# Patient Record
Sex: FEMALE | Race: WHITE | NOT HISPANIC OR LATINO | Employment: OTHER | URBAN - METROPOLITAN AREA
[De-identification: names, ages, dates, MRNs, and addresses within clinical notes are randomized per-mention and may not be internally consistent; named-entity substitution may affect disease eponyms.]

---

## 2017-01-17 ENCOUNTER — HOSPITAL ENCOUNTER (OUTPATIENT)
Dept: LAB | Facility: MEDICAL CENTER | Age: 72
End: 2017-01-17
Attending: INTERNAL MEDICINE
Payer: MEDICARE

## 2017-01-17 LAB
ALBUMIN SERPL BCP-MCNC: 4.2 G/DL (ref 3.2–4.9)
ALBUMIN/GLOB SERPL: 1.5 G/DL
ALP SERPL-CCNC: 49 U/L (ref 30–99)
ALT SERPL-CCNC: 16 U/L (ref 2–50)
ANION GAP SERPL CALC-SCNC: 4 MMOL/L (ref 0–11.9)
AST SERPL-CCNC: 12 U/L (ref 12–45)
BASOPHILS # BLD AUTO: 0.04 K/UL (ref 0–0.12)
BASOPHILS NFR BLD AUTO: 0.8 % (ref 0–1.8)
BILIRUB SERPL-MCNC: 0.5 MG/DL (ref 0.1–1.5)
BUN SERPL-MCNC: 35 MG/DL (ref 8–22)
CALCIUM SERPL-MCNC: 10.1 MG/DL (ref 8.5–10.5)
CHLORIDE SERPL-SCNC: 105 MMOL/L (ref 96–112)
CHOLEST SERPL-MCNC: 232 MG/DL (ref 100–199)
CO2 SERPL-SCNC: 29 MMOL/L (ref 20–33)
CREAT SERPL-MCNC: 0.89 MG/DL (ref 0.5–1.4)
EOSINOPHIL # BLD: 0.24 K/UL (ref 0–0.51)
EOSINOPHIL NFR BLD AUTO: 5 % (ref 0–6.9)
ERYTHROCYTE [DISTWIDTH] IN BLOOD BY AUTOMATED COUNT: 51.5 FL (ref 35.9–50)
GLOBULIN SER CALC-MCNC: 2.8 G/DL (ref 1.9–3.5)
GLUCOSE SERPL-MCNC: 93 MG/DL (ref 65–99)
HCT VFR BLD AUTO: 45.8 % (ref 37–47)
HDLC SERPL-MCNC: 58 MG/DL
HGB BLD-MCNC: 14.7 G/DL (ref 12–16)
IMM GRANULOCYTES # BLD AUTO: 0.02 K/UL (ref 0–0.11)
IMM GRANULOCYTES NFR BLD AUTO: 0.4 % (ref 0–0.9)
LDLC SERPL CALC-MCNC: 108 MG/DL
LYMPHOCYTES # BLD: 0.86 K/UL (ref 1–4.8)
LYMPHOCYTES NFR BLD AUTO: 17.8 % (ref 22–41)
MCH RBC QN AUTO: 34.1 PG (ref 27–33)
MCHC RBC AUTO-ENTMCNC: 32.1 G/DL (ref 33.6–35)
MCV RBC AUTO: 106.3 FL (ref 81.4–97.8)
MONOCYTES # BLD: 0.45 K/UL (ref 0–0.85)
MONOCYTES NFR BLD AUTO: 9.3 % (ref 0–13.4)
NEUTROPHILS # BLD: 3.22 K/UL (ref 2–7.15)
NEUTROPHILS NFR BLD AUTO: 66.7 % (ref 44–72)
NRBC # BLD AUTO: 0 K/UL
NRBC BLD-RTO: 0 /100 WBC
PLATELET # BLD AUTO: 187 K/UL (ref 164–446)
PMV BLD AUTO: 12 FL (ref 9–12.9)
POTASSIUM SERPL-SCNC: 4.7 MMOL/L (ref 3.6–5.5)
PROT SERPL-MCNC: 7 G/DL (ref 6–8.2)
RBC # BLD AUTO: 4.31 M/UL (ref 4.2–5.4)
SODIUM SERPL-SCNC: 138 MMOL/L (ref 135–145)
TRIGL SERPL-MCNC: 330 MG/DL (ref 0–149)
WBC # BLD AUTO: 4.8 K/UL (ref 4.8–10.8)

## 2017-01-17 PROCEDURE — 80061 LIPID PANEL: CPT

## 2017-01-17 PROCEDURE — 36415 COLL VENOUS BLD VENIPUNCTURE: CPT

## 2017-01-17 PROCEDURE — 80053 COMPREHEN METABOLIC PANEL: CPT

## 2017-01-17 PROCEDURE — 85025 COMPLETE CBC W/AUTO DIFF WBC: CPT

## 2017-01-19 ENCOUNTER — SLEEP CENTER VISIT (OUTPATIENT)
Dept: SLEEP MEDICINE | Facility: MEDICAL CENTER | Age: 72
End: 2017-01-19
Payer: MEDICARE

## 2017-01-19 VITALS
HEART RATE: 65 BPM | DIASTOLIC BLOOD PRESSURE: 52 MMHG | BODY MASS INDEX: 38.14 KG/M2 | WEIGHT: 243 LBS | SYSTOLIC BLOOD PRESSURE: 110 MMHG | RESPIRATION RATE: 18 BRPM | HEIGHT: 67 IN

## 2017-01-19 DIAGNOSIS — G47.33 OBSTRUCTIVE SLEEP APNEA: ICD-10-CM

## 2017-01-19 PROCEDURE — 99213 OFFICE O/P EST LOW 20 MIN: CPT | Performed by: INTERNAL MEDICINE

## 2017-01-19 NOTE — PROGRESS NOTES
Clau Vences is a 71 y.o. female here for obstructive sleep apnea.    History of Present Illness:    The patient is a 71-year-old female has obstructive sleep apnea, coronary disease and hypertension. She is currently on CPAP of 17 cm water and comes in today for an annual visit. The apnea hypopnea index of 0.2 and she is averaging 5 hours and 50 minutes a night with the machine. She is having a moderate leak. She is using a nasal mask but she's not using a chinstrap. She does wake up with a dry throat and dry mouth. She is sleeping well and wakes up feeling rested and denies any daytime sleepiness. Overall she is very pleased with the results of the CPAP therapy. She has no history of any breathing problems during the daytime and denies any cough or congestion or dyspnea on exertion. She has no new complaints today. Her current CPAP machine is 1-year-old.    Constitutional:  Negative for fever, chills, sweats, and fatigue.  Eyes:  Negative for eye pain and visual changes.  HENT:  Negative for tinnitus and hoarse voice.  Cardiovascular:  Negative for chest pain, leg swelling, syncope and orthopnea.  Respiratory:  See HPI for pertinent negatives  Sleep:  Negative for somnolence, loud snoring, sleep disturbance due to breathing, insomnia.  Gastrointestinal:  Negative for dysphagia, nausea and abdominal pain.  Heme/lymph:  Denies easy bruising, blood clots.  Musculoskeletal:  Negative for arthralgias, sore muscles and back pain.  Skin:  Negative for rash and color change.  Neurological:  Negative for headaches, lightheadedness and weakness.  Psychiatric:  Denies depression.    Current Outpatient Prescriptions   Medication Sig Dispense Refill   • IRON PO Take  by mouth every day.     • Cyanocobalamin (VITAMIN B12 PO) Take  by mouth every day.     • losartan-hydrochlorothiazide (HYZAAR) 100-25 MG per tablet Take 1 Tab by mouth every day.     • carvedilol (COREG) 6.25 MG TABS Take 6.25 mg by mouth 2 times a day,  with meals.     • simvastatin (ZOCOR) 20 MG TABS Take 20 mg by mouth every evening.     • zolpidem (AMBIEN) 10 MG Tab Take 5 mg by mouth at bedtime as needed for Sleep.     • docusate sodium (COLACE) 100 MG CAPS Take 100 mg by mouth every day.     • oxycodone-acetaminophen (PERCOCET) 7.5-325 MG per tablet Take 1-2 Tabs by mouth every 6 hours as needed (pain). (Patient not taking: Reported on 1/19/2017) 60 Tab 0   • metoclopramide (REGLAN) 10 MG TABS Take 1 Tab by mouth every 6 hours as needed (nausea ). (Patient not taking: Reported on 1/19/2017) 30 Tab 0   • FOLIC ACID PO Take  by mouth every day.     • Acetaminophen (TYLENOL 8 HOUR PO) Take  by mouth as needed.     • LEXAPRO PO Take 20 mg by mouth every day.       No current facility-administered medications for this visit.       Social History   Substance Use Topics   • Smoking status: Never Smoker    • Smokeless tobacco: Never Used   • Alcohol Use: 0.0 oz/week     0 Standard drinks or equivalent per week      Comment: 5 per week       Past Medical History   Diagnosis Date   • CAD (coronary artery disease)    • Hypertension    • Other specified symptom associated with female genital organs    • Snoring    • Psychiatric problem    • Unspecified cataract    • Anesthesia      low O2 sat   • Arthritis      osteo   • Sleep apnea      c-pap with 3 l/nc   • Dyslipidemia        Past Surgical History   Procedure Laterality Date   • Gastroscopy with biopsy  7/2/2014     Performed by Sky Vila M.D. at SURGERY [facility] ORS   • Other orthopedic surgery  2006     left total knee   • Other       meghan cataracts   • Hysterectomy robotic  10/23/2014     Performed by Smith Lyons M.D. at SURGERY [facility] ORS   • Tonsillectomy and adenoidectomy     • Other abdominal surgery       Resection of pelvic mass, uterus and ovaries       Allergies:  Nkda    Family History   Problem Relation Age of Onset   • Breast Cancer Mother    • Cancer Father      Colon       Physical  "Examination    Filed Vitals:    01/19/17 1035   Height: 1.702 m (5' 7\")   Weight: 110.224 kg (243 lb)   Weight % change since last entry.: 0 %   BP: 110/52   Pulse: 65   BMI (Calculated): 38.06   Resp: 18   O2 sat % room air: 90 %       Physical Exam:  Constitutional:  Well developed and well nourished.  Head:  Normocephalic and atraumatic.  Nose:  Nose normal.  Mouth/Throat:  Oropharynx is clear and moist, no lesions.  Mallampati class IV  Neck:  Normal range of motion.  Supple.  No JVD.  Cardiovascular:  Normal rate, regular rhythm, normal heart sounds. No edema  Pulmonary/Chest: No wheezing, rales or rhonchi.  Respiratory effort non labored  Musculoskeletal.  No muscular atrophy.  Lymphadenopathy:  No cervical or supraclavicular adenopathy  Neurological:  Alert and oriented.  Cranial nerves intact.  No focal deficits  Skin:  No rashes or ulcers.  Psyciatric:  Normal mood and affect.      Assessment and Plan:  1. Obstructive sleep apnea  I have recommended that she continue with her current CPAP settings at 17 cm of water. Treatment appears effective and well tolerated and she has been compliant. She is having a leak which sounds like a mouth leak. We have ordered a deluxe wide chinstrap. She will give it a try. Otherwise is no other issues and we'll see her back in one year.  - DME OTHER      Followup Return in about 1 year (around 1/19/2018) for follow up with the sleep physician, follow up visit with AKIRA.    "

## 2017-01-19 NOTE — MR AVS SNAPSHOT
"Clau Vences   2017 10:40 AM   Sleep Center Visit   MRN: [id number]    Department:  [facility]   Dept Phone:  [phone]    Description:  Female : 1945   Provider:  Smith Orourke M.D.           Reason for Visit     Follow-Up AZUL      Allergies as of 2017     Allergen Noted Reactions    Nkda [No Known Drug Allergy] 2007         You were diagnosed with     Obstructive sleep apnea   [[id number]]         Vital Signs     Blood Pressure Pulse Respirations Height Weight Body Mass Index    110/52 mmHg 65 18 1.702 m (5' 7\") 110.224 kg (243 lb) 38.05 kg/m2    Smoking Status                   Never Smoker            Basic Information     Date Of Birth Sex Race Ethnicity Preferred Language    1945 Female White Non- English      Problem List              ICD-10-CM Priority Class Noted - Resolved    Pulmonary embolism (CMS-HCC) I26.99   2014 - Present    Acute GI bleeding K92.2   2014 - Present    Respiratory failure, acute (CMS-[facility]) J96.00 High  2014 - Present    HTN (hypertension) I10   2014 - Present    Hyperlipidemia E78.5   2014 - Present    AZUL on CPAP G47.33   2014 - Present    Anemia D64.9   2014 - Present    Hypoxia R09.02   2014 - Present    Abdominal or pelvic swelling, mass or lump, unspecified site R19.00   10/23/2014 - Present      Health Maintenance        Date Due Completion Dates    IMM DTaP/Tdap/Td Vaccine (1 - Tdap) 3/3/1964 ---    PAP SMEAR 3/3/1966 ---    COLONOSCOPY 3/3/1995 ---    IMM ZOSTER VACCINE 3/3/2005 ---    IMM PNEUMOCOCCAL 65+ (ADULT) LOW/MEDIUM RISK SERIES (1 of 2 - PCV13) 3/3/2010 ---    IMM INFLUENZA (1) 2016 ---    MAMMOGRAM 2017, 2015, 2014, 3/13/2013, 3/8/2012, 2011, 2010, 2010, 2009, 2009, 2008, 2008, 2007, 2005, 2004    BONE DENSITY 2021, 3/13/2013, 2010, 2007            Current Immunizations     No " immunizations on file.      Below and/or attached are the medications your provider expects you to take. Review all of your home medications and newly ordered medications with your provider and/or pharmacist. Follow medication instructions as directed by your provider and/or pharmacist. Please keep your medication list with you and share with your provider. Update the information when medications are discontinued, doses are changed, or new medications (including over-the-counter products) are added; and carry medication information at all times in the event of emergency situations     Allergies:  NKDA - (reactions not documented)               Medications  Valid as of: January 19, 2017 - 11:05 AM    Generic Name Brand Name Tablet Size Instructions for use    Acetaminophen   Take  by mouth as needed.        Carvedilol (Tab) COREG 6.25 MG Take 6.25 mg by mouth 2 times a day, with meals.        Cyanocobalamin   Take  by mouth every day.        Docusate Sodium (Cap) COLACE 100 MG Take 100 mg by mouth every day.        Escitalopram Oxalate   Take 20 mg by mouth every day.        Folic Acid   Take  by mouth every day.        Iron   Take  by mouth every day.        Losartan Potassium-HCTZ (Tab) HYZAAR 100-25 MG Take 1 Tab by mouth every day.        Metoclopramide HCl (Tab) REGLAN 10 MG Take 1 Tab by mouth every 6 hours as needed (nausea ).        Oxycodone-Acetaminophen (Tab) PERCOCET 7.5-325 MG Take 1-2 Tabs by mouth every 6 hours as needed (pain).        Simvastatin (Tab) ZOCOR 20 MG Take 20 mg by mouth every evening.        Zolpidem Tartrate (Tab) AMBIEN 10 MG Take 5 mg by mouth at bedtime as needed for Sleep.        .                 Medicines prescribed today were sent to:     [id number] - [place], [place] - [address]    [address] [address]    Phone: [phone] Fax: [phone]    Open 24 Hours?: No      Medication refill instructions:       If your prescription bottle indicates you have medication  refills left, it is not necessary to call your provider’s office. Please contact your pharmacy and they will refill your medication.    If your prescription bottle indicates you do not have any refills left, you may request refills at any time through one of the following ways: The online Luqit system (except Urgent Care), by calling your provider’s office, or by asking your pharmacy to contact your provider’s office with a refill request. Medication refills are processed only during regular business hours and may not be available until the next business day. Your provider may request additional information or to have a follow-up visit with you prior to refilling your medication.   *Please Note: Medication refills are assigned a new Rx number when refilled electronically. Your pharmacy may indicate that no refills were authorized even though a new prescription for the same medication is available at the pharmacy. Please request the medicine by name with the pharmacy before contacting your provider for a refill.        Instructions    1.  Continue with CPAP therapy as you are currently doing  2. We have prescribed a chinstrap to be used with the nasal mask  3. Recommend follow-up in one year          Luqit Access Code: Activation code not generated  Current Luqit Status: Active

## 2017-05-04 ENCOUNTER — HOSPITAL ENCOUNTER (OUTPATIENT)
Dept: RADIOLOGY | Facility: MEDICAL CENTER | Age: 72
End: 2017-05-04
Attending: INTERNAL MEDICINE
Payer: MEDICARE

## 2017-05-04 DIAGNOSIS — Z13.9 SCREENING: ICD-10-CM

## 2017-05-04 PROCEDURE — G0202 SCR MAMMO BI INCL CAD: HCPCS

## 2017-08-08 ENCOUNTER — OFFICE VISIT (OUTPATIENT)
Dept: INTERNAL MEDICINE | Facility: IMAGING CENTER | Age: 72
End: 2017-08-08
Payer: MEDICARE

## 2017-08-08 VITALS
RESPIRATION RATE: 14 BRPM | SYSTOLIC BLOOD PRESSURE: 142 MMHG | TEMPERATURE: 98.7 F | HEIGHT: 67 IN | BODY MASS INDEX: 37.98 KG/M2 | HEART RATE: 70 BPM | OXYGEN SATURATION: 91 % | DIASTOLIC BLOOD PRESSURE: 78 MMHG | WEIGHT: 242 LBS

## 2017-08-08 DIAGNOSIS — M15.9 PRIMARY OSTEOARTHRITIS INVOLVING MULTIPLE JOINTS: ICD-10-CM

## 2017-08-08 DIAGNOSIS — E78.2 MIXED HYPERLIPIDEMIA: ICD-10-CM

## 2017-08-08 DIAGNOSIS — F41.9 ANXIETY: ICD-10-CM

## 2017-08-08 DIAGNOSIS — F51.04 CHRONIC INSOMNIA: ICD-10-CM

## 2017-08-08 DIAGNOSIS — G47.33 OSA ON CPAP: ICD-10-CM

## 2017-08-08 DIAGNOSIS — I10 ESSENTIAL HYPERTENSION: ICD-10-CM

## 2017-08-08 PROCEDURE — 99204 OFFICE O/P NEW MOD 45 MIN: CPT | Performed by: FAMILY MEDICINE

## 2017-08-08 RX ORDER — DIAZEPAM 5 MG/1
5 TABLET ORAL EVERY 12 HOURS PRN
COMMUNITY
End: 2017-10-18 | Stop reason: SDUPTHER

## 2017-08-08 RX ORDER — CARVEDILOL 12.5 MG/1
12.5 TABLET ORAL 2 TIMES DAILY WITH MEALS
COMMUNITY
End: 2017-10-09 | Stop reason: SDUPTHER

## 2017-08-08 RX ORDER — SERTRALINE HYDROCHLORIDE 100 MG/1
100 TABLET, FILM COATED ORAL DAILY
COMMUNITY
End: 2018-12-11 | Stop reason: SDUPTHER

## 2017-08-08 RX ORDER — ZOLPIDEM TARTRATE 5 MG/1
5 TABLET ORAL NIGHTLY PRN
COMMUNITY
End: 2017-08-14 | Stop reason: SDUPTHER

## 2017-08-08 ASSESSMENT — PATIENT HEALTH QUESTIONNAIRE - PHQ9: CLINICAL INTERPRETATION OF PHQ2 SCORE: 0

## 2017-08-09 PROBLEM — F41.9 ANXIETY: Status: ACTIVE | Noted: 2017-08-09

## 2017-08-09 PROBLEM — F51.04 CHRONIC INSOMNIA: Status: ACTIVE | Noted: 2017-08-09

## 2017-08-09 NOTE — PROGRESS NOTES
CC: Establish care    HPI:  Clau is a 72 y.o. new female patient here to establish care.  Previous physician Dr. Farooq    She has a history of hyperlipidemia, hypertension, osteoarthritis, sleep apnea.  She is followed by [facility] and sees Dr. martinez once a year. She uses her sleep apnea regularly.    She is on carvedilol and Hyzaar for hypertension. She states this combination is working well for her. Denies any chest pain, shortness of breath, edema or cough.    She is on simvastatin for cholesterol. She has also been working with a nutritionist, Camilla Carr on diet, and weight loss.  She has slowly been losing weight.    She does have osteoarthritis has had a left total knee replacement. Has arthritis in her right knee and sees Dr. Celestine Starr for steroid injection every now and then. Her exercise includes yoga and Pilates. She states due to her arthritis she is somewhat limited with aerobic exercise.    She is status post complete hysterectomy by Dr. Hernandez for a benign ovarian mass.  She is up-to-date on her mammograms.    Up-to-date on colonoscopy.    She struggles with chronic insomnia. She states she has tapered down from 20 mg of Ambien to 5 mg of Ambien. She states without it she simply cannot sleep. She denies any side effects from it.    She is on sertraline for anxiety, previously on Lexapro. The sertraline seems to be working well for her. She rarely uses Valium mainly for flying anxiety.    She denies depression, no thoughts of hopelessness or suicide.      Past Medical History   Diagnosis Date   • Hypertension    • Other specified symptom associated with female genital organs    • Snoring    • Psychiatric problem    • Unspecified cataract    • Anesthesia      low O2 sat   • Arthritis      osteo   • Sleep apnea      c-pap with 3 l/nc   • Dyslipidemia        Past Surgical History   Procedure Laterality Date   • Gastroscopy with biopsy  7/2/2014     Performed by Sky Vila M.D. at  SURGERY [facility] ORS   • Other orthopedic surgery  2006     left total knee   • Other       meghan cataracts   • Hysterectomy robotic  10/23/2014     Performed by Smith Lyons M.D. at SURGERY [facility] ORS   • Tonsillectomy and adenoidectomy     • Other abdominal surgery       Resection of pelvic mass, uterus and ovaries       Family History   Problem Relation Age of Onset   • Breast Cancer Mother    • Cancer Mother 65     Breast   • Cancer Father      Colon   • Alcohol/Drug Father    • Hyperlipidemia Brother    • Heart Disease Brother      arrythmia       Social History   Substance Use Topics   • Smoking status: Never Smoker    • Smokeless tobacco: Never Used   • Alcohol Use: 0.0 oz/week     0 Standard drinks or equivalent per week      Comment: 2 glasses of wine     Counseling given: Not Answered     Patient Active Problem List    Diagnosis Date Noted   • Chronic insomnia 08/09/2017   • Anxiety 08/09/2017   • HTN (hypertension) 07/01/2014   • Hyperlipidemia 07/01/2014   • AZUL on CPAP 07/01/2014     Current Outpatient Prescriptions   Medication Sig Dispense Refill   • carvedilol (COREG) 12.5 MG Tab Take 12.5 mg by mouth 2 times a day, with meals.     • sertraline (ZOLOFT) 100 MG Tab Take 100 mg by mouth every day.     • zolpidem (AMBIEN) 5 MG Tab Take 5 mg by mouth at bedtime as needed for Sleep.     • diazepam (VALIUM) 5 MG Tab Take 5 mg by mouth every 12 hours as needed for Anxiety.     • vitamin D (CHOLECALCIFEROL) 1000 UNIT Tab Take 5,000 Units by mouth every day.     • Omega 3 1000 MG Cap Take  by mouth.     • docusate sodium (COLACE) 100 MG CAPS Take 100 mg by mouth every day.     • IRON PO Take 65 mg by mouth every day.     • Cyanocobalamin (VITAMIN B12 PO) Take  by mouth every day.     • FOLIC ACID PO Take  by mouth every day.     • losartan-hydrochlorothiazide (HYZAAR) 100-25 MG per tablet Take 1 Tab by mouth every day.     • simvastatin (ZOCOR) 20 MG TABS Take 20 mg by mouth every evening.     •  "metoclopramide (REGLAN) 10 MG TABS Take 1 Tab by mouth every 6 hours as needed (nausea ). (Patient not taking: Reported on 1/19/2017) 30 Tab 0   • Acetaminophen (TYLENOL 8 HOUR PO) Take  by mouth as needed.                     No current facility-administered medications for this visit.      Current supplements: As above.        REVIEW OF SYSTEMS:  GENERAL: No fatigue, slow intentional weight loss.  HEENT:  Ears--no earache, no change in hearing, no dizziness, no tinnitus.                 Eyes--no blurred vision, no discharge or pain                 Throat--No sore throat, no dysphagia, no hoarseness  CV:  No chest pain,dyspnea,palpitations or edema.  RESP:  No sob,cough,wheezing or hemoptysis.  GI: No dysphagia, heartburn,abdominal pain, nausea, vomiting, diarrhea or constipation.       No melena, jaundice, bleeding, incontinence or change in bowel habits.  :  No dysuria, polyuria, hematuria, incontinence, or nocturia.  MS: Arthritic pain, mostly in her right knee.  NEURO:  No seizures, syncope, paralysis, tremor, or weakness.  SKIN: No new or concerning skin lesions or changes.   PSYCH: Mood fine.          /78 mmHg  Pulse 70  Temp(Src) 37.1 °C (98.7 °F)  Resp 14  Ht 1.702 m (5' 7\")  Wt 109.77 kg (242 lb)  BMI 37.89 kg/m2  SpO2 91% Body mass index is 37.89 kg/(m^2).    PHYSICAL EXAM;  GENERAL; overweight female. No acute distress.   HEENT:  Head normocephalic and atraumatic.    PERRLA, EOMI. No conjunctival injection, no icterus.     TM's normal, nasal mucosa and mouth with no abnormalities.    Oropharynx is clear with no lesions.  NECK: Supple, no adenopathy or thyromegaly.  CV: RR. Normal S1,S2. No murmur, gallop or rub.          No JVD, Carotid pulses 2+ and sym. No bruits.  LUNGS:  Clear, no wheezes, rales or rhonchi.  ABDOMEN: Soft, NT, nondistended. Bowel sounds normal. No hepatosplenomegaly or masses. No rebound or guarding. No hernias.  EXTREMITIES:  No edema.   NEURO:  CN II-XII intact. " Motor and sensation grossly intact.   SKIN: No rashes or abnormal lesions.  Psych: Mood and affect are appropriate.        Assessment and Plan. The following treatment and monitoring plan is recommended:   1. Essential hypertension  Controlled on present medications.    2. Mixed hyperlipidemia  Controlled on present medications.  Recommend lab work in January.    3. AZUL on CPAP  Continue follow-up with pulmonary. Continue C Pap.  Continue efforts at weight loss.    4. BMI 37.0-37.9, adult  Continue efforts at weight loss.    5. Chronic insomnia  May continue on Ambien 5 mg nightly. Discussed sleep hygiene.    6. Anxiety  Rare Valium.  Continue Sertraline.    7. Healthcare Maintenance. Counseled re: nutrition, activity and safety. Reviewed immunizations.   Follow up 6 months and prn.      ·

## 2017-08-14 RX ORDER — ZOLPIDEM TARTRATE 5 MG/1
5 TABLET ORAL NIGHTLY PRN
Qty: 30 TAB | Refills: 3 | Status: SHIPPED
Start: 2017-08-14 | End: 2017-12-13 | Stop reason: SDUPTHER

## 2017-08-14 RX ORDER — SIMVASTATIN 20 MG
20 TABLET ORAL EVERY EVENING
Qty: 30 TAB | Refills: 11 | Status: SHIPPED | OUTPATIENT
Start: 2017-08-14 | End: 2018-08-24 | Stop reason: SDUPTHER

## 2017-09-13 ENCOUNTER — OFFICE VISIT (OUTPATIENT)
Dept: INTERNAL MEDICINE | Facility: IMAGING CENTER | Age: 72
End: 2017-09-13
Payer: MEDICARE

## 2017-09-13 VITALS
HEART RATE: 74 BPM | OXYGEN SATURATION: 90 % | RESPIRATION RATE: 14 BRPM | HEIGHT: 67 IN | WEIGHT: 242 LBS | DIASTOLIC BLOOD PRESSURE: 70 MMHG | SYSTOLIC BLOOD PRESSURE: 150 MMHG | TEMPERATURE: 98.5 F | BODY MASS INDEX: 37.98 KG/M2

## 2017-09-13 DIAGNOSIS — J06.9 VIRAL URI WITH COUGH: ICD-10-CM

## 2017-09-13 PROBLEM — R01.1 HEART MURMUR: Status: ACTIVE | Noted: 2017-09-13

## 2017-09-13 PROCEDURE — 99213 OFFICE O/P EST LOW 20 MIN: CPT | Performed by: FAMILY MEDICINE

## 2017-09-13 NOTE — PROGRESS NOTES
Chief Complaint   Patient presents with   • URI     and cough x 3 weeks       HISTORY OF PRESENT ILLNESS: Patient is a 72 y.o. female established patient who presents today Complaining of 3 weeks of cough. Started as a cold with some head congestion. She still has head congestion, clear drainage, ears feel full and plugged. Postnasal drainage which is triggering a cough. She denies any chest congestion. All of her drainage is clear. No fevers. She states she really does not feel that bad. The cough does keep her awake at night. No shortness of breath.  She has not tried taking anything except for cough lozenges.    Patient Active Problem List    Diagnosis Date Noted   • Heart murmur 09/13/2017   • Chronic insomnia 08/09/2017   • Anxiety 08/09/2017   • HTN (hypertension) 07/01/2014   • Hyperlipidemia 07/01/2014   • AZUL on CPAP 07/01/2014     Current Outpatient Prescriptions on File Prior to Visit   Medication Sig Dispense Refill   • zolpidem (AMBIEN) 5 MG Tab Take 1 Tab by mouth at bedtime as needed for Sleep. 30 Tab 3   • simvastatin (ZOCOR) 20 MG Tab Take 1 Tab by mouth every evening. 30 Tab 11   • carvedilol (COREG) 12.5 MG Tab Take 12.5 mg by mouth 2 times a day, with meals.     • sertraline (ZOLOFT) 100 MG Tab Take 100 mg by mouth every day.     • diazepam (VALIUM) 5 MG Tab Take 5 mg by mouth every 12 hours as needed for Anxiety.     • vitamin D (CHOLECALCIFEROL) 1000 UNIT Tab Take 5,000 Units by mouth every day.     • Omega 3 1000 MG Cap Take  by mouth.     • docusate sodium (COLACE) 100 MG CAPS Take 100 mg by mouth every day.     • metoclopramide (REGLAN) 10 MG TABS Take 1 Tab by mouth every 6 hours as needed (nausea ). (Patient not taking: Reported on 1/19/2017) 30 Tab 0   • IRON PO Take 65 mg by mouth every day.     • Cyanocobalamin (VITAMIN B12 PO) Take  by mouth every day.     • FOLIC ACID PO Take  by mouth every day.     • Acetaminophen (TYLENOL 8 HOUR PO) Take  by mouth as needed.     •  "losartan-hydrochlorothiazide (HYZAAR) 100-25 MG per tablet Take 1 Tab by mouth every day.     • carvedilol (COREG) 6.25 MG TABS Take 6.25 mg by mouth 2 times a day, with meals.     • LEXAPRO PO Take 20 mg by mouth every day.       No current facility-administered medications on file prior to visit.          Past medical, surgical, family, and social history is reviewed and updated in Epic chart by me today.   Medications and allergies reviewed and updated in Epic chart by me today.     REVIEW OF SYSTEMS:  GENERAL: No fatigue, no weight loss.  HEENT:  Ears--ears feel full. No pain. No tinnitus.                 Eyes--no blurred vision, no discharge or pain                 Nasal congestion                 Throat--mild sore throat, no dysphagia, no hoarseness  CV:  No chest pain,dyspnea,palpitations or edema.  RESP:  No sob,,wheezing or hemoptysis. Nonproductive cough.  GI: No dysphagia, heartburn,abdominal pain, nausea, vomiting, diarrhea or constipation.       No melena, jaundice, bleeding, incontinence or change in bowel habits.      Vitals:    09/13/17 0800   BP: 150/70   Pulse: 74   Resp: 14   Temp: 36.9 °C (98.5 °F)   SpO2: 90%   Weight: 109.8 kg (242 lb)   Height: 1.702 m (5' 7.01\")     Physical Exam:  GENERAL;  WD/WN, No acute distress.  HEENT:  PERRLA, EOMI, no conjuctival injection, no icterus.                 TM's: Retracted bilaterally with fluid. No erythema                 Nasal mucosa erythematous and edematous.                 Pharynx injected. No exudate.  NECK: Supple with no adenopathy or thyromegaly.  LUNGS: Clear with no rales, rhonchi, or wheezes.  COR: Regular rate and rhythm. Grade 2/6 systolic murmur heard best over the aortic area. No Gallop or rub.  EXT: No edema.    Assessment/Plan:  1. Viral URI with cough     Recommend Mucinex twice daily, Flonase daily, increase fluids, rest. Call for worsening of her symptoms, fever  "

## 2017-10-06 ENCOUNTER — NON-PROVIDER VISIT (OUTPATIENT)
Dept: INTERNAL MEDICINE | Facility: IMAGING CENTER | Age: 72
End: 2017-10-06
Payer: MEDICARE

## 2017-10-06 DIAGNOSIS — Z23 NEED FOR INFLUENZA VACCINATION: ICD-10-CM

## 2017-10-06 PROCEDURE — 90662 IIV NO PRSV INCREASED AG IM: CPT | Performed by: FAMILY MEDICINE

## 2017-10-06 PROCEDURE — G0008 ADMIN INFLUENZA VIRUS VAC: HCPCS | Performed by: FAMILY MEDICINE

## 2017-10-09 RX ORDER — CARVEDILOL 12.5 MG/1
12.5 TABLET ORAL 2 TIMES DAILY WITH MEALS
Qty: 60 TAB | Refills: 6 | Status: SHIPPED | OUTPATIENT
Start: 2017-10-09 | End: 2018-05-12 | Stop reason: SDUPTHER

## 2017-10-18 RX ORDER — DIAZEPAM 5 MG/1
5 TABLET ORAL EVERY 12 HOURS PRN
Qty: 30 TAB | Refills: 0 | Status: SHIPPED
Start: 2017-10-18 | End: 2017-12-11 | Stop reason: SDUPTHER

## 2017-12-11 RX ORDER — DIAZEPAM 5 MG/1
TABLET ORAL
Qty: 30 TAB | Refills: 0 | Status: SHIPPED
Start: 2017-12-11 | End: 2018-01-24 | Stop reason: SDUPTHER

## 2017-12-13 ENCOUNTER — OFFICE VISIT (OUTPATIENT)
Dept: INTERNAL MEDICINE | Facility: IMAGING CENTER | Age: 72
End: 2017-12-13
Payer: MEDICARE

## 2017-12-13 VITALS
HEIGHT: 67 IN | WEIGHT: 237 LBS | RESPIRATION RATE: 14 BRPM | BODY MASS INDEX: 37.2 KG/M2 | TEMPERATURE: 97.6 F | SYSTOLIC BLOOD PRESSURE: 136 MMHG | OXYGEN SATURATION: 95 % | DIASTOLIC BLOOD PRESSURE: 70 MMHG | HEART RATE: 74 BPM

## 2017-12-13 DIAGNOSIS — M17.11 PRIMARY OSTEOARTHRITIS OF RIGHT KNEE: ICD-10-CM

## 2017-12-13 DIAGNOSIS — F51.04 CHRONIC INSOMNIA: ICD-10-CM

## 2017-12-13 DIAGNOSIS — G47.33 OSA ON CPAP: ICD-10-CM

## 2017-12-13 DIAGNOSIS — E78.2 MIXED HYPERLIPIDEMIA: ICD-10-CM

## 2017-12-13 DIAGNOSIS — I10 ESSENTIAL HYPERTENSION: ICD-10-CM

## 2017-12-13 DIAGNOSIS — Z01.818 PRE-OP EXAM: ICD-10-CM

## 2017-12-13 PROCEDURE — 99213 OFFICE O/P EST LOW 20 MIN: CPT | Performed by: FAMILY MEDICINE

## 2017-12-14 ENCOUNTER — NON-PROVIDER VISIT (OUTPATIENT)
Dept: INTERNAL MEDICINE | Facility: IMAGING CENTER | Age: 72
End: 2017-12-14
Payer: MEDICARE

## 2017-12-14 VITALS — DIASTOLIC BLOOD PRESSURE: 82 MMHG | SYSTOLIC BLOOD PRESSURE: 160 MMHG

## 2017-12-14 DIAGNOSIS — I10 ESSENTIAL HYPERTENSION: ICD-10-CM

## 2017-12-14 RX ORDER — ZOLPIDEM TARTRATE 5 MG/1
TABLET ORAL
Qty: 30 TAB | Refills: 2 | Status: SHIPPED
Start: 2017-12-14 | End: 2018-01-13

## 2017-12-15 RX ORDER — LOSARTAN POTASSIUM AND HYDROCHLOROTHIAZIDE 25; 100 MG/1; MG/1
1 TABLET ORAL DAILY
Qty: 90 TAB | Refills: 3 | Status: SHIPPED | OUTPATIENT
Start: 2017-12-15 | End: 2018-12-17 | Stop reason: SDUPTHER

## 2017-12-15 NOTE — PROGRESS NOTES
Chief Complaint   Patient presents with   • Pre-op Exam     Right TKA with Dr. Starr       HISTORY OF PRESENT ILLNESS: Patient is a 72 y.o. female established patient who presents today For preoperative evaluation for anticipated right total knee arthroplasty with Dr. Celestine Starr. She is tentatively scheduled for the day after Tiffany.    She has had her left knee replaced. She is anxious but looking forward to this.    She does have a history of hyperlipidemia and hypertension, sleep apnea and obesity. She denies any chest pain, shortness breath, palpitations. Denies fatigue. No pedal edema. No history of complications with anesthesia.  She will be getting lab work and an EKG and chest x-ray at [facility] tomorrow.          Patient Active Problem List    Diagnosis Date Noted   • Heart murmur 09/13/2017   • Chronic insomnia 08/09/2017   • Anxiety 08/09/2017   • HTN (hypertension) 07/01/2014   • Hyperlipidemia 07/01/2014   • AZUL on CPAP 07/01/2014     Current Outpatient Prescriptions on File Prior to Visit   Medication Sig Dispense Refill   • carvedilol (COREG) 12.5 MG Tab Take 1 Tab by mouth 2 times a day, with meals. 60 Tab 6   • simvastatin (ZOCOR) 20 MG Tab Take 1 Tab by mouth every evening. 30 Tab 11   • sertraline (ZOLOFT) 100 MG Tab Take 100 mg by mouth every day.     • Omega 3 1000 MG Cap Take  by mouth.     • diazepam (VALIUM) 5 MG Tab TAKE ONE (1) TABLET BY MOUTH EVERY TWELVE HOURS AS NEEDED FOR ANXIETY. 30 Tab 0   • vitamin D (CHOLECALCIFEROL) 1000 UNIT Tab Take 5,000 Units by mouth every day.     • docusate sodium (COLACE) 100 MG CAPS Take 100 mg by mouth every day.     • IRON PO Take 65 mg by mouth every day.     • Cyanocobalamin (VITAMIN B12 PO) Take  by mouth every day.     • FOLIC ACID PO Take  by mouth every day.     • Acetaminophen (TYLENOL 8 HOUR PO) Take  by mouth as needed.     • losartan-hydrochlorothiazide (HYZAAR) 100-25 MG per tablet Take 1 Tab by mouth every day.    "  • LEXAPRO PO Take 20 mg by mouth every day.       No current facility-administered medications on file prior to visit.          Past medical, surgical, family, and social history is reviewed and updated in Epic chart by me today.   Medications and allergies reviewed and updated in Epic chart by me today.     REVIEW OF SYSTEMS:  GENERAL: No fatigue, no weight loss.  HEENT:  Ears--no earache, no change in hearing, no dizziness, no tinnitus.                 Eyes--no blurred vision, no discharge or pain                 Throat--No sore throat, no dysphagia, no hoarseness  CV:  No chest pain,dyspnea,palpitations or edema.  RESP:  No sob,cough,wheezing or hemoptysis.  GI: No dysphagia, heartburn,abdominal pain, nausea, vomiting, diarrhea or constipation.       No melena, jaundice, bleeding, incontinence or change in bowel habits.  :  No dysuria, polyuria, hematuria, incontinence, or nocturia.  MS:  Right knee pain  NEURO:  No seizures, syncope, paralysis, tremor, or weakness.  SKIN: No new or concerning skin lesions or changes.   PSYCH: Mood fine.    Vitals:    12/13/17 1400   BP: 136/70   Pulse: 74   Resp: 14   Temp: 36.4 °C (97.6 °F)   SpO2: 95%   Weight: 107.5 kg (237 lb)   Height: 1.702 m (5' 7.01\")     Physical Exam:  Gen: Obese female.. No acute distress.  Neck:  Supple, no adenopathy or thyromegaly.  Heart:  Regular rate and rhythm.  Normal S1, S2. No gallop or rub. Grade 2/6 systolic murmur heard best over the aortic area. No change.  Lungs:  Clear, No wheezes,rales or rhonchi.  Abdomen: Soft, nontender, nondistended. Normal bowel sounds. No hepatosplenomegaly or masses, or hernias. No rebound or guarding.  Extremities:  No edema.  Psych: Mood and affect are appropriate.      Assessment/Plan:  1. Pre-op exam. She is medically stable and low risk for surgery. Will review her EKG and lab work.     2. Primary osteoarthritis of right knee . Anticipating knee replacement surgery.     3. Essential hypertension . " Controlled. Continue present medications.     4. Mixed hyperlipidemia     5. AZUL on CPAP        Follow-up 1-2 months     Addendum:   Labwork from [facility] reviewed.  EKG reviewed.   NSR, rate 73, 1st degree AV block. No ectopy or acute changes.                             No change compared to EKG of 10/15/2014.

## 2017-12-18 ENCOUNTER — TELEPHONE (OUTPATIENT)
Dept: INTERNAL MEDICINE | Facility: IMAGING CENTER | Age: 72
End: 2017-12-18

## 2017-12-18 DIAGNOSIS — I10 ESSENTIAL HYPERTENSION: ICD-10-CM

## 2017-12-18 RX ORDER — AMLODIPINE BESYLATE 5 MG/1
5 TABLET ORAL DAILY
Qty: 30 TAB | Refills: 3 | Status: SHIPPED | OUTPATIENT
Start: 2017-12-18 | End: 2018-02-22

## 2017-12-19 NOTE — TELEPHONE ENCOUNTER
Will add amlodipine 5mg at hs.  She will come in Thursday for BP check    Anticipating surgery on 12/26

## 2018-01-16 ENCOUNTER — TELEPHONE (OUTPATIENT)
Dept: INTERNAL MEDICINE | Facility: IMAGING CENTER | Age: 73
End: 2018-01-16

## 2018-01-17 NOTE — TELEPHONE ENCOUNTER
Has been feeling nauseous for the past 2 days. Off all pain medicines from her knee surgery. The only new medication with amlodipine which was started December 18.  Food makes it better. Has tried Pepcid. That did not make a difference. Bowels are normal. No pain. No fever. No urinary symptoms.  Will have her try going off the amlodipine for 2 or 3 days and let me know. Encouraged small frequent meals. Follow-up in 2-3

## 2018-01-24 DIAGNOSIS — F41.9 ANXIETY: ICD-10-CM

## 2018-01-24 RX ORDER — DIAZEPAM 5 MG/1
TABLET ORAL
Qty: 30 TAB | Refills: 0 | Status: SHIPPED
Start: 2018-01-24 | End: 2018-02-23

## 2018-02-22 ENCOUNTER — OFFICE VISIT (OUTPATIENT)
Dept: INTERNAL MEDICINE | Facility: IMAGING CENTER | Age: 73
End: 2018-02-22
Payer: MEDICARE

## 2018-02-22 VITALS
TEMPERATURE: 98.5 F | HEIGHT: 67 IN | WEIGHT: 238 LBS | DIASTOLIC BLOOD PRESSURE: 72 MMHG | HEART RATE: 74 BPM | SYSTOLIC BLOOD PRESSURE: 118 MMHG | OXYGEN SATURATION: 94 % | BODY MASS INDEX: 37.35 KG/M2

## 2018-02-22 DIAGNOSIS — I10 ESSENTIAL HYPERTENSION: ICD-10-CM

## 2018-02-22 DIAGNOSIS — F41.9 ANXIETY: ICD-10-CM

## 2018-02-22 DIAGNOSIS — F51.04 CHRONIC INSOMNIA: ICD-10-CM

## 2018-02-22 PROCEDURE — 99213 OFFICE O/P EST LOW 20 MIN: CPT | Performed by: FAMILY MEDICINE

## 2018-02-22 RX ORDER — ZOLPIDEM TARTRATE 5 MG/1
TABLET ORAL
COMMUNITY
Start: 2018-02-09 | End: 2018-03-11 | Stop reason: SDUPTHER

## 2018-02-22 NOTE — PROGRESS NOTES
Chief Complaint   Patient presents with   • Hypertension     follow up . stopped norvasc       HISTORY OF PRESENT ILLNESS: Patient is a 72 y.o. female established patient who presents today to follow-up on hypertension. She is now taking Hyzaar and carvedilol. She stopped taking the amlodipine. Her blood pressure was elevated prior to her knee surgery. She believes she was just very anxious about the knee surgery. She has since discontinued the amlodipine. She is monitoring her blood pressure at home and her systolics are running mostly in the 130s. She denies any chest pain, shortness of breath, no edema.  She is receiving physical therapy. Her knee replacement went well.    She continues with some anxiety, is on Zoloft for mood and that seems to be working well for her. Does take Valium occasionally for anxiety. And she is dependent on Ambien for sleep.    Patient Active Problem List    Diagnosis Date Noted   • Heart murmur 09/13/2017   • Chronic insomnia 08/09/2017   • Anxiety 08/09/2017   • HTN (hypertension) 07/01/2014   • Hyperlipidemia 07/01/2014   • AZUL on CPAP 07/01/2014       Current Outpatient Prescriptions:   •  zolpidem (AMBIEN) 5 MG Tab, , Disp: , Rfl:   •  losartan-hydrochlorothiazide (HYZAAR) 100-25 MG per tablet, Take 1 Tab by mouth every day., Disp: 90 Tab, Rfl: 3  •  simvastatin (ZOCOR) 20 MG Tab, Take 1 Tab by mouth every evening., Disp: 30 Tab, Rfl: 11  •  sertraline (ZOLOFT) 100 MG Tab, Take 100 mg by mouth every day., Disp: , Rfl:   •  vitamin D (CHOLECALCIFEROL) 1000 UNIT Tab, Take 5,000 Units by mouth every day., Disp: , Rfl:   •  Omega 3 1000 MG Cap, Take  by mouth., Disp: , Rfl:   •  docusate sodium (COLACE) 100 MG CAPS, Take 100 mg by mouth every day., Disp: , Rfl:   •  IRON PO, Take 65 mg by mouth every day., Disp: , Rfl:   •  Cyanocobalamin (VITAMIN B12 PO), Take  by mouth every day., Disp: , Rfl:   •  FOLIC ACID PO, Take  by mouth every day., Disp: , Rfl:   •  acetaminophen-codeine #3  "(TYLENOL #3) 300-30 MG Tab, , Disp: , Rfl:   •  diazepam (VALIUM) 5 MG Tab, TAKE ONE (1) TABLET BY MOUTH EVERY TWELVE HOURS AS NEEDED FOR ANXIETY , Disp: 30 Tab, Rfl: 0  •  carvedilol (COREG) 12.5 MG Tab, Take 1 Tab by mouth 2 times a day, with meals., Disp: 60 Tab, Rfl: 6  •  Acetaminophen (TYLENOL 8 HOUR PO), Take  by mouth as needed., Disp: , Rfl:       Past medical, surgical, family, and social history is reviewed and updated in Epic chart by me today.   Medications and allergies reviewed and updated in Epic chart by me today.     REVIEW OF SYSTEMS:  GENERAL: No fatigue, no weight loss.  CV:  No chest pain,dyspnea,palpitations or edema.  RESP:  No sob,cough,wheezing or hemoptysis.  GI: No dysphagia, heartburn,abdominal pain, nausea, vomiting, diarrhea or constipation.       No melena, jaundice, bleeding, incontinence or change in bowel habits.  :  No dysuria, polyuria, hematuria, incontinence, or nocturia.  MS: Recent right knee replacement.  NEURO:  No seizures, syncope, paralysis, tremor, or weakness.  SKIN: No new or concerning skin lesions or changes.   PSYCH: Mood fine.    Vitals:    02/22/18 1125   BP: 118/72   Pulse: 74   Temp: 36.9 °C (98.5 °F)   SpO2: 94%   Weight: 108 kg (238 lb)   Height: 1.702 m (5' 7\")     Physical Exam:  Gen: Overweight female.. No acute distress.  Neck:  Supple, no adenopathy or thyromegaly.  Heart:  Regular rate and rhythm.  Normal S1, S2. No murmur, gallop or rub.  Lungs:  Clear, No wheezes,rales or rhonchi.  Extremities:  No edema.  Psych: Mood and affect are appropriate.        Assessment/Plan:  1. Essential hypertension      Well controlled on Hyzaar and carvedilol. She'll continue same.   2. Anxiety. Continue with sertraline 100 mg daily. Sparing use of Valium.   3. Chronic insomnia. Continue Ambien. Discussed sleep hygiene. She is cautioned not to use the Ambien and Valium within the same 4 hour period   Recheck in 3 months.     "

## 2018-03-09 ENCOUNTER — OFFICE VISIT (OUTPATIENT)
Dept: INTERNAL MEDICINE | Facility: IMAGING CENTER | Age: 73
End: 2018-03-09
Payer: MEDICARE

## 2018-03-09 VITALS
HEART RATE: 62 BPM | HEIGHT: 67 IN | RESPIRATION RATE: 14 BRPM | TEMPERATURE: 36.6 F | OXYGEN SATURATION: 93 % | WEIGHT: 238 LBS | DIASTOLIC BLOOD PRESSURE: 80 MMHG | BODY MASS INDEX: 37.35 KG/M2 | SYSTOLIC BLOOD PRESSURE: 128 MMHG

## 2018-03-09 DIAGNOSIS — N89.8 VAGINAL LESION: ICD-10-CM

## 2018-03-09 PROCEDURE — 99213 OFFICE O/P EST LOW 20 MIN: CPT | Performed by: FAMILY MEDICINE

## 2018-03-09 NOTE — PROGRESS NOTES
"Chief Complaint   Patient presents with   • Cyst       HPI:  Patient is a 73 y.o. female established patient of Dr. Davies who presents today for evaluation of new vaginal lesion present for the past two days. She reports riding stationary bike regularly for physical therapy related to R TKA 12/27/17 and reports that her vaginal area has become very irritated and has increased moisture. She noticed a prominent lump on inside of R labia that is concerning to her. She denies associated pain nor drainage from lesion and has no associated systemic symptoms at this time.     Patient Active Problem List    Diagnosis Date Noted   • Heart murmur 09/13/2017   • Chronic insomnia 08/09/2017   • Anxiety 08/09/2017   • HTN (hypertension) 07/01/2014   • Hyperlipidemia 07/01/2014   • AZUL on CPAP 07/01/2014     Past medical, surgical, family, and social history was reviewed in Epic chart by me today.     Medications and allergies reviewed and updated in Epic chart by me today.     ROS:  Pertinent positives listed above in HPI. All other systems have been reviewed and are negative.    PE:   /80   Pulse 62   Temp (!) 2.6 °C (36.6 °F)   Resp 14   Ht 1.702 m (5' 7.01\")   Wt 108 kg (238 lb)   SpO2 93%   BMI 37.27 kg/m²   Vital signs reviewed with patient.     Gen: Well developed; well nourished; no acute distress; age appropriate appearance   Extremities: No peripheral edema b/l LE extremities/ no clubbing nor cyanosis noted  Skin: Warm and dry; no rashes noted   Neuro: No focal deficits noted   Psych: AAOx4; mood and affect are appropriate  : External genitalia normal with no lesions noted. Small cyst lesion noted on inside of R labia: no material can be expressed from it and no signs of secondary infection present/ no vaginal odor or discharge noted on exam today.       A/P:  1. Vaginal lesion  Suspect patient had small inclusion cyst that is self draining and resolving. No indication for further treatment at this " time    Pt is to call our office if lesion does not resolve on its own.

## 2018-03-11 DIAGNOSIS — F51.01 PRIMARY INSOMNIA: ICD-10-CM

## 2018-03-12 ENCOUNTER — APPOINTMENT (OUTPATIENT)
Dept: SLEEP MEDICINE | Facility: MEDICAL CENTER | Age: 73
End: 2018-03-12
Payer: MEDICARE

## 2018-03-12 RX ORDER — ZOLPIDEM TARTRATE 5 MG/1
5 TABLET ORAL NIGHTLY PRN
Qty: 30 TAB | Refills: 0 | Status: SHIPPED
Start: 2018-03-12 | End: 2018-04-11

## 2018-03-19 DIAGNOSIS — F41.9 ANXIETY: ICD-10-CM

## 2018-03-19 RX ORDER — DIAZEPAM 5 MG/1
TABLET ORAL
Qty: 30 TAB | Refills: 0 | Status: SHIPPED
Start: 2018-03-19 | End: 2018-08-15 | Stop reason: SDUPTHER

## 2018-04-06 ENCOUNTER — SLEEP CENTER VISIT (OUTPATIENT)
Dept: SLEEP MEDICINE | Facility: MEDICAL CENTER | Age: 73
End: 2018-04-06
Payer: MEDICARE

## 2018-04-06 VITALS
HEART RATE: 70 BPM | WEIGHT: 225.9 LBS | DIASTOLIC BLOOD PRESSURE: 62 MMHG | OXYGEN SATURATION: 95 % | RESPIRATION RATE: 18 BRPM | BODY MASS INDEX: 35.46 KG/M2 | SYSTOLIC BLOOD PRESSURE: 114 MMHG | HEIGHT: 67 IN

## 2018-04-06 DIAGNOSIS — G47.33 OSA ON CPAP: ICD-10-CM

## 2018-04-06 DIAGNOSIS — R01.1 HEART MURMUR: ICD-10-CM

## 2018-04-06 PROCEDURE — 99213 OFFICE O/P EST LOW 20 MIN: CPT | Performed by: NURSE PRACTITIONER

## 2018-04-06 NOTE — PROGRESS NOTES
Chief Complaint   Patient presents with   • Apnea     Annual Compliance         HPI: This patient is a 73 y.o. female, who presents for annual follow-up of obstructive sleep apnea.     PSG 2007 indicates severe obstructive sleep apnea with an RDI of 93. she is compliant with CPAP 17 cm H2O. Compliance download over the past 30 days indicates 100 % compliance, average use of 5 hours 44 minutes per night, AHI of 0.4. patient reports continuing to benefit from therapy. She's used her CPAP machine so long she does not know really how she feels about it. She denies a.m. headaches. She does not nap during the day. She recently had a right knee replacement and is working with physical therapy, healing well. She requires a prescription for supplies to [facility].    Past Medical History:   Diagnosis Date   • Anesthesia     low O2 sat   • Arthritis     osteo   • Dyslipidemia    • Heart murmur 9/13/2017   • Hypertension    • Other specified symptom associated with female genital organs    • Psychiatric problem    • Sleep apnea     c-pap with 3 l/nc   • Snoring    • Unspecified cataract        Social History   Substance Use Topics   • Smoking status: Never Smoker   • Smokeless tobacco: Never Used   • Alcohol use 8.4 oz/week     14 Glasses of wine per week      Comment: 2 glasses of wine per day       Family History   Problem Relation Age of Onset   • Breast Cancer Mother    • Cancer Mother 65     Breast   • Cancer Father      Colon   • Alcohol/Drug Father    • Hyperlipidemia Brother    • Heart Disease Brother      arrythmia       Current medications as of today   Current Outpatient Prescriptions   Medication Sig Dispense Refill   • zolpidem (AMBIEN) 5 MG Tab Take 1 Tab by mouth at bedtime as needed for Sleep for up to 30 days. 30 Tab 0   • losartan-hydrochlorothiazide (HYZAAR) 100-25 MG per tablet Take 1 Tab by mouth every day. 90 Tab 3   • carvedilol (COREG) 12.5 MG Tab Take 1 Tab by mouth 2 times a day, with meals. 60 Tab 6   •  "simvastatin (ZOCOR) 20 MG Tab Take 1 Tab by mouth every evening. 30 Tab 11   • sertraline (ZOLOFT) 100 MG Tab Take 100 mg by mouth every day.     • vitamin D (CHOLECALCIFEROL) 1000 UNIT Tab Take 5,000 Units by mouth every day.     • Omega 3 1000 MG Cap Take  by mouth.     • IRON PO Take 65 mg by mouth every day.     • Cyanocobalamin (VITAMIN B12 PO) Take  by mouth every day.     • diazePAM (VALIUM) 5 MG Tab TAKE ONE (1) TABLET BY MOUTH EVERY TWELVE HOURS AS NEEDED FOR ANXIETY 30 Tab 0   • acetaminophen-codeine #3 (TYLENOL #3) 300-30 MG Tab      • docusate sodium (COLACE) 100 MG CAPS Take 100 mg by mouth every day.     • FOLIC ACID PO Take  by mouth every day.     • Acetaminophen (TYLENOL 8 HOUR PO) Take  by mouth as needed.       No current facility-administered medications for this visit.        Allergies: Nkda [no known drug allergy] and Tramadol    Blood pressure 114/62, pulse 70, resp. rate 18, height 1.702 m (5' 7\"), weight 102.5 kg (225 lb 14.4 oz), SpO2 95 %.      ROS: As per HPI and otherwise negative if not stated.      Physical exam:   Constitutional: Well-nourished, well-developed, in no acute distress  Eyes: PERRL  Neck: supple, trachea midline  Respiratory: no intercostal retractions or accessory muscle use   Lungs auscultation: Clear to auscultation bilaterally  Cardiovascular: Regular rate rhythm, systolic murmur noted, rubs or gallops  Musculoskeletal: no clubbing or cyanosis  Skin: No rashes or lesions noted on exposed skin  Neuro: No focal deficit noted  Psychiatric: Oriented to time, person and place.     Diagnosis:  1. AZUL on CPAP  DME MASK AND SUPPLIES   2. Heart murmur     3. BMI 35.0-35.9,adult         Plan:  1. Continue CPAP nightly  2. Order for new mask and supplies to [facility].  3. Clean mask & tubing weekly  4. Replace supplies as insurance will allow  5. Follow up annually, sooner if needed  6. Patient may like to consider purchasing a travel CPAP. Information for [facility] was " provided.

## 2018-04-11 ENCOUNTER — TELEPHONE (OUTPATIENT)
Dept: INTERNAL MEDICINE | Facility: IMAGING CENTER | Age: 73
End: 2018-04-11

## 2018-04-11 DIAGNOSIS — Z78.0 POST-MENOPAUSE: ICD-10-CM

## 2018-04-11 NOTE — TELEPHONE ENCOUNTER
----- Message from Casandra Angel R.N. sent at 4/11/2018 10:18 AM PDT -----  Please order DEXA scan.    M

## 2018-04-12 DIAGNOSIS — F51.04 CHRONIC INSOMNIA: ICD-10-CM

## 2018-04-13 RX ORDER — ZOLPIDEM TARTRATE 5 MG/1
TABLET ORAL
Qty: 30 TAB | Refills: 0 | Status: SHIPPED
Start: 2018-04-13 | End: 2018-05-07 | Stop reason: SDUPTHER

## 2018-05-07 ENCOUNTER — HOSPITAL ENCOUNTER (OUTPATIENT)
Dept: RADIOLOGY | Facility: MEDICAL CENTER | Age: 73
End: 2018-05-07
Attending: FAMILY MEDICINE
Payer: MEDICARE

## 2018-05-07 DIAGNOSIS — Z78.0 POST-MENOPAUSE: ICD-10-CM

## 2018-05-07 DIAGNOSIS — Z12.31 ENCOUNTER FOR SCREENING MAMMOGRAM FOR MALIGNANT NEOPLASM OF BREAST: ICD-10-CM

## 2018-05-07 PROCEDURE — 77063 BREAST TOMOSYNTHESIS BI: CPT

## 2018-05-07 PROCEDURE — 77080 DXA BONE DENSITY AXIAL: CPT

## 2018-05-14 RX ORDER — CARVEDILOL 12.5 MG/1
TABLET ORAL
Qty: 180 TAB | Refills: 1 | Status: SHIPPED | OUTPATIENT
Start: 2018-05-14 | End: 2018-11-16 | Stop reason: SDUPTHER

## 2018-06-05 ENCOUNTER — OFFICE VISIT (OUTPATIENT)
Dept: INTERNAL MEDICINE | Facility: IMAGING CENTER | Age: 73
End: 2018-06-05
Payer: MEDICARE

## 2018-06-05 VITALS
SYSTOLIC BLOOD PRESSURE: 120 MMHG | WEIGHT: 231 LBS | TEMPERATURE: 96.7 F | DIASTOLIC BLOOD PRESSURE: 74 MMHG | HEIGHT: 67 IN | HEART RATE: 70 BPM | BODY MASS INDEX: 36.26 KG/M2 | RESPIRATION RATE: 14 BRPM | OXYGEN SATURATION: 90 %

## 2018-06-05 DIAGNOSIS — F51.04 CHRONIC INSOMNIA: ICD-10-CM

## 2018-06-05 PROCEDURE — 99213 OFFICE O/P EST LOW 20 MIN: CPT | Performed by: FAMILY MEDICINE

## 2018-06-05 RX ORDER — ZOLPIDEM TARTRATE 5 MG/1
5 TABLET ORAL
Qty: 30 TAB | Refills: 3 | Status: SHIPPED
Start: 2018-06-05 | End: 2018-10-01 | Stop reason: SDUPTHER

## 2018-06-05 NOTE — PROGRESS NOTES
Chief Complaint   Patient presents with   • Insomnia     chronic       HISTORY OF PRESENT ILLNESS: Patient is a 73 y.o. female established patient who presents today for follow-up on chronic insomnia and a prescription refill.  She suffers from chronic insomnia. She sometimes has difficulty initiating as well as maintaining sleep. She does have obstructive sleep apnea and uses CPAP consistently. She is dependent on Ambien for sleep. She takes 5 mg most every night. She states it helps her get to sleep and maintaining sleep. She feels rested. She denies side effects. No history of sleepwalking or sleep talking. She does not feel tired the next morning.    She is otherwise doing well. She had her right knee replaced. Dr. Starr has released her and she is starting back to exercising. She has lost a few pounds.      Patient Active Problem List    Diagnosis Date Noted   • BMI 35.0-35.9,adult 04/06/2018   • Heart murmur 09/13/2017   • Chronic insomnia 08/09/2017   • Anxiety 08/09/2017   • HTN (hypertension) 07/01/2014   • Hyperlipidemia 07/01/2014   • AZUL on CPAP 07/01/2014       Current Outpatient Prescriptions:   •  zolpidem (AMBIEN) 5 MG Tab, Take 1 Tab by mouth every bedtime for 30 days., Disp: 30 Tab, Rfl: 3  •  carvedilol (COREG) 12.5 MG Tab, TAKE ONE TABLET BY MOUTH TWICE DAILY WITH MEALS , Disp: 180 Tab, Rfl: 1  •  losartan-hydrochlorothiazide (HYZAAR) 100-25 MG per tablet, Take 1 Tab by mouth every day., Disp: 90 Tab, Rfl: 3  •  simvastatin (ZOCOR) 20 MG Tab, Take 1 Tab by mouth every evening., Disp: 30 Tab, Rfl: 11  •  sertraline (ZOLOFT) 100 MG Tab, Take 100 mg by mouth every day., Disp: , Rfl:   •  vitamin D (CHOLECALCIFEROL) 1000 UNIT Tab, Take 5,000 Units by mouth every day., Disp: , Rfl:   •  Omega 3 1000 MG Cap, Take  by mouth., Disp: , Rfl:   •  docusate sodium (COLACE) 100 MG CAPS, Take 100 mg by mouth every day., Disp: , Rfl:   •  IRON PO, Take 65 mg by mouth every day., Disp: , Rfl:   •  Cyanocobalamin  "(VITAMIN B12 PO), Take  by mouth every day., Disp: , Rfl:   •  FOLIC ACID PO, Take  by mouth every day., Disp: , Rfl:   •  Acetaminophen (TYLENOL 8 HOUR PO), Take  by mouth as needed., Disp: , Rfl:         Past medical, surgical, family, and social history is reviewed and updated in Epic chart by me today.   Medications and allergies reviewed and updated in Epic chart by me today.     REVIEW OF SYSTEMS:  GENERAL: No fatigue, slow weight loss.  CV:  No chest pain,dyspnea,palpitations or edema.  RESP:  No sob,cough,wheezing or hemoptysis.  GI: No dysphagia, heartburn,abdominal pain, nausea, vomiting, diarrhea or constipation.       No melena, jaundice, bleeding, incontinence or change in bowel habits.  :  No dysuria, polyuria, hematuria, incontinence, or nocturia.  MS:  No joint swelling, myalgias, or arthralgias.  NEURO:  No seizures, syncope, paralysis, tremor, or weakness.  SKIN: No new or concerning skin lesions or changes.   PSYCH: Mood fine.    Vitals:    06/05/18 1100   BP: 120/74   Pulse: 70   Resp: 14   Temp: 35.9 °C (96.7 °F)   SpO2: 90%   Weight: 104.8 kg (231 lb)   Height: 1.702 m (5' 7.01\")     Physical Exam:  Gen: Well developed, well nourished. No acute distress.  Neck:  Supple, no adenopathy or thyromegaly.  Heart:  Regular rate and rhythm.  Normal S1, S2. No murmur, gallop or rub.  Lungs:  Clear, No wheezes,rales or rhonchi.  Extremities:  No edema.  Psych: Mood and affect are appropriate.          Assessment/Plan:  1. Chronic insomnia . Discussed sleep hygiene. She is stable and will continue on Ambien 5 mg nightly. Follow-up in 3-4 months.  zolpidem (AMBIEN) 5 MG Tab   2. BMI 36.0-36.9,adult . Counseled regarding diet and exercise.  Patient identified as having weight management issue.  Appropriate orders and counseling given.          "

## 2018-06-07 ENCOUNTER — APPOINTMENT (OUTPATIENT)
Dept: OTHER | Facility: IMAGING CENTER | Age: 73
End: 2018-06-07

## 2018-06-14 ENCOUNTER — APPOINTMENT (OUTPATIENT)
Dept: OTHER | Facility: IMAGING CENTER | Age: 73
End: 2018-06-14

## 2018-06-21 ENCOUNTER — APPOINTMENT (OUTPATIENT)
Dept: OTHER | Facility: IMAGING CENTER | Age: 73
End: 2018-06-21

## 2018-06-28 ENCOUNTER — APPOINTMENT (OUTPATIENT)
Dept: OTHER | Facility: IMAGING CENTER | Age: 73
End: 2018-06-28

## 2018-07-02 ENCOUNTER — APPOINTMENT (OUTPATIENT)
Dept: OTHER | Facility: IMAGING CENTER | Age: 73
End: 2018-07-02

## 2018-07-05 ENCOUNTER — APPOINTMENT (OUTPATIENT)
Dept: OTHER | Facility: IMAGING CENTER | Age: 73
End: 2018-07-05

## 2018-07-12 ENCOUNTER — APPOINTMENT (OUTPATIENT)
Dept: OTHER | Facility: IMAGING CENTER | Age: 73
End: 2018-07-12

## 2018-07-19 ENCOUNTER — APPOINTMENT (OUTPATIENT)
Dept: OTHER | Facility: IMAGING CENTER | Age: 73
End: 2018-07-19

## 2018-07-26 ENCOUNTER — APPOINTMENT (OUTPATIENT)
Dept: OTHER | Facility: IMAGING CENTER | Age: 73
End: 2018-07-26

## 2018-08-02 ENCOUNTER — APPOINTMENT (OUTPATIENT)
Dept: OTHER | Facility: IMAGING CENTER | Age: 73
End: 2018-08-02

## 2018-08-09 ENCOUNTER — APPOINTMENT (OUTPATIENT)
Dept: OTHER | Facility: IMAGING CENTER | Age: 73
End: 2018-08-09

## 2018-08-15 DIAGNOSIS — F41.9 ANXIETY: ICD-10-CM

## 2018-08-15 RX ORDER — DIAZEPAM 5 MG/1
TABLET ORAL
Qty: 30 TAB | Refills: 0 | Status: SHIPPED
Start: 2018-08-15 | End: 2018-10-18 | Stop reason: SDUPTHER

## 2018-08-23 ENCOUNTER — APPOINTMENT (OUTPATIENT)
Dept: OTHER | Facility: IMAGING CENTER | Age: 73
End: 2018-08-23

## 2018-08-24 RX ORDER — SIMVASTATIN 20 MG
20 TABLET ORAL EVERY EVENING
Qty: 30 TAB | Refills: 0 | Status: SHIPPED | OUTPATIENT
Start: 2018-08-24 | End: 2018-10-02 | Stop reason: SDUPTHER

## 2018-08-30 ENCOUNTER — APPOINTMENT (OUTPATIENT)
Dept: OTHER | Facility: IMAGING CENTER | Age: 73
End: 2018-08-30

## 2018-09-06 ENCOUNTER — APPOINTMENT (OUTPATIENT)
Dept: OTHER | Facility: IMAGING CENTER | Age: 73
End: 2018-09-06

## 2018-09-10 ENCOUNTER — APPOINTMENT (OUTPATIENT)
Dept: OTHER | Facility: IMAGING CENTER | Age: 73
End: 2018-09-10

## 2018-09-13 ENCOUNTER — APPOINTMENT (OUTPATIENT)
Dept: OTHER | Facility: IMAGING CENTER | Age: 73
End: 2018-09-13

## 2018-09-20 ENCOUNTER — APPOINTMENT (OUTPATIENT)
Dept: OTHER | Facility: IMAGING CENTER | Age: 73
End: 2018-09-20

## 2018-09-27 ENCOUNTER — APPOINTMENT (OUTPATIENT)
Dept: OTHER | Facility: IMAGING CENTER | Age: 73
End: 2018-09-27

## 2018-10-01 DIAGNOSIS — F51.04 CHRONIC INSOMNIA: ICD-10-CM

## 2018-10-01 RX ORDER — ZOLPIDEM TARTRATE 5 MG/1
5 TABLET ORAL
Qty: 30 TAB | Refills: 0 | Status: SHIPPED
Start: 2018-10-01 | End: 2018-10-29 | Stop reason: SDUPTHER

## 2018-10-02 RX ORDER — SIMVASTATIN 20 MG
TABLET ORAL
Qty: 30 TAB | Refills: 3 | Status: SHIPPED | OUTPATIENT
Start: 2018-10-02 | End: 2019-02-05 | Stop reason: SDUPTHER

## 2018-10-04 ENCOUNTER — APPOINTMENT (OUTPATIENT)
Dept: OTHER | Facility: IMAGING CENTER | Age: 73
End: 2018-10-04

## 2018-10-11 ENCOUNTER — APPOINTMENT (OUTPATIENT)
Dept: OTHER | Facility: IMAGING CENTER | Age: 73
End: 2018-10-11

## 2018-10-18 ENCOUNTER — APPOINTMENT (OUTPATIENT)
Dept: OTHER | Facility: IMAGING CENTER | Age: 73
End: 2018-10-18

## 2018-10-18 DIAGNOSIS — F41.9 ANXIETY: ICD-10-CM

## 2018-10-18 RX ORDER — DIAZEPAM 5 MG/1
TABLET ORAL
Qty: 30 TAB | Refills: 0 | Status: SHIPPED
Start: 2018-10-18 | End: 2018-10-22 | Stop reason: SDUPTHER

## 2018-10-20 ENCOUNTER — NON-PROVIDER VISIT (OUTPATIENT)
Dept: INTERNAL MEDICINE | Facility: IMAGING CENTER | Age: 73
End: 2018-10-20
Payer: MEDICARE

## 2018-10-20 DIAGNOSIS — Z23 NEED FOR INFLUENZA VACCINATION: ICD-10-CM

## 2018-10-20 PROCEDURE — G0008 ADMIN INFLUENZA VIRUS VAC: HCPCS | Performed by: FAMILY MEDICINE

## 2018-10-20 PROCEDURE — 90662 IIV NO PRSV INCREASED AG IM: CPT | Performed by: FAMILY MEDICINE

## 2018-10-22 DIAGNOSIS — F41.9 ANXIETY: ICD-10-CM

## 2018-10-23 RX ORDER — DIAZEPAM 5 MG/1
TABLET ORAL
Qty: 30 TAB | Refills: 0 | Status: SHIPPED
Start: 2018-10-23 | End: 2018-12-10 | Stop reason: SDUPTHER

## 2018-10-25 ENCOUNTER — APPOINTMENT (OUTPATIENT)
Dept: OTHER | Facility: IMAGING CENTER | Age: 73
End: 2018-10-25

## 2018-10-29 ENCOUNTER — OFFICE VISIT (OUTPATIENT)
Dept: INTERNAL MEDICINE | Facility: IMAGING CENTER | Age: 73
End: 2018-10-29
Payer: MEDICARE

## 2018-10-29 VITALS
SYSTOLIC BLOOD PRESSURE: 136 MMHG | HEART RATE: 73 BPM | TEMPERATURE: 97.3 F | RESPIRATION RATE: 14 BRPM | BODY MASS INDEX: 38.92 KG/M2 | OXYGEN SATURATION: 90 % | WEIGHT: 248 LBS | HEIGHT: 67 IN | DIASTOLIC BLOOD PRESSURE: 78 MMHG

## 2018-10-29 DIAGNOSIS — M51.36 DDD (DEGENERATIVE DISC DISEASE), LUMBAR: ICD-10-CM

## 2018-10-29 DIAGNOSIS — F51.04 CHRONIC INSOMNIA: ICD-10-CM

## 2018-10-29 DIAGNOSIS — M54.50 CHRONIC MIDLINE LOW BACK PAIN WITHOUT SCIATICA: ICD-10-CM

## 2018-10-29 DIAGNOSIS — F41.9 ANXIETY: ICD-10-CM

## 2018-10-29 DIAGNOSIS — G89.29 CHRONIC MIDLINE LOW BACK PAIN WITHOUT SCIATICA: ICD-10-CM

## 2018-10-29 PROCEDURE — 99214 OFFICE O/P EST MOD 30 MIN: CPT | Performed by: FAMILY MEDICINE

## 2018-10-29 RX ORDER — ZOLPIDEM TARTRATE 5 MG/1
5 TABLET ORAL
Qty: 30 TAB | Refills: 0 | Status: SHIPPED
Start: 2018-10-29 | End: 2018-11-30 | Stop reason: SDUPTHER

## 2018-10-29 ASSESSMENT — PATIENT HEALTH QUESTIONNAIRE - PHQ9: CLINICAL INTERPRETATION OF PHQ2 SCORE: 0

## 2018-10-29 NOTE — PROGRESS NOTES
Chief Complaint   Patient presents with   • Insomnia   • Anxiety   • Back Pain       HISTORY OF PRESENT ILLNESS: Patient is a 73 y.o. female established patient who presents today to discuss medication refills for anxiety and insomnia.    She states she has difficult time sleeping most nights.  She does take Ambien 5 mg.  Her last refill was October 2.  She states without the Ambien she has a difficult time both initiating and maintaining sleep.  She has worked on aspects of sleep hygiene.  She denies side effects of medication.    She also has anxiety.  She uses an occasional Valium refill was August and then October 18.  She knows not to use the volume with the Ambien.  She has been on this for years and is trying to slowly taper.  Denies depression.    She had her knee replaced and went through physical therapy.  Now her biggest complaint is some back pain.  She states in the morning her low back feels very stiff.  Occasionally she will get some radicular type symptoms but nothing consistent.  She is doing some exercises and chair yoga which seems to help.  She does take an occasional Tylenol.  She is unable to take anti-inflammatories due to history of GI bleed.  She is trying to walk.  She is not interested in any imaging.  Review of a CT scan abdomen/pelvis done in 2014 does show some degenerative disc disease.        Patient Active Problem List    Diagnosis Date Noted   • BMI 35.0-35.9,adult 04/06/2018   • Heart murmur 09/13/2017   • Chronic insomnia 08/09/2017   • Anxiety 08/09/2017   • HTN (hypertension) 07/01/2014   • Hyperlipidemia 07/01/2014   • AZUL on CPAP 07/01/2014       Current Outpatient Prescriptions:   •  zolpidem (AMBIEN) 5 MG Tab, Take 1 Tab by mouth every bedtime for 30 days., Disp: 30 Tab, Rfl: 0  •  simvastatin (ZOCOR) 20 MG Tab, TAKE 1 TABLET BY MOUTH IN THE EVENING, Disp: 30 Tab, Rfl: 3  •  carvedilol (COREG) 12.5 MG Tab, TAKE ONE TABLET BY MOUTH TWICE DAILY WITH MEALS , Disp: 180 Tab, Rfl:  1  •  losartan-hydrochlorothiazide (HYZAAR) 100-25 MG per tablet, Take 1 Tab by mouth every day., Disp: 90 Tab, Rfl: 3  •  diazePAM (VALIUM) 5 MG Tab, TAKE ONE (1) TABLET BY MOUTH EVERY TWELVE HOURS AS NEEDED FOR ANXIETY, Disp: 30 Tab, Rfl: 0  •  sertraline (ZOLOFT) 100 MG Tab, Take 100 mg by mouth every day., Disp: , Rfl:   •  vitamin D (CHOLECALCIFEROL) 1000 UNIT Tab, Take 5,000 Units by mouth every day., Disp: , Rfl:   •  Omega 3 1000 MG Cap, Take  by mouth., Disp: , Rfl:   •  docusate sodium (COLACE) 100 MG CAPS, Take 100 mg by mouth every day., Disp: , Rfl:   •  IRON PO, Take 65 mg by mouth every day., Disp: , Rfl:   •  Cyanocobalamin (VITAMIN B12 PO), Take  by mouth every day., Disp: , Rfl:   •  FOLIC ACID PO, Take  by mouth every day., Disp: , Rfl:   •  Acetaminophen (TYLENOL 8 HOUR PO), Take  by mouth as needed., Disp: , Rfl:       Past medical, surgical, family, and social history is reviewed and updated in Epic chart by me today.   Medications and allergies reviewed and updated in Epic chart by me today.     REVIEW OF SYSTEMS:  GENERAL: No fatigue.  She gained weight recovering from hernia surgery.  HEENT:  Ears--no earache, no change in hearing, no dizziness, no tinnitus.                 Eyes--no blurred vision, no discharge or pain                 Throat--No sore throat, no dysphagia, no hoarseness  CV:  No chest pain,dyspnea,palpitations or edema.  RESP:  No sob,cough,wheezing or hemoptysis.  GI: No dysphagia, heartburn,abdominal pain, nausea, vomiting, diarrhea or constipation.       No melena, jaundice, bleeding, incontinence or change in bowel habits.  :  No dysuria, polyuria, hematuria, incontinence, or nocturia.  MS: Low back pain as above.  NEURO:  No seizures, syncope, paralysis, tremor, or weakness.  SKIN: No new or concerning skin lesions or changes.   PSYCH: Mood fine.     Vitals:    10/29/18 0900   BP: 136/78   Pulse: 73   Resp: 14   Temp: 36.3 °C (97.3 °F)   SpO2: 90%   Weight: 112.5 kg  "(248 lb)   Height: 1.702 m (5' 7.01\")     Physical Exam:  Gen: Overweight female.. No acute distress.  Neck:  Supple, no adenopathy or thyromegaly.  Heart:  Regular rate and rhythm.  Normal S1, S2. No murmur, gallop or rub.  Lungs:  Clear, No wheezes,rales or rhonchi.  Back: LS spine is nontender.  She has mild tenderness in both paraspinous muscles and SI joints.  She has fairly good range of motion but complains of feeling stiff.  Neuro exam is symmetric and nonfocal.  Extremities:  No edema.  Psych: Mood and affect are appropriate.      Assessment/Plan:  1. Anxiety.  She may continue with Valium used sparingly.  She will follow-up again in 3-4 months.  Discussed meditation, stress management, counseling.    2. Chronic insomnia.  Continues with Ambien 5 mg.  Counseled regarding sleep hygiene. zolpidem (AMBIEN) 5 MG Tab        3. Chronic midline low back pain without sciatica.  Encouraged her to continue with her chair yoga and gentle stretches.  Encouraged walking, encouraged weight loss.  Occasional Tylenol.  If her symptoms bother her more would recommend imaging and additional physical therapy.    4. DDD (degenerative disc disease), lumbar       Follow-up 3-4 months  "

## 2018-11-08 ENCOUNTER — APPOINTMENT (OUTPATIENT)
Dept: OTHER | Facility: IMAGING CENTER | Age: 73
End: 2018-11-08

## 2018-11-16 RX ORDER — CARVEDILOL 12.5 MG/1
12.5 TABLET ORAL 2 TIMES DAILY WITH MEALS
Qty: 180 TAB | Refills: 1 | Status: SHIPPED | OUTPATIENT
Start: 2018-11-16 | End: 2019-05-21 | Stop reason: SDUPTHER

## 2018-11-29 ENCOUNTER — APPOINTMENT (OUTPATIENT)
Dept: OTHER | Facility: IMAGING CENTER | Age: 73
End: 2018-11-29

## 2018-11-30 DIAGNOSIS — F51.04 CHRONIC INSOMNIA: ICD-10-CM

## 2018-11-30 RX ORDER — ZOLPIDEM TARTRATE 5 MG/1
5 TABLET ORAL
Qty: 30 TAB | Refills: 2 | Status: SHIPPED
Start: 2018-11-30 | End: 2019-03-11 | Stop reason: SDUPTHER

## 2018-12-06 ENCOUNTER — APPOINTMENT (OUTPATIENT)
Dept: OTHER | Facility: IMAGING CENTER | Age: 73
End: 2018-12-06

## 2018-12-10 DIAGNOSIS — F41.9 ANXIETY: ICD-10-CM

## 2018-12-10 RX ORDER — DIAZEPAM 5 MG/1
TABLET ORAL
Qty: 30 TAB | Refills: 0 | Status: SHIPPED
Start: 2018-12-10 | End: 2019-01-30 | Stop reason: SDUPTHER

## 2018-12-11 RX ORDER — SERTRALINE HYDROCHLORIDE 100 MG/1
100 TABLET, FILM COATED ORAL DAILY
Qty: 30 TAB | Refills: 11 | Status: SHIPPED | OUTPATIENT
Start: 2018-12-11 | End: 2019-12-10 | Stop reason: SDUPTHER

## 2018-12-13 ENCOUNTER — APPOINTMENT (OUTPATIENT)
Dept: OTHER | Facility: IMAGING CENTER | Age: 73
End: 2018-12-13

## 2018-12-17 RX ORDER — LOSARTAN POTASSIUM AND HYDROCHLOROTHIAZIDE 25; 100 MG/1; MG/1
1 TABLET ORAL DAILY
Qty: 90 TAB | Refills: 3 | Status: SHIPPED | OUTPATIENT
Start: 2018-12-17 | End: 2021-05-06 | Stop reason: SDUPTHER

## 2018-12-20 ENCOUNTER — APPOINTMENT (OUTPATIENT)
Dept: OTHER | Facility: IMAGING CENTER | Age: 73
End: 2018-12-20

## 2019-01-03 ENCOUNTER — APPOINTMENT (OUTPATIENT)
Dept: OTHER | Facility: IMAGING CENTER | Age: 74
End: 2019-01-03

## 2019-01-10 ENCOUNTER — APPOINTMENT (OUTPATIENT)
Dept: OTHER | Facility: IMAGING CENTER | Age: 74
End: 2019-01-10

## 2019-01-17 ENCOUNTER — APPOINTMENT (OUTPATIENT)
Dept: OTHER | Facility: IMAGING CENTER | Age: 74
End: 2019-01-17

## 2019-01-24 ENCOUNTER — APPOINTMENT (OUTPATIENT)
Dept: OTHER | Facility: IMAGING CENTER | Age: 74
End: 2019-01-24

## 2019-01-30 DIAGNOSIS — F41.9 ANXIETY: ICD-10-CM

## 2019-01-30 RX ORDER — DIAZEPAM 5 MG/1
TABLET ORAL
Qty: 30 TAB | Refills: 0 | Status: SHIPPED
Start: 2019-01-30 | End: 2019-05-23 | Stop reason: SDUPTHER

## 2019-02-05 RX ORDER — SIMVASTATIN 20 MG
TABLET ORAL
Qty: 30 TAB | Refills: 3 | Status: SHIPPED | OUTPATIENT
Start: 2019-02-05 | End: 2019-06-09 | Stop reason: SDUPTHER

## 2019-02-07 ENCOUNTER — APPOINTMENT (OUTPATIENT)
Dept: OTHER | Facility: IMAGING CENTER | Age: 74
End: 2019-02-07

## 2019-02-14 ENCOUNTER — APPOINTMENT (OUTPATIENT)
Dept: OTHER | Facility: IMAGING CENTER | Age: 74
End: 2019-02-14

## 2019-02-21 ENCOUNTER — APPOINTMENT (OUTPATIENT)
Dept: OTHER | Facility: IMAGING CENTER | Age: 74
End: 2019-02-21

## 2019-02-28 ENCOUNTER — APPOINTMENT (OUTPATIENT)
Dept: OTHER | Facility: IMAGING CENTER | Age: 74
End: 2019-02-28

## 2019-03-11 DIAGNOSIS — F51.04 CHRONIC INSOMNIA: ICD-10-CM

## 2019-03-11 RX ORDER — ZOLPIDEM TARTRATE 5 MG/1
5 TABLET ORAL
Qty: 30 TAB | Refills: 0 | Status: SHIPPED
Start: 2019-03-11 | End: 2019-04-15 | Stop reason: SDUPTHER

## 2019-03-13 ENCOUNTER — OFFICE VISIT (OUTPATIENT)
Dept: INTERNAL MEDICINE | Facility: IMAGING CENTER | Age: 74
End: 2019-03-13
Payer: MEDICARE

## 2019-03-13 VITALS
BODY MASS INDEX: 38.92 KG/M2 | DIASTOLIC BLOOD PRESSURE: 72 MMHG | TEMPERATURE: 98.7 F | HEART RATE: 87 BPM | HEIGHT: 67 IN | SYSTOLIC BLOOD PRESSURE: 128 MMHG | WEIGHT: 248 LBS | RESPIRATION RATE: 14 BRPM | OXYGEN SATURATION: 91 %

## 2019-03-13 DIAGNOSIS — J01.00 ACUTE NON-RECURRENT MAXILLARY SINUSITIS: ICD-10-CM

## 2019-03-13 PROCEDURE — 99213 OFFICE O/P EST LOW 20 MIN: CPT | Performed by: FAMILY MEDICINE

## 2019-03-13 RX ORDER — AZITHROMYCIN 250 MG/1
TABLET, FILM COATED ORAL
Qty: 6 TAB | Refills: 0 | Status: SHIPPED | OUTPATIENT
Start: 2019-03-13 | End: 2019-08-26

## 2019-03-13 ASSESSMENT — PATIENT HEALTH QUESTIONNAIRE - PHQ9: CLINICAL INTERPRETATION OF PHQ2 SCORE: 0

## 2019-03-13 NOTE — PROGRESS NOTES
Chief Complaint   Patient presents with   • URI     x 8 day       HISTORY OF PRESENT ILLNESS: Patient is a 74 y.o. female established patient who presents today complaining of 8 days of respiratory symptoms.  She states it started as a cold.  She has had has had congestion and a sore throat.  She has been using Mucinex.  She thinks is getting worse, more head congestion.  Yellowish drainage.  Ears feel full.  Mild sore throat.  Cough.  No chest congestion.  No shortness of breath.  She feels like it is mostly in her maxillary sinus area.  She has been drinking fluids well.  No fevers.  She has had chills.  No muscle aches.      Patient Active Problem List    Diagnosis Date Noted   • BMI 35.0-35.9,adult 04/06/2018   • Heart murmur 09/13/2017   • Chronic insomnia 08/09/2017   • Anxiety 08/09/2017   • HTN (hypertension) 07/01/2014   • Hyperlipidemia 07/01/2014   • AZUL on CPAP 07/01/2014     Current Outpatient Prescriptions on File Prior to Visit   Medication Sig Dispense Refill   • zolpidem (AMBIEN) 5 MG Tab Take 1 Tab by mouth every bedtime for 30 days. 30 Tab 0   • simvastatin (ZOCOR) 20 MG Tab TAKE ONE TABLET BY MOUTH EVERY EVENING 30 Tab 3   • losartan-hydrochlorothiazide (HYZAAR) 100-25 MG per tablet Take 1 Tab by mouth every day. 90 Tab 3   • sertraline (ZOLOFT) 100 MG Tab Take 1 Tab by mouth every day. 30 Tab 11   • carvedilol (COREG) 12.5 MG Tab Take 1 Tab by mouth 2 times a day, with meals. 180 Tab 1   • vitamin D (CHOLECALCIFEROL) 1000 UNIT Tab Take 5,000 Units by mouth every day.     • Omega 3 1000 MG Cap Take  by mouth.     • docusate sodium (COLACE) 100 MG CAPS Take 100 mg by mouth every day.     • IRON PO Take 65 mg by mouth every day.     • Cyanocobalamin (VITAMIN B12 PO) Take  by mouth every day.     • FOLIC ACID PO Take  by mouth every day.     • Acetaminophen (TYLENOL 8 HOUR PO) Take  by mouth as needed.       No current facility-administered medications on file prior to visit.          Past medical,  "surgical, family, and social history is reviewed and updated in Epic chart by me today.   Medications and allergies reviewed and updated in Epic chart by me today.     REVIEW OF SYSTEMS:  GENERAL: mild fatigue, no weight loss.  HEENT:  Ears--no earache, ears feel full.  no change in hearing, no dizziness, no tinnitus.                 Eyes--no blurred vision, no discharge or pain                 Throat--mild sore throat, no dysphagia, no hoarseness  CV:  No chest pain,dyspnea,palpitations or edema.  RESP:  No sob,wheezing or hemoptysis. Mild cough.   GI: No dysphagia, heartburn,abdominal pain, nausea, vomiting, diarrhea or constipation.       No melena, jaundice, bleeding, incontinence or change in bowel habits.  :  No dysuria, polyuria, hematuria, incontinence, or nocturia.  MS:  No joint swelling, myalgias, or arthralgias.  NEURO:  No seizures, syncope, paralysis, tremor, or weakness.  SKIN: No new or concerning skin lesions or changes.   PSYCH: Mood fine.    Vitals:    03/13/19 1400   BP: 128/72   Pulse: 87   Resp: 14   Temp: 37.1 °C (98.7 °F)   TempSrc: Temporal   SpO2: 91%   Weight: 112.5 kg (248 lb)   Height: 1.702 m (5' 7.01\")     Physical Exam:  GENERAL;  WD/WN, No acute distress.  HEENT:  PERRLA, EOMI, no conjuctival injection, no icterus.                 TM's retracted bilaterally.                 Nasal mucosa erythematous and edematous.                 Pharynx injected. No exudate.  NECK: Supple with no adenopathy or thyromegaly.  LUNGS: Clear with no rales, rhonchi, or wheezes.  COR: RR with no murmur, gallop or rub.  EXT: No edema.      Assessment/Plan:  1. Acute non-recurrent maxillary sinusitis     Zithromax for 5 days, fluids, rest.  She is to call for any worsening of her symptoms.     "

## 2019-03-21 ENCOUNTER — APPOINTMENT (OUTPATIENT)
Dept: OTHER | Facility: IMAGING CENTER | Age: 74
End: 2019-03-21

## 2019-03-28 ENCOUNTER — APPOINTMENT (OUTPATIENT)
Dept: OTHER | Facility: IMAGING CENTER | Age: 74
End: 2019-03-28

## 2019-04-11 ENCOUNTER — APPOINTMENT (OUTPATIENT)
Dept: OTHER | Facility: IMAGING CENTER | Age: 74
End: 2019-04-11

## 2019-04-15 DIAGNOSIS — F51.04 CHRONIC INSOMNIA: ICD-10-CM

## 2019-04-15 RX ORDER — ZOLPIDEM TARTRATE 5 MG/1
5 TABLET ORAL
Qty: 30 TAB | Refills: 0 | Status: SHIPPED
Start: 2019-04-15 | End: 2019-06-14 | Stop reason: SDUPTHER

## 2019-04-18 ENCOUNTER — APPOINTMENT (OUTPATIENT)
Dept: MEDICAL GROUP | Facility: IMAGING CENTER | Age: 74
End: 2019-04-18

## 2019-04-25 ENCOUNTER — APPOINTMENT (OUTPATIENT)
Dept: MEDICAL GROUP | Facility: IMAGING CENTER | Age: 74
End: 2019-04-25

## 2019-05-02 ENCOUNTER — APPOINTMENT (OUTPATIENT)
Dept: MEDICAL GROUP | Facility: IMAGING CENTER | Age: 74
End: 2019-05-02

## 2019-05-09 ENCOUNTER — APPOINTMENT (OUTPATIENT)
Dept: MEDICAL GROUP | Facility: IMAGING CENTER | Age: 74
End: 2019-05-09

## 2019-05-14 ENCOUNTER — APPOINTMENT (OUTPATIENT)
Dept: RADIOLOGY | Facility: MEDICAL CENTER | Age: 74
End: 2019-05-14
Attending: FAMILY MEDICINE
Payer: MEDICARE

## 2019-05-16 ENCOUNTER — APPOINTMENT (OUTPATIENT)
Dept: MEDICAL GROUP | Facility: IMAGING CENTER | Age: 74
End: 2019-05-16

## 2019-05-21 RX ORDER — CARVEDILOL 12.5 MG/1
12.5 TABLET ORAL 2 TIMES DAILY WITH MEALS
Qty: 180 TAB | Refills: 1 | Status: SHIPPED | OUTPATIENT
Start: 2019-05-21 | End: 2019-11-14 | Stop reason: SDUPTHER

## 2019-05-23 ENCOUNTER — APPOINTMENT (OUTPATIENT)
Dept: MEDICAL GROUP | Facility: IMAGING CENTER | Age: 74
End: 2019-05-23

## 2019-05-23 DIAGNOSIS — F41.9 ANXIETY: ICD-10-CM

## 2019-05-23 RX ORDER — DIAZEPAM 5 MG/1
TABLET ORAL
Qty: 30 TAB | Refills: 0 | Status: SHIPPED
Start: 2019-05-23 | End: 2019-07-24 | Stop reason: SDUPTHER

## 2019-05-24 ENCOUNTER — HOSPITAL ENCOUNTER (OUTPATIENT)
Dept: RADIOLOGY | Facility: MEDICAL CENTER | Age: 74
End: 2019-05-24
Attending: FAMILY MEDICINE
Payer: MEDICARE

## 2019-05-24 DIAGNOSIS — Z12.31 SCREENING MAMMOGRAM, ENCOUNTER FOR: ICD-10-CM

## 2019-06-06 ENCOUNTER — APPOINTMENT (OUTPATIENT)
Dept: MEDICAL GROUP | Facility: IMAGING CENTER | Age: 74
End: 2019-06-06

## 2019-06-10 RX ORDER — SIMVASTATIN 20 MG
TABLET ORAL
Qty: 90 TAB | Refills: 3 | Status: SHIPPED | OUTPATIENT
Start: 2019-06-10 | End: 2021-01-13

## 2019-06-13 ENCOUNTER — APPOINTMENT (OUTPATIENT)
Dept: MEDICAL GROUP | Facility: IMAGING CENTER | Age: 74
End: 2019-06-13

## 2019-06-14 DIAGNOSIS — F51.04 CHRONIC INSOMNIA: ICD-10-CM

## 2019-06-14 RX ORDER — ZOLPIDEM TARTRATE 5 MG/1
5 TABLET ORAL
Qty: 30 TAB | Refills: 0 | Status: SHIPPED
Start: 2019-06-14 | End: 2019-07-11 | Stop reason: SDUPTHER

## 2019-06-20 ENCOUNTER — APPOINTMENT (OUTPATIENT)
Dept: MEDICAL GROUP | Facility: IMAGING CENTER | Age: 74
End: 2019-06-20

## 2019-06-25 ENCOUNTER — HOSPITAL ENCOUNTER (OUTPATIENT)
Dept: RADIOLOGY | Facility: MEDICAL CENTER | Age: 74
End: 2019-06-25
Attending: FAMILY MEDICINE
Payer: MEDICARE

## 2019-06-25 PROCEDURE — 77063 BREAST TOMOSYNTHESIS BI: CPT

## 2019-06-27 ENCOUNTER — APPOINTMENT (OUTPATIENT)
Dept: MEDICAL GROUP | Facility: IMAGING CENTER | Age: 74
End: 2019-06-27

## 2019-07-11 ENCOUNTER — APPOINTMENT (OUTPATIENT)
Dept: MEDICAL GROUP | Facility: IMAGING CENTER | Age: 74
End: 2019-07-11

## 2019-07-11 DIAGNOSIS — F51.04 CHRONIC INSOMNIA: ICD-10-CM

## 2019-07-11 RX ORDER — ZOLPIDEM TARTRATE 5 MG/1
5 TABLET ORAL
Qty: 30 TAB | Refills: 0 | Status: SHIPPED
Start: 2019-07-11 | End: 2019-08-12 | Stop reason: SDUPTHER

## 2019-07-24 DIAGNOSIS — F41.9 ANXIETY: ICD-10-CM

## 2019-07-24 RX ORDER — DIAZEPAM 5 MG/1
TABLET ORAL
Qty: 30 TAB | Refills: 0 | Status: SHIPPED
Start: 2019-07-24 | End: 2019-09-26 | Stop reason: SDUPTHER

## 2019-07-25 ENCOUNTER — APPOINTMENT (OUTPATIENT)
Dept: MEDICAL GROUP | Facility: IMAGING CENTER | Age: 74
End: 2019-07-25

## 2019-08-01 ENCOUNTER — APPOINTMENT (OUTPATIENT)
Dept: MEDICAL GROUP | Facility: IMAGING CENTER | Age: 74
End: 2019-08-01

## 2019-08-08 ENCOUNTER — APPOINTMENT (OUTPATIENT)
Dept: MEDICAL GROUP | Facility: IMAGING CENTER | Age: 74
End: 2019-08-08

## 2019-08-12 DIAGNOSIS — F51.04 CHRONIC INSOMNIA: ICD-10-CM

## 2019-08-12 RX ORDER — ZOLPIDEM TARTRATE 5 MG/1
5 TABLET ORAL
Qty: 30 TAB | Refills: 0 | Status: SHIPPED
Start: 2019-08-12 | End: 2019-09-12 | Stop reason: SDUPTHER

## 2019-08-15 ENCOUNTER — APPOINTMENT (OUTPATIENT)
Dept: MEDICAL GROUP | Facility: IMAGING CENTER | Age: 74
End: 2019-08-15

## 2019-08-26 ENCOUNTER — OFFICE VISIT (OUTPATIENT)
Dept: INTERNAL MEDICINE | Facility: IMAGING CENTER | Age: 74
End: 2019-08-26
Payer: MEDICARE

## 2019-08-26 ENCOUNTER — HOSPITAL ENCOUNTER (OUTPATIENT)
Facility: MEDICAL CENTER | Age: 74
End: 2019-08-26
Attending: FAMILY MEDICINE
Payer: MEDICARE

## 2019-08-26 VITALS
RESPIRATION RATE: 14 BRPM | BODY MASS INDEX: 36.73 KG/M2 | WEIGHT: 234 LBS | HEART RATE: 89 BPM | DIASTOLIC BLOOD PRESSURE: 70 MMHG | HEIGHT: 67 IN | TEMPERATURE: 97.3 F | OXYGEN SATURATION: 98 % | SYSTOLIC BLOOD PRESSURE: 122 MMHG

## 2019-08-26 DIAGNOSIS — R19.7 DIARRHEA, UNSPECIFIED TYPE: ICD-10-CM

## 2019-08-26 DIAGNOSIS — F51.04 CHRONIC INSOMNIA: ICD-10-CM

## 2019-08-26 DIAGNOSIS — F41.9 ANXIETY: ICD-10-CM

## 2019-08-26 LAB
ALBUMIN SERPL BCP-MCNC: 4.3 G/DL (ref 3.2–4.9)
ALBUMIN/GLOB SERPL: 1.6 G/DL
ALP SERPL-CCNC: 50 U/L (ref 30–99)
ALT SERPL-CCNC: 28 U/L (ref 2–50)
ANION GAP SERPL CALC-SCNC: 9 MMOL/L (ref 0–11.9)
AST SERPL-CCNC: 28 U/L (ref 12–45)
BASOPHILS # BLD AUTO: 0.5 % (ref 0–1.8)
BASOPHILS # BLD: 0.03 K/UL (ref 0–0.12)
BILIRUB SERPL-MCNC: 0.6 MG/DL (ref 0.1–1.5)
BUN SERPL-MCNC: 42 MG/DL (ref 8–22)
CALCIUM SERPL-MCNC: 10.4 MG/DL (ref 8.5–10.5)
CHLORIDE SERPL-SCNC: 101 MMOL/L (ref 96–112)
CO2 SERPL-SCNC: 27 MMOL/L (ref 20–33)
CREAT SERPL-MCNC: 1.17 MG/DL (ref 0.5–1.4)
EOSINOPHIL # BLD AUTO: 0.11 K/UL (ref 0–0.51)
EOSINOPHIL NFR BLD: 1.9 % (ref 0–6.9)
ERYTHROCYTE [DISTWIDTH] IN BLOOD BY AUTOMATED COUNT: 47.4 FL (ref 35.9–50)
GLOBULIN SER CALC-MCNC: 2.7 G/DL (ref 1.9–3.5)
GLUCOSE SERPL-MCNC: 97 MG/DL (ref 65–99)
HCT VFR BLD AUTO: 43.6 % (ref 37–47)
HGB BLD-MCNC: 14.6 G/DL (ref 12–16)
IMM GRANULOCYTES # BLD AUTO: 0.01 K/UL (ref 0–0.11)
IMM GRANULOCYTES NFR BLD AUTO: 0.2 % (ref 0–0.9)
LYMPHOCYTES # BLD AUTO: 0.86 K/UL (ref 1–4.8)
LYMPHOCYTES NFR BLD: 15.1 % (ref 22–41)
MCH RBC QN AUTO: 35.2 PG (ref 27–33)
MCHC RBC AUTO-ENTMCNC: 33.5 G/DL (ref 33.6–35)
MCV RBC AUTO: 105.1 FL (ref 81.4–97.8)
MONOCYTES # BLD AUTO: 0.58 K/UL (ref 0–0.85)
MONOCYTES NFR BLD AUTO: 10.2 % (ref 0–13.4)
NEUTROPHILS # BLD AUTO: 4.12 K/UL (ref 2–7.15)
NEUTROPHILS NFR BLD: 72.1 % (ref 44–72)
NRBC # BLD AUTO: 0 K/UL
NRBC BLD-RTO: 0 /100 WBC
PLATELET # BLD AUTO: 159 K/UL (ref 164–446)
PMV BLD AUTO: 11.6 FL (ref 9–12.9)
POTASSIUM SERPL-SCNC: 3.7 MMOL/L (ref 3.6–5.5)
PROT SERPL-MCNC: 7 G/DL (ref 6–8.2)
RBC # BLD AUTO: 4.15 M/UL (ref 4.2–5.4)
SODIUM SERPL-SCNC: 137 MMOL/L (ref 135–145)
TSH SERPL DL<=0.005 MIU/L-ACNC: 0.94 UIU/ML (ref 0.38–5.33)
WBC # BLD AUTO: 5.7 K/UL (ref 4.8–10.8)

## 2019-08-26 PROCEDURE — 85025 COMPLETE CBC W/AUTO DIFF WBC: CPT

## 2019-08-26 PROCEDURE — 99214 OFFICE O/P EST MOD 30 MIN: CPT | Performed by: FAMILY MEDICINE

## 2019-08-26 PROCEDURE — 84443 ASSAY THYROID STIM HORMONE: CPT

## 2019-08-26 PROCEDURE — 80053 COMPREHEN METABOLIC PANEL: CPT

## 2019-08-26 RX ORDER — CHOLESTYRAMINE 4 G/9G
1 POWDER, FOR SUSPENSION ORAL DAILY
Qty: 30 EACH | Refills: 1 | Status: SHIPPED | OUTPATIENT
Start: 2019-08-26 | End: 2021-01-13

## 2019-08-27 NOTE — PROGRESS NOTES
Chief Complaint   Patient presents with   • Diarrhea     intermittent since July       HISTORY OF PRESENT ILLNESS: Patient is a 74 y.o. female established patient who presents today complaining of intermittent diarrhea since July 4.  She states at least 4 to 5 days of the week she has multiple loose stools.  Usually worse in the morning.  By afternoon things have slowed.  She denies any inciting event.  No recent travel.  She denies pain.  She was a little cramping and urgency prior to a bowel movement.  There has been no blood or dark stools.  No one else has been sick.  No significant change in her diet.  She has found no pattern to certain foods.  Her appetite has been good.  She has lost about 5 pounds.  No fevers or chills.  She will have an occasional day with normal bowel movements.  She has had no accidents.  No change in medications.  She is up-to-date on colonoscopies.  She has tried Imodium and it does help some.  She has only been taking 1-2 daily    Also here to follow-up on insomnia.  She suffers from chronic insomnia and has been dependent on Ambien for sleep.  She has difficulty both initiating and maintaining sleep.  5 mg of Ambien has worked well for her.  She denies side effects.  She does take it appropriately.    She also is an anxious person.  She states she worries about everything.  She also occasionally take Valium when her anxiety increases.  She uses this sparingly.   is reviewed today.  She is also on daily sertraline which is helped as well.      Patient Active Problem List    Diagnosis Date Noted   • BMI 35.0-35.9,adult 04/06/2018   • Heart murmur 09/13/2017   • Chronic insomnia 08/09/2017   • Anxiety 08/09/2017   • HTN (hypertension) 07/01/2014   • Hyperlipidemia 07/01/2014   • AZUL on CPAP 07/01/2014     Current Outpatient Medications on File Prior to Visit   Medication Sig Dispense Refill   • zolpidem (AMBIEN) 5 MG Tab Take 1 Tab by mouth every bedtime for 30 days. 30 Tab 0   •  "simvastatin (ZOCOR) 20 MG Tab TAKE ONE TABLET BY MOUTH EVERY EVENING 90 Tab 3   • carvedilol (COREG) 12.5 MG Tab Take 1 Tab by mouth 2 times a day, with meals. 180 Tab 1   • losartan-hydrochlorothiazide (HYZAAR) 100-25 MG per tablet Take 1 Tab by mouth every day. 90 Tab 3   • sertraline (ZOLOFT) 100 MG Tab Take 1 Tab by mouth every day. 30 Tab 11   • IRON PO Take 65 mg by mouth every day.     • Cyanocobalamin (VITAMIN B12 PO) Take  by mouth every day.     • FOLIC ACID PO Take  by mouth every day.     • Acetaminophen (TYLENOL 8 HOUR PO) Take  by mouth as needed.     • vitamin D (CHOLECALCIFEROL) 1000 UNIT Tab Take 5,000 Units by mouth every day.     • Omega 3 1000 MG Cap Take  by mouth.     • docusate sodium (COLACE) 100 MG CAPS Take 100 mg by mouth every day.       No current facility-administered medications on file prior to visit.          Past medical, surgical, family, and social history is reviewed and updated in Epic chart by me today.   Medications and allergies reviewed and updated in Epic chart by me today.     REVIEW OF SYSTEMS:  GENERAL: No fatigue, 5 lb wt loss since july per patient  HEENT:  Ears--no earache, no change in hearing, no dizziness, no tinnitus.                 Eyes--no blurred vision, no discharge or pain                 Throat--No sore throat, no dysphagia, no hoarseness  CV:  No chest pain,dyspnea,palpitations or edema.  RESP:  No sob,cough,wheezing or hemoptysis.  GI: diarrhea as above.  No nausea or vomiting.  No heartburn.  :  No dysuria, polyuria, hematuria, incontinence, or nocturia.  MS:  No joint swelling, myalgias, or arthralgias.  NEURO:  No seizures, syncope, paralysis, tremor, or weakness.  SKIN: No new or concerning skin lesions or changes.   PSYCH: Mood fine.    Vitals:    08/26/19 1400   BP: 122/70   Pulse: 89   Resp: 14   Temp: 36.3 °C (97.3 °F)   TempSrc: Temporal   SpO2: 98%   Weight: 106.1 kg (234 lb)   Height: 1.702 m (5' 7.01\")     Physical Exam:  Gen: Well " developed, well nourished. No acute distress.  Neck:  Supple, no adenopathy or thyromegaly.  Heart:  Regular rate and rhythm.  Normal S1, S2. No murmur, gallop or rub.  Lungs:  Clear, No wheezes,rales or rhonchi.  Abdomen: Soft, nontender, nondistended. Normal bowel sounds. No hepatosplenomegaly or masses, or hernias. No rebound or guarding.  Extremities:  No edema.  Psych: Mood and affect are appropriate.      Assessment/Plan:  1. Diarrhea, unspecified type.  Monitor lab work and school culture as ordered.  Monitor symptoms.  Encourage fluids.  Trial of Questran, 1 packet daily.  She may continue the Imodium if this does not work.  Will reevaluate after lab and stool tests.  CBC WITH DIFFERENTIAL    Comp Metabolic Panel    TSH WITH REFLEX TO FT4    CULTURE STOOL   2. Anxiety .  Continue sertraline.  May use diazepam sparingly.    3. Chronic insomnia.  Discussed sleep hygiene.  She continues on Ambien 5 mg daily.  Cautioned not to take within 8 hours of valium.

## 2019-08-28 ENCOUNTER — HOSPITAL ENCOUNTER (OUTPATIENT)
Facility: MEDICAL CENTER | Age: 74
End: 2019-08-28
Attending: FAMILY MEDICINE
Payer: MEDICARE

## 2019-08-28 ENCOUNTER — NON-PROVIDER VISIT (OUTPATIENT)
Dept: INTERNAL MEDICINE | Facility: IMAGING CENTER | Age: 74
End: 2019-08-28
Payer: MEDICARE

## 2019-08-28 DIAGNOSIS — R19.7 DIARRHEA, UNSPECIFIED TYPE: ICD-10-CM

## 2019-08-29 ENCOUNTER — APPOINTMENT (OUTPATIENT)
Dept: MEDICAL GROUP | Facility: IMAGING CENTER | Age: 74
End: 2019-08-29

## 2019-08-30 ENCOUNTER — TELEPHONE (OUTPATIENT)
Dept: INTERNAL MEDICINE | Facility: IMAGING CENTER | Age: 74
End: 2019-08-30

## 2019-08-30 NOTE — TELEPHONE ENCOUNTER
Lab was unable to test stool sample.  It needs to be collected in a specific stool kit.  Message left for Clau to she how she is feeling and if she still has diarrhea.  If so, she will need to go to [facility] lab and obtain correct stool collection cup.  Return call requested.

## 2019-09-03 ENCOUNTER — TELEPHONE (OUTPATIENT)
Dept: INTERNAL MEDICINE | Facility: IMAGING CENTER | Age: 74
End: 2019-09-03

## 2019-09-03 NOTE — TELEPHONE ENCOUNTER
Rosy returned phone call and stated that her diarrhea is 100% better.  She is going to hold off on the stool culture unless she has diarrhea again.

## 2019-09-10 DIAGNOSIS — F51.04 CHRONIC INSOMNIA: ICD-10-CM

## 2019-09-10 RX ORDER — ZOLPIDEM TARTRATE 5 MG/1
5 TABLET ORAL
Qty: 30 TAB | Refills: 0 | OUTPATIENT
Start: 2019-09-10 | End: 2019-10-10

## 2019-09-12 DIAGNOSIS — F51.04 CHRONIC INSOMNIA: ICD-10-CM

## 2019-09-12 RX ORDER — ZOLPIDEM TARTRATE 5 MG/1
5 TABLET ORAL
Qty: 30 TAB | Refills: 0 | OUTPATIENT
Start: 2019-09-12 | End: 2019-10-12

## 2019-09-12 RX ORDER — ZOLPIDEM TARTRATE 5 MG/1
5 TABLET ORAL
Qty: 30 TAB | Refills: 2 | Status: SHIPPED
Start: 2019-09-12 | End: 2019-12-04 | Stop reason: SDUPTHER

## 2019-09-26 DIAGNOSIS — F41.9 ANXIETY: ICD-10-CM

## 2019-09-26 RX ORDER — DIAZEPAM 5 MG/1
TABLET ORAL
Qty: 30 TAB | Refills: 0 | Status: SHIPPED
Start: 2019-09-26 | End: 2019-12-02 | Stop reason: SDUPTHER

## 2019-10-16 ENCOUNTER — NON-PROVIDER VISIT (OUTPATIENT)
Dept: INTERNAL MEDICINE | Facility: IMAGING CENTER | Age: 74
End: 2019-10-16
Payer: MEDICARE

## 2019-10-16 DIAGNOSIS — Z23 NEED FOR VACCINATION: ICD-10-CM

## 2019-10-16 PROCEDURE — 90662 IIV NO PRSV INCREASED AG IM: CPT | Performed by: FAMILY MEDICINE

## 2019-10-16 PROCEDURE — G0008 ADMIN INFLUENZA VIRUS VAC: HCPCS | Performed by: FAMILY MEDICINE

## 2019-11-14 RX ORDER — CARVEDILOL 12.5 MG/1
TABLET ORAL
Qty: 180 TAB | Refills: 1 | Status: SHIPPED | OUTPATIENT
Start: 2019-11-14 | End: 2020-06-01

## 2019-12-02 DIAGNOSIS — F41.9 ANXIETY: ICD-10-CM

## 2019-12-02 RX ORDER — DIAZEPAM 5 MG/1
TABLET ORAL
Qty: 30 TAB | Refills: 0 | Status: SHIPPED
Start: 2019-12-02 | End: 2019-12-31 | Stop reason: SDUPTHER

## 2019-12-04 DIAGNOSIS — F51.04 CHRONIC INSOMNIA: ICD-10-CM

## 2019-12-04 RX ORDER — ZOLPIDEM TARTRATE 5 MG/1
5 TABLET ORAL
Qty: 30 TAB | Refills: 2 | Status: SHIPPED
Start: 2019-12-04 | End: 2020-03-02 | Stop reason: SDUPTHER

## 2019-12-10 RX ORDER — SERTRALINE HYDROCHLORIDE 100 MG/1
100 TABLET, FILM COATED ORAL DAILY
Qty: 90 TAB | Refills: 3 | Status: SHIPPED | OUTPATIENT
Start: 2019-12-10 | End: 2020-12-08

## 2019-12-31 DIAGNOSIS — F41.9 ANXIETY: ICD-10-CM

## 2019-12-31 RX ORDER — DIAZEPAM 5 MG/1
TABLET ORAL
Qty: 30 TAB | Refills: 0 | Status: SHIPPED
Start: 2019-12-31 | End: 2020-04-17 | Stop reason: SDUPTHER

## 2020-02-19 ENCOUNTER — OFFICE VISIT (OUTPATIENT)
Dept: INTERNAL MEDICINE | Facility: IMAGING CENTER | Age: 75
End: 2020-02-19
Payer: MEDICARE

## 2020-02-19 VITALS
OXYGEN SATURATION: 95 % | BODY MASS INDEX: 36.73 KG/M2 | WEIGHT: 234 LBS | HEART RATE: 75 BPM | DIASTOLIC BLOOD PRESSURE: 80 MMHG | RESPIRATION RATE: 14 BRPM | HEIGHT: 67 IN | SYSTOLIC BLOOD PRESSURE: 140 MMHG | TEMPERATURE: 98.4 F

## 2020-02-19 DIAGNOSIS — J06.9 VIRAL URI: ICD-10-CM

## 2020-02-19 DIAGNOSIS — F51.04 CHRONIC INSOMNIA: ICD-10-CM

## 2020-02-19 DIAGNOSIS — R01.1 HEART MURMUR: ICD-10-CM

## 2020-02-19 DIAGNOSIS — H69.93 DYSFUNCTION OF BOTH EUSTACHIAN TUBES: ICD-10-CM

## 2020-02-19 DIAGNOSIS — H91.93 DECREASED HEARING OF BOTH EARS: ICD-10-CM

## 2020-02-19 DIAGNOSIS — F41.9 ANXIETY: ICD-10-CM

## 2020-02-19 DIAGNOSIS — R42 DIZZINESS: ICD-10-CM

## 2020-02-19 PROCEDURE — 99214 OFFICE O/P EST MOD 30 MIN: CPT | Performed by: FAMILY MEDICINE

## 2020-02-19 RX ORDER — ZOLPIDEM TARTRATE 5 MG/1
TABLET ORAL
COMMUNITY
Start: 2020-02-01 | End: 2020-07-02

## 2020-02-19 RX ORDER — LOSARTAN POTASSIUM 100 MG/1
TABLET ORAL
COMMUNITY
Start: 2020-01-02 | End: 2020-10-07

## 2020-02-19 ASSESSMENT — PATIENT HEALTH QUESTIONNAIRE - PHQ9: CLINICAL INTERPRETATION OF PHQ2 SCORE: 0

## 2020-02-24 NOTE — PROGRESS NOTES
Chief Complaint   Patient presents with   • Ear Fullness     and balance concerns   • URI       HISTORY OF PRESENT ILLNESS: Patient is a 74 y.o. female established patient who presents today complaining of upper respiratory symptoms for the past 7 to 10 days.  She does feel like she is slowly weaning.  She has had head congestion, mostly clear drainage.  Little sore throat.  Dry cough.  No shortness of breath.  She has been using OTC medications including Mucinex and is feeling better.    She is complaining of chronic symptoms with her ears.  She feels like her hearing has decreased in both of her ears.  She states her ears sometimes feel full.  She sometimes feels a little unsteady.  Her balance is worsened.  She denies any spinning or vertigo type symptoms.  She would like a referral to audiology.    Also here to follow-up on anxiety and chronic insomnia.  She is dependent on Valium for anxiety.  She takes 5 mg occasionally.  She is also on sertraline daily.   is reviewed today.  She feels that her mood is controlled on the sertraline and generally her anxiety is well controlled.  She does occasionally need that 5 mg of Valium when she feels more anxious and stressed.    She also is dependent on Ambien for sleep.  She does take 5 mg most nights.  She knows not to take the Ambien and the Valium together.  The Ambien helps her get to sleep and maintain sleep.  She denies any side effects.  She does have sleep apnea and uses CPAP      Patient Active Problem List    Diagnosis Date Noted   • BMI 35.0-35.9,adult 04/06/2018   • Heart murmur 09/13/2017   • Chronic insomnia 08/09/2017   • Anxiety 08/09/2017   • HTN (hypertension) 07/01/2014   • Hyperlipidemia 07/01/2014   • AZUL on CPAP 07/01/2014     Current Outpatient Medications on File Prior to Visit   Medication Sig Dispense Refill   • simvastatin (ZOCOR) 20 MG Tab TAKE ONE TABLET BY MOUTH EVERY EVENING 90 Tab 3   • Acetaminophen (TYLENOL 8 HOUR PO) Take  by mouth  as needed.     • losartan (COZAAR) 100 MG Tab      • zolpidem (AMBIEN) 5 MG Tab      • sertraline (ZOLOFT) 100 MG Tab Take 1 Tab by mouth every day. 90 Tab 3   • carvedilol (COREG) 12.5 MG Tab TAKE ONE (1) TABLET BY MOUTH TWICE DAILY WITH MEALS 180 Tab 1   • cholestyramine (QUESTRAN) 4 g packet Take 4 g by mouth every day. 30 Each 1   • losartan-hydrochlorothiazide (HYZAAR) 100-25 MG per tablet Take 1 Tab by mouth every day. 90 Tab 3   • vitamin D (CHOLECALCIFEROL) 1000 UNIT Tab Take 5,000 Units by mouth every day.     • Omega 3 1000 MG Cap Take  by mouth.     • docusate sodium (COLACE) 100 MG CAPS Take 100 mg by mouth every day.     • IRON PO Take 65 mg by mouth every day.     • Cyanocobalamin (VITAMIN B12 PO) Take  by mouth every day.     • FOLIC ACID PO Take  by mouth every day.       No current facility-administered medications on file prior to visit.          Past medical, surgical, family, and social history is reviewed and updated in Epic chart by me today.   Medications and allergies reviewed and updated in Epic chart by me today.     REVIEW OF SYSTEMS:  GENERAL: No fatigue, no weight loss.  HEENT:  Ears--as above                 Eyes--no blurred vision, no discharge or pain                 Throat--No sore throat, no dysphagia, no hoarseness  CV:  No chest pain,dyspnea,palpitations or edema.  RESP:  No sob,cough,wheezing or hemoptysis.  GI: No dysphagia, heartburn,abdominal pain, nausea, vomiting, diarrhea or constipation.       No melena, jaundice, bleeding, incontinence or change in bowel habits.  :  No dysuria, polyuria, hematuria, incontinence, or nocturia.  MS:  No joint swelling, myalgias, or arthralgias.  NEURO:  No seizures, syncope, paralysis, tremor, or weakness.  SKIN: No new or concerning skin lesions or changes.   PSYCH: Mood as above.  States her mood is well controlled on sertraline.    Vitals:    02/19/20 0900   BP: 140/80   Pulse: 75   Resp: 14   Temp: 36.9 °C (98.4 °F)   TempSrc: Temporal  "  SpO2: 95%   Weight: 106.1 kg (234 lb)   Height: 1.702 m (5' 7.01\")     Physical Exam:  GENERAL;  WD/WN, No acute distress.  HEENT:  PERRLA, EOMI, no conjuctival injection, no icterus.                 TM's normal bilat.                 Nasal mucosa erythematous and edematous.                 Pharynx i clear.  No exudate.  NECK: Supple with no adenopathy or thyromegaly.  LUNGS: Clear with no rales, rhonchi, or wheezes.  COR: 3/6 systolic murmur.  Normal rhythm.  EXT: No edema.    Echo:  2014  CONCLUSIONS  Normal left ventricular chamber size. Mild concentric left ventricular   hypertrophy. Normal left ventricular systolic function. Left   ventricular ejection fraction is 60% to 65%. Grade II diastolic   dysfunction is present.   Trace mitral regurgitation. Possible prolapse of the anterior mitral   valve leaflet.  The aortic valve is not well visualized. Aortic sclerosis without   stenosis. AV MG 8.25 mmHg, PG 15 mmHg. Trace aortic insufficiency.  Trace tricuspid regurgitation. Unable to estimate pulmonary artery   pressure due to an inadequate tricuspid regurgitant jet.   Small pericardial effusion without evidence of hemodynamic compromise.   Normal aortic root diameter 2.8 cm.   No prior study for comparison.    Assessment/Plan:  1. Decreased hearing of both ears.  Referral to audiology. REFERRAL TO AUDIOLOGY   2. Dysfunction of both eustachian tubes  REFERRAL TO AUDIOLOGY   3. Dizziness.  Recommend Nasacort daily    4. Viral URI.  Symptomatic treatment.  She is improving.    5. Chronic insomnia.  Counseled regarding sleep hygiene.  Continue CPAP.  May continue with Ambien at bedtime.    6. Anxiety.  Continue sertraline.  Occasional Valium.    7. Heart murmur.  She has not had an echocardiogram since 2014.  I believe her murmur is a little more pronounced. EC-ECHOCARDIOGRAM COMPLETE W/O CONT     Follow-up after echo.   "

## 2020-03-02 DIAGNOSIS — F51.04 CHRONIC INSOMNIA: ICD-10-CM

## 2020-03-02 RX ORDER — ZOLPIDEM TARTRATE 5 MG/1
5 TABLET ORAL
Qty: 30 TAB | Refills: 2 | OUTPATIENT
Start: 2020-03-02 | End: 2020-04-01

## 2020-04-02 DIAGNOSIS — F51.04 CHRONIC INSOMNIA: ICD-10-CM

## 2020-04-02 RX ORDER — ZOLPIDEM TARTRATE 5 MG/1
5 TABLET ORAL
Qty: 30 TAB | Refills: 2
Start: 2020-04-02 | End: 2020-05-02

## 2020-04-06 DIAGNOSIS — F51.04 CHRONIC INSOMNIA: ICD-10-CM

## 2020-04-06 RX ORDER — ZOLPIDEM TARTRATE 5 MG/1
5 TABLET ORAL
Qty: 30 TAB | Refills: 2 | OUTPATIENT
Start: 2020-04-06 | End: 2020-05-06

## 2020-04-17 DIAGNOSIS — F41.9 ANXIETY: ICD-10-CM

## 2020-04-17 RX ORDER — DIAZEPAM 5 MG/1
TABLET ORAL
Qty: 30 TAB | Refills: 1 | Status: SHIPPED
Start: 2020-04-17 | End: 2020-05-16

## 2020-05-06 ENCOUNTER — APPOINTMENT (OUTPATIENT)
Dept: SLEEP MEDICINE | Facility: MEDICAL CENTER | Age: 75
End: 2020-05-06
Payer: MEDICARE

## 2020-06-01 RX ORDER — CARVEDILOL 12.5 MG/1
TABLET ORAL
Qty: 180 TAB | Refills: 1 | Status: SHIPPED | OUTPATIENT
Start: 2020-06-01 | End: 2020-12-04

## 2020-07-02 DIAGNOSIS — F51.04 CHRONIC INSOMNIA: ICD-10-CM

## 2020-07-02 RX ORDER — ZOLPIDEM TARTRATE 5 MG/1
TABLET ORAL
Qty: 30 TAB | Refills: 2 | Status: SHIPPED | OUTPATIENT
Start: 2020-07-02 | End: 2020-08-01

## 2020-07-08 ENCOUNTER — HOSPITAL ENCOUNTER (OUTPATIENT)
Dept: RADIOLOGY | Facility: MEDICAL CENTER | Age: 75
End: 2020-07-08
Attending: FAMILY MEDICINE
Payer: MEDICARE

## 2020-07-08 DIAGNOSIS — Z12.31 VISIT FOR SCREENING MAMMOGRAM: ICD-10-CM

## 2020-07-08 PROCEDURE — 77067 SCR MAMMO BI INCL CAD: CPT

## 2020-09-30 DIAGNOSIS — F51.04 CHRONIC INSOMNIA: ICD-10-CM

## 2020-09-30 RX ORDER — ZOLPIDEM TARTRATE 5 MG/1
TABLET ORAL
Qty: 30 TAB | Refills: 0 | OUTPATIENT
Start: 2020-09-30 | End: 2020-10-30

## 2020-10-01 ENCOUNTER — NON-PROVIDER VISIT (OUTPATIENT)
Dept: INTERNAL MEDICINE | Facility: IMAGING CENTER | Age: 75
End: 2020-10-01
Payer: MEDICARE

## 2020-10-01 DIAGNOSIS — Z23 NEED FOR INFLUENZA VACCINATION: ICD-10-CM

## 2020-10-01 PROCEDURE — G0008 ADMIN INFLUENZA VIRUS VAC: HCPCS | Performed by: FAMILY MEDICINE

## 2020-10-01 PROCEDURE — 90662 IIV NO PRSV INCREASED AG IM: CPT | Performed by: FAMILY MEDICINE

## 2020-10-07 RX ORDER — LOSARTAN POTASSIUM 100 MG/1
TABLET ORAL
Qty: 90 TAB | Refills: 1 | Status: SHIPPED
Start: 2020-10-07 | End: 2020-12-02

## 2020-10-28 DIAGNOSIS — F51.04 CHRONIC INSOMNIA: ICD-10-CM

## 2020-10-28 RX ORDER — ZOLPIDEM TARTRATE 5 MG/1
TABLET ORAL
Qty: 30 TAB | Refills: 0 | Status: SHIPPED
Start: 2020-10-28 | End: 2020-11-27

## 2020-11-02 DIAGNOSIS — F51.04 CHRONIC INSOMNIA: ICD-10-CM

## 2020-11-02 RX ORDER — ZOLPIDEM TARTRATE 5 MG/1
TABLET ORAL
Qty: 30 TAB | Refills: 0 | OUTPATIENT
Start: 2020-11-02 | End: 2020-12-02

## 2020-11-28 DIAGNOSIS — F51.04 CHRONIC INSOMNIA: ICD-10-CM

## 2020-11-30 RX ORDER — ZOLPIDEM TARTRATE 5 MG/1
TABLET ORAL
Qty: 30 TAB | Refills: 2 | Status: SHIPPED | OUTPATIENT
Start: 2020-11-30 | End: 2020-12-30

## 2020-12-02 ENCOUNTER — OFFICE VISIT (OUTPATIENT)
Dept: INTERNAL MEDICINE | Facility: IMAGING CENTER | Age: 75
End: 2020-12-02
Payer: MEDICARE

## 2020-12-02 VITALS
TEMPERATURE: 97.1 F | RESPIRATION RATE: 14 BRPM | OXYGEN SATURATION: 93 % | HEIGHT: 67 IN | DIASTOLIC BLOOD PRESSURE: 72 MMHG | BODY MASS INDEX: 36.57 KG/M2 | WEIGHT: 233 LBS | SYSTOLIC BLOOD PRESSURE: 128 MMHG | HEART RATE: 69 BPM

## 2020-12-02 DIAGNOSIS — Z11.59 NEED FOR HEPATITIS C SCREENING TEST: ICD-10-CM

## 2020-12-02 DIAGNOSIS — R01.1 HEART MURMUR: ICD-10-CM

## 2020-12-02 DIAGNOSIS — E55.9 VITAMIN D DEFICIENCY: ICD-10-CM

## 2020-12-02 DIAGNOSIS — D75.89 MACROCYTOSIS: ICD-10-CM

## 2020-12-02 DIAGNOSIS — I10 ESSENTIAL HYPERTENSION: ICD-10-CM

## 2020-12-02 DIAGNOSIS — F51.04 CHRONIC INSOMNIA: ICD-10-CM

## 2020-12-02 DIAGNOSIS — E78.2 MIXED HYPERLIPIDEMIA: ICD-10-CM

## 2020-12-02 PROCEDURE — 99214 OFFICE O/P EST MOD 30 MIN: CPT | Performed by: FAMILY MEDICINE

## 2020-12-02 ASSESSMENT — FIBROSIS 4 INDEX: FIB4 SCORE: 2.5

## 2020-12-03 NOTE — PROGRESS NOTES
Chief Complaint   Patient presents with   • Hypertension     elevated at the dentists office   • Insomnia     follow up       HISTORY OF PRESENT ILLNESS: Patient is a 75 y.o. female established patient who presents today with concerns about her blood pressure.  She was at the dentist recently and her blood pressure was elevated.  She has since monitored it at home and it has been much better.  She is not sure if she was just real anxious or if possibly the tech there did not take it correctly.  She denies any chest pain, shortness of breath, edema.  She is on carvedilol twice daily and Hyzaar for blood pressure control.    Also on simvastatin for lipids and due for lab work.  She is overweight.  She admits during the pandemic she is not getting much exercise.    She does have a heart murmur and this has not been monitored with an echocardiogram since 2014.    Also here to follow-up on chronic insomnia.  She has a long history of insomnia.  She has difficulty both initiating and maintaining sleep.  She feels that she practices good sleep hygiene.  She does have sleep apnea and uses her CPAP regularly.  She is dependent on Ambien for sleep.  She denies any side effects.      Patient Active Problem List    Diagnosis Date Noted   • BMI 35.0-35.9,adult 04/06/2018   • Heart murmur 09/13/2017   • Chronic insomnia 08/09/2017   • Anxiety 08/09/2017   • HTN (hypertension) 07/01/2014   • Hyperlipidemia 07/01/2014   • AZUL on CPAP 07/01/2014     Current Outpatient Medications on File Prior to Visit   Medication Sig Dispense Refill   • zolpidem (AMBIEN) 5 MG Tab TAKE ONE TABLET BY MOUTH EVERY DAY AT BEDTIME AS NEEDED 30 Tab 2   • carvedilol (COREG) 12.5 MG Tab TAKE ONE TABLET BY MOUTH TWICE A DAY WITH MEALS 180 Tab 1   • sertraline (ZOLOFT) 100 MG Tab Take 1 Tab by mouth every day. 90 Tab 3   • cholestyramine (QUESTRAN) 4 g packet Take 4 g by mouth every day. 30 Each 1   • simvastatin (ZOCOR) 20 MG Tab TAKE ONE TABLET BY MOUTH  "EVERY EVENING 90 Tab 3   • losartan-hydrochlorothiazide (HYZAAR) 100-25 MG per tablet Take 1 Tab by mouth every day. 90 Tab 3   • vitamin D (CHOLECALCIFEROL) 1000 UNIT Tab Take 5,000 Units by mouth every day.     • Omega 3 1000 MG Cap Take  by mouth.     • docusate sodium (COLACE) 100 MG CAPS Take 100 mg by mouth every day.     • IRON PO Take 65 mg by mouth every day.     • Cyanocobalamin (VITAMIN B12 PO) Take  by mouth every day.     • FOLIC ACID PO Take  by mouth every day.     • Acetaminophen (TYLENOL 8 HOUR PO) Take  by mouth as needed.       No current facility-administered medications on file prior to visit.        Past medical, surgical, family, and social history is reviewed and updated in Epic chart by me today.   Medications and allergies reviewed and updated in Epic chart by me today.     REVIEW OF SYSTEMS:  GENERAL: No fatigue, no weight loss.  HEENT:  Ears--no earache, no change in hearing, no dizziness, no tinnitus.                 Eyes--no blurred vision, no discharge or pain                 Throat--No sore throat, no dysphagia, no hoarseness  CV:  No chest pain,dyspnea,palpitations or edema.  RESP:  No sob,cough,wheezing or hemoptysis.  GI: No dysphagia, heartburn,abdominal pain, nausea, vomiting, diarrhea or constipation.       No melena, jaundice, bleeding, incontinence or change in bowel habits.  :  No dysuria, polyuria, hematuria, incontinence, or nocturia.  MS:  No joint swelling, myalgias, or arthralgias.  NEURO:  No seizures, syncope, paralysis, tremor, or weakness.  SKIN: No new or concerning skin lesions or changes.   PSYCH: Mood fine.    Vitals:    12/02/20 1000   BP: 128/72   Pulse: 69   Resp: 14   Temp: 36.2 °C (97.1 °F)   TempSrc: Temporal   SpO2: 93%   Weight: 105.7 kg (233 lb)   Height: 1.702 m (5' 7.01\")     Physical Exam:  Gen: Overweight female.. No acute distress.  Neck:  Supple, no adenopathy or thyromegaly.  Heart:  Regular rate and rhythm.  Grade 3/6 systolic murmur.   Lungs:  " Clear, No wheezes,rales or rhonchi.  Extremities:  No edema.  Psych: Mood and affect are appropriate.        Assessment/Plan:  1. Essential hypertension her blood pressure here today is in good control.  She will continue her present medications.  Counseled regarding exercise, weight loss, sodium limits. EC-ECHOCARDIOGRAM COMPLETE W/O CONT    CBC WITH DIFFERENTIAL    Comp Metabolic Panel   2. Chronic insomnia.  Counseled regarding sleep hygiene.  She may continue with Ambien.    3. Heart murmur.  Echocardiogram EC-ECHOCARDIOGRAM COMPLETE W/O CONT   4. Mixed hyperlipidemia.  Continue statin, diet and exercise and monitor lab work. Comp Metabolic Panel    Lipid Profile   5. Macrocytosis  VITAMIN B12   6. Vitamin D deficiency  VITAMIN D,25 HYDROXY   7. Need for hepatitis C screening test  HEP C VIRUS ANTIBODY   8. BMI 36.0-36.9,adult.  Counseled regarding diet and exercise. TSH WITH REFLEX TO FT4     Follow-up in 3 months and as needed

## 2020-12-04 RX ORDER — CARVEDILOL 12.5 MG/1
TABLET ORAL
Qty: 180 TAB | Refills: 3 | Status: SHIPPED | OUTPATIENT
Start: 2020-12-04 | End: 2021-09-13 | Stop reason: SDUPTHER

## 2020-12-08 RX ORDER — SERTRALINE HYDROCHLORIDE 100 MG/1
TABLET, FILM COATED ORAL
Qty: 90 TAB | Refills: 3 | Status: SHIPPED | OUTPATIENT
Start: 2020-12-08 | End: 2021-09-16 | Stop reason: SDUPTHER

## 2020-12-09 ENCOUNTER — NON-PROVIDER VISIT (OUTPATIENT)
Dept: INTERNAL MEDICINE | Facility: IMAGING CENTER | Age: 75
End: 2020-12-09
Payer: MEDICARE

## 2020-12-09 ENCOUNTER — HOSPITAL ENCOUNTER (OUTPATIENT)
Facility: MEDICAL CENTER | Age: 75
End: 2020-12-09
Attending: FAMILY MEDICINE
Payer: MEDICARE

## 2020-12-09 DIAGNOSIS — D75.89 MACROCYTOSIS: ICD-10-CM

## 2020-12-09 DIAGNOSIS — E78.2 MIXED HYPERLIPIDEMIA: ICD-10-CM

## 2020-12-09 DIAGNOSIS — E55.9 VITAMIN D DEFICIENCY: ICD-10-CM

## 2020-12-09 DIAGNOSIS — Z11.59 NEED FOR HEPATITIS C SCREENING TEST: ICD-10-CM

## 2020-12-09 DIAGNOSIS — I10 ESSENTIAL HYPERTENSION: ICD-10-CM

## 2020-12-09 PROCEDURE — 82306 VITAMIN D 25 HYDROXY: CPT

## 2020-12-09 PROCEDURE — 80061 LIPID PANEL: CPT

## 2020-12-09 PROCEDURE — 86803 HEPATITIS C AB TEST: CPT

## 2020-12-09 PROCEDURE — 84443 ASSAY THYROID STIM HORMONE: CPT

## 2020-12-09 PROCEDURE — 80053 COMPREHEN METABOLIC PANEL: CPT

## 2020-12-09 PROCEDURE — 82607 VITAMIN B-12: CPT

## 2020-12-09 PROCEDURE — 85025 COMPLETE CBC W/AUTO DIFF WBC: CPT

## 2020-12-09 RX ORDER — HYDROCHLOROTHIAZIDE 25 MG/1
TABLET ORAL
Qty: 90 TAB | Refills: 3 | Status: SHIPPED | OUTPATIENT
Start: 2020-12-09 | End: 2021-01-13

## 2020-12-10 LAB
25(OH)D3 SERPL-MCNC: 55 NG/ML (ref 30–100)
ALBUMIN SERPL BCP-MCNC: 4.4 G/DL (ref 3.2–4.9)
ALBUMIN/GLOB SERPL: 1.5 G/DL
ALP SERPL-CCNC: 75 U/L (ref 30–99)
ALT SERPL-CCNC: 21 U/L (ref 2–50)
ANION GAP SERPL CALC-SCNC: 9 MMOL/L (ref 7–16)
AST SERPL-CCNC: 22 U/L (ref 12–45)
BASOPHILS # BLD AUTO: 0.7 % (ref 0–1.8)
BASOPHILS # BLD: 0.04 K/UL (ref 0–0.12)
BILIRUB SERPL-MCNC: 0.5 MG/DL (ref 0.1–1.5)
BUN SERPL-MCNC: 27 MG/DL (ref 8–22)
CALCIUM SERPL-MCNC: 11.2 MG/DL (ref 8.5–10.5)
CHLORIDE SERPL-SCNC: 102 MMOL/L (ref 96–112)
CHOLEST SERPL-MCNC: 282 MG/DL (ref 100–199)
CO2 SERPL-SCNC: 27 MMOL/L (ref 20–33)
CREAT SERPL-MCNC: 0.72 MG/DL (ref 0.5–1.4)
EOSINOPHIL # BLD AUTO: 0.12 K/UL (ref 0–0.51)
EOSINOPHIL NFR BLD: 2 % (ref 0–6.9)
ERYTHROCYTE [DISTWIDTH] IN BLOOD BY AUTOMATED COUNT: 48.4 FL (ref 35.9–50)
GLOBULIN SER CALC-MCNC: 2.9 G/DL (ref 1.9–3.5)
GLUCOSE SERPL-MCNC: 101 MG/DL (ref 65–99)
HCT VFR BLD AUTO: 45.1 % (ref 37–47)
HCV AB SER QL: NORMAL
HDLC SERPL-MCNC: 72 MG/DL
HGB BLD-MCNC: 14.8 G/DL (ref 12–16)
IMM GRANULOCYTES # BLD AUTO: 0.03 K/UL (ref 0–0.11)
IMM GRANULOCYTES NFR BLD AUTO: 0.5 % (ref 0–0.9)
LDLC SERPL CALC-MCNC: 182 MG/DL
LYMPHOCYTES # BLD AUTO: 0.91 K/UL (ref 1–4.8)
LYMPHOCYTES NFR BLD: 15.3 % (ref 22–41)
MCH RBC QN AUTO: 34.4 PG (ref 27–33)
MCHC RBC AUTO-ENTMCNC: 32.8 G/DL (ref 33.6–35)
MCV RBC AUTO: 104.9 FL (ref 81.4–97.8)
MONOCYTES # BLD AUTO: 0.49 K/UL (ref 0–0.85)
MONOCYTES NFR BLD AUTO: 8.2 % (ref 0–13.4)
NEUTROPHILS # BLD AUTO: 4.36 K/UL (ref 2–7.15)
NEUTROPHILS NFR BLD: 73.3 % (ref 44–72)
NRBC # BLD AUTO: 0 K/UL
NRBC BLD-RTO: 0 /100 WBC
PLATELET # BLD AUTO: 162 K/UL (ref 164–446)
PMV BLD AUTO: 12.1 FL (ref 9–12.9)
POTASSIUM SERPL-SCNC: 4.9 MMOL/L (ref 3.6–5.5)
PROT SERPL-MCNC: 7.3 G/DL (ref 6–8.2)
RBC # BLD AUTO: 4.3 M/UL (ref 4.2–5.4)
SODIUM SERPL-SCNC: 138 MMOL/L (ref 135–145)
TRIGL SERPL-MCNC: 139 MG/DL (ref 0–149)
TSH SERPL DL<=0.005 MIU/L-ACNC: 1.16 UIU/ML (ref 0.38–5.33)
VIT B12 SERPL-MCNC: 784 PG/ML (ref 211–911)
WBC # BLD AUTO: 6 K/UL (ref 4.8–10.8)

## 2020-12-15 ENCOUNTER — TELEPHONE (OUTPATIENT)
Dept: INTERNAL MEDICINE | Facility: IMAGING CENTER | Age: 75
End: 2020-12-15

## 2020-12-15 DIAGNOSIS — E83.52 HYPERCALCEMIA: ICD-10-CM

## 2020-12-15 DIAGNOSIS — E78.2 MIXED HYPERLIPIDEMIA: ICD-10-CM

## 2020-12-15 RX ORDER — ROSUVASTATIN CALCIUM 10 MG/1
10 TABLET, COATED ORAL EVERY EVENING
Qty: 30 TAB | Refills: 11 | Status: SHIPPED | OUTPATIENT
Start: 2020-12-15 | End: 2021-12-07 | Stop reason: SDUPTHER

## 2020-12-15 NOTE — TELEPHONE ENCOUNTER
I spoke with Clau.  She has been taking simvastatin 20 mg.  Her cholesterol is significantly higher.    We will switch to rosuvastatin 10 mg, focus on diet and exercise.  Recheck labs in 3 months with a chemistry panel.    Calcium was also slightly high and I like to repeat that along with a PTH.

## 2020-12-27 DIAGNOSIS — F41.9 ANXIETY: ICD-10-CM

## 2020-12-28 RX ORDER — DIAZEPAM 5 MG/1
TABLET ORAL
Qty: 30 TAB | Refills: 1 | Status: SHIPPED | OUTPATIENT
Start: 2020-12-28 | End: 2021-01-27

## 2021-01-11 DIAGNOSIS — Z23 NEED FOR VACCINATION: ICD-10-CM

## 2021-01-13 ENCOUNTER — OFFICE VISIT (OUTPATIENT)
Dept: INTERNAL MEDICINE | Facility: IMAGING CENTER | Age: 76
End: 2021-01-13
Payer: MEDICARE

## 2021-01-13 VITALS
RESPIRATION RATE: 17 BRPM | HEART RATE: 86 BPM | WEIGHT: 233 LBS | TEMPERATURE: 98.1 F | DIASTOLIC BLOOD PRESSURE: 72 MMHG | BODY MASS INDEX: 36.57 KG/M2 | HEIGHT: 67 IN | SYSTOLIC BLOOD PRESSURE: 146 MMHG | OXYGEN SATURATION: 93 %

## 2021-01-13 DIAGNOSIS — G47.33 OSA ON CPAP: ICD-10-CM

## 2021-01-13 DIAGNOSIS — Z71.85 VACCINE COUNSELING: ICD-10-CM

## 2021-01-13 DIAGNOSIS — E78.2 MIXED HYPERLIPIDEMIA: ICD-10-CM

## 2021-01-13 DIAGNOSIS — F51.04 CHRONIC INSOMNIA: ICD-10-CM

## 2021-01-13 DIAGNOSIS — R01.1 HEART MURMUR: ICD-10-CM

## 2021-01-13 DIAGNOSIS — I10 ESSENTIAL HYPERTENSION: ICD-10-CM

## 2021-01-13 DIAGNOSIS — E66.9 OBESITY (BMI 30-39.9): ICD-10-CM

## 2021-01-13 DIAGNOSIS — F41.9 ANXIETY: ICD-10-CM

## 2021-01-13 DIAGNOSIS — Z00.00 ENCOUNTER FOR MEDICARE ANNUAL WELLNESS EXAM: ICD-10-CM

## 2021-01-13 PROCEDURE — G0439 PPPS, SUBSEQ VISIT: HCPCS | Performed by: FAMILY MEDICINE

## 2021-01-13 ASSESSMENT — PATIENT HEALTH QUESTIONNAIRE - PHQ9: CLINICAL INTERPRETATION OF PHQ2 SCORE: 0

## 2021-01-13 ASSESSMENT — FIBROSIS 4 INDEX: FIB4 SCORE: 2.22

## 2021-01-13 ASSESSMENT — ACTIVITIES OF DAILY LIVING (ADL): BATHING_REQUIRES_ASSISTANCE: 0

## 2021-01-13 ASSESSMENT — ENCOUNTER SYMPTOMS: GENERAL WELL-BEING: GOOD

## 2021-01-14 NOTE — PROGRESS NOTES
CC:   Medicare Annual Wellness Visit    HPI:  Clau is a 75 y.o. female here for her Medicare Annual Wellness Visit and to transfer care from Dr. Davies, due to her long-term. Patient has history of chronic essential HTN and chronic mixed dyslipidemia controlled with daily medication use. She has pending order for echo to follow up on MVP identified on echo done 7/1/14, and she continues to see Dr. Morel for ongoing eye care. She has chronic AZUL with CPAP use managed by pulmonology and will be due for annual fasting labs in March. She has chronic MARIE controlled with infrequent Valium use and daily Zoloft use. She sees Dr. Olson for ongoing dermatology care and endorses 100% medication compliance. She is in good spirits today and denies new health concerns.     Patient Active Problem List    Diagnosis Date Noted   • Obesity (BMI 30-39.9) 04/06/2018   • Heart murmur 09/13/2017   • Chronic insomnia 08/09/2017   • Anxiety 08/09/2017   • HTN (hypertension) 07/01/2014   • Hyperlipidemia 07/01/2014   • AZUL on CPAP 07/01/2014     Current Outpatient Medications   Medication Sig Dispense Refill   • diazePAM (VALIUM) 5 MG Tab TAKE ONE TABLET BY MOUTH EVERY 12 HOURS AS NEEDED FOR ANXIETY 30 Tab 1   • rosuvastatin (CRESTOR) 10 MG Tab Take 1 Tab by mouth every evening. 30 Tab 11   • sertraline (ZOLOFT) 100 MG Tab TAKE ONE (1) TABLET BY MOUTH EVERY DAY 90 Tab 3   • carvedilol (COREG) 12.5 MG Tab TAKE ONE TABLET BY MOUTH TWICE A DAY WITH MEALS 180 Tab 3   • losartan-hydrochlorothiazide (HYZAAR) 100-25 MG per tablet Take 1 Tab by mouth every day. 90 Tab 3   • vitamin D (CHOLECALCIFEROL) 1000 UNIT Tab Take 5,000 Units by mouth every day.     • Omega 3 1000 MG Cap Take  by mouth.     • docusate sodium (COLACE) 100 MG CAPS Take 100 mg by mouth every day.     • IRON PO Take 65 mg by mouth every day.     • Cyanocobalamin (VITAMIN B12 PO) Take  by mouth every day.     • FOLIC ACID PO Take  by mouth every day.     • Acetaminophen  (TYLENOL 8 HOUR PO) Take  by mouth as needed.       No current facility-administered medications for this visit.       Current supplements: see MAR  Chronic narcotic pain medicines: no  Allergies: Tramadol  Exercise: yes  Current social contact/activities: limited due to COVID  Current mood: good  Advance Directive on file: no    Screening:  Depression Screening    Little interest or pleasure in doing things?  0 - not at all  Feeling down, depressed , or hopeless? 0 - not at all  Patient Health Questionnaire Score: 0     Screening for Cognitive Impairment    Three Minute Recall (river, nation, finger) 3/3    Paramjit clock face with all 12 numbers and set the hands to show 10 past 11.  Yes    Cognitive concerns identified deferred for follow up unless specifically addressed in assessment and plan.    Fall Risk Assessment    Has the patient had two or more falls in the last year or any fall with injury in the last year?  No    Safety Assessment    Throw rugs on floor.  No  Handrails on all stairs.  Yes  Good lighting in all hallways.  Yes  Difficulty hearing.  No  Patient counseled about all safety risks that were identified.    Functional Assessment ADLs    Are there any barriers preventing you from cooking for yourself or meeting nutritional needs?  No.    Are there any barriers preventing you from driving safely or obtaining transportation?  No.    Are there any barriers preventing you from using a telephone or calling for help?  No.    Are there any barriers preventing you from shopping?  No.    Are there any barriers preventing you from taking care of your own finances?  No.    Are there any barriers preventing you from managing your medications?  No.    Are there any barriers preventing you from showering, bathing or dressing yourself?  No.    Are you currently engaging in any exercise or physical activity?  Yes.     What is your perception of your health?  Good.      Health Maintenance Summary                 COVID-19 Vaccine Overdue 3/3/1961     MAMMOGRAM Next Due 7/8/2021      Done 7/8/2020 MA-SCREENING MAMMO BILAT W/TOMOSYNTHESIS W/CAD     Patient has more history with this topic...    COLONOSCOPY Next Due 2/4/2023      Done 2/4/2013 REFERRAL TO GI FOR COLONOSCOPY    BONE DENSITY Next Due 5/7/2023      Done 5/7/2018 DS-BONE DENSITY STUDY (DEXA)     Patient has more history with this topic...    IMM DTaP/Tdap/Td Vaccine Next Due 10/11/2026      Done 10/11/2016 Imm Admin: Tdap Vaccine          Patient Care Team:  Marge Brasher M.D. as PCP - General (Family Medicine)  Savannah Dolan R.N. as Registered Nurse      Social History     Tobacco Use   • Smoking status: Never Smoker   • Smokeless tobacco: Never Used   Substance Use Topics   • Alcohol use: Yes     Alcohol/week: 8.4 oz     Types: 14 Glasses of wine per week     Comment: 2 glasses of wine per day   • Drug use: No     Family History   Problem Relation Age of Onset   • Breast Cancer Mother    • Cancer Mother 65        Breast   • Cancer Father         Colon   • Alcohol/Drug Father    • Hyperlipidemia Brother    • Heart Disease Brother         arrythmia     She  has a past medical history of Anesthesia, Arthritis, Dyslipidemia, Heart murmur (9/13/2017), Hypertension, Other specified symptom associated with female genital organs, Psychiatric problem, Sleep apnea, Snoring, and Unspecified cataract. She also has no past medical history of Breast cancer (HCC) or CAD (coronary artery disease).   Past Surgical History:   Procedure Laterality Date   • KNEE ARTHROPLASTY TOTAL Right 12/27/2017   • HYSTERECTOMY ROBOTIC  10/23/2014    Performed by Smith Lyons M.D. at SURGERY [facility] ORS   • GASTROSCOPY WITH BIOPSY  7/2/2014    Performed by Sky Vila M.D. at SURGERY [facility] ORS   • OTHER ORTHOPEDIC SURGERY  2006    left total knee   • APPENDECTOMY     • OTHER      meghan cataracts   • OTHER ABDOMINAL SURGERY      Resection of pelvic mass, uterus and  "ovaries   • PA REDUCTION OF BREAST Bilateral     1982   • TONSILLECTOMY AND ADENOIDECTOMY         ROS:    All positives noted in HPI. All others reviewed and are negative.    Ostomy or other tubes or amputations: no  Chronic oxygen use: no  Last eye exam: UTD per pt report  : denies urinary incontinence; does not interfere with ADLs/ sleep  Gait: stable  Problems with balance/ difficulty walking: no  Hearing: good  Dentition: adequate    Exam:   /72 (BP Location: Left arm, Patient Position: Sitting, BP Cuff Size: Adult)   Pulse 86   Temp 36.7 °C (98.1 °F) (Temporal)   Resp 17   Ht 1.702 m (5' 7\")   Wt 105.7 kg (233 lb)   SpO2 93%  Body mass index is 36.49 kg/m².    Gen: Well developed; well nourished; no acute distress; age appropriate appearance   HEENT: Normocephalic; atraumatic; PEERLA b/l; sclera clear b/l; b/l external auditory canals WNL; b/l TM WNL; nares patent; oropharynx clear; mask in place  Neck: No adenopathy; no thyromegaly  CV: Regular rate and rhythm; S1/ S2 present; faint murmur; no gallop or rub noted  Pulm: No respiratory distress; clear to ascultation b/l; no wheezing or stridor noted b/l  Abd: Adequate bowel sounds noted; soft and nontender; no rebound, rigidity, nor distention  Extremities: No peripheral edema b/l LE extremities/ no clubbing nor cyanosis noted  Skin: Warm and dry; no rashes noted   Neuro: No focal deficits noted; pt is able to get up out of chair unassisted and walk forward  Psych: AAOx4; mood and affect are appropriate    Assessment and Plan:  1. Essential hypertension  Stable/ recommend patient continue current daily medication use.   - Subsequent Annual Wellness Visit - Includes PPPS ()    2. Mixed hyperlipidemia  Stable/ recommend patient continue current Crestor use.   - Subsequent Annual Wellness Visit - Includes PPPS ()    3. AZUL on CPAP  Stable with ongoing CPAP use managed by pulmonology.   - Subsequent Annual Wellness Visit - Includes PPPS " ()    4. Chronic insomnia  Stable per patient report.   - Subsequent Annual Wellness Visit - Includes PPPS ()    5. Anxiety  Stable at this time per pt report. Recommend continuing PRN Valium use and daily Zoloft use.   - Subsequent Annual Wellness Visit - Includes PPPS ()    6. Heart murmur  Patient has known mild MVP - repeat echo order pending in patient's chart for update this year.   - Subsequent Annual Wellness Visit - Includes PPPS ()    7. Obesity (BMI 30-39.9)  Stable  - Subsequent Annual Wellness Visit - Includes PPPS ()  - Patient identified as having weight management issue.  Appropriate orders and counseling given.    8. Vaccine counseling  Patient is aware of COVID vaccine and will schedule appointment when more become available.   - Subsequent Annual Wellness Visit - Includes PPPS ()    9. Encounter for Medicare annual wellness exam  Patient is stable and remains well informed about health conditions that need additional attention moving forward.   - Subsequent Annual Wellness Visit - Includes PPPS ()       Services needed: no new services needed at this time  Health Care Screening: recommendations as per orders if indicated.  Referrals offered: none  Counseling provided today:  · Prevent falls and reduce trip hazards; Secure or remove rugs if present   · Maintain working fire alarm and carbon monoxide detectors   · Engage in regular physical activity daily and social activities weekly as tolerated

## 2021-01-18 ENCOUNTER — APPOINTMENT (OUTPATIENT)
Dept: CARDIOLOGY | Facility: MEDICAL CENTER | Age: 76
End: 2021-01-18
Attending: FAMILY MEDICINE
Payer: MEDICARE

## 2021-01-22 ENCOUNTER — IMMUNIZATION (OUTPATIENT)
Dept: FAMILY PLANNING/WOMEN'S HEALTH CLINIC | Facility: IMMUNIZATION CENTER | Age: 76
End: 2021-01-22
Attending: INTERNAL MEDICINE
Payer: MEDICARE

## 2021-01-22 DIAGNOSIS — Z23 NEED FOR VACCINATION: ICD-10-CM

## 2021-01-22 DIAGNOSIS — Z23 ENCOUNTER FOR VACCINATION: Primary | ICD-10-CM

## 2021-01-22 PROCEDURE — 0001A PFIZER SARS-COV-2 VACCINE: CPT

## 2021-01-22 PROCEDURE — 91300 PFIZER SARS-COV-2 VACCINE: CPT

## 2021-01-26 ENCOUNTER — HOSPITAL ENCOUNTER (OUTPATIENT)
Dept: RADIOLOGY | Facility: MEDICAL CENTER | Age: 76
End: 2021-01-26
Attending: PODIATRIST
Payer: MEDICARE

## 2021-01-26 DIAGNOSIS — I74.3 EMBOLISM AND THROMBOSIS OF ARTERIES OF LOWER EXTREMITY (HCC): ICD-10-CM

## 2021-01-26 PROCEDURE — 93971 EXTREMITY STUDY: CPT | Mod: LT

## 2021-01-27 ENCOUNTER — TELEPHONE (OUTPATIENT)
Dept: INTERNAL MEDICINE | Facility: IMAGING CENTER | Age: 76
End: 2021-01-27

## 2021-02-09 DIAGNOSIS — I34.1 MITRAL VALVE PROLAPSE: ICD-10-CM

## 2021-02-10 ENCOUNTER — APPOINTMENT (OUTPATIENT)
Dept: CARDIOLOGY | Facility: MEDICAL CENTER | Age: 76
End: 2021-02-10
Attending: FAMILY MEDICINE
Payer: MEDICARE

## 2021-02-12 ENCOUNTER — IMMUNIZATION (OUTPATIENT)
Dept: FAMILY PLANNING/WOMEN'S HEALTH CLINIC | Facility: IMMUNIZATION CENTER | Age: 76
End: 2021-02-12
Attending: INTERNAL MEDICINE
Payer: MEDICARE

## 2021-02-12 DIAGNOSIS — Z23 ENCOUNTER FOR VACCINATION: Primary | ICD-10-CM

## 2021-02-12 PROCEDURE — 0002A PFIZER SARS-COV-2 VACCINE: CPT

## 2021-02-12 PROCEDURE — 91300 PFIZER SARS-COV-2 VACCINE: CPT

## 2021-02-24 DIAGNOSIS — D75.89 MACROCYTOSIS: ICD-10-CM

## 2021-02-25 ENCOUNTER — HOSPITAL ENCOUNTER (OUTPATIENT)
Facility: MEDICAL CENTER | Age: 76
End: 2021-02-25
Attending: FAMILY MEDICINE
Payer: MEDICARE

## 2021-02-25 ENCOUNTER — NON-PROVIDER VISIT (OUTPATIENT)
Dept: INTERNAL MEDICINE | Facility: IMAGING CENTER | Age: 76
End: 2021-02-25
Payer: MEDICARE

## 2021-02-25 DIAGNOSIS — E78.2 MIXED HYPERLIPIDEMIA: ICD-10-CM

## 2021-02-25 DIAGNOSIS — E83.52 HYPERCALCEMIA: ICD-10-CM

## 2021-02-25 DIAGNOSIS — D75.89 MACROCYTOSIS: ICD-10-CM

## 2021-02-25 LAB
ALBUMIN SERPL BCP-MCNC: 4.3 G/DL (ref 3.2–4.9)
ALBUMIN/GLOB SERPL: 1.4 G/DL
ALP SERPL-CCNC: 113 U/L (ref 30–99)
ALT SERPL-CCNC: 14 U/L (ref 2–50)
ANION GAP SERPL CALC-SCNC: 9 MMOL/L (ref 7–16)
AST SERPL-CCNC: 18 U/L (ref 12–45)
BILIRUB SERPL-MCNC: 0.5 MG/DL (ref 0.1–1.5)
BUN SERPL-MCNC: 28 MG/DL (ref 8–22)
CALCIUM SERPL-MCNC: 10.9 MG/DL (ref 8.5–10.5)
CHLORIDE SERPL-SCNC: 98 MMOL/L (ref 96–112)
CHOLEST SERPL-MCNC: 167 MG/DL (ref 100–199)
CO2 SERPL-SCNC: 27 MMOL/L (ref 20–33)
CREAT SERPL-MCNC: 0.87 MG/DL (ref 0.5–1.4)
FOLATE SERPL-MCNC: 27.5 NG/ML
GLOBULIN SER CALC-MCNC: 3 G/DL (ref 1.9–3.5)
GLUCOSE SERPL-MCNC: 92 MG/DL (ref 65–99)
HDLC SERPL-MCNC: 78 MG/DL
LDLC SERPL CALC-MCNC: 64 MG/DL
POTASSIUM SERPL-SCNC: 4.7 MMOL/L (ref 3.6–5.5)
PROT SERPL-MCNC: 7.3 G/DL (ref 6–8.2)
PTH-INTACT SERPL-MCNC: 57.1 PG/ML (ref 14–72)
SODIUM SERPL-SCNC: 134 MMOL/L (ref 135–145)
TRIGL SERPL-MCNC: 123 MG/DL (ref 0–149)

## 2021-02-25 PROCEDURE — 82746 ASSAY OF FOLIC ACID SERUM: CPT

## 2021-02-25 PROCEDURE — 80053 COMPREHEN METABOLIC PANEL: CPT

## 2021-02-25 PROCEDURE — 80061 LIPID PANEL: CPT

## 2021-02-25 PROCEDURE — 83970 ASSAY OF PARATHORMONE: CPT

## 2021-03-01 DIAGNOSIS — F51.01 PRIMARY INSOMNIA: ICD-10-CM

## 2021-03-01 DIAGNOSIS — E83.52 HYPERCALCEMIA: ICD-10-CM

## 2021-03-01 RX ORDER — ZOLPIDEM TARTRATE 5 MG/1
5 TABLET ORAL NIGHTLY PRN
Qty: 30 TABLET | Refills: 2 | Status: SHIPPED | OUTPATIENT
Start: 2021-03-01 | End: 2021-05-25

## 2021-03-12 ENCOUNTER — HOSPITAL ENCOUNTER (OUTPATIENT)
Dept: RADIOLOGY | Facility: MEDICAL CENTER | Age: 76
End: 2021-03-12
Attending: FAMILY MEDICINE
Payer: MEDICARE

## 2021-03-12 DIAGNOSIS — E83.52 HYPERCALCEMIA: ICD-10-CM

## 2021-03-12 PROCEDURE — 76536 US EXAM OF HEAD AND NECK: CPT

## 2021-03-16 ENCOUNTER — OFFICE VISIT (OUTPATIENT)
Dept: INTERNAL MEDICINE | Facility: IMAGING CENTER | Age: 76
End: 2021-03-16
Payer: MEDICARE

## 2021-03-16 VITALS
RESPIRATION RATE: 17 BRPM | DIASTOLIC BLOOD PRESSURE: 64 MMHG | OXYGEN SATURATION: 93 % | SYSTOLIC BLOOD PRESSURE: 125 MMHG | WEIGHT: 233.69 LBS | HEIGHT: 67 IN | TEMPERATURE: 98.3 F | HEART RATE: 75 BPM | BODY MASS INDEX: 36.68 KG/M2

## 2021-03-16 DIAGNOSIS — E83.52 HYPERCALCEMIA: ICD-10-CM

## 2021-03-16 DIAGNOSIS — E04.1 THYROID NODULE: ICD-10-CM

## 2021-03-16 PROCEDURE — 99213 OFFICE O/P EST LOW 20 MIN: CPT | Performed by: FAMILY MEDICINE

## 2021-03-16 RX ORDER — LOSARTAN POTASSIUM 100 MG/1
TABLET ORAL
COMMUNITY
Start: 2021-02-05 | End: 2021-03-16

## 2021-03-16 ASSESSMENT — FIBROSIS 4 INDEX: FIB4 SCORE: 2.26

## 2021-03-16 NOTE — PROGRESS NOTES
"Chief Complaint   Patient presents with   • Follow-Up     review results       HPI:  Patient is a 76 y.o. female established patient who presents today to review recent lab and imaging results. Patient has history of mild hypercalcemia noted on labs done 12/9/2020 by Dr. Davies. Since that time, patient has undergone further lab work 2/25/2021 and thyroid US 3/12/2021 and would like to discuss results today. She is \"a worrier\" at baseline and has been doing extensively online research ([employer]) about potential illnesses she could be suffering from. Beyond her baseline anxiety, she feels well at this time and remains busy with her 8 year old lab at home.     Patient Active Problem List    Diagnosis Date Noted   • Thyroid nodule 03/16/2021   • Obesity (BMI 30-39.9) 04/06/2018   • Heart murmur 09/13/2017   • Chronic insomnia 08/09/2017   • Anxiety 08/09/2017   • HTN (hypertension) 07/01/2014   • Hyperlipidemia 07/01/2014   • AZUL on CPAP 07/01/2014       Past medical, surgical, family, and social history was reviewed and updated in Epic chart by me today.     Medications and allergies reviewed and updated in Epic chart by me today.     Lab results 2/25/2021 reviewed with patient at visit today.  Thyroid US 3/12/2021 results reviewed with patient at visit today.     ROS:  Pertinent positives listed above in HPI. All other systems have been reviewed and are negative.    PE:   /64 (BP Location: Left arm, Patient Position: Sitting, BP Cuff Size: Adult)   Pulse 75   Temp 36.8 °C (98.3 °F) (Temporal)   Resp 17   Ht 1.702 m (5' 7\")   Wt 106 kg (233 lb 11 oz)   SpO2 93%   BMI 36.60 kg/m²   Vital signs reviewed with patient.     Gen: Well developed; well nourished; no acute distress; non toxic appearance   Neuro: No focal deficits noted   Psych: AAOx4; mood and affect are at baseline    A/P:  1. Hypercalcemia  Mild condition of unclear etiology despite work up. Considering the fast that her calcium levels have " decreased since December, recommend patient repeat labs in 8 weeks for ongoing management.   - IONIZED CALCIUM; Future  - Comp Metabolic Panel; Future    2. Thyroid nodule  US done 3/12/2021 shows 2.3 cm right thyroid nodule - TR3 - recommend repeating US in 1 year for ongoing management.     Face to face time: 20 minutes spent with greater than 90% of time spent with direct patient care and counseling about diagnosis, prognosis, risk versus benefits of treatment, and importance of compliance with instructions. All questions answered and support provided during visit today.

## 2021-04-13 ENCOUNTER — HOSPITAL ENCOUNTER (OUTPATIENT)
Dept: CARDIOLOGY | Facility: MEDICAL CENTER | Age: 76
End: 2021-04-13
Attending: FAMILY MEDICINE
Payer: MEDICARE

## 2021-04-13 DIAGNOSIS — I34.1 MITRAL VALVE PROLAPSE: ICD-10-CM

## 2021-04-13 LAB
LV EJECT FRACT  99904: 70
LV EJECT FRACT MOD 2C 99903: 74.4
LV EJECT FRACT MOD 4C 99902: 66.95
LV EJECT FRACT MOD BP 99901: 70.45

## 2021-04-13 PROCEDURE — 93306 TTE W/DOPPLER COMPLETE: CPT

## 2021-04-13 PROCEDURE — 93306 TTE W/DOPPLER COMPLETE: CPT | Mod: 26 | Performed by: INTERNAL MEDICINE

## 2021-04-26 ENCOUNTER — OFFICE VISIT (OUTPATIENT)
Dept: INTERNAL MEDICINE | Facility: IMAGING CENTER | Age: 76
End: 2021-04-26
Payer: MEDICARE

## 2021-04-26 VITALS
WEIGHT: 233.69 LBS | HEIGHT: 67 IN | HEART RATE: 63 BPM | TEMPERATURE: 98.4 F | BODY MASS INDEX: 36.68 KG/M2 | SYSTOLIC BLOOD PRESSURE: 136 MMHG | DIASTOLIC BLOOD PRESSURE: 78 MMHG | RESPIRATION RATE: 17 BRPM | OXYGEN SATURATION: 95 %
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
## 2021-04-26 DIAGNOSIS — R26.89 POOR BALANCE: ICD-10-CM

## 2021-04-26 PROCEDURE — 99213 OFFICE O/P EST LOW 20 MIN: CPT | Performed by: FAMILY MEDICINE

## 2021-04-26 RX ORDER — HYDROCHLOROTHIAZIDE 25 MG/1
TABLET ORAL
COMMUNITY
Start: 2021-03-17 | End: 2021-04-26

## 2021-04-26 ASSESSMENT — FIBROSIS 4 INDEX: FIB4 SCORE: 2.26

## 2021-04-26 NOTE — PROGRESS NOTES
"Chief Complaint   Patient presents with   • Fall     Fell this AM, feels deconditioned and nervous with all walking       HPI:  Patient is a 76 y.o. female established patient who presents today to discuss options to help her feel more confident with ambulation. She reports being clumsy and falls often - most recently she stepped on her dog's ball on floor today. She denies acute injury but fell nervous with all ambulation. She is scared to hurt herself and has consequently become deconditioned due to fear of safe ambulation. She uses a four point cane, denies associated neurological changes, and is otherwise doing well at this time.    Patient Active Problem List    Diagnosis Date Noted   • Poor balance 04/26/2021   • Thyroid nodule 03/16/2021   • Obesity (BMI 30-39.9) 04/06/2018   • Heart murmur 09/13/2017   • Chronic insomnia 08/09/2017   • Anxiety 08/09/2017   • HTN (hypertension) 07/01/2014   • Hyperlipidemia 07/01/2014   • AZUL on CPAP 07/01/2014       Past medical, surgical, family, and social history was reviewed and updated in Epic chart by me today.     Medications and allergies reviewed and updated in Epic chart by me today.     ROS:  Pertinent positives listed above in HPI. All other systems have been reviewed and are negative.    PE:   /78 (BP Location: Left arm, Patient Position: Sitting, BP Cuff Size: Adult)   Pulse 63   Temp 36.9 °C (98.4 °F) (Temporal)   Resp 17   Ht 1.702 m (5' 7\")   Wt 106 kg (233 lb 11 oz)   SpO2 95%   BMI 36.60 kg/m²   Vital signs reviewed with patient.     Gen: Well developed; well nourished; no acute distress; non toxic appearance   Skin: Warm and dry; no rashes noted   Neuro: No focal deficits noted   Psych: AAOx4; mood and affect are appropriate    A/P:  1. Fall from ground level  Most recent fall was today - stepped on her dog's ball and fell without reported injury. Patient reports being chronically clumsy and falls often. She is appropriately concerned about " having a significant injury if she continues to fall. I agree with her concerns and will refer patient to Charu Godfrey at [facility] for evaluation and treatment. Patient provided with office number to schedule appointment for evaluation in near future.   - REFERRAL TO PHYSICAL THERAPY    2. Poor balance  Chronic issue for patient that is worsening as she becomes more fearful of safe ambulation. No new neurological condition present.  I feel that PT referral will help her greatly, and I will certainly follow her response to therapies.   - REFERRAL TO PHYSICAL THERAPY

## 2021-05-03 ENCOUNTER — OFFICE VISIT (OUTPATIENT)
Dept: INTERNAL MEDICINE | Facility: IMAGING CENTER | Age: 76
End: 2021-05-03
Payer: MEDICARE

## 2021-05-03 VITALS
WEIGHT: 233.69 LBS | SYSTOLIC BLOOD PRESSURE: 140 MMHG | BODY MASS INDEX: 36.68 KG/M2 | HEIGHT: 67 IN | TEMPERATURE: 98.8 F | DIASTOLIC BLOOD PRESSURE: 62 MMHG | RESPIRATION RATE: 17 BRPM | HEART RATE: 65 BPM | OXYGEN SATURATION: 92 %

## 2021-05-03 DIAGNOSIS — H01.00B BLEPHARITIS OF BOTH UPPER AND LOWER EYELID OF LEFT EYE, UNSPECIFIED TYPE: ICD-10-CM

## 2021-05-03 PROCEDURE — 99214 OFFICE O/P EST MOD 30 MIN: CPT | Performed by: FAMILY MEDICINE

## 2021-05-03 RX ORDER — ERYTHROMYCIN 5 MG/G
1 OINTMENT OPHTHALMIC 4 TIMES DAILY
Qty: 3.5 G | Refills: 1 | Status: SHIPPED | OUTPATIENT
Start: 2021-05-03 | End: 2021-05-10

## 2021-05-03 ASSESSMENT — FIBROSIS 4 INDEX: FIB4 SCORE: 2.26

## 2021-05-03 NOTE — PROGRESS NOTES
"Chief Complaint   Patient presents with   • Eye Problem     Left upper eyelid swollen since yesterday/ resolving stye left lower eyelid       HPI:  Patient is a 76 y.o. female established patient who presents today for evaluation of new left upper eyelid swelling and redness present since yesterday. She has been appropriately treating mid portion left lower eyelid stye with warm compresses throughout the past week and was feeling better until her condition changed yesterday. She has associated left upper eyelid skin redness and has been applying polysporin type of ointment to eyelid. She denies associated vision changes, denies eye drainage/eye lash matting, and no associated acute illness. She sees Dr. Morel for annual eye exams per her report.     Patient Active Problem List    Diagnosis Date Noted   • Poor balance 04/26/2021   • Thyroid nodule 03/16/2021   • Obesity (BMI 30-39.9) 04/06/2018   • Heart murmur 09/13/2017   • Chronic insomnia 08/09/2017   • Anxiety 08/09/2017   • HTN (hypertension) 07/01/2014   • Hyperlipidemia 07/01/2014   • AZUL on CPAP 07/01/2014       Past medical, surgical, family, and social history was reviewed and updated in Epic chart by me today.     Medications and allergies reviewed and updated in Epic chart by me today.     ROS:  Pertinent positives listed above in HPI. All other systems have been reviewed and are negative.    PE:   /62 (BP Location: Left arm, Patient Position: Sitting, BP Cuff Size: Adult)   Pulse 65   Temp 37.1 °C (98.8 °F) (Temporal)   Resp 17   Ht 1.702 m (5' 7\")   Wt 106 kg (233 lb 11 oz)   SpO2 92%   BMI 36.60 kg/m²   Vital signs reviewed with patient.     Gen: Well developed; well nourished; no acute distress; age appropriate appearance   HEENT: Normocephalic; atraumatic; PEERLA b/l; sclera clear b/l; left upper eyelid swollen and skin is erythematous; resolving mid left lower lid stye present; no discharge/no eyelash matting; right eyelids are WNL; " hearing intact; nares patent; oropharynx clear; mask in place; mild dependent edema sac noted under left eye area  Skin: Warm and dry; no rashes noted   Neuro: No focal deficits noted   Psych: AAOx4; mood and affect are appropriate    A/P:  1. Blepharitis of both upper and lower eyelid of left eye, unspecified type  Evolving condition for patient - initially she had a mid lower left eyelid stye for the past week, which was resolving with support care management. She then developed overt left upper eyelid blepharitis and skin erythema without eye discharge nor vision changes. Recommend patient discontinue OTC ointment use, start erythromycin ointment, keep area clean and dry, and apply cool compresses to entire area throughout the day to decrease swelling and provide comfort. If condition does not significantly improve within 48 hrs, patient has been directed to call Dr. Morel's office for formal eye evaluation. New RX sent to pharmacy.   - erythromycin 5 MG/GM Ointment; Apply 1 Application to left eye 4 times a day for 7 days. (treat both upper and lower eyelids)  Dispense: 3.5 g; Refill: 1

## 2021-05-06 RX ORDER — LOSARTAN POTASSIUM AND HYDROCHLOROTHIAZIDE 25; 100 MG/1; MG/1
1 TABLET ORAL DAILY
Qty: 90 TABLET | Refills: 3 | Status: SHIPPED
Start: 2021-05-06 | End: 2021-09-30

## 2021-05-10 ENCOUNTER — PHYSICAL THERAPY (OUTPATIENT)
Dept: PHYSICAL THERAPY | Facility: REHABILITATION | Age: 76
End: 2021-05-10
Attending: FAMILY MEDICINE
Payer: MEDICARE
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
## 2021-05-10 DIAGNOSIS — R26.89 POOR BALANCE: ICD-10-CM

## 2021-05-10 PROCEDURE — 97110 THERAPEUTIC EXERCISES: CPT

## 2021-05-10 PROCEDURE — 97161 PT EVAL LOW COMPLEX 20 MIN: CPT

## 2021-05-10 ASSESSMENT — BALANCE ASSESSMENTS
LOOK OVER LEFT AND RIGHT SHOULDERS WHILE STANDING: 4
STANDING UNSUPPORTED WITH FEET TOGETHER: 3
LONG VERSION TOTAL SCORE (MAX 56): 43
SITTING TO STANDING: 4
TURN 360 DEGREES: 3
SITTING UNSUPPORTED: 4
STANDING UNSUPPORTED WITH EYES CLOSED: 3
STANDING UNSUPPORTED: 4
PLACE ALTERNATE FOOT ON STEP OR STOOL WHILE STANDING UNSUPPORTED: 1
STANDING UNSUPPORTED ONE FOOT IN FRONT: 2
TRANSFERS: 3
PICK UP OBJECT FROM THE FLOOR FROM A STANDING POSITION: 4
STANDING TO SITTING: 4
LONG VERSION TOTAL SCORE (MAX 56): 43
STANDING ON ONE LEG: 1
REACHING FORWARD WITH OUTSTRETCHED ARM WHILE STANDING: 3

## 2021-05-10 NOTE — OP THERAPY EVALUATION
"  Outpatient Physical Therapy  VESTIBULAR EVALUATION    [address]  [address]  [facility] [address]  Phone:  [phone]  Fax:  [phone]    Date of Evaluation: 05/10/2021    Patient: Rosy Vences  YOB: 1945  MRN: [id number]     Referring Provider: Marge Brasher M.D.  [address] [facility]  [address]  [place] [address]   Referring Diagnosis: Poor balance [R26.89];Fall from ground level [W18.30XA]     Time Calculation    Start time: 1016  Stop time: 1115 Time Calculation (min): 59 minutes           Chief Complaint: Loss Of Balance    Visit Diagnoses     ICD-10-CM   1. Poor balance  R26.89   2. Fall from ground level  W18.30XA         History of Present Illness:     Mechanism of injury:    Pt presents to PT with complaint of imbalance.  States she has been falling about 1x/month over the last year.  Most recently fell last week (tripped on her dog's ball) and in Feb 2021(not sure what happened, but went down and twisted the L ankle).  Concerned that she is unable to get back up when she falls.  \"I'm worried about falling. I think about it all the time.\" Uses life alert. Had not been using an AD in the home, only SPC for the community.  Recently got a 4WW that she has been using in the home, \"it's wonderful.\"  Doesn't use it in the community, too bulky.  Impacting PMH includes: L foot pain due to 'bone spur in my instep', Bilateral TKAs  (2017 most recent).  Uses CPAP at night. Denies dizziness except for some lightheadedness if she transitions too quickly. Denies N/T in the LEs. Chronic swelling in the L LE.     Prior level of function:  Retired. Indep wtih ADLs.  No longer able to garden. Has a recumbent bike (hasn't used due to DOMS). Chair Yoga with an instructor: 2-3 yrs, 3x/week (will decrease freq during PT episode).     Sleep disturbance:  Not disrupted  Symptoms:     Progression:  Worsening  Social Support:     Lives in:  One-story house    " Lives with:  Alone (Family nearby)  Treatments:     No prior treatments received    Patient goals:     Patient goals for therapy:  Improved balance, independence with ADLs/IADLs, increased motion, decreased pain, increased strength and return to sport/leisure activities      Past Medical History:   Diagnosis Date   • Anesthesia     low O2 sat   • Arthritis     osteo   • Dyslipidemia    • Heart murmur 9/13/2017   • Hypertension    • Other specified symptom associated with female genital organs    • Psychiatric problem    • Sleep apnea     c-pap with 3 l/nc   • Snoring    • Unspecified cataract      Past Surgical History:   Procedure Laterality Date   • KNEE ARTHROPLASTY TOTAL Right 12/27/2017   • HYSTERECTOMY ROBOTIC  10/23/2014    Performed by Smith Lyons M.D. at SURGERY [facility] ORS   • GASTROSCOPY WITH BIOPSY  7/2/2014    Performed by Sky Vila M.D. at SURGERY [facility] ORS   • OTHER ORTHOPEDIC SURGERY  2006    left total knee   • APPENDECTOMY     • OTHER      meghan cataracts   • OTHER ABDOMINAL SURGERY      Resection of pelvic mass, uterus and ovaries   • [place] REDUCTION OF BREAST Bilateral     1982   • TONSILLECTOMY AND ADENOIDECTOMY       Social History     Tobacco Use   • Smoking status: Never Smoker   • Smokeless tobacco: Never Used   Substance Use Topics   • Alcohol use: Yes     Family and Occupational History     Socioeconomic History   • Marital status:      Spouse name: Not on file   • Number of children: Not on file   • Years of education: Not on file   • Highest education level: Not on file   Occupational History   • Not on file       Objective:    Observation:     Comments: ABC score= 70%  Gait:     Assessment: wide-based gait, improved gait with assistive device and difficulty with concurrent head rotation    Comments: Increased lateral sway, little to no arm swing    5x STS= 23 sec    TUG= 36 sec with R SPC  Vestibulospinal Exam:     Fall Risk: yes    Comments: ENGLISH= 43/56.  Reproduction of dizziness/lightheadedness during step tap.  Confidence notably shaken.   Oculomotor Exam:         Details: No spontaneous nystagmus central gaze          Details: No spontaneous nystagmus eccentric gaze    No saccadic eye movements    Smooth pursuit present    Convergence:        Normal convergence    No skew  Active Range of Motion:     Within functional limits  Limb Ataxia Exam:     Finger-to-Nose:         Intention tremor: none    Dysdiadochokinesia: none.  Strength Exam:     Upper extremities within functional limits    Comments:   Heel raise only to half height, LOB with attempt at toe raise  Reflex Exam:       Deep Tendon Reflexes:         Left L4 (Patellar): normal (2+)        Right L4 (Patellar): normal (2+)  Sensation Tests:     Left Light Touch Sensation:         All left lumbar dermatomes intact      Right Light Touch Sensation:         All right lumbar dermatomes intact      BPPV Exam:     Normal [place]-Hallpike    Normal straight head-hanging test    Normal roll test        Therapeutic Exercises (CPT 69557):     1. NuStep, L1 x 5 min    2. Pre gait stepping to focus on weight shifts, x 2 min    3. Ankle sways, AP and circular x 1 min ea, HO provided.       Time-based treatments/modalities:    Physical Therapy Timed Treatment Charges  Therapeutic exercise minutes (CPT 56832): 10 minutes        Assessment:     Functional impairments:  Decreased postural stability, gait abnormality/instability, decreased limits of stability, decreased range of motion/strength, decreased utilization of VIS/vest/somato and pain    Assessment details:    Ms. Vences is a 76 y.o female who presents to PT with complaint of progressively worsening balance and increase fall rate.  PT evaluation reveals decreased balance confidence (70% on the ABC scale), moderate risk of falls indicated on the ENGLISH, TUG and 5x STS.  She has very limited confidence with SLS, resulting in shuffled gait pattern with increased lateral  sway.  Poor step recovery to external perturbations.  Skilled PT services are indicated to address the mentioned functional limitations, decrease risk for falls and enhance QOL.       Prognosis: good    Goals:     Short term goals:  - Indep with 'just right' balance challenge for HEP  - Able to step tap alt pattern indep without hand support  - Able to walk clinic ground without AD and normal arm swing    Short term goal time span:  1-2 weeks    Long term goals:  - Improve ABC 15%  - Improve ENGLISH to at least 48/56  - Improve TUG to less than 13 seconds.  - Indep with HEP    Long term goal time span:  6-8 weeks  Plan:     Therapy options:  Physical therapy treatment to continue    Planned therapy interventions:  Functional Training, Self Care (CPT 82221), Therapeutic Activities (CPT 54753), Therapeutic Exercise (CPT 10127), Gait Training (CPT 35989), Neuromuscular Re-education (CPT 79299), E Stim Unattended (CPT 01891) and Hot or Cold Pack Therapy (CPT 48063)    Frequency:  2x week    Duration in weeks:  8    Discussed with:  Patient      Functional Assessment Used  English Balance Total Score (0-56): 43       Referring provider co-signature:  I have reviewed this plan of care and my co-signature certifies the need for services.    Certification Period: 05/10/2021 to  07/05/21    Physician Signature: ________________________________ Date: ______________

## 2021-05-13 ENCOUNTER — PHYSICAL THERAPY (OUTPATIENT)
Dept: PHYSICAL THERAPY | Facility: REHABILITATION | Age: 76
End: 2021-05-13
Attending: FAMILY MEDICINE
Payer: MEDICARE
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
## 2021-05-13 DIAGNOSIS — R26.89 POOR BALANCE: ICD-10-CM

## 2021-05-13 PROCEDURE — 97112 NEUROMUSCULAR REEDUCATION: CPT

## 2021-05-13 NOTE — OP THERAPY DAILY TREATMENT
Outpatient Physical Therapy  DAILY TREATMENT     [facility] Physical Therapy [address].  [address]  [place] [address]  Phone:  [phone]  Fax:  [phone]    Date: 05/13/2021    Patient: Rosy Vences  YOB: 1945  MRN: [id number]     Time Calculation      start 1148 Stop 1215 Total 27 min         Chief Complaint: Loss Of Balance    Visit #: 2    SUBJECTIVE:  No new complaints.  Enthusiastic about getting started.     OBJECTIVE:      Therapeutic Treatments and Modalities:     1. Neuromuscular Re-education (CPT 53536)    Therapeutic Treatment and Modalities Summary:   // bars:  - Changing JOSE A: narrow->1/2 tandem  - Airex: standard and wide with EC  - Standing marching with focus on weight shifting  - Lateral walking  - Pregait dowel step over: fwd and lateral      Time-based treatments/modalities:    Physical Therapy Timed Treatment Charges  Neuromusc re-ed, balance, coor, post minutes (CPT 59754): 27 minutes      ASSESSMENT:   Response to treatment: Good participation.  Needs encouragement to challenge to the point of effort with balance, but receptive to cuing.     PLAN/RECOMMENDATIONS:   Plan for treatment: therapy treatment to continue next visit.  Planned interventions for next visit: continue with current treatment.

## 2021-05-17 ENCOUNTER — PHYSICAL THERAPY (OUTPATIENT)
Dept: PHYSICAL THERAPY | Facility: REHABILITATION | Age: 76
End: 2021-05-17
Attending: FAMILY MEDICINE
Payer: MEDICARE

## 2021-05-17 DIAGNOSIS — R26.89 POOR BALANCE: ICD-10-CM
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
## 2021-05-17 PROCEDURE — 97110 THERAPEUTIC EXERCISES: CPT

## 2021-05-17 PROCEDURE — 97112 NEUROMUSCULAR REEDUCATION: CPT

## 2021-05-17 NOTE — OP THERAPY DAILY TREATMENT
"  Outpatient Physical Therapy  DAILY TREATMENT     [facility] Physical [address].  [address]  [place] [address]  Phone:  [phone]  Fax:  [phone]    Date: 05/17/2021    Patient: Rosy Vences  YOB: 1945  MRN: [id number]     Time Calculation    Start time: 1015  Stop time: 1100 Time Calculation (min): 45 minutes         Chief Complaint: Loss Of Balance    Visit #: 3    SUBJECTIVE:  I forgot my SPC coming in today.  I think that is a good sign.     OBJECTIVE:      Therapeutic Exercises (CPT 45425):     1. Gastroc str, x 1 min    2. Heel raise, x 20    3. SL clam, x 20 ea    4. SL hip abd on diagonal, x 15 ea    5. NuStep, L1 x 7 min, pacer at 65 spm.     Therapeutic Treatments and Modalities:     1. Neuromuscular Re-education (CPT 79909)    Therapeutic Treatment and Modalities Summary:   // bars:  - Ankle sways: x 1 min  - [place] sways: x 2 min  - Balance on tilt: 4 way x 1 min ea  - Rocker board: lateral x 2 min attempts. Poor confidence, req 1 hand support  - Step taps: x2 min. 4\". Cuing to increase WS to R and decrease reliance on L hand support.       Time-based treatments/modalities:    Physical Therapy Timed Treatment Charges  Neuromusc re-ed, balance, coor, post minutes (CPT 57069): 25 minutes  Therapeutic exercise minutes (CPT 24992): 20 minutes      ASSESSMENT:   Response to treatment: Poor R LE trust and strength results in decreased weight shift onto that side and increased reliance on UEs for support when SLS on the R.  Receptive to cuing and able to decrease support with practice. Anxious during rocker board.     PLAN/RECOMMENDATIONS:   Plan for treatment: therapy treatment to continue next visit.  Planned interventions for next visit: continue with current treatment. Develop R hip strength and trust, review rocker board and enhance confidence.        "

## 2021-05-20 ENCOUNTER — PHYSICAL THERAPY (OUTPATIENT)
Dept: PHYSICAL THERAPY | Facility: REHABILITATION | Age: 76
End: 2021-05-20
Attending: FAMILY MEDICINE
Payer: MEDICARE

## 2021-05-20 DIAGNOSIS — R26.89 POOR BALANCE: ICD-10-CM
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
## 2021-05-20 PROCEDURE — 97110 THERAPEUTIC EXERCISES: CPT

## 2021-05-20 PROCEDURE — 97112 NEUROMUSCULAR REEDUCATION: CPT

## 2021-05-20 NOTE — OP THERAPY DAILY TREATMENT
"  Outpatient Physical Therapy  DAILY TREATMENT     [facility] Physical Therapy [address].  [address]  [place] [address]  Phone:  [phone]  Fax:  [phone]    Date: 05/20/2021    Patient: Rosy Vences  YOB: 1945  MRN: [id number]     Time Calculation    Start time: 1148  Stop time: 1215 Time Calculation (min): 27 minutes         Chief Complaint: Loss Of Balance    Visit #: 4    SUBJECTIVE:  I wasn't able to do the hip exercises because my bed is too soft    OBJECTIVE:      Therapeutic Exercises (CPT 72490):     1. Standard bridge, x 15    2. SL clam , x 20    3. SL hip abd on diagonal, x 10 ea, Notable axiety rolling from L SL to R. \"I never lay on that side\"    Therapeutic Treatments and Modalities:     1. Neuromuscular Re-education (CPT 24153)    Therapeutic Treatment and Modalities Summary:   // bars:  - STS: x5 standard, x5 with unilateral airex, x 5 with bilateral airex  - Airex EO/EC standing balance- x 1 min attempts ea  - step taps 6\" x 15 ea  - 1/2 step hold with HTs x 10 ea      Time-based treatments/modalities:    Physical Therapy Timed Treatment Charges  Neuromusc re-ed, balance, coor, post minutes (CPT 89960): 15 minutes  Therapeutic exercise minutes (CPT 79781): 12 minutes      ASSESSMENT:   Response to treatment: Improved step taps to without support. Improved interaction with balance challenges outside the // bars.  Interestingly, bed mobility generated quite an anxiety response and should be reviewed. Also should work on floor recovery.     PLAN/RECOMMENDATIONS:   Plan for treatment: therapy treatment to continue next visit.  Planned interventions for next visit: continue with current treatment.       "

## 2021-05-24 ENCOUNTER — PHYSICAL THERAPY (OUTPATIENT)
Dept: PHYSICAL THERAPY | Facility: REHABILITATION | Age: 76
End: 2021-05-24
Attending: FAMILY MEDICINE
Payer: MEDICARE

## 2021-05-24 DIAGNOSIS — R26.89 POOR BALANCE: ICD-10-CM
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
## 2021-05-24 PROCEDURE — 97110 THERAPEUTIC EXERCISES: CPT

## 2021-05-24 PROCEDURE — 97112 NEUROMUSCULAR REEDUCATION: CPT

## 2021-05-24 NOTE — OP THERAPY DAILY TREATMENT
Outpatient Physical Therapy  DAILY TREATMENT     [facility] Physical [address].  [address]  [place] [address]  Phone:  [phone]  Fax:  [phone]    Date: 05/24/2021    Patient: Rosy Vences  YOB: 1945  MRN: [id number]     Time Calculation    Start time: 1145  Stop time: 1220 Time Calculation (min): 35 minutes         Chief Complaint: Loss Of Balance    Visit #: 5    SUBJECTIVE:  No new complaints.     OBJECTIVE:      Therapeutic Exercises (CPT 85864):     1. Standard bridge, x 20    2. SL clam , x 20    3. SL hip abd on diagonal, x 10 ea, decreased anxiety during bed mobility    4. TB side steps, L1 x 1 lap ea    5. TB monster walk, L1 x 1 lap ea    Therapeutic Treatments and Modalities:     1. Neuromuscular Re-education (CPT 31632)    Therapeutic Treatment and Modalities Summary:   // bars:  - STS: x 8 with EO, x 8 with EC - HHA at first to initiate trunk lean due to post support from knees on table.  Improves with practice.   - Gait with: standard and cuing to increase stride length, speed change, dual tasking (carry ball, vertical ball toss, category recall).   - Obstacle course: dowel, roller step over, airex up and over    Time-based treatments/modalities:    Physical Therapy Timed Treatment Charges  Neuromusc re-ed, balance, coor, post minutes (CPT 31959): 15 minutes  Therapeutic exercise minutes (CPT 04796): 15 minutes    ASSESSMENT:   Response to treatment: Was able to normalize pattern with cuing for long stride. Noted return of arm swing and improve speed.  Dual tasking well tolerated until difficulty coordinating ball toss. Anxiety during airex step overs, needed HHA, but able to perform in //bars without UEs.     PLAN/RECOMMENDATIONS:   Plan for treatment: therapy treatment to continue next visit.  Planned interventions for next visit: continue with current treatment.

## 2021-05-25 DIAGNOSIS — F51.01 PRIMARY INSOMNIA: ICD-10-CM

## 2021-05-25 RX ORDER — ZOLPIDEM TARTRATE 5 MG/1
5 TABLET ORAL NIGHTLY PRN
Qty: 30 TABLET | Refills: 2 | Status: SHIPPED | OUTPATIENT
Start: 2021-05-29 | End: 2021-06-28

## 2021-05-27 ENCOUNTER — PHYSICAL THERAPY (OUTPATIENT)
Dept: PHYSICAL THERAPY | Facility: REHABILITATION | Age: 76
End: 2021-05-27
Attending: FAMILY MEDICINE
Payer: MEDICARE

## 2021-05-27 DIAGNOSIS — R26.89 POOR BALANCE: ICD-10-CM
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
## 2021-05-27 PROCEDURE — 97112 NEUROMUSCULAR REEDUCATION: CPT

## 2021-05-27 NOTE — OP THERAPY DAILY TREATMENT
"  Outpatient Physical Therapy  DAILY TREATMENT     [facility] Physical Therapy [address].  [address]  [place] [address]  Phone:  [phone]  Fax:  [phone]    Date: 05/27/2021    Patient: Rosy Vences  YOB: 1945  MRN: [id number]     Time Calculation    Start time: 1149  Stop time: 1215 Time Calculation (min): 26 minutes         Chief Complaint: Loss Of Balance    Visit #: 6    SUBJECTIVE:  I'm doing ok.  Riding my bike x 7 min a day without issue. Walking is feeling better    OBJECTIVE:      Therapeutic Exercises (CPT 04545):     1. Step ups, 6\" x 15 ea with unilateral support, cuing for full ext at the top and glute/quad drive    Therapeutic Treatments and Modalities:     1. Neuromuscular Re-education (CPT 27939)    Therapeutic Treatment and Modalities Summary:   // bars:  - Rocker board: lateral x 4 min (manual cues initially to trust R weight shift and anticipation of loading on that side x 2 min)  - airex step up and over x 15    Free space:   airex step up and over- ModA at the pelvis and encouragement required x 2 (Reports feeling dizzy after, 'you are really pushing me')  airex walk across with obstacles under (similar to grass): 2x across.  Pattern shuffled    Time-based treatments/modalities:    Physical Therapy Timed Treatment Charges  Neuromusc re-ed, balance, coor, post minutes (CPT 47096): 23 minutes  Therapeutic exercise minutes (CPT 70417): 3 minutes    ASSESSMENT:   Response to treatment: Steady improvements noted by willingness to participate on compliant surfaces in free space. Will need further training to enhance independence and quality of movement.     PLAN/RECOMMENDATIONS:   Plan for treatment: therapy treatment to continue next visit.  Planned interventions for next visit: continue with current treatment.       "

## 2021-06-01 ENCOUNTER — APPOINTMENT (OUTPATIENT)
Dept: PHYSICAL THERAPY | Facility: REHABILITATION | Age: 76
End: 2021-06-01
Attending: FAMILY MEDICINE
Payer: MEDICARE

## 2021-06-03 ENCOUNTER — PHYSICAL THERAPY (OUTPATIENT)
Dept: PHYSICAL THERAPY | Facility: REHABILITATION | Age: 76
End: 2021-06-03
Attending: FAMILY MEDICINE
Payer: MEDICARE
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
## 2021-06-03 DIAGNOSIS — R26.89 POOR BALANCE: ICD-10-CM

## 2021-06-03 PROCEDURE — 97110 THERAPEUTIC EXERCISES: CPT

## 2021-06-03 PROCEDURE — 97112 NEUROMUSCULAR REEDUCATION: CPT

## 2021-06-03 NOTE — OP THERAPY DAILY TREATMENT
Outpatient Physical Therapy  DAILY TREATMENT     [facility] Physical Therapy [address].  [address]  [place] [address]  Phone:  [phone]  Fax:  [phone]    Date: 2021    Patient: Rosy Vences  YOB: 1945  MRN: [id number]     Time Calculation    Start time: 1147  Stop time: 1215 Time Calculation (min): 28 minutes         Chief Complaint: Loss Of Balance    Visit #: 7    SUBJECTIVE:  Low energy due to having GI issues over the weekend, so hoping to do a less intense session today.     OBJECTIVE:      Therapeutic Exercises (CPT 61400):     1. Shuttle, Squat: 6C x 3 min    2. HR , bilat with support x 20    Therapeutic Treatments and Modalities:     1. Neuromuscular Re-education (CPT 46407)    Therapeutic Treatment and Modalities Summary:   - Banded walkin way x 2 laps ea  Ambulation:   - across level surface with scattered ankle weights to walk across (mimicing river rocks)  - as above with airex mat to cover the obstacles (mimics walking over pitted grass)  - practice with targeted foot placement (mimic stepping stones)    Time-based treatments/modalities:    Physical Therapy Timed Treatment Charges  Neuromusc re-ed, balance, coor, post minutes (CPT 27742): 20 minutes  Therapeutic exercise minutes (CPT 23722): 10 minutes    ASSESSMENT:   Response to treatment: Improved quality overall during obstacle navigation.  Was able to increase step clearance, but still quickly placed/shuffled and nonspecific in regard to placement selection.  Limits ability to avoid unknown obstacles.  Would continue to benefit from balance/strength progressions to enhance community integration.     PLAN/RECOMMENDATIONS:   Plan for treatment: therapy treatment to continue next visit.  Planned interventions for next visit: continue with current treatment.

## 2021-06-08 ENCOUNTER — PHYSICAL THERAPY (OUTPATIENT)
Dept: PHYSICAL THERAPY | Facility: REHABILITATION | Age: 76
End: 2021-06-08
Attending: FAMILY MEDICINE
Payer: MEDICARE

## 2021-06-08 DIAGNOSIS — R26.89 POOR BALANCE: ICD-10-CM
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
## 2021-06-08 PROCEDURE — 97112 NEUROMUSCULAR REEDUCATION: CPT

## 2021-06-08 ASSESSMENT — GAIT ASSESSMENTS
GAIT AND PIVOT TURN: MILD IMPAIRMENT: PIVOT TURNS SAFELY IN >3 SECONDS AND STOPS WITH NO LOSS OF BALANCE
STEPS: MODERATE IMPAIRMENT: TWO FEET TO A STAIR, MUST USE RAIL
DYNAMIC GAIT INDEX SCORE: 66.67
STEP OVER OBSTACLE: MILD IMPAIRMENT: IS ABLE TO STEP OVER BOX, BUT MUST SLOW DOWN AND ADJUST TO CLEAR THE BOX SAFELY
GAIT WITH HORIZONTAL HEAD TURNS: MILD IMPAIRMENT: PERFORMS HEAD TURNS SMOOTHLY WITH SLIGHT CHANGE IN GAIT VELOCITY, IE, MINOR DISRUPTION TO SMOOTH GAIT PATH OR USES WALKING AID
GAIT WITH VERTICAL HEAD TURNS: NORMAL: PERFORMS HEAD TURNS SMOOTHLY WITH NO CHANGE IN GAIT
GAIT LEVEL SURFACE: MODERATE IMPAIRMENT: WALKS 20', SLOW SPEED, ABNORMAL GAIT PATTERN, EVIDENCE FOR IMBALANCE
STEP AROUND OBSTACLES: NORMAL: IS ABLE TO WALK AROUND CONES SAFELY WITHOUT CHANGING GAIT SPEED, NO EVIDENCE OF IMBALANCE
CHANGE IN GAIT SPEED: MILD IMPAIRMENT: IS ABLE TO CHANGE SPEED BUT DEMONSTRATES MILD GAIT DEVIATIONS, OR NO GAIT DEVIATIONS BUT UNABLE TO ACHIEVE A SIGNIFICANT CHANGE IN VELOCITY, OR USES AN ASSISTIVE DEVICE

## 2021-06-08 ASSESSMENT — BALANCE ASSESSMENTS
STANDING TO SITTING: 4
PICK UP OBJECT FROM THE FLOOR FROM A STANDING POSITION: 4
STANDING UNSUPPORTED WITH EYES CLOSED: 4
TURN 360 DEGREES: 4
LONG VERSION TOTAL SCORE (MAX 56): 51
STANDING UNSUPPORTED WITH FEET TOGETHER: 4
STANDING UNSUPPORTED ONE FOOT IN FRONT: 3
STANDING ON ONE LEG: 1
LOOK OVER LEFT AND RIGHT SHOULDERS WHILE STANDING: 4
SITTING TO STANDING: 4
LONG VERSION TOTAL SCORE (MAX 56): 51
SITTING UNSUPPORTED: 4
REACHING FORWARD WITH OUTSTRETCHED ARM WHILE STANDING: 4
PLACE ALTERNATE FOOT ON STEP OR STOOL WHILE STANDING UNSUPPORTED: 3
STANDING UNSUPPORTED: 4
TRANSFERS: 4

## 2021-06-08 NOTE — OP THERAPY PROGRESS SUMMARY
Outpatient Physical Therapy  PROGRESS SUMMARY NOTE      [facility] Physical Therapy [place] [address]  [address]  [place] [address]  Phone:  [phone]  Fax:  [phone]    Date of Visit: 06/08/2021    Patient: Rosy Vences  YOB: 1945  MRN: [id number]     Referring Provider: Marge Brasher M.D.  [address] [facility]  [address]  [place] [address]   Referring Diagnosis Other abnormalities of gait and mobility [R26.89];Fall on same level, unspecified, initial encounter [W18.30XA]     Visit Diagnoses     ICD-10-CM   1. Fall from ground level  W18.30XA   2. Poor balance  R26.89       Rehab Potential: good    Progress Report Period: 5/10/21-6/8/21    Functional Assessment Used  English Balance Total Score (0-56): 51  Dynamic Gait Index Total Score: 66.67       Objective Findings and Assessment:   Patient progression towards goals:   Ms. Vences has been seen for 8 PT sessions treating her imbalance through progressive strengthening and balance training.  Pt is showing positive response overall with improvement in all measures: English Balance Total Score (0-56): 51 (improved from 43/56), 5x STS= 14 seconds (improved from 23 sec), TUG= 13 sec without AD (improved from 36 sec with R SPC).    Short term goals:    - Indep with 'just right' balance challenge for HEP (MET)  - Able to step tap alt pattern indep without hand support (MET)  - Able to walk clinic ground without AD and normal arm swing (MET)      Long term goals:    - Improve ABC 15%  - Improve ENGLISH to at least 48/56 (MET)  - Improve TUG to less than 13 seconds. (MET)  - Indep with HEP (MET)    With improvements noted in strength and static balance, increased challenge to assess dynamic control today.  Dynamic Gait Index Total Score: 66.67  (16/24), which does continue to note risk of falls dynamically and would benefit from continuation of skilled PT services to work toward goals set below.       Goals:   Short Term Goals:     - Pt  able to ambulate 400 feet in 2 mins without AD  - Pt able to ambulate with HTs and no LOB  Short term goal time span:  1-2 weeks      Long Term Goals:      - Improve DGI to at least 20/24  - Pt able to ambulate in her yard without falls or apprehension  - Indpe with progressed HEP  Long term goal time span:  6-8 weeks    Plan:   Planned therapy interventions:  Canalith Repositioning (CPT 39704), E Stim Unattended (CPT 02528), Therapeutic Activities (CPT 15780), Therapeutic Exercise (CPT 12761), Functional Training, Self Care (CPT 84313) and Neuromuscular Re-education (CPT 38589)  Frequency: 1-2x/week.  Duration in weeks:  8      Referring provider co-signature:  I have reviewed this plan of care and my co-signature certifies the need for services.     Certification Period: 06/08/2021 to 08/03/21    Physician Signature: ________________________________ Date: ______________

## 2021-06-08 NOTE — OP THERAPY DAILY TREATMENT
Outpatient Physical Therapy  DAILY TREATMENT     [facility] Physical [address].  [address]  [place] [address]  Phone:  [phone]  Fax:  [phone]    Date: 06/08/2021    Patient: Rosy Vences  YOB: 1945  MRN: [id number]     Time Calculation    Start time: 1148  Stop time: 1215 Time Calculation (min): 27 minutes         Chief Complaint: Difficulty Walking    Visit #: 8    SUBJECTIVE:  I feel like I had a breakthrough last week.     OBJECTIVE:  Kovacs Balance Total Score (0-56): 51  Dynamic Gait Index Total Score: 66.67  (16/24)  5x STS= (14 seconds) 23 sec  TUG= 36 sec with R SPC    Therapeutic Treatments and Modalities:     1. Neuromuscular Re-education (CPT 12139)    Therapeutic Treatment and Modalities Summary:   - Reassessment of objective measures and review of results as noted above. Discussed goals for ongoing PT.     Time-based treatments/modalities:    Physical Therapy Timed Treatment Charges  Neuromusc re-ed, balance, coor, post minutes (CPT 11043): 27 minutes    ASSESSMENT:   Response to treatment: See PN    PLAN/RECOMMENDATIONS:   Plan for treatment: therapy treatment to continue next visit.  Planned interventions for next visit: continue with current treatment.

## 2021-06-11 ENCOUNTER — PHYSICAL THERAPY (OUTPATIENT)
Dept: PHYSICAL THERAPY | Facility: REHABILITATION | Age: 76
End: 2021-06-11
Attending: FAMILY MEDICINE
Payer: MEDICARE

## 2021-06-11 DIAGNOSIS — R26.89 POOR BALANCE: ICD-10-CM
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
## 2021-06-11 PROCEDURE — 97112 NEUROMUSCULAR REEDUCATION: CPT

## 2021-06-11 NOTE — OP THERAPY DAILY TREATMENT
"  Outpatient Physical Therapy  DAILY TREATMENT     [facility] Physical Therapy [address]  [address]  [place] [address]  Phone:  [phone]  Fax:  [phone]    Date: 06/11/2021    Patient: Rosy Vences  YOB: 1945  MRN: [id number]     Time Calculation    Start time: 1045  Stop time: 1114 Time Calculation (min): 29 minutes         Chief Complaint: Loss Of Balance    Visit #: 9    SUBJECTIVE:  I'm doing ok today.     OBJECTIVE:      Therapeutic Treatments and Modalities:     1. Neuromuscular Re-education (CPT 19242)    Therapeutic Treatment and Modalities Summary:   3 rounds:   10 STS  10 alt step ups (4\")   1 lap (200 feet) without AD working on increased speed  2 min rest    Round 1- 3:32 min  Round 2- 3:06 min  Round 3- 3:15 min    - Ball toss to PT: lateral and F/B x 2 laps ea.       Time-based treatments/modalities:    Physical Therapy Timed Treatment Charges  Neuromusc re-ed, balance, coor, post minutes (CPT 30379): 29 minutes    ASSESSMENT:   Response to treatment: Intro of circuit training to increase work intensity- well tolerated, but pt quite fatigued to follow.  During ball toss, noted decreased step length posterior, but good dual tasking.  LOB x2 walking backward, but good self recovery    PLAN/RECOMMENDATIONS:   Plan for treatment: therapy treatment to continue next visit.  Planned interventions for next visit: continue with current treatment.       "

## 2021-06-16 ENCOUNTER — PHYSICAL THERAPY (OUTPATIENT)
Dept: PHYSICAL THERAPY | Facility: REHABILITATION | Age: 76
End: 2021-06-16
Attending: FAMILY MEDICINE
Payer: MEDICARE
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
## 2021-06-16 DIAGNOSIS — R26.89 POOR BALANCE: ICD-10-CM

## 2021-06-16 PROCEDURE — 97112 NEUROMUSCULAR REEDUCATION: CPT

## 2021-06-16 NOTE — OP THERAPY DAILY TREATMENT
"  Outpatient Physical Therapy  DAILY TREATMENT     [facility] Therapy [address].  [address]  [place] [address]  Phone:  [phone]  Fax:  [phone]    Date: 06/16/2021    Patient: Rosy Vences  YOB: 1945  MRN: [id number]     Time Calculation    Start time: 1050  Stop time: 1115 Time Calculation (min): 25 minutes         Chief Complaint: Loss Of Balance    Visit #: 10    SUBJECTIVE:  I'm doing well, just sore from doing more activities around my home that I wasn't able to do.     OBJECTIVE:      Therapeutic Treatments and Modalities:     1. Neuromuscular Re-education (CPT 67178)    Therapeutic Treatment and Modalities Summary:   - Floor recovery: standing facing the table to lower down through lunge and returning through same position.  R LE fwd. X 3. Notable effort for the final rep  - cone sweeps: from firm surface, then from airex  - Step up and overs: 4\" x 10 with B dowel support and SBA  - Airex up and over: too anxious with single dowel, B dowel support x 3 reps    Time-based treatments/modalities:    Physical Therapy Timed Treatment Charges  Neuromusc re-ed, balance, coor, post minutes (CPT 24838): 25 minutes    ASSESSMENT:   Response to treatment: With encouragement, was able to show good control for floor recovery with UE push.  Would benefit from developing strength in the lower part of the lunge. Still more limited with airex interaction by anxiety/fear vs functional capacity.     PLAN/RECOMMENDATIONS:   Plan for treatment: therapy treatment to continue next visit.  Planned interventions for next visit: continue with current treatment.       "

## 2021-06-22 ENCOUNTER — PHYSICAL THERAPY (OUTPATIENT)
Dept: PHYSICAL THERAPY | Facility: REHABILITATION | Age: 76
End: 2021-06-22
Attending: FAMILY MEDICINE
Payer: MEDICARE

## 2021-06-22 DIAGNOSIS — R26.89 POOR BALANCE: ICD-10-CM
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
## 2021-06-22 PROCEDURE — 97112 NEUROMUSCULAR REEDUCATION: CPT

## 2021-06-22 NOTE — OP THERAPY DAILY TREATMENT
Outpatient Physical Therapy  DAILY TREATMENT     [facility] Physical Therapy [address]  [address]  [place] [address]  Phone:  [phone]  Fax:  [phone]    Date: 06/22/2021    Patient: Rosy Vences  YOB: 1945  MRN: [id number]     Time Calculation    Start time: 1316  Stop time: 1347 Time Calculation (min): 31 minutes         Chief Complaint: Loss Of Balance    Visit #: 11    SUBJECTIVE:  I've been very active over the weekend with cleaning and with family.     OBJECTIVE:      Therapeutic Treatments and Modalities:     1. Neuromuscular Re-education (CPT 86831)    Therapeutic Treatment and Modalities Summary:   Functional circuit:   2 rounds  - 5 x STS  - 1 lap gait (40 feet across airex with obstacles and with rebounder ball toss)  - floor transfer to supine  - bridge x 10  - return to standing    4 way gait: x 2 laps ea.   With random cuing x 2 min.     Time-based treatments/modalities:    Physical Therapy Timed Treatment Charges  Neuromusc re-ed, balance, coor, post minutes (CPT 70346): 31 minutes    ASSESSMENT:   Response to treatment: Improved interaction with obstacles under airex.  Larger steps to allow more selective placement.  Weak hips for side bridge during floor transfer SL to quad.Max effort to complete rep 2.     PLAN/RECOMMENDATIONS:   Plan for treatment: therapy treatment to continue next visit.  Planned interventions for next visit: continue with current treatment.

## 2021-07-01 ENCOUNTER — APPOINTMENT (OUTPATIENT)
Dept: PHYSICAL THERAPY | Facility: REHABILITATION | Age: 76
End: 2021-07-01
Attending: FAMILY MEDICINE
Payer: MEDICARE

## 2021-07-01 NOTE — OP THERAPY DAILY TREATMENT
Outpatient Physical Therapy  DAILY TREATMENT     [facility] Physical Therapy [address].  [address]  [place] [address]  Phone:  [phone]  Fax:  [phone]    Date: 07/01/2021    Patient: Rosy Vences  YOB: 1945  MRN: [id number]     Time Calculation                   Chief Complaint: No chief complaint on file.    Visit #: 12    SUBJECTIVE:  ***    OBJECTIVE:      Therapeutic Treatments and Modalities:     1. Neuromuscular Re-education (CPT 08168)    Therapeutic Treatment and Modalities Summary:   Functional circuit:   2 rounds  - 5 x STS  - 1 lap gait (40 feet across airex with obstacles and with rebounder ball toss)  - floor transfer to supine  - bridge x 10  - return to standing    4 way gait: x 2 laps ea.   With random cuing x 2 min.     Time-based treatments/modalities:         ASSESSMENT:   Response to treatment: ***    PLAN/RECOMMENDATIONS:   Plan for treatment: therapy treatment to continue next visit.  Planned interventions for next visit: continue with current treatment.

## 2021-07-02 ENCOUNTER — APPOINTMENT (OUTPATIENT)
Dept: PHYSICAL THERAPY | Facility: REHABILITATION | Age: 76
End: 2021-07-02
Attending: FAMILY MEDICINE
Payer: MEDICARE

## 2021-07-09 ENCOUNTER — PHYSICAL THERAPY (OUTPATIENT)
Dept: PHYSICAL THERAPY | Facility: REHABILITATION | Age: 76
End: 2021-07-09
Attending: FAMILY MEDICINE
Payer: MEDICARE

## 2021-07-09 DIAGNOSIS — R26.89 POOR BALANCE: ICD-10-CM
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
## 2021-07-09 PROCEDURE — 97110 THERAPEUTIC EXERCISES: CPT

## 2021-07-09 PROCEDURE — 97112 NEUROMUSCULAR REEDUCATION: CPT

## 2021-07-09 NOTE — OP THERAPY DAILY TREATMENT
Outpatient Physical Therapy  DAILY TREATMENT     [facility] Physical Therapy [address]  [address]  [place] [address]  Phone:  [phone]  Fax:  [phone]    Date: 07/09/2021    Patient: Rosy Vences  YOB: 1945  MRN: [id number]     Time Calculation    Start time: 1434  Stop time: 1512 Time Calculation (min): 38 minutes         Chief Complaint: Loss Of Balance and Weakness    Visit #: 12    SUBJECTIVE:  I was extremely sore for 3 days after LV, hoping to have an easier session.    OBJECTIVE:      Therapeutic Exercises (CPT 70624):     1. Shuttle, Squat: bilat 7C x 40, SL 5C 2x5 ea    Therapeutic Treatments and Modalities:     1. Neuromuscular Re-education (CPT 29015)    Therapeutic Treatment and Modalities Summary:   Modified SLS:  -balance with 1 foot on ball and no UEs x 1 min ea  -ball circles under foot with 1 UE support x 20 ea way    Airex:   -EC working on relaxation/deep breathing x 1 min  - trunk rotations (x 10 ea)  - step up and over: hand support to start-> SPC only (x 15)  - Gait: with quick pivot turns, no device    Time-based treatments/modalities:    Physical Therapy Timed Treatment Charges  Neuromusc re-ed, balance, coor, post minutes (CPT 85015): 30 minutes  Therapeutic exercise minutes (CPT 20285): 8 minutes    ASSESSMENT:   Response to treatment: Pt still apprehensive with compliant surfaces if asked to perform indep, but now able to navigate indep with use of SPC. Still reverts to wide base with lateral sway at times, but can self correct out of it.     PLAN/RECOMMENDATIONS:   Plan for treatment: therapy treatment to continue next visit.  Planned interventions for next visit: continue with current treatment.

## 2021-07-15 DIAGNOSIS — H69.93 DYSFUNCTION OF BOTH EUSTACHIAN TUBES: ICD-10-CM

## 2021-07-15 DIAGNOSIS — H91.93 DECREASED HEARING OF BOTH EARS: ICD-10-CM

## 2021-07-16 ENCOUNTER — APPOINTMENT (OUTPATIENT)
Dept: PHYSICAL THERAPY | Facility: REHABILITATION | Age: 76
End: 2021-07-16
Attending: FAMILY MEDICINE
Payer: MEDICARE

## 2021-07-16 ENCOUNTER — HOSPITAL ENCOUNTER (OUTPATIENT)
Dept: RADIOLOGY | Facility: MEDICAL CENTER | Age: 76
End: 2021-07-16
Attending: FAMILY MEDICINE
Payer: MEDICARE

## 2021-07-16 DIAGNOSIS — Z12.31 VISIT FOR SCREENING MAMMOGRAM: ICD-10-CM

## 2021-07-16 PROCEDURE — 77063 BREAST TOMOSYNTHESIS BI: CPT

## 2021-07-22 ENCOUNTER — PHYSICAL THERAPY (OUTPATIENT)
Dept: PHYSICAL THERAPY | Facility: REHABILITATION | Age: 76
End: 2021-07-22
Attending: FAMILY MEDICINE
Payer: MEDICARE
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
## 2021-07-22 DIAGNOSIS — R26.89 POOR BALANCE: ICD-10-CM

## 2021-07-22 PROCEDURE — 97112 NEUROMUSCULAR REEDUCATION: CPT

## 2021-07-22 NOTE — OP THERAPY DAILY TREATMENT
Outpatient Physical Therapy  DAILY TREATMENT     [facility] Physical Therapy [address].  [address]  [place] [address]  Phone:  [phone]  Fax:  [phone]    Date: 07/22/2021    Patient: Rosy Vences  YOB: 1945  MRN: [id number]     Time Calculation    Start time: 1401  Stop time: 1445 Time Calculation (min): 44 minutes         Chief Complaint: Loss Of Balance and Difficulty Walking    Visit #: 13    SUBJECTIVE:  I'm really doing much better.  Feeling more confident.  My  is doing a good job of adding the balance program into my routine.     OBJECTIVE:     ABC scale= 66.5%  5x STS= 13.8 sec  DGI= 20/24  2 min walk test: 311 feet.   LOS on neurocom: decreased excursion AP, decreased directional control in all directions.       Therapeutic Treatments and Modalities:     1. Neuromuscular Re-education (CPT 96349)    Therapeutic Treatment and Modalities Summary:   - Reassessment completed as noted above  - Results reviewed  - HEP reviewed.     Time-based treatments/modalities:    Physical Therapy Timed Treatment Charges  Neuromusc re-ed, balance, coor, post minutes (CPT 87054): 44 minutes    ASSESSMENT:   Response to treatment: See d/c note    PLAN/RECOMMENDATIONS:   Plan for treatment: no further treatment needed.

## 2021-07-26 NOTE — OP THERAPY DISCHARGE SUMMARY
Outpatient Physical Therapy  DISCHARGE SUMMARY NOTE      [facility] Physical Therapy [place] [address]  [address]  [facility] [address]  Phone:  [phone]  Fax:  [phone]    Date of Visit: 07/22/2021    Patient: Rosy eVnces  YOB: 1945  MRN: [id number]     Referring Provider: Marge Brasher M.D.  [address] [facility]  [address]  [place] [address]   Referring Diagnosis Poor balance [R26.89];Fall from ground level [W18.30XA]         Functional Assessment Used        Your patient is being discharged from Physical Therapy with the following comments:   · Goals met    Comments:  Ms. Vences was seen for 13 PT visits treating her for complaints of imbalance and difficulty walking.  PT evaluation consistent with decreased endurance, decreased functional strength and substantial fear of falling. Objective measures as follows:    ABC scale= 66.5%  5x STS= 13.8 sec  DGI= 20/24  2 min walk test: 311 feet.   LOS on neurocom: decreased excursion AP, decreased directional control in all directions.      Pt now demonstrates low risk of fall statically and dynamically.  Objective measures suggest safe community ambulation without an AD, but she is most comfortable with continuing to use a SPC. Use is indep and appropriate.  Her self selected gait speed is slow, but is able to increase speed with encouragement and results in more normalized arm swing.  She is to continue her HEP with guidance from her . Also work on integrating an exercise program to enhance gait speeds.  No further skilled guidance required at this time.     Recommendations:  D/C to HEP.    Charu Godfrey, PT, DPT    Date: 7/26/2021

## 2021-08-02 DIAGNOSIS — G47.33 OSA ON CPAP: ICD-10-CM

## 2021-08-25 DIAGNOSIS — F51.01 PRIMARY INSOMNIA: ICD-10-CM

## 2021-08-25 RX ORDER — ZOLPIDEM TARTRATE 5 MG/1
TABLET ORAL
Qty: 30 TABLET | Refills: 2 | OUTPATIENT
Start: 2021-08-25

## 2021-08-27 ENCOUNTER — OFFICE VISIT (OUTPATIENT)
Dept: INTERNAL MEDICINE | Facility: IMAGING CENTER | Age: 76
End: 2021-08-27
Payer: MEDICARE

## 2021-08-27 ENCOUNTER — HOSPITAL ENCOUNTER (OUTPATIENT)
Facility: MEDICAL CENTER | Age: 76
End: 2021-08-27
Attending: FAMILY MEDICINE
Payer: MEDICARE

## 2021-08-27 VITALS
DIASTOLIC BLOOD PRESSURE: 68 MMHG | HEIGHT: 67 IN | WEIGHT: 233.69 LBS | SYSTOLIC BLOOD PRESSURE: 138 MMHG | HEART RATE: 68 BPM | TEMPERATURE: 99.1 F | BODY MASS INDEX: 36.68 KG/M2 | RESPIRATION RATE: 17 BRPM | OXYGEN SATURATION: 92 %

## 2021-08-27 DIAGNOSIS — E83.52 HYPERCALCEMIA: ICD-10-CM

## 2021-08-27 DIAGNOSIS — F51.01 PRIMARY INSOMNIA: ICD-10-CM

## 2021-08-27 PROCEDURE — 99214 OFFICE O/P EST MOD 30 MIN: CPT | Performed by: FAMILY MEDICINE

## 2021-08-27 PROCEDURE — 80053 COMPREHEN METABOLIC PANEL: CPT

## 2021-08-27 PROCEDURE — 82330 ASSAY OF CALCIUM: CPT

## 2021-08-27 RX ORDER — ZOLPIDEM TARTRATE 5 MG/1
5 TABLET ORAL NIGHTLY PRN
Qty: 30 TABLET | Refills: 2 | Status: SHIPPED | OUTPATIENT
Start: 2021-08-27 | End: 2021-11-22

## 2021-08-27 RX ORDER — HYDROCHLOROTHIAZIDE 25 MG/1
TABLET ORAL
COMMUNITY
Start: 2021-06-12 | End: 2021-12-13 | Stop reason: SDUPTHER

## 2021-08-27 ASSESSMENT — FIBROSIS 4 INDEX: FIB4 SCORE: 2.26

## 2021-08-27 NOTE — PROGRESS NOTES
"Chief Complaint   Patient presents with   • Medication Refill     Ambien       HPI:  Patient is a 76 y.o. female established patient who presents today to obtain a new Ambien prescription to manage chronic primary insomnia. She continues to benefit from Ambien use, denies medication related side effects, and continues to use this controlled medication safely. She is otherwise stable and is in good spirits today.     Patient Active Problem List    Diagnosis Date Noted   • Poor balance 04/26/2021   • Thyroid nodule 03/16/2021   • Obesity (BMI 30-39.9) 04/06/2018   • Heart murmur 09/13/2017   • Chronic insomnia 08/09/2017   • Anxiety 08/09/2017   • HTN (hypertension) 07/01/2014   • Hyperlipidemia 07/01/2014   • AZUL on CPAP 07/01/2014       Past medical, surgical, family, and social history was reviewed and updated in Epic chart by me today.     Medications and allergies reviewed and updated in Epic chart by me today.     ROS:  Pertinent positives listed above in HPI. All other systems have been reviewed and are negative.    PE:   /68 (BP Location: Left arm, Patient Position: Sitting, BP Cuff Size: Adult)   Pulse 68   Temp 37.3 °C (99.1 °F) (Temporal)   Resp 17   Ht 1.702 m (5' 7\")   Wt 106 kg (233 lb 11 oz)   SpO2 92%   BMI 36.60 kg/m²   Vital signs reviewed with patient.     Gen: Well developed; well nourished; no acute distress; age appropriate appearance   CV: Regular rate and rhythm; S1/ S2 present; no murmur, gallop or rub noted  Pulm: No respiratory distress; clear to ascultation b/l; no wheezing or stridor noted b/l  Extremities: No peripheral edema b/l LE extremities/ no clubbing nor cyanosis noted  Skin: Warm and dry; no rashes noted   Neuro: No focal deficits noted   Psych: AAOx4; mood and affect are appropriate    A/P:  1. Primary insomnia  Stable/ patient can continue current low dose Ambien use for insomnia management.  reviewed today and no concerns noted. Pt is well versed in safe use of " controlled medication, and benefit of use outweighs risk at this time. New RX sent to pharmacy.   - zolpidem (AMBIEN) 5 MG Tab; Take 1 Tablet by mouth at bedtime as needed for Sleep for up to 30 days.  Dispense: 30 Tablet; Refill: 2    2. Hypercalcemia  Patient remains asymptomatic and is due for repeat lab work today. I will contact patient when results are available for further management.

## 2021-08-28 LAB
ALBUMIN SERPL BCP-MCNC: 4.3 G/DL (ref 3.2–4.9)
ALBUMIN/GLOB SERPL: 1.4 G/DL
ALP SERPL-CCNC: 75 U/L (ref 30–99)
ALT SERPL-CCNC: 14 U/L (ref 2–50)
ANION GAP SERPL CALC-SCNC: 14 MMOL/L (ref 7–16)
AST SERPL-CCNC: 25 U/L (ref 12–45)
BILIRUB SERPL-MCNC: 0.5 MG/DL (ref 0.1–1.5)
BUN SERPL-MCNC: 33 MG/DL (ref 8–22)
CA-I SERPL-SCNC: 1.3 MMOL/L (ref 1.1–1.3)
CALCIUM SERPL-MCNC: 10.7 MG/DL (ref 8.5–10.5)
CHLORIDE SERPL-SCNC: 104 MMOL/L (ref 96–112)
CO2 SERPL-SCNC: 24 MMOL/L (ref 20–33)
CREAT SERPL-MCNC: 1.03 MG/DL (ref 0.5–1.4)
GLOBULIN SER CALC-MCNC: 3 G/DL (ref 1.9–3.5)
GLUCOSE SERPL-MCNC: 84 MG/DL (ref 65–99)
POTASSIUM SERPL-SCNC: 5 MMOL/L (ref 3.6–5.5)
PROT SERPL-MCNC: 7.3 G/DL (ref 6–8.2)
SODIUM SERPL-SCNC: 142 MMOL/L (ref 135–145)

## 2021-09-13 RX ORDER — CARVEDILOL 12.5 MG/1
12.5 TABLET ORAL 2 TIMES DAILY WITH MEALS
Qty: 180 TABLET | Refills: 3 | Status: SHIPPED | OUTPATIENT
Start: 2021-09-13 | End: 2022-12-12

## 2021-09-16 RX ORDER — SERTRALINE HYDROCHLORIDE 100 MG/1
TABLET, FILM COATED ORAL
Qty: 90 TABLET | Refills: 3 | Status: SHIPPED | OUTPATIENT
Start: 2021-09-16 | End: 2022-09-12

## 2021-09-23 ENCOUNTER — NON-PROVIDER VISIT (OUTPATIENT)
Dept: INTERNAL MEDICINE | Facility: IMAGING CENTER | Age: 76
End: 2021-09-23
Payer: MEDICARE

## 2021-09-23 DIAGNOSIS — Z23 NEED FOR VACCINATION: ICD-10-CM

## 2021-09-23 PROCEDURE — G0008 ADMIN INFLUENZA VIRUS VAC: HCPCS | Performed by: FAMILY MEDICINE

## 2021-09-23 PROCEDURE — 90662 IIV NO PRSV INCREASED AG IM: CPT | Performed by: FAMILY MEDICINE

## 2021-09-30 DIAGNOSIS — M15.9 PRIMARY OSTEOARTHRITIS INVOLVING MULTIPLE JOINTS: ICD-10-CM

## 2021-09-30 RX ORDER — PREDNISONE 5 MG/1
5 TABLET ORAL DAILY
Qty: 30 TABLET | Refills: 1 | Status: SHIPPED | OUTPATIENT
Start: 2021-09-30 | End: 2021-11-27

## 2021-10-14 ENCOUNTER — APPOINTMENT (OUTPATIENT)
Dept: SLEEP MEDICINE | Facility: MEDICAL CENTER | Age: 76
End: 2021-10-14
Payer: MEDICARE

## 2021-10-20 ENCOUNTER — OFFICE VISIT (OUTPATIENT)
Dept: SLEEP MEDICINE | Facility: MEDICAL CENTER | Age: 76
End: 2021-10-20
Payer: MEDICARE

## 2021-10-20 VITALS
WEIGHT: 221.9 LBS | OXYGEN SATURATION: 94 % | DIASTOLIC BLOOD PRESSURE: 84 MMHG | SYSTOLIC BLOOD PRESSURE: 134 MMHG | BODY MASS INDEX: 34.83 KG/M2 | RESPIRATION RATE: 16 BRPM | HEART RATE: 69 BPM | HEIGHT: 67 IN

## 2021-10-20 DIAGNOSIS — G47.33 OSA (OBSTRUCTIVE SLEEP APNEA): ICD-10-CM

## 2021-10-20 PROCEDURE — 99203 OFFICE O/P NEW LOW 30 MIN: CPT | Performed by: FAMILY MEDICINE

## 2021-10-20 ASSESSMENT — FIBROSIS 4 INDEX: FIB4 SCORE: 3.13

## 2021-10-20 NOTE — PROGRESS NOTES
"    [facility]  Consult Note     Date: 10/20/2021 / Time: 8:43 AM    Patient ID:   Name:             Clau Vences   YOB: 1945  Age:                 76 y.o.  female   MRN:               [id number]      Thank you for requesting a sleep medicine consultation on Clau Vences at the sleep center. She presents today with the chief complaints of AZUL and to establish as a new pt. She was previously seen by PMA. Pt was diagnosed with AZUL on 5/7/07 via split night study. Severe obstructive sleep apnea with AHI of 93.3/hr and O2 hollis 74 %. Due to severity of the disease she met the split study protocol. The titration started with CPAP 5 cm and the best tolerated was CPAP 17 cm. The AHI improved to 5.6/hr with improved O2 hollis of 89%. She has been on CPAP 17 cm since then.The initial presenting symptoms were snoring and non restful sleep. She is referred by Dr. mccauley for evaluation and treatment of sleep disorder breathing.     HISTORY OF PRESENT ILLNESS:       At night,  Clau Vences goes to bed around 10 pm on weekdays and on the weekends. She gets out of bed at 7 am on weekdays and on the weekends.  She  averages about 7 hrs of sleep on a good night. She falls asleep within few minutes minutes however she does use Ambien 5 mg as needed which is managed by her PCP. She wakes up about 1 times during the night due to bathroom use and on average It takes her few min to fall back asleep.She is not aware of snoring/breathing pauses/gasping or choking in sleep since she has been on the therapy.  She  denies any symptoms of restless legs syndrome such as an \"urge to move\"  She  legs in the evening or bedtime. She  denies any symptoms of narcolepsy such as sleep paralysis or cataplexy, or any symptoms to suggest parasomnias such as sleep walking or acting out of dreams. Overall, she does finds her sleep refreshing she gets enough hours of sleep.In terms of  excessive daytime sleepiness, " she denies of sleepiness while  at work, while reading or watching TV and occasionally while driving. She  Does not take regular naps. She drinks 0 caffeinated beverages per day.The 30 day compliance was downloaded which shows adequate compliance with more that 4 hr usage about *97%. The AHI is has improved to 0.3/hr. The mask leak is normal.     REVIEW OF SYSTEMS:       Constitutional: Denies fevers, Denies weight changes  Eyes: Denies changes in vision, no eye pain  Ears/Nose/Throat/Mouth: Denies nasal congestion or sore throat   Cardiovascular: Denies chest pain or palpitations   Respiratory: Denies shortness of breath , Denies cough  Gastrointestinal/Hepatic: Denies abdominal pain, nausea, Musculoskeletal/Rheum: Denies  joint pain and swelling   Skin/Breast: Denies rash  Neurological: Denies headache, confusion, memory loss or focal weakness/parasthesias  Psychiatric: denies mood disorder     Comprehensive review of systems form is reviewed with the patient and is attached in the EMR.     PMH:  has a past medical history of Anesthesia, Arthritis, Back pain, Dyslipidemia, Slovak measles, Heart murmur (9/13/2017), Hypertension, Influenza, Mumps, Other specified symptom associated with female genital organs, Painful joint, Psychiatric problem, Sleep apnea, Snoring, Sore muscles, Tonsillitis, and Unspecified cataract. She also has no past medical history of Breast cancer (HCC) or CAD (coronary artery disease).  MEDS:   Current Outpatient Medications:   •  predniSONE (DELTASONE) 5 MG Tab, Take 1 Tablet by mouth every day., Disp: 30 Tablet, Rfl: 1  •  sertraline (ZOLOFT) 100 MG Tab, TAKE ONE (1) TABLET BY MOUTH EVERY DAY, Disp: 90 Tablet, Rfl: 3  •  carvedilol (COREG) 12.5 MG Tab, Take 1 Tablet by mouth 2 times a day with meals., Disp: 180 Tablet, Rfl: 3  •  hydroCHLOROthiazide (HYDRODIURIL) 25 MG Tab, , Disp: , Rfl:   •  rosuvastatin (CRESTOR) 10 MG Tab, Take 1 Tab by mouth every evening., Disp: 30 Tab, Rfl: 11  •   "vitamin D (CHOLECALCIFEROL) 1000 UNIT Tab, Take 5,000 Units by mouth every day., Disp: , Rfl:   •  Omega 3 1000 MG Cap, Take  by mouth., Disp: , Rfl:   •  docusate sodium (COLACE) 100 MG CAPS, Take 100 mg by mouth every day., Disp: , Rfl:   •  IRON PO, Take 65 mg by mouth every day., Disp: , Rfl:   •  Cyanocobalamin (VITAMIN B12 PO), Take  by mouth every day., Disp: , Rfl:   •  FOLIC ACID PO, Take  by mouth every day., Disp: , Rfl:   •  Acetaminophen (TYLENOL 8 HOUR PO), Take  by mouth as needed., Disp: , Rfl:   ALLERGIES:   Allergies   Allergen Reactions   • Tramadol      Nausea and somnolence     SURGHX:   Past Surgical History:   Procedure Laterality Date   • KNEE ARTHROPLASTY TOTAL Right 12/27/2017   • HYSTERECTOMY ROBOTIC  10/23/2014    Performed by Smith Lyons M.D. at SURGERY [facility]   • GASTROSCOPY WITH BIOPSY  7/2/2014    Performed by Sky Vila M.D. at SURGERY [facility]   • OTHER ORTHOPEDIC SURGERY  2006    left total knee   • APPENDECTOMY     • ARTHROSCOPY, KNEE     • HYSTERECTOMY LAPAROSCOPY     • OTHER      meghan cataracts   • OTHER ABDOMINAL SURGERY      Resection of pelvic mass, uterus and ovaries   • PB REMV 2ND CATARACT,CORN-SCLER SECTN     • RI REDUCTION OF BREAST Bilateral     1982   • TONSILLECTOMY     • TONSILLECTOMY AND ADENOIDECTOMY       SOCHX:  reports that she has never smoked. She has never used smokeless tobacco. She reports current alcohol use of about 0.6 - 1.2 oz of alcohol per week. She reports that she does not use drugs.   FH:   Family History   Problem Relation Age of Onset   • Breast Cancer Mother    • Cancer Mother 65        Breast   • Cancer Father         Colon   • Alcohol/Drug Father    • Colon Cancer Father    • Hyperlipidemia Brother    • Heart Disease Brother         arrythmia       Physical Exam:  Vitals/ General Appearance:   Weight/BMI: Body mass index is 34.75 kg/m².  Resp 16   Ht 1.702 m (5' 7\")   Wt 101 kg (221 lb 14.4 oz)   Vitals:    " "10/20/21 0839   Resp: 16   Weight: 101 kg (221 lb 14.4 oz)   Height: 1.702 m (5' 7\")           Constitutional: Alert, no distress, well-groomed.  Skin: No rashes in visible areas.  Eye: Round. Conjunctiva clear, lids normal. No icterus.   ENMT: Lips pink without lesions, good dentition, moist mucous membranes. Phonation normal.  Neck: No masses, no thyromegaly. Moves freely without pain.  CV: Pulse as reported by patient  Respiratory: Unlabored respiratory effort, no cough or audible wheeze  Psych: Alert and oriented x3, normal affect and mood.   INVESTIGATIONS:       ASSESSMENT AND PLAN     1. Sleep Apnea:The pathophysiology of sleep anea and the increased risk of cardiovascular morbidity from untreated sleep apnea is discussed in detail with the patient. She is urged to avoid supine sleep, weight gain and alcoholic beverages since all of these can worsen sleep apnea. She is cautioned against drowsy driving. If She feels sleepy while driving, She must pull over for a break/nap, rather than persist on the road, in the interest of She own safety and that of others on the road.   Plan   - Continue CPAP 17 cm with nasal mask    - . After informed discussion new CPAP 17 cm with nasal mask is ordered today    - F/u in 8-10 weeks to assess the efficiacy of recommended pressure    - compliance download was reviewed and discussed with the pt   - compliance was reinforced     2. Regarding treatment of other past medical problems and general health maintenance,  She is urged to follow up with PCP.      "

## 2021-11-22 DIAGNOSIS — F51.01 PRIMARY INSOMNIA: ICD-10-CM

## 2021-11-22 RX ORDER — ZOLPIDEM TARTRATE 5 MG/1
TABLET ORAL
Qty: 30 TABLET | Refills: 2 | Status: SHIPPED | OUTPATIENT
Start: 2021-11-22 | End: 2021-12-22

## 2021-11-26 DIAGNOSIS — M15.9 PRIMARY OSTEOARTHRITIS INVOLVING MULTIPLE JOINTS: ICD-10-CM

## 2021-11-27 RX ORDER — PREDNISONE 5 MG/1
TABLET ORAL
Qty: 30 TABLET | Refills: 1 | Status: SHIPPED | OUTPATIENT
Start: 2021-11-27 | End: 2022-01-16

## 2021-12-07 DIAGNOSIS — E78.2 MIXED HYPERLIPIDEMIA: ICD-10-CM

## 2021-12-07 RX ORDER — ROSUVASTATIN CALCIUM 10 MG/1
10 TABLET, COATED ORAL EVERY EVENING
Qty: 90 TABLET | Refills: 0 | Status: SHIPPED | OUTPATIENT
Start: 2021-12-07 | End: 2022-03-07

## 2021-12-13 RX ORDER — HYDROCHLOROTHIAZIDE 25 MG/1
25 TABLET ORAL DAILY
Qty: 90 TABLET | Refills: 3 | Status: SHIPPED
Start: 2021-12-13 | End: 2022-03-08

## 2022-01-16 DIAGNOSIS — M15.9 PRIMARY OSTEOARTHRITIS INVOLVING MULTIPLE JOINTS: ICD-10-CM

## 2022-01-16 RX ORDER — PREDNISONE 5 MG/1
TABLET ORAL
Qty: 30 TABLET | Refills: 1 | Status: SHIPPED | OUTPATIENT
Start: 2022-01-16 | End: 2022-03-26

## 2022-02-04 ENCOUNTER — TELEPHONE (OUTPATIENT)
Dept: INTERNAL MEDICINE | Facility: IMAGING CENTER | Age: 77
End: 2022-02-04

## 2022-02-10 ENCOUNTER — OFFICE VISIT (OUTPATIENT)
Dept: INTERNAL MEDICINE | Facility: IMAGING CENTER | Age: 77
End: 2022-02-10
Payer: MEDICARE

## 2022-02-10 DIAGNOSIS — R69 ILLNESS: ICD-10-CM

## 2022-02-10 DIAGNOSIS — F41.9 ANXIETY: ICD-10-CM

## 2022-02-10 DIAGNOSIS — Z00.00 HEALTH CARE MAINTENANCE: ICD-10-CM

## 2022-02-10 DIAGNOSIS — I10 PRIMARY HYPERTENSION: ICD-10-CM

## 2022-02-10 PROCEDURE — 99214 OFFICE O/P EST MOD 30 MIN: CPT | Performed by: FAMILY MEDICINE

## 2022-02-10 ASSESSMENT — FIBROSIS 4 INDEX: FIB4 SCORE: 3.13

## 2022-02-11 VITALS
OXYGEN SATURATION: 94 % | WEIGHT: 236.6 LBS | SYSTOLIC BLOOD PRESSURE: 160 MMHG | HEIGHT: 67 IN | DIASTOLIC BLOOD PRESSURE: 65 MMHG | RESPIRATION RATE: 17 BRPM | HEART RATE: 62 BPM | TEMPERATURE: 98.2 F | BODY MASS INDEX: 37.13 KG/M2

## 2022-02-11 DIAGNOSIS — R35.0 URINARY FREQUENCY: ICD-10-CM

## 2022-02-11 DIAGNOSIS — E83.52 HYPERCALCEMIA: ICD-10-CM

## 2022-02-11 DIAGNOSIS — E78.2 MIXED HYPERLIPIDEMIA: ICD-10-CM

## 2022-02-11 DIAGNOSIS — R53.83 OTHER FATIGUE: ICD-10-CM

## 2022-02-11 DIAGNOSIS — I10 PRIMARY HYPERTENSION: ICD-10-CM

## 2022-02-11 DIAGNOSIS — D75.89 MACROCYTOSIS: ICD-10-CM

## 2022-02-11 DIAGNOSIS — R73.9 HYPERGLYCEMIA: ICD-10-CM

## 2022-02-11 DIAGNOSIS — E55.9 VITAMIN D DEFICIENCY: ICD-10-CM

## 2022-02-11 DIAGNOSIS — M25.59 PAIN IN OTHER JOINT: ICD-10-CM
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
## 2022-02-14 DIAGNOSIS — I10 PRIMARY HYPERTENSION: ICD-10-CM

## 2022-02-14 RX ORDER — LISINOPRIL 20 MG/1
20 TABLET ORAL DAILY
Qty: 30 TABLET | Refills: 1 | Status: SHIPPED | OUTPATIENT
Start: 2022-02-14 | End: 2022-03-01 | Stop reason: SDUPTHER

## 2022-02-15 ENCOUNTER — HOSPITAL ENCOUNTER (OUTPATIENT)
Facility: MEDICAL CENTER | Age: 77
End: 2022-02-15
Attending: FAMILY MEDICINE
Payer: MEDICARE

## 2022-02-15 ENCOUNTER — APPOINTMENT (OUTPATIENT)
Dept: INTERNAL MEDICINE | Facility: IMAGING CENTER | Age: 77
End: 2022-02-15
Payer: MEDICARE

## 2022-02-15 ENCOUNTER — NON-PROVIDER VISIT (OUTPATIENT)
Dept: INTERNAL MEDICINE | Facility: IMAGING CENTER | Age: 77
End: 2022-02-15
Payer: MEDICARE

## 2022-02-15 DIAGNOSIS — R73.9 HYPERGLYCEMIA: ICD-10-CM

## 2022-02-15 DIAGNOSIS — E55.9 VITAMIN D DEFICIENCY: ICD-10-CM

## 2022-02-15 DIAGNOSIS — R53.83 OTHER FATIGUE: ICD-10-CM

## 2022-02-15 DIAGNOSIS — E83.52 HYPERCALCEMIA: ICD-10-CM

## 2022-02-15 DIAGNOSIS — D75.89 MACROCYTOSIS: ICD-10-CM

## 2022-02-15 DIAGNOSIS — E78.2 MIXED HYPERLIPIDEMIA: ICD-10-CM

## 2022-02-15 DIAGNOSIS — M25.59 PAIN IN OTHER JOINT: ICD-10-CM

## 2022-02-15 DIAGNOSIS — I10 PRIMARY HYPERTENSION: ICD-10-CM

## 2022-02-15 PROCEDURE — 82746 ASSAY OF FOLIC ACID SERUM: CPT

## 2022-02-15 PROCEDURE — 80053 COMPREHEN METABOLIC PANEL: CPT

## 2022-02-15 PROCEDURE — 84443 ASSAY THYROID STIM HORMONE: CPT

## 2022-02-15 PROCEDURE — 83036 HEMOGLOBIN GLYCOSYLATED A1C: CPT | Mod: GA

## 2022-02-15 PROCEDURE — 82306 VITAMIN D 25 HYDROXY: CPT

## 2022-02-15 PROCEDURE — 85025 COMPLETE CBC W/AUTO DIFF WBC: CPT

## 2022-02-15 PROCEDURE — 86140 C-REACTIVE PROTEIN: CPT

## 2022-02-15 PROCEDURE — 82330 ASSAY OF CALCIUM: CPT

## 2022-02-15 PROCEDURE — 80061 LIPID PANEL: CPT

## 2022-02-15 PROCEDURE — 85652 RBC SED RATE AUTOMATED: CPT

## 2022-02-15 PROCEDURE — 82607 VITAMIN B-12: CPT

## 2022-02-16 ENCOUNTER — TELEPHONE (OUTPATIENT)
Dept: INTERNAL MEDICINE | Facility: IMAGING CENTER | Age: 77
End: 2022-02-16
Payer: MEDICARE

## 2022-02-16 ENCOUNTER — HOSPITAL ENCOUNTER (EMERGENCY)
Facility: MEDICAL CENTER | Age: 77
End: 2022-02-16
Attending: EMERGENCY MEDICINE
Payer: MEDICARE

## 2022-02-16 VITALS
BODY MASS INDEX: 37.06 KG/M2 | HEIGHT: 67 IN | RESPIRATION RATE: 18 BRPM | OXYGEN SATURATION: 95 % | TEMPERATURE: 97.8 F | DIASTOLIC BLOOD PRESSURE: 82 MMHG | HEART RATE: 61 BPM | SYSTOLIC BLOOD PRESSURE: 177 MMHG

## 2022-02-16 DIAGNOSIS — E87.1 HYPONATREMIA: ICD-10-CM

## 2022-02-16 LAB
25(OH)D3 SERPL-MCNC: 38 NG/ML (ref 30–100)
ALBUMIN SERPL BCP-MCNC: 4.1 G/DL (ref 3.2–4.9)
ALBUMIN SERPL BCP-MCNC: 4.7 G/DL (ref 3.2–4.9)
ALBUMIN/GLOB SERPL: 1.7 G/DL
ALBUMIN/GLOB SERPL: 2 G/DL
ALP SERPL-CCNC: 71 U/L (ref 30–99)
ALP SERPL-CCNC: 71 U/L (ref 30–99)
ALT SERPL-CCNC: 31 U/L (ref 2–50)
ALT SERPL-CCNC: 43 U/L (ref 2–50)
ANION GAP SERPL CALC-SCNC: 13 MMOL/L (ref 7–16)
ANION GAP SERPL CALC-SCNC: 14 MMOL/L (ref 7–16)
APPEARANCE UR: CLEAR
AST SERPL-CCNC: 23 U/L (ref 12–45)
AST SERPL-CCNC: 35 U/L (ref 12–45)
BACTERIA #/AREA URNS HPF: ABNORMAL /HPF
BASOPHILS # BLD AUTO: 1 % (ref 0–1.8)
BASOPHILS # BLD: 0.06 K/UL (ref 0–0.12)
BILIRUB SERPL-MCNC: 0.6 MG/DL (ref 0.1–1.5)
BILIRUB SERPL-MCNC: 0.7 MG/DL (ref 0.1–1.5)
BILIRUB UR QL STRIP.AUTO: NEGATIVE
BUN SERPL-MCNC: 16 MG/DL (ref 8–22)
BUN SERPL-MCNC: 18 MG/DL (ref 8–22)
CA-I SERPL-SCNC: 1.3 MMOL/L (ref 1.1–1.3)
CALCIUM SERPL-MCNC: 10.2 MG/DL (ref 8.5–10.5)
CALCIUM SERPL-MCNC: 9.5 MG/DL (ref 8.4–10.2)
CHLORIDE SERPL-SCNC: 86 MMOL/L (ref 96–112)
CHLORIDE SERPL-SCNC: 90 MMOL/L (ref 96–112)
CHOLEST SERPL-MCNC: 190 MG/DL (ref 100–199)
CO2 SERPL-SCNC: 23 MMOL/L (ref 20–33)
CO2 SERPL-SCNC: 23 MMOL/L (ref 20–33)
COLOR UR: YELLOW
CREAT SERPL-MCNC: 0.8 MG/DL (ref 0.5–1.4)
CREAT SERPL-MCNC: 0.86 MG/DL (ref 0.5–1.4)
CRP SERPL HS-MCNC: <0.3 MG/DL (ref 0–0.75)
EOSINOPHIL # BLD AUTO: 0.35 K/UL (ref 0–0.51)
EOSINOPHIL NFR BLD: 5.6 % (ref 0–6.9)
EPI CELLS #/AREA URNS HPF: ABNORMAL /HPF
ERYTHROCYTE [DISTWIDTH] IN BLOOD BY AUTOMATED COUNT: 47.6 FL (ref 35.9–50)
ERYTHROCYTE [SEDIMENTATION RATE] IN BLOOD BY WESTERGREN METHOD: 7 MM/HOUR (ref 0–25)
EST. AVERAGE GLUCOSE BLD GHB EST-MCNC: 100 MG/DL
FOLATE SERPL-MCNC: 19.9 NG/ML
GLOBULIN SER CALC-MCNC: 2.4 G/DL (ref 1.9–3.5)
GLOBULIN SER CALC-MCNC: 2.4 G/DL (ref 1.9–3.5)
GLUCOSE SERPL-MCNC: 84 MG/DL (ref 65–99)
GLUCOSE SERPL-MCNC: 92 MG/DL (ref 65–99)
GLUCOSE UR STRIP.AUTO-MCNC: NEGATIVE MG/DL
HBA1C MFR BLD: 5.1 % (ref 4–5.6)
HCT VFR BLD AUTO: 40.8 % (ref 37–47)
HDLC SERPL-MCNC: 118 MG/DL
HGB BLD-MCNC: 14.1 G/DL (ref 12–16)
IMM GRANULOCYTES # BLD AUTO: 0.02 K/UL (ref 0–0.11)
IMM GRANULOCYTES NFR BLD AUTO: 0.3 % (ref 0–0.9)
KETONES UR STRIP.AUTO-MCNC: NEGATIVE MG/DL
LDLC SERPL CALC-MCNC: 53 MG/DL
LEUKOCYTE ESTERASE UR QL STRIP.AUTO: NEGATIVE
LYMPHOCYTES # BLD AUTO: 0.62 K/UL (ref 1–4.8)
LYMPHOCYTES NFR BLD: 9.9 % (ref 22–41)
MCH RBC QN AUTO: 36.2 PG (ref 27–33)
MCHC RBC AUTO-ENTMCNC: 34.6 G/DL (ref 33.6–35)
MCV RBC AUTO: 104.6 FL (ref 81.4–97.8)
MICRO URNS: ABNORMAL
MONOCYTES # BLD AUTO: 0.53 K/UL (ref 0–0.85)
MONOCYTES NFR BLD AUTO: 8.5 % (ref 0–13.4)
NEUTROPHILS # BLD AUTO: 4.67 K/UL (ref 2–7.15)
NEUTROPHILS NFR BLD: 74.7 % (ref 44–72)
NITRITE UR QL STRIP.AUTO: NEGATIVE
NRBC # BLD AUTO: 0 K/UL
NRBC BLD-RTO: 0 /100 WBC
PH UR STRIP.AUTO: 5 [PH] (ref 5–8)
PLATELET # BLD AUTO: 196 K/UL (ref 164–446)
PMV BLD AUTO: 11.6 FL (ref 9–12.9)
POTASSIUM SERPL-SCNC: 4.8 MMOL/L (ref 3.6–5.5)
POTASSIUM SERPL-SCNC: 5.2 MMOL/L (ref 3.6–5.5)
PROT SERPL-MCNC: 6.5 G/DL (ref 6–8.2)
PROT SERPL-MCNC: 7.1 G/DL (ref 6–8.2)
PROT UR QL STRIP: NEGATIVE MG/DL
RBC # BLD AUTO: 3.9 M/UL (ref 4.2–5.4)
RBC # URNS HPF: ABNORMAL /HPF
RBC UR QL AUTO: ABNORMAL
SODIUM SERPL-SCNC: 123 MMOL/L (ref 135–145)
SODIUM SERPL-SCNC: 126 MMOL/L (ref 135–145)
SP GR UR STRIP.AUTO: 1.01
TRIGL SERPL-MCNC: 93 MG/DL (ref 0–149)
TSH SERPL DL<=0.005 MIU/L-ACNC: 1.49 UIU/ML (ref 0.38–5.33)
VIT B12 SERPL-MCNC: 779 PG/ML (ref 211–911)
WBC # BLD AUTO: 6.3 K/UL (ref 4.8–10.8)
WBC #/AREA URNS HPF: ABNORMAL /HPF

## 2022-02-16 PROCEDURE — 80053 COMPREHEN METABOLIC PANEL: CPT

## 2022-02-16 PROCEDURE — 81001 URINALYSIS AUTO W/SCOPE: CPT

## 2022-02-16 PROCEDURE — 36415 COLL VENOUS BLD VENIPUNCTURE: CPT

## 2022-02-16 PROCEDURE — 700105 HCHG RX REV CODE 258: Performed by: EMERGENCY MEDICINE

## 2022-02-16 PROCEDURE — 99284 EMERGENCY DEPT VISIT MOD MDM: CPT

## 2022-02-16 RX ORDER — SODIUM CHLORIDE 9 MG/ML
1000 INJECTION, SOLUTION INTRAVENOUS ONCE
Status: COMPLETED | OUTPATIENT
Start: 2022-02-16 | End: 2022-02-16

## 2022-02-16 RX ORDER — ACETAMINOPHEN 500 MG
1000 TABLET ORAL EVERY 6 HOURS PRN
Status: SHIPPED | COMMUNITY
End: 2023-02-15

## 2022-02-16 RX ADMIN — SODIUM CHLORIDE 1000 ML: 9 INJECTION, SOLUTION INTRAVENOUS at 11:17

## 2022-02-16 NOTE — ED TRIAGE NOTES
"Chief Complaint   Patient presents with   • Abnormal Labs     Sent in by PCP for abnormal Na and Cl- labs in epic     BP (!) 168/78   Pulse (!) 55   Temp 36.5 °C (97.7 °F) (Temporal)   Resp 16   Ht 1.702 m (5' 7\")   SpO2 92%   BMI 37.06 kg/m²     Pt bib friend, currently in wheelchair feeling weak and unable to stand. Has been feeling weak since last week, did have \"flu symptoms\" and  achy joints but this is resolved, tested neg for covid with home test.   "

## 2022-02-16 NOTE — TELEPHONE ENCOUNTER
Spoke with patient regarding new hyponatremia noted on labs done 2/15/22. Although she feels a bit better this morning, she has been reporting typical symptoms of acute hyponatremia for the past 1 week plus. No indication this is related to HCTZ use (long term med) nor due to massive water intake. We discussed need for patient to proceed to ER for further evaluation for her safety this morning. All questions answered.

## 2022-02-16 NOTE — ED PROVIDER NOTES
ED Provider Note    CHIEF COMPLAINT  Chief Complaint   Patient presents with   • Abnormal Labs     Sent in by PCP for abnormal Na and Cl- labs in epic       HPI  Clau Vences is a 76 y.o. female who presents for evaluation for hyponatremia and hypochloremia.  The patient had laboratory analysis performed by her primary care doctor after she presented with some lethargy and myalgias.  She is found to have low sodium as well as chloride.  She states she has had no change in her medication.  She has a little bit of diarrhea periodically but no vomiting.  She does have some generalized malaise.  She has not had any associated fevers.    REVIEW OF SYSTEMS  See [facility] for further details. All other systems are negative.     PAST MEDICAL HISTORY  Past Medical History:   Diagnosis Date   • Heart murmur 9/13/2017   • Anesthesia     low O2 sat   • Arthritis     osteo   • Back pain    • Dyslipidemia    • [place] measles    • Hypertension    • Influenza    • Mumps    • Other specified symptom associated with female genital organs    • Painful joint    • Psychiatric problem    • Sleep apnea     c-pap with 3 l/nc   • Snoring    • Sore muscles    • Tonsillitis    • Unspecified cataract        FAMILY HISTORY  [unfilled]    SOCIAL HISTORY  Social History     Socioeconomic History   • Marital status:    Tobacco Use   • Smoking status: Never Smoker   • Smokeless tobacco: Never Used   Vaping Use   • Vaping Use: Never used   Substance and Sexual Activity   • Alcohol use: Yes     Alcohol/week: 0.6 - 1.2 oz     Types: 1 - 2 Glasses of wine per week     Comment: 2 glasses of wine a day    • Drug use: No       SURGICAL HISTORY  Past Surgical History:   Procedure Laterality Date   • KNEE ARTHROPLASTY TOTAL Right 12/27/2017   • HYSTERECTOMY ROBOTIC  10/23/2014    Performed by Smtih Lyons M.D. at SURGERY [facility]   • GASTROSCOPY WITH BIOPSY  7/2/2014    Performed by Sky Vila M.D. at SURGERY [facility]   •  "OTHER ORTHOPEDIC SURGERY  2006    left total knee   • APPENDECTOMY     • ARTHROSCOPY, KNEE     • HYSTERECTOMY LAPAROSCOPY     • OTHER      meghan cataracts   • OTHER ABDOMINAL SURGERY      Resection of pelvic mass, uterus and ovaries   • IA REDUCTION OF BREAST Bilateral     1982   • IA REMV 2ND CATARACT,CORN-SCLER SECTN     • TONSILLECTOMY     • TONSILLECTOMY AND ADENOIDECTOMY         CURRENT MEDICATIONS  Home Medications     Reviewed by Ashley Patino R.N. (Registered Nurse) on 02/16/22 at 1031  Med List Status: <None>   Medication Last Dose Status   Acetaminophen (TYLENOL 8 HOUR PO)  Active   carvedilol (COREG) 12.5 MG Tab  Active   Cyanocobalamin (VITAMIN B12 PO)  Active   docusate sodium (COLACE) 100 MG CAPS  Active   FOLIC ACID PO  Active   hydroCHLOROthiazide (HYDRODIURIL) 25 MG Tab  Active   IRON PO  Active   lisinopril (PRINIVIL) 20 MG Tab  Active   Omega 3 1000 MG Cap  Active   predniSONE (DELTASONE) 5 MG Tab  Active   rosuvastatin (CRESTOR) 10 MG Tab  Active   sertraline (ZOLOFT) 100 MG Tab  Active   vitamin D (CHOLECALCIFEROL) 1000 UNIT Tab  Active                ALLERGIES  Allergies   Allergen Reactions   • Tramadol      Nausea and somnolence       PHYSICAL EXAM  VITAL SIGNS: BP (!) 168/78   Pulse (!) 55   Temp 36.5 °C (97.7 °F) (Temporal)   Resp 16   Ht 1.702 m (5' 7\")   SpO2 92%   BMI 37.06 kg/m²       Constitutional: Well developed, Well nourished, No acute distress, Non-toxic appearance.   HENT: Normocephalic, Atraumatic, Bilateral external ears normal, Oropharynx moist, No oral exudates, Nose normal.   Eyes: PERRLA, EOMI, Conjunctiva normal, No discharge.   Neck: Normal range of motion, No tenderness, Supple, No stridor.   Lymphatic: No lymphadenopathy noted.   Cardiovascular: Normal heart rate, Normal rhythm, No murmurs, No rubs, No gallops.   Thorax & Lungs: Normal breath sounds, No respiratory distress, No wheezing, No chest tenderness.   Abdomen: Bowel sounds normal, Soft, No tenderness, " No masses, No pulsatile masses.   Skin: Warm, Dry, No erythema, No rash.   Back: No tenderness, No CVA tenderness.   Extremities: Intact distal pulses, No edema, No tenderness, No cyanosis, No clubbing.   Neurologic: Alert & oriented x 3, Normal motor function, Normal sensory function, No focal deficits noted.   Psychiatric: Affect normal, Judgment normal, Mood normal.     Results for orders placed or performed during the hospital encounter of 02/16/22   COMP METABOLIC PANEL   Result Value Ref Range    Sodium 126 (L) 135 - 145 mmol/L    Potassium 4.8 3.6 - 5.5 mmol/L    Chloride 90 (L) 96 - 112 mmol/L    Co2 23 20 - 33 mmol/L    Anion Gap 13.0 7.0 - 16.0    Glucose 92 65 - 99 mg/dL    Bun 16 8 - 22 mg/dL    Creatinine 0.80 0.50 - 1.40 mg/dL    Calcium 9.5 8.4 - 10.2 mg/dL    AST(SGOT) 23 12 - 45 U/L    ALT(SGPT) 31 2 - 50 U/L    Alkaline Phosphatase 71 30 - 99 U/L    Total Bilirubin 0.6 0.1 - 1.5 mg/dL    Albumin 4.1 3.2 - 4.9 g/dL    Total Protein 6.5 6.0 - 8.2 g/dL    Globulin 2.4 1.9 - 3.5 g/dL    A-G Ratio 1.7 g/dL   URINALYSIS    Specimen: Urine, Clean Catch   Result Value Ref Range    Color Yellow     Character Clear     Specific Gravity 1.010 <1.035    Ph 5.0 5.0 - 8.0    Glucose Negative Negative mg/dL    Ketones Negative Negative mg/dL    Protein Negative Negative mg/dL    Bilirubin Negative Negative    Nitrite Negative Negative    Leukocyte Esterase Negative Negative    Occult Blood Trace (A) Negative    Micro Urine Req Microscopic    URINE MICROSCOPIC (W/UA)   Result Value Ref Range    WBC 0-2 /hpf    RBC 0-2 /hpf    Bacteria Few (A) None /hpf    Epithelial Cells Few Few /hpf   ESTIMATED GFR   Result Value Ref Range    GFR If African American >60 >60 mL/min/1.73 m 2    GFR If Non African American >60 >60 mL/min/1.73 m 2       COURSE & MEDICAL DECISION MAKING  Pertinent Labs & Imaging studies reviewed. (See chart for details)  This a 76-year-old female who presents the emergency department with  hyponatremia and hypochloremia.  I did give the patient a liter of fluid and rechecked her labs.  Her sodium and chloride has increased.  She still little bit low.  She has a follow-up appointment with her doctor next Tuesday.  She will reduce her free water intake and make sure she drinks lots of electrolytes.  She will follow up with her doctor on Tuesday for further recommendations and repeat laboratory analysis.  I suspect this could be from her medication.  She is aware her doctor will need to make some corrections to her medication.    FINAL IMPRESSION  1.  Hyponatremia  2.  Hypochloremia    Disposition  The patient will be discharged in stable condition         Electronically signed by: Ananth Banerjee M.D., 2/16/2022 10:52 AM

## 2022-02-16 NOTE — ED NOTES
Pt assisted to restroom to attempt urine sample. Pt was able to go but unable to collect the sample. Will attempt again when possible.

## 2022-02-20 DIAGNOSIS — F51.01 PRIMARY INSOMNIA: ICD-10-CM

## 2022-02-22 ENCOUNTER — OFFICE VISIT (OUTPATIENT)
Dept: INTERNAL MEDICINE | Facility: IMAGING CENTER | Age: 77
End: 2022-02-22
Payer: MEDICARE

## 2022-02-22 ENCOUNTER — HOSPITAL ENCOUNTER (OUTPATIENT)
Facility: MEDICAL CENTER | Age: 77
End: 2022-02-22
Attending: FAMILY MEDICINE
Payer: MEDICARE

## 2022-02-22 VITALS
WEIGHT: 235.89 LBS | SYSTOLIC BLOOD PRESSURE: 148 MMHG | DIASTOLIC BLOOD PRESSURE: 78 MMHG | HEIGHT: 67 IN | RESPIRATION RATE: 17 BRPM | TEMPERATURE: 98 F | OXYGEN SATURATION: 94 % | BODY MASS INDEX: 37.02 KG/M2 | HEART RATE: 59 BPM

## 2022-02-22 DIAGNOSIS — Z09 HOSPITAL DISCHARGE FOLLOW-UP: ICD-10-CM

## 2022-02-22 DIAGNOSIS — F51.04 CHRONIC INSOMNIA: ICD-10-CM

## 2022-02-22 DIAGNOSIS — E87.1 HYPONATREMIA: ICD-10-CM

## 2022-02-22 DIAGNOSIS — I10 PRIMARY HYPERTENSION: ICD-10-CM

## 2022-02-22 PROCEDURE — 80048 BASIC METABOLIC PNL TOTAL CA: CPT

## 2022-02-22 PROCEDURE — 99214 OFFICE O/P EST MOD 30 MIN: CPT | Performed by: FAMILY MEDICINE

## 2022-02-22 RX ORDER — ZOLPIDEM TARTRATE 5 MG/1
TABLET ORAL
COMMUNITY
Start: 2022-01-21 | End: 2022-02-23

## 2022-02-22 RX ORDER — ZOLPIDEM TARTRATE 5 MG/1
TABLET ORAL
Qty: 30 TABLET | Refills: 2 | Status: SHIPPED | OUTPATIENT
Start: 2022-02-22 | End: 2022-03-24

## 2022-02-22 ASSESSMENT — FIBROSIS 4 INDEX: FIB4 SCORE: 1.6

## 2022-02-23 LAB
ANION GAP SERPL CALC-SCNC: 10 MMOL/L (ref 7–16)
BUN SERPL-MCNC: 24 MG/DL (ref 8–22)
CALCIUM SERPL-MCNC: 10.5 MG/DL (ref 8.5–10.5)
CHLORIDE SERPL-SCNC: 96 MMOL/L (ref 96–112)
CO2 SERPL-SCNC: 25 MMOL/L (ref 20–33)
CREAT SERPL-MCNC: 0.86 MG/DL (ref 0.5–1.4)
GLUCOSE SERPL-MCNC: 96 MG/DL (ref 65–99)
POTASSIUM SERPL-SCNC: 4.7 MMOL/L (ref 3.6–5.5)
SODIUM SERPL-SCNC: 131 MMOL/L (ref 135–145)

## 2022-02-23 NOTE — PROGRESS NOTES
Chief Complaint   Patient presents with   • Hospital Follow-up     Also blood pressure follow up and medication evaluation. Discuss new diet protocols.   • Medication Refill     Ambien   • Medication Management     Discuss Diazepam.       HPI:  Patient is a 76 y.o. female established patient who presents today for an ER follow-up visit. She had been experiencing generalized weakness as well as dizziness, and lab results 2/16/2022 showed sodium 123 (prior sodium level was 142 8/27/21). I discussed condition with patient and directed her to seek immediate evaluation at ER. She was seen at the [facility] ER 2/16/2022 by Dr. Banerjee and work up revealed ongoing hyponatremia. She was given IVF and discharged home from ER with recommendation for close follow-up. She has been focusing on increasing daily electrolyte intake, increasing salt additions to food, and being more active as able. She suffers from chronic essential HTN and has had elevated blood pressure readings also. She is tolerating Lisinopril 20 mg daily, reports normal bladder/bowel habits, and is in good spirits today. Patient also suffers from chronic primary insomnia and has used low dose Ambien PRN long term for symptom management. She continues to benefit from ongoing Ambien use, denies medication related symptoms, and uses this controlled medication in a safe manner.     Patient Active Problem List    Diagnosis Date Noted   • Poor balance 04/26/2021   • Thyroid nodule 03/16/2021   • Obesity (BMI 30-39.9) 04/06/2018   • Heart murmur 09/13/2017   • Chronic insomnia 08/09/2017   • Anxiety 08/09/2017   • HTN (hypertension) 07/01/2014   • Hyperlipidemia 07/01/2014   • AZUL on CPAP 07/01/2014       Past medical, surgical, family, and social history was reviewed and updated in Epic chart by me today.     Medications and allergies reviewed and updated in Epic chart by me today.     ROS:  Pertinent positives listed above in HPI. All other systems have been reviewed  "and are negative.    PE:   /78 (BP Location: Left arm, Patient Position: Sitting, BP Cuff Size: Adult)   Pulse (!) 59   Temp 36.7 °C (98 °F) (Temporal)   Resp 17   Ht 1.702 m (5' 7\")   Wt 107 kg (235 lb 14.3 oz)   SpO2 94%   BMI 36.95 kg/m²   Vital signs reviewed with patient.     Gen: Well developed; well nourished; no acute distress; non toxic appearance   CV: Regular rate and rhythm; S1/ S2 present; no murmur, gallop or rub noted  Pulm: No respiratory distress; clear to ascultation b/l; no wheezing or stridor noted b/l  Skin: Warm and dry; no rashes noted   Neuro: No focal deficits noted   Psych: AAOx4; mood and affect are appropriate    A/P:  1. Hospital discharge follow-up  Patient was seen and treated at [facility] on 2/16/22. All pertinent records reviewed by me prior to visit today - refer to HPI for details.     2. Hyponatremia  New issue for patient and reason for ER visit on 2/16/22. We discussed strategies to improve her sodium levels at visit, and recommend patient repeat non fasting BMP at visit today for ongoing management. I will call patient with lab results when available.   - Basic Metabolic Panel; Future    3. Primary hypertension  Improved BP readings to date. We discussed condition at visit today. Recommend patient discontinue current HCTZ 25 mg use, increase Lisinopril use from 20 mg daily to 20 mg BID, and continue home BP monitoring/ log.    4. Chronic insomnia  Stable/ well controlled with low dose PRN Ambien use.  reviewed today and no concerns noted. Pt is well versed in safe use of controlled medication, and benefit of use outweighs risk at this time. No indication for concurrent Diazepam use at this time. New RX sent to pharmacy on separate encounter.     Recommend patient follow up with me in 2 weeks for ongoing management.           "

## 2022-03-01 DIAGNOSIS — I10 PRIMARY HYPERTENSION: ICD-10-CM

## 2022-03-01 RX ORDER — LISINOPRIL 20 MG/1
40 TABLET ORAL DAILY
Qty: 180 TABLET | Refills: 1 | Status: SHIPPED | OUTPATIENT
Start: 2022-03-01 | End: 2022-08-29

## 2022-03-05 DIAGNOSIS — E78.2 MIXED HYPERLIPIDEMIA: ICD-10-CM

## 2022-03-07 RX ORDER — ROSUVASTATIN CALCIUM 10 MG/1
TABLET, COATED ORAL
Qty: 90 TABLET | Refills: 3 | Status: SHIPPED | OUTPATIENT
Start: 2022-03-07 | End: 2023-05-03

## 2022-03-08 ENCOUNTER — HOSPITAL ENCOUNTER (OUTPATIENT)
Facility: MEDICAL CENTER | Age: 77
End: 2022-03-08
Attending: FAMILY MEDICINE
Payer: MEDICARE

## 2022-03-08 ENCOUNTER — OFFICE VISIT (OUTPATIENT)
Dept: INTERNAL MEDICINE | Facility: IMAGING CENTER | Age: 77
End: 2022-03-08
Payer: MEDICARE

## 2022-03-08 VITALS
HEIGHT: 67 IN | OXYGEN SATURATION: 93 % | RESPIRATION RATE: 17 BRPM | WEIGHT: 235.89 LBS | BODY MASS INDEX: 37.02 KG/M2 | DIASTOLIC BLOOD PRESSURE: 62 MMHG | HEART RATE: 61 BPM | SYSTOLIC BLOOD PRESSURE: 130 MMHG | TEMPERATURE: 97.9 F

## 2022-03-08 DIAGNOSIS — E87.8 ELECTROLYTE IMBALANCE: ICD-10-CM

## 2022-03-08 DIAGNOSIS — I10 PRIMARY HYPERTENSION: ICD-10-CM

## 2022-03-08 LAB
ANION GAP SERPL CALC-SCNC: 11 MMOL/L (ref 7–16)
BUN SERPL-MCNC: 24 MG/DL (ref 8–22)
CALCIUM SERPL-MCNC: 10.9 MG/DL (ref 8.5–10.5)
CHLORIDE SERPL-SCNC: 102 MMOL/L (ref 96–112)
CO2 SERPL-SCNC: 26 MMOL/L (ref 20–33)
CREAT SERPL-MCNC: 0.82 MG/DL (ref 0.5–1.4)
GLUCOSE SERPL-MCNC: 94 MG/DL (ref 65–99)
POTASSIUM SERPL-SCNC: 5.1 MMOL/L (ref 3.6–5.5)
SODIUM SERPL-SCNC: 139 MMOL/L (ref 135–145)

## 2022-03-08 PROCEDURE — 80048 BASIC METABOLIC PNL TOTAL CA: CPT

## 2022-03-08 PROCEDURE — 99214 OFFICE O/P EST MOD 30 MIN: CPT | Performed by: FAMILY MEDICINE

## 2022-03-08 ASSESSMENT — FIBROSIS 4 INDEX: FIB4 SCORE: 1.62

## 2022-03-08 NOTE — PROGRESS NOTES
"Chief Complaint   Patient presents with   • Follow-Up     Doing much better and feeling more like herself.       HPI:  Patient is a 77 y.o. female established patient who presents today for her two week follow-up visit. She has been struggling with symptomatic hyponatremia and uncontrolled essential HTN over the past few weeks, and medications have been adjusted accordingly. Patient is tolerating current medication use well and feels much more stable/ mobile in her daily life. She denies new health concerns and has been monitoring BP daily at home (log reviewed at visit today).     Patient Active Problem List    Diagnosis Date Noted   • Poor balance 04/26/2021   • Thyroid nodule 03/16/2021   • Obesity (BMI 30-39.9) 04/06/2018   • Heart murmur 09/13/2017   • Chronic insomnia 08/09/2017   • Anxiety 08/09/2017   • HTN (hypertension) 07/01/2014   • Hyperlipidemia 07/01/2014   • AZUL on CPAP 07/01/2014       Past medical, surgical, family, and social history was reviewed and updated in Epic chart by me today.     Medications and allergies reviewed and updated in Epic chart by me today.     Home BP log reviewed at visit today.     ROS:  Pertinent positives listed above in HPI. All other systems have been reviewed and are negative.    PE:   /62 (BP Location: Left arm, Patient Position: Sitting, BP Cuff Size: Adult)   Pulse 61   Temp 36.6 °C (97.9 °F) (Temporal)   Resp 17   Ht 1.702 m (5' 7\")   Wt 107 kg (235 lb 14.3 oz)   SpO2 93%   BMI 36.95 kg/m²   Vital signs reviewed with patient.     Gen: Well developed; well nourished; no acute distress; non toxic appearance   CV: Regular rate and rhythm; S1/ S2 present; no murmur, gallop or rub noted  Pulm: No respiratory distress; clear to ascultation b/l; no wheezing or stridor noted b/l  Extremities: No new peripheral edema b/l LE extremities/ no clubbing nor cyanosis noted  Skin: Warm and dry; no rashes noted   Neuro: No focal deficits noted; ambulating well with " single point cane  Psych: AAOx4; mood and affect are appropriate    A/P:  1. Primary hypertension  Markedly improved currently. Patient is tolerating Lisinopril 40 mg daily and Coreg 12.5 mg BID without reported side effects at this time. We discussed need to avoid very tight blood pressure control with her at this time to avoid unwanted side effects.     2. Electrolyte imbalance  Patient's last sodium level was 131 2/22/22 and recommend repeat BMP lab draw today for ongoing monitoring. I will call patient with lab results when available for ongoing management.   - Basic Metabolic Panel; Future

## 2022-03-21 RX ORDER — CHOLESTYRAMINE 4 G/9G
1 POWDER, FOR SUSPENSION ORAL DAILY
Qty: 30 EACH | Refills: 1 | Status: SHIPPED | OUTPATIENT
Start: 2022-03-21 | End: 2022-11-04 | Stop reason: SDUPTHER

## 2022-04-07 ENCOUNTER — OFFICE VISIT (OUTPATIENT)
Dept: INTERNAL MEDICINE | Facility: IMAGING CENTER | Age: 77
End: 2022-04-07
Payer: MEDICARE

## 2022-04-07 ENCOUNTER — APPOINTMENT (OUTPATIENT)
Dept: INTERNAL MEDICINE | Facility: IMAGING CENTER | Age: 77
End: 2022-04-07
Payer: MEDICARE

## 2022-04-07 VITALS
HEART RATE: 77 BPM | HEIGHT: 67 IN | BODY MASS INDEX: 37.02 KG/M2 | OXYGEN SATURATION: 92 % | RESPIRATION RATE: 17 BRPM | DIASTOLIC BLOOD PRESSURE: 70 MMHG | SYSTOLIC BLOOD PRESSURE: 148 MMHG | TEMPERATURE: 97.6 F | WEIGHT: 235.89 LBS

## 2022-04-07 DIAGNOSIS — Z91.89 AT RISK FOR BREAST CANCER: ICD-10-CM

## 2022-04-07 DIAGNOSIS — E66.9 OBESITY (BMI 30-39.9): ICD-10-CM

## 2022-04-07 DIAGNOSIS — Z00.00 ENCOUNTER FOR MEDICARE ANNUAL WELLNESS EXAM: ICD-10-CM

## 2022-04-07 DIAGNOSIS — E78.2 MIXED HYPERLIPIDEMIA: ICD-10-CM

## 2022-04-07 DIAGNOSIS — I10 PRIMARY HYPERTENSION: ICD-10-CM

## 2022-04-07 DIAGNOSIS — R01.1 HEART MURMUR: ICD-10-CM

## 2022-04-07 DIAGNOSIS — F41.9 ANXIETY: ICD-10-CM

## 2022-04-07 DIAGNOSIS — F51.04 CHRONIC INSOMNIA: ICD-10-CM

## 2022-04-07 DIAGNOSIS — G47.33 OSA ON CPAP: ICD-10-CM

## 2022-04-07 DIAGNOSIS — E04.1 THYROID NODULE: ICD-10-CM

## 2022-04-07 DIAGNOSIS — J30.2 SEASONAL ALLERGIES: ICD-10-CM

## 2022-04-07 PROCEDURE — G0439 PPPS, SUBSEQ VISIT: HCPCS | Performed by: FAMILY MEDICINE

## 2022-04-07 ASSESSMENT — PATIENT HEALTH QUESTIONNAIRE - PHQ9: CLINICAL INTERPRETATION OF PHQ2 SCORE: 0

## 2022-04-07 ASSESSMENT — FIBROSIS 4 INDEX: FIB4 SCORE: 1.62

## 2022-04-07 ASSESSMENT — ACTIVITIES OF DAILY LIVING (ADL): BATHING_REQUIRES_ASSISTANCE: 0

## 2022-04-07 ASSESSMENT — ENCOUNTER SYMPTOMS: GENERAL WELL-BEING: EXCELLENT

## 2022-04-07 NOTE — PROGRESS NOTES
CC:   Medicare Annual Wellness Visit    HPI:  Clau is a 77 y.o. female here for her Medicare Annual Wellness Visit and to review labs done 2/16/22 in person. She is feeling much better overall after requiring ER treatment 2/16/22 for symptomatic hyponatremia and uncontrolled HTN. Her sodium level normalized to 139 on 3/8/22, and she continues to focus on adequate hydration efforts daily. She has chronic essential HTN and chronic mixed dyslipidemia controlled with daily medication use. She does not tolerate tight blood pressure control well historically. She sees Dr. Olson for ongoing dermatology care and is UTD with eye exam with Dr. Morel. She has chronic AZUL with CPAP use managed by Dr. Morrissey and is working with her [employer] for new equipment. She has chronic MARIE controlled with lifestyle changes and daily Zoloft use. She also suffers from chronic primary insomnia and uses Ambien PRN for symptom management. She continues to benefit from ongoing Ambien use, denies medication related side effects, and continues to use this controlled medication in a safe manner. She reports significant seasonal allergy symptoms over the past few day to week and is seeking treatment options. She is walking well today, denies new mental health concerns, and is in good spirits today.     Patient Active Problem List    Diagnosis Date Noted   • Poor balance 04/26/2021   • Thyroid nodule 03/16/2021   • Obesity (BMI 30-39.9) 04/06/2018   • Heart murmur 09/13/2017   • Chronic insomnia 08/09/2017   • Anxiety 08/09/2017   • HTN (hypertension) 07/01/2014   • Hyperlipidemia 07/01/2014   • AZUL on CPAP 07/01/2014     Current Outpatient Medications   Medication Sig Dispense Refill   • predniSONE (DELTASONE) 5 MG Tab TAKE 1 TABLET BY MOUTH DAILY 30 Tablet 1   • cholestyramine (QUESTRAN) 4 g packet Take 4 g by mouth every day. 30 Each 1   • rosuvastatin (CRESTOR) 10 MG Tab TAKE ONE TABLET BY MOUTH IN THE EVENING 90 Tablet 3   • lisinopril  (PRINIVIL) 20 MG Tab Take 2 Tablets by mouth every day. 180 Tablet 1   • acetaminophen (TYLENOL) 500 MG Tab Take 1,000 mg by mouth every 6 hours as needed.     • sertraline (ZOLOFT) 100 MG Tab TAKE ONE (1) TABLET BY MOUTH EVERY DAY 90 Tablet 3   • carvedilol (COREG) 12.5 MG Tab Take 1 Tablet by mouth 2 times a day with meals. 180 Tablet 3   • vitamin D (CHOLECALCIFEROL) 1000 UNIT Tab Take 1,000 Units by mouth every day.     • Omega 3 1000 MG Cap Take 1 Capsule by mouth every day.     • IRON PO Take 1 Tablet by mouth every day.     • FOLIC ACID PO Take 1 Tablet by mouth every morning.       No current facility-administered medications for this visit.      Current supplements: see MAR  Chronic narcotic pain medicines: no  Allergies: Tramadol  Exercise: no  Current social contact/activities: yes  Current mood: good  Advance Directive on file: no     Screening:  Depression Screening  Little interest or pleasure in doing things?  0 - not at all  Feeling down, depressed , or hopeless? 0 - not at all  Patient Health Questionnaire Score: 0     If depressive symptoms identified deferred to follow up visit unless specifically addressed in assessment and plan.    Interpretation of PHQ-9 Total Score   Score Severity   1-4 No Depression   5-9 Mild Depression   10-14 Moderate Depression   15-19 Moderately Severe Depression   20-27 Severe Depression    Screening for Cognitive Impairment  Three Minute Recall (daughter, heaven, mountain) 3/3    Paramjit clock face with all 12 numbers and set the hands to show 10 past 11.  Yes    Cognitive concerns identified deferred for follow up unless specifically addressed in assessment and plan.    Fall Risk Assessment  Has the patient had two or more falls in the last year or any fall with injury in the last year?  No    Safety Assessment  Throw rugs on floor.  No  Handrails on all stairs.  Yes  Good lighting in all hallways.  Yes  Difficulty hearing.  No  Patient counseled about all safety risks  that were identified.    Functional Assessment ADLs  Are there any barriers preventing you from cooking for yourself or meeting nutritional needs?  No.    Are there any barriers preventing you from driving safely or obtaining transportation?  No.    Are there any barriers preventing you from using a telephone or calling for help?  No.    Are there any barriers preventing you from shopping?   .    Are there any barriers preventing you from taking care of your own finances?  No.    Are there any barriers preventing you from managing your medications?  No.    Are there any barriers preventing you from showering, bathing or dressing yourself?  No.    Are you currently engaging in any exercise or physical activity?  Yes.     What is your perception of your health?  Excellent.      Health Maintenance Summary          MAMMOGRAM (Yearly) Next due on 7/16/2022 07/16/2021  MA-SCREENING MAMMO BILAT W/TOMOSYNTHESIS W/CAD    07/08/2020  MA-SCREENING MAMMO BILAT W/TOMOSYNTHESIS W/CAD    06/25/2019  MA-SCREENING MAMMO BILAT W/TOMOSYNTHESIS W/CAD    05/07/2018  MA-MAMMO SCREENING BILAT W/SERENA W/CAD    05/04/2017  MA-MAMMO SCREENING BILAT W/SERENA W/CAD    Only the first 5 history entries have been loaded, but more history exists.          COLORECTAL CANCER SCREENING (COLONOSCOPY - Every 10 Years) Next due on 2/4/2023 07/01/2014  OCCULT BLOOD X3 (STOOL)    02/04/2013  REFERRAL TO GI FOR COLONOSCOPY          Annual Wellness Visit (Every 366 Days) Next due on 4/8/2023 04/07/2022  Visit Dx: Encounter for Medicare annual wellness exam    04/07/2022  Subsequent Annual Wellness Visit - Includes PPPS ()    01/13/2021  Visit Dx: Encounter for Medicare annual wellness exam    01/13/2021  Subsequent Annual Wellness Visit - Includes PPPS ()          BONE DENSITY (Every 5 Years) Next due on 5/7/2023 05/07/2018  DS-BONE DENSITY STUDY (DEXA)    04/27/2016  DS-BONE DENSITY STUDY (DEXA)    03/13/2013  DS-BONE DENSITY STUDY  (DEXA)    02/24/2010  DS-BONE DENSITY STUDY (DEXA)    02/07/2007  DS-BONE DENSITY STUDY (DEXA)          IMM DTaP/Tdap/Td Vaccine (2 - Td or Tdap) Next due on 10/11/2026    10/11/2016  Imm Admin: Tdap Vaccine          IMM PNEUMOCOCCAL VACCINE: 65+ Years (Series Information) Completed    02/01/2016  Imm Admin: Pneumococcal polysaccharide vaccine (PPSV-23)    12/22/2014  Imm Admin: Pneumococcal Conjugate Vaccine (Prevnar/PCV-13)          IMM ZOSTER VACCINES (Series Information) Completed    01/29/2020  Imm Admin: Zoster Vaccine Recombinant (RZV) (SHINGRIX)    11/21/2019  Imm Admin: Zoster Vaccine Recombinant (RZV) (SHINGRIX)    08/08/2015  Imm Admin: Zoster Vaccine Live (ZVL) (Zostavax) - HISTORICAL DATA          HEPATITIS C SCREENING  Completed    12/09/2020  HEP C VIRUS ANTIBODY          IMM INFLUENZA (Series Information) Completed    09/23/2021  Imm Admin: Influenza Vaccine Adult HD    10/01/2020  Imm Admin: Influenza Vaccine Adult HD    10/16/2019  Imm Admin: Influenza Vaccine Adult HD    10/20/2018  Imm Admin: Influenza Vaccine Adult HD    10/06/2017  Imm Admin: Influenza Vaccine Adult HD    Only the first 5 history entries have been loaded, but more history exists.          COVID-19 Vaccine (Series Information) Completed    09/30/2021  Imm Admin: Pfizer SARS-CoV-2 Vaccine    02/12/2021  Imm Admin: Pfizer SARS-CoV-2 Vaccine    01/22/2021  Imm Admin: Pfizer SARS-CoV-2 Vaccine          IMM HEP B VACCINE (Series Information) Aged Out    No completion history exists for this topic.          IMM MENINGOCOCCAL VACCINE (MCV4) (Series Information) Aged Out    No completion history exists for this topic.          Discontinued - PAP SMEAR  Discontinued    No completion history exists for this topic.                Patient Care Team:  Marge Brasher M.D. as PCP - General (Family Medicine)  Savannah Dolan R.N. as Registered Nurse  Charu Godfrey, PT, DPT as Physical Therapist (Physical Therapy)      Social History      Tobacco Use   • Smoking status: Never Smoker   • Smokeless tobacco: Never Used   Vaping Use   • Vaping Use: Never used   Substance Use Topics   • Alcohol use: Yes     Alcohol/week: 0.6 - 1.2 oz     Types: 1 - 2 Glasses of wine per week     Comment: 2 glasses of wine a day    • Drug use: No     Family History   Problem Relation Age of Onset   • Breast Cancer Mother    • Cancer Mother 65        Breast   • Cancer Father         Colon   • Alcohol/Drug Father    • Colon Cancer Father    • Hyperlipidemia Brother    • Heart Disease Brother         arrythmia     She  has a past medical history of Anesthesia, Arthritis, Back pain, Dyslipidemia, Danish measles, Heart murmur (9/13/2017), Hypertension, Influenza, Mumps, Other specified symptom associated with female genital organs, Painful joint, Psychiatric problem, Sleep apnea, Snoring, Sore muscles, Tonsillitis, and Unspecified cataract.    She has no past medical history of Breast cancer (HCC) or CAD (coronary artery disease).   Past Surgical History:   Procedure Laterality Date   • KNEE ARTHROPLASTY TOTAL Right 12/27/2017   • HYSTERECTOMY ROBOTIC  10/23/2014    Performed by Smith Lyons M.D. at SURGERY [facility]   • GASTROSCOPY WITH BIOPSY  7/2/2014    Performed by Sky Vila M.D. at SURGERY [facility]   • OTHER ORTHOPEDIC SURGERY  2006    left total knee   • APPENDECTOMY     • ARTHROSCOPY, KNEE     • HYSTERECTOMY LAPAROSCOPY     • OTHER      meghan cataracts   • OTHER ABDOMINAL SURGERY      Resection of pelvic mass, uterus and ovaries   • MN BREAST REDUCTION Bilateral     1982   • MN REMV 2ND CATARACT,CORN-SCLER SECTN     • TONSILLECTOMY     • TONSILLECTOMY AND ADENOIDECTOMY         ROS:    All positives noted in HPI. All others reviewed and are negative.    Ostomy or other tubes or amputations: no  Chronic oxygen use: no  Last eye exam: UTD with Dr. Mable WEAVER: denies urinary incontinence; does not interfere with ADLs/ sleep  Gait: Uses  "cane  Problems with balance/ difficulty walking: at times - steady today  Hearing: adequate.    Dentition: adequate    Lab results 22 reviewed in person with patient at visit today.     Exam:   /70 (BP Location: Left arm, Patient Position: Sitting, BP Cuff Size: Adult)   Pulse 77   Temp 36.4 °C (97.6 °F) (Temporal)   Resp 17   Ht 1.702 m (5' 7\")   Wt 107 kg (235 lb 14.3 oz)   SpO2 92%  Body mass index is 36.95 kg/m².    Gen: Well developed; well nourished; no acute distress; age appropriate appearance   HEENT: Normocephalic; atraumatic; PEERLA b/l; sclera clear b/l; b/l external auditory canals WNL; b/l TM WNL; nares patent; oropharynx clear; mask in place  Neck: No adenopathy; no thyromegaly  CV: Regular rate and rhythm; S1/ S2 present; systolic murmur; no gallop or rub noted  Pulm: No respiratory distress; clear to ascultation b/l; no wheezing or stridor noted b/l  Abd: Adequate bowel sounds noted; soft and nontender; no rebound, rigidity, nor distention  Extremities: No peripheral edema b/l LE extremities/ no clubbing nor cyanosis noted  Skin: Warm and dry; no rashes noted   Neuro: No focal deficits noted; pt is able to get up out of chair unassisted and walk forward  Psych: AAOx4; mood and affect are at baseline    Assessment and Plan:    1. Seasonal allergies  Patient endorses intense seasonal allergy symptoms over the past few days to week. We discussed condition at visit and recommend Flonase use daily - BID and daily allergy medication use (Zyrtec/Allegra/Xyzal ect).   - Subsequent Annual Wellness Visit - Includes PPPS ()    2. At risk for breast cancer  Patient's mother  of breast cancer, and she declines genetic testing at this time (counseled).  - Subsequent Annual Wellness Visit - Includes PPPS ()    3. Primary hypertension  Stable/ recommend patient continue current daily medication use - does not tolerate tight blood pressure control at all.  - Subsequent Annual Wellness " Visit - Includes PPPS ()    4. Mixed hyperlipidemia  Stable/ recommend patient continue current Crestor use.   - Subsequent Annual Wellness Visit - Includes PPPS ()    5. AZUL on CPAP  Stable condition managed by Dr. Morrissey. Patient is currently trying to secure new equipment with her [employer] company and has run into some challenges. Recommend patient contact Dr. Morrissey's MA/RN for help with this matter.   - Subsequent Annual Wellness Visit - Includes PPPS ()    6. Chronic insomnia  Stable/ managed with PRN Ambien use.   - Subsequent Annual Wellness Visit - Includes PPPS ()    7. Anxiety  Stable/ currently managed with lifestyle changes as needed  - Subsequent Annual Wellness Visit - Includes PPPS ()    8. Heart murmur  Stable  - Subsequent Annual Wellness Visit - Includes PPPS ()    9. Obesity (BMI 30-39.9)  Stable  - Subsequent Annual Wellness Visit - Includes PPPS ()    10. Thyroid nodule  Stable on prior imaging/TFT WNL  - Subsequent Annual Wellness Visit - Includes PPPS ()    11. Encounter for Medicare annual wellness exam  Patient is doing much better overall now and remains well informed about chronic medical conditions. Recommend patient continue to focus on daily hydration efforts, walking as able, and continuing current lifestyle choices.   - Subsequent Annual Wellness Visit - Includes PPPS ()       Services needed: no new services needed at this time  Health Care Screening: recommendations as per orders if indicated.  Referrals offered: none  Counseling provided today:  · Prevent falls and reduce trip hazards; Secure or remove rugs if present   · Maintain working fire alarm and carbon monoxide detectors   · Engage in regular physical activity daily and social activities weekly as tolerated

## 2022-04-26 ENCOUNTER — TELEPHONE (OUTPATIENT)
Dept: SLEEP MEDICINE | Facility: MEDICAL CENTER | Age: 77
End: 2022-04-26

## 2022-04-26 NOTE — TELEPHONE ENCOUNTER
Pt sent me an emailed stating the following Dear Ms Krishnan,        I became a patient of Dr. Huggins’s on Oct 20.2021 at the request of my primary Dr. Marge Brasher. I was diagnosed with sleep apnea in May of 2007 by the present [facility] group although the DRs. I saw then are gone. I have had my yearly checkups and receive parts from [employer]. Dr Huggins in October suggested that I WAS ready for a new machine and he wrote the Rx and cabncelled [employer] for a local company.I called you in Jan 2022 because I had not heard from the new people. At that time, you sent a new  pRx for the machine. On Tuesday, I received a call from customer service at [facility] telling me that I needed a new sleep study and medicare had turned down Dr Garcia request. They acted as if I had not seen anyone since 2007 which is not the case  I need fresh supplies and now do not know where to begin. I need your and Dr Garcia help desperately. In October Dr Huggins said I did not need a new sleep study. It is all documented on [employer]. My birthday is [birth year] and my name is Clau Vences, [address] [place] [address], [place] [id number]. My home phone is   ; [phone] [employer]  [phone]. Thank you for your help. I hope to hear from you soon as I am at a dead end now.     It has been two weeks, and I have heard nothing, PLEASE Help  I am at a complete loss. Thank you   Clau Vences      I sent an email to Vivek at [facility] asking him if this is true and try to figure what the pt needs to do.

## 2022-05-23 DIAGNOSIS — M15.9 PRIMARY OSTEOARTHRITIS INVOLVING MULTIPLE JOINTS: ICD-10-CM

## 2022-05-23 RX ORDER — PREDNISONE 5 MG/1
TABLET ORAL
Qty: 30 TABLET | Refills: 1 | Status: SHIPPED | OUTPATIENT
Start: 2022-05-23 | End: 2022-08-22

## 2022-05-24 DIAGNOSIS — F51.04 CHRONIC INSOMNIA: ICD-10-CM

## 2022-05-24 RX ORDER — ZOLPIDEM TARTRATE 5 MG/1
TABLET ORAL
Qty: 30 TABLET | Refills: 2 | Status: SHIPPED | OUTPATIENT
Start: 2022-05-24 | End: 2022-06-23

## 2022-06-24 DIAGNOSIS — H91.93 DECREASED HEARING OF BOTH EARS: ICD-10-CM

## 2022-06-28 ENCOUNTER — HOSPITAL ENCOUNTER (OUTPATIENT)
Facility: MEDICAL CENTER | Age: 77
End: 2022-06-28
Attending: FAMILY MEDICINE
Payer: MEDICARE

## 2022-06-28 ENCOUNTER — NON-PROVIDER VISIT (OUTPATIENT)
Dept: INTERNAL MEDICINE | Facility: IMAGING CENTER | Age: 77
End: 2022-06-28
Payer: MEDICARE

## 2022-06-28 DIAGNOSIS — R35.0 URINARY FREQUENCY: ICD-10-CM

## 2022-06-28 DIAGNOSIS — R82.90 ABNORMAL URINALYSIS: ICD-10-CM

## 2022-06-28 DIAGNOSIS — R30.0 DYSURIA: ICD-10-CM

## 2022-06-28 DIAGNOSIS — R31.9 HEMATURIA, UNSPECIFIED TYPE: ICD-10-CM

## 2022-06-28 LAB
APPEARANCE UR: NORMAL
BILIRUB UR STRIP-MCNC: NEGATIVE MG/DL
COLOR UR AUTO: NORMAL
GLUCOSE UR STRIP.AUTO-MCNC: NEGATIVE MG/DL
KETONES UR STRIP.AUTO-MCNC: NEGATIVE MG/DL
LEUKOCYTE ESTERASE UR QL STRIP.AUTO: NEGATIVE
NITRITE UR QL STRIP.AUTO: NEGATIVE
PH UR STRIP.AUTO: 5 [PH] (ref 5–8)
PROT UR QL STRIP: NORMAL MG/DL
RBC UR QL AUTO: NORMAL
SP GR UR STRIP.AUTO: 1.01
UROBILINOGEN UR STRIP-MCNC: 0.2 MG/DL

## 2022-06-28 PROCEDURE — 81002 URINALYSIS NONAUTO W/O SCOPE: CPT | Performed by: FAMILY MEDICINE

## 2022-06-28 PROCEDURE — 87086 URINE CULTURE/COLONY COUNT: CPT

## 2022-07-01 LAB
BACTERIA UR CULT: NORMAL
SIGNIFICANT IND 70042: NORMAL
SITE SITE: NORMAL
SOURCE SOURCE: NORMAL

## 2022-07-18 ENCOUNTER — HOSPITAL ENCOUNTER (OUTPATIENT)
Dept: RADIOLOGY | Facility: MEDICAL CENTER | Age: 77
End: 2022-07-18
Attending: INTERNAL MEDICINE
Payer: MEDICARE

## 2022-07-18 DIAGNOSIS — Z12.31 VISIT FOR SCREENING MAMMOGRAM: ICD-10-CM

## 2022-07-18 PROCEDURE — 77063 BREAST TOMOSYNTHESIS BI: CPT

## 2022-07-19 NOTE — TELEPHONE ENCOUNTER
[facility] because there is no same/similar on file with Medicare, so they didn’t pay for her current CPAP, we can’t just go off the replacement rules. Since the Sleep Study was done back in 2007, then it’s too old to be rescored, or for Dr to add an Addendum. Logically, an addendum is within 6 mos-1 yr, something they can “logically” remember”      Which mean the insurance has not been paying for anything regarding cpap for the pt which makes no senses since the pt has been on therapy since her last SS in 2007.  I spoke to gerald CROOKS about this and she stated that the pt will need a new office visit since it has been 6 months since we have seen her and a new order can be place at that time and we can switch her to a different DME. I informed the pt of this and schedule her an OV with Fidel CHAMPION on 7/29/2022 2 8:40 am

## 2022-08-15 ENCOUNTER — APPOINTMENT (OUTPATIENT)
Dept: INTERNAL MEDICINE | Facility: IMAGING CENTER | Age: 77
End: 2022-08-15
Payer: MEDICARE

## 2022-08-15 DIAGNOSIS — J30.89 SEASONAL ALLERGIC RHINITIS DUE TO OTHER ALLERGIC TRIGGER: ICD-10-CM

## 2022-08-15 RX ORDER — AZELASTINE 1 MG/ML
1 SPRAY, METERED NASAL 2 TIMES DAILY
Qty: 30 ML | Refills: 3 | Status: ON HOLD
Start: 2022-08-15 | End: 2023-01-25

## 2022-08-21 DIAGNOSIS — M15.9 PRIMARY OSTEOARTHRITIS INVOLVING MULTIPLE JOINTS: ICD-10-CM

## 2022-08-22 RX ORDER — PREDNISONE 5 MG/1
TABLET ORAL
Qty: 30 TABLET | Refills: 1 | Status: SHIPPED | OUTPATIENT
Start: 2022-08-22 | End: 2022-10-12

## 2022-08-23 ENCOUNTER — OFFICE VISIT (OUTPATIENT)
Dept: SLEEP MEDICINE | Facility: MEDICAL CENTER | Age: 77
End: 2022-08-23
Payer: MEDICARE

## 2022-08-23 VITALS
WEIGHT: 213 LBS | BODY MASS INDEX: 33.43 KG/M2 | SYSTOLIC BLOOD PRESSURE: 138 MMHG | OXYGEN SATURATION: 93 % | HEIGHT: 67 IN | HEART RATE: 64 BPM | DIASTOLIC BLOOD PRESSURE: 70 MMHG

## 2022-08-23 DIAGNOSIS — F51.04 CHRONIC INSOMNIA: ICD-10-CM

## 2022-08-23 DIAGNOSIS — I10 HYPERTENSION, UNSPECIFIED TYPE: ICD-10-CM

## 2022-08-23 DIAGNOSIS — G47.33 OSA (OBSTRUCTIVE SLEEP APNEA): ICD-10-CM

## 2022-08-23 PROCEDURE — 99213 OFFICE O/P EST LOW 20 MIN: CPT | Performed by: NURSE PRACTITIONER

## 2022-08-23 RX ORDER — ZOLPIDEM TARTRATE 5 MG/1
1 TABLET ORAL NIGHTLY PRN
COMMUNITY
Start: 2022-06-25 | End: 2022-08-24

## 2022-08-23 ASSESSMENT — FIBROSIS 4 INDEX: FIB4 SCORE: 1.62

## 2022-08-23 NOTE — PROGRESS NOTES
Chief Complaint   Patient presents with    Apnea     Apnea-Last seen 10/20/2021       HPI:      Mrs. Vences is a 78 y/o female patient who is in today for annual AZUL f/u. Former [facility] patient. PMH includes hypertension, heart murmur, HLD, chronic insomnia, anxiety, thyroid nodule, AZUL, poor balance, never smoker, T&A.    Patient received a ResMed CPAP machine on 7/30/15.  Patient would like to obtain a new machine since it is over 5 years old.  Compliance report from 7/24/22-8/22/22 was downloaded and reviewed with the patient which showed CPAP 17 cmH2O, 97% compliance, 5 hrs 29 min use, AHI of 0.4. severe mask leak 45.2 L/min 95% of time. She is tolerating the pressure and mask well.  She does report being a mouth breather does wake up with a dry mouth.  She goes to bed at 10 pm and wakes up at 7:30 am.  She reports that she sleeps well through the night but does wake up once or twice due to nocturia.  She typically does not have trouble falling asleep.  She is utilizing Ambien 5 mg nightly for insomnia which is managed by her PCP.  She also follows up with her PCP for blood pressure management. She denies morning headache or snoring.  Patient uses a single-point cane for assistance with ambulation due to having bone spurs in her foot.  She does perform yoga 3 times a week for an hour with a  that comes to her home.  She denies any new health problems or medications.    Sleep/Card Study History:   Echo from 4/13/21 indicated Left ventricular ejection fraction is visually estimated to be 70%. Moderate concentric left ventricular hypertrophy. Grade I diastolic dysfunction. Mild aortic stenosis. Normal inferior vena cava size and inspiratory collapse.    Pt was diagnosed with AZUL on 5/7/07 via split night study. Severe obstructive sleep apnea with AHI of 93.3/hr and O2 hollis 74 %. Due to severity of the disease she met the split study protocol. The titration started with CPAP 5 cm and the best tolerated  was CPAP 17 cm. The AHI improved to 5.6/hr with improved O2 hollis of 89%. She has been on CPAP 17 cm since then.    ROS:    Constitutional: Denies fevers, Denies weight changes  Eyes: Denies changes in vision, no eye pain  Ears/Nose/Throat/Mouth: Denies nasal congestion or sore throat   Cardiovascular: Denies chest pain or palpitations   Respiratory: Denies shortness of breath , Denies cough  Gastrointestinal/Hepatic: Denies abdominal pain, nausea, vomiting,   Skin/Breast: Denies rash,   Neurological: Denies headache, confusion,   Psychiatric: denies mood disorder   Sleep: denies snoring       Past Medical History:   Diagnosis Date    Anesthesia     low O2 sat    Arthritis     osteo    Back pain     Dyslipidemia     Frisian measles     Heart murmur 9/13/2017    Hypertension     Influenza     Mumps     Other specified symptom associated with female genital organs     Painful joint     Psychiatric problem     Sleep apnea     c-pap with 3 l/nc    Snoring     Sore muscles     Tonsillitis     Unspecified cataract        Past Surgical History:   Procedure Laterality Date    KNEE ARTHROPLASTY TOTAL Right 12/27/2017    HYSTERECTOMY ROBOTIC  10/23/2014    Performed by Smith Lyons M.D. at SURGERY [facility] ORS    GASTROSCOPY WITH BIOPSY  7/2/2014    Performed by Sky Vila M.D. at SURGERY [facility] ORS    OTHER ORTHOPEDIC SURGERY  2006    left total knee    APPENDECTOMY      ARTHROSCOPY, KNEE      HYSTERECTOMY LAPAROSCOPY      OTHER      meghan cataracts    OTHER ABDOMINAL SURGERY      Resection of pelvic mass, uterus and ovaries    IN BREAST REDUCTION Bilateral     1982    IN REMV 2ND CATARACT,CORN-SCLER SECTN      TONSILLECTOMY      TONSILLECTOMY AND ADENOIDECTOMY         Family History   Problem Relation Age of Onset    Breast Cancer Mother     Cancer Mother 65        Breast    Cancer Father         Colon    Alcohol/Drug Father     Colon Cancer Father     Hyperlipidemia Brother     Heart Disease Brother          arrythmia       Social History     Socioeconomic History    Marital status:      Spouse name: Not on file    Number of children: Not on file    Years of education: Not on file    Highest education level: Not on file   Occupational History    Not on file   Tobacco Use    Smoking status: Never    Smokeless tobacco: Never   Vaping Use    Vaping Use: Never used   Substance and Sexual Activity    Alcohol use: Yes     Alcohol/week: 0.6 - 1.2 oz     Types: 1 - 2 Glasses of wine per week     Comment: 2 glasses of wine a day     Drug use: No    Sexual activity: Not on file     Comment: , 2 children   Other Topics Concern    Not on file   Social History Narrative    Not on file     Social Determinants of Health     Financial Resource Strain: Not on file   Food Insecurity: Not on file   Transportation Needs: Not on file   Physical Activity: Not on file   Stress: Not on file   Social Connections: Not on file   Intimate Partner Violence: Not on file   Housing Stability: Not on file       Allergies as of 08/23/2022 - Reviewed 08/23/2022   Allergen Reaction Noted    Tramadol  02/22/2018        Vitals:  Vitals:    08/23/22 1052   BP: 138/70   Pulse: 64   SpO2: 93%       Current medications as of today   Current Outpatient Medications   Medication Sig Dispense Refill    predniSONE (DELTASONE) 5 MG Tab TAKE ONE TABLET BY MOUTH EVERY DAY 30 Tablet 1    zolpidem (AMBIEN) 5 MG Tab Take 1 Tablet by mouth at bedtime as needed.      azelastine (ASTELIN) 137 MCG/SPRAY nasal spray Administer 1 Spray into affected nostril(S) 2 times a day. 30 mL 3    cholestyramine (QUESTRAN) 4 g packet Take 4 g by mouth every day. 30 Each 1    rosuvastatin (CRESTOR) 10 MG Tab TAKE ONE TABLET BY MOUTH IN THE EVENING 90 Tablet 3    lisinopril (PRINIVIL) 20 MG Tab Take 2 Tablets by mouth every day. 180 Tablet 1    acetaminophen (TYLENOL) 500 MG Tab Take 1,000 mg by mouth every 6 hours as needed.      sertraline (ZOLOFT) 100 MG Tab TAKE ONE (1)  TABLET BY MOUTH EVERY DAY 90 Tablet 3    carvedilol (COREG) 12.5 MG Tab Take 1 Tablet by mouth 2 times a day with meals. 180 Tablet 3    vitamin D (CHOLECALCIFEROL) 1000 UNIT Tab Take 1,000 Units by mouth every day.      Omega 3 1000 MG Cap Take 1 Capsule by mouth every day.      IRON PO Take 1 Tablet by mouth every day.      FOLIC ACID PO Take 1 Tablet by mouth every morning.       No current facility-administered medications for this visit.         Physical Exam: Limited by COVID-19 precautions.  Appearance: Well developed, well nourished, no acute distress  Eyes: PERRL, EOM intact, sclera white, conjunctiva moist  Ears: no lesions or deformities  Hearing: grossly intact  Nose: no lesions or deformities  Respiratory effort: no intercostal retractions or use of accessory muscles  Extremities: no cyanosis or edema  Abdomen: soft   Gait and Station: normal  Digits and nails: no clubbing, cyanosis, petechiae or nodes.  Cranial nerves: grossly intact  Skin: no visible rashes, lesions or ulcers noted  Orientation: Oriented to time, person and place  Mood and affect: mood and affect appropriate, normal interaction with examiner  Judgement: Intact    Assessment:  1. AZUL (obstructive sleep apnea)  DME CPAP    DME Mask and Supplies      2. Hypertension, unspecified type        3. BMI 33.0-33.9,adult  Patient identified as having weight management issue.  Appropriate orders and counseling given.      4. Chronic insomnia              Plan  Discussed the cardiovascular and neuropsychiatric risks of untreated AZUL; including but not limited to: HTN, DM, MI, ASCVD, CVA, CHF, traffic accidents.     1. Compliance report from 7/24/22-8/22/22 was downloaded and reviewed with the patient which showed CPAP 17 cmH2O, 97% compliance, 5 hrs 29 min use, AHI of 0.4. severe mask leak 45.2 L/min 95% of time.  Patient is compliant and benefiting from CPAP therapy for management of AZUL. Advised patient to continue to use the CPAP every night  for more than four hours for optimal health benefit.    *DME order (CPAP and more) for new ResMed CPAP 17 cmH2O, mask (nasal mask or MOC) and supplies was placed today. Continue to clean mask and supplies weekly with soap and water, and change supplies per insurance guidelines.  A separate order for supplies is also placed.    2. Continue to f/u with PCP for BP management.  3. Encouraged a healthy diet and light conditioning to help with weight loss and management.   If your BMI is 25-29.9 you are overweight. If your BMI is 30 or greater you are obese. To lose weight eat less, move more, or both. Any diet that reduces caloric intake can help with weight loss. Extra weight may reduce your lifespan. Avoid dramatic unsustainable dietary changes that result in the yo-yo effect (down then back up.)  Usually small modifications in diet and exercise are easier to stick with.  4. Sleep hygiene discussed. Recommend keeping a set sleep/wake schedule. Logging enough hours of sleep. Limiting/Avoiding naps. No caffeine after noon and no heavy meals in the evening.   Chronic insomnia: Currently using Ambien 5 mg nightly which is prescribed by PCP. Advised patient to try and take drug holidays and limited use due to dependence, risk for fall and Alzheimer's.  5. Follow up with appropriate healthcare providers for all other medical problems.  6. F/u in 4 months for first compliance, AZUL.       MARYBETH Early.      This dictation was created using voice recognition software. The accuracy of the dictation is limited to the abilities of the software. I expect there may be some errors of grammar and possibly content.

## 2022-08-23 NOTE — PATIENT INSTRUCTIONS
Consider adding a chinstrap to use with nasal mask or may try Somnifix mouth tape which can be purchased online to help decrease mouth breathing.

## 2022-08-24 ENCOUNTER — OFFICE VISIT (OUTPATIENT)
Dept: INTERNAL MEDICINE | Facility: IMAGING CENTER | Age: 77
End: 2022-08-24
Payer: MEDICARE

## 2022-08-24 VITALS
RESPIRATION RATE: 17 BRPM | HEIGHT: 67 IN | WEIGHT: 213 LBS | HEART RATE: 65 BPM | TEMPERATURE: 98 F | BODY MASS INDEX: 33.43 KG/M2 | OXYGEN SATURATION: 92 % | DIASTOLIC BLOOD PRESSURE: 74 MMHG | SYSTOLIC BLOOD PRESSURE: 142 MMHG

## 2022-08-24 DIAGNOSIS — F51.04 CHRONIC INSOMNIA: ICD-10-CM

## 2022-08-24 PROCEDURE — 99213 OFFICE O/P EST LOW 20 MIN: CPT | Performed by: FAMILY MEDICINE

## 2022-08-24 RX ORDER — ZOLPIDEM TARTRATE 5 MG/1
5 TABLET ORAL NIGHTLY PRN
Qty: 30 TABLET | Refills: 2 | Status: SHIPPED | OUTPATIENT
Start: 2022-08-25 | End: 2022-11-14 | Stop reason: SDUPTHER

## 2022-08-24 ASSESSMENT — FIBROSIS 4 INDEX: FIB4 SCORE: 1.62

## 2022-08-24 NOTE — PROGRESS NOTES
"Chief Complaint   Patient presents with    Medication Refill     Zolpidem    Shoulder Pain     Right shoulder, possible bone spur? Has been present for numerous years. She just notices it is stiff when she does yoga.        HPI:  Patient is a 77 y.o. female established patient who presents today to obtain a new Ambien prescription for chronic insomnia management. She continues to benefit from Ambien use, denies medication related side effects, and continues to use this controlled medication in a safe manner.     Patient Active Problem List    Diagnosis Date Noted    Poor balance 04/26/2021    Thyroid nodule 03/16/2021    Obesity (BMI 30-39.9) 04/06/2018    Heart murmur 09/13/2017    Chronic insomnia 08/09/2017    Anxiety 08/09/2017    HTN (hypertension) 07/01/2014    Hyperlipidemia 07/01/2014    AZUL on CPAP 07/01/2014       Past medical, surgical, family, and social history was reviewed and updated in Epic chart by me today.     Medications and allergies reviewed and updated in Epic chart by me today.     ROS:  Pertinent positives listed above in HPI. All other systems have been reviewed and are negative.    PE:   BP (!) 142/74 (BP Location: Left arm, Patient Position: Sitting, BP Cuff Size: Adult)   Pulse 65   Temp 36.7 °C (98 °F) (Temporal)   Resp 17   Ht 1.702 m (5' 7\")   Wt 96.6 kg (213 lb)   SpO2 92%   BMI 33.36 kg/m²   Vital signs reviewed with patient.     Gen: Well developed; well nourished; no acute distress; age appropriate appearance   CV: Regular rate and rhythm; S1/ S2 present; no murmur, gallop or rub noted  Pulm: No respiratory distress; clear to ascultation b/l; no wheezing or stridor noted b/l  Extremities: No new peripheral edema b/l LE extremities/ no clubbing nor cyanosis noted  Skin: Warm and dry; no rashes noted   Neuro: No focal deficits noted   Psych: AAOx4; mood and affect are appropriate    A/P:  1. Chronic insomnia  Stable/ patient can continue current PRN Ambien use for insomnia " management.  reviewed today and no concerns noted. Pt is well versed in safe use of controlled medication, and benefit of use outweighs risk at this time. New RX sent to pharmacy.   - zolpidem (AMBIEN) 5 MG Tab; Take 1 Tablet by mouth at bedtime as needed for Sleep for up to 30 days.  Dispense: 30 Tablet; Refill: 2

## 2022-08-29 DIAGNOSIS — I10 PRIMARY HYPERTENSION: ICD-10-CM

## 2022-08-29 RX ORDER — LISINOPRIL 20 MG/1
TABLET ORAL
Qty: 180 TABLET | Refills: 2 | Status: SHIPPED | OUTPATIENT
Start: 2022-08-29 | End: 2023-07-03 | Stop reason: SDUPTHER

## 2022-09-12 RX ORDER — SERTRALINE HYDROCHLORIDE 100 MG/1
TABLET, FILM COATED ORAL
Qty: 90 TABLET | Refills: 3 | Status: SHIPPED | OUTPATIENT
Start: 2022-09-12 | End: 2023-07-17 | Stop reason: SDUPTHER

## 2022-09-27 ENCOUNTER — NON-PROVIDER VISIT (OUTPATIENT)
Dept: INTERNAL MEDICINE | Facility: IMAGING CENTER | Age: 77
End: 2022-09-27
Payer: MEDICARE

## 2022-09-27 DIAGNOSIS — Z23 NEED FOR INFLUENZA VACCINATION: ICD-10-CM

## 2022-09-27 PROCEDURE — G0008 ADMIN INFLUENZA VIRUS VAC: HCPCS | Performed by: FAMILY MEDICINE

## 2022-09-27 PROCEDURE — 90662 IIV NO PRSV INCREASED AG IM: CPT | Performed by: FAMILY MEDICINE

## 2022-10-12 DIAGNOSIS — M15.9 PRIMARY OSTEOARTHRITIS INVOLVING MULTIPLE JOINTS: ICD-10-CM

## 2022-10-12 RX ORDER — PREDNISONE 5 MG/1
TABLET ORAL
Qty: 30 TABLET | Refills: 1 | Status: SHIPPED | OUTPATIENT
Start: 2022-10-12 | End: 2022-12-15

## 2022-11-04 RX ORDER — CHOLESTYRAMINE 4 G/9G
1 POWDER, FOR SUSPENSION ORAL DAILY
Qty: 30 EACH | Refills: 3 | Status: SHIPPED | OUTPATIENT
Start: 2022-11-04 | End: 2023-03-21

## 2022-11-09 ENCOUNTER — PATIENT MESSAGE (OUTPATIENT)
Dept: HEALTH INFORMATION MANAGEMENT | Facility: OTHER | Age: 77
End: 2022-11-09

## 2022-11-10 ENCOUNTER — PATIENT MESSAGE (OUTPATIENT)
Dept: SLEEP MEDICINE | Facility: MEDICAL CENTER | Age: 77
End: 2022-11-10

## 2022-11-14 DIAGNOSIS — F51.04 CHRONIC INSOMNIA: ICD-10-CM

## 2022-11-14 RX ORDER — ZOLPIDEM TARTRATE 5 MG/1
5 TABLET ORAL NIGHTLY PRN
Qty: 30 TABLET | Refills: 2 | Status: SHIPPED | OUTPATIENT
Start: 2022-11-23 | End: 2022-12-23

## 2022-12-12 RX ORDER — CARVEDILOL 12.5 MG/1
TABLET ORAL
Qty: 180 TABLET | Refills: 3 | Status: ON HOLD
Start: 2022-12-12 | End: 2023-02-18

## 2022-12-13 RX ORDER — CARVEDILOL 12.5 MG/1
TABLET ORAL
Qty: 180 TABLET | Refills: 3 | OUTPATIENT
Start: 2022-12-13

## 2022-12-15 DIAGNOSIS — M15.9 PRIMARY OSTEOARTHRITIS INVOLVING MULTIPLE JOINTS: ICD-10-CM

## 2022-12-15 RX ORDER — PREDNISONE 5 MG/1
TABLET ORAL
Qty: 30 TABLET | Refills: 1 | Status: SHIPPED | OUTPATIENT
Start: 2022-12-15 | End: 2023-03-16

## 2022-12-19 NOTE — PATIENT COMMUNICATION
STAT MASK AND SUPPLIES ORDER FROM AUG 2022 HAS BEEN REFAXED TO DME:  [facility] / ph [phone] / fax [phone]

## 2023-01-05 ENCOUNTER — APPOINTMENT (OUTPATIENT)
Dept: SLEEP MEDICINE | Facility: MEDICAL CENTER | Age: 78
End: 2023-01-05
Payer: MEDICARE

## 2023-01-19 ENCOUNTER — OFFICE VISIT (OUTPATIENT)
Dept: SLEEP MEDICINE | Facility: MEDICAL CENTER | Age: 78
End: 2023-01-19
Payer: MEDICARE

## 2023-01-19 VITALS
DIASTOLIC BLOOD PRESSURE: 70 MMHG | HEART RATE: 89 BPM | BODY MASS INDEX: 33.12 KG/M2 | RESPIRATION RATE: 14 BRPM | OXYGEN SATURATION: 92 % | SYSTOLIC BLOOD PRESSURE: 132 MMHG | WEIGHT: 211 LBS | HEIGHT: 67 IN

## 2023-01-19 DIAGNOSIS — G47.33 OSA (OBSTRUCTIVE SLEEP APNEA): Primary | ICD-10-CM

## 2023-01-19 PROCEDURE — 99213 OFFICE O/P EST LOW 20 MIN: CPT | Performed by: STUDENT IN AN ORGANIZED HEALTH CARE EDUCATION/TRAINING PROGRAM

## 2023-01-19 ASSESSMENT — FIBROSIS 4 INDEX: FIB4 SCORE: 1.62

## 2023-01-19 NOTE — PROGRESS NOTES
[facility] Follow-up Visit    CC: Follow-up for first compliance visit after receiving new CPAP machine      HPI:  Clau Vences is a 77 y.o.female  with hypertension, hyperlipidemia, anxiety, obesity, and obstructive sleep apnea on CPAP.  Presents today for first compliance visit after receiving new CPAP machine.    She reports that she received her CPAP machine in October 2022.  Since receiving the new machine she is using it regularly.  It is the model to her last machine.  She states she does use it nightly.  Overall does notice benefit to using her Pap machine.  She continues to have dry mouth with her Pap machine.  She has tried Biotene and XyliMelts with little effect.  She has not adjusted the humidity on the machine.    DME provider: [facility]   Device: Airsense 10 (received in Oct 2022)  Mask: fullface   Aerophagia: No   Snoring: No   Dry mouth: Yes  Leak: No   Skin irritation: No   Chin strap: No       Sleep History  Pt was diagnosed with AZUL on 5/7/07 via split night study. Severe obstructive sleep apnea with AHI of 93.3/hr and O2 hollis 74 %. Due to severity of the disease she met the split study protocol. The titration started with CPAP 5 cm and the best tolerated was CPAP 17 cm. The AHI improved to 5.6/hr with improved O2 hollis of 89%. She has been on CPAP 17 cm since then.    Patient Active Problem List    Diagnosis Date Noted    Poor balance 04/26/2021    Thyroid nodule 03/16/2021    Obesity (BMI 30-39.9) 04/06/2018    Heart murmur 09/13/2017    Chronic insomnia 08/09/2017    Anxiety 08/09/2017    HTN (hypertension) 07/01/2014    Hyperlipidemia 07/01/2014    AZUL on CPAP 07/01/2014       Past Medical History:   Diagnosis Date    Anesthesia     low O2 sat    Arthritis     osteo    Back pain     Dyslipidemia     Tajik measles     Heart murmur 9/13/2017    Hypertension     Influenza     Mumps     Other specified symptom associated with female genital organs     Painful joint      Psychiatric problem     Sleep apnea     c-pap with 3 l/nc    Snoring     Sore muscles     Tonsillitis     Unspecified cataract         Past Surgical History:   Procedure Laterality Date    KNEE ARTHROPLASTY TOTAL Right 12/27/2017    HYSTERECTOMY ROBOTIC  10/23/2014    Performed by Smith Lyons M.D. at SURGERY [facility] ORS    GASTROSCOPY WITH BIOPSY  7/2/2014    Performed by Sky Vila M.D. at SURGERY [facility] ORS    OTHER ORTHOPEDIC SURGERY  2006    left total knee    APPENDECTOMY      ARTHROSCOPY, KNEE      HYSTERECTOMY LAPAROSCOPY      OTHER      meghan cataracts    OTHER ABDOMINAL SURGERY      Resection of pelvic mass, uterus and ovaries    CA BREAST REDUCTION Bilateral     1982    CA REMV 2ND CATARACT,CORN-SCLER SECTN      TONSILLECTOMY      TONSILLECTOMY AND ADENOIDECTOMY         Family History   Problem Relation Age of Onset    Breast Cancer Mother     Cancer Mother 65        Breast    Cancer Father         Colon    Alcohol/Drug Father     Colon Cancer Father     Hyperlipidemia Brother     Heart Disease Brother         arrythmia       Social History     Socioeconomic History    Marital status:      Spouse name: Not on file    Number of children: Not on file    Years of education: Not on file    Highest education level: Not on file   Occupational History    Not on file   Tobacco Use    Smoking status: Never    Smokeless tobacco: Never   Vaping Use    Vaping Use: Never used   Substance and Sexual Activity    Alcohol use: Yes     Alcohol/week: 0.6 - 1.2 oz     Types: 1 - 2 Glasses of wine per week     Comment: 2 glasses of wine a day     Drug use: No    Sexual activity: Not on file     Comment: , 2 children   Other Topics Concern    Not on file   Social History Narrative    Not on file     Social Determinants of Health     Financial Resource Strain: Not on file   Food Insecurity: Not on file   Transportation Needs: Not on file   Physical Activity: Not on file   Stress: Not on file  "  Social Connections: Not on file   Intimate Partner Violence: Not on file   Housing Stability: Not on file       Current Outpatient Medications   Medication Sig Dispense Refill    predniSONE (DELTASONE) 5 MG Tab TAKE 1 TABLET BY MOUTH DAILY 30 Tablet 1    carvedilol (COREG) 12.5 MG Tab TAKE 1 TABLET BY MOUTH TWICE DAILY WITH MEALS 180 Tablet 3    cholestyramine (QUESTRAN) 4 g packet Take 4 g by mouth every day. 30 Each 3    sertraline (ZOLOFT) 100 MG Tab TAKE 1 TABELT BY MOUTH DAILY 90 Tablet 3    lisinopril (PRINIVIL) 20 MG Tab TAKE 2 TABLETS BY MOUTH DAILY 180 Tablet 2    azelastine (ASTELIN) 137 MCG/SPRAY nasal spray Administer 1 Spray into affected nostril(S) 2 times a day. 30 mL 3    rosuvastatin (CRESTOR) 10 MG Tab TAKE ONE TABLET BY MOUTH IN THE EVENING 90 Tablet 3    acetaminophen (TYLENOL) 500 MG Tab Take 1,000 mg by mouth every 6 hours as needed.      vitamin D (CHOLECALCIFEROL) 1000 UNIT Tab Take 1,000 Units by mouth every day.      Omega 3 1000 MG Cap Take 1 Capsule by mouth every day.      IRON PO Take 1 Tablet by mouth every day.      FOLIC ACID PO Take 1 Tablet by mouth every morning.       No current facility-administered medications for this visit.        ALLERGIES: Tramadol    ROS  Constitutional: Denies fevers, Denies weight changes  Ears/Nose/Throat/Mouth: Denies nasal congestion or sore throat   Cardiovascular: Denies chest pain  Respiratory: Denies shortness of breath, Denies cough  Gastrointestinal/Hepatic: Denies nausea, vomiting  Sleep: see HPI      PHYSICAL EXAM  /70 (BP Location: Left arm, Patient Position: Sitting, BP Cuff Size: Large adult)   Pulse 89   Resp 14   Ht 1.702 m (5' 7\")   Wt 95.7 kg (211 lb)   SpO2 92%   BMI 33.05 kg/m²   Appearance: Well-nourished, well-developed, no acute distress  Eyes:  No scleral icterus , EOMI  ENMT: masked  Musculoskeletal:  Grossly normal; gait and station normal; digits and nails normal  Skin:  No rashes, petechiae, cyanosis  Neurologic: " without focal signs; oriented to person, time, place, and purpose; judgement intact      Medical Decision Making   Assessment and Plan  Clau Vences is a 77 y.o.female  with hypertension, hyperlipidemia, anxiety, obesity, and obstructive sleep apnea on CPAP.  Presents today for first compliance visit after receiving new CPAP machine.    The medical record was reviewed.    Obstructive sleep apnea  Compliance data reviewed showing 73% usage > 4hours in last 30  days. Average AHI 0.7 events/hour.  Fixed pressure 17. Pt continues to use and benefit from machine.     PLAN:   -Order placed for mask and supplies   -Advised to reach out via MyChart with questions     Has been advised to continue the current CPAP, clean equipment frequently, and get new mask and supplies as allowed by insurance and DME. Recommend an earlier appointment, if significant treatment barriers develop.    Patients with AZUL are at increased risk of cardiovascular disease including coronary artery disease, systemic arterial hypertension, pulmonary arterial hypertension, cardiac arrythmias, and stroke. The patient was advised to avoid driving a motor vehicle when drowsy.    Positive airway pressure will favorably impact many of the adverse conditions and effects provoked by AZUL.    Have advised the patient to follow up with the appropriate healthcare practitioners for all other medical problems and issues.    Return in about 1 year (around 1/19/2024).      Please note portions of this record was created using voice recognition software. I have made every reasonable attempt to correct obvious errors, but I expect that there are errors of grammar and possibly content I did not discover before finalizing the note.

## 2023-01-23 ENCOUNTER — OFFICE VISIT (OUTPATIENT)
Dept: INTERNAL MEDICINE | Facility: IMAGING CENTER | Age: 78
End: 2023-01-23
Payer: MEDICARE

## 2023-01-23 VITALS
TEMPERATURE: 97.9 F | WEIGHT: 210.98 LBS | SYSTOLIC BLOOD PRESSURE: 122 MMHG | DIASTOLIC BLOOD PRESSURE: 64 MMHG | BODY MASS INDEX: 33.11 KG/M2 | HEART RATE: 71 BPM | RESPIRATION RATE: 17 BRPM | OXYGEN SATURATION: 94 % | HEIGHT: 67 IN

## 2023-01-23 DIAGNOSIS — R10.84 GENERALIZED ABDOMINAL PAIN: ICD-10-CM

## 2023-01-23 DIAGNOSIS — R11.2 NAUSEA AND VOMITING, UNSPECIFIED VOMITING TYPE: ICD-10-CM

## 2023-01-23 PROCEDURE — 99214 OFFICE O/P EST MOD 30 MIN: CPT | Performed by: FAMILY MEDICINE

## 2023-01-23 RX ORDER — ONDANSETRON 4 MG/1
4 TABLET, FILM COATED ORAL EVERY 6 HOURS PRN
Qty: 20 TABLET | Refills: 1 | Status: SHIPPED
Start: 2023-01-23 | End: 2023-03-08

## 2023-01-23 ASSESSMENT — PATIENT HEALTH QUESTIONNAIRE - PHQ9: CLINICAL INTERPRETATION OF PHQ2 SCORE: 0

## 2023-01-23 ASSESSMENT — FIBROSIS 4 INDEX: FIB4 SCORE: 1.62

## 2023-01-23 NOTE — PROGRESS NOTES
"Chief Complaint   Patient presents with    Abdominal Pain     X5 days. Intense cramping, diarrhea that has since resolved (last BM 2 days ago), nausea, no vomiting. BRAT diet and questran packs. Reports drinking milk and eating bananas.        HPI:  Patient is a 77 y.o. female established patient who presents today for evaluation of new GI illness that started approximately five days ago. She reports sudden onset of body aches, abdominal cramping, general malaise, nausea without emesis, poor appetite, diarrhea, and abdominal bloating. She denies fever and has tested negative for COVID at home. Diarrhea resolved two days ago, and she reports consuming mainly milk (only thing that is appealing to her)/ limited BRAT diet items also. She is grieving the sudden loss of her ten year old dog this past weekend (incurable jaw cancer) and is just not feeling her best overall.     Patient Active Problem List    Diagnosis Date Noted    Poor balance 04/26/2021    Thyroid nodule 03/16/2021    Obesity (BMI 30-39.9) 04/06/2018    Heart murmur 09/13/2017    Chronic insomnia 08/09/2017    Anxiety 08/09/2017    HTN (hypertension) 07/01/2014    Hyperlipidemia 07/01/2014    AZUL on CPAP 07/01/2014       Past medical, surgical, family, and social history was reviewed and updated in Epic chart by me today.     Medications and allergies reviewed and updated in Epic chart by me today.     ROS:  Pertinent positives listed above in HPI. All other systems have been reviewed and are negative.    PE:   /64 (BP Location: Left arm, Patient Position: Sitting, BP Cuff Size: Adult)   Pulse 71   Temp 36.6 °C (97.9 °F) (Temporal)   Resp 17   Ht 1.702 m (5' 7\")   Wt 95.7 kg (210 lb 15.7 oz)   SpO2 94%   BMI 33.04 kg/m²   Vital signs reviewed with patient.     Gen: Well developed; well nourished; no acute distress; tired appearance   HEENT: Normocephalic; atraumatic; PEERLA b/l; sclera clear b/l; b/l external auditory canals WNL; b/l TM WNL; " nares patent; oropharynx clear; mask in place  Neck: No adenopathy; no thyromegaly  CV: Regular rate and rhythm; S1/ S2 present; no murmur, gallop or rub noted  Pulm: No respiratory distress; clear to ascultation b/l; no wheezing or stridor noted b/l  Abd: hypoactive bowel sounds noted; soft and nontender; distended; no rebound nor rigidity   Extremities: No new peripheral edema b/l LE extremities/ no clubbing nor cyanosis noted  Skin: Warm and dry; no rashes noted   Neuro: No new focal deficits noted   Psych: AAOx4; mood and affect are appropriate    A/P:  1. Nausea and vomiting, unspecified vomiting type  Patient has been experiencing nausea (feels like vomiting but cannot) for the past five days. Recommend sips of clear liquids, avoid dairy for now, and PRN Zofran/Tylenol use. New RX sent to pharmacy.   - ondansetron (ZOFRAN) 4 MG Tab tablet; Take 1 Tablet by mouth every 6 hours as needed for Nausea/Vomiting.  Dispense: 20 Tablet; Refill: 1    2. Generalized abdominal pain  New condition present for the past five days as described above in HPI. I suspect she is suffering from a viral illness (vital signs stable today) but recommend patient obtain xrays of abdomen today for further workup. I will contact patient when results are available for further management.   - [id number] VIEWS; Future

## 2023-01-24 ENCOUNTER — APPOINTMENT (OUTPATIENT)
Dept: RADIOLOGY | Facility: MEDICAL CENTER | Age: 78
DRG: 335 | End: 2023-01-24
Attending: EMERGENCY MEDICINE
Payer: MEDICARE

## 2023-01-24 ENCOUNTER — HOSPITAL ENCOUNTER (OUTPATIENT)
Dept: RADIOLOGY | Facility: MEDICAL CENTER | Age: 78
End: 2023-01-24
Attending: FAMILY MEDICINE
Payer: MEDICARE

## 2023-01-24 ENCOUNTER — HOSPITAL ENCOUNTER (INPATIENT)
Facility: MEDICAL CENTER | Age: 78
LOS: 16 days | DRG: 335 | End: 2023-02-09
Attending: EMERGENCY MEDICINE | Admitting: HOSPITALIST
Payer: MEDICARE

## 2023-01-24 ENCOUNTER — TELEPHONE (OUTPATIENT)
Dept: INTERNAL MEDICINE | Facility: IMAGING CENTER | Age: 78
End: 2023-01-24
Payer: MEDICARE

## 2023-01-24 ENCOUNTER — APPOINTMENT (OUTPATIENT)
Dept: RADIOLOGY | Facility: MEDICAL CENTER | Age: 78
DRG: 335 | End: 2023-01-24
Attending: HOSPITALIST
Payer: MEDICARE

## 2023-01-24 DIAGNOSIS — J69.0 ASPIRATION PNEUMONIA, UNSPECIFIED ASPIRATION PNEUMONIA TYPE, UNSPECIFIED LATERALITY, UNSPECIFIED PART OF LUNG (HCC): ICD-10-CM

## 2023-01-24 DIAGNOSIS — E86.0 DEHYDRATION: ICD-10-CM

## 2023-01-24 DIAGNOSIS — K56.609 SBO (SMALL BOWEL OBSTRUCTION) (HCC): ICD-10-CM

## 2023-01-24 DIAGNOSIS — E83.39 HYPOPHOSPHATEMIA: ICD-10-CM

## 2023-01-24 DIAGNOSIS — R10.84 GENERALIZED ABDOMINAL PAIN: ICD-10-CM

## 2023-01-24 DIAGNOSIS — F41.9 ANXIETY: ICD-10-CM

## 2023-01-24 DIAGNOSIS — N20.0 KIDNEY STONE: ICD-10-CM

## 2023-01-24 DIAGNOSIS — E87.1 HYPONATREMIA: ICD-10-CM

## 2023-01-24 DIAGNOSIS — N17.9 ACUTE KIDNEY INJURY (HCC): ICD-10-CM

## 2023-01-24 PROBLEM — M19.90 ARTHRITIS: Status: ACTIVE | Noted: 2023-01-24

## 2023-01-24 PROBLEM — E83.52 HYPERCALCEMIA: Status: ACTIVE | Noted: 2023-01-24

## 2023-01-24 LAB
ALBUMIN SERPL BCP-MCNC: 3.7 G/DL (ref 3.2–4.9)
ALBUMIN/GLOB SERPL: 1.1 G/DL
ALP SERPL-CCNC: 72 U/L (ref 30–99)
ALT SERPL-CCNC: 11 U/L (ref 2–50)
ANION GAP SERPL CALC-SCNC: 16 MMOL/L (ref 7–16)
APPEARANCE UR: CLEAR
AST SERPL-CCNC: 16 U/L (ref 12–45)
BASOPHILS # BLD AUTO: 0 % (ref 0–1.8)
BASOPHILS # BLD: 0 K/UL (ref 0–0.12)
BILIRUB SERPL-MCNC: 0.3 MG/DL (ref 0.1–1.5)
BILIRUB UR QL STRIP.AUTO: ABNORMAL
BUN SERPL-MCNC: 84 MG/DL (ref 8–22)
CALCIUM ALBUM COR SERPL-MCNC: 10.7 MG/DL (ref 8.5–10.5)
CALCIUM SERPL-MCNC: 10.5 MG/DL (ref 8.4–10.2)
CHLORIDE SERPL-SCNC: 88 MMOL/L (ref 96–112)
CO2 SERPL-SCNC: 23 MMOL/L (ref 20–33)
COLOR UR: YELLOW
CREAT SERPL-MCNC: 2.26 MG/DL (ref 0.5–1.4)
EOSINOPHIL # BLD AUTO: 0.1 K/UL (ref 0–0.51)
EOSINOPHIL NFR BLD: 2 % (ref 0–6.9)
ERYTHROCYTE [DISTWIDTH] IN BLOOD BY AUTOMATED COUNT: 43 FL (ref 35.9–50)
GFR SERPLBLD CREATININE-BSD FMLA CKD-EPI: 22 ML/MIN/1.73 M 2
GLOBULIN SER CALC-MCNC: 3.4 G/DL (ref 1.9–3.5)
GLUCOSE SERPL-MCNC: 90 MG/DL (ref 65–99)
GLUCOSE UR STRIP.AUTO-MCNC: NEGATIVE MG/DL
HCT VFR BLD AUTO: 43.1 % (ref 37–47)
HGB BLD-MCNC: 14.6 G/DL (ref 12–16)
KETONES UR STRIP.AUTO-MCNC: NEGATIVE MG/DL
LACTATE SERPL-SCNC: 1.3 MMOL/L (ref 0.5–2)
LEUKOCYTE ESTERASE UR QL STRIP.AUTO: NEGATIVE
LIPASE SERPL-CCNC: 85 U/L (ref 7–58)
LYMPHOCYTES # BLD AUTO: 0.56 K/UL (ref 1–4.8)
LYMPHOCYTES NFR BLD: 11 % (ref 22–41)
MANUAL DIFF BLD: ABNORMAL
MCH RBC QN AUTO: 33 PG (ref 27–33)
MCHC RBC AUTO-ENTMCNC: 33.9 G/DL (ref 33.6–35)
MCV RBC AUTO: 97.5 FL (ref 81.4–97.8)
METAMYELOCYTES NFR BLD MANUAL: 1 %
MICRO URNS: ABNORMAL
MONOCYTES # BLD AUTO: 0.61 K/UL (ref 0–0.85)
MONOCYTES NFR BLD AUTO: 12 % (ref 0–13.4)
NEUTROPHILS # BLD AUTO: 3.77 K/UL (ref 2–7.15)
NEUTROPHILS NFR BLD: 31 % (ref 44–72)
NEUTS BAND NFR BLD MANUAL: 43 % (ref 0–10)
NITRITE UR QL STRIP.AUTO: NEGATIVE
NRBC # BLD AUTO: 0 K/UL
NRBC BLD-RTO: 0 /100 WBC
PH UR STRIP.AUTO: 5 [PH] (ref 5–8)
PLATELET # BLD AUTO: 376 K/UL (ref 164–446)
PLATELET BLD QL SMEAR: NORMAL
PMV BLD AUTO: 10.3 FL (ref 9–12.9)
POTASSIUM SERPL-SCNC: 4.7 MMOL/L (ref 3.6–5.5)
PROT SERPL-MCNC: 7.1 G/DL (ref 6–8.2)
PROT UR QL STRIP: NEGATIVE MG/DL
RBC # BLD AUTO: 4.42 M/UL (ref 4.2–5.4)
RBC BLD AUTO: NORMAL
RBC UR QL AUTO: NEGATIVE
SODIUM SERPL-SCNC: 127 MMOL/L (ref 135–145)
SP GR UR STRIP.AUTO: 1.02
WBC # BLD AUTO: 5.1 K/UL (ref 4.8–10.8)

## 2023-01-24 PROCEDURE — 770001 HCHG ROOM/CARE - MED/SURG/GYN PRIV*

## 2023-01-24 PROCEDURE — 43752 NASAL/OROGASTRIC W/TUBE PLMT: CPT

## 2023-01-24 PROCEDURE — 96374 THER/PROPH/DIAG INJ IV PUSH: CPT

## 2023-01-24 PROCEDURE — 99223 1ST HOSP IP/OBS HIGH 75: CPT | Mod: AI | Performed by: HOSPITALIST

## 2023-01-24 PROCEDURE — 36415 COLL VENOUS BLD VENIPUNCTURE: CPT

## 2023-01-24 PROCEDURE — 83605 ASSAY OF LACTIC ACID: CPT

## 2023-01-24 PROCEDURE — 700105 HCHG RX REV CODE 258: Performed by: HOSPITALIST

## 2023-01-24 PROCEDURE — 81003 URINALYSIS AUTO W/O SCOPE: CPT

## 2023-01-24 PROCEDURE — 83690 ASSAY OF LIPASE: CPT

## 2023-01-24 PROCEDURE — 74176 CT ABD & PELVIS W/O CONTRAST: CPT

## 2023-01-24 PROCEDURE — 96375 TX/PRO/DX INJ NEW DRUG ADDON: CPT

## 2023-01-24 PROCEDURE — 700111 HCHG RX REV CODE 636 W/ 250 OVERRIDE (IP): Performed by: EMERGENCY MEDICINE

## 2023-01-24 PROCEDURE — 700111 HCHG RX REV CODE 636 W/ 250 OVERRIDE (IP): Performed by: HOSPITALIST

## 2023-01-24 PROCEDURE — 80053 COMPREHEN METABOLIC PANEL: CPT

## 2023-01-24 PROCEDURE — 99285 EMERGENCY DEPT VISIT HI MDM: CPT

## 2023-01-24 PROCEDURE — 74019 RADEX ABDOMEN 2 VIEWS: CPT

## 2023-01-24 PROCEDURE — 85007 BL SMEAR W/DIFF WBC COUNT: CPT

## 2023-01-24 PROCEDURE — 85025 COMPLETE CBC W/AUTO DIFF WBC: CPT

## 2023-01-24 PROCEDURE — 304538 HCHG NG TUBE

## 2023-01-24 PROCEDURE — 700105 HCHG RX REV CODE 258: Performed by: EMERGENCY MEDICINE

## 2023-01-24 RX ORDER — LABETALOL HYDROCHLORIDE 5 MG/ML
10 INJECTION, SOLUTION INTRAVENOUS EVERY 4 HOURS PRN
Status: DISCONTINUED | OUTPATIENT
Start: 2023-01-24 | End: 2023-02-09 | Stop reason: HOSPADM

## 2023-01-24 RX ORDER — MORPHINE SULFATE 4 MG/ML
4 INJECTION INTRAVENOUS ONCE
Status: COMPLETED | OUTPATIENT
Start: 2023-01-24 | End: 2023-01-24

## 2023-01-24 RX ORDER — ONDANSETRON 4 MG/1
4 TABLET, ORALLY DISINTEGRATING ORAL EVERY 4 HOURS PRN
Status: DISCONTINUED | OUTPATIENT
Start: 2023-01-24 | End: 2023-01-26

## 2023-01-24 RX ORDER — AZELASTINE 1 MG/ML
1 SPRAY, METERED NASAL 2 TIMES DAILY
Status: DISCONTINUED | OUTPATIENT
Start: 2023-01-24 | End: 2023-01-24

## 2023-01-24 RX ORDER — METHYLPREDNISOLONE SODIUM SUCCINATE 40 MG/ML
20 INJECTION, POWDER, LYOPHILIZED, FOR SOLUTION INTRAMUSCULAR; INTRAVENOUS DAILY
Status: DISCONTINUED | OUTPATIENT
Start: 2023-01-25 | End: 2023-01-29

## 2023-01-24 RX ORDER — MORPHINE SULFATE 4 MG/ML
2 INJECTION INTRAVENOUS
Status: DISCONTINUED | OUTPATIENT
Start: 2023-01-24 | End: 2023-02-07

## 2023-01-24 RX ORDER — ONDANSETRON 2 MG/ML
4 INJECTION INTRAMUSCULAR; INTRAVENOUS EVERY 4 HOURS PRN
Status: DISCONTINUED | OUTPATIENT
Start: 2023-01-24 | End: 2023-02-09 | Stop reason: HOSPADM

## 2023-01-24 RX ORDER — MORPHINE SULFATE 4 MG/ML
1 INJECTION INTRAVENOUS
Status: DISCONTINUED | OUTPATIENT
Start: 2023-01-24 | End: 2023-02-07

## 2023-01-24 RX ORDER — CHOLECALCIFEROL (VITAMIN D3) 125 MCG
5 CAPSULE ORAL NIGHTLY
Status: DISCONTINUED | OUTPATIENT
Start: 2023-01-24 | End: 2023-01-26

## 2023-01-24 RX ORDER — ONDANSETRON 2 MG/ML
4 INJECTION INTRAMUSCULAR; INTRAVENOUS ONCE
Status: COMPLETED | OUTPATIENT
Start: 2023-01-24 | End: 2023-01-24

## 2023-01-24 RX ORDER — SODIUM CHLORIDE 9 MG/ML
1000 INJECTION, SOLUTION INTRAVENOUS ONCE
Status: COMPLETED | OUTPATIENT
Start: 2023-01-24 | End: 2023-01-24

## 2023-01-24 RX ORDER — SODIUM CHLORIDE 9 MG/ML
INJECTION, SOLUTION INTRAVENOUS CONTINUOUS
Status: DISCONTINUED | OUTPATIENT
Start: 2023-01-24 | End: 2023-01-30

## 2023-01-24 RX ADMIN — MORPHINE SULFATE 2 MG: 4 INJECTION INTRAVENOUS at 22:48

## 2023-01-24 RX ADMIN — ONDANSETRON 4 MG: 2 INJECTION INTRAMUSCULAR; INTRAVENOUS at 19:28

## 2023-01-24 RX ADMIN — SODIUM CHLORIDE 1000 ML: 9 INJECTION, SOLUTION INTRAVENOUS at 19:29

## 2023-01-24 RX ADMIN — SODIUM CHLORIDE: 9 INJECTION, SOLUTION INTRAVENOUS at 22:05

## 2023-01-24 RX ADMIN — MORPHINE SULFATE 4 MG: 4 INJECTION INTRAVENOUS at 20:34

## 2023-01-24 ASSESSMENT — LIFESTYLE VARIABLES
HAVE YOU EVER FELT YOU SHOULD CUT DOWN ON YOUR DRINKING: NO
TOTAL SCORE: 0
HAVE PEOPLE ANNOYED YOU BY CRITICIZING YOUR DRINKING: NO
DO YOU DRINK ALCOHOL: NO
TOTAL SCORE: 0
TOTAL SCORE: 0
EVER FELT BAD OR GUILTY ABOUT YOUR DRINKING: NO
TOTAL SCORE: 0
HAVE PEOPLE ANNOYED YOU BY CRITICIZING YOUR DRINKING: NO
HAVE YOU EVER FELT YOU SHOULD CUT DOWN ON YOUR DRINKING: NO
EVER HAD A DRINK FIRST THING IN THE MORNING TO STEADY YOUR NERVES TO GET RID OF A HANGOVER: NO
ALCOHOL_USE: YES
TOTAL SCORE: 0
AVERAGE NUMBER OF DAYS PER WEEK YOU HAVE A DRINK CONTAINING ALCOHOL: 1
ON A TYPICAL DAY WHEN YOU DRINK ALCOHOL HOW MANY DRINKS DO YOU HAVE: 1
EVER FELT BAD OR GUILTY ABOUT YOUR DRINKING: NO
CONSUMPTION TOTAL: NEGATIVE
TOTAL SCORE: 0
CONSUMPTION TOTAL: INCOMPLETE
EVER HAD A DRINK FIRST THING IN THE MORNING TO STEADY YOUR NERVES TO GET RID OF A HANGOVER: NO
HOW MANY TIMES IN THE PAST YEAR HAVE YOU HAD 5 OR MORE DRINKS IN A DAY: 0

## 2023-01-24 ASSESSMENT — ENCOUNTER SYMPTOMS
DOUBLE VISION: 0
NAUSEA: 1
RESPIRATORY NEGATIVE: 1
CHILLS: 0
CONSTITUTIONAL NEGATIVE: 1
MUSCULOSKELETAL NEGATIVE: 1
FOCAL WEAKNESS: 0
BLOOD IN STOOL: 0
EYES NEGATIVE: 1
CONSTIPATION: 0
CARDIOVASCULAR NEGATIVE: 1
NERVOUS/ANXIOUS: 0
ABDOMINAL PAIN: 1
HEMOPTYSIS: 0
DIZZINESS: 0
VOMITING: 0
SEIZURES: 0
PSYCHIATRIC NEGATIVE: 1
LOSS OF CONSCIOUSNESS: 0
FEVER: 0
DIAPHORESIS: 0
BRUISES/BLEEDS EASILY: 0
DIARRHEA: 0
HEADACHES: 0
PALPITATIONS: 0
NEUROLOGICAL NEGATIVE: 1
COUGH: 0
WHEEZING: 0
HEARTBURN: 0

## 2023-01-24 ASSESSMENT — PATIENT HEALTH QUESTIONNAIRE - PHQ9
2. FEELING DOWN, DEPRESSED, IRRITABLE, OR HOPELESS: NOT AT ALL
1. LITTLE INTEREST OR PLEASURE IN DOING THINGS: NOT AT ALL
SUM OF ALL RESPONSES TO PHQ9 QUESTIONS 1 AND 2: 0

## 2023-01-24 ASSESSMENT — FIBROSIS 4 INDEX: FIB4 SCORE: 1.62

## 2023-01-24 ASSESSMENT — PAIN DESCRIPTION - PAIN TYPE
TYPE: ACUTE PAIN
TYPE: ACUTE PAIN

## 2023-01-25 PROBLEM — J96.01 ACUTE RESPIRATORY FAILURE WITH HYPOXIA (HCC): Status: ACTIVE | Noted: 2023-01-25

## 2023-01-25 LAB
ANION GAP SERPL CALC-SCNC: 18 MMOL/L (ref 7–16)
BUN SERPL-MCNC: 76 MG/DL (ref 8–22)
CALCIUM SERPL-MCNC: 9.7 MG/DL (ref 8.4–10.2)
CHLORIDE SERPL-SCNC: 95 MMOL/L (ref 96–112)
CO2 SERPL-SCNC: 19 MMOL/L (ref 20–33)
CREAT SERPL-MCNC: 1.63 MG/DL (ref 0.5–1.4)
ERYTHROCYTE [DISTWIDTH] IN BLOOD BY AUTOMATED COUNT: 43.3 FL (ref 35.9–50)
GFR SERPLBLD CREATININE-BSD FMLA CKD-EPI: 32 ML/MIN/1.73 M 2
GLUCOSE SERPL-MCNC: 74 MG/DL (ref 65–99)
HCT VFR BLD AUTO: 40.2 % (ref 37–47)
HGB BLD-MCNC: 13.5 G/DL (ref 12–16)
MCH RBC QN AUTO: 33.1 PG (ref 27–33)
MCHC RBC AUTO-ENTMCNC: 33.6 G/DL (ref 33.6–35)
MCV RBC AUTO: 98.5 FL (ref 81.4–97.8)
PLATELET # BLD AUTO: 310 K/UL (ref 164–446)
PMV BLD AUTO: 10.3 FL (ref 9–12.9)
POTASSIUM SERPL-SCNC: 4.5 MMOL/L (ref 3.6–5.5)
RBC # BLD AUTO: 4.08 M/UL (ref 4.2–5.4)
SODIUM SERPL-SCNC: 132 MMOL/L (ref 135–145)
WBC # BLD AUTO: 4.7 K/UL (ref 4.8–10.8)

## 2023-01-25 PROCEDURE — 700105 HCHG RX REV CODE 258: Performed by: HOSPITALIST

## 2023-01-25 PROCEDURE — 770001 HCHG ROOM/CARE - MED/SURG/GYN PRIV*

## 2023-01-25 PROCEDURE — 80048 BASIC METABOLIC PNL TOTAL CA: CPT

## 2023-01-25 PROCEDURE — 36415 COLL VENOUS BLD VENIPUNCTURE: CPT

## 2023-01-25 PROCEDURE — 94760 N-INVAS EAR/PLS OXIMETRY 1: CPT

## 2023-01-25 PROCEDURE — 85027 COMPLETE CBC AUTOMATED: CPT

## 2023-01-25 PROCEDURE — 700111 HCHG RX REV CODE 636 W/ 250 OVERRIDE (IP): Performed by: HOSPITALIST

## 2023-01-25 PROCEDURE — 99233 SBSQ HOSP IP/OBS HIGH 50: CPT | Performed by: INTERNAL MEDICINE

## 2023-01-25 RX ORDER — ZOLPIDEM TARTRATE 5 MG/1
5 TABLET ORAL
COMMUNITY
End: 2023-02-27 | Stop reason: SDUPTHER

## 2023-01-25 RX ORDER — LORAZEPAM 2 MG/ML
1 INJECTION INTRAMUSCULAR EVERY 4 HOURS PRN
Status: DISCONTINUED | OUTPATIENT
Start: 2023-01-25 | End: 2023-02-09

## 2023-01-25 RX ADMIN — MORPHINE SULFATE 2 MG: 4 INJECTION INTRAVENOUS at 16:12

## 2023-01-25 RX ADMIN — METHYLPREDNISOLONE SODIUM SUCCINATE 20 MG: 40 INJECTION, POWDER, FOR SOLUTION INTRAMUSCULAR; INTRAVENOUS at 06:43

## 2023-01-25 RX ADMIN — SODIUM CHLORIDE: 9 INJECTION, SOLUTION INTRAVENOUS at 18:14

## 2023-01-25 RX ADMIN — LORAZEPAM 1 MG: 2 INJECTION INTRAMUSCULAR; INTRAVENOUS at 21:47

## 2023-01-25 ASSESSMENT — COGNITIVE AND FUNCTIONAL STATUS - GENERAL
MOBILITY SCORE: 21
SUGGESTED CMS G CODE MODIFIER DAILY ACTIVITY: CJ
SUGGESTED CMS G CODE MODIFIER MOBILITY: CJ
DAILY ACTIVITIY SCORE: 22
DRESSING REGULAR LOWER BODY CLOTHING: A LITTLE
WALKING IN HOSPITAL ROOM: A LITTLE
CLIMB 3 TO 5 STEPS WITH RAILING: A LITTLE
HELP NEEDED FOR BATHING: A LITTLE
STANDING UP FROM CHAIR USING ARMS: A LITTLE

## 2023-01-25 ASSESSMENT — ENCOUNTER SYMPTOMS
WEAKNESS: 0
DEPRESSION: 0
PALPITATIONS: 0
DIZZINESS: 0
BACK PAIN: 0
HALLUCINATIONS: 0
ABDOMINAL PAIN: 1
NAUSEA: 1
FOCAL WEAKNESS: 0
MYALGIAS: 0
CHILLS: 0
ROS GI COMMENTS: DISTENTION
SHORTNESS OF BREATH: 0
HEADACHES: 0
FEVER: 0
BLOOD IN STOOL: 0
HEARTBURN: 0
DIARRHEA: 0
VOMITING: 1
COUGH: 0
SORE THROAT: 0

## 2023-01-25 ASSESSMENT — PAIN DESCRIPTION - PAIN TYPE
TYPE: ACUTE PAIN
TYPE: ACUTE PAIN

## 2023-01-25 NOTE — HOSPITAL COURSE
History of hysterectomy and appendectomy 4 years ago, sleep apnea on CPAP, arthritis on chronic prednisone therapy, hypertension, hyperlipidemia and anxiety, who was admitted for abdominal pain and distention associated with 4 days of constipation.  She was found to have a small bowel obstruction.  General surgery was consulted and recommended conservative management NG tube placement.

## 2023-01-25 NOTE — PROGRESS NOTES
4 Eyes Skin Assessment Completed by DILIP Beaver and Kae RN.    Head WDL  Ears WDL  Nose WDL  Mouth WDL  Neck WDL  Breast/Chest WDL  Shoulder Blades WDL  Spine WDL  (R) Arm/Elbow/Hand WDL  (L) Arm/Elbow/Hand WDL  Abdomen Distended  Groin WDL  Scrotum/Coccyx/Buttocks WDL  (R) Leg    Dry, Flaky  (L) Leg    Dry, Flaky, Scab  (R) Heel/Foot/Toe    Dry, Flaky  (L) Heel/Foot/Toe     Dry, Flaky          Devices In Places Blood Pressure Cuff, Pulse Ox, and OG/NG      Interventions In Place Pillows    Possible Skin Injury No    Pictures Uploaded Into Epic N/A  Wound Consult Placed N/A  RN Wound Prevention Protocol Ordered No

## 2023-01-25 NOTE — PROGRESS NOTES
Received pt's report from ED RN. Pt transferred via gurney, able to walk to from [facility] to bed. Pt is not in any distress, on RA. AAOx4. NGT in place, low intermittent suction, bilious drainage present. Abdomen is distended. Pt reported pain, medicated per MAR. Discussed POC. Answered all questions. Fall precaution in place, locked bed in lowest position, call light within reach. All needs met at this time. Will continue to monitor.

## 2023-01-25 NOTE — ED PROVIDER NOTES
ED Provider Note    CHIEF COMPLAINT  Chief Complaint   Patient presents with    Bowel Obstruction     Seen by PCP, abd xray today showing SBO. Onset of symptoms 5 days ago +diarrhea that has since resolved, bloated, N w/o V.        HPI  Clau Vences is a 77 y.o. female who presents for evaluation of abdominal pain, bloating, nausea for approximately 5 days.  Patient notes that it started with pain and diarrhea about 5 days ago however the diarrhea has since resolved but she remains bloated and nauseous.  She has not had any vomiting however.  She notes she has not taken in much food but has been able to keep her fluids down.  She notes no fevers or chills and noted no blood in the diarrhea or dark tarry stools.  She has a history of a hysterectomy in the past she was seen by her primary care physician yesterday who ordered diagnostic imaging of the abdomen in terms of plain films.  This demonstrated a possible small bowel obstruction and she was told to come to the emergency department.  External records reviewed-care everywhere  Limitation to history -  none  Outside historians-son    REVIEW OF SYSTEMS  Constitutional: No fevers or chills  Skin: No rashes  HEENT: No sore throat, runny nose  Neck: No neck pain  Chest: No pain or rashes  Pulm: No shortness of breath, cough, wheezing, stridor, or pain with inspiration/expiration  Gastrointestinal: No nausea, vomiting, constipation,  melena, hematochezia   Genitourinary: No dysuria or hematuria  Musculoskeletal: No pain, swelling, weakness  Neurologic: No sensory or motor changes. No confusion or disorientation.  Heme: No bleeding or bruising problems.   Immuno: No hx of recurrent infections    PAST FAM HISTORY  Family History   Problem Relation Age of Onset    Breast Cancer Mother     Cancer Mother 65        Breast    Cancer Father         Colon    Alcohol/Drug Father     Colon Cancer Father     Hyperlipidemia Brother     Heart Disease Brother          arrythmia       PAST MEDICAL HISTORY   has a past medical history of Anesthesia, Arthritis, Back pain, Dyslipidemia, [place] measles, Heart murmur (9/13/2017), Hypertension, Influenza, Mumps, Other specified symptom associated with female genital organs, Painful joint, Psychiatric problem, Sleep apnea, Snoring, Sore muscles, Tonsillitis, and Unspecified cataract.    SOCIAL HISTORY  Social History     Tobacco Use    Smoking status: Never    Smokeless tobacco: Never   Vaping Use    Vaping Use: Never used   Substance and Sexual Activity    Alcohol use: Yes     Alcohol/week: 0.6 - 1.2 oz     Types: 1 - 2 Glasses of wine per week     Comment: 2 glasses of wine a day     Drug use: No    Sexual activity: Not on file     Comment: , 2 children       SURGICAL HISTORY   has a past surgical history that includes gastroscopy with biopsy (7/2/2014); other orthopedic surgery (2006); other; hysterectomy robotic (10/23/2014); tonsillectomy and adenoidectomy; other abdominal surgery; appendectomy; knee arthroplasty total (Right, 12/27/2017); breast reduction (Bilateral); remv 2nd cataract,corn-scler sectn; hysterectomy laparoscopy; tonsillectomy; and arthroscopy, knee.    CURRENT MEDICATIONS  Home Medications       Reviewed by Ashley Patino R.N. (Registered Nurse) on 01/24/23 at 1747  Med List Status: Not Addressed     Medication Last Dose Status   acetaminophen (TYLENOL) 500 MG Tab  Active   azelastine (ASTELIN) 137 MCG/SPRAY nasal spray  Active   carvedilol (COREG) 12.5 MG Tab  Active   cholestyramine (QUESTRAN) 4 g packet  Active   FOLIC ACID PO  Active   IRON PO  Active   lisinopril (PRINIVIL) 20 MG Tab  Active   Omega 3 1000 MG Cap  Active   ondansetron (ZOFRAN) 4 MG Tab tablet  Active   predniSONE (DELTASONE) 5 MG Tab  Active   rosuvastatin (CRESTOR) 10 MG Tab  Active   sertraline (ZOLOFT) 100 MG Tab  Active   vitamin D (CHOLECALCIFEROL) 1000 UNIT Tab  Active                    ALLERGIES  Allergies   Allergen  "Reactions    Tramadol      Nausea and somnolence       PHYSICAL EXAM  VITAL SIGNS: /74   Pulse 79   Temp 36.7 °C (98.1 °F)   Resp 16   Ht 1.702 m (5' 7\")   Wt 99.4 kg (219 lb 2.2 oz)   SpO2 95%   BMI 34.32 kg/m²    Gen: Alert in no apparent distress.  HEENT: No signs of trauma, Bilateral external ears normal, Nose normal. Conjunctiva normal, Non-icteric.   Neck:  No tenderness, Supple, No masses  Lymphatic: No cervical lymphadenopathy noted.   Cardiovascular: Regular rate and rhythm, no murmurs.  Capillary refill less than 3 seconds to all extremities, 2+ distal pulses.  Thorax & Lungs: Normal breath sounds, No respiratory distress, No wheezing bilateral chest rise  Abdomen: Distended but soft, quiet bowel sounds, diffuse moderate tenderness, no Guarding or rebound  Skin: Warm, Dry  Extremities: Intact distal pulses, No edema  Neurologic: Alert , no facial droop, grossly normal coordination and strength  Psychiatric: Affect pleasant      INITIAL IMPRESSION  Patient has for evaluation of abdominal pain for 5 days with bloating and distention suggestive of small bowel obstruction.  I did review the patient's plain films of the abdomen today and it is very suspicious with air-fluid levels and dilated small bowel.  Patient stated understanding the need for CT imaging and will be empirically hydrated.  Laboratory evaluation will also take place undoubtably, the patient will need to be admitted however will obtain CT imaging before consulting surgery    Barriers to care at this time, including but not limited to: None    Diagnostic tests and prescription drugs considered including, but not limited to: None      LABS  Results for orders placed or performed during the hospital encounter of 01/24/23   CBC with Differential   Result Value Ref Range    WBC 5.1 4.8 - 10.8 K/uL    RBC 4.42 4.20 - 5.40 M/uL    Hemoglobin 14.6 12.0 - 16.0 g/dL    Hematocrit 43.1 37.0 - 47.0 %    MCV 97.5 81.4 - 97.8 fL    MCH 33.0 " 27.0 - 33.0 pg    MCHC 33.9 33.6 - 35.0 g/dL    RDW 43.0 35.9 - 50.0 fL    Platelet Count 376 164 - 446 K/uL    MPV 10.3 9.0 - 12.9 fL    Neutrophils-Polys 31.00 (L) 44.00 - 72.00 %    Lymphocytes 11.00 (L) 22.00 - 41.00 %    Monocytes 12.00 0.00 - 13.40 %    Eosinophils 2.00 0.00 - 6.90 %    Basophils 0.00 0.00 - 1.80 %    Nucleated RBC 0.00 /100 WBC    Neutrophils (Absolute) 3.77 2.00 - 7.15 K/uL    Lymphs (Absolute) 0.56 (L) 1.00 - 4.80 K/uL    Monos (Absolute) 0.61 0.00 - 0.85 K/uL    Eos (Absolute) 0.10 0.00 - 0.51 K/uL    Baso (Absolute) 0.00 0.00 - 0.12 K/uL    NRBC (Absolute) 0.00 K/uL   Complete Metabolic Panel   Result Value Ref Range    Sodium 127 (L) 135 - 145 mmol/L    Potassium 4.7 3.6 - 5.5 mmol/L    Chloride 88 (L) 96 - 112 mmol/L    Co2 23 20 - 33 mmol/L    Anion Gap 16.0 7.0 - 16.0    Glucose 90 65 - 99 mg/dL    Bun 84 (H) 8 - 22 mg/dL    Creatinine 2.26 (H) 0.50 - 1.40 mg/dL    Calcium 10.5 (H) 8.4 - 10.2 mg/dL    AST(SGOT) 16 12 - 45 U/L    ALT(SGPT) 11 2 - 50 U/L    Alkaline Phosphatase 72 30 - 99 U/L    Total Bilirubin 0.3 0.1 - 1.5 mg/dL    Albumin 3.7 3.2 - 4.9 g/dL    Total Protein 7.1 6.0 - 8.2 g/dL    Globulin 3.4 1.9 - 3.5 g/dL    A-G Ratio 1.1 g/dL   Lipase   Result Value Ref Range    Lipase 85 (H) 7 - 58 U/L   Urinalysis    Specimen: Urine   Result Value Ref Range    Color Yellow     Character Clear     Specific Gravity 1.025 <1.035    Ph 5.0 5.0 - 8.0    Glucose Negative Negative mg/dL    Ketones Negative Negative mg/dL    Protein Negative Negative mg/dL    Bilirubin Moderate (A) Negative    Nitrite Negative Negative    Leukocyte Esterase Negative Negative    Occult Blood Negative Negative    Micro Urine Req see below    LACTIC ACID   Result Value Ref Range    Lactic Acid 1.3 0.5 - 2.0 mmol/L   CORRECTED CALCIUM   Result Value Ref Range    Correct Calcium 10.7 (H) 8.5 - 10.5 mg/dL   ESTIMATED GFR   Result Value Ref Range    GFR (CKD-EPI) 22 (A) >60 mL/min/1.73 m 2   DIFFERENTIAL MANUAL    Result Value Ref Range    Bands-Stabs 43.00 (H) 0.00 - 10.00 %    Metamyelocytes 1.00 %    Manual Diff Status PERFORMED    PLATELET ESTIMATE   Result Value Ref Range    Plt Estimation Normal    MORPHOLOGY   Result Value Ref Range    RBC Morphology Normal        RADIOLOGY  CT-ABDOMEN-PELVIS W/O   Final Result      1.  Dilated loops of small intestine with air/blood levels consistent with small bowel obstruction.      2.  Fatty liver.      3.  Multiple left-sided renal stones measuring up to 2 cm in size.      4.  Prior hysterectomy.      5.  Sigmoid diverticulosis.          HYDRATION: Based on the patient's presentation of Acute Kindney Injury, Dehydration, and Inability to take oral fluids the patient was given IV fluids. IV Hydration was used because oral hydration was not adequate alone. Upon recheck following hydration, the patient was stable.    COURSE & MEDICAL DECISION MAKING  Pertinent Labs & Imaging studies reviewed. (See chart for details)  Patient is arrival and previous imaging is consistent with the CT imaging which demonstrates a small bowel obstruction.  The transition point is unclear however the patient remains hemodynamically stable and will require admission for medical management until the surgeon can see her tomorrow.  The case was discussed with Dr. Cotton, general surgeon, who stated understanding the situation and agreed with hospital admission after NG tube placement in the emergency department to help with decompression.      8:20 PM  This was discussed with the patient who stated understanding of the need for NG tube placement.  She currently does not feel as if her pain is well controlled so we will attempt another 4 mg of morphine and continue IV hydration.    ED observation?  No    In addition to the chief complaint, the following problems were addressed: Dehydration, acute kidney injury, hyponatremia    I have discussed management of the patient with the following physicians and  PAULA's: Dr. Cotton, general surgery, Dr. Jang    Discussion of management with other QHP or appropriate source(s): none      The patient will not drink alcohol nor drive with prescribed medications. The patient will return for worsening symptoms and is stable at the time of discharge. The patient verbalizes understanding and will comply.    FINAL IMPRESSION  1. SBO (small bowel obstruction) (HCC)    2. Acute kidney injury (HCC)    3. Dehydration    4. Hyponatremia        Electronically signed by: Keon Sarkar M.D., 1/24/2023 6:31 PM

## 2023-01-25 NOTE — ASSESSMENT & PLAN NOTE
Presented with severe abdominal pain and distention associated with vomiting.  She has not had a bowel obstruction and failed to improve with conservative management/NG tube  Small bowel follow-through on 1/28 revealed a high-grade obstruction therefore she was taken emergently to the operating room later that day  Postop day 7  -surgery cleared patient for dc   -tolerating diet   PT/OT- post acute    PM&R consulted- pt refused   Pending SNF placement     resolved

## 2023-01-25 NOTE — ASSESSMENT & PLAN NOTE
For now we are holding Coreg and lisinopril.    Use as needed labetalol only.    --We will resume the patient's home dose of lisinopril

## 2023-01-25 NOTE — ED TRIAGE NOTES
"Chief Complaint   Patient presents with    Bowel Obstruction     Seen by PCP, abd xray today showing SBO. Onset of symptoms 5 days ago +diarrhea that has since resolved, bloated, N w/o V.      BP (!) 140/62   Pulse 82   Temp 36.3 °C (97.3 °F) (Temporal)   Resp 18   Ht 1.702 m (5' 7\")   Wt 99.4 kg (219 lb 2.2 oz)   SpO2 94%   BMI 34.32 kg/m²     Pt AO4, amb w steady gait   "

## 2023-01-25 NOTE — CARE PLAN
The patient is Stable - Low risk of patient condition declining or worsening    Shift Goals  Clinical Goals: Pt able to control pain 4/10 or less, tolerate NGT and NPO during this shift  Patient Goals: Pt able to sleep comfortably with manageable pain  Family Goals: n/a    Progress made toward(s) clinical / shift goals:  Pt reported abdominal pain, medicated per MAR. States did not get enough sleep throughout the night. Tolerating NGT placement and draining bilious drainage. Fall risk precaution in place, did not sustain any falls during this shift.     Desat at 84%, put on 2L O2 via NC, improved to 94%.     Patient is not progressing towards the following goals:  No BM for 4 days per pt.    Problem: Bowel Elimination  Goal: Establish and maintain regular bowel function  Outcome: Not Progressing

## 2023-01-25 NOTE — ASSESSMENT & PLAN NOTE
The patient at home uses CPAP unable to use with NGT  Oxygen at night  She is requiring 4L at this time due to atelectasis    2/7: We will add nocturnal CPAP

## 2023-01-25 NOTE — ASSESSMENT & PLAN NOTE
The patient has chronic arthritis and has been treated with prednisone 5 mg daily  Continue IV Solu-Medrol as IV substitute

## 2023-01-25 NOTE — ASSESSMENT & PLAN NOTE
With dehydration her calcium levels have gone up as well.  Hx of hyperCa  Corrected with IVF  Monitor vs OP workup

## 2023-01-25 NOTE — PROGRESS NOTES
[facility] Medicine Daily Progress Note    Date of Service  1/25/2023    Chief Complaint  Clau Vences is a 77 y.o. female admitted 1/24/2023 with abdominal pain and constipation    Hospital Course  History of hysterectomy and appendectomy 4 years ago, sleep apnea on CPAP, arthritis on chronic prednisone therapy, hypertension, hyperlipidemia and anxiety, who was admitted for abdominal pain and distention associated with 4 days of constipation.  She was found to have a small bowel obstruction.  General surgery was consulted and recommended conservative management NG tube placement.    Interval Problem Update  1/25: +gas per patient, NGT w bilious output.  Very distended with absent bowel sounds.  Cr improving    I have discussed this patient's plan of care and discharge plan at IDT rounds today with Case Management, Nursing, Nursing leadership, and other members of the IDT team.    Consultants/Specialty  general surgery    Code Status  Full Code    Disposition  Patient is not medically cleared for discharge.   Anticipate discharge to to home with close outpatient follow-up.  I have placed the appropriate orders for post-discharge needs.    Review of Systems  Review of Systems   Constitutional:  Negative for chills, fever and malaise/fatigue.   HENT:  Negative for sore throat.    Respiratory:  Negative for cough and shortness of breath.    Cardiovascular:  Negative for chest pain and palpitations.   Gastrointestinal:  Positive for abdominal pain, nausea and vomiting. Negative for blood in stool, diarrhea and heartburn.        Distention   Genitourinary:  Negative for dysuria and frequency.   Musculoskeletal:  Negative for back pain and myalgias.   Neurological:  Negative for dizziness, focal weakness, weakness and headaches.   Psychiatric/Behavioral:  Negative for depression and hallucinations.    All other systems reviewed and are negative.     Physical Exam  Temp:  [36.3 °C (97.3 °F)-36.7 °C (98.1 °F)] (P)  36.6 °C (97.8 °F)  Pulse:  [79-91] (P) 86  Resp:  [16-18] (P) 17  BP: (132-140)/(57-74) (P) 164/74  SpO2:  [84 %-95 %] (P) 95 %    Physical Exam  Constitutional:       General: She is not in acute distress.     Appearance: She is ill-appearing.   HENT:      Nose: Nose normal.      Mouth/Throat:      Mouth: Mucous membranes are dry.   Cardiovascular:      Rate and Rhythm: Normal rate and regular rhythm.      Pulses: Normal pulses.   Pulmonary:      Comments: Decreased effort and decreased breath sounds at bilateral bases  Abdominal:      General: There is distension.      Tenderness: There is abdominal tenderness.   Musculoskeletal:         General: No swelling.      Cervical back: No muscular tenderness.   Lymphadenopathy:      Cervical: No cervical adenopathy.   Skin:     General: Skin is warm and dry.      Findings: No rash.   Neurological:      General: No focal deficit present.      Mental Status: She is alert and oriented to person, place, and time.      Motor: Weakness present.   Psychiatric:         Mood and Affect: Mood normal.         Thought Content: Thought content normal.       Fluids    Intake/Output Summary (Last 24 hours) at 1/25/2023 1048  Last data filed at 1/25/2023 0759  Gross per 24 hour   Intake 935.42 ml   Output 100 ml   Net 835.42 ml       Laboratory  Recent Labs     01/24/23 1828 01/25/23  0243   WBC 5.1 4.7*   RBC 4.42 4.08*   HEMOGLOBIN 14.6 13.5   HEMATOCRIT 43.1 40.2   MCV 97.5 98.5*   MCH 33.0 33.1*   MCHC 33.9 33.6   RDW 43.0 43.3   PLATELETCT 376 310   MPV 10.3 10.3     Recent Labs     01/24/23 1828 01/25/23  0243   SODIUM 127* 132*   POTASSIUM 4.7 4.5   CHLORIDE 88* 95*   CO2 23 19*   GLUCOSE 90 74   BUN 84* 76*   CREATININE 2.26* 1.63*   CALCIUM 10.5* 9.7                   Imaging  DX-ABDOMEN FOR TUBE PLACEMENT   Final Result         1.  Air-filled distended bowel loops in the upper abdomen, could represent ileus versus enterocolitis or evolving obstructive changes. Radiographic  follow-up to resolution as clinically appropriate.   2.  Nasogastric tube tip terminates overlying the expected location of the gastric fundus.   3.  Left lower lobe atelectasis, infiltrate, and/or effusion.   4.  Cardiomegaly   5.  Atherosclerosis      CT-ABDOMEN-PELVIS W/O   Final Result      1.  Dilated loops of small intestine with air/blood levels consistent with small bowel obstruction.      2.  Fatty liver.      3.  Multiple left-sided renal stones measuring up to 2 cm in size.      4.  Prior hysterectomy.      5.  Sigmoid diverticulosis.           Assessment/Plan  * SBO (small bowel obstruction) (HCC)- (present on admission)  Assessment & Plan  Patient 4 years ago had a hysterectomy with appendectomy.  Diarrhea 5d ago followed by distention and obstipation  Came to ER found to have SBO, no prior hx of obstruction  D/W Dr. Cotton, conservative management for now  If no improvement in 24-48, would need washout  NG tube to low intermittent suction  Pain management  Continue NS    Acute respiratory failure with hypoxia (HCC)  Assessment & Plan  At baseline she has obesity and sleep apnea however I suspect she has some degree of atelectasis secondary to abdominal distention  She is requiring 5L  Encourage IS  Supplemental O2 as needed  Wean as able    Arthritis  Assessment & Plan  The patient has chronic arthritis and has been treated with prednisone 5 mg daily.  Since we are unable to continue oral intake the patient will be given Solu-Medrol 20 mg IV daily which is about the equivalent dose.  The arthritis is mostly in her back and her hips.      Hypercalcemia  Assessment & Plan  With dehydration her calcium levels have gone up as well.  Hx of hypernatremia  Corrected with IVF  Monitor vs OP workup    Acute renal failure (ARF) (HCC)  Assessment & Plan  Secondary to the dehydration, improving  Continue NS    Hyponatremia  Assessment & Plan  Continue NS    Obesity (BMI 30-39.9)- (present on admission)  Assessment  & Plan  Body mass index is 34.32 kg/m².  Outpatient weight loss management program highly recommended    Anxiety- (present on admission)  Assessment & Plan  Chronic anxiety and the patient at home was on Zoloft.    For now hold the Zoloft while npo    AZUL on CPAP- (present on admission)  Assessment & Plan  The patient at home uses CPAP but I am not sure that she will be able to tolerate CPAP with an NG tube in place.  She desaturated to 80% on 2L with sleep  Increase O2 at night and with naps    Hyperlipidemia- (present on admission)  Assessment & Plan  Once the patient is able to resume oral intake low-fat low-cholesterol diet with Crestor and cholestyramine will be recommended.    HTN (hypertension)- (present on admission)  Assessment & Plan  For now we are holding Coreg and lisinopril.    Use as needed labetalol only.         VTE prophylaxis: SCDs/TEDs    I have performed a physical exam and reviewed and updated ROS and Plan today (1/25/2023). In review of yesterday's note (1/24/2023), there are no changes except as documented above.    Total time:  40 minutes.  I spent greater than 50% of the time for patient care, counseling, and coordination on this date, including unit/floor time, and face-to-face time with the patient as per interval events and assessment and plan above

## 2023-01-25 NOTE — TELEPHONE ENCOUNTER
Spoke with Dr. Andrew in radiology regarding evidence of SBO on abdominal xrays today. I then contacted patient to relay imaging information/ directed her to proceed to [facility] for further workup and management. All questions answered as able.

## 2023-01-25 NOTE — H&P
"Surgical History and Physical    Date: 1/25/2023    Requesting Physician: ER  PCP: Marge Brasher M.D.  Attending Physician: Jill Cotton M.D. [facility]    CC: \"I'm uncomfortable\"    HPI: This is a 77 y.o. female who is presenting with complaints of abdominal pain and distention. About 5 days ago she had diarrhea and subsequent to that had worsening abdominal pain and then obstipation. Her last BM was 4 days ago.     She reports she didn't get much rest last night as she is uncomfortable. She has passed a small amount of gas. Still feels very distended and uncomfortable. Her NG drainage is bilious.     Past Medical History:   Diagnosis Date    Anesthesia     low O2 sat    Arthritis     osteo    Back pain     Dyslipidemia     Persian measles     Heart murmur 9/13/2017    Hypertension     Influenza     Mumps     Other specified symptom associated with female genital organs     Painful joint     Psychiatric problem     Sleep apnea     c-pap with 3 l/nc    Snoring     Sore muscles     Tonsillitis     Unspecified cataract        Past Surgical History:   Procedure Laterality Date    KNEE ARTHROPLASTY TOTAL Right 12/27/2017    HYSTERECTOMY ROBOTIC  10/23/2014    Performed by Smith Lyons M.D. at SURGERY [facility]S    GASTROSCOPY WITH BIOPSY  7/2/2014    Performed by Sky Vila M.D. at SURGERY [facility]S    OTHER ORTHOPEDIC SURGERY  2006    left total knee    APPENDECTOMY      ARTHROSCOPY, KNEE      HYSTERECTOMY LAPAROSCOPY      OTHER      meghan cataracts    OTHER ABDOMINAL SURGERY      Resection of pelvic mass, uterus and ovaries    [place] BREAST REDUCTION Bilateral     1982    [place] REMV 2ND CATARACT,CORN-SCLER SECTN      TONSILLECTOMY      TONSILLECTOMY AND ADENOIDECTOMY         Current Facility-Administered Medications   Medication Dose Route Frequency Provider Last Rate Last Admin    Pharmacy Consult: Enteral tube insertion - review meds/change route/product selection   Other PHARMACY TO " DOSE Keon Sarkar M.D.        NS infusion   Intravenous Continuous Stephanie Jang M.D. 100 mL/hr at 01/24/23 2205 New Bag at 01/24/23 2205    labetalol (NORMODYNE/TRANDATE) injection 10 mg  10 mg Intravenous Q4HRS PRN Stephanie Jang M.D.        ondansetron (ZOFRAN) syringe/vial injection 4 mg  4 mg Intravenous Q4HRS PRN Stephanie Jang M.D.        ondansetron (ZOFRAN ODT) dispertab 4 mg  4 mg Oral Q4HRS PRN Stephanie Jang M.D.        morphine 4 MG/ML injection 1 mg  1 mg Intravenous Q2HRS PRN Stephanie Jang M.D.        morphine 4 MG/ML injection 2 mg  2 mg Intravenous Q2HRS PRN Stephanie Jang M.D.   2 mg at 01/24/23 2248    methylPREDNISolone (SOLU-MEDROL) 40 MG injection 20 mg  20 mg Intravenous DAILY Stephanie Jang M.D.        melatonin tablet 5 mg  5 mg Oral Nightly Faith Sanford M.D.           Social History     Socioeconomic History    Marital status:      Spouse name: Not on file    Number of children: Not on file    Years of education: Not on file    Highest education level: Not on file   Occupational History    Not on file   Tobacco Use    Smoking status: Never    Smokeless tobacco: Never   Vaping Use    Vaping Use: Never used   Substance and Sexual Activity    Alcohol use: Yes     Alcohol/week: 0.6 - 1.2 oz     Types: 1 - 2 Glasses of wine per week     Comment: 2 glasses of wine a day     Drug use: No    Sexual activity: Not on file     Comment: , 2 children   Other Topics Concern    Not on file   Social History Narrative    Not on file     Social Determinants of Health     Financial Resource Strain: Not on file   Food Insecurity: Not on file   Transportation Needs: Not on file   Physical Activity: Not on file   Stress: Not on file   Social Connections: Not on file   Intimate Partner Violence: Not on file   Housing Stability: Not on file       Family History   Problem Relation Age of Onset    Breast Cancer Mother     Cancer Mother 65        Breast    Cancer Father         Colon     "Alcohol/Drug Father     Colon Cancer Father     Hyperlipidemia Brother     Heart Disease Brother         arrythmia       Allergies:  Tramadol    Review of Systems:  Constitutional: Negative for fever, chills, weight loss, malaise/fatigue and diaphoresis.   HENT: Negative for hearing loss, ear pain, nosebleeds, congestion, sore throat, neck pain, tinnitus and ear discharge.    Eyes: Negative for blurred vision, double vision, photophobia, pain, discharge and redness.   Respiratory: Negative for cough, hemoptysis, sputum production, shortness of breath, wheezing and stridor.    Cardiovascular: Negative for chest pain, palpitations, orthopnea, claudication, leg swelling and PND.   Gastrointestinal: Negative for heartburn, nausea, vomiting, abdominal pain, diarrhea, constipation, blood in stool and melena.   Genitourinary: Negative for dysuria, urgency, frequency, hematuria and flank pain.   Musculoskeletal: Negative for myalgias, back pain, joint pain and falls.   Skin: Negative for itching and rash.  Neurological: Negative for dizziness, tingling, tremors, sensory change, speech change, focal weakness, seizures, loss of consciousness, weakness and headaches.   Endo/Heme/Allergies: Negative for environmental allergies and polydipsia. Does not bruise/bleed easily.   Psychiatric/Behavioral: Negative for depression, suicidal ideas, hallucinations, memory loss and substance abuse. The patient is not nervous/anxious and does not have insomnia.    Physical Exam:  /71   Pulse 86   Temp 36.6 °C (97.9 °F) (Temporal)   Resp 16   Ht 1.702 m (5' 7\")   Wt 99.4 kg (219 lb 2.2 oz)   SpO2 94%     Constitutional: she is oriented to person, place, and time.  she appears well-developed and well-nourished. She is anxious.  Head: Normocephalic and atraumatic.   Neck: Normal range of motion. Neck supple. No JVD present. No tracheal deviation present. No thyromegaly present.   Cardiovascular: Normal rate, regular rhythm, normal " heart sounds and intact distal pulses.  Exam reveals no gallop and no friction rub.  No murmur heard.  Pulmonary/Chest: Effort normal and breath sounds normal. No stridor. No respiratory distress. she has no wheezes. she has no rales.   Abdominal: Distended, non-tender to palpation. No hernias or masses. Quiet bowel tones.   Musculoskeletal: Normal range of motion. she exhibits no edema and no tenderness.   Neurological: she is alert and oriented to person, place, and time. she has normal reflexes. No cranial nerve deficit. Coordination normal.   Skin: Skin is warm and dry. No rash noted. she is not diaphoretic. No erythema. No pallor.   Psychiatric: she has a normal mood and affect.  Behavior is normal.       Labs:  Recent Labs     01/24/23 1828 01/25/23  0243   WBC 5.1 4.7*   RBC 4.42 4.08*   HEMOGLOBIN 14.6 13.5   HEMATOCRIT 43.1 40.2   MCV 97.5 98.5*   MCH 33.0 33.1*   MCHC 33.9 33.6   RDW 43.0 43.3   PLATELETCT 376 310   MPV 10.3 10.3     Recent Labs     01/24/23 1828 01/25/23  0243   SODIUM 127* 132*   POTASSIUM 4.7 4.5   CHLORIDE 88* 95*   CO2 23 19*   GLUCOSE 90 74   BUN 84* 76*   CREATININE 2.26* 1.63*   CALCIUM 10.5* 9.7         Recent Labs     01/24/23 1828   ASTSGOT 16   ALTSGPT 11   TBILIRUBIN 0.3   ALKPHOSPHAT 72   GLOBULIN 3.4       Radiology:  1/24/2023 7:29 PM     HISTORY/REASON FOR EXAM:  Abdominal pain for 5 days.        TECHNIQUE/EXAM DESCRIPTION:  CT scan of the abdomen and pelvis without contrast.     Noncontrast helical scanning was obtained from the diaphragmatic domes through the pubic symphysis.     Low dose optimization technique was utilized for this CT exam including automated exposure control and adjustment of the mA and/or kV according to patient size.     COMPARISON: 8/6/2014     FINDINGS:  Lower Chest: Unremarkable.     Liver: Fatty change.     Spleen: Unremarkable.     Pancreas: Fatty change.     Gallbladder: No calcified stones.     Biliary: Nondilated.     Adrenal glands:  Normal.     Kidneys: There are multiple left-sided renal stones largest of which measures 2 cm in size. The largest stone measures 500 Hounsfield units. No evidence of ureteral stone or hydronephrosis.     Bowel: There are multiple dilated loops of small intestine with air/fluid levels consistent with small bowel obstruction. There is gas within the colon as well. There is relatively decompressed terminal ileum. There is sigmoid diverticulosis.     Lymph nodes: No adenopathy.     Vasculature: There is vascular calcification..     Peritoneum: Unremarkable without ascites.     Musculoskeletal: No acute or destructive process.     Pelvis: There is been prior hysterectomy..           IMPRESSION:     1.  Dilated loops of small intestine with air/blood levels consistent with small bowel obstruction.     2.  Fatty liver.     3.  Multiple left-sided renal stones measuring up to 2 cm in size.     4.  Prior hysterectomy.     5.  Sigmoid diverticulosis.    The radiologist's interpretation of all images has been reviewed by me.     Assessment: This is a 77 y.o. female with a small bowel obstruction.     Plan: Expectant management with NG tube decompression, bowel rest, and fluid resuscitation. Will follow closely with primary team for resolution or worsening. If she does not clinically resolve in the next 24-48h, given how long she has been obstipated at home, she may need surgical intervention. This was discussed with her and she understands.    Long standing hypercalcemia with non supressed PTH - most likely primary hyperparathyroidism - will need outpt workup.     Thank you very much for this consultation. Will follow closely with you.     Jill Cotton M.D.  [facility]  [phone]

## 2023-01-25 NOTE — DISCHARGE PLANNING
Case Management Discharge Planning    Admission Date: 1/24/2023  GMLOS: 3  ALOS: 1    6-Clicks ADL Score: 22  6-Clicks Mobility Score: 21      Anticipated Discharge Dispo: Discharge Disposition: Discharged to home/self care (01)    DME Needed: No    Action(s) Taken: Updated Provider/Nurse on Discharge Plan    No anticipated DC needs. RN CM will continue to follow.     Escalations Completed: None    Medically Clear: No    Next Steps: Medical clearance    Barriers to Discharge: Medical clearance

## 2023-01-25 NOTE — ASSESSMENT & PLAN NOTE
Secondary to the dehydration  Continue NS until taking PO  Avoid nephrotoxic medications     resolved

## 2023-01-25 NOTE — ASSESSMENT & PLAN NOTE
Once the patient is able to resume oral intake low-fat low-cholesterol diet with Crestor and cholestyramine will be recommended.

## 2023-01-25 NOTE — ASSESSMENT & PLAN NOTE
At baseline she has obesity and sleep apnea her acute component was secondary to atelectasis secondary to abdominal distention  Remains on oxygen,4L  Encourage IS  Supplemental O2 as needed    2/7: Patient currently on 1L. Concern for aspiration pneumonia, continue antibiotics.

## 2023-01-25 NOTE — PROGRESS NOTES
Med rec updated and complete, per pt   Allergies reviewed, per pt  Pt reports that she has been having diarrhea and put a hold on her CHOLESTYRAMINE 4 G

## 2023-01-26 ENCOUNTER — APPOINTMENT (OUTPATIENT)
Dept: RADIOLOGY | Facility: MEDICAL CENTER | Age: 78
DRG: 335 | End: 2023-01-26
Attending: HOSPITALIST
Payer: MEDICARE

## 2023-01-26 LAB
ALBUMIN SERPL BCP-MCNC: 3.2 G/DL (ref 3.2–4.9)
BASOPHILS # BLD AUTO: 0 % (ref 0–1.8)
BASOPHILS # BLD: 0 K/UL (ref 0–0.12)
BUN SERPL-MCNC: 43 MG/DL (ref 8–22)
CALCIUM ALBUM COR SERPL-MCNC: 10.8 MG/DL (ref 8.5–10.5)
CALCIUM SERPL-MCNC: 10.2 MG/DL (ref 8.4–10.2)
CHLORIDE SERPL-SCNC: 104 MMOL/L (ref 96–112)
CO2 SERPL-SCNC: 21 MMOL/L (ref 20–33)
CREAT SERPL-MCNC: 0.9 MG/DL (ref 0.5–1.4)
EOSINOPHIL # BLD AUTO: 0 K/UL (ref 0–0.51)
EOSINOPHIL NFR BLD: 0 % (ref 0–6.9)
ERYTHROCYTE [DISTWIDTH] IN BLOOD BY AUTOMATED COUNT: 45 FL (ref 35.9–50)
GFR SERPLBLD CREATININE-BSD FMLA CKD-EPI: 65 ML/MIN/1.73 M 2
GLUCOSE SERPL-MCNC: 105 MG/DL (ref 65–99)
HCT VFR BLD AUTO: 42.7 % (ref 37–47)
HGB BLD-MCNC: 14.1 G/DL (ref 12–16)
LYMPHOCYTES # BLD AUTO: 0.61 K/UL (ref 1–4.8)
LYMPHOCYTES NFR BLD: 10 % (ref 22–41)
MAGNESIUM SERPL-MCNC: 1.7 MG/DL (ref 1.5–2.5)
MANUAL DIFF BLD: NORMAL
MCH RBC QN AUTO: 33.6 PG (ref 27–33)
MCHC RBC AUTO-ENTMCNC: 33 G/DL (ref 33.6–35)
MCV RBC AUTO: 101.7 FL (ref 81.4–97.8)
MONOCYTES # BLD AUTO: 0.55 K/UL (ref 0–0.85)
MONOCYTES NFR BLD AUTO: 9 % (ref 0–13.4)
NEUTROPHILS # BLD AUTO: 4.94 K/UL (ref 2–7.15)
NEUTROPHILS NFR BLD: 74 % (ref 44–72)
NEUTS BAND NFR BLD MANUAL: 7 % (ref 0–10)
NRBC # BLD AUTO: 0 K/UL
NRBC BLD-RTO: 0 /100 WBC
PHOSPHATE SERPL-MCNC: 2.1 MG/DL (ref 2.5–4.5)
PLATELET # BLD AUTO: 334 K/UL (ref 164–446)
PMV BLD AUTO: 9.9 FL (ref 9–12.9)
POTASSIUM SERPL-SCNC: 4.8 MMOL/L (ref 3.6–5.5)
RBC # BLD AUTO: 4.2 M/UL (ref 4.2–5.4)
SODIUM SERPL-SCNC: 140 MMOL/L (ref 135–145)
WBC # BLD AUTO: 6.1 K/UL (ref 4.8–10.8)

## 2023-01-26 PROCEDURE — 80069 RENAL FUNCTION PANEL: CPT

## 2023-01-26 PROCEDURE — 700111 HCHG RX REV CODE 636 W/ 250 OVERRIDE (IP): Performed by: HOSPITALIST

## 2023-01-26 PROCEDURE — 74018 RADEX ABDOMEN 1 VIEW: CPT

## 2023-01-26 PROCEDURE — 770001 HCHG ROOM/CARE - MED/SURG/GYN PRIV*

## 2023-01-26 PROCEDURE — 85007 BL SMEAR W/DIFF WBC COUNT: CPT

## 2023-01-26 PROCEDURE — 83735 ASSAY OF MAGNESIUM: CPT

## 2023-01-26 PROCEDURE — 700111 HCHG RX REV CODE 636 W/ 250 OVERRIDE (IP): Performed by: INTERNAL MEDICINE

## 2023-01-26 PROCEDURE — 85025 COMPLETE CBC W/AUTO DIFF WBC: CPT

## 2023-01-26 PROCEDURE — 99232 SBSQ HOSP IP/OBS MODERATE 35: CPT | Performed by: INTERNAL MEDICINE

## 2023-01-26 PROCEDURE — 36415 COLL VENOUS BLD VENIPUNCTURE: CPT

## 2023-01-26 RX ORDER — MAGNESIUM SULFATE HEPTAHYDRATE 40 MG/ML
2 INJECTION, SOLUTION INTRAVENOUS ONCE
Status: COMPLETED | OUTPATIENT
Start: 2023-01-26 | End: 2023-01-26

## 2023-01-26 RX ORDER — ONDANSETRON 4 MG/1
4 TABLET, ORALLY DISINTEGRATING ORAL EVERY 4 HOURS PRN
Status: DISCONTINUED | OUTPATIENT
Start: 2023-01-26 | End: 2023-02-01

## 2023-01-26 RX ORDER — CHOLECALCIFEROL (VITAMIN D3) 125 MCG
5 CAPSULE ORAL NIGHTLY
Status: DISCONTINUED | OUTPATIENT
Start: 2023-01-26 | End: 2023-02-01

## 2023-01-26 RX ADMIN — ONDANSETRON 4 MG: 2 INJECTION INTRAMUSCULAR; INTRAVENOUS at 22:19

## 2023-01-26 RX ADMIN — METHYLPREDNISOLONE SODIUM SUCCINATE 20 MG: 40 INJECTION, POWDER, FOR SOLUTION INTRAMUSCULAR; INTRAVENOUS at 05:50

## 2023-01-26 RX ADMIN — LORAZEPAM 1 MG: 2 INJECTION INTRAMUSCULAR; INTRAVENOUS at 20:27

## 2023-01-26 RX ADMIN — MAGNESIUM SULFATE HEPTAHYDRATE 2 G: 40 INJECTION, SOLUTION INTRAVENOUS at 11:35

## 2023-01-26 ASSESSMENT — ENCOUNTER SYMPTOMS
DEPRESSION: 0
SHORTNESS OF BREATH: 0
VOMITING: 0
BLOOD IN STOOL: 0
PALPITATIONS: 0
FOCAL WEAKNESS: 0
DIARRHEA: 0
COUGH: 0
FEVER: 0
ABDOMINAL PAIN: 1
HEADACHES: 0
WEAKNESS: 0
CHILLS: 0
DIZZINESS: 0
NAUSEA: 0
HEARTBURN: 0
SORE THROAT: 0
BACK PAIN: 0
ROS GI COMMENTS: DISTENTION IMPROVED
MYALGIAS: 0
HALLUCINATIONS: 0

## 2023-01-26 ASSESSMENT — PAIN DESCRIPTION - PAIN TYPE
TYPE: ACUTE PAIN
TYPE: ACUTE PAIN

## 2023-01-26 ASSESSMENT — PATIENT HEALTH QUESTIONNAIRE - PHQ9
SUM OF ALL RESPONSES TO PHQ9 QUESTIONS 1 AND 2: 0
2. FEELING DOWN, DEPRESSED, IRRITABLE, OR HOPELESS: NOT AT ALL
1. LITTLE INTEREST OR PLEASURE IN DOING THINGS: NOT AT ALL

## 2023-01-26 NOTE — PROGRESS NOTES
Hospital Medicine Daily Progress Note    Date of Service  1/26/2023    Chief Complaint  Clau Vences is a 77 y.o. female admitted 1/24/2023 with abdominal pain and constipation    Hospital Course  History of hysterectomy and appendectomy 4 years ago, sleep apnea on CPAP, arthritis on chronic prednisone therapy, hypertension, hyperlipidemia and anxiety, who was admitted for abdominal pain and distention associated with 4 days of constipation.  She was found to have a small bowel obstruction.  General surgery was consulted and recommended conservative management NG tube placement.    Interval Problem Update  1/25: +gas per patient, NGT w bilious output.  Very distended with absent bowel sounds.  Cr improving  1/26: +BM overnight x3, less distention and pain.  Has weaned to room air.  D/w surgery, possibly remove ngt this afternoon    I have discussed this patient's plan of care and discharge plan at IDT rounds today with Case Management, Nursing, Nursing leadership, and other members of the IDT team.    Consultants/Specialty  general surgery    Code Status  Full Code    Disposition  Patient is not medically cleared for discharge.   Anticipate discharge to to home with close outpatient follow-up.  I have placed the appropriate orders for post-discharge needs.    Review of Systems  Review of Systems   Constitutional:  Positive for malaise/fatigue. Negative for chills and fever.   HENT:  Negative for sore throat.    Respiratory:  Negative for cough and shortness of breath.    Cardiovascular:  Negative for chest pain and palpitations.   Gastrointestinal:  Positive for abdominal pain (Much better). Negative for blood in stool, diarrhea, heartburn, nausea and vomiting.        Distention improved   Genitourinary:  Negative for dysuria and frequency.   Musculoskeletal:  Negative for back pain and myalgias.   Neurological:  Negative for dizziness, focal weakness, weakness and headaches.   Psychiatric/Behavioral:   Negative for depression and hallucinations.    All other systems reviewed and are negative.     Physical Exam  Temp:  [37 °C (98.6 °F)-37.1 °C (98.7 °F)] 37 °C (98.6 °F)  Pulse:  [86-96] 86  Resp:  [17-18] 18  BP: ()/(62-71) 144/69  SpO2:  [93 %-96 %] 96 %    Physical Exam  Constitutional:       General: She is not in acute distress.     Appearance: She is ill-appearing.   HENT:      Nose: Nose normal.      Mouth/Throat:      Mouth: Mucous membranes are dry.   Cardiovascular:      Rate and Rhythm: Normal rate and regular rhythm.      Pulses: Normal pulses.   Pulmonary:      Effort: Pulmonary effort is normal. No respiratory distress.   Abdominal:      General: There is distension.      Palpations: Abdomen is soft.   Musculoskeletal:         General: No swelling.      Cervical back: No muscular tenderness.   Lymphadenopathy:      Cervical: No cervical adenopathy.   Skin:     General: Skin is warm and dry.      Findings: No rash.   Neurological:      General: No focal deficit present.      Mental Status: She is alert and oriented to person, place, and time.      Motor: Weakness present.   Psychiatric:         Mood and Affect: Mood normal.         Thought Content: Thought content normal.       Fluids    Intake/Output Summary (Last 24 hours) at 1/26/2023 1101  Last data filed at 1/26/2023 0639  Gross per 24 hour   Intake 2057.17 ml   Output 800 ml   Net 1257.17 ml       Laboratory  Recent Labs     01/24/23 1828 01/25/23  0243 01/26/23  0807   WBC 5.1 4.7* 6.1   RBC 4.42 4.08* 4.20   HEMOGLOBIN 14.6 13.5 14.1   HEMATOCRIT 43.1 40.2 42.7   MCV 97.5 98.5* 101.7*   MCH 33.0 33.1* 33.6*   MCHC 33.9 33.6 33.0*   RDW 43.0 43.3 45.0   PLATELETCT 376 310 334   MPV 10.3 10.3 9.9     Recent Labs     01/24/23  1828 01/25/23  0243 01/26/23  0807   SODIUM 127* 132* 140   POTASSIUM 4.7 4.5 4.8   CHLORIDE 88* 95* 104   CO2 23 19* 21   GLUCOSE 90 74 105*   BUN 84* 76* 43*   CREATININE 2.26* 1.63* 0.90   CALCIUM 10.5* 9.7 10.2                    Imaging  DX-ABDOMEN FOR TUBE PLACEMENT   Final Result         1.  Air-filled distended bowel loops in the upper abdomen, could represent ileus versus enterocolitis or evolving obstructive changes. Radiographic follow-up to resolution as clinically appropriate.   2.  Nasogastric tube tip terminates overlying the expected location of the gastric fundus.   3.  Left lower lobe atelectasis, infiltrate, and/or effusion.   4.  Cardiomegaly   5.  Atherosclerosis      CT-ABDOMEN-PELVIS W/O   Final Result      1.  Dilated loops of small intestine with air/blood levels consistent with small bowel obstruction.      2.  Fatty liver.      3.  Multiple left-sided renal stones measuring up to 2 cm in size.      4.  Prior hysterectomy.      5.  Sigmoid diverticulosis.           Assessment/Plan  * SBO (small bowel obstruction) (HCC)- (present on admission)  Assessment & Plan  Patient 4 years ago had a hysterectomy with appendectomy-presented with abdominal pain and constipation for about 5 days  Found to have SBO, no prior hx of obstruction  With conservative management she has had improvement today, 3 BMs overnight and distention improved  NG tube to low intermittent suction  Discussed with surgery who will reevaluate this afternoon and likely clamp and remove NG at that time  Possible trial of clear liquids later today  Pain management  Continue NS    Acute respiratory failure with hypoxia (HCC)  Assessment & Plan  At baseline she has obesity and sleep apnea her acute component was secondary to atelectasis secondary to abdominal distention  She has weaned to room air, encourage ambulation  Encourage IS  Supplemental O2 as needed    Arthritis  Assessment & Plan  The patient has chronic arthritis and has been treated with prednisone 5 mg daily  Continue IV Solu-Medrol as IV substitute today, likely resume p.o. prednisone tomorrow    Hypercalcemia  Assessment & Plan  With dehydration her calcium levels have gone up  as well.  Hx of hyperCa  Corrected with IVF  Monitor vs OP workup    Acute renal failure (ARF) (HCC)  Assessment & Plan  Secondary to the dehydration-creatinine has normalized  Continue NS until taking PO    Hyponatremia  Assessment & Plan  Continue NS    Obesity (BMI 30-39.9)- (present on admission)  Assessment & Plan  Body mass index is 34.32 kg/m².  Outpatient weight loss management program highly recommended    Anxiety- (present on admission)  Assessment & Plan  Chronic anxiety and the patient at home was on Zoloft.    For now hold the Zoloft while npo    AZUL on CPAP- (present on admission)  Assessment & Plan  The patient at home uses CPAP but I am not sure that she will be able to tolerate CPAP with an NG tube in place.  She desaturated to 80% on 2L with sleep  Increase O2 at night and with naps    Hyperlipidemia- (present on admission)  Assessment & Plan  Once the patient is able to resume oral intake low-fat low-cholesterol diet with Crestor and cholestyramine will be recommended.    HTN (hypertension)- (present on admission)  Assessment & Plan  For now we are holding Coreg and lisinopril.    Use as needed labetalol only.         VTE prophylaxis: SCDs/TEDs    I have performed a physical exam and reviewed and updated ROS and Plan today (1/26/2023). In review of yesterday's note (1/25/2023), there are no changes except as documented above.    Total time:  40 minutes.  I spent greater than 50% of the time for patient care, counseling, and coordination on this date, including unit/floor time, and face-to-face time with the patient as per interval events and assessment and plan above.

## 2023-01-26 NOTE — PROGRESS NOTES
"Surgical Progress Note:    HD#2 SBO    +BMs overnight and today, feeling much better.    PE:  BP (!) 165/76   Pulse 100   Temp 36.8 °C (98.3 °F) (Oral)   Resp 18   Ht 1.702 m (5' 7\")   Wt 99.4 kg (219 lb 2.2 oz)   SpO2 93%   BMI 34.32 kg/m²     I/O:   Intake/Output Summary (Last 24 hours) at 1/26/2023 1248  Last data filed at 1/26/2023 0639  Gross per 24 hour   Intake 2057.17 ml   Output 800 ml   Net 1257.17 ml         GEN: NAD  COR: RRR  PULM: CTAB  ABD: distended but much softer, NT +active bowel tones  EXT: WWP    Labs:  Recent Labs     01/24/23 1828 01/25/23 0243 01/26/23  0807   WBC 5.1 4.7* 6.1   RBC 4.42 4.08* 4.20   HEMOGLOBIN 14.6 13.5 14.1   HEMATOCRIT 43.1 40.2 42.7   MCV 97.5 98.5* 101.7*   MCH 33.0 33.1* 33.6*   RDW 43.0 43.3 45.0   PLATELETCT 376 310 334   MPV 10.3 10.3 9.9   NEUTSPOLYS 31.00*  --  74.00*   LYMPHOCYTES 11.00*  --  10.00*   MONOCYTES 12.00  --  9.00   EOSINOPHILS 2.00  --  0.00   BASOPHILS 0.00  --  0.00   RBCMORPHOLO Normal  --   --      Recent Labs     01/24/23 1828 01/25/23  0243 01/26/23  0807   SODIUM 127* 132* 140   POTASSIUM 4.7 4.5 4.8   CHLORIDE 88* 95* 104   CO2 23 19* 21   GLUCOSE 90 74 105*   BUN 84* 76* 43*         A/P: HD#2 SBO, improving  - will clamp NG and start clears, if no nausea or distension will d/c NG and ADAT this afternoon.     Jill Cotton M.D.  [facility]  [phone]        "

## 2023-01-26 NOTE — CARE PLAN
The patient is Stable - Low risk of patient condition declining or worsening    Shift Goals  Clinical Goals: Pt will tolerate pain 3/10 or less, free from falls during this shift  Patient Goals: Pt able to sleep more tonight comfortably  Family Goals: n/a    Progress made toward(s) clinical / shift goals:  Pt reported minimal pain, declined medication intervention. NG tube in place,draining bilious output. No N/V. Able to have BM, green stools. Slept for at least 5 hours throughout the night. Fall risk precaution in place, pt did not sustain any falls during this shift.    Patient is not progressing towards the following goals:

## 2023-01-26 NOTE — DISCHARGE PLANNING
Met with pt. She lives alone in a one level home. Reported that she has a walker and she uses it sometimes but not all the time. She reports being independent with ADLs and IADLs. Her daughter Celeste , who was at the bedside, checks on her daily.     PCP if Dr. Marge Brasher.  Pharmacy is [facility].     Care Transition Team Assessment    Information Source  Orientation Level: Oriented X4  Information Given By: Patient  Who is responsible for making decisions for patient? : Patient    Readmission Evaluation  Is this a readmission?: No    Elopement Risk  Legal Hold: No  Ambulatory or Self Mobile in Wheelchair: Yes  Disoriented: No  Psychiatric Symptoms: None  History of Wandering: No  Elopement this Admit: No  Vocalizing Wanting to Leave: No  Displays Behaviors, Body Language Wanting to Leave: No-Not at Risk for Elopement  Elopement Risk: Not at Risk for Elopement    Interdisciplinary Discharge Planning  Does Admitting Nurse Feel This Could be a Complex Discharge?: No  Primary Care Physician: Dr. Marge Brasher  Lives with - Patient's Self Care Capacity: Alone and Able to Care For Self  Patient or legal guardian wants to designate a caregiver: No  Support Systems: Children  Housing / Facility: [address] [place]  Do You Take your Prescribed Medications Regularly: Yes  Able to Return to Previous ADL's: Yes  Mobility Issues: No  Prior Services: None, Home-Independent  Patient Prefers to be Discharged to:: Home  Assistance Needed: No  Durable Medical Equipment: Walker    Discharge Preparedness  What is your plan after discharge?: Home with help  What are your discharge supports?: Child  Prior Functional Level: Ambulatory, Drives Self, Independent with Activities of Daily Living, Needs Assist with Activities of Daily Living  Difficulity with ADLs: None  Difficulity with IADLs: None    Functional Assesment  Prior Functional Level: Ambulatory, Drives Self, Independent with Activities of Daily Living, Needs Assist with  Activities of Daily Living    Finances  Financial Barriers to Discharge: No  Prescription Coverage: Yes    Vision / Hearing Impairment  Vision Impairment : Yes  Right Eye Vision: Impaired, Wears Glasses  Left Eye Vision: Impaired, Wears Glasses  Hearing Impairment : Yes  Hearing Impairment: Both Ears, Hearing Device Not Available  Does Pt Need Special Equipment for the Hearing Impaired?: No    Values / Beliefs / Concerns  Values / Beliefs Concerns : No    Advance Directive  Advance Directive?: None    Domestic Abuse  Have you ever been the victim of abuse or violence?: No  Physical Abuse or Sexual Abuse: No  Verbal Abuse or Emotional Abuse: No  Possible Abuse/Neglect Reported to:: Not Applicable         Discharge Risks or Barriers  Discharge risks or barriers?: No  Patient risk factors: Other (comment)    Anticipated Discharge Information  Discharge Disposition: Discharged to home/self care (01)

## 2023-01-26 NOTE — PROGRESS NOTES
Surg Prog    F/U physical exam  She states she feels about the same, passing a little gas, still very distended  NG scant    Abdomen soft, distended, minimally tender to palpation    Will follow  Consider SBFT tomorrow if not improved    Jill Cotton M.D.  [facility]  [phone]

## 2023-01-26 NOTE — CARE PLAN
The patient is Stable - Low risk of patient condition declining or worsening    Shift Goals  Clinical Goals: pt will report pain less than 3/10 for duration of shift  Patient Goals: Pt able to sleep more tonight comfortably  Family Goals: n/a    Progress made toward(s) clinical / shift goals:  PT did not report any increase in pain and tolerated NG tube removal and clears for the afternoon/evening    Patient is not progressing towards the following goals:

## 2023-01-26 NOTE — PROGRESS NOTES
Report received from Day RN, assumed care of pt.   POC and medications reviewed with pt. Pt verbalized understanding.   AOx4  Denies pain, SOB, or dizziness at this time.   Safety measures in place.  Hourly rounding in place.

## 2023-01-26 NOTE — PROGRESS NOTES
Pt is resting comfortably in bed, not in any distress, on 3L O2 via NC. AAOx4. Pt denies any pain, no N/V at this time. NGT in place, draining bilious discharge on LIS. Discussed POC, answered all questions. Fall risk precaution in place, call light within reach. All needs met at this time. Will continue to monitor.

## 2023-01-27 ENCOUNTER — APPOINTMENT (OUTPATIENT)
Dept: RADIOLOGY | Facility: MEDICAL CENTER | Age: 78
DRG: 335 | End: 2023-01-27
Attending: INTERNAL MEDICINE
Payer: MEDICARE

## 2023-01-27 LAB
ALBUMIN SERPL BCP-MCNC: 2.9 G/DL (ref 3.2–4.9)
BUN SERPL-MCNC: 28 MG/DL (ref 8–22)
CALCIUM ALBUM COR SERPL-MCNC: 10.7 MG/DL (ref 8.5–10.5)
CALCIUM SERPL-MCNC: 9.8 MG/DL (ref 8.4–10.2)
CHLORIDE SERPL-SCNC: 103 MMOL/L (ref 96–112)
CO2 SERPL-SCNC: 24 MMOL/L (ref 20–33)
CREAT SERPL-MCNC: 0.74 MG/DL (ref 0.5–1.4)
GFR SERPLBLD CREATININE-BSD FMLA CKD-EPI: 83 ML/MIN/1.73 M 2
GLUCOSE SERPL-MCNC: 155 MG/DL (ref 65–99)
PHOSPHATE SERPL-MCNC: 1.4 MG/DL (ref 2.5–4.5)
POTASSIUM SERPL-SCNC: 4 MMOL/L (ref 3.6–5.5)
SODIUM SERPL-SCNC: 136 MMOL/L (ref 135–145)

## 2023-01-27 PROCEDURE — 770001 HCHG ROOM/CARE - MED/SURG/GYN PRIV*

## 2023-01-27 PROCEDURE — 700105 HCHG RX REV CODE 258: Performed by: HOSPITALIST

## 2023-01-27 PROCEDURE — 700111 HCHG RX REV CODE 636 W/ 250 OVERRIDE (IP): Performed by: HOSPITALIST

## 2023-01-27 PROCEDURE — 99232 SBSQ HOSP IP/OBS MODERATE 35: CPT | Performed by: INTERNAL MEDICINE

## 2023-01-27 PROCEDURE — 700117 HCHG RX CONTRAST REV CODE 255: Performed by: INTERNAL MEDICINE

## 2023-01-27 PROCEDURE — 80069 RENAL FUNCTION PANEL: CPT

## 2023-01-27 PROCEDURE — 700101 HCHG RX REV CODE 250: Performed by: INTERNAL MEDICINE

## 2023-01-27 PROCEDURE — 94760 N-INVAS EAR/PLS OXIMETRY 1: CPT

## 2023-01-27 PROCEDURE — 700105 HCHG RX REV CODE 258: Performed by: INTERNAL MEDICINE

## 2023-01-27 PROCEDURE — 36415 COLL VENOUS BLD VENIPUNCTURE: CPT

## 2023-01-27 RX ADMIN — ONDANSETRON 4 MG: 2 INJECTION INTRAMUSCULAR; INTRAVENOUS at 19:41

## 2023-01-27 RX ADMIN — LORAZEPAM 1 MG: 2 INJECTION INTRAMUSCULAR; INTRAVENOUS at 05:09

## 2023-01-27 RX ADMIN — POTASSIUM PHOSPHATE, MONOBASIC AND POTASSIUM PHOSPHATE, DIBASIC 30 MMOL: 224; 236 INJECTION, SOLUTION, CONCENTRATE INTRAVENOUS at 12:45

## 2023-01-27 RX ADMIN — ONDANSETRON 4 MG: 2 INJECTION INTRAMUSCULAR; INTRAVENOUS at 04:46

## 2023-01-27 RX ADMIN — SODIUM CHLORIDE: 9 INJECTION, SOLUTION INTRAVENOUS at 04:31

## 2023-01-27 RX ADMIN — IOHEXOL 200 ML: 350 INJECTION, SOLUTION INTRAVENOUS at 11:50

## 2023-01-27 RX ADMIN — METHYLPREDNISOLONE SODIUM SUCCINATE 20 MG: 40 INJECTION, POWDER, FOR SOLUTION INTRAMUSCULAR; INTRAVENOUS at 04:27

## 2023-01-27 RX ADMIN — LORAZEPAM 1 MG: 2 INJECTION INTRAMUSCULAR; INTRAVENOUS at 20:21

## 2023-01-27 ASSESSMENT — ENCOUNTER SYMPTOMS
VOMITING: 1
BACK PAIN: 0
WEAKNESS: 0
BLOOD IN STOOL: 0
COUGH: 0
NAUSEA: 0
PALPITATIONS: 0
DIZZINESS: 0
CHILLS: 0
HEARTBURN: 0
SHORTNESS OF BREATH: 0
ABDOMINAL PAIN: 1
FOCAL WEAKNESS: 0
HEADACHES: 0
DEPRESSION: 0
SORE THROAT: 0
DIARRHEA: 0
FEVER: 0
ROS GI COMMENTS: DISTENTION
MYALGIAS: 0
HALLUCINATIONS: 0

## 2023-01-27 ASSESSMENT — PAIN DESCRIPTION - PAIN TYPE
TYPE: ACUTE PAIN
TYPE: ACUTE PAIN

## 2023-01-27 ASSESSMENT — FIBROSIS 4 INDEX: FIB4 SCORE: 1.11

## 2023-01-27 NOTE — DISCHARGE PLANNING
Case Management Discharge Planning    Admission Date: 1/24/2023  GMLOS: 4.6  ALOS: 3    6-Clicks ADL Score: 22  6-Clicks Mobility Score: 21      Anticipated Discharge Dispo: Discharge Disposition: Discharged to home/self care (01)    DME Needed: No    Action(s) Taken: Updated Provider/Nurse on Discharge Plan    No anticipated DC needs. RN CM will continue to follow.     Escalations Completed: None    Medically Clear: No    Next Steps: Medical clearance    Barriers to Discharge: Medical clearance

## 2023-01-27 NOTE — PROGRESS NOTES
"OVERNIGHT HOSPITALIST:    I provide during rounds by nursing staff the patient had 1 episode of nonbloody emesis around 10 PM this evening.  Her NG tube has been discontinued around 4 PM as she was feeling better.  KUB was done which is demonstrating \"persistent findings of small bowel obstruction \".  Patient received Zofran and at this time she is asymptomatic.  Will order to reinsert NG tube if she continues to vomit overnight.  Otherwise will be followed by surgical team in the morning.        "

## 2023-01-27 NOTE — CARE PLAN
The patient is Stable - Low risk of patient condition declining or worsening    Shift Goals  Clinical Goals: Pt will tolerate NG placement and report improvement in n/v  Patient Goals: Patient will be able to rest and sleep comfortably.  Family Goals: n/a    Progress made toward(s) clinical / shift goals:  PT tolerated new NG placement well on shift, and had no more episodes of Nausea and vomiting     Patient is not progressing towards the following goals:      Problem: Bowel Elimination  Goal: Establish and maintain regular bowel function  Outcome: Not Progressing

## 2023-01-27 NOTE — CARE PLAN
The patient is Stable - Low risk of patient condition declining or worsening    Shift Goals  Clinical Goals: Patient's pain will remain manageable at < 4/10.  Patient Goals: Patient will be able to rest and sleep comfortably.  Family Goals: n/a    Progress made toward(s) clinical / shift goals:  Patient's pain remained manageable. Patient complained of nausea and given medication per MAR. Patient's belongings and call light within reach. All needs met.    Patient is not progressing towards the following goals:

## 2023-01-27 NOTE — PROGRESS NOTES
[facility] Medicine Daily Progress Note    Date of Service  1/27/2023    Chief Complaint  Clau Vences is a 77 y.o. female admitted 1/24/2023 with abdominal pain and constipation    Hospital Course  History of hysterectomy and appendectomy 4 years ago, sleep apnea on CPAP, arthritis on chronic prednisone therapy, hypertension, hyperlipidemia and anxiety, who was admitted for abdominal pain and distention associated with 4 days of constipation.  She was found to have a small bowel obstruction.  General surgery was consulted and recommended conservative management NG tube placement.    Interval Problem Update  1/25: +gas per patient, NGT w bilious output.  Very distended with absent bowel sounds.  Cr improving  1/26: +BM overnight x3, less distention and pain.  Has weaned to room air.  D/w surgery, possibly remove ngt this afternoon  1/27: Did not tolerate advancing diet, recurrent vomiting, distention and pain.  Discussed with surgery, SBFT pending    I have discussed this patient's plan of care and discharge plan at IDT rounds today with Case Management, Nursing, Nursing leadership, and other members of the IDT team.    Consultants/Specialty  general surgery    Code Status  Full Code    Disposition  Patient is not medically cleared for discharge.   Anticipate discharge to to home with close outpatient follow-up.  I have placed the appropriate orders for post-discharge needs.    Review of Systems  Review of Systems   Constitutional:  Positive for malaise/fatigue. Negative for chills and fever.   HENT:  Negative for sore throat.    Respiratory:  Negative for cough and shortness of breath.    Cardiovascular:  Negative for chest pain and palpitations.   Gastrointestinal:  Positive for abdominal pain and vomiting. Negative for blood in stool, diarrhea, heartburn and nausea.        Distention   Genitourinary:  Negative for dysuria and frequency.   Musculoskeletal:  Negative for back pain and myalgias.    Neurological:  Negative for dizziness, focal weakness, weakness and headaches.   Psychiatric/Behavioral:  Negative for depression and hallucinations.    All other systems reviewed and are negative.     Physical Exam  Temp:  [36.5 °C (97.7 °F)-36.8 °C (98.3 °F)] 36.5 °C (97.7 °F)  Pulse:  [89-95] 89  Resp:  [18] 18  BP: (125-168)/(65-87) 153/77  SpO2:  [92 %-96 %] 96 %    Physical Exam  Constitutional:       General: She is not in acute distress.     Appearance: She is ill-appearing.   HENT:      Nose: Nose normal.      Mouth/Throat:      Mouth: Mucous membranes are dry.   Cardiovascular:      Rate and Rhythm: Normal rate and regular rhythm.      Pulses: Normal pulses.   Pulmonary:      Effort: Pulmonary effort is normal. No respiratory distress.   Abdominal:      General: There is distension.      Tenderness: There is abdominal tenderness.      Comments: Firm   Musculoskeletal:         General: No swelling.      Cervical back: No muscular tenderness.   Lymphadenopathy:      Cervical: No cervical adenopathy.   Skin:     General: Skin is warm and dry.      Findings: No rash.   Neurological:      General: No focal deficit present.      Mental Status: She is alert and oriented to person, place, and time.      Motor: Weakness present.   Psychiatric:         Mood and Affect: Mood normal.         Thought Content: Thought content normal.       Fluids    Intake/Output Summary (Last 24 hours) at 1/27/2023 1323  Last data filed at 1/27/2023 0900  Gross per 24 hour   Intake 840 ml   Output 1350 ml   Net -510 ml         Laboratory  Recent Labs     01/24/23  1828 01/25/23  0243 01/26/23  0807   WBC 5.1 4.7* 6.1   RBC 4.42 4.08* 4.20   HEMOGLOBIN 14.6 13.5 14.1   HEMATOCRIT 43.1 40.2 42.7   MCV 97.5 98.5* 101.7*   MCH 33.0 33.1* 33.6*   MCHC 33.9 33.6 33.0*   RDW 43.0 43.3 45.0   PLATELETCT 376 310 334   MPV 10.3 10.3 9.9       Recent Labs     01/25/23  0243 01/26/23  0807 01/27/23  0021   SODIUM 132* 140 136   POTASSIUM 4.5 4.8  4.0   CHLORIDE 95* 104 103   CO2 19* 21 24   GLUCOSE 74 105* 155*   BUN 76* 43* 28*   CREATININE 1.63* 0.90 0.74   CALCIUM 9.7 10.2 9.8                     Imaging  DX-ABDOMEN FOR TUBE PLACEMENT   Final Result      NG tube tip overlies the gastric body.      [id number] VIEW   Final Result      Persistent findings of small bowel obstruction      DX-ABDOMEN FOR TUBE PLACEMENT   Final Result         1.  Air-filled distended bowel loops in the upper abdomen, could represent ileus versus enterocolitis or evolving obstructive changes. Radiographic follow-up to resolution as clinically appropriate.   2.  Nasogastric tube tip terminates overlying the expected location of the gastric fundus.   3.  Left lower lobe atelectasis, infiltrate, and/or effusion.   4.  Cardiomegaly   5.  Atherosclerosis      CT-ABDOMEN-PELVIS W/O   Final Result      1.  Dilated loops of small intestine with air/blood levels consistent with small bowel obstruction.      2.  Fatty liver.      3.  Multiple left-sided renal stones measuring up to 2 cm in size.      4.  Prior hysterectomy.      5.  Sigmoid diverticulosis.      DX-SMALL BOWEL SERIES    (Results Pending)          Assessment/Plan  * SBO (small bowel obstruction) (HCC)- (present on admission)  Assessment & Plan  Patient 4 years ago had a hysterectomy with appendectomy-presented with abdominal pain and constipation for about 5 days  Found to have SBO, no prior hx of obstruction  With conservative management she has had improvement and began having bowel movements however overnight developed recurrence of distention pain and vomiting  N.p.o.  Replace NG tube to low intermittent suction  Discussed with surgery  Small bowel follow-through  Continue NS    Acute respiratory failure with hypoxia (HCC)  Assessment & Plan  At baseline she has obesity and sleep apnea her acute component was secondary to atelectasis secondary to abdominal distention  She has weaned to room air, encourage  ambulation  Encourage IS  Supplemental O2 as needed    Arthritis  Assessment & Plan  The patient has chronic arthritis and has been treated with prednisone 5 mg daily  Continue IV Solu-Medrol as IV substitute    Hypercalcemia  Assessment & Plan  With dehydration her calcium levels have gone up as well.  Hx of hyperCa  Corrected with IVF  Monitor vs OP workup    Acute renal failure (ARF) (HCC)  Assessment & Plan  Secondary to the dehydration-creatinine has normalized  Continue NS until taking PO    Hyponatremia  Assessment & Plan  Continue NS    Obesity (BMI 30-39.9)- (present on admission)  Assessment & Plan  Body mass index is 34.32 kg/m².  Outpatient weight loss management program highly recommended    Anxiety- (present on admission)  Assessment & Plan  Chronic anxiety and the patient at home was on Zoloft.    For now hold the Zoloft while npo    AZUL on CPAP- (present on admission)  Assessment & Plan  The patient at home uses CPAP but I am not sure that she will be able to tolerate CPAP with an NG tube in place.  She desaturated to 80% on 2L with sleep  Increase O2 at night and with naps    Hyperlipidemia- (present on admission)  Assessment & Plan  Once the patient is able to resume oral intake low-fat low-cholesterol diet with Crestor and cholestyramine will be recommended.    HTN (hypertension)- (present on admission)  Assessment & Plan  For now we are holding Coreg and lisinopril.    Use as needed labetalol only.         VTE prophylaxis: SCDs/TEDs    I have performed a physical exam and reviewed and updated ROS and Plan today (1/27/2023). In review of yesterday's note (1/26/2023), there are no changes except as documented above.    Total time:  40 minutes.  I spent greater than 50% of the time for patient care, counseling, and coordination on this date, including unit/floor time, and face-to-face time with the patient as per interval events and assessment and plan above.

## 2023-01-27 NOTE — PROGRESS NOTES
Pt vomited during this shift, informed MD Dr Cruz with order charted.    Xray abdomen done, result relayed to Dr Cruz

## 2023-01-27 NOTE — PROGRESS NOTES
Received report from day shift nurse. Assumed pt care at 1915. Pt is A&Ox4, resting comfortably in bed. Pt on 3L NC. No signs of SOB/respiratory distress. Labs noted, VSS. Pt c/o no pain at this moment. Patient also denied complaints of nausea and vomiting. Fall precautions in place. Bed at lowest position. Call light and personal belongings within reach. All needs met at this time.

## 2023-01-28 ENCOUNTER — ANESTHESIA (OUTPATIENT)
Dept: SURGERY | Facility: MEDICAL CENTER | Age: 78
DRG: 335 | End: 2023-01-28
Payer: MEDICARE

## 2023-01-28 ENCOUNTER — ANESTHESIA EVENT (OUTPATIENT)
Dept: SURGERY | Facility: MEDICAL CENTER | Age: 78
DRG: 335 | End: 2023-01-28
Payer: MEDICARE

## 2023-01-28 LAB
ALBUMIN SERPL BCP-MCNC: 3.2 G/DL (ref 3.2–4.9)
BASOPHILS # BLD AUTO: 0.7 % (ref 0–1.8)
BASOPHILS # BLD: 0.07 K/UL (ref 0–0.12)
BUN SERPL-MCNC: 29 MG/DL (ref 8–22)
CALCIUM ALBUM COR SERPL-MCNC: 10.5 MG/DL (ref 8.5–10.5)
CALCIUM SERPL-MCNC: 9.9 MG/DL (ref 8.4–10.2)
CHLORIDE SERPL-SCNC: 104 MMOL/L (ref 96–112)
CO2 SERPL-SCNC: 31 MMOL/L (ref 20–33)
CREAT SERPL-MCNC: 0.92 MG/DL (ref 0.5–1.4)
EOSINOPHIL # BLD AUTO: 0.01 K/UL (ref 0–0.51)
EOSINOPHIL NFR BLD: 0.1 % (ref 0–6.9)
ERYTHROCYTE [DISTWIDTH] IN BLOOD BY AUTOMATED COUNT: 44.8 FL (ref 35.9–50)
GFR SERPLBLD CREATININE-BSD FMLA CKD-EPI: 64 ML/MIN/1.73 M 2
GLUCOSE SERPL-MCNC: 117 MG/DL (ref 65–99)
HCT VFR BLD AUTO: 41.4 % (ref 37–47)
HGB BLD-MCNC: 13.5 G/DL (ref 12–16)
IMM GRANULOCYTES # BLD AUTO: 0.28 K/UL (ref 0–0.11)
IMM GRANULOCYTES NFR BLD AUTO: 2.8 % (ref 0–0.9)
LYMPHOCYTES # BLD AUTO: 0.42 K/UL (ref 1–4.8)
LYMPHOCYTES NFR BLD: 4.2 % (ref 22–41)
MCH RBC QN AUTO: 33.1 PG (ref 27–33)
MCHC RBC AUTO-ENTMCNC: 32.6 G/DL (ref 33.6–35)
MCV RBC AUTO: 101.5 FL (ref 81.4–97.8)
MONOCYTES # BLD AUTO: 0.55 K/UL (ref 0–0.85)
MONOCYTES NFR BLD AUTO: 5.5 % (ref 0–13.4)
NEUTROPHILS # BLD AUTO: 8.58 K/UL (ref 2–7.15)
NEUTROPHILS NFR BLD: 86.7 % (ref 44–72)
NRBC # BLD AUTO: 0 K/UL
NRBC BLD-RTO: 0 /100 WBC
PHOSPHATE SERPL-MCNC: 2.6 MG/DL (ref 2.5–4.5)
PLATELET # BLD AUTO: 260 K/UL (ref 164–446)
PMV BLD AUTO: 9.9 FL (ref 9–12.9)
POTASSIUM SERPL-SCNC: 4.1 MMOL/L (ref 3.6–5.5)
RBC # BLD AUTO: 4.08 M/UL (ref 4.2–5.4)
SODIUM SERPL-SCNC: 144 MMOL/L (ref 135–145)
WBC # BLD AUTO: 9.9 K/UL (ref 4.8–10.8)

## 2023-01-28 PROCEDURE — 700111 HCHG RX REV CODE 636 W/ 250 OVERRIDE (IP): Performed by: ANESTHESIOLOGY

## 2023-01-28 PROCEDURE — 80069 RENAL FUNCTION PANEL: CPT

## 2023-01-28 PROCEDURE — 36415 COLL VENOUS BLD VENIPUNCTURE: CPT

## 2023-01-28 PROCEDURE — 99232 SBSQ HOSP IP/OBS MODERATE 35: CPT | Performed by: INTERNAL MEDICINE

## 2023-01-28 PROCEDURE — 160048 HCHG OR STATISTICAL LEVEL 1-5: Performed by: SURGERY

## 2023-01-28 PROCEDURE — 700101 HCHG RX REV CODE 250: Performed by: ANESTHESIOLOGY

## 2023-01-28 PROCEDURE — 160042 HCHG SURGERY MINUTES - EA ADDL 1 MIN LEVEL 5: Performed by: SURGERY

## 2023-01-28 PROCEDURE — 00790 ANES IPER UPR ABD NOS: CPT | Performed by: ANESTHESIOLOGY

## 2023-01-28 PROCEDURE — 700105 HCHG RX REV CODE 258: Performed by: HOSPITALIST

## 2023-01-28 PROCEDURE — 160009 HCHG ANES TIME/MIN: Performed by: SURGERY

## 2023-01-28 PROCEDURE — 770001 HCHG ROOM/CARE - MED/SURG/GYN PRIV*

## 2023-01-28 PROCEDURE — 94760 N-INVAS EAR/PLS OXIMETRY 1: CPT

## 2023-01-28 PROCEDURE — 99140 ANES COMP EMERGENCY COND: CPT | Performed by: ANESTHESIOLOGY

## 2023-01-28 PROCEDURE — 160035 HCHG PACU - 1ST 60 MINS PHASE I: Performed by: SURGERY

## 2023-01-28 PROCEDURE — 160031 HCHG SURGERY MINUTES - 1ST 30 MINS LEVEL 5: Performed by: SURGERY

## 2023-01-28 PROCEDURE — 0DN80ZZ RELEASE SMALL INTESTINE, OPEN APPROACH: ICD-10-PCS | Performed by: SURGERY

## 2023-01-28 PROCEDURE — 93005 ELECTROCARDIOGRAM TRACING: CPT | Performed by: ANESTHESIOLOGY

## 2023-01-28 PROCEDURE — 85025 COMPLETE CBC W/AUTO DIFF WBC: CPT

## 2023-01-28 PROCEDURE — 74250 X-RAY XM SM INT 1CNTRST STD: CPT

## 2023-01-28 PROCEDURE — 700105 HCHG RX REV CODE 258: Performed by: SURGERY

## 2023-01-28 PROCEDURE — 160002 HCHG RECOVERY MINUTES (STAT): Performed by: SURGERY

## 2023-01-28 PROCEDURE — 700111 HCHG RX REV CODE 636 W/ 250 OVERRIDE (IP): Performed by: HOSPITALIST

## 2023-01-28 PROCEDURE — 99100 ANES PT EXTEME AGE<1 YR&>70: CPT | Performed by: ANESTHESIOLOGY

## 2023-01-28 RX ORDER — DEXAMETHASONE SODIUM PHOSPHATE 4 MG/ML
INJECTION, SOLUTION INTRA-ARTICULAR; INTRALESIONAL; INTRAMUSCULAR; INTRAVENOUS; SOFT TISSUE PRN
Status: DISCONTINUED | OUTPATIENT
Start: 2023-01-28 | End: 2023-01-28 | Stop reason: SURG

## 2023-01-28 RX ORDER — LABETALOL HYDROCHLORIDE 5 MG/ML
INJECTION, SOLUTION INTRAVENOUS PRN
Status: DISCONTINUED | OUTPATIENT
Start: 2023-01-28 | End: 2023-01-28 | Stop reason: SURG

## 2023-01-28 RX ORDER — HYDROMORPHONE HYDROCHLORIDE 1 MG/ML
0.2 INJECTION, SOLUTION INTRAMUSCULAR; INTRAVENOUS; SUBCUTANEOUS
Status: DISCONTINUED | OUTPATIENT
Start: 2023-01-28 | End: 2023-01-28 | Stop reason: HOSPADM

## 2023-01-28 RX ORDER — HYDROMORPHONE HYDROCHLORIDE 1 MG/ML
0.4 INJECTION, SOLUTION INTRAMUSCULAR; INTRAVENOUS; SUBCUTANEOUS
Status: DISCONTINUED | OUTPATIENT
Start: 2023-01-28 | End: 2023-01-28 | Stop reason: HOSPADM

## 2023-01-28 RX ORDER — SODIUM CHLORIDE, SODIUM LACTATE, POTASSIUM CHLORIDE, CALCIUM CHLORIDE 600; 310; 30; 20 MG/100ML; MG/100ML; MG/100ML; MG/100ML
INJECTION, SOLUTION INTRAVENOUS CONTINUOUS
Status: ACTIVE | OUTPATIENT
Start: 2023-01-28 | End: 2023-01-28

## 2023-01-28 RX ORDER — ONDANSETRON 2 MG/ML
4 INJECTION INTRAMUSCULAR; INTRAVENOUS
Status: DISCONTINUED | OUTPATIENT
Start: 2023-01-28 | End: 2023-01-28 | Stop reason: HOSPADM

## 2023-01-28 RX ORDER — DIPHENHYDRAMINE HYDROCHLORIDE 50 MG/ML
12.5 INJECTION INTRAMUSCULAR; INTRAVENOUS
Status: DISCONTINUED | OUTPATIENT
Start: 2023-01-28 | End: 2023-01-28 | Stop reason: HOSPADM

## 2023-01-28 RX ORDER — SODIUM CHLORIDE, SODIUM LACTATE, POTASSIUM CHLORIDE, CALCIUM CHLORIDE 600; 310; 30; 20 MG/100ML; MG/100ML; MG/100ML; MG/100ML
INJECTION, SOLUTION INTRAVENOUS CONTINUOUS
Status: DISCONTINUED | OUTPATIENT
Start: 2023-01-28 | End: 2023-01-28 | Stop reason: HOSPADM

## 2023-01-28 RX ORDER — LIDOCAINE HYDROCHLORIDE 20 MG/ML
JELLY TOPICAL PRN
Status: DISCONTINUED | OUTPATIENT
Start: 2023-01-28 | End: 2023-01-28 | Stop reason: SURG

## 2023-01-28 RX ORDER — SUCCINYLCHOLINE CHLORIDE 20 MG/ML
INJECTION INTRAMUSCULAR; INTRAVENOUS PRN
Status: DISCONTINUED | OUTPATIENT
Start: 2023-01-28 | End: 2023-01-28 | Stop reason: SURG

## 2023-01-28 RX ORDER — HYDROMORPHONE HYDROCHLORIDE 1 MG/ML
0.1 INJECTION, SOLUTION INTRAMUSCULAR; INTRAVENOUS; SUBCUTANEOUS
Status: DISCONTINUED | OUTPATIENT
Start: 2023-01-28 | End: 2023-01-28 | Stop reason: HOSPADM

## 2023-01-28 RX ORDER — ROCURONIUM BROMIDE 10 MG/ML
INJECTION, SOLUTION INTRAVENOUS PRN
Status: DISCONTINUED | OUTPATIENT
Start: 2023-01-28 | End: 2023-01-28 | Stop reason: SURG

## 2023-01-28 RX ORDER — HYDROMORPHONE HYDROCHLORIDE 2 MG/ML
INJECTION, SOLUTION INTRAMUSCULAR; INTRAVENOUS; SUBCUTANEOUS PRN
Status: DISCONTINUED | OUTPATIENT
Start: 2023-01-28 | End: 2023-01-28 | Stop reason: SURG

## 2023-01-28 RX ORDER — HYDRALAZINE HYDROCHLORIDE 20 MG/ML
5 INJECTION INTRAMUSCULAR; INTRAVENOUS
Status: DISCONTINUED | OUTPATIENT
Start: 2023-01-28 | End: 2023-01-28 | Stop reason: HOSPADM

## 2023-01-28 RX ORDER — CEFOTETAN DISODIUM 2 G/20ML
INJECTION, POWDER, FOR SOLUTION INTRAMUSCULAR; INTRAVENOUS PRN
Status: DISCONTINUED | OUTPATIENT
Start: 2023-01-28 | End: 2023-01-28 | Stop reason: SURG

## 2023-01-28 RX ORDER — LIDOCAINE HYDROCHLORIDE 20 MG/ML
INJECTION, SOLUTION EPIDURAL; INFILTRATION; INTRACAUDAL; PERINEURAL PRN
Status: DISCONTINUED | OUTPATIENT
Start: 2023-01-28 | End: 2023-01-28 | Stop reason: SURG

## 2023-01-28 RX ORDER — HALOPERIDOL 5 MG/ML
1 INJECTION INTRAMUSCULAR
Status: DISCONTINUED | OUTPATIENT
Start: 2023-01-28 | End: 2023-01-28 | Stop reason: HOSPADM

## 2023-01-28 RX ORDER — LABETALOL HYDROCHLORIDE 5 MG/ML
5 INJECTION, SOLUTION INTRAVENOUS
Status: DISCONTINUED | OUTPATIENT
Start: 2023-01-28 | End: 2023-01-28 | Stop reason: HOSPADM

## 2023-01-28 RX ADMIN — LORAZEPAM 1 MG: 2 INJECTION INTRAMUSCULAR; INTRAVENOUS at 20:31

## 2023-01-28 RX ADMIN — MORPHINE SULFATE 1 MG: 4 INJECTION INTRAVENOUS at 13:02

## 2023-01-28 RX ADMIN — HYDROMORPHONE HYDROCHLORIDE 0.5 MG: 2 INJECTION INTRAMUSCULAR; INTRAVENOUS; SUBCUTANEOUS at 15:41

## 2023-01-28 RX ADMIN — LIDOCAINE HYDROCHLORIDE 3 ML: 20 JELLY TOPICAL at 14:54

## 2023-01-28 RX ADMIN — MORPHINE SULFATE 1 MG: 4 INJECTION INTRAVENOUS at 18:16

## 2023-01-28 RX ADMIN — FENTANYL CITRATE 50 MCG: 50 INJECTION, SOLUTION INTRAMUSCULAR; INTRAVENOUS at 15:12

## 2023-01-28 RX ADMIN — SODIUM CHLORIDE, POTASSIUM CHLORIDE, SODIUM LACTATE AND CALCIUM CHLORIDE: 600; 310; 30; 20 INJECTION, SOLUTION INTRAVENOUS at 15:26

## 2023-01-28 RX ADMIN — ONDANSETRON 4 MG: 2 INJECTION INTRAMUSCULAR; INTRAVENOUS at 15:27

## 2023-01-28 RX ADMIN — METHYLPREDNISOLONE SODIUM SUCCINATE 20 MG: 40 INJECTION, POWDER, FOR SOLUTION INTRAMUSCULAR; INTRAVENOUS at 04:34

## 2023-01-28 RX ADMIN — SUCCINYLCHOLINE CHLORIDE 100 MG: 20 INJECTION, SOLUTION INTRAMUSCULAR; INTRAVENOUS; PARENTERAL at 14:54

## 2023-01-28 RX ADMIN — FENTANYL CITRATE 100 MCG: 50 INJECTION, SOLUTION INTRAMUSCULAR; INTRAVENOUS at 14:53

## 2023-01-28 RX ADMIN — ROCURONIUM BROMIDE 30 MG: 10 INJECTION, SOLUTION INTRAVENOUS at 15:05

## 2023-01-28 RX ADMIN — PROPOFOL 100 MG: 10 INJECTION, EMULSION INTRAVENOUS at 14:54

## 2023-01-28 RX ADMIN — CEFOTETAN DISODIUM 2 G: 2 INJECTION, POWDER, FOR SOLUTION INTRAMUSCULAR; INTRAVENOUS at 14:54

## 2023-01-28 RX ADMIN — DEXAMETHASONE SODIUM PHOSPHATE 4 MG: 4 INJECTION, SOLUTION INTRA-ARTICULAR; INTRALESIONAL; INTRAMUSCULAR; INTRAVENOUS; SOFT TISSUE at 15:00

## 2023-01-28 RX ADMIN — FENTANYL CITRATE 50 MCG: 50 INJECTION, SOLUTION INTRAMUSCULAR; INTRAVENOUS at 16:03

## 2023-01-28 RX ADMIN — FENTANYL CITRATE 25 MCG: 50 INJECTION, SOLUTION INTRAMUSCULAR; INTRAVENOUS at 16:15

## 2023-01-28 RX ADMIN — SODIUM CHLORIDE, POTASSIUM CHLORIDE, SODIUM LACTATE AND CALCIUM CHLORIDE: 600; 310; 30; 20 INJECTION, SOLUTION INTRAVENOUS at 14:46

## 2023-01-28 RX ADMIN — LIDOCAINE HYDROCHLORIDE 100 MG: 20 INJECTION, SOLUTION EPIDURAL; INFILTRATION; INTRACAUDAL at 14:52

## 2023-01-28 RX ADMIN — SODIUM CHLORIDE: 9 INJECTION, SOLUTION INTRAVENOUS at 02:31

## 2023-01-28 RX ADMIN — MORPHINE SULFATE 1 MG: 4 INJECTION INTRAVENOUS at 22:45

## 2023-01-28 RX ADMIN — SUGAMMADEX 200 MG: 100 INJECTION, SOLUTION INTRAVENOUS at 15:38

## 2023-01-28 RX ADMIN — FENTANYL CITRATE 50 MCG: 50 INJECTION, SOLUTION INTRAMUSCULAR; INTRAVENOUS at 14:48

## 2023-01-28 RX ADMIN — LORAZEPAM 1 MG: 2 INJECTION INTRAMUSCULAR; INTRAVENOUS at 01:24

## 2023-01-28 RX ADMIN — LABETALOL HYDROCHLORIDE 5 MG: 5 INJECTION, SOLUTION INTRAVENOUS at 15:09

## 2023-01-28 ASSESSMENT — PAIN DESCRIPTION - PAIN TYPE
TYPE: ACUTE PAIN
TYPE: SURGICAL PAIN
TYPE: ACUTE PAIN
TYPE: SURGICAL PAIN
TYPE: ACUTE PAIN

## 2023-01-28 ASSESSMENT — ENCOUNTER SYMPTOMS
SHORTNESS OF BREATH: 0
DIARRHEA: 0
PALPITATIONS: 0
SORE THROAT: 0
COUGH: 0
BACK PAIN: 0
VOMITING: 0
WEAKNESS: 0
NERVOUS/ANXIOUS: 1
HEARTBURN: 0
FEVER: 0
BLOOD IN STOOL: 0
MYALGIAS: 0
FOCAL WEAKNESS: 0
ABDOMINAL PAIN: 1
DEPRESSION: 1
HEADACHES: 0
ROS GI COMMENTS: DISTENTION
NAUSEA: 0
HALLUCINATIONS: 0
CHILLS: 0
DIZZINESS: 0

## 2023-01-28 NOTE — PROGRESS NOTES
Received report from day shift nurse. Assumed pt care at 1915. Pt is A&Ox4, resting comfortably in bed. Pt on 3L oxygen via NC. No signs of SOB/respiratory distress. Labs noted, VSS. Pt c/o no pain at this moment. Patient with NG tube not attached to LIS. Fall precautions in place. Bed at lowest position. Call light and personal belongings within reach. All needs met at this time.

## 2023-01-28 NOTE — OR NURSING
"1547 - Pt arrived from OR, report received from RN/anesthesia, VSS    1550 - Oral airway removed, pt arousable, denies pain/nausea    1600 - Pt arousable, complaining of 6/10 pain, no nausea, medication given, VSS    1615  - Pt resting comfortably, VSS, reports pain 4/10, medication given, no nausea     1630 - Daughter Celeste updated via phone, pt resting comfortably, VSS, pt reports pain 4/10 \"tolerable, no nausea    1635 - Pt transported to room via bed with belongings by RN. O2 tank > 50%  "

## 2023-01-28 NOTE — PROGRESS NOTES
Surgical Progress Note    Author: Mike Valles M.D.      Interval Events:  HD 5 admitted with small bowel obstruction treated with NGT, NG tube replaced yesterday and small bowel follow-through begun in the afternoon.  The study has not been completed.  She reports no additional BMs, no significant flatus.  She states that she feels much better than yesterday, no abdominal pain    ROS  Hemodynamics:  Temp (24hrs), Av.6 °C (97.9 °F), Min:36.5 °C (97.7 °F), Max:36.8 °C (98.2 °F)  Temperature: 36.6 °C (97.8 °F)  Pulse  Av.2  Min: 64  Max: 100   Blood Pressure : (!) 162/75 (rn notified)     Respiratory:    Respiration: 18, Pulse Oximetry: 91 %           Neuro:  GCS       Fluids:    Intake/Output Summary (Last 24 hours) at 2023 1746  Last data filed at 2023 0900  Gross per 24 hour   Intake 120 ml   Output 1350 ml   Net -1230 ml     Weight: 99.3 kg (219 lb)  Current Diet Order   Procedures    Diet NPO Restrict to: Strict     Physical Exam  Appears well  Neck supple, NGT with bilious drainage  Regular heart rate  Normal respiratory effort  Abdomen soft, moderately distended, nontender in all quadrants  Ext perfused  Skin pink      Labs:  Recent Results (from the past 24 hour(s))   CBC WITH DIFFERENTIAL    Collection Time: 23  8:08 AM   Result Value Ref Range    WBC 9.9 4.8 - 10.8 K/uL    RBC 4.08 (L) 4.20 - 5.40 M/uL    Hemoglobin 13.5 12.0 - 16.0 g/dL    Hematocrit 41.4 37.0 - 47.0 %    .5 (H) 81.4 - 97.8 fL    MCH 33.1 (H) 27.0 - 33.0 pg    MCHC 32.6 (L) 33.6 - 35.0 g/dL    RDW 44.8 35.9 - 50.0 fL    Platelet Count 260 164 - 446 K/uL    MPV 9.9 9.0 - 12.9 fL    Neutrophils-Polys 86.70 (H) 44.00 - 72.00 %    Lymphocytes 4.20 (L) 22.00 - 41.00 %    Monocytes 5.50 0.00 - 13.40 %    Eosinophils 0.10 0.00 - 6.90 %    Basophils 0.70 0.00 - 1.80 %    Immature Granulocytes 2.80 (H) 0.00 - 0.90 %    Nucleated RBC 0.00 /100 WBC    Neutrophils (Absolute) 8.58 (H) 2.00 - 7.15 K/uL    Lymphs  (Absolute) 0.42 (L) 1.00 - 4.80 K/uL    Monos (Absolute) 0.55 0.00 - 0.85 K/uL    Eos (Absolute) 0.01 0.00 - 0.51 K/uL    Baso (Absolute) 0.07 0.00 - 0.12 K/uL    Immature Granulocytes (abs) 0.28 (H) 0.00 - 0.11 K/uL    NRBC (Absolute) 0.00 K/uL   Renal Function Panel    Collection Time: 01/28/23  8:08 AM   Result Value Ref Range    Sodium 144 135 - 145 mmol/L    Potassium 4.1 3.6 - 5.5 mmol/L    Chloride 104 96 - 112 mmol/L    Co2 31 20 - 33 mmol/L    Glucose 117 (H) 65 - 99 mg/dL    Creatinine 0.92 0.50 - 1.40 mg/dL    Bun 29 (H) 8 - 22 mg/dL    Calcium 9.9 8.4 - 10.2 mg/dL    Phosphorus 2.6 2.5 - 4.5 mg/dL    Albumin 3.2 3.2 - 4.9 g/dL   CORRECTED CALCIUM    Collection Time: 01/28/23  8:08 AM   Result Value Ref Range    Correct Calcium 10.5 8.5 - 10.5 mg/dL   ESTIMATED GFR    Collection Time: 01/28/23  8:08 AM   Result Value Ref Range    GFR (CKD-EPI) 64 >60 mL/min/1.73 m 2     Medical Decision Making, by Problem:  Active Hospital Problems    Diagnosis     Acute respiratory failure with hypoxia (HCC) [J96.01]     SBO (small bowel obstruction) (HCC) [K56.609]     Hyponatremia [E87.1]     Acute renal failure (ARF) (HCC) [N17.9]     Hypercalcemia [E83.52]     Arthritis [M19.90]     Obesity (BMI 30-39.9) [E66.9]     Anxiety [F41.9]     AZUL on CPAP [G47.33, Z99.89]     Hyperlipidemia [E78.5]     HTN (hypertension) [I10]      Plan:  76 y/o female with previous laparoscopic hysterectomy now apparent ongoing obstruction, NGT back in  [facility] medical care  DVT prophylaxis  PPI  NGT to [facility], flush q12  Follow-up SBFT with gastrograffin  Will follow    Quality Measures:  Quality-Core Measures    Discussed patient condition with Hospitalist and Patient

## 2023-01-28 NOTE — CARE PLAN
The patient is Stable - Low risk of patient condition declining or worsening    Shift Goals  Clinical Goals: Patient will report improvement in n/v.  Patient Goals: Patient will be able to sleep and rest.  Family Goals: n/a    Progress made toward(s) clinical / shift goals:  Patient did not complain of nausea and vomiting. She was placed on strict NPO. She was able to rest and sleep comfortably. Patient belongings and call light within reach.    Patient is not progressing towards the following goals:

## 2023-01-28 NOTE — OR NURSING
Patient allergies and NPO status verified, home medication reconciliation completed and belongings secured. Surgical site verified with patient. Patient verbalizes understanding of pain scale, expected course of stay and plan of care; patient and family state verbal understanding at this time. IV access established. Sequential in place as ordered.     Reported 700ml of dark green bile from JUNIOR cardenas NG to Dr Roldan and Dr Valles.

## 2023-01-28 NOTE — OP REPORT
OP Note    PreOp Diagnosis:  1.  High-grade small bowel obstruction    PostOp Diagnosis:  1.  High-grade small bowel obstruction secondary to intra-abdominal adhesions    Procedure(s):  1.  Exploratory laparotomy  2.  Lysis of adhesions    Surgeon(s):  Mike Valles M.D.    Anesthesiologist/Type of Anesthesia:  Anesthesiologist: Sharan Jay M.D./General    Surgical Staff:  Circulator: Julia Morataya  Scrub Person: Lincoln Ramirez    Specimens removed if any:  * No specimens in log *    Estimated Blood Loss: 20 cc    Findings: Single band of omentum causing high-grade small bowel obstruction    Complications: None observed    Details of procedure:  The patient was met in the preoperative holding area where all of the potential risks and benefits of the procedure were discussed in detail with the patient. All of her questions were answered to her satisfaction and informed consent was signed. The patient was taken back to the operating room and placed supine on the operating room table. General endotracheal anesthesia was induced and all of the patients pressure points were padded appropriately. The patients abdomen was prepped and draped in the usual sterile fashion.  A timeout was performed which correctly identified the patient and the procedure being performed and preoperative antibiotics were given according to SCIP guidelines.     The operation was begun with a supraumbilical mini laparotomy incision.  Peritoneal cavity was entered.  The omentum was swept superiorly and the small bowel was eviscerated.  Ran a good majority of the small bowel through this small incision however the dilated loops and mesentery quickly made this difficult we returned the bowel to the abdominal cavity and slightly increased size of the incision down to below the umbilicus.  I began to run the bowel once again.  There was a tethered portion of bowel with stuck omentum in the right mid abdomen.  This was freed.  The  small piece of omentum was then slightly bleeding and this was cauterized and excised.  This was not sent for pathologic analysis as there was no visible concern.  I then ran the bowel from the ligament of Treitz to the terminal ileum twice without any pathology identified.  The colon was filled with air.  I palpated the colon from the ascending through the transverse down the descending into the rectum was not able to palpate any other cause of obstruction or pathology.  I then milked the contents of the small bowel from the terminal ileum back to the ligament of Treitz and out through the NG tube.  Approximately 2.2 L were drained.  The bowel was found to be erythematous but completely viable.  The midline laparotomy incision was then closed in layers.  The midline fascia was reapproximated with double opposing looped 1-0 PDS and skin was reapproximated with staples.  A sterile dressing was applied.    The patient tolerated the procedure well and was extubated at the conclusion of the case without incident. All of the sponge, needle and instrument counts were correct at the conclusion of the case. After she was awoken from anesthesia she was taken to the recovery room in stable condition.         1/28/2023 3:40 PM Mike Valles M.D.

## 2023-01-28 NOTE — ANESTHESIA PREPROCEDURE EVALUATION
Case: [id number] Date/Time: 01/28/23 1415    Procedure: EX LAP WITH POSSIBLE SMALL BOWEL RESECTION    Pre-op diagnosis: Small bowel obstruction    Location: [facility] OR 01 / SURGERY [facility]    Surgeons: Mike Valles M.D.          Relevant Problems   ANESTHESIA   (positive) AZUL on CPAP      CARDIAC   (positive) HTN (hypertension)   (positive) Heart murmur         (positive) Acute renal failure (ARF) (HCC)      Other   (positive) Arthritis   (positive) Hyperlipidemia   (positive) Obesity (BMI 30-39.9)   (positive) SBO (small bowel obstruction) (HCC)   Mild Aortic Stenosis    Physical Exam    Airway   Mallampati: III  TM distance: >3 FB  Neck ROM: full       Cardiovascular - normal exam  Rhythm: regular  Rate: normal  (-) murmur     Dental - normal exam           Pulmonary - normal exam  Breath sounds clear to auscultation     Abdominal    Neurological - normal exam                 Anesthesia Plan    ASA 3- EMERGENT   ASA physical status emergent criteria: acutely contaminated wound or identified infection source    Plan - general       Airway plan will be ETT          Induction: intravenous    Postoperative Plan: Postoperative administration of opioids is intended.    Pertinent diagnostic labs and testing reviewed    Informed Consent:    Anesthetic plan and risks discussed with patient.    Use of blood products discussed with: patient whom consented to blood products.

## 2023-01-28 NOTE — DIETARY
"Nutrition services: Day 4 of admit.  Clau Vences is a 77 y.o. female with admitting DX of SBO (small bowel obstruction) (HCC) [K56.609]    Pt now NPO/clear liquid diet day 4. Per updates, SBFT pending today; abdomen softer, +flatus, no BM since 1/26. Continues w/ NG to LIS. Surgery following.  Labs: glu 117, BUN 29  MAR: solumedrol, morphine, PRN zofran    Height: 170.2 cm (5' 7\")  Weight: 99.3 kg (219 lb)  Body mass index is 34.3 kg/m²., BMI classification: Obesity class I    RD following.          "

## 2023-01-28 NOTE — PROGRESS NOTES
Surgical Progress Note    Author: Mike Valles M.D.      Interval Events:  HD 4 admitted with small bowel obstruction treated with NGT, removed yesterday and reports feeling well, tolerating diet somewhat and had a few bowel movements but began to feel sick in the evening and had some emesis. NGT back in this am, AXR consistent with SBO still. No pain      ROS  Hemodynamics:  Temp (24hrs), Av.7 °C (98.1 °F), Min:36.5 °C (97.7 °F), Max:36.8 °C (98.3 °F)  Temperature: 36.7 °C (98 °F)  Pulse  Av.2  Min: 79  Max: 100   Blood Pressure : (!) 156/78     Respiratory:    Respiration: 17, Pulse Oximetry: 97 %           Neuro:  GCS       Fluids:    Intake/Output Summary (Last 24 hours) at 2023 1746  Last data filed at 2023 0900  Gross per 24 hour   Intake 120 ml   Output 1350 ml   Net -1230 ml        Current Diet Order   Procedures    Diet Order Diet: Clear Liquid     Physical Exam  Appears well  Neck supple, NGT with bilious drainage  Regular heart rate  Normal respiratory effort  Abdomen soft, moderately distended, nontender in all quadrants  Ext perfused  Skin pink      Labs:  Recent Results (from the past 24 hour(s))   Renal Function Panel    Collection Time: 23 12:21 AM   Result Value Ref Range    Sodium 136 135 - 145 mmol/L    Potassium 4.0 3.6 - 5.5 mmol/L    Chloride 103 96 - 112 mmol/L    Co2 24 20 - 33 mmol/L    Glucose 155 (H) 65 - 99 mg/dL    Creatinine 0.74 0.50 - 1.40 mg/dL    Bun 28 (H) 8 - 22 mg/dL    Calcium 9.8 8.4 - 10.2 mg/dL    Phosphorus 1.4 (L) 2.5 - 4.5 mg/dL    Albumin 2.9 (L) 3.2 - 4.9 g/dL   CORRECTED CALCIUM    Collection Time: 23 12:21 AM   Result Value Ref Range    Correct Calcium 10.7 (H) 8.5 - 10.5 mg/dL   ESTIMATED GFR    Collection Time: 23 12:21 AM   Result Value Ref Range    GFR (CKD-EPI) 83 >60 mL/min/1.73 m 2     Medical Decision Making, by Problem:  Active Hospital Problems    Diagnosis     Acute respiratory failure with hypoxia (HCC)  [J96.01]     SBO (small bowel obstruction) (HCC) [K56.609]     Hyponatremia [E87.1]     Acute renal failure (ARF) (HCC) [N17.9]     Hypercalcemia [E83.52]     Arthritis [M19.90]     Obesity (BMI 30-39.9) [E66.9]     Anxiety [F41.9]     AZUL on CPAP [G47.33, Z99.89]     Hyperlipidemia [E78.5]     HTN (hypertension) [I10]      Plan:  76 y/o female with previous laparoscopic hysterectomy now apparent ongoing obstruction, NGT back in  [facility] care  DVT prophylaxis  PPI  NGT to LCS, flush q12  SBFT with gastrograffin  Will follow    Quality Measures:  Quality-Core Measures    Discussed patient condition with Hospitalist and Patient

## 2023-01-28 NOTE — PROGRESS NOTES
Small bowel follow-through without significant progression  Small bowel obstruction confirmed  Discussed risks and benefits of procedure, patient elects to proceed with exploratory laparotomy with possible bowel resection

## 2023-01-28 NOTE — ANESTHESIA TIME REPORT
Anesthesia Start and Stop Event Times     Date Time Event    1/28/2023 1419 Ready for Procedure     1446 Anesthesia Start     1553 Anesthesia Stop        Responsible Staff  01/28/23    Name Role Begin End    Sharan Jay M.D. Anesth [phone]        Overtime Reason:  no overtime (within assigned shift)    Comments:

## 2023-01-28 NOTE — PROGRESS NOTES
[facility] Medicine Daily Progress Note    Date of Service  1/28/2023    Chief Complaint  Clau Vences is a 77 y.o. female admitted 1/24/2023 with abdominal pain and constipation    Hospital Course  History of hysterectomy and appendectomy 4 years ago, sleep apnea on CPAP, arthritis on chronic prednisone therapy, hypertension, hyperlipidemia and anxiety, who was admitted for abdominal pain and distention associated with 4 days of constipation.  She was found to have a small bowel obstruction.  General surgery was consulted and recommended conservative management NG tube placement.    Interval Problem Update  1/25: +gas per patient, NGT w bilious output.  Very distended with absent bowel sounds.  Cr improving  1/26: +BM overnight x3, less distention and pain.  Has weaned to room air.  D/w surgery, possibly remove ngt this afternoon  1/27: Did not tolerate advancing diet, recurrent vomiting, distention and pain.  Discussed with surgery, SBFT pending  1/28: Belly softer, passing some gas but still no BM.  SBFT pending.  Possible / Likely surgery tomorrow.  D/W daughter at bedside    I have discussed this patient's plan of care and discharge plan at IDT rounds today with Case Management, Nursing, Nursing leadership, and other members of the IDT team.    Consultants/Specialty  general surgery    Code Status  Full Code    Disposition  Patient is not medically cleared for discharge.   Anticipate discharge to to home with close outpatient follow-up.  I have placed the appropriate orders for post-discharge needs.    Review of Systems  Review of Systems   Constitutional:  Positive for malaise/fatigue. Negative for chills and fever.   HENT:  Negative for sore throat.    Respiratory:  Negative for cough and shortness of breath.    Cardiovascular:  Negative for chest pain and palpitations.   Gastrointestinal:  Positive for abdominal pain (Resolved today). Negative for blood in stool, diarrhea, heartburn, nausea and  vomiting.        Distention   Genitourinary:  Negative for dysuria and frequency.   Musculoskeletal:  Negative for back pain and myalgias.   Neurological:  Negative for dizziness, focal weakness, weakness and headaches.   Psychiatric/Behavioral:  Positive for depression. Negative for hallucinations. The patient is nervous/anxious.    All other systems reviewed and are negative.     Physical Exam  Temp:  [36.5 °C (97.7 °F)-36.8 °C (98.2 °F)] 36.5 °C (97.7 °F)  Pulse:  [] 99  Resp:  [17-18] 18  BP: (156-171)/(70-80) 156/77  SpO2:  [91 %-97 %] 94 %    Physical Exam  Constitutional:       General: She is not in acute distress.     Appearance: She is ill-appearing.   HENT:      Nose: Nose normal.      Mouth/Throat:      Mouth: Mucous membranes are dry.   Cardiovascular:      Rate and Rhythm: Normal rate and regular rhythm.      Pulses: Normal pulses.   Pulmonary:      Effort: No respiratory distress.      Comments: Reduced inspiratory effort  Abdominal:      General: There is distension (Remains distended).      Palpations: Abdomen is soft.      Tenderness: There is no abdominal tenderness (Non tender today).   Musculoskeletal:         General: No swelling.      Cervical back: No muscular tenderness.   Lymphadenopathy:      Cervical: No cervical adenopathy.   Skin:     General: Skin is warm and dry.      Coloration: Skin is pale.      Findings: No rash.   Neurological:      General: No focal deficit present.      Mental Status: She is alert and oriented to person, place, and time.      Motor: Weakness present.   Psychiatric:         Mood and Affect: Mood normal.         Thought Content: Thought content normal.       Fluids    Intake/Output Summary (Last 24 hours) at 1/28/2023 1057  Last data filed at 1/28/2023 0830  Gross per 24 hour   Intake --   Output 2450 ml   Net -2450 ml         Laboratory  Recent Labs     01/26/23  0807 01/28/23  0808   WBC 6.1 9.9   RBC 4.20 4.08*   HEMOGLOBIN 14.1 13.5   HEMATOCRIT 42.7  41.4   .7* 101.5*   MCH 33.6* 33.1*   MCHC 33.0* 32.6*   RDW 45.0 44.8   PLATELETCT 334 260   MPV 9.9 9.9       Recent Labs     01/26/23  0807 01/27/23  0021 01/28/23  0808   SODIUM 140 136 144   POTASSIUM 4.8 4.0 4.1   CHLORIDE 104 103 104   CO2 21 24 31   GLUCOSE 105* 155* 117*   BUN 43* 28* 29*   CREATININE 0.90 0.74 0.92   CALCIUM 10.2 9.8 9.9                     Imaging  DX-ABDOMEN FOR TUBE PLACEMENT   Final Result      NG tube tip overlies the gastric body.      [id number] VIEW   Final Result      Persistent findings of small bowel obstruction      DX-ABDOMEN FOR TUBE PLACEMENT   Final Result         1.  Air-filled distended bowel loops in the upper abdomen, could represent ileus versus enterocolitis or evolving obstructive changes. Radiographic follow-up to resolution as clinically appropriate.   2.  Nasogastric tube tip terminates overlying the expected location of the gastric fundus.   3.  Left lower lobe atelectasis, infiltrate, and/or effusion.   4.  Cardiomegaly   5.  Atherosclerosis      CT-ABDOMEN-PELVIS W/O   Final Result      1.  Dilated loops of small intestine with air/blood levels consistent with small bowel obstruction.      2.  Fatty liver.      3.  Multiple left-sided renal stones measuring up to 2 cm in size.      4.  Prior hysterectomy.      5.  Sigmoid diverticulosis.      DX-SMALL BOWEL SERIES    (Results Pending)          Assessment/Plan  * SBO (small bowel obstruction) (HCC)- (present on admission)  Assessment & Plan  Patient 4 years ago had a hysterectomy with appendectomy-presented with abdominal pain and constipation for about 5 days  Found to have SBO, no prior hx of obstruction  With conservative management she had improvement and began having BMs on 1/26, but on 1/27 overnight developed recurrence of distention pain and vomiting  N.p.o.  Continue NG tube to low intermittent suction  Discussed with surgery, high likelihood of surgery (anticipate tomorrow)  F/U small bowel  follow-through  Continue NS  Discussed with patient and daughter at bedside in detail, all questions answered    Acute respiratory failure with hypoxia (HCC)  Assessment & Plan  At baseline she has obesity and sleep apnea her acute component was secondary to atelectasis secondary to abdominal distention  Remains intermittently on oxygen, 2L  Encourage IS  Supplemental O2 as needed    Arthritis  Assessment & Plan  The patient has chronic arthritis and has been treated with prednisone 5 mg daily  Continue IV Solu-Medrol as IV substitute    Hypercalcemia  Assessment & Plan  With dehydration her calcium levels have gone up as well.  Hx of hyperCa  Corrected with IVF  Monitor vs OP workup    Acute renal failure (ARF) (HCC)  Assessment & Plan  Secondary to the dehydration-creatinine has normalized  Continue NS until taking PO    Hyponatremia  Assessment & Plan  Continue NS    Obesity (BMI 30-39.9)- (present on admission)  Assessment & Plan  Body mass index is 34.32 kg/m².  Outpatient weight loss management program highly recommended    Anxiety- (present on admission)  Assessment & Plan  Chronic anxiety and the patient at home was on Zoloft.    For now hold the Zoloft while npo    AZUL on CPAP- (present on admission)  Assessment & Plan  The patient at home uses CPAP unable to use with NGT  Oxygen at night  She desaturated to 80% on 2L with sleep  Increase O2 at night and with naps    Hyperlipidemia- (present on admission)  Assessment & Plan  Once the patient is able to resume oral intake low-fat low-cholesterol diet with Crestor and cholestyramine will be recommended.    HTN (hypertension)- (present on admission)  Assessment & Plan  For now we are holding Coreg and lisinopril.    Use as needed labetalol only.         VTE prophylaxis: SCDs/TEDs    I have performed a physical exam and reviewed and updated ROS and Plan today (1/28/2023). In review of yesterday's note (1/27/2023), there are no changes except as documented  above.    Total time:  40 minutes.  I spent greater than 50% of the time for patient care, counseling, and coordination on this date, including unit/floor time, and face-to-face time with the patient as per interval events and assessment and plan above.

## 2023-01-28 NOTE — ANESTHESIA PROCEDURE NOTES
Airway    Date/Time: 1/28/2023 2:54 PM  Performed by: Sharan Jay M.D.  Authorized by: Sharan Jay M.D.     Location:  OR  Urgency:  Elective  Difficult Airway: No    Indications for Airway Management:  Anesthesia      Spontaneous Ventilation: absent    Sedation Level:  Deep  Preoxygenated: Yes    Patient Position:  Sniffing  Mask Difficulty Assessment:  0 - not attempted  Final Airway Type:  Endotracheal airway  Final Endotracheal Airway:  ETT  Cuffed: Yes    Technique Used for Successful ETT Placement:  Video laryngoscopy  Devices/Methods Used in Placement:  Cricoid pressure    Insertion Site:  Oral  Blade Type:  Glide  Laryngoscope Blade/Videolaryngoscope Blade Size:  3  ETT Size (mm):  7.0  Measured from:  Teeth  ETT to Teeth (cm):  20  Placement Verified by: auscultation and capnometry    Cormack-Lehane Classification:  Grade I - full view of glottis  Number of Attempts at Approach:  1   Atraumatic DLx1, RSI with cricoid pressure

## 2023-01-28 NOTE — PROGRESS NOTES
Received bedside report from night RN. Patient alert and oriented. Denies any complains at this time. NG to left nares attached to low intermittent suction intact draining green fluid. Discussed plan of care and understood. Assessment per GSU. Falls precaution in place. Call light placed within reach.

## 2023-01-29 LAB
ALBUMIN SERPL BCP-MCNC: 2.7 G/DL (ref 3.2–4.9)
BASOPHILS # BLD AUTO: 0.8 % (ref 0–1.8)
BASOPHILS # BLD: 0.14 K/UL (ref 0–0.12)
BUN SERPL-MCNC: 32 MG/DL (ref 8–22)
CALCIUM ALBUM COR SERPL-MCNC: 10.3 MG/DL (ref 8.5–10.5)
CALCIUM SERPL-MCNC: 9.3 MG/DL (ref 8.4–10.2)
CHLORIDE SERPL-SCNC: 105 MMOL/L (ref 96–112)
CO2 SERPL-SCNC: 26 MMOL/L (ref 20–33)
CREAT SERPL-MCNC: 1.15 MG/DL (ref 0.5–1.4)
EOSINOPHIL # BLD AUTO: 0.01 K/UL (ref 0–0.51)
EOSINOPHIL NFR BLD: 0.1 % (ref 0–6.9)
ERYTHROCYTE [DISTWIDTH] IN BLOOD BY AUTOMATED COUNT: 46.3 FL (ref 35.9–50)
GFR SERPLBLD CREATININE-BSD FMLA CKD-EPI: 49 ML/MIN/1.73 M 2
GLUCOSE SERPL-MCNC: 103 MG/DL (ref 65–99)
HCT VFR BLD AUTO: 45.6 % (ref 37–47)
HGB BLD-MCNC: 14.8 G/DL (ref 12–16)
IMM GRANULOCYTES # BLD AUTO: 0.4 K/UL (ref 0–0.11)
IMM GRANULOCYTES NFR BLD AUTO: 2.4 % (ref 0–0.9)
LYMPHOCYTES # BLD AUTO: 0.76 K/UL (ref 1–4.8)
LYMPHOCYTES NFR BLD: 4.6 % (ref 22–41)
MAGNESIUM SERPL-MCNC: 1.6 MG/DL (ref 1.5–2.5)
MCH RBC QN AUTO: 33.4 PG (ref 27–33)
MCHC RBC AUTO-ENTMCNC: 32.5 G/DL (ref 33.6–35)
MCV RBC AUTO: 102.9 FL (ref 81.4–97.8)
MONOCYTES # BLD AUTO: 1.02 K/UL (ref 0–0.85)
MONOCYTES NFR BLD AUTO: 6.2 % (ref 0–13.4)
NEUTROPHILS # BLD AUTO: 14.16 K/UL (ref 2–7.15)
NEUTROPHILS NFR BLD: 85.9 % (ref 44–72)
NRBC # BLD AUTO: 0 K/UL
NRBC BLD-RTO: 0 /100 WBC
PHOSPHATE SERPL-MCNC: 2.9 MG/DL (ref 2.5–4.5)
PLATELET # BLD AUTO: 260 K/UL (ref 164–446)
PMV BLD AUTO: 10.5 FL (ref 9–12.9)
POTASSIUM SERPL-SCNC: 4.2 MMOL/L (ref 3.6–5.5)
RBC # BLD AUTO: 4.43 M/UL (ref 4.2–5.4)
SODIUM SERPL-SCNC: 143 MMOL/L (ref 135–145)
WBC # BLD AUTO: 16.5 K/UL (ref 4.8–10.8)

## 2023-01-29 PROCEDURE — 770001 HCHG ROOM/CARE - MED/SURG/GYN PRIV*

## 2023-01-29 PROCEDURE — 80069 RENAL FUNCTION PANEL: CPT

## 2023-01-29 PROCEDURE — 700111 HCHG RX REV CODE 636 W/ 250 OVERRIDE (IP): Performed by: INTERNAL MEDICINE

## 2023-01-29 PROCEDURE — 700111 HCHG RX REV CODE 636 W/ 250 OVERRIDE (IP): Performed by: HOSPITALIST

## 2023-01-29 PROCEDURE — 83735 ASSAY OF MAGNESIUM: CPT

## 2023-01-29 PROCEDURE — 700105 HCHG RX REV CODE 258: Performed by: HOSPITALIST

## 2023-01-29 PROCEDURE — 85025 COMPLETE CBC W/AUTO DIFF WBC: CPT

## 2023-01-29 PROCEDURE — 99233 SBSQ HOSP IP/OBS HIGH 50: CPT | Performed by: INTERNAL MEDICINE

## 2023-01-29 PROCEDURE — 36415 COLL VENOUS BLD VENIPUNCTURE: CPT

## 2023-01-29 PROCEDURE — 94760 N-INVAS EAR/PLS OXIMETRY 1: CPT

## 2023-01-29 RX ORDER — METHYLPREDNISOLONE SODIUM SUCCINATE 40 MG/ML
10 INJECTION, POWDER, LYOPHILIZED, FOR SOLUTION INTRAMUSCULAR; INTRAVENOUS DAILY
Status: DISCONTINUED | OUTPATIENT
Start: 2023-01-30 | End: 2023-02-04

## 2023-01-29 RX ORDER — MAGNESIUM SULFATE HEPTAHYDRATE 40 MG/ML
2 INJECTION, SOLUTION INTRAVENOUS ONCE
Status: COMPLETED | OUTPATIENT
Start: 2023-01-29 | End: 2023-01-29

## 2023-01-29 RX ADMIN — LORAZEPAM 1 MG: 2 INJECTION INTRAMUSCULAR; INTRAVENOUS at 21:14

## 2023-01-29 RX ADMIN — SODIUM CHLORIDE: 9 INJECTION, SOLUTION INTRAVENOUS at 05:15

## 2023-01-29 RX ADMIN — MORPHINE SULFATE 1 MG: 4 INJECTION INTRAVENOUS at 01:35

## 2023-01-29 RX ADMIN — LORAZEPAM 1 MG: 2 INJECTION INTRAMUSCULAR; INTRAVENOUS at 04:05

## 2023-01-29 RX ADMIN — METHYLPREDNISOLONE SODIUM SUCCINATE 20 MG: 40 INJECTION, POWDER, FOR SOLUTION INTRAMUSCULAR; INTRAVENOUS at 04:37

## 2023-01-29 RX ADMIN — MORPHINE SULFATE 2 MG: 4 INJECTION INTRAVENOUS at 21:14

## 2023-01-29 RX ADMIN — MORPHINE SULFATE 1 MG: 4 INJECTION INTRAVENOUS at 18:54

## 2023-01-29 RX ADMIN — MAGNESIUM SULFATE HEPTAHYDRATE 2 G: 40 INJECTION, SOLUTION INTRAVENOUS at 08:24

## 2023-01-29 ASSESSMENT — ENCOUNTER SYMPTOMS
BLOOD IN STOOL: 0
FEVER: 0
MYALGIAS: 0
WEAKNESS: 0
BACK PAIN: 0
DEPRESSION: 1
DIARRHEA: 0
NAUSEA: 0
DIZZINESS: 0
HEADACHES: 0
VOMITING: 0
SORE THROAT: 0
HEARTBURN: 0
FOCAL WEAKNESS: 0
CHILLS: 0
NERVOUS/ANXIOUS: 1
ABDOMINAL PAIN: 1
PALPITATIONS: 0
SHORTNESS OF BREATH: 0
COUGH: 0
HALLUCINATIONS: 0

## 2023-01-29 ASSESSMENT — COGNITIVE AND FUNCTIONAL STATUS - GENERAL
STANDING UP FROM CHAIR USING ARMS: A LITTLE
DRESSING REGULAR UPPER BODY CLOTHING: A LITTLE
PERSONAL GROOMING: A LITTLE
EATING MEALS: A LITTLE
MOVING FROM LYING ON BACK TO SITTING ON SIDE OF FLAT BED: A LITTLE
SUGGESTED CMS G CODE MODIFIER MOBILITY: CK
MOVING TO AND FROM BED TO CHAIR: A LITTLE
WALKING IN HOSPITAL ROOM: A LITTLE
TURNING FROM BACK TO SIDE WHILE IN FLAT BAD: A LITTLE
HELP NEEDED FOR BATHING: A LITTLE
MOBILITY SCORE: 18
DRESSING REGULAR LOWER BODY CLOTHING: A LITTLE
SUGGESTED CMS G CODE MODIFIER DAILY ACTIVITY: CK
DAILY ACTIVITIY SCORE: 18
CLIMB 3 TO 5 STEPS WITH RAILING: A LITTLE
TOILETING: A LITTLE

## 2023-01-29 ASSESSMENT — PATIENT HEALTH QUESTIONNAIRE - PHQ9
1. LITTLE INTEREST OR PLEASURE IN DOING THINGS: NOT AT ALL
2. FEELING DOWN, DEPRESSED, IRRITABLE, OR HOPELESS: NOT AT ALL
SUM OF ALL RESPONSES TO PHQ9 QUESTIONS 1 AND 2: 0

## 2023-01-29 ASSESSMENT — PAIN DESCRIPTION - PAIN TYPE
TYPE: SURGICAL PAIN

## 2023-01-29 ASSESSMENT — PAIN SCALES - PAIN ASSESSMENT IN ADVANCED DEMENTIA (PAINAD): CONSOLABILITY: NO NEED TO CONSOLE

## 2023-01-29 NOTE — PROGRESS NOTES
Received report from day shift nurse. Assumed pt care at 1915. Pt is A&Ox4, resting comfortably in bed. Pt on 4L oxygen on NC. No signs of SOB/respiratory distress. Labs noted, VSS. NGT attached to LIS. Fall precautions in place. Bed at lowest position. Call light and personal belongings within reach. All needs met at this time.

## 2023-01-29 NOTE — PROGRESS NOTES
Received patient from PACU, transported via gurney. Patient alert and oriented in no acute respiratory distress. Denies pain at this time. Denies nausea. VS stable. Midline incision with dressing intact, some strike through noted. NG to left nares present. Call light placed within reach.

## 2023-01-29 NOTE — PROGRESS NOTES
Bedside report received from night RN. Assumed care of patient. Daily plan of care discussed. Pt awake and alert. Reports being very thirsty this AM, NPO status maintained at this time. NGT remains in place to low-intermittent suction. Hourly rounding in place.

## 2023-01-29 NOTE — CARE PLAN
The patient is Stable - Low risk of patient condition declining or worsening    Shift Goals  Clinical Goals: Patient will be able to ambulate 60 ft.  Patient Goals: Patient will be able to rest and sleep comfortably.  Family Goals: n/a    Progress made toward(s) clinical / shift goals:  Patient able to ambulate in the hallway and to the bathroom. Pain was managed with PRN pain medication. Patient resting and sleeping comfortably. Call light and belongings within reach. All needs met at this time.    Patient is not progressing towards the following goals:

## 2023-01-29 NOTE — PROGRESS NOTES
Surgical Progress Note    Author: Mike Valles M.D.      Interval Events:  6, postoperative day 1 status post exploratory laparotomy and lysis of adhesions for small bowel obstruction.  Patient reports feeling better.  She reports scant flatus this morning no BMs.  She states that she does not feel nauseous she is thirsty.  Her abdomen feels much better    ROS  Hemodynamics:  Temp (24hrs), Av.7 °C (98 °F), Min:36.4 °C (97.5 °F), Max:37.1 °C (98.7 °F)  Temperature: 36.4 °C (97.6 °F)  Pulse  Av.6  Min: 64  Max: 115   Blood Pressure : (!) 140/60 (notifed the nurse)     Respiratory:    Respiration: 17, Pulse Oximetry: 96 %           Neuro:  GCS       Fluids:    Intake/Output Summary (Last 24 hours) at 2023 1746  Last data filed at 2023 0900  Gross per 24 hour   Intake 120 ml   Output 1350 ml   Net -1230 ml        Current Diet Order   Procedures    Diet NPO Restrict to: Strict     Physical Exam  Appears well  Neck supple, NGT with bilious drainage  Regular heart rate  Normal respiratory effort  Abdomen soft, incision clean dry and intact  Ext perfused  Skin pink      Labs:  Recent Results (from the past 24 hour(s))   CBC WITH DIFFERENTIAL    Collection Time: 23 12:42 AM   Result Value Ref Range    WBC 16.5 (H) 4.8 - 10.8 K/uL    RBC 4.43 4.20 - 5.40 M/uL    Hemoglobin 14.8 12.0 - 16.0 g/dL    Hematocrit 45.6 37.0 - 47.0 %    .9 (H) 81.4 - 97.8 fL    MCH 33.4 (H) 27.0 - 33.0 pg    MCHC 32.5 (L) 33.6 - 35.0 g/dL    RDW 46.3 35.9 - 50.0 fL    Platelet Count 260 164 - 446 K/uL    MPV 10.5 9.0 - 12.9 fL    Neutrophils-Polys 85.90 (H) 44.00 - 72.00 %    Lymphocytes 4.60 (L) 22.00 - 41.00 %    Monocytes 6.20 0.00 - 13.40 %    Eosinophils 0.10 0.00 - 6.90 %    Basophils 0.80 0.00 - 1.80 %    Immature Granulocytes 2.40 (H) 0.00 - 0.90 %    Nucleated RBC 0.00 /100 WBC    Neutrophils (Absolute) 14.16 (H) 2.00 - 7.15 K/uL    Lymphs (Absolute) 0.76 (L) 1.00 - 4.80 K/uL    Monos (Absolute)  1.02 (H) 0.00 - 0.85 K/uL    Eos (Absolute) 0.01 0.00 - 0.51 K/uL    Baso (Absolute) 0.14 (H) 0.00 - 0.12 K/uL    Immature Granulocytes (abs) 0.40 (H) 0.00 - 0.11 K/uL    NRBC (Absolute) 0.00 K/uL   Renal Function Panel    Collection Time: 01/29/23 12:42 AM   Result Value Ref Range    Sodium 143 135 - 145 mmol/L    Potassium 4.2 3.6 - 5.5 mmol/L    Chloride 105 96 - 112 mmol/L    Co2 26 20 - 33 mmol/L    Glucose 103 (H) 65 - 99 mg/dL    Creatinine 1.15 0.50 - 1.40 mg/dL    Bun 32 (H) 8 - 22 mg/dL    Calcium 9.3 8.4 - 10.2 mg/dL    Phosphorus 2.9 2.5 - 4.5 mg/dL    Albumin 2.7 (L) 3.2 - 4.9 g/dL   MAGNESIUM    Collection Time: 01/29/23 12:42 AM   Result Value Ref Range    Magnesium 1.6 1.5 - 2.5 mg/dL   CORRECTED CALCIUM    Collection Time: 01/29/23 12:42 AM   Result Value Ref Range    Correct Calcium 10.3 8.5 - 10.5 mg/dL   ESTIMATED GFR    Collection Time: 01/29/23 12:42 AM   Result Value Ref Range    GFR (CKD-EPI) 49 (A) >60 mL/min/1.73 m 2     Medical Decision Making, by Problem:  Active Hospital Problems    Diagnosis     Acute respiratory failure with hypoxia (HCC) [J96.01]     SBO (small bowel obstruction) ([facility]) [K56.609]     Hyponatremia [E87.1]     Acute renal failure (ARF) (HCC) [N17.9]     Hypercalcemia [E83.52]     Arthritis [M19.90]     Obesity (BMI 30-39.9) [E66.9]     Anxiety [F41.9]     AZUL on CPAP [G47.33, Z99.89]     Hyperlipidemia [E78.5]     HTN (hypertension) [I10]      Plan:  76 y/o female with SBO postoperative day 1 status post exploratory laparotomy and lysis of adhesions  Appreciate medical care  DVT prophylaxis  PPI  NGT to LCS, flush q12  DC NG tube once flatus  Will follow    Quality Measures:  Quality-Core Measures    Discussed patient condition with Hospitalist and Patient

## 2023-01-29 NOTE — PROGRESS NOTES
4 Eyes Skin Assessment Completed by DILIP Rice and DILIP Cartagena.    Head WDL  Ears WDL  Nose NG to left nares  Mouth WDL  Neck WDL  Breast/Chest WDL  Shoulder Blades WDL  Spine WDL  (R) Arm/Elbow/Hand WDL  (L) Arm/Elbow/Hand WDL  Abdomen Incision  Groin WDL  Scrotum/Coccyx/Buttocks WDL  (R) Leg WDL  (L) Leg WDL  (R) Heel/Foot/Toe WDL  (L) Heel/Foot/Toe WDL          Devices In Places Blood Pressure Cuff, Pulse Ox, and SCD's      Interventions In Place Pillows    Possible Skin Injury No    Pictures Uploaded Into Epic N/A  Wound Consult Placed N/A  RN Wound Prevention Protocol Ordered No

## 2023-01-29 NOTE — ANESTHESIA POSTPROCEDURE EVALUATION
Patient: Clau Vences    Procedure Summary     Date: 01/28/23 Room / Location:  OR 01 / SURGERY [facility]    Anesthesia Start: 1446 Anesthesia Stop: 1553    Procedure: EX LAP (Abdomen) Diagnosis: (Small bowel obstruction)    Surgeons: Mike Valles M.D. Responsible Provider: Sharan Jay M.D.    Anesthesia Type: general, peripheral nerve block ASA Status: 3 - Emergent          Final Anesthesia Type: general, peripheral nerve block  Last vitals  BP   Blood Pressure : (!) 155/78    Temp   36.9 °C (98.5 °F)    Pulse   100   Resp   17    SpO2   92 %      Anesthesia Post Evaluation    Patient location during evaluation: PACU  Patient participation: complete - patient participated  Level of consciousness: awake and alert    Airway patency: patent  Anesthetic complications: no  Cardiovascular status: hemodynamically stable  Respiratory status: acceptable  Hydration status: euvolemic    PONV: none          No notable events documented.     Nurse Pain Score: 5 (NPRS)

## 2023-01-29 NOTE — CARE PLAN
The patient is Stable - Low risk of patient condition declining or worsening    Shift Goals  Clinical Goals: Maintain NG patency; No n/v during this shift  Patient Goals: Remove NG and drink liquids withut issues  Family Goals: n/a    Progress made toward(s) clinical / shift goals: free from falls during this shift;    Patient is not progressing towards the following goals:      Problem: Pain - Standard  Goal: Alleviation of pain or a reduction in pain to the patient’s comfort goal  Outcome: Not Progressing     Problem: Bowel Elimination  Goal: Establish and maintain regular bowel function  Outcome: Not Progressing

## 2023-01-29 NOTE — DISCHARGE PLANNING
Case Management Discharge Planning    Admission Date: 1/24/2023  GMLOS: 4.6  ALOS: 5    6-Clicks ADL Score: 22  6-Clicks Mobility Score: 21      Anticipated Discharge Dispo: Discharge Disposition: Discharged to home/self care (01)    DME Needed: No    Action(s) Taken: Updated Provider/Nurse on Discharge Plan    Escalations Completed: None    Medically Clear: No    Next Steps: Per IDT rounds, anticipate medical clearance possibly in 3-5 days. No needs from CM anticipated.    Barriers to Discharge: Medical clearance    Is the patient up for discharge tomorrow: No

## 2023-01-29 NOTE — PROGRESS NOTES
[facility] Medicine Daily Progress Note    Date of Service  1/29/2023    Chief Complaint  Clau Vences is a 77 y.o. female admitted 1/24/2023 with abdominal pain and constipation    Hospital Course  History of hysterectomy and appendectomy 4 years ago, sleep apnea on CPAP, arthritis on chronic prednisone therapy, hypertension, hyperlipidemia and anxiety, who was admitted for abdominal pain and distention associated with 4 days of constipation.  She was found to have a small bowel obstruction.  General surgery was consulted and recommended conservative management NG tube placement.    Interval Problem Update  1/25: +gas per patient, NGT w bilious output.  Very distended with absent bowel sounds.  Cr improving  1/26: +BM overnight x3, less distention and pain.  Has weaned to room air.  D/w surgery, possibly remove ngt this afternoon  1/27: Did not tolerate advancing diet, recurrent vomiting, distention and pain.  Discussed with surgery, SBFT pending  1/28: Belly softer, passing some gas but still no BM.  SBFT pending.  Possible / Likely surgery tomorrow.  D/W daughter at bedside  1/29: SBFT showed high-grade obstruction, she was taken to the OR for SHANE.  POD 1, still with NG tube, n.p.o.  No mildly tachycardic, on 4 L of oxygen due to atelectasis    I have discussed this patient's plan of care and discharge plan at IDT rounds today with Case Management, Nursing, Nursing leadership, and other members of the IDT team.    Consultants/Specialty  general surgery    Code Status  Full Code    Disposition  Patient is not medically cleared for discharge.   Anticipate discharge to to home with close outpatient follow-up.  I have placed the appropriate orders for post-discharge needs.    Review of Systems  Review of Systems   Constitutional:  Positive for malaise/fatigue. Negative for chills and fever.   HENT:  Negative for sore throat.    Respiratory:  Negative for cough and shortness of breath.    Cardiovascular:   "Negative for chest pain and palpitations.   Gastrointestinal:  Positive for abdominal pain (Resolved today). Negative for blood in stool, diarrhea, heartburn, nausea and vomiting.        Distention.  Very thirsty and \"doesn't think she can make it\" any longer without drinking anything   Genitourinary:  Negative for dysuria and frequency.   Musculoskeletal:  Negative for back pain and myalgias.   Neurological:  Negative for dizziness, focal weakness, weakness and headaches.   Psychiatric/Behavioral:  Positive for depression. Negative for hallucinations. The patient is nervous/anxious.    All other systems reviewed and are negative.     Physical Exam  Temp:  [36.4 °C (97.5 °F)-37.1 °C (98.7 °F)] 36.4 °C (97.6 °F)  Pulse:  [] 95  Resp:  [16-22] 17  BP: (115-165)/(60-78) 140/60  SpO2:  [91 %-96 %] 96 %    Physical Exam  Constitutional:       General: She is not in acute distress.     Appearance: She is ill-appearing.   HENT:      Nose: Nose normal.      Mouth/Throat:      Mouth: Mucous membranes are dry.   Cardiovascular:      Rate and Rhythm: Normal rate and regular rhythm.      Pulses: Normal pulses.   Pulmonary:      Effort: No respiratory distress.      Comments: Reduced inspiratory effort  Abdominal:      General: There is distension (Remains distended).      Palpations: Abdomen is soft.      Tenderness: There is no abdominal tenderness (Non tender today).   Musculoskeletal:         General: No swelling.      Cervical back: No muscular tenderness.   Lymphadenopathy:      Cervical: No cervical adenopathy.   Skin:     General: Skin is warm and dry.      Coloration: Skin is pale.      Findings: No rash.   Neurological:      General: No focal deficit present.      Mental Status: She is alert and oriented to person, place, and time.      Motor: Weakness present.   Psychiatric:         Mood and Affect: Mood normal.         Thought Content: Thought content normal.       Fluids    Intake/Output Summary (Last 24 " hours) at 1/29/2023 1051  Last data filed at 1/29/2023 0826  Gross per 24 hour   Intake 3211.58 ml   Output 3350 ml   Net -138.42 ml         Laboratory  Recent Labs     01/28/23  0808 01/29/23  0042   WBC 9.9 16.5*   RBC 4.08* 4.43   HEMOGLOBIN 13.5 14.8   HEMATOCRIT 41.4 45.6   .5* 102.9*   MCH 33.1* 33.4*   MCHC 32.6* 32.5*   RDW 44.8 46.3   PLATELETCT 260 260   MPV 9.9 10.5       Recent Labs     01/27/23  0021 01/28/23  0808 01/29/23  0042   SODIUM 136 144 143   POTASSIUM 4.0 4.1 4.2   CHLORIDE 103 104 105   CO2 24 31 26   GLUCOSE 155* 117* 103*   BUN 28* 29* 32*   CREATININE 0.74 0.92 1.15   CALCIUM 9.8 9.9 9.3                     Imaging  DX-SMALL BOWEL SERIES   Final Result      1.  Minimal progression of oral contrast in the bowel on 8 hours of imaging with dilution of contrast could be due to extreme dysmotility/ileus versus obstruction. Review of the previous CT scan shows no transition point of the small bowel dilatation    with apparent contrast in the cecum and gas seen extending through the transverse colon.      DX-ABDOMEN FOR TUBE PLACEMENT   Final Result      NG tube tip overlies the gastric body.      [id number] VIEW   Final Result      Persistent findings of small bowel obstruction      DX-ABDOMEN FOR TUBE PLACEMENT   Final Result         1.  Air-filled distended bowel loops in the upper abdomen, could represent ileus versus enterocolitis or evolving obstructive changes. Radiographic follow-up to resolution as clinically appropriate.   2.  Nasogastric tube tip terminates overlying the expected location of the gastric fundus.   3.  Left lower lobe atelectasis, infiltrate, and/or effusion.   4.  Cardiomegaly   5.  Atherosclerosis      CT-ABDOMEN-PELVIS W/O   Final Result      1.  Dilated loops of small intestine with air/blood levels consistent with small bowel obstruction.      2.  Fatty liver.      3.  Multiple left-sided renal stones measuring up to 2 cm in size.      4.  Prior  hysterectomy.      5.  Sigmoid diverticulosis.             Assessment/Plan  * SBO (small bowel obstruction) ([facility])- (present on admission)  Assessment & Plan  Presented with severe abdominal pain and distention associated with vomiting.  She has not had a bowel obstruction and failed to improve with conservative management/NG tube  Small bowel follow-through on 1/28 revealed a high-grade obstruction therefore she was taken emergently to the operating room later that day  Postop day 1, not passing gas, remains very distended although pain has improved.  N.p.o.  Continue NG tube to low intermittent suction  D/W surgery  Continue NS    Acute respiratory failure with hypoxia (HCC)  Assessment & Plan  At baseline she has obesity and sleep apnea her acute component was secondary to atelectasis secondary to abdominal distention  Remains on oxygen,4L  Encourage IS  Supplemental O2 as needed    Arthritis  Assessment & Plan  The patient has chronic arthritis and has been treated with prednisone 5 mg daily  Continue IV Solu-Medrol as IV substitute    Hypercalcemia  Assessment & Plan  With dehydration her calcium levels have gone up as well.  Hx of hyperCa  Corrected with IVF  Monitor vs OP workup    Acute renal failure (ARF) ([facility])  Assessment & Plan  Secondary to the dehydration-creatinine has normalized  Continue NS until taking PO    Hyponatremia  Assessment & Plan  Continue NS    Obesity (BMI 30-39.9)- (present on admission)  Assessment & Plan  Body mass index is 34.32 kg/m².  Outpatient weight loss management program highly recommended    Anxiety- (present on admission)  Assessment & Plan  Chronic anxiety and the patient at home was on Zoloft.    For now hold the Zoloft while npo    AZUL on CPAP- (present on admission)  Assessment & Plan  The patient at home uses CPAP unable to use with NGT  Oxygen at night  She is requiring 4L at this time due to atelectasis    Hyperlipidemia- (present on admission)  Assessment & Plan  Once  the patient is able to resume oral intake low-fat low-cholesterol diet with Crestor and cholestyramine will be recommended.    HTN (hypertension)- (present on admission)  Assessment & Plan  For now we are holding Coreg and lisinopril.    Use as needed labetalol only.         VTE prophylaxis: SCDs/TEDs    I have performed a physical exam and reviewed and updated ROS and Plan today (1/29/2023). In review of yesterday's note (1/28/2023), there are no changes except as documented above.    Total time:  40 minutes.  I spent greater than 50% of the time for patient care, counseling, and coordination on this date, including unit/floor time, and face-to-face time with the patient as per interval events and assessment and plan above.

## 2023-01-30 PROBLEM — E87.0 HYPERNATREMIA: Status: ACTIVE | Noted: 2023-01-24

## 2023-01-30 LAB
ALBUMIN SERPL BCP-MCNC: 2.6 G/DL (ref 3.2–4.9)
BASOPHILS # BLD AUTO: 0.6 % (ref 0–1.8)
BASOPHILS # BLD: 0.07 K/UL (ref 0–0.12)
BUN SERPL-MCNC: 32 MG/DL (ref 8–22)
CALCIUM ALBUM COR SERPL-MCNC: 10.1 MG/DL (ref 8.5–10.5)
CALCIUM SERPL-MCNC: 9 MG/DL (ref 8.4–10.2)
CHLORIDE SERPL-SCNC: 111 MMOL/L (ref 96–112)
CO2 SERPL-SCNC: 26 MMOL/L (ref 20–33)
CREAT SERPL-MCNC: 0.86 MG/DL (ref 0.5–1.4)
EKG IMPRESSION: NORMAL
EOSINOPHIL # BLD AUTO: 0.14 K/UL (ref 0–0.51)
EOSINOPHIL NFR BLD: 1.2 % (ref 0–6.9)
ERYTHROCYTE [DISTWIDTH] IN BLOOD BY AUTOMATED COUNT: 47.3 FL (ref 35.9–50)
GFR SERPLBLD CREATININE-BSD FMLA CKD-EPI: 69 ML/MIN/1.73 M 2
GLUCOSE SERPL-MCNC: 89 MG/DL (ref 65–99)
HCT VFR BLD AUTO: 39.3 % (ref 37–47)
HGB BLD-MCNC: 12.4 G/DL (ref 12–16)
IMM GRANULOCYTES # BLD AUTO: 0.5 K/UL (ref 0–0.11)
IMM GRANULOCYTES NFR BLD AUTO: 4.2 % (ref 0–0.9)
LYMPHOCYTES # BLD AUTO: 0.73 K/UL (ref 1–4.8)
LYMPHOCYTES NFR BLD: 6.1 % (ref 22–41)
MAGNESIUM SERPL-MCNC: 1.9 MG/DL (ref 1.5–2.5)
MCH RBC QN AUTO: 33.1 PG (ref 27–33)
MCHC RBC AUTO-ENTMCNC: 31.6 G/DL (ref 33.6–35)
MCV RBC AUTO: 104.8 FL (ref 81.4–97.8)
MONOCYTES # BLD AUTO: 0.81 K/UL (ref 0–0.85)
MONOCYTES NFR BLD AUTO: 6.8 % (ref 0–13.4)
NEUTROPHILS # BLD AUTO: 9.73 K/UL (ref 2–7.15)
NEUTROPHILS NFR BLD: 81.1 % (ref 44–72)
NRBC # BLD AUTO: 0 K/UL
NRBC BLD-RTO: 0 /100 WBC
PHOSPHATE SERPL-MCNC: 2.4 MG/DL (ref 2.5–4.5)
PLATELET # BLD AUTO: 161 K/UL (ref 164–446)
PMV BLD AUTO: 10.4 FL (ref 9–12.9)
POTASSIUM SERPL-SCNC: 4.2 MMOL/L (ref 3.6–5.5)
RBC # BLD AUTO: 3.75 M/UL (ref 4.2–5.4)
SODIUM SERPL-SCNC: 147 MMOL/L (ref 135–145)
WBC # BLD AUTO: 12 K/UL (ref 4.8–10.8)

## 2023-01-30 PROCEDURE — 700105 HCHG RX REV CODE 258: Performed by: INTERNAL MEDICINE

## 2023-01-30 PROCEDURE — 80069 RENAL FUNCTION PANEL: CPT

## 2023-01-30 PROCEDURE — 83735 ASSAY OF MAGNESIUM: CPT

## 2023-01-30 PROCEDURE — 770001 HCHG ROOM/CARE - MED/SURG/GYN PRIV*

## 2023-01-30 PROCEDURE — 700111 HCHG RX REV CODE 636 W/ 250 OVERRIDE (IP): Performed by: HOSPITALIST

## 2023-01-30 PROCEDURE — 85025 COMPLETE CBC W/AUTO DIFF WBC: CPT

## 2023-01-30 PROCEDURE — 99232 SBSQ HOSP IP/OBS MODERATE 35: CPT | Performed by: INTERNAL MEDICINE

## 2023-01-30 PROCEDURE — 94760 N-INVAS EAR/PLS OXIMETRY 1: CPT

## 2023-01-30 PROCEDURE — 36415 COLL VENOUS BLD VENIPUNCTURE: CPT

## 2023-01-30 PROCEDURE — C9113 INJ PANTOPRAZOLE SODIUM, VIA: HCPCS | Performed by: INTERNAL MEDICINE

## 2023-01-30 PROCEDURE — 700111 HCHG RX REV CODE 636 W/ 250 OVERRIDE (IP): Performed by: INTERNAL MEDICINE

## 2023-01-30 PROCEDURE — 93010 ELECTROCARDIOGRAM REPORT: CPT | Performed by: INTERNAL MEDICINE

## 2023-01-30 RX ORDER — PANTOPRAZOLE SODIUM 40 MG/10ML
40 INJECTION, POWDER, LYOPHILIZED, FOR SOLUTION INTRAVENOUS DAILY
Status: DISCONTINUED | OUTPATIENT
Start: 2023-01-30 | End: 2023-02-04

## 2023-01-30 RX ORDER — DEXTROSE AND SODIUM CHLORIDE 5; .45 G/100ML; G/100ML
INJECTION, SOLUTION INTRAVENOUS CONTINUOUS
Status: DISCONTINUED | OUTPATIENT
Start: 2023-01-30 | End: 2023-02-02

## 2023-01-30 RX ADMIN — METHYLPREDNISOLONE SODIUM SUCCINATE 10 MG: 40 INJECTION, POWDER, FOR SOLUTION INTRAMUSCULAR; INTRAVENOUS at 05:09

## 2023-01-30 RX ADMIN — LORAZEPAM 1 MG: 2 INJECTION INTRAMUSCULAR; INTRAVENOUS at 21:23

## 2023-01-30 RX ADMIN — MORPHINE SULFATE 1 MG: 4 INJECTION INTRAVENOUS at 18:03

## 2023-01-30 RX ADMIN — DEXTROSE AND SODIUM CHLORIDE: 5; 450 INJECTION, SOLUTION INTRAVENOUS at 11:21

## 2023-01-30 RX ADMIN — MORPHINE SULFATE 2 MG: 4 INJECTION INTRAVENOUS at 20:50

## 2023-01-30 RX ADMIN — MORPHINE SULFATE 2 MG: 4 INJECTION INTRAVENOUS at 03:32

## 2023-01-30 RX ADMIN — LORAZEPAM 1 MG: 2 INJECTION INTRAMUSCULAR; INTRAVENOUS at 02:45

## 2023-01-30 RX ADMIN — PANTOPRAZOLE SODIUM 40 MG: 40 INJECTION, POWDER, LYOPHILIZED, FOR SOLUTION INTRAVENOUS at 11:21

## 2023-01-30 RX ADMIN — MORPHINE SULFATE 1 MG: 4 INJECTION INTRAVENOUS at 01:03

## 2023-01-30 ASSESSMENT — ENCOUNTER SYMPTOMS
PALPITATIONS: 0
COUGH: 0
VOMITING: 0
SHORTNESS OF BREATH: 0
HEADACHES: 0
FOCAL WEAKNESS: 0
DIZZINESS: 0
CHILLS: 0
BACK PAIN: 0
ABDOMINAL PAIN: 1
HEARTBURN: 0
MYALGIAS: 0
NAUSEA: 0
FEVER: 0
NERVOUS/ANXIOUS: 1
SORE THROAT: 0
BLOOD IN STOOL: 0
DIARRHEA: 0
DEPRESSION: 1
HALLUCINATIONS: 0
WEAKNESS: 0

## 2023-01-30 ASSESSMENT — PAIN DESCRIPTION - PAIN TYPE
TYPE: SURGICAL PAIN
TYPE: ACUTE PAIN
TYPE: SURGICAL PAIN
TYPE: SURGICAL PAIN

## 2023-01-30 ASSESSMENT — PAIN SCALES - PAIN ASSESSMENT IN ADVANCED DEMENTIA (PAINAD)
CONSOLABILITY: NO NEED TO CONSOLE
BODYLANGUAGE: RELAXED
BODYLANGUAGE: RELAXED
CONSOLABILITY: NO NEED TO CONSOLE

## 2023-01-30 NOTE — PROGRESS NOTES
Pt is supine in bed when received. Complains of mild discomfort, and showing mild anxiety. Verbalizes needs well and demonstrates use of call bell. Call bell in reach    Chart check completed

## 2023-01-30 NOTE — PROGRESS NOTES
Patient frequently asking for ice chip and water.  Continued education regarding NPO status.  Paged Dr. Valles for updates and orders.

## 2023-01-30 NOTE — PROGRESS NOTES
[facility] Medicine Daily Progress Note    Date of Service  1/30/2023    Chief Complaint  Clau Vences is a 77 y.o. female admitted 1/24/2023 with abdominal pain and constipation    Hospital Course  History of hysterectomy and appendectomy 4 years ago, sleep apnea on CPAP, arthritis on chronic prednisone therapy, hypertension, hyperlipidemia and anxiety, who was admitted for abdominal pain and distention associated with 4 days of constipation.  She was found to have a small bowel obstruction.  General surgery was consulted and recommended conservative management NG tube placement.    Interval Problem Update  1/25: +gas per patient, NGT w bilious output.  Very distended with absent bowel sounds.  Cr improving  1/26: +BM overnight x3, less distention and pain.  Has weaned to room air.  D/w surgery, possibly remove ngt this afternoon  1/27: Did not tolerate advancing diet, recurrent vomiting, distention and pain.  Discussed with surgery, SBFT pending  1/28: Belly softer, passing some gas but still no BM.  SBFT pending.  Possible / Likely surgery tomorrow.  D/W daughter at bedside  1/29: SBFT showed high-grade obstruction, she was taken to the OR for SHANE.  POD 1, still with NG tube, n.p.o.  No mildly tachycardic, on 4 L of oxygen due to atelectasis  1/30: Ice chips are ok, POD2, still no gas.  Distended.  Remains on 4L.    I have discussed this patient's plan of care and discharge plan at IDT rounds today with Case Management, Nursing, Nursing leadership, and other members of the IDT team.    Consultants/Specialty  general surgery    Code Status  Full Code    Disposition  Patient is not medically cleared for discharge.   Anticipate discharge to to home with close outpatient follow-up.  I have placed the appropriate orders for post-discharge needs.    Review of Systems  Review of Systems   Constitutional:  Positive for malaise/fatigue. Negative for chills and fever.   HENT:  Negative for sore throat.     Respiratory:  Negative for cough and shortness of breath.    Cardiovascular:  Negative for chest pain and palpitations.   Gastrointestinal:  Positive for abdominal pain (Resolved today). Negative for blood in stool, diarrhea, heartburn, nausea and vomiting.        Distention.  Very thirsty   Genitourinary:  Negative for dysuria and frequency.   Musculoskeletal:  Negative for back pain and myalgias.   Neurological:  Negative for dizziness, focal weakness, weakness and headaches.   Psychiatric/Behavioral:  Positive for depression. Negative for hallucinations. The patient is nervous/anxious.    All other systems reviewed and are negative.     Physical Exam  Temp:  [36.4 °C (97.5 °F)-36.9 °C (98.4 °F)] 36.7 °C (98.1 °F)  Pulse:  [] 116  Resp:  [16-18] 18  BP: (143-154)/(60-77) 143/77  SpO2:  [91 %-97 %] 92 %    Physical Exam  Constitutional:       General: She is not in acute distress.  HENT:      Nose: Nose normal.      Mouth/Throat:      Mouth: Mucous membranes are dry.   Cardiovascular:      Rate and Rhythm: Normal rate and regular rhythm.      Pulses: Normal pulses.   Pulmonary:      Effort: No respiratory distress.      Comments: Reduced inspiratory effort  Abdominal:      General: There is distension (Remains distended).      Palpations: Abdomen is soft.      Tenderness: There is no abdominal tenderness (Non tender today).   Musculoskeletal:         General: No swelling.      Cervical back: No muscular tenderness.   Lymphadenopathy:      Cervical: No cervical adenopathy.   Skin:     General: Skin is warm and dry.      Coloration: Skin is pale.      Findings: No rash.   Neurological:      General: No focal deficit present.      Mental Status: She is alert and oriented to person, place, and time.      Motor: Weakness present.   Psychiatric:         Mood and Affect: Mood normal.         Thought Content: Thought content normal.       Fluids    Intake/Output Summary (Last 24 hours) at 1/30/2023 1157  Last data  filed at 1/30/2023 0800  Gross per 24 hour   Intake --   Output 900 ml   Net -900 ml         Laboratory  Recent Labs     01/28/23  0808 01/29/23 0042 01/30/23  0406   WBC 9.9 16.5* 12.0*   RBC 4.08* 4.43 3.75*   HEMOGLOBIN 13.5 14.8 12.4   HEMATOCRIT 41.4 45.6 39.3   .5* 102.9* 104.8*   MCH 33.1* 33.4* 33.1*   MCHC 32.6* 32.5* 31.6*   RDW 44.8 46.3 47.3   PLATELETCT 260 260 161*   MPV 9.9 10.5 10.4       Recent Labs     01/28/23  0808 01/29/23 0042 01/30/23  0406   SODIUM 144 143 147*   POTASSIUM 4.1 4.2 4.2   CHLORIDE 104 105 111   CO2 31 26 26   GLUCOSE 117* 103* 89   BUN 29* 32* 32*   CREATININE 0.92 1.15 0.86   CALCIUM 9.9 9.3 9.0                     Imaging  DX-SMALL BOWEL SERIES   Final Result      1.  Minimal progression of oral contrast in the bowel on 8 hours of imaging with dilution of contrast could be due to extreme dysmotility/ileus versus obstruction. Review of the previous CT scan shows no transition point of the small bowel dilatation    with apparent contrast in the cecum and gas seen extending through the transverse colon.      DX-ABDOMEN FOR TUBE PLACEMENT   Final Result      NG tube tip overlies the gastric body.      [id number] VIEW   Final Result      Persistent findings of small bowel obstruction      DX-ABDOMEN FOR TUBE PLACEMENT   Final Result         1.  Air-filled distended bowel loops in the upper abdomen, could represent ileus versus enterocolitis or evolving obstructive changes. Radiographic follow-up to resolution as clinically appropriate.   2.  Nasogastric tube tip terminates overlying the expected location of the gastric fundus.   3.  Left lower lobe atelectasis, infiltrate, and/or effusion.   4.  Cardiomegaly   5.  Atherosclerosis      CT-ABDOMEN-PELVIS W/O   Final Result      1.  Dilated loops of small intestine with air/blood levels consistent with small bowel obstruction.      2.  Fatty liver.      3.  Multiple left-sided renal stones measuring up to 2 cm in size.       4.  Prior hysterectomy.      5.  Sigmoid diverticulosis.             Assessment/Plan  * SBO (small bowel obstruction) ([facility])- (present on admission)  Assessment & Plan  Presented with severe abdominal pain and distention associated with vomiting.  She has not had a bowel obstruction and failed to improve with conservative management/NG tube  Small bowel follow-through on 1/28 revealed a high-grade obstruction therefore she was taken emergently to the operating room later that day  Postop day 2, not passing gas, remains distended although pain has improved.  N.p.o.  Continue NG tube to low continuous suction  D/W surgery  Continue fluids, change to d5 1/2 ns due to rising Na    Acute respiratory failure with hypoxia (HCC)  Assessment & Plan  At baseline she has obesity and sleep apnea her acute component was secondary to atelectasis secondary to abdominal distention  Remains on oxygen,4L  Encourage IS  Supplemental O2 as needed    Arthritis  Assessment & Plan  The patient has chronic arthritis and has been treated with prednisone 5 mg daily  Continue IV Solu-Medrol as IV substitute    Hypercalcemia  Assessment & Plan  With dehydration her calcium levels have gone up as well.  Hx of hyperCa  Corrected with IVF  Monitor vs OP workup    Acute renal failure (ARF) (HCC)  Assessment & Plan  Secondary to the dehydration-creatinine has normalized  Continue NS until taking PO    Hypernatremia  Assessment & Plan  Change to d5 1/2 ns    Obesity (BMI 30-39.9)- (present on admission)  Assessment & Plan  Body mass index is 34.32 kg/m².  Outpatient weight loss management program highly recommended    Anxiety- (present on admission)  Assessment & Plan  Chronic anxiety and the patient at home was on Zoloft.    For now hold the Zoloft while npo    AZUL on CPAP- (present on admission)  Assessment & Plan  The patient at home uses CPAP unable to use with NGT  Oxygen at night  She is requiring 4L at this time due to  atelectasis    Hyperlipidemia- (present on admission)  Assessment & Plan  Once the patient is able to resume oral intake low-fat low-cholesterol diet with Crestor and cholestyramine will be recommended.    HTN (hypertension)- (present on admission)  Assessment & Plan  For now we are holding Coreg and lisinopril.    Use as needed labetalol only.         VTE prophylaxis: SCDs/TEDs    I have performed a physical exam and reviewed and updated ROS and Plan today (1/30/2023). In review of yesterday's note (1/29/2023), there are no changes except as documented above.

## 2023-01-30 NOTE — CARE PLAN
The patient is Stable - Low risk of patient condition declining or worsening    Shift Goals  Clinical Goals: 0 N/V throughout shift  Patient Goals: rest and comfort  Family Goals: n/a    Progress made toward(s) clinical / shift goals:    Problem: Pain - Standard  Goal: Alleviation of pain or a reduction in pain to the patient’s comfort goal  Outcome: Progressing       Patient is not progressing towards the following goals:

## 2023-01-30 NOTE — CARE PLAN
Problem: Knowledge Deficit - Standard  Goal: Patient and family/care givers will demonstrate understanding of plan of care, disease process/condition, diagnostic tests and medications  Outcome: Progressing     Problem: Fall Risk  Goal: Patient will remain free from falls  Outcome: Progressing     The patient is Stable - Low risk of patient condition declining or worsening    Shift Goals  Clinical Goals: Monitor for further abdominal pain/nausea; monitor surgical incision  Patient Goals: Eat or drink today; remove NGT if possible  Family Goals: n/a    Progress made toward(s) clinical / shift goals:  Pt denies abdominal pain or nausea at time of writing. Abdomen distended but not tender. Unable to remove NGT or advance diet this shift; pending BM.    Patient is not progressing towards the following goals:      Problem: Bowel Elimination  Goal: Establish and maintain regular bowel function  Outcome: Not Progressing

## 2023-01-30 NOTE — DISCHARGE PLANNING
Case Management Discharge Planning    Admission Date: 1/24/2023  GMLOS: 4.6  ALOS: 6    6-Clicks ADL Score: 18  6-Clicks Mobility Score: 18      Anticipated Discharge Dispo: Discharge Disposition: D/T to home under [facility] care in anticipation of covered skilled care (06)    DME Needed: No    Action(s) Taken: Updated Provider/Nurse on Discharge Plan    0835: RN CM requested PT/OT orders from MD due to low 6 clicks and assistance x1 to ambulate.    Escalations Completed: Provider and PT/OT    Medically Clear: No    Next Steps: PT/OT evals, Medical clearance    Barriers to Discharge: Medical clearance, Pending PT/OT Evaluations

## 2023-01-30 NOTE — PROGRESS NOTES
Received report from NOC RN.  Assumed patient care.  Patient is alert and oriented.  Denies pain or nausea.  Abdominal dressing intact, scant old drainage noted.  NG tube on continuous.  Green content noticed in suction canister.  Hypoactive bowel sounds, abdominal distended but soft and non tender. Denies flatus. IS at 500. On 4L NC, O2 at 92%, will wean approprietly.   Patient states she is thirsty and asking for water, educated patient that order is strict NPO.  Mouth swabs given.  Encouraged patient to ambulate at lest 3 times today, patient states understanding.

## 2023-01-30 NOTE — PROGRESS NOTES
Surgery  POD#2  Feels ok  Dressing intact  No bm yet  Mobilize  Ice chips ok  Will d/c ngt tomorrow

## 2023-01-31 LAB
ALBUMIN SERPL BCP-MCNC: 2.7 G/DL (ref 3.2–4.9)
ALBUMIN/GLOB SERPL: 1 G/DL
ALP SERPL-CCNC: 49 U/L (ref 30–99)
ALT SERPL-CCNC: 7 U/L (ref 2–50)
ANION GAP SERPL CALC-SCNC: 8 MMOL/L (ref 7–16)
AST SERPL-CCNC: 16 U/L (ref 12–45)
BASOPHILS # BLD AUTO: 0.2 % (ref 0–1.8)
BASOPHILS # BLD: 0.02 K/UL (ref 0–0.12)
BILIRUB SERPL-MCNC: 0.2 MG/DL (ref 0.1–1.5)
BUN SERPL-MCNC: 22 MG/DL (ref 8–22)
CALCIUM ALBUM COR SERPL-MCNC: 10.3 MG/DL (ref 8.5–10.5)
CALCIUM SERPL-MCNC: 9.3 MG/DL (ref 8.4–10.2)
CHLORIDE SERPL-SCNC: 108 MMOL/L (ref 96–112)
CO2 SERPL-SCNC: 25 MMOL/L (ref 20–33)
COMMENT 1642: NORMAL
CREAT SERPL-MCNC: 0.73 MG/DL (ref 0.5–1.4)
EOSINOPHIL # BLD AUTO: 0.2 K/UL (ref 0–0.51)
EOSINOPHIL NFR BLD: 1.9 % (ref 0–6.9)
ERYTHROCYTE [DISTWIDTH] IN BLOOD BY AUTOMATED COUNT: 47.8 FL (ref 35.9–50)
GFR SERPLBLD CREATININE-BSD FMLA CKD-EPI: 84 ML/MIN/1.73 M 2
GLOBULIN SER CALC-MCNC: 2.7 G/DL (ref 1.9–3.5)
GLUCOSE SERPL-MCNC: 123 MG/DL (ref 65–99)
HCT VFR BLD AUTO: 43.7 % (ref 37–47)
HGB BLD-MCNC: 13.4 G/DL (ref 12–16)
IMM GRANULOCYTES # BLD AUTO: 0.51 K/UL (ref 0–0.11)
IMM GRANULOCYTES NFR BLD AUTO: 4.8 % (ref 0–0.9)
LYMPHOCYTES # BLD AUTO: 0.82 K/UL (ref 1–4.8)
LYMPHOCYTES NFR BLD: 7.7 % (ref 22–41)
MAGNESIUM SERPL-MCNC: 1.7 MG/DL (ref 1.5–2.5)
MCH RBC QN AUTO: 33 PG (ref 27–33)
MCHC RBC AUTO-ENTMCNC: 30.7 G/DL (ref 33.6–35)
MCV RBC AUTO: 107.6 FL (ref 81.4–97.8)
MONOCYTES # BLD AUTO: 0.86 K/UL (ref 0–0.85)
MONOCYTES NFR BLD AUTO: 8 % (ref 0–13.4)
NEUTROPHILS # BLD AUTO: 8.29 K/UL (ref 2–7.15)
NEUTROPHILS NFR BLD: 77.4 % (ref 44–72)
NRBC # BLD AUTO: 0 K/UL
NRBC BLD-RTO: 0 /100 WBC
PLATELET # BLD AUTO: 109 K/UL (ref 164–446)
PMV BLD AUTO: 11.1 FL (ref 9–12.9)
POTASSIUM SERPL-SCNC: 4 MMOL/L (ref 3.6–5.5)
PROT SERPL-MCNC: 5.4 G/DL (ref 6–8.2)
RBC # BLD AUTO: 4.06 M/UL (ref 4.2–5.4)
SODIUM SERPL-SCNC: 141 MMOL/L (ref 135–145)
WBC # BLD AUTO: 10.7 K/UL (ref 4.8–10.8)

## 2023-01-31 PROCEDURE — 83735 ASSAY OF MAGNESIUM: CPT

## 2023-01-31 PROCEDURE — 700111 HCHG RX REV CODE 636 W/ 250 OVERRIDE (IP): Performed by: INTERNAL MEDICINE

## 2023-01-31 PROCEDURE — 36415 COLL VENOUS BLD VENIPUNCTURE: CPT

## 2023-01-31 PROCEDURE — A9270 NON-COVERED ITEM OR SERVICE: HCPCS | Performed by: INTERNAL MEDICINE

## 2023-01-31 PROCEDURE — 80053 COMPREHEN METABOLIC PANEL: CPT

## 2023-01-31 PROCEDURE — 770001 HCHG ROOM/CARE - MED/SURG/GYN PRIV*

## 2023-01-31 PROCEDURE — 85025 COMPLETE CBC W/AUTO DIFF WBC: CPT

## 2023-01-31 PROCEDURE — 700101 HCHG RX REV CODE 250: Performed by: HOSPITALIST

## 2023-01-31 PROCEDURE — 700105 HCHG RX REV CODE 258: Performed by: INTERNAL MEDICINE

## 2023-01-31 PROCEDURE — 99232 SBSQ HOSP IP/OBS MODERATE 35: CPT | Performed by: INTERNAL MEDICINE

## 2023-01-31 PROCEDURE — C9113 INJ PANTOPRAZOLE SODIUM, VIA: HCPCS | Performed by: INTERNAL MEDICINE

## 2023-01-31 PROCEDURE — 94760 N-INVAS EAR/PLS OXIMETRY 1: CPT

## 2023-01-31 PROCEDURE — 700111 HCHG RX REV CODE 636 W/ 250 OVERRIDE (IP): Performed by: HOSPITALIST

## 2023-01-31 PROCEDURE — 700102 HCHG RX REV CODE 250 W/ 637 OVERRIDE(OP): Performed by: INTERNAL MEDICINE

## 2023-01-31 RX ADMIN — METHYLPREDNISOLONE SODIUM SUCCINATE 10 MG: 40 INJECTION, POWDER, FOR SOLUTION INTRAMUSCULAR; INTRAVENOUS at 05:00

## 2023-01-31 RX ADMIN — MORPHINE SULFATE 2 MG: 4 INJECTION INTRAVENOUS at 04:07

## 2023-01-31 RX ADMIN — LORAZEPAM 1 MG: 2 INJECTION INTRAMUSCULAR; INTRAVENOUS at 21:53

## 2023-01-31 RX ADMIN — LABETALOL HYDROCHLORIDE 10 MG: 5 INJECTION, SOLUTION INTRAVENOUS at 11:11

## 2023-01-31 RX ADMIN — DEXTROSE AND SODIUM CHLORIDE: 5; 450 INJECTION, SOLUTION INTRAVENOUS at 17:00

## 2023-01-31 RX ADMIN — DEXTROSE AND SODIUM CHLORIDE: 5; 450 INJECTION, SOLUTION INTRAVENOUS at 01:40

## 2023-01-31 RX ADMIN — LORAZEPAM 1 MG: 2 INJECTION INTRAMUSCULAR; INTRAVENOUS at 07:17

## 2023-01-31 RX ADMIN — MORPHINE SULFATE 2 MG: 4 INJECTION INTRAVENOUS at 21:13

## 2023-01-31 RX ADMIN — LORAZEPAM 1 MG: 2 INJECTION INTRAMUSCULAR; INTRAVENOUS at 01:35

## 2023-01-31 RX ADMIN — PANTOPRAZOLE SODIUM 40 MG: 40 INJECTION, POWDER, LYOPHILIZED, FOR SOLUTION INTRAVENOUS at 05:00

## 2023-01-31 RX ADMIN — Medication 5 MG: at 20:27

## 2023-01-31 ASSESSMENT — ENCOUNTER SYMPTOMS
FOCAL WEAKNESS: 0
BACK PAIN: 0
DIARRHEA: 0
NAUSEA: 0
FEVER: 0
COUGH: 0
VOMITING: 0
WEAKNESS: 0
HEADACHES: 0
DEPRESSION: 1
HALLUCINATIONS: 0
SHORTNESS OF BREATH: 0
CONSTIPATION: 1
MYALGIAS: 0
CHILLS: 0
HEARTBURN: 0
ABDOMINAL PAIN: 0
NERVOUS/ANXIOUS: 1
DIZZINESS: 0

## 2023-01-31 ASSESSMENT — PAIN DESCRIPTION - PAIN TYPE
TYPE: SURGICAL PAIN

## 2023-01-31 NOTE — CARE PLAN
The patient is Stable - Low risk of patient condition declining or worsening    Shift Goals  Clinical Goals: patient will ambulate 3 times before end of shift  Patient Goals: ice chips  Family Goals: n/a    Progress made toward(s) clinical / shift goals:  Patient calls before getting up out of bed.  Ambulated 4 times during shift, up to chair most of the day. Ice chips okay per surgery.     Patient is not progressing towards the following goals: distant bowel sounds, no flatus, NGT remains in place.      Problem: Bowel Elimination  Goal: Establish and maintain regular bowel function  Outcome: Not Progressing

## 2023-01-31 NOTE — PROGRESS NOTES
Bedside report received from night RN. Assumed care of patient. Alert and oriented, wakes easily to voice. Daily plan of care discussed. Pt denies intolerable pain to abdomen, denies nausea and vomiting. On 2L O2 via NC, no SOB/respiratory distress noted.  Hourly rounding in place.     1000 NG tube clamped for 4 hours per order, will monitor for nausea, vomiting and abdominal pain.     1400 Pt denies nausea, vomiting and pain to abdomen after 4 hrs. Pt states she was able to pass gas. NGT removed, diet advanced to clear liquid.

## 2023-01-31 NOTE — PROGRESS NOTES
Received bedside report from day shift RN As at 19:05.   Assumed pt care.   Pt seen AO4,  pt with IVF D5 1/2 NS running at 75/hr. On 4L of O2, sPo2 at 97%. Pain reported, medicated per mar and no nausea reported at this time.   POC discussed. Safety and fall precautions in place.   Instructed pt to use call light, verbalized understanding.   Call light kept within reach.  No other needs at this time.   Will continue to monitor.

## 2023-01-31 NOTE — DISCHARGE PLANNING
Case Management Discharge Planning    Admission Date: 1/24/2023  GMLOS: 7.7  ALOS: 7    6-Clicks ADL Score: 18  6-Clicks Mobility Score: 18      Anticipated Discharge Dispo: D/T to home under [facility] care in anticipation of covered skilled care (06)    DME Needed: No    Action(s) Taken: LSW completed chart review. Per review, PT/OT requested yesterday, no PT/OT orders at this time. Discussed pt in IDT rounds. Per MD, anticipate pt to d/c in 1-2 days, surgery following.     Escalations Completed: None    Medically Clear: No    Next Steps: LSW to follow and assist as needed.    Barriers to Discharge: None    Is the patient up for discharge tomorrow: No

## 2023-01-31 NOTE — PROGRESS NOTES
POD#3  Strongly desires removal of NGT  Wound ok  Labs ok  Clamp NGT x 4 hours  D/c if no nv  Back to suction for n/v

## 2023-01-31 NOTE — PROGRESS NOTES
[facility] Medicine Daily Progress Note    Date of Service  1/31/2023    Chief Complaint  Clau Vences is a 77 y.o. female admitted 1/24/2023 with abdominal pain and constipation    Hospital Course  History of hysterectomy and appendectomy 4 years ago, sleep apnea on CPAP, arthritis on chronic prednisone therapy, hypertension, hyperlipidemia and anxiety, who was admitted for abdominal pain and distention associated with 4 days of constipation.  She was found to have a small bowel obstruction.  General surgery was consulted and recommended conservative management NG tube placement. Unfortunately patient did not improve and SBFT showed high grade obstruction. Surgery took patient to the OR for laparotomy and lysis of adhesions on 1/28.    Interval Problem Update  1/29: SBFT showed high-grade obstruction, she was taken to the OR for SHANE.  POD 1, still with NG tube, n.p.o.  No mildly tachycardic, on 4 L of oxygen due to atelectasis  1/30: Ice chips are ok, POD2, still no gas.  Distended.  Remains on 4L.  1/31: Vitals stable. Leukocytosis resolved. Hypernatremia resolved. Ng tube clamped per surgery, if not N/V in 4 hours can dc. Patient reports she passed flatus this morning, no BM yet but feels pressure like she needs to go. Denies any N/V or abdominal pain.     I have discussed this patient's plan of care and discharge plan at IDT rounds today with Case Management, Nursing, Nursing leadership, and other members of the IDT team.    Consultants/Specialty  general surgery    Code Status  Full Code    Disposition  Patient is not medically cleared for discharge.   Anticipate discharge to to home with close outpatient follow-up.  I have placed the appropriate orders for post-discharge needs.    Review of Systems  Review of Systems   Constitutional:  Positive for malaise/fatigue. Negative for chills and fever.   Respiratory:  Negative for cough and shortness of breath.    Cardiovascular:  Negative for chest pain and  leg swelling.   Gastrointestinal:  Positive for constipation. Negative for abdominal pain, diarrhea, heartburn, nausea and vomiting.   Musculoskeletal:  Negative for back pain and myalgias.   Neurological:  Negative for dizziness, focal weakness, weakness and headaches.   Psychiatric/Behavioral:  Positive for depression. Negative for hallucinations. The patient is nervous/anxious.    All other systems reviewed and are negative.     Physical Exam  Temp:  [36.7 °C (98.1 °F)-37 °C (98.6 °F)] 37 °C (98.6 °F)  Pulse:  [] 95  Resp:  [16-18] 17  BP: (157-192)/() 192/104  SpO2:  [94 %-96 %] 94 %    Physical Exam  Constitutional:       General: She is not in acute distress.     Appearance: She is obese. She is ill-appearing.   HENT:      Head: Normocephalic and atraumatic.      Nose:      Comments: Ng tube clamped      Mouth/Throat:      Mouth: Mucous membranes are dry.      Pharynx: Oropharynx is clear.   Cardiovascular:      Rate and Rhythm: Normal rate and regular rhythm.      Pulses: Normal pulses.   Pulmonary:      Effort: No respiratory distress.      Breath sounds: No wheezing.   Abdominal:      General: There is distension.      Palpations: Abdomen is soft.      Tenderness: There is no abdominal tenderness.   Musculoskeletal:         General: No swelling.      Cervical back: Neck supple. No muscular tenderness.   Skin:     General: Skin is warm and dry.      Coloration: Skin is pale.      Findings: No rash.   Neurological:      General: No focal deficit present.      Mental Status: She is alert and oriented to person, place, and time.      Cranial Nerves: No cranial nerve deficit.      Motor: Weakness present.   Psychiatric:         Mood and Affect: Mood normal.         Thought Content: Thought content normal.       Fluids    Intake/Output Summary (Last 24 hours) at 1/31/2023 1230  Last data filed at 1/31/2023 1033  Gross per 24 hour   Intake 1535.17 ml   Output 1600 ml   Net -64.83 ml        Laboratory  Recent Labs     01/29/23  0042 01/30/23  0406 01/31/23  0152   WBC 16.5* 12.0* 10.7   RBC 4.43 3.75* 4.06*   HEMOGLOBIN 14.8 12.4 13.4   HEMATOCRIT 45.6 39.3 43.7   .9* 104.8* 107.6*   MCH 33.4* 33.1* 33.0   MCHC 32.5* 31.6* 30.7*   RDW 46.3 47.3 47.8   PLATELETCT 260 161* 109*   MPV 10.5 10.4 11.1     Recent Labs     01/29/23  0042 01/30/23  0406 01/31/23  0152   SODIUM 143 147* 141   POTASSIUM 4.2 4.2 4.0   CHLORIDE 105 111 108   CO2 26 26 25   GLUCOSE 103* 89 123*   BUN 32* 32* 22   CREATININE 1.15 0.86 0.73   CALCIUM 9.3 9.0 9.3                   Imaging  DX-SMALL BOWEL SERIES   Final Result      1.  Minimal progression of oral contrast in the bowel on 8 hours of imaging with dilution of contrast could be due to extreme dysmotility/ileus versus obstruction. Review of the previous CT scan shows no transition point of the small bowel dilatation    with apparent contrast in the cecum and gas seen extending through the transverse colon.      DX-ABDOMEN FOR TUBE PLACEMENT   Final Result      NG tube tip overlies the gastric body.      SG-[id number] VIEW   Final Result      Persistent findings of small bowel obstruction      DX-ABDOMEN FOR TUBE PLACEMENT   Final Result         1.  Air-filled distended bowel loops in the upper abdomen, could represent ileus versus enterocolitis or evolving obstructive changes. Radiographic follow-up to resolution as clinically appropriate.   2.  Nasogastric tube tip terminates overlying the expected location of the gastric fundus.   3.  Left lower lobe atelectasis, infiltrate, and/or effusion.   4.  Cardiomegaly   5.  Atherosclerosis      CT-ABDOMEN-PELVIS W/O   Final Result      1.  Dilated loops of small intestine with air/blood levels consistent with small bowel obstruction.      2.  Fatty liver.      3.  Multiple left-sided renal stones measuring up to 2 cm in size.      4.  Prior hysterectomy.      5.  Sigmoid diverticulosis.             Assessment/Plan  *  SBO (small bowel obstruction) (HCC)- (present on admission)  Assessment & Plan  Presented with severe abdominal pain and distention associated with vomiting.  She has not had a bowel obstruction and failed to improve with conservative management/NG tube  Small bowel follow-through on 1/28 revealed a high-grade obstruction therefore she was taken emergently to the operating room later that day  Postop day 3, passing gas   N.p.o.  NG tube clamped, if no n/v can dc in 4 hrs   Continue fluids    Acute respiratory failure with hypoxia (HCC)  Assessment & Plan  At baseline she has obesity and sleep apnea her acute component was secondary to atelectasis secondary to abdominal distention  Remains on oxygen,4L  Encourage IS  Supplemental O2 as needed    Arthritis  Assessment & Plan  The patient has chronic arthritis and has been treated with prednisone 5 mg daily  Continue IV Solu-Medrol as IV substitute    Hypercalcemia  Assessment & Plan  With dehydration her calcium levels have gone up as well.  Hx of hyperCa  Corrected with IVF  Monitor vs OP workup    Acute renal failure (ARF) (HCC)  Assessment & Plan  Secondary to the dehydration  Continue NS until taking PO  Avoid nephrotoxic medications     resolved    Hypernatremia  Assessment & Plan  Change to d5 1/2 ns    resolved    Obesity (BMI 30-39.9)- (present on admission)  Assessment & Plan  Body mass index is 34.32 kg/m².  Outpatient weight loss management program highly recommended    Anxiety- (present on admission)  Assessment & Plan  Chronic anxiety and the patient at home was on Zoloft.    For now hold the Zoloft while npo    AZUL on CPAP- (present on admission)  Assessment & Plan  The patient at home uses CPAP unable to use with NGT  Oxygen at night  She is requiring 4L at this time due to atelectasis    Hyperlipidemia- (present on admission)  Assessment & Plan  Once the patient is able to resume oral intake low-fat low-cholesterol diet with Crestor and cholestyramine  will be recommended.    HTN (hypertension)- (present on admission)  Assessment & Plan  For now we are holding Coreg and lisinopril.    Use as needed labetalol only.         VTE prophylaxis: SCDs/TEDs    I have performed a physical exam and reviewed and updated ROS and Plan today (1/31/2023). In review of yesterday's note (1/30/2023), there are no changes except as documented above.

## 2023-01-31 NOTE — CARE PLAN
The patient is Stable - Low risk of patient condition declining or worsening    Shift Goals  Clinical Goals: Pt will be able to tolerate pain with ambulation, pass gas this shift  Patient Goals: Sleep  Family Goals: n/a    Progress made toward(s) clinical / shift goals:  Administered PRN anxiety and pain meds per mar, ice chips given for comfort. Pt seen sleeping comfortably this shift, reported pain relief post interventions, was able to tolerate pain with ambulation.    Patient is not progressing towards the following goals: Pt has not yet passed gas this shift

## 2023-02-01 ENCOUNTER — APPOINTMENT (OUTPATIENT)
Dept: RADIOLOGY | Facility: MEDICAL CENTER | Age: 78
DRG: 335 | End: 2023-02-01
Attending: INTERNAL MEDICINE
Payer: MEDICARE

## 2023-02-01 PROBLEM — N17.9 ACUTE RENAL FAILURE (ARF) (HCC): Status: RESOLVED | Noted: 2023-01-24 | Resolved: 2023-02-01

## 2023-02-01 PROBLEM — E83.52 HYPERCALCEMIA: Status: RESOLVED | Noted: 2023-01-24 | Resolved: 2023-02-01

## 2023-02-01 LAB
ANION GAP SERPL CALC-SCNC: 8 MMOL/L (ref 7–16)
BUN SERPL-MCNC: 15 MG/DL (ref 8–22)
CALCIUM SERPL-MCNC: 9.3 MG/DL (ref 8.4–10.2)
CHLORIDE SERPL-SCNC: 103 MMOL/L (ref 96–112)
CO2 SERPL-SCNC: 27 MMOL/L (ref 20–33)
CREAT SERPL-MCNC: 0.59 MG/DL (ref 0.5–1.4)
ERYTHROCYTE [DISTWIDTH] IN BLOOD BY AUTOMATED COUNT: 45.5 FL (ref 35.9–50)
GFR SERPLBLD CREATININE-BSD FMLA CKD-EPI: 92 ML/MIN/1.73 M 2
GLUCOSE SERPL-MCNC: 135 MG/DL (ref 65–99)
HCT VFR BLD AUTO: 39.7 % (ref 37–47)
HGB BLD-MCNC: 12.5 G/DL (ref 12–16)
MCH RBC QN AUTO: 32.7 PG (ref 27–33)
MCHC RBC AUTO-ENTMCNC: 31.5 G/DL (ref 33.6–35)
MCV RBC AUTO: 103.9 FL (ref 81.4–97.8)
PLATELET # BLD AUTO: 146 K/UL (ref 164–446)
PMV BLD AUTO: 11 FL (ref 9–12.9)
POTASSIUM SERPL-SCNC: 3.9 MMOL/L (ref 3.6–5.5)
RBC # BLD AUTO: 3.82 M/UL (ref 4.2–5.4)
SODIUM SERPL-SCNC: 138 MMOL/L (ref 135–145)
WBC # BLD AUTO: 9.3 K/UL (ref 4.8–10.8)

## 2023-02-01 PROCEDURE — 700111 HCHG RX REV CODE 636 W/ 250 OVERRIDE (IP): Performed by: INTERNAL MEDICINE

## 2023-02-01 PROCEDURE — 700111 HCHG RX REV CODE 636 W/ 250 OVERRIDE (IP): Performed by: HOSPITALIST

## 2023-02-01 PROCEDURE — 97166 OT EVAL MOD COMPLEX 45 MIN: CPT

## 2023-02-01 PROCEDURE — 80048 BASIC METABOLIC PNL TOTAL CA: CPT

## 2023-02-01 PROCEDURE — 770001 HCHG ROOM/CARE - MED/SURG/GYN PRIV*

## 2023-02-01 PROCEDURE — A9270 NON-COVERED ITEM OR SERVICE: HCPCS | Performed by: INTERNAL MEDICINE

## 2023-02-01 PROCEDURE — 97162 PT EVAL MOD COMPLEX 30 MIN: CPT

## 2023-02-01 PROCEDURE — 85027 COMPLETE CBC AUTOMATED: CPT

## 2023-02-01 PROCEDURE — 94760 N-INVAS EAR/PLS OXIMETRY 1: CPT

## 2023-02-01 PROCEDURE — 99232 SBSQ HOSP IP/OBS MODERATE 35: CPT | Performed by: INTERNAL MEDICINE

## 2023-02-01 PROCEDURE — 700102 HCHG RX REV CODE 250 W/ 637 OVERRIDE(OP): Performed by: INTERNAL MEDICINE

## 2023-02-01 PROCEDURE — C9113 INJ PANTOPRAZOLE SODIUM, VIA: HCPCS | Performed by: INTERNAL MEDICINE

## 2023-02-01 PROCEDURE — C1751 CATH, INF, PER/CENT/MIDLINE: HCPCS

## 2023-02-01 PROCEDURE — 700105 HCHG RX REV CODE 258: Performed by: INTERNAL MEDICINE

## 2023-02-01 PROCEDURE — 36415 COLL VENOUS BLD VENIPUNCTURE: CPT

## 2023-02-01 PROCEDURE — 02HV33Z INSERTION OF INFUSION DEVICE INTO SUPERIOR VENA CAVA, PERCUTANEOUS APPROACH: ICD-10-PCS | Performed by: INTERNAL MEDICINE

## 2023-02-01 RX ORDER — ONDANSETRON 4 MG/1
4 TABLET, ORALLY DISINTEGRATING ORAL EVERY 4 HOURS PRN
Status: DISCONTINUED | OUTPATIENT
Start: 2023-02-01 | End: 2023-02-09 | Stop reason: HOSPADM

## 2023-02-01 RX ORDER — PROCHLORPERAZINE EDISYLATE 5 MG/ML
10 INJECTION INTRAMUSCULAR; INTRAVENOUS EVERY 6 HOURS PRN
Status: DISCONTINUED | OUTPATIENT
Start: 2023-02-01 | End: 2023-02-09 | Stop reason: HOSPADM

## 2023-02-01 RX ORDER — CHOLECALCIFEROL (VITAMIN D3) 125 MCG
5 CAPSULE ORAL NIGHTLY
Status: DISCONTINUED | OUTPATIENT
Start: 2023-02-01 | End: 2023-02-09 | Stop reason: HOSPADM

## 2023-02-01 RX ADMIN — PANTOPRAZOLE SODIUM 40 MG: 40 INJECTION, POWDER, LYOPHILIZED, FOR SOLUTION INTRAVENOUS at 06:00

## 2023-02-01 RX ADMIN — Medication 5 MG: at 20:18

## 2023-02-01 RX ADMIN — LORAZEPAM 1 MG: 2 INJECTION INTRAMUSCULAR; INTRAVENOUS at 20:11

## 2023-02-01 RX ADMIN — PROCHLORPERAZINE EDISYLATE 10 MG: 5 INJECTION INTRAMUSCULAR; INTRAVENOUS at 09:32

## 2023-02-01 RX ADMIN — ONDANSETRON 4 MG: 2 INJECTION INTRAMUSCULAR; INTRAVENOUS at 11:56

## 2023-02-01 RX ADMIN — LORAZEPAM 1 MG: 2 INJECTION INTRAMUSCULAR; INTRAVENOUS at 02:58

## 2023-02-01 RX ADMIN — MORPHINE SULFATE 1 MG: 4 INJECTION INTRAVENOUS at 20:11

## 2023-02-01 RX ADMIN — ONDANSETRON 4 MG: 2 INJECTION INTRAMUSCULAR; INTRAVENOUS at 05:56

## 2023-02-01 RX ADMIN — DEXTROSE AND SODIUM CHLORIDE: 5; 450 INJECTION, SOLUTION INTRAVENOUS at 06:03

## 2023-02-01 RX ADMIN — METHYLPREDNISOLONE SODIUM SUCCINATE 10 MG: 40 INJECTION, POWDER, FOR SOLUTION INTRAMUSCULAR; INTRAVENOUS at 06:00

## 2023-02-01 ASSESSMENT — ENCOUNTER SYMPTOMS
COUGH: 0
WEAKNESS: 0
DEPRESSION: 1
DIZZINESS: 0
ABDOMINAL PAIN: 0
CHILLS: 0
FEVER: 0
NAUSEA: 1
HALLUCINATIONS: 0
FOCAL WEAKNESS: 0
CONSTIPATION: 1
VOMITING: 1
DIARRHEA: 0
BACK PAIN: 0
HEARTBURN: 0
NERVOUS/ANXIOUS: 1
MYALGIAS: 0
HEADACHES: 0
SHORTNESS OF BREATH: 0

## 2023-02-01 ASSESSMENT — GAIT ASSESSMENTS
GAIT LEVEL OF ASSIST: MINIMAL ASSIST
DEVIATION: SHUFFLED GAIT;BRADYKINETIC
DISTANCE (FEET): 60
ASSISTIVE DEVICE: FRONT WHEEL WALKER
DISTANCE (FEET): 100

## 2023-02-01 ASSESSMENT — COGNITIVE AND FUNCTIONAL STATUS - GENERAL
TOILETING: A LITTLE
CLIMB 3 TO 5 STEPS WITH RAILING: A LOT
DAILY ACTIVITIY SCORE: 17
MOBILITY SCORE: 15
HELP NEEDED FOR BATHING: A LOT
DRESSING REGULAR UPPER BODY CLOTHING: A LITTLE
TURNING FROM BACK TO SIDE WHILE IN FLAT BAD: A LOT
MOVING FROM LYING ON BACK TO SITTING ON SIDE OF FLAT BED: A LITTLE
PERSONAL GROOMING: A LITTLE
STANDING UP FROM CHAIR USING ARMS: A LITTLE
MOVING TO AND FROM BED TO CHAIR: A LOT
SUGGESTED CMS G CODE MODIFIER DAILY ACTIVITY: CK
SUGGESTED CMS G CODE MODIFIER MOBILITY: CK
DRESSING REGULAR LOWER BODY CLOTHING: A LOT
WALKING IN HOSPITAL ROOM: A LITTLE

## 2023-02-01 ASSESSMENT — PAIN DESCRIPTION - PAIN TYPE: TYPE: SURGICAL PAIN

## 2023-02-01 ASSESSMENT — ACTIVITIES OF DAILY LIVING (ADL): TOILETING: INDEPENDENT

## 2023-02-01 ASSESSMENT — FIBROSIS 4 INDEX: FIB4 SCORE: 3.19

## 2023-02-01 NOTE — PROGRESS NOTES
"Received bedside report from day shift DILIP Cartagena at 19:15. Assumed pt care.   Pt seen AO4, O2 at 3L via NC SpO2 at 93%. Pain reported at 2/10, ice chips and warm blanket provided, per pt she \"just wants to sleep\". No nausea reported at this time. POC discussed. Safety and fall precautions in place.Call light kept within reach.  No other needs at this time.   Will continue to monitor.    "

## 2023-02-01 NOTE — DIETARY
"Nutrition Support Assessment   Day 8 of admit.  Clau Vences is a 77 y.o. female with admitting DX of SBO (small bowel obstruction) ([facility]) [K56.609]     Current problem list:  SBO   HTN   Hyperlipidemia   AZUL on CPAP  Anxiety  Obesity (BMI 30-39.9)   Hypernatremia   Arthritis  Acute respiratory failure with hypoxia   Acute renal failure   Hypercalcemia     Assessment:  Height: 170.2 cm (5' 7\")  Weight: 99.3 kg (219 lb)  Body mass index is 34.3 kg/m²., BMI classification: Obesity class I  IBW: 61.4 kg    Calculation/Equation: MSJ x 1.0 -1.1 = 1512 - 1664 kcal/day  Total Calories / day: 1512 - 1787 (Calories / kg: 15.2 - 18)  Total Grams Protein / day: 99 - 119 (Grams Protein / k - 1.2)  Protein calculated w/ IBW: 92 - 123 (1.5 - 2.0 g/kg IBW)     Evaluation:   Contacted by Pharmacist re: start of TPN anticipated tomorrow. Per notes, pt tolerated clears initially, then had N/V and more distension this a.m. +flatus, -BM. Day 8 of NPO/clear liquids.  TPN access pending.  Labs: : glu 135; : alb 2.7, glu 123  MAR: D5  NS @ 75 ml/hr, ativan, TPN per Pharmacy, solumedrol, PRN zofran, Protonix, PRN compazine  TPN appropriate given unable to tolerate PO nutrition and awaiting return of bowel function, last documented BM .     Malnutrition Risk: At risk w/ poor nutrition x >7 days. No wt loss shown per flowsheets.     Recommendations/Plan:  TPN per Pharmacy. Please consider above recommendations.  Additional fluids per Hospitalist.  Monitor weight trends.     RD following.  "

## 2023-02-01 NOTE — PROGRESS NOTES
Bedside report received from night RN. Assumed care of patient. Alert and oriented, on 2L via NC with saturation maintaining >90%. Daily plan of care discussed. Will continue to monitor for pain in abdomen, nausea and vomiting. Hourly rounding in place.      1830 Administered fleet enema, pending results.

## 2023-02-01 NOTE — CARE PLAN
The patient is Watcher - Medium risk of patient condition declining or worsening    Shift Goals  Clinical Goals: Tolerate current diet, have a bowel movement this shift  Patient Goals: Pain mgt, sleep  Family Goals: n/a    Progress made toward(s) clinical / shift goals:  Pt tolerated diet at the beginning of shift, ice chips given. Administered prn pain meds per mar, pt reported pain relief rated pain at 2/10 post interventions. Seen sleeping comfortably this shift.    Patient is not progressing towards the following goals: Pt reports nausea towards the end of shift, Zofran given. Pt has not had a bowel movement this shift.

## 2023-02-01 NOTE — THERAPY
Physical Therapy   Initial Evaluation     Patient Name: Clau Vences  Age:  77 y.o., Sex:  female  Medical Record #: [id number]  Today's Date: 2/1/2023     Precautions  Precautions: Fall Risk    Assessment  Patient is 77 y.o. female with a diagnosis of SBO, s/p laparotomy and lysis of adhesions on 1/28.   Pt is presenting with weakness  and overall debility due to prolonged hospital stay. Pts gait is slow, shuffling/ festinating gait pattern. Pt required constant cueing for increased step length however pt would quickly revert back to shuffling steps. Pt also with impaired cognition/ delayed responses and at this time is not safe to DC home alone. Even with assist at home pt is a high fall risk and would greatly benefit from more PT at SNF prior to DC home.     Plan    Physical Therapy Initial Treatment Plan   Treatment Plan : Bed Mobility, Gait Training, Therapeutic Activities, Therapeutic Exercise, Neuro Re-Education / Balance, Stair Training  Treatment Frequency: 5 Times per Week  Duration: Until Therapy Goals Met    DC Equipment Recommendations: Unable to determine at this time  Discharge Recommendations: Recommend post-acute placement for additional physical therapy services prior to discharge home (pt is not safe to be home alone, strongly recommend SNF)        02/01/23 1425   Prior Living Situation   Housing / Facility 1 Story House   Steps Into Home 1   Steps In Home 0   Bathroom Set up Walk In Shower;Shower Chair   Equipment Owned 4-Wheel Walker;Tub / Shower Seat;Raised Toilet Seat Without Arms;Grab Bar(s) In Tub / Shower   Lives with - Patient's Self Care Capacity Alone and Able to Care For Self   Comments Pt does have a dtr that lives locally but unsure how much assist she can provide   Prior Level of Functional Mobility   Bed Mobility Independent   Transfer Status Independent   Ambulation Independent   Assistive Devices Used   (? poor historian regarding use of 4WW at home prior to admit and how  often)   Cognition    Speech/ Communication Delayed Responses;Hard of Hearing   Level of Consciousness Alert   Comments Pt with flat affect, delayed responses, requires a lot of cueing.   Passive ROM Lower Body   Passive ROM Lower Body WDL   Active ROM Lower Body    Active ROM Lower Body  WDL   Strength Lower Body   Lower Body Strength  X   Gross Strength Generalized Weakness, Equal Bilaterally   Balance Assessment   Sitting Balance (Static) Fair   Sitting Balance (Dynamic) Fair   Standing Balance (Static) Fair -   Standing Balance (Dynamic) Poor +   Weight Shift Sitting Poor   Weight Shift Standing Poor   Comments stdg with FWW   Bed Mobility    Supine to Sit   (Nt, pt sitting up in chair)   Gait Analysis   Gait Level Of Assist Minimal Assist   Assistive Device Front Wheel Walker   Distance (Feet) 100   # of Times Distance was Traveled 1   Deviation Shuffled Gait;Bradykinetic  (shuffling steps)   Comments Pt requries constant to  feet, shulffing/ festinating gait pattern   Functional Mobility   Sit to Stand Minimal Assist   Bed, Chair, Wheelchair Transfer Minimal Assist   Transfer Method Stand Step   Activity Tolerance   Sitting in Chair as tolerated   Standing 6-7 min   Short Term Goals    Short Term Goal # 1 Pt will be able to perform bed mobility and sup <> sit Dan[place] in 6 visits.   Short Term Goal # 2 Pt will be able to perform sit <> stand and transfer with FWW Danuta in 6 visits.   Short Term Goal # 3 Pt will be able to ambulate 150 ft with FWW Danuta in 6 visits.

## 2023-02-01 NOTE — DISCHARGE PLANNING
Case Management Discharge Planning    Admission Date: 1/24/2023  GMLOS: 7.7  ALOS: 8    6-Clicks ADL Score: 18  6-Clicks Mobility Score: 18      Anticipated Discharge Dispo: Discharge Disposition: Discharged to home/self care (01)    DME Needed: No    Action(s) Taken: Updated Provider/Nurse on Discharge Plan    Pending PT/OT evaluations.     Escalations Completed: PT/OT    Medically Clear: No    Next Steps: PT/OT evaluations, Medical clearance    Barriers to Discharge: Medical clearance and Pending PT Evaluation, Pending OT Evaluation

## 2023-02-01 NOTE — PROGRESS NOTES
[facility] Medicine Daily Progress Note    Date of Service  2/1/2023    Chief Complaint  Clau Vences is a 77 y.o. female admitted 1/24/2023 with abdominal pain and constipation    Hospital Course  History of hysterectomy and appendectomy 4 years ago, sleep apnea on CPAP, arthritis on chronic prednisone therapy, hypertension, hyperlipidemia and anxiety, who was admitted for abdominal pain and distention associated with 4 days of constipation.  She was found to have a small bowel obstruction.  General surgery was consulted and recommended conservative management NG tube placement. Unfortunately patient did not improve and SBFT showed high grade obstruction. Surgery took patient to the OR for laparotomy and lysis of adhesions on 1/28.    Interval Problem Update  1/29: SBFT showed high-grade obstruction, she was taken to the OR for SHANE.  POD 1, still with NG tube, n.p.o.  No mildly tachycardic, on 4 L of oxygen due to atelectasis  1/30: Ice chips are ok, POD2, still no gas.  Distended.  Remains on 4L.  1/31: Vitals stable. Leukocytosis resolved. Hypernatremia resolved. Ng tube clamped per surgery, if not N/V in 4 hours can dc. Patient reports she passed flatus this morning, no BM yet but feels pressure like she needs to go. Denies any N/V or abdominal pain.     2/1 Vitals stable on 2 L.  Platelets improved 146 today. NG tube removed, patient on clears. Tolerated clears overnight however this morning nauseous and vomiting up green fluid. More distended, denies any pain. Reports she is still passing gas, no BM. Added compazine prn. Encouraged ambulation to help stimulate her bowel function. PT/OT pending. Discussed plan of care with daughter at bedside.     I have discussed this patient's plan of care and discharge plan at IDT rounds today with Case Management, Nursing, Nursing leadership, and other members of the IDT team.    Consultants/Specialty  general surgery    Code Status  Full Code    Disposition  Patient  is not medically cleared for discharge.   Anticipate discharge to to home with close outpatient follow-up.  I have placed the appropriate orders for post-discharge needs.    Review of Systems  Review of Systems   Constitutional:  Positive for malaise/fatigue. Negative for chills and fever.   Respiratory:  Negative for cough and shortness of breath.    Cardiovascular:  Negative for chest pain and leg swelling.   Gastrointestinal:  Positive for constipation, nausea and vomiting. Negative for abdominal pain, diarrhea and heartburn.   Musculoskeletal:  Negative for back pain and myalgias.   Neurological:  Negative for dizziness, focal weakness, weakness and headaches.   Psychiatric/Behavioral:  Positive for depression. Negative for hallucinations. The patient is nervous/anxious.    All other systems reviewed and are negative.     Physical Exam  Temp:  [36.8 °C (98.2 °F)-37.6 °C (99.6 °F)] 37.6 °C (99.6 °F)  Pulse:  [] 62  Resp:  [17-19] 17  BP: (123-169)/(65-78) 169/76  SpO2:  [92 %-94 %] 93 %    Physical Exam  Constitutional:       General: She is not in acute distress.     Appearance: She is obese. She is ill-appearing.      Comments: Daughter at bedside    HENT:      Head: Normocephalic and atraumatic.      Mouth/Throat:      Mouth: Mucous membranes are dry.      Pharynx: Oropharynx is clear.   Cardiovascular:      Rate and Rhythm: Normal rate and regular rhythm.      Pulses: Normal pulses.   Pulmonary:      Effort: No respiratory distress.      Breath sounds: No wheezing.   Abdominal:      General: There is distension (moderate, worse today).      Palpations: Abdomen is soft.      Tenderness: There is no abdominal tenderness.      Comments: Hypoactive bowel sounds    Musculoskeletal:         General: No swelling.      Cervical back: Neck supple. No muscular tenderness.   Skin:     General: Skin is warm and dry.      Coloration: Skin is pale.      Findings: No rash.   Neurological:      General: No focal  deficit present.      Mental Status: She is alert and oriented to person, place, and time.      Cranial Nerves: No cranial nerve deficit.      Motor: Weakness present.   Psychiatric:         Mood and Affect: Mood normal.         Thought Content: Thought content normal.       Fluids    Intake/Output Summary (Last 24 hours) at 2/1/2023 1109  Last data filed at 2/1/2023 0603  Gross per 24 hour   Intake 2044.2 ml   Output 500 ml   Net 1544.2 ml       Laboratory  Recent Labs     01/30/23  0406 01/31/23 0152 02/01/23 0310   WBC 12.0* 10.7 9.3   RBC 3.75* 4.06* 3.82*   HEMOGLOBIN 12.4 13.4 12.5   HEMATOCRIT 39.3 43.7 39.7   .8* 107.6* 103.9*   MCH 33.1* 33.0 32.7   MCHC 31.6* 30.7* 31.5*   RDW 47.3 47.8 45.5   PLATELETCT 161* 109* 146*   MPV 10.4 11.1 11.0     Recent Labs     01/30/23 0406 01/31/23 0152 02/01/23 0310   SODIUM 147* 141 138   POTASSIUM 4.2 4.0 3.9   CHLORIDE 111 108 103   CO2 26 25 27   GLUCOSE 89 123* 135*   BUN 32* 22 15   CREATININE 0.86 0.73 0.59   CALCIUM 9.0 9.3 9.3                   Imaging  DX-SMALL BOWEL SERIES   Final Result      1.  Minimal progression of oral contrast in the bowel on 8 hours of imaging with dilution of contrast could be due to extreme dysmotility/ileus versus obstruction. Review of the previous CT scan shows no transition point of the small bowel dilatation    with apparent contrast in the cecum and gas seen extending through the transverse colon.      DX-ABDOMEN FOR TUBE PLACEMENT   Final Result      NG tube tip overlies the gastric body.      [id number] VIEW   Final Result      Persistent findings of small bowel obstruction      DX-ABDOMEN FOR TUBE PLACEMENT   Final Result         1.  Air-filled distended bowel loops in the upper abdomen, could represent ileus versus enterocolitis or evolving obstructive changes. Radiographic follow-up to resolution as clinically appropriate.   2.  Nasogastric tube tip terminates overlying the expected location of the gastric  fundus.   3.  Left lower lobe atelectasis, infiltrate, and/or effusion.   4.  Cardiomegaly   5.  Atherosclerosis      CT-ABDOMEN-PELVIS W/O   Final Result      1.  Dilated loops of small intestine with air/blood levels consistent with small bowel obstruction.      2.  Fatty liver.      3.  Multiple left-sided renal stones measuring up to 2 cm in size.      4.  Prior hysterectomy.      5.  Sigmoid diverticulosis.             Assessment/Plan  * SBO (small bowel obstruction) (HCC)- (present on admission)  Assessment & Plan  Presented with severe abdominal pain and distention associated with vomiting.  She has not had a bowel obstruction and failed to improve with conservative management/NG tube  Small bowel follow-through on 1/28 revealed a high-grade obstruction therefore she was taken emergently to the operating room later that day  Postop day 4, passing gas   Ng tube dc 1/31   Clear liquids, patient with N/V this morning, change back to NPO     Acute respiratory failure with hypoxia (HCC)  Assessment & Plan  At baseline she has obesity and sleep apnea her acute component was secondary to atelectasis secondary to abdominal distention  Remains on oxygen,4L  Encourage IS  Supplemental O2 as needed    Arthritis  Assessment & Plan  The patient has chronic arthritis and has been treated with prednisone 5 mg daily  Continue IV Solu-Medrol as IV substitute    Hypernatremia  Assessment & Plan  Change to d5 1/2 ns    resolved    Obesity (BMI 30-39.9)- (present on admission)  Assessment & Plan  Body mass index is 34.32 kg/m².  Outpatient weight loss management program highly recommended    Anxiety- (present on admission)  Assessment & Plan  Chronic anxiety and the patient at home was on Zoloft.    For now hold the Zoloft while npo    AZUL on CPAP- (present on admission)  Assessment & Plan  The patient at home uses CPAP unable to use with NGT  Oxygen at night  She is requiring 4L at this time due to  atelectasis    Hyperlipidemia- (present on admission)  Assessment & Plan  Once the patient is able to resume oral intake low-fat low-cholesterol diet with Crestor and cholestyramine will be recommended.    HTN (hypertension)- (present on admission)  Assessment & Plan  For now we are holding Coreg and lisinopril.    Use as needed labetalol only.         VTE prophylaxis: SCDs/TEDs    I have performed a physical exam and reviewed and updated ROS and Plan today (2/1/2023). In review of yesterday's note (1/31/2023), there are no changes except as documented above.

## 2023-02-01 NOTE — CARE PLAN
The patient is Stable - Low risk of patient condition declining or worsening    Shift Goals  Clinical Goals: Monitor for pain and NGT output  Patient Goals: relax comfortably  Family Goals: n/a    Progress made toward(s) clinical / shift goals: Pt denies pain through the shift. Able to tolerate diet after removal of NG tube.   Patient is not progressing towards the following goals:n/a

## 2023-02-01 NOTE — THERAPY
"Occupational Therapy   Initial Evaluation     Patient Name: Clau Vences  Age:  77 y.o., Sex:  female  Medical Record #: [id number]  Today's Date: 2/1/2023     Precautions  Precautions: Fall Risk    Assessment  Patient is 77 y.o. female admitted 1/24/23 for abdominal pain and distention associated with 4 days of constipation.  She was found to have a small bowel obstruction.  General surgery was consulted and recommended conservative management NG tube placement. Unfortunately patient did not improve, and had surgery for laparotomy and lysis of adhesions on 1/28. Pt with history of hysterectomy and appendectomy 4 years ago, sleep apnea on CPAP, arthritis on chronic prednisone therapy, hypertension, hyperlipidemia and anxiety. Pt currently presents with weakness, impaired activity tolerance, impaired balance, impaired safety awareness and insight to functional limitations. Pt with shuffled gait, high fall risk and is not able to correct this with cuing.  She lives alone and currently CANNOT care for herself.  Pt will benefit from further inpt post acute therapy prior to home.  OT will follow while in house.      Plan    Occupational Therapy Initial Treatment Plan   Treatment Interventions: Self Care / Activities of Daily Living, Adaptive Equipment, Neuro Re-Education / Balance, Therapeutic Exercises, Therapeutic Activity  Treatment Frequency: 3 Times per Week  Duration: Until Therapy Goals Met    DC Equipment Recommendations: Unable to determine at this time  Discharge Recommendations: Recommend post-acute placement for additional occupational therapy services prior to discharge home     Subjective    \"I have people\" ( pt stated when asked \"who is available to help you at home?\")     Objective       02/01/23 1422   Prior Living Situation   Prior Services None;Home-Independent   Housing / Facility 1 Story House   Steps Into Home 1   Steps In Home 0   Bathroom Set up Walk In Shower;Shower Chair;Grab Bars " "  Equipment Owned 4-Wheel Walker;Tub / Shower Seat;Grab Bar(s) In Tub / Shower;Raised Toilet Seat Without Arms   Lives with - Patient's Self Care Capacity Alone and Able to Care For Self   Comments Pt reports \"I have people\" when asked if she had family or local social support   Prior Level of ADL Function   Self Feeding Independent   Grooming / Hygiene Independent   Bathing Independent   Dressing Independent   Toileting Independent   Prior Level of IADL Function   Medication Management Independent   Laundry Independent   Kitchen Mobility Independent   Finances Independent   Home Management Independent   Shopping Independent   Prior Level Of Mobility Independent With Device in Home;Independent Without Device in Home  (pt reports she has been using 4ww for about a year since she sprained her ankle \"because I don't want to fall\"  she did not remark on if she used it when she was out in the community)   Driving / Transportation Driving Independent   Occupation (Pre-Hospital Vocational) Retired Due To Age   Leisure Interests Unable To Determine At This Time   Precautions   Precautions Fall Risk   Vitals   O2 (LPM) 2   O2 Delivery Device Silicone Nasal Cannula   Pain 0 - 10 Group   Location Abdomen   Location Orientation Mid   Description Aching   Therapist Pain Assessment During Activity;Post Activity Pain Same as Prior to Activity;Nurse Notified   Cognition    Cognition / Consciousness X   Speech/ Communication Delayed Responses;Hard of Hearing   Level of Consciousness Alert   Comments Pt [place] with flat affect.  Answers with 1-2 word answers, does not elaborate on details about PLOF without multiple questions to clarify details. Delayed responses to questions, demos decreased insight to limitations and the steps she needs to take to progress towards independent return to home   Active ROM Upper Body   Active ROM Upper Body  WDL   Dominant Hand Right   Strength Upper Body   Upper Body Strength  WDL   Balance Assessment " "  Sitting Balance (Static) Fair +   Sitting Balance (Dynamic) Fair   Standing Balance (Static) Fair -   Standing Balance (Dynamic) Fair -   Weight Shift Sitting Fair   Weight Shift Standing Poor   Comments with FWW, shuffled, short steps, needs max v/c to take larger steps, will briefly and then resumes shuffling.  Pt states \"this is how I normally walk\" (Per CNA, however, pts daughter reports that pt did NOT shuffle like this PTA)   Bed Mobility    Comments UIC pre and post   ADL Assessment   Grooming Supervision;Seated   Upper Body Dressing Minimal Assist   Lower Body Dressing Maximal Assist  (refused to attempt, \"I can't\")   Toileting   (refused to demo, reports \"I have a higher toilet at home\" when therapist asked if she can get off the toilet HERE)   How much help from another person does the patient currently need...   Putting on and taking off regular lower body clothing? 2   Bathing (including washing, rinsing, and drying)? 2   Toileting, which includes using a toilet, bedpan, or urinal? 3   Putting on and taking off regular upper body clothing? 3   Taking care of personal grooming such as brushing teeth? 3   Eating meals? 4   6 Clicks Daily Activity Score 17   Functional Mobility   Sit to Stand Contact Guard Assist   Bed, Chair, Wheelchair Transfer Contact Guard Assist   Transfer Method Stand Step   Mobility CGA in vora with FWW   Distance (Feet) 60   # of Times Distance was Traveled 2   Comments Pt left with PT in vora to walk a bit further   Edema / Skin Assessment   Comments Abdomen appears swollen through gown, not visualized by OT   Activity Tolerance   Sitting in Chair > 1 hour   Standing 6- 7min total   Comments limited by pain and fatigue   Patient / Family Goals   Patient / Family Goal #1 unable to state   Short Term Goals   Short Term Goal # 1 Pt will stand 10 min at sink to groom, SBA in 3 visits   Short Term Goal # 2 Pt will dress with JONO in 4 visits   Short Term Goal # 3 pt will toilet " supervsed in 4 visits

## 2023-02-01 NOTE — DIETARY
Nutrition Services Brief Update:    Day 7 of admit.  Clau Vences is a 77 y.o. female with admitting DX of SBO (small bowel obstruction) ([facility]) [K56.609]    Current Diet: clear liquid diet ordered today. Pt NPO/clears x 7 days.     Pt w/ exp lap and lysis of adhesions on 1/28. She was NPO w/ NG tube to suction until today. NG tube removed today and diet started.   Pt drank 25-50% of clear liquid snack today. Pt may benefit from addition of Boost Breeze TID w/ meals for additional protein.     Problem: Nutritional:  Goal: Achieve adequate nutritional intake  Description: Advance diet beyond clears when medically feasible. Patient will consume >50% of meals  Outcome: not met    RD following.

## 2023-02-02 ENCOUNTER — HOSPITAL ENCOUNTER (INPATIENT)
Facility: REHABILITATION | Age: 78
End: 2023-02-02
Attending: PHYSICAL MEDICINE & REHABILITATION | Admitting: PHYSICAL MEDICINE & REHABILITATION
Payer: MEDICARE

## 2023-02-02 LAB
ALBUMIN SERPL BCP-MCNC: 2.6 G/DL (ref 3.2–4.9)
ALBUMIN/GLOB SERPL: 1.2 G/DL
ALP SERPL-CCNC: 41 U/L (ref 30–99)
ALT SERPL-CCNC: 6 U/L (ref 2–50)
ANION GAP SERPL CALC-SCNC: 8 MMOL/L (ref 7–16)
AST SERPL-CCNC: 11 U/L (ref 12–45)
BILIRUB SERPL-MCNC: 0.2 MG/DL (ref 0.1–1.5)
BUN SERPL-MCNC: 13 MG/DL (ref 8–22)
CALCIUM ALBUM COR SERPL-MCNC: 9.9 MG/DL (ref 8.5–10.5)
CALCIUM SERPL-MCNC: 8.8 MG/DL (ref 8.4–10.2)
CHLORIDE SERPL-SCNC: 103 MMOL/L (ref 96–112)
CHOLEST SERPL-MCNC: 114 MG/DL (ref 100–199)
CO2 SERPL-SCNC: 29 MMOL/L (ref 20–33)
CREAT SERPL-MCNC: 0.65 MG/DL (ref 0.5–1.4)
ERYTHROCYTE [DISTWIDTH] IN BLOOD BY AUTOMATED COUNT: 45.6 FL (ref 35.9–50)
GFR SERPLBLD CREATININE-BSD FMLA CKD-EPI: 90 ML/MIN/1.73 M 2
GLOBULIN SER CALC-MCNC: 2.2 G/DL (ref 1.9–3.5)
GLUCOSE BLD STRIP.AUTO-MCNC: 96 MG/DL (ref 65–99)
GLUCOSE BLD STRIP.AUTO-MCNC: 96 MG/DL (ref 65–99)
GLUCOSE SERPL-MCNC: 113 MG/DL (ref 65–99)
HCT VFR BLD AUTO: 36.5 % (ref 37–47)
HGB BLD-MCNC: 11.7 G/DL (ref 12–16)
MAGNESIUM SERPL-MCNC: 1.4 MG/DL (ref 1.5–2.5)
MCH RBC QN AUTO: 32.9 PG (ref 27–33)
MCHC RBC AUTO-ENTMCNC: 32.1 G/DL (ref 33.6–35)
MCV RBC AUTO: 102.5 FL (ref 81.4–97.8)
PHOSPHATE SERPL-MCNC: 2.7 MG/DL (ref 2.5–4.5)
PLATELET # BLD AUTO: 140 K/UL (ref 164–446)
PMV BLD AUTO: 11 FL (ref 9–12.9)
POTASSIUM SERPL-SCNC: 3.6 MMOL/L (ref 3.6–5.5)
PROT SERPL-MCNC: 4.8 G/DL (ref 6–8.2)
RBC # BLD AUTO: 3.56 M/UL (ref 4.2–5.4)
SODIUM SERPL-SCNC: 140 MMOL/L (ref 135–145)
TRIGL SERPL-MCNC: 169 MG/DL (ref 0–149)
WBC # BLD AUTO: 9.4 K/UL (ref 4.8–10.8)

## 2023-02-02 PROCEDURE — 80053 COMPREHEN METABOLIC PANEL: CPT

## 2023-02-02 PROCEDURE — 770001 HCHG ROOM/CARE - MED/SURG/GYN PRIV*

## 2023-02-02 PROCEDURE — 700111 HCHG RX REV CODE 636 W/ 250 OVERRIDE (IP): Performed by: INTERNAL MEDICINE

## 2023-02-02 PROCEDURE — 700102 HCHG RX REV CODE 250 W/ 637 OVERRIDE(OP): Performed by: INTERNAL MEDICINE

## 2023-02-02 PROCEDURE — 84100 ASSAY OF PHOSPHORUS: CPT

## 2023-02-02 PROCEDURE — 84478 ASSAY OF TRIGLYCERIDES: CPT

## 2023-02-02 PROCEDURE — 99232 SBSQ HOSP IP/OBS MODERATE 35: CPT | Performed by: INTERNAL MEDICINE

## 2023-02-02 PROCEDURE — 700111 HCHG RX REV CODE 636 W/ 250 OVERRIDE (IP): Performed by: HOSPITALIST

## 2023-02-02 PROCEDURE — 82465 ASSAY BLD/SERUM CHOLESTEROL: CPT

## 2023-02-02 PROCEDURE — 700101 HCHG RX REV CODE 250: Performed by: INTERNAL MEDICINE

## 2023-02-02 PROCEDURE — 700105 HCHG RX REV CODE 258: Performed by: INTERNAL MEDICINE

## 2023-02-02 PROCEDURE — 85027 COMPLETE CBC AUTOMATED: CPT

## 2023-02-02 PROCEDURE — 94760 N-INVAS EAR/PLS OXIMETRY 1: CPT

## 2023-02-02 PROCEDURE — A9270 NON-COVERED ITEM OR SERVICE: HCPCS | Performed by: INTERNAL MEDICINE

## 2023-02-02 PROCEDURE — 83735 ASSAY OF MAGNESIUM: CPT

## 2023-02-02 PROCEDURE — 82962 GLUCOSE BLOOD TEST: CPT | Mod: 91

## 2023-02-02 PROCEDURE — C9113 INJ PANTOPRAZOLE SODIUM, VIA: HCPCS | Performed by: INTERNAL MEDICINE

## 2023-02-02 RX ORDER — MAGNESIUM SULFATE HEPTAHYDRATE 40 MG/ML
4 INJECTION, SOLUTION INTRAVENOUS ONCE
Status: COMPLETED | OUTPATIENT
Start: 2023-02-02 | End: 2023-02-02

## 2023-02-02 RX ORDER — DEXTROSE AND SODIUM CHLORIDE 5; .45 G/100ML; G/100ML
INJECTION, SOLUTION INTRAVENOUS CONTINUOUS
Status: DISCONTINUED | OUTPATIENT
Start: 2023-02-02 | End: 2023-02-03

## 2023-02-02 RX ADMIN — MAGNESIUM SULFATE HEPTAHYDRATE 4 G: 40 INJECTION, SOLUTION INTRAVENOUS at 08:54

## 2023-02-02 RX ADMIN — CALCIUM GLUCONATE: 98 INJECTION, SOLUTION INTRAVENOUS at 20:25

## 2023-02-02 RX ADMIN — Medication 5 MG: at 20:36

## 2023-02-02 RX ADMIN — DEXTROSE AND SODIUM CHLORIDE: 5; 450 INJECTION, SOLUTION INTRAVENOUS at 11:42

## 2023-02-02 RX ADMIN — PANTOPRAZOLE SODIUM 40 MG: 40 INJECTION, POWDER, LYOPHILIZED, FOR SOLUTION INTRAVENOUS at 04:44

## 2023-02-02 RX ADMIN — LORAZEPAM 1 MG: 2 INJECTION INTRAMUSCULAR; INTRAVENOUS at 02:12

## 2023-02-02 RX ADMIN — DEXTROSE AND SODIUM CHLORIDE: 5; 450 INJECTION, SOLUTION INTRAVENOUS at 03:19

## 2023-02-02 RX ADMIN — LORAZEPAM 1 MG: 2 INJECTION INTRAMUSCULAR; INTRAVENOUS at 20:35

## 2023-02-02 RX ADMIN — METHYLPREDNISOLONE SODIUM SUCCINATE 10 MG: 40 INJECTION, POWDER, FOR SOLUTION INTRAMUSCULAR; INTRAVENOUS at 04:44

## 2023-02-02 ASSESSMENT — PAIN SCALES - PAIN ASSESSMENT IN ADVANCED DEMENTIA (PAINAD)
BODYLANGUAGE: RELAXED
CONSOLABILITY: NO NEED TO CONSOLE

## 2023-02-02 ASSESSMENT — ENCOUNTER SYMPTOMS
HALLUCINATIONS: 0
HEARTBURN: 0
HEADACHES: 0
CHILLS: 0
NERVOUS/ANXIOUS: 1
FEVER: 0
BACK PAIN: 0
WEAKNESS: 0
CONSTIPATION: 1
FOCAL WEAKNESS: 0
ABDOMINAL PAIN: 0
DIARRHEA: 0
COUGH: 0
VOMITING: 0
NAUSEA: 0
DIZZINESS: 0
SHORTNESS OF BREATH: 0
MYALGIAS: 0
DEPRESSION: 1

## 2023-02-02 ASSESSMENT — PAIN DESCRIPTION - PAIN TYPE
TYPE: SURGICAL PAIN
TYPE: SURGICAL PAIN

## 2023-02-02 NOTE — CARE PLAN
Problem: Knowledge Deficit - Standard  Goal: Patient and family/care givers will demonstrate understanding of plan of care, disease process/condition, diagnostic tests and medications  Outcome: Progressing      The patient is Stable - Low risk of patient condition declining or worsening    Shift Goals  Clinical Goals: ambulate often, have bowel movement, monitor for nausea  Patient Goals: rest and comfort  Family Goals: na    Progress made toward(s) clinical / shift goals:  discussed discharge planning and how it relates to patient's desire for independence    Patient is not progressing towards the following goals: patient passing gas, full liquid diet in effect, discussed ambulation to support bowel movement       Problem: Bowel Elimination  Goal: Establish and maintain regular bowel function  Outcome: Not Progressing

## 2023-02-02 NOTE — PROGRESS NOTES
Assumed care of patient at 0700, report received from DILIP Chino. Patient sleeping at this time.    Morning assessment of patient complete at 0850. Patient denies pain or nausea at this time. A&Ox4. Call light within reach. PICC line infusing.

## 2023-02-02 NOTE — CARE PLAN
The patient is Stable - Low risk of patient condition declining or worsening    Shift Goals  Clinical Goals: Monitor for nausea and vomiting  Patient Goals: rest and comfort  Family Goals: n/a    Progress made toward(s) clinical / shift goals:  no longer nauseous, resting comfortably     Patient is not progressing towards the following goals:

## 2023-02-02 NOTE — CARE PLAN
The patient is Stable - Low risk of patient condition declining or worsening    Shift Goals  Clinical Goals: Monitor for nausea and vomiting  Patient Goals: rest and comfort  Family Goals: n/a    Progress made toward(s) clinical / shift goals: Pt denies nausea and vomiting this afternoon. Still on NPO restrict to ice chips.     Patient is not progressing towards the following goals: n/a

## 2023-02-02 NOTE — PROGRESS NOTES
Pharmacy TPN Note for 2023     77 y.o. female on TPN day # 1      Admission History: Admitted on 2023 for SBO (small bowel obstruction) ([facility]) [K56.609]    Allergies:   Tramadol     Indication for TPN:   Indication: Bowel Obstruction/Ileus       Clinical Considerations for TPN:   Clinical Considerations Impacting TPN: Re-feeding syndrome           Temp (24hrs), Av.9 °C (98.5 °F), Min:36.6 °C (97.9 °F), Max:37.6 °C (99.6 °F)    Recent Labs     23  0152 23  0310 23  0224   SODIUM 141 138 140   POTASSIUM 4.0 3.9 3.6   CHLORIDE 108 103 103   CO2 25 27 29   BUN 22 15 13   CREATININE 0.73 0.59 0.65   GLUCOSE 123* 135* 113*   CALCIUM 9.3 9.3 8.8   ASTSGOT 16  --  11*   ALTSGPT 7  --  6   ALBUMIN 2.7*  --  2.6*   TBILIRUBIN 0.2  --  0.2   PHOSPHORUS  --   --  2.7   MAGNESIUM 1.7  --  1.4*     Accu-Checks  No results for input(s): POCGLUCOSE in the last 72 hours.    Vitals:    23 1624 23 2100 23 0000 23 0400   BP: (!) 159/85 134/71 (!) 150/81 139/78   Weight:       Height:           Intake/Output Summary (Last 24 hours) at 2023 0949  Last data filed at 2023 1700  Gross per 24 hour   Intake --   Output 450 ml   Net -450 ml       Orders Placed This Encounter   Procedures    Diet NPO Restrict to: Ice Chips     Standing Status:   Standing     Number of Occurrences:   1     Order Specific Question:   Diet NPO Restrict to:     Answer:   Ice Chips [2]       TPN for past 72 hours (Show up to 3 orders; newest on the left.)       Start date and time    2023      TPN Adult Central Line [[id number]]    Order Status  Active    Frequency  TPN DAILY       Base    Clinisol  45 g    dextrose 70%  150 g    fat emulsion (soy) 20%  25 g       Additives    potassium phosphate  21 mmol    potassium chloride  80 mEq    sodium acetate  80 mEq    sodium chloride  80 mEq    magnesium sulfate  12 mEq    calcium GLUConate  9.4 mEq    zinc sulfate  5 mg    M.T.E.-4 Tralement  1 mL     M.V.I. Adult  10 mL       QS Base    sterile water  1,210.52 mL       Energy Contribution    Proteins  180 kcal    Dextrose  510 kcal    Lipids  250 kcal    Total  940 kcal       Electrolyte Ion Calculated Amount    Sodium  160 mEq    Potassium  110.8 mEq    Calcium  9.4 mEq    Magnesium  12 mEq    Aluminum  75 mEq    Phosphate  21 mmol    Chloride  160 mEq    Acetate  118.1 mEq    Chloride: Acetate Ratio  1.355       Other    Total Amino Acid  45 g    Total Amino Acid/kg  0.46 g/kg    Glucose Infusion Rate  1.06 mg/kg/min    Volume  1,992 mL    Rate  83 mL/hr    Dosing Weight  98.7 kg    Infusion Site  Central            TPN Requirements:  Weight Used in TPN Calculation: 72.9 kg (160 lb 11.5 oz) (Adjusted weight)  Total Protein Needs (g/kg): 1.2 g/kg  Total Caloric Needs (kcal): 1880 kcal  Total Fluid Needs (mL): 2000 mL     Current TPN Formulation:  % of goal: 50  % kcal as lipids: 27  Grams protein/k.6  Non-protein calories: 760  Kcals/k.9  Total daily calories: 940    Comments:  1) Nutritional Plan: Initiate TPN at 50%, will advance as tolerated, pt. NPO x 9 days, at risk for re-feeding.  2) Labs: per protocol  3) Fluids/additives: TPN only, will d/c MIV fluid when TPN initiated  4) Changes to formulation: New start   5) Next labs/note: Tomorrow am      Jluis De Leon [facility]

## 2023-02-02 NOTE — PROCEDURES
Vascular Access Team     Date of Insertion: 2/1/23  Arm Circumference: 28  Internal length: 43  External Length: 3  Vein Occupancy %: 41   Reason for PICC: TPN  Labs: WBC 9.3, , BUN 15, Cr 0.59, GFR 92, INR N/A     Consents confirmed, vessel patency confirmed with ultrasound. Risks and benefits of procedure explained to patient and education regarding central line associated bloodstream infections provided. Questions answered.      PICC placed in RUE per licensed provider order with ultrasound guidance.  5 Fr, double lumen PICC placed in basilic vein after 1 attempt(s). 2 mL of 1% lidocaine injected intradermally at the insertion site. A 21 gauge microintroducer needle was visualized entering the vein and modified Seldinger technique was used to obtain access to the vein. 43 cm catheter inserted and brisk blood return was observed from each lumen upon aspiration. Line secured at the 3 cm marker. TCS stylet removed and observed to be fully intact. Each lumen flushed using pulsatile method without resistance with 10 mL 0.9% normal saline. PICC line secured with Biopatch and Tegaderm.     PICC tip placement location is confirmed by nurse to be in the Superior Vena Cava (SVC) utilizing 3CG technology. PICC line is appropriate for use at this time. Patient tolerated procedure well, without complications.  Patient condition relayed to primary RN or ordering physician via this post procedure note in the EMR.      Ultrasound images uploaded to PACS and viewable in the EMR - yes  Ultrasound imaged printed and placed in paper chart - no     BARD Power PICC ref # [id number], Lot # [id number], Expiration Date 11/30/2023

## 2023-02-02 NOTE — PROGRESS NOTES
Assumed care of pt from DILIP Stephens. Pt c/o anxiety at this time. Medicated per MAR. Pt is requesting to rest at this time. Fall precautions in place.

## 2023-02-02 NOTE — DISCHARGE PLANNING
Case Management Discharge Planning    Admission Date: 1/24/2023  GMLOS: 7.7  ALOS: 9    6-Clicks ADL Score: 17  6-Clicks Mobility Score: 15  PT and/or OT Eval ordered: Yes  Post-acute Referrals Ordered: Yes  Post-acute Choice Obtained: No  Has referral(s) been sent to post-acute provider:  No      Anticipated Discharge Dispo: Discharge Disposition: Discharged to home/self care (01)    DME Needed: No    Action(s) Taken: Updated Provider/Nurse on Discharge Plan    0835: RN CM reached out to transitional care coordination team with Rehab to evaluate patient for acceptance.  RN CM to discuss SNF choice with patient as back up.     1118: RN MERNA spoke to patient at bedside.  Patient refusing SNF or Rehab placement and states she would like to go home after DC.  Per MD, patient may need TPN.  RN CM notified patient that if TPN is required, LTAC vs SNF would be needed.  Patient continued to states she would like to go home once medically cleared.  Patient agreeable to  and has provided choice for [facility]. MD notified. RN CM will not send  choice until closer to DC as patient may need TPN. Patient states she has adult children that can help with her needs at home.     Escalations Completed: Provider    Medically Clear: No    Next Steps: Medical clearance    Barriers to Discharge: Medical clearance and Pending Placement

## 2023-02-02 NOTE — PROGRESS NOTES
Surgery  POD#5  Passing small amounts of gas, no bm  Wound ok  Distended, soft  A/p   Mobilize   Start diet again cautiously   Awaiting return of bowel function prior to advancing diet

## 2023-02-02 NOTE — DISCHARGE PLANNING
Spoke with daughter Celeste.  Celeste is agreement for admission to [facility].  Celeste will be staying with patient in her home after being discharged from [facility].  Advised Celeste of [facility] policies on covid and visiting.  Celeste agreeable to family training, Per physiatry consult/chart review.   Patient not yet on diet so not a candidate.  TCC will follow for updated diet instructions, and discharge planning ,

## 2023-02-02 NOTE — PROGRESS NOTES
[facility] Medicine Daily Progress Note    Date of Service  2/2/2023    Chief Complaint  Clau Vences is a 77 y.o. female admitted 1/24/2023 with abdominal pain and constipation    Hospital Course  History of hysterectomy and appendectomy 4 years ago, sleep apnea on CPAP, arthritis on chronic prednisone therapy, hypertension, hyperlipidemia and anxiety, who was admitted for abdominal pain and distention associated with 4 days of constipation.  She was found to have a small bowel obstruction.  General surgery was consulted and recommended conservative management NG tube placement. Unfortunately patient did not improve and SBFT showed high grade obstruction. Surgery took patient to the OR for laparotomy and lysis of adhesions on 1/28.    Interval Problem Update  1/29: SBFT showed high-grade obstruction, she was taken to the OR for SHANE.  POD 1, still with NG tube, n.p.o.  No mildly tachycardic, on 4 L of oxygen due to atelectasis  1/30: Ice chips are ok, POD2, still no gas.  Distended.  Remains on 4L.  1/31: Vitals stable. Leukocytosis resolved. Hypernatremia resolved. Ng tube clamped per surgery, if not N/V in 4 hours can dc. Patient reports she passed flatus this morning, no BM yet but feels pressure like she needs to go. Denies any N/V or abdominal pain.     2/1 Vitals stable on 2 L.  Platelets improved 146 today. NG tube removed, patient on clears. Tolerated clears overnight however this morning nauseous and vomiting up green fluid. More distended, denies any pain. Reports she is still passing gas, no BM. Added compazine prn. Encouraged ambulation to help stimulate her bowel function. PT/OT pending. Discussed plan of care with daughter at bedside.     2/2 Surgery yesterday recommended PPN, discussed with pharmacy can only do TPN. PICC placed yesterday and TPN to start tonight. PT/OT recommending post acute placement. Mg 1.4, IV replacement ordered. Patient still not having a BM. Per CM refusing all  placement.    I have discussed this patient's plan of care and discharge plan at IDT rounds today with Case Management, Nursing, Nursing leadership, and other members of the IDT team.    Consultants/Specialty  general surgery    Code Status  Full Code    Disposition  Patient is not medically cleared for discharge.   Anticipate discharge to to home with close outpatient follow-up.  I have placed the appropriate orders for post-discharge needs.    Review of Systems  Review of Systems   Constitutional:  Positive for malaise/fatigue. Negative for chills and fever.   Respiratory:  Negative for cough and shortness of breath.    Cardiovascular:  Negative for chest pain and leg swelling.   Gastrointestinal:  Positive for constipation. Negative for abdominal pain, diarrhea, heartburn, nausea and vomiting.   Musculoskeletal:  Negative for back pain and myalgias.   Neurological:  Negative for dizziness, focal weakness, weakness and headaches.   Psychiatric/Behavioral:  Positive for depression. Negative for hallucinations. The patient is nervous/anxious.    All other systems reviewed and are negative.     Physical Exam  Temp:  [36.4 °C (97.6 °F)-37.1 °C (98.7 °F)] 36.4 °C (97.6 °F)  Pulse:  [] 84  Resp:  [17] 17  BP: (127-159)/(66-85) 127/66  SpO2:  [90 %-93 %] 93 %    Physical Exam  Constitutional:       General: She is not in acute distress.     Appearance: She is obese. She is ill-appearing.   HENT:      Head: Normocephalic and atraumatic.      Mouth/Throat:      Mouth: Mucous membranes are dry.      Pharynx: Oropharynx is clear.   Cardiovascular:      Rate and Rhythm: Normal rate and regular rhythm.      Pulses: Normal pulses.   Pulmonary:      Effort: No respiratory distress.      Breath sounds: No wheezing.   Abdominal:      General: There is distension.      Palpations: Abdomen is soft.      Tenderness: There is no abdominal tenderness.      Comments: Hypoactive bowel sounds    Musculoskeletal:         General: No  swelling.      Cervical back: Neck supple. No muscular tenderness.   Skin:     General: Skin is warm and dry.      Coloration: Skin is pale.      Findings: No rash.   Neurological:      General: No focal deficit present.      Mental Status: She is alert and oriented to person, place, and time.      Cranial Nerves: No cranial nerve deficit.      Motor: Weakness present.   Psychiatric:         Mood and Affect: Mood normal.         Thought Content: Thought content normal.       Fluids    Intake/Output Summary (Last 24 hours) at 2/2/2023 1202  Last data filed at 2/1/2023 1700  Gross per 24 hour   Intake --   Output 450 ml   Net -450 ml       Laboratory  Recent Labs     01/31/23  0152 02/01/23 0310 02/02/23 0224   WBC 10.7 9.3 9.4   RBC 4.06* 3.82* 3.56*   HEMOGLOBIN 13.4 12.5 11.7*   HEMATOCRIT 43.7 39.7 36.5*   .6* 103.9* 102.5*   MCH 33.0 32.7 32.9   MCHC 30.7* 31.5* 32.1*   RDW 47.8 45.5 45.6   PLATELETCT 109* 146* 140*   MPV 11.1 11.0 11.0     Recent Labs     01/31/23  0152 02/01/23 0310 02/02/23 0224   SODIUM 141 138 140   POTASSIUM 4.0 3.9 3.6   CHLORIDE 108 103 103   CO2 25 27 29   GLUCOSE 123* 135* 113*   BUN 22 15 13   CREATININE 0.73 0.59 0.65   CALCIUM 9.3 9.3 8.8             Recent Labs     02/02/23 0224   TRIGLYCERIDE 169*       Imaging  IR-PICC LINE PLACEMENT W/ GUIDANCE > AGE 5   Final Result                  Ultrasound-guided PICC placement performed by qualified nursing staff as    above.          DX-SMALL BOWEL SERIES   Final Result      1.  Minimal progression of oral contrast in the bowel on 8 hours of imaging with dilution of contrast could be due to extreme dysmotility/ileus versus obstruction. Review of the previous CT scan shows no transition point of the small bowel dilatation    with apparent contrast in the cecum and gas seen extending through the transverse colon.      DX-ABDOMEN FOR TUBE PLACEMENT   Final Result      NG tube tip overlies the gastric body.      [id number] VIEW    Final Result      Persistent findings of small bowel obstruction      DX-ABDOMEN FOR TUBE PLACEMENT   Final Result         1.  Air-filled distended bowel loops in the upper abdomen, could represent ileus versus enterocolitis or evolving obstructive changes. Radiographic follow-up to resolution as clinically appropriate.   2.  Nasogastric tube tip terminates overlying the expected location of the gastric fundus.   3.  Left lower lobe atelectasis, infiltrate, and/or effusion.   4.  Cardiomegaly   5.  Atherosclerosis      CT-ABDOMEN-PELVIS W/O   Final Result      1.  Dilated loops of small intestine with air/blood levels consistent with small bowel obstruction.      2.  Fatty liver.      3.  Multiple left-sided renal stones measuring up to 2 cm in size.      4.  Prior hysterectomy.      5.  Sigmoid diverticulosis.             Assessment/Plan  * SBO (small bowel obstruction) (HCC)- (present on admission)  Assessment & Plan  Presented with severe abdominal pain and distention associated with vomiting.  She has not had a bowel obstruction and failed to improve with conservative management/NG tube  Small bowel follow-through on 1/28 revealed a high-grade obstruction therefore she was taken emergently to the operating room later that day  Postop day 5, passing gas   Ng tube dc 1/31   TPN to start tonight, diet per surgery     Acute respiratory failure with hypoxia (HCC)  Assessment & Plan  At baseline she has obesity and sleep apnea her acute component was secondary to atelectasis secondary to abdominal distention  Remains on oxygen,4L  Encourage IS  Supplemental O2 as needed    Arthritis  Assessment & Plan  The patient has chronic arthritis and has been treated with prednisone 5 mg daily  Continue IV Solu-Medrol as IV substitute    Hypernatremia  Assessment & Plan  Change to d5 1/2 ns    resolved    Obesity (BMI 30-39.9)- (present on admission)  Assessment & Plan  Body mass index is 34.32 kg/m².  Outpatient weight loss  management program highly recommended    Anxiety- (present on admission)  Assessment & Plan  Chronic anxiety and the patient at home was on Zoloft.    For now hold the Zoloft while npo    AZUL on CPAP- (present on admission)  Assessment & Plan  The patient at home uses CPAP unable to use with NGT  Oxygen at night  She is requiring 4L at this time due to atelectasis    Hyperlipidemia- (present on admission)  Assessment & Plan  Once the patient is able to resume oral intake low-fat low-cholesterol diet with Crestor and cholestyramine will be recommended.    HTN (hypertension)- (present on admission)  Assessment & Plan  For now we are holding Coreg and lisinopril.    Use as needed labetalol only.         VTE prophylaxis: SCDs/TEDs    I have performed a physical exam and reviewed and updated ROS and Plan today (2/2/2023). In review of yesterday's note (2/1/2023), there are no changes except as documented above.

## 2023-02-02 NOTE — DISCHARGE PLANNING
[facility] Transitional Care Coordination    Referral from:  Dr Jason  Insurance Provider on Facesheet:[facility]  Potential Rehab Diagnosis: SBO    Chart review indicates patient may have on going medical management and may have therapy needs to possibly meet inpatient rehab facility criteria with the goal of returning to community.    D/C support: Daughter will need to verify d/c support    Left message on daughter Celeste Andrew phone# [phone] 2/2/23 10:46am     Physiatry consultation  per protocol.          Thank you for the referral.

## 2023-02-02 NOTE — CONSULTS
Medical chart review completed.     Patient is a 77 y.o. female  with a past medical history of hypertension, sleep apnea, hysterectomy, anxiety, admitted to [facility] on 1/24/23, with abdominal pain an an outpatient xray that showed possible small bowel obstruction.  CT confirmed obstruction and she was admitted.     She was seen and evaluated by general surgery, diagnosed with high grade small bowel obstruction secondary to intra-abdominal adhesions and was taken to the OR on 1/28 for and exp lap with lysis of adhesions with Dr. Valles.     Patient is currently POD #5, is passing gas and has not had a bowel movement yet. She has not been started on a diet, is on TPN and IV fluids.    Patient with multiple co-morbidities(including but not limited to anxiety, hypertension, sleep apnea, anemia, thrombocytopenia); with cognitive deficits and functional deficits in mobility/self-cares, and Moderate de-conditioning.     Pre-morbidly, this patient lived in a single level home with One steps to enter, alone and able to care for self. The patient was evaluated by acute care Physical Therapy and Occupational Therapy; currently requiring minimal assistance for mobility and minimal to maximum assistance for ADLs, also with ongoing cognitive deficits.     The patient is currently not a good candidate for an acute inpatient rehabilitation program as she has not yet been started on a diet. In addition, therapy notes indicate she has cognitive impairments, and it appears she lives alone without any support available.    Happy to reconsider her as a candidate if she is found to have discharge support and once she is started on a diet, moving her bowels.    Thank you for allowing us to participate in her care.    Christy Castro M.D.  Physical Medicine and Rehabilitation

## 2023-02-03 LAB
ALBUMIN SERPL BCP-MCNC: 2.9 G/DL (ref 3.2–4.9)
ALBUMIN/GLOB SERPL: 1 G/DL
ALP SERPL-CCNC: 52 U/L (ref 30–99)
ALT SERPL-CCNC: 8 U/L (ref 2–50)
ANION GAP SERPL CALC-SCNC: 6 MMOL/L (ref 7–16)
AST SERPL-CCNC: 16 U/L (ref 12–45)
BILIRUB SERPL-MCNC: 0.3 MG/DL (ref 0.1–1.5)
BUN SERPL-MCNC: 13 MG/DL (ref 8–22)
CALCIUM ALBUM COR SERPL-MCNC: 10.3 MG/DL (ref 8.5–10.5)
CALCIUM SERPL-MCNC: 9.4 MG/DL (ref 8.4–10.2)
CHLORIDE SERPL-SCNC: 102 MMOL/L (ref 96–112)
CO2 SERPL-SCNC: 29 MMOL/L (ref 20–33)
CREAT SERPL-MCNC: 0.71 MG/DL (ref 0.5–1.4)
GFR SERPLBLD CREATININE-BSD FMLA CKD-EPI: 87 ML/MIN/1.73 M 2
GLOBULIN SER CALC-MCNC: 2.9 G/DL (ref 1.9–3.5)
GLUCOSE BLD STRIP.AUTO-MCNC: 117 MG/DL (ref 65–99)
GLUCOSE BLD STRIP.AUTO-MCNC: 121 MG/DL (ref 65–99)
GLUCOSE BLD STRIP.AUTO-MCNC: 126 MG/DL (ref 65–99)
GLUCOSE SERPL-MCNC: 125 MG/DL (ref 65–99)
MAGNESIUM SERPL-MCNC: 2.1 MG/DL (ref 1.5–2.5)
PHOSPHATE SERPL-MCNC: 2.5 MG/DL (ref 2.5–4.5)
POTASSIUM SERPL-SCNC: 3.9 MMOL/L (ref 3.6–5.5)
PROT SERPL-MCNC: 5.8 G/DL (ref 6–8.2)
SODIUM SERPL-SCNC: 137 MMOL/L (ref 135–145)

## 2023-02-03 PROCEDURE — 80053 COMPREHEN METABOLIC PANEL: CPT

## 2023-02-03 PROCEDURE — 94760 N-INVAS EAR/PLS OXIMETRY 1: CPT

## 2023-02-03 PROCEDURE — 770001 HCHG ROOM/CARE - MED/SURG/GYN PRIV*

## 2023-02-03 PROCEDURE — 700101 HCHG RX REV CODE 250: Performed by: INTERNAL MEDICINE

## 2023-02-03 PROCEDURE — 83735 ASSAY OF MAGNESIUM: CPT

## 2023-02-03 PROCEDURE — 700111 HCHG RX REV CODE 636 W/ 250 OVERRIDE (IP): Performed by: HOSPITALIST

## 2023-02-03 PROCEDURE — 700111 HCHG RX REV CODE 636 W/ 250 OVERRIDE (IP): Performed by: INTERNAL MEDICINE

## 2023-02-03 PROCEDURE — 700105 HCHG RX REV CODE 258: Performed by: INTERNAL MEDICINE

## 2023-02-03 PROCEDURE — 84100 ASSAY OF PHOSPHORUS: CPT

## 2023-02-03 PROCEDURE — 99232 SBSQ HOSP IP/OBS MODERATE 35: CPT | Performed by: INTERNAL MEDICINE

## 2023-02-03 PROCEDURE — 82962 GLUCOSE BLOOD TEST: CPT | Mod: 91

## 2023-02-03 PROCEDURE — 3E0436Z INTRODUCTION OF NUTRITIONAL SUBSTANCE INTO CENTRAL VEIN, PERCUTANEOUS APPROACH: ICD-10-PCS | Performed by: INTERNAL MEDICINE

## 2023-02-03 PROCEDURE — C9113 INJ PANTOPRAZOLE SODIUM, VIA: HCPCS | Performed by: INTERNAL MEDICINE

## 2023-02-03 RX ADMIN — METHYLPREDNISOLONE SODIUM SUCCINATE 10 MG: 40 INJECTION, POWDER, FOR SOLUTION INTRAMUSCULAR; INTRAVENOUS at 04:57

## 2023-02-03 RX ADMIN — LORAZEPAM 1 MG: 2 INJECTION INTRAMUSCULAR; INTRAVENOUS at 00:43

## 2023-02-03 RX ADMIN — CALCIUM GLUCONATE: 98 INJECTION, SOLUTION INTRAVENOUS at 20:23

## 2023-02-03 RX ADMIN — ONDANSETRON 4 MG: 2 INJECTION INTRAMUSCULAR; INTRAVENOUS at 20:11

## 2023-02-03 RX ADMIN — LORAZEPAM 1 MG: 2 INJECTION INTRAMUSCULAR; INTRAVENOUS at 20:28

## 2023-02-03 RX ADMIN — PANTOPRAZOLE SODIUM 40 MG: 40 INJECTION, POWDER, LYOPHILIZED, FOR SOLUTION INTRAVENOUS at 04:58

## 2023-02-03 ASSESSMENT — ENCOUNTER SYMPTOMS
DIZZINESS: 0
DEPRESSION: 1
FOCAL WEAKNESS: 0
FEVER: 0
BACK PAIN: 0
HEADACHES: 0
VOMITING: 0
COUGH: 0
MYALGIAS: 0
CONSTIPATION: 0
NERVOUS/ANXIOUS: 1
DIARRHEA: 0
ABDOMINAL PAIN: 0
NAUSEA: 0
WEAKNESS: 0
HALLUCINATIONS: 0
HEARTBURN: 0
CHILLS: 0
SHORTNESS OF BREATH: 0

## 2023-02-03 ASSESSMENT — PAIN DESCRIPTION - PAIN TYPE
TYPE: SURGICAL PAIN
TYPE: ACUTE PAIN;SURGICAL PAIN

## 2023-02-03 NOTE — DISCHARGE PLANNING
Case Management Discharge Planning    Admission Date: 1/24/2023  GMLOS: 7.7  ALOS: 10    6-Clicks ADL Score: 17  6-Clicks Mobility Score: 15  PT and/or OT Eval ordered: Yes  Post-acute Referrals Ordered: Yes  Post-acute Choice Obtained: No  Has referral(s) been sent to post-acute provider:  [facility]      Anticipated Discharge Dispo: Discharge Disposition: Discharged to home/self care (01)    DME Needed: No    Action(s) Taken: Updated Provider/Nurse on Discharge Plan    Per MD, patient states she would like PT/OT to re-eval closer to discharge. Per MD, patient states she may consider SNF placement.     1153: Per Elyse with [facility], patient can be transported tomorrow.  MD states patient would be medically cleared to DC to [facility] tomorrow. Elyse to arrange transport.  RN CM to discuss with patient once time of transportation is given.    1427: Patient states she does not want rehab and would like to go to SNF as [facility] has stated they offer private rooms which rehab does not.  RN MERNA notified Elyse.     Escalations Completed: None    Medically Clear: No    Next Steps: SNF bed availability     Barriers to Discharge: Medical clearance, SNF bed availability

## 2023-02-03 NOTE — PROGRESS NOTES
[facility] Medicine Daily Progress Note    Date of Service  2/3/2023    Chief Complaint  Clau Vences is a 77 y.o. female admitted 1/24/2023 with abdominal pain and constipation    Hospital Course  History of hysterectomy and appendectomy 4 years ago, sleep apnea on CPAP, arthritis on chronic prednisone therapy, hypertension, hyperlipidemia and anxiety, who was admitted for abdominal pain and distention associated with 4 days of constipation.  She was found to have a small bowel obstruction.  General surgery was consulted and recommended conservative management NG tube placement. Unfortunately patient did not improve and SBFT showed high grade obstruction. Surgery took patient to the OR for laparotomy and lysis of adhesions on 1/28.    Interval Problem Update  1/29: SBFT showed high-grade obstruction, she was taken to the OR for SHANE.  POD 1, still with NG tube, n.p.o.  No mildly tachycardic, on 4 L of oxygen due to atelectasis  1/30: Ice chips are ok, POD2, still no gas.  Distended.  Remains on 4L.  1/31: Vitals stable. Leukocytosis resolved. Hypernatremia resolved. Ng tube clamped per surgery, if not N/V in 4 hours can dc. Patient reports she passed flatus this morning, no BM yet but feels pressure like she needs to go. Denies any N/V or abdominal pain.     2/1 Vitals stable on 2 L.  Platelets improved 146 today. NG tube removed, patient on clears. Tolerated clears overnight however this morning nauseous and vomiting up green fluid. More distended, denies any pain. Reports she is still passing gas, no BM. Added compazine prn. Encouraged ambulation to help stimulate her bowel function. PT/OT pending. Discussed plan of care with daughter at bedside.     2/2 Surgery yesterday recommended PPN, discussed with pharmacy can only do TPN. PICC placed yesterday and TPN to start tonight. PT/OT recommending post acute placement. Mg 1.4, IV replacement ordered. Patient still not having a BM. Per CM refusing all  placement.    2/3 Patient with a bowel movement x2 this morning, reports she is feeling much better, still distended. Discussed with surgery ok to place on diet, changed to GI soft. PM&R believes patient is a good candidate for rehab, if she continues to tolerate and improve possibly can dc tomorrow.      I have discussed this patient's plan of care and discharge plan at IDT rounds today with Case Management, Nursing, Nursing leadership, and other members of the IDT team.    Consultants/Specialty  general surgery    Code Status  Full Code    Disposition  Patient is not medically cleared for discharge.   Anticipate discharge to to home with close outpatient follow-up.  I have placed the appropriate orders for post-discharge needs.    Review of Systems  Review of Systems   Constitutional:  Positive for malaise/fatigue. Negative for chills and fever.   Respiratory:  Negative for cough and shortness of breath.    Cardiovascular:  Negative for chest pain and leg swelling.   Gastrointestinal:  Negative for abdominal pain, constipation, diarrhea, heartburn, nausea and vomiting.   Musculoskeletal:  Negative for back pain and myalgias.   Neurological:  Negative for dizziness, focal weakness, weakness and headaches.   Psychiatric/Behavioral:  Positive for depression. Negative for hallucinations. The patient is nervous/anxious.    All other systems reviewed and are negative.     Physical Exam  Temp:  [2.4 °C (36.3 °F)-37.1 °C (98.8 °F)] 36.7 °C (98.1 °F)  Pulse:  [79-96] 79  Resp:  [16-18] 18  BP: (142-163)/(69-86) 148/75  SpO2:  [90 %-96 %] 91 %    Physical Exam  Constitutional:       General: She is not in acute distress.     Appearance: She is obese. She is ill-appearing.   HENT:      Head: Normocephalic and atraumatic.      Mouth/Throat:      Mouth: Mucous membranes are dry.      Pharynx: Oropharynx is clear.   Eyes:      Conjunctiva/sclera: Conjunctivae normal.   Cardiovascular:      Rate and Rhythm: Normal rate and  regular rhythm.      Pulses: Normal pulses.   Pulmonary:      Effort: No respiratory distress.      Breath sounds: No wheezing.   Abdominal:      General: There is distension.      Palpations: Abdomen is soft.      Tenderness: There is no abdominal tenderness.      Comments: Hyperactive bowel sounds    Musculoskeletal:         General: No swelling.      Cervical back: Neck supple. No muscular tenderness.   Skin:     General: Skin is warm and dry.      Findings: No rash.   Neurological:      General: No focal deficit present.      Mental Status: She is alert and oriented to person, place, and time.      Cranial Nerves: No cranial nerve deficit.      Motor: Weakness present.   Psychiatric:         Mood and Affect: Mood normal.         Thought Content: Thought content normal.       Fluids    Intake/Output Summary (Last 24 hours) at 2/3/2023 1131  Last data filed at 2/3/2023 0106  Gross per 24 hour   Intake 260 ml   Output 650 ml   Net -390 ml       Laboratory  Recent Labs     02/01/23 0310 02/02/23 0224   WBC 9.3 9.4   RBC 3.82* 3.56*   HEMOGLOBIN 12.5 11.7*   HEMATOCRIT 39.7 36.5*   .9* 102.5*   MCH 32.7 32.9   MCHC 31.5* 32.1*   RDW 45.5 45.6   PLATELETCT 146* 140*   MPV 11.0 11.0     Recent Labs     02/01/23 0310 02/02/23 0224 02/03/23  0100   SODIUM 138 140 137   POTASSIUM 3.9 3.6 3.9   CHLORIDE 103 103 102   CO2 27 29 29   GLUCOSE 135* 113* 125*   BUN 15 13 13   CREATININE 0.59 0.65 0.71   CALCIUM 9.3 8.8 9.4             Recent Labs     02/02/23 0224   TRIGLYCERIDE 169*       Imaging  IR-PICC LINE PLACEMENT W/ GUIDANCE > AGE 5   Final Result                  Ultrasound-guided PICC placement performed by qualified nursing staff as    above.          DX-SMALL BOWEL SERIES   Final Result      1.  Minimal progression of oral contrast in the bowel on 8 hours of imaging with dilution of contrast could be due to extreme dysmotility/ileus versus obstruction. Review of the previous CT scan shows no transition  point of the small bowel dilatation    with apparent contrast in the cecum and gas seen extending through the transverse colon.      DX-ABDOMEN FOR TUBE PLACEMENT   Final Result      NG tube tip overlies the gastric body.      [id number] VIEW   Final Result      Persistent findings of small bowel obstruction      DX-ABDOMEN FOR TUBE PLACEMENT   Final Result         1.  Air-filled distended bowel loops in the upper abdomen, could represent ileus versus enterocolitis or evolving obstructive changes. Radiographic follow-up to resolution as clinically appropriate.   2.  Nasogastric tube tip terminates overlying the expected location of the gastric fundus.   3.  Left lower lobe atelectasis, infiltrate, and/or effusion.   4.  Cardiomegaly   5.  Atherosclerosis      CT-ABDOMEN-PELVIS W/O   Final Result      1.  Dilated loops of small intestine with air/blood levels consistent with small bowel obstruction.      2.  Fatty liver.      3.  Multiple left-sided renal stones measuring up to 2 cm in size.      4.  Prior hysterectomy.      5.  Sigmoid diverticulosis.             Assessment/Plan  * SBO (small bowel obstruction) (HCC)- (present on admission)  Assessment & Plan  Presented with severe abdominal pain and distention associated with vomiting.  She has not had a bowel obstruction and failed to improve with conservative management/NG tube  Small bowel follow-through on 1/28 revealed a high-grade obstruction therefore she was taken emergently to the operating room later that day  Postop day 6  Had 2 BMs this morning, discussed with surgery start gi soft diet   PT/OT- post acute    PM&R consulted    Acute respiratory failure with hypoxia (HCC)  Assessment & Plan  At baseline she has obesity and sleep apnea her acute component was secondary to atelectasis secondary to abdominal distention  Remains on oxygen,4L  Encourage IS  Supplemental O2 as needed    Arthritis  Assessment & Plan  The patient has chronic arthritis and has  been treated with prednisone 5 mg daily  Continue IV Solu-Medrol as IV substitute    Hypernatremia  Assessment & Plan  Change to d5 1/2 ns    resolved    Obesity (BMI 30-39.9)- (present on admission)  Assessment & Plan  Body mass index is 34.32 kg/m².  Outpatient weight loss management program highly recommended    Anxiety- (present on admission)  Assessment & Plan  Chronic anxiety and the patient at home was on Zoloft.    For now hold the Zoloft while npo    AZUL on CPAP- (present on admission)  Assessment & Plan  The patient at home uses CPAP unable to use with NGT  Oxygen at night  She is requiring 4L at this time due to atelectasis    Hyperlipidemia- (present on admission)  Assessment & Plan  Once the patient is able to resume oral intake low-fat low-cholesterol diet with Crestor and cholestyramine will be recommended.    HTN (hypertension)- (present on admission)  Assessment & Plan  For now we are holding Coreg and lisinopril.    Use as needed labetalol only.         VTE prophylaxis: SCDs/TEDs    I have performed a physical exam and reviewed and updated ROS and Plan today (2/3/2023). In review of yesterday's note (2/2/2023), there are no changes except as documented above.

## 2023-02-03 NOTE — PROGRESS NOTES
Received bedside report from night RN. Patient alert and oriented. Denies any complains at this time. Discussed plan of care and understood. Assessment per GSU. Falls precaution in place. Call light placed within reach. Will continue to monitor.

## 2023-02-03 NOTE — PROGRESS NOTES
Received report from dayshift RN. Pt resting in bed,supine. Pt A&O x4, on 3 lpm via NC. Pt no complaints of pain at this time.  Pt denies numbness and tingling. Pt continued on IV fluids. Pt updated with POC which includes mobility, BM and TPN. Call light within reach, fall precautions in place, all needs met at this time.

## 2023-02-03 NOTE — PROGRESS NOTES
Pharmacy TPN Note for 2/3/2023     77 y.o. female on TPN day # 2      Admission History: Admitted on 2023 for SBO (small bowel obstruction) ([facility]) [K56.609]    Allergies:   Tramadol     Indication for TPN:   Indication: Bowel Obstruction/Ileus;Severe malnutrition       Clinical Considerations for TPN:   Clinical Considerations Impacting TPN: Re-feeding syndrome           Temp (24hrs), Av.9 °C (85.8 °F), Min:2.4 °C (36.3 °F), Max:37.1 °C (98.8 °F)    Recent Labs     23  0310 23  0224 23  0100   SODIUM 138 140 137   POTASSIUM 3.9 3.6 3.9   CHLORIDE 103 103 102   CO2 27 29 29   BUN 15 13 13   CREATININE 0.59 0.65 0.71   GLUCOSE 135* 113* 125*   CALCIUM 9.3 8.8 9.4   ASTSGOT  --  11* 16   ALTSGPT  --  6 8   ALBUMIN  --  2.6* 2.9*   TBILIRUBIN  --  0.2 0.3   PHOSPHORUS  --  2.7 2.5   MAGNESIUM  --  1.4* 2.1     Accu-Checks  No results for input(s): POCGLUCOSE in the last 72 hours.    Vitals:    23 1942 23 2035 23 0501 23 1000   BP: (!) 160/86 (!) 163/79 (!) 158/79 (!) 148/75   Weight:       Height:           Intake/Output Summary (Last 24 hours) at 2/3/2023 1145  Last data filed at 2/3/2023 0106  Gross per 24 hour   Intake 260 ml   Output 650 ml   Net -390 ml       Orders Placed This Encounter   Procedures    Diet Order Diet: Low Fiber(GI Soft)     Standing Status:   Standing     Number of Occurrences:   1     Order Specific Question:   Diet:     Answer:   Low Fiber(GI Soft) [2]       TPN for past 72 hours (Show up to 3 orders; newest on the left. Changes between the two most recent orders are indicated.)       Start date and time    2023      TPN Adult Central Line [[id number]] TPN Adult Central Line [[id number]]    Order Status  Active Last Dose in Progress    Last Admin   New Bag at 2023 by Ricardo Mcnally, R.NDaniel    Frequency  TPN DAILY TPN DAILY       Base    Clinisol  45 g 45 g    dextrose 70%  150 g 150 g    fat emulsion  (soy) 20%  25 g 25 g       Additives    potassium phosphate  21 mmol 21 mmol    potassium chloride  80 mEq 80 mEq    sodium acetate  80 mEq 80 mEq    sodium chloride  80 mEq 80 mEq    magnesium sulfate  12 mEq 12 mEq    calcium GLUConate  4.65 mEq 9.4 mEq    zinc sulfate  5 mg 5 mg    M.T.E.-4 Tralement  1 mL 1 mL    M.V.I. Adult  10 mL 10 mL       QS Base    sterile water  1,220.74 mL 1,210.52 mL       Energy Contribution    Proteins  180 kcal 180 kcal    Dextrose  510 kcal 510 kcal    Lipids  250 kcal 250 kcal    Total  940 kcal 940 kcal       Electrolyte Ion Calculated Amount    Sodium  160 mEq 160 mEq    Potassium  110.8 mEq 110.8 mEq    Calcium  4.65 mEq 9.4 mEq    Magnesium  12 mEq 12 mEq    Aluminum  75 mEq 75 mEq    Phosphate  21 mmol 21 mmol    Chloride  160 mEq 160 mEq    Acetate  118.1 mEq 118.1 mEq    Chloride: Acetate Ratio  1.355 1.355       Other    Total Amino Acid  45 g 45 g    Total Amino Acid/kg  0.46 g/kg 0.46 g/kg    Glucose Infusion Rate  1.06 mg/kg/min 1.06 mg/kg/min    Volume  1,992 mL 1,992 mL    Rate  83 mL/hr 83 mL/hr    Dosing Weight  98.7 kg 98.7 kg    Infusion Site  [facility]            TPN Requirements:  Weight Used in TPN Calculation: 72.9 kg (160 lb 11.5 oz)  Total Protein Needs (g/kg): 1.2 g/kg  Total Caloric Needs (kcal): 1880 kcal  Total Fluid Needs (mL): 2000 mL     Current TPN Formulation:  % of goal: 50  % kcal as lipids: 26.7%  Grams protein/k.6  Non-protein calories: 760  Kcals/k.9  Total daily calories: 940    Comments:  1) Nutritional Plan: Currently on FLD. BM today.  2) Labs: Tolerated K and Phos in TPN.   3) Fluids/additives: TPN providing all IV fluids at 83 ml/hr. Monitor Phos/K for potential need to decrease once refeeding resolves. No changes to Mag in formulation considering increase in Mag also came from 4 gm Mag rider yesterday.  4) Changes to formulation: no changes today other than decreasing calcium gluconate from 9.3 mEq to 4.65 mEq. Keeping at  50% macros today with high risk of refeeding.   5) Next labs/note: BMP, Mg, PO4 in am. Follow enteral feeding status as patient had first bowel movement this morning.      Kt Arthur, PharmD

## 2023-02-03 NOTE — CARE PLAN
The patient is Stable - Low risk of patient condition declining or worsening    Shift Goals  Clinical Goals: Promote ambulation with standby assist; bowel movement during shift.  Patient Goals: Will be able to sleep tonight.  Family Goals: N/A    Progress made toward(s) clinical / shift goals:      Pt able to ambulate twice to the bathroom SBA with FWW, tolerated well; TPN started last night; pt tolerated, no complains of nausea & vomiting. Given PRN ativan and slept comfortably.     Problem: Pain - Standard  Goal: Alleviation of pain or a reduction in pain to the patient’s comfort goal  Outcome: Progressing     Problem: Bowel Elimination  Goal: Establish and maintain regular bowel function  Outcome: Progressing     Problem: Fall Risk  Goal: Patient will remain free from falls  Outcome: Progressing       Patient is not progressing towards the following goals: No BM but was able to pass gas.

## 2023-02-03 NOTE — PROGRESS NOTES
Surgery  POD#6  Passing small amounts of gas, no bm  Wound ok  Distended, soft  A/p   Mobilize   Hold on Clears for now   Awaiting return of bowel function prior to advancing diet

## 2023-02-03 NOTE — DISCHARGE PLANNING
Per chart review, patient tolerating clear liquid diet.    POD#6   Will advance diet with return of bowel function.  TCC will continue to follow

## 2023-02-03 NOTE — DISCHARGE PLANNING
Received Voalte from RN MERNA Fuentes , stating patient has decided to go to a SNF ([facility])     TCC will no longer follow

## 2023-02-03 NOTE — PREADMISSION SCREENING NOTE
Pre-Admission Screening Form    Patient Information:   Name: Clau Vences     MRN: [id number]       : 1945      Age: 77 y.o.   Gender: female      Race: White [7]       Marital Status:  [5]  Family Contact: Celeste Andrew Jean        Relationship: Child [20]  Friend [5]  Home Phone: [phone]           Cell Phone: [phone]  Advanced Directives: None  Code Status:  FULL  Current Attending Provider: Hayde Jason D.O.  Referring Physician: Dr. Jason      Physiatrist Consult: Dr Castro       Referral Date: 23  Primary Payor Source:  MEDICARE  Secondary Payor Source:  MISCELLANEOUS    Medical Information:   Date of Admission to Acute Care [place]2023  Room Number: 1105/01  Rehabilitation Diagnosis: 0016 - Debility (Non-Cardiac, Non-Pulmonary)  Immunization History   Administered Date(s) Administered    COVID-19 Vaccine, unspecified - HISTORICAL DATA 2022    Influenza (IM) Preservative Free - HISTORICAL DATA 10/26/2011    Influenza Seasonal Injectable - Historical Data 10/30/2012, 10/09/2014    Influenza Vaccine Adult HD 10/19/2015, 10/11/2016, 10/06/2017, 10/20/2018, 10/16/2019, 10/01/2020, 2021, 2022    Influenza Vaccine H1N1 - HISTORICAL DATA 2010    Influenza Vaccine Quad Inj (Pf) 10/07/2013    PFIZER BIVALENT BOOSTER SARS-COV-2 VACCINE (12+) 10/27/2022    PFIZER PURPLE CAP SARS-COV-2 VACCINATION (12+) 2021, 2021, 2021    Pneumococcal Conjugate Vaccine (Prevnar/PCV-13) 2014    Pneumococcal polysaccharide vaccine (PPSV-23) 2016    Tdap Vaccine 10/11/2016    Zoster Vaccine Live (ZVL) (Zostavax) - HISTORICAL DATA 2015    Zoster Vaccine Recombinant (RZV) (SHINGRIX) 2019, 2020     Allergies   Allergen Reactions    Tramadol      Nausea and somnolence     Past Medical History:   Diagnosis Date    Anesthesia     low O2 sat    Arthritis     osteo    Back pain     Dyslipidemia      [place] measles     Heart murmur 9/13/2017    Hypertension     Influenza     Mumps     Other specified symptom associated with female genital organs     Painful joint     Psychiatric problem     Sleep apnea     c-pap with 3 l/nc    Snoring     Sore muscles     Tonsillitis     Unspecified cataract      Past Surgical History:   Procedure Laterality Date    SD EXPLORATORY OF ABDOMEN N/A 1/28/2023    Procedure: EX LAP;  Surgeon: Mike Valles M.D.;  Location: SURGERY [facility];  Service: Gastroenterology    KNEE ARTHROPLASTY TOTAL Right 12/27/2017    HYSTERECTOMY ROBOTIC  10/23/2014    Performed by Smith Lyons M.D. at SURGERY [facility] ORS    GASTROSCOPY WITH BIOPSY  7/2/2014    Performed by Sky Vila M.D. at SURGERY [facility] ORS    OTHER ORTHOPEDIC SURGERY  2006    left total knee    APPENDECTOMY      ARTHROSCOPY, KNEE      HYSTERECTOMY LAPAROSCOPY      OTHER      meghan cataracts    OTHER ABDOMINAL SURGERY      Resection of pelvic mass, uterus and ovaries    SD BREAST REDUCTION Bilateral     1982    SD REMV 2ND CATARACT,CORN-SCLER SECTN      TONSILLECTOMY      TONSILLECTOMY AND ADENOIDECTOMY         History Leading to Admission, Conditions that Caused the Need for Rehab (CMS): SBO  Co-morbidities:  as listed above and below  Potential Risk - Complications: Cognitive Impairment, Deep Vein Thrombosis, Incontinence, Malnutrition, Pain, Pneumonia, Pressure Ulcer, and Urinary Tract Infection  Level of Risk: High    Ongoing Medical Management Needed (Medical/Nursing Needs):   Patient Active Problem List    Diagnosis Date Noted    Acute respiratory failure with hypoxia (HCC) 01/25/2023    SBO (small bowel obstruction) (HCC) 01/24/2023    Hypernatremia 01/24/2023    Arthritis 01/24/2023    Poor balance 04/26/2021    Thyroid nodule 03/16/2021    Obesity (BMI 30-39.9) 04/06/2018    Heart murmur 09/13/2017    Chronic insomnia 08/09/2017    Anxiety 08/09/2017    HTN (hypertension) 07/01/2014     Hyperlipidemia 07/01/2014    AZUL on CPAP 07/01/2014           Medical chart review completed.      Patient is a 77 y.o. female  with a past medical history of hypertension, sleep apnea, hysterectomy, anxiety, admitted to [facility] on 1/24/23, with abdominal pain an an outpatient xray that showed possible small bowel obstruction.  CT confirmed obstruction and she was admitted.      She was seen and evaluated by general surgery, diagnosed with high grade small bowel obstruction secondary to intra-abdominal adhesions and was taken to the OR on 1/28 for and exp lap with lysis of adhesions with Dr. Valles.      Patient is currently POD #5, is passing gas and has not had a bowel movement yet. She has not been started on a diet, is on TPN and IV fluids.     Patient with multiple co-morbidities(including but not limited to anxiety, hypertension, sleep apnea, anemia, thrombocytopenia); with cognitive deficits and functional deficits in mobility/self-cares, and Moderate de-conditioning.      Pre-morbidly, this patient lived in a single level home with One steps to enter, alone and able to care for self. The patient was evaluated by acute care Physical Therapy and Occupational Therapy; currently requiring minimal assistance for mobility and minimal to maximum assistance for ADLs, also with ongoing cognitive deficits.      The patient is currently not a good candidate for an acute inpatient rehabilitation program as she has not yet been started on a diet. In addition, therapy notes indicate she has cognitive impairments, and it appears she lives alone without any support available.     Happy to reconsider her as a candidate if she is found to have discharge support and once she is started on a diet, moving her bowels.     Thank you for allowing us to participate in her care.     Christy Castro M.D.              Current Vital Signs:   Temperature: 36.7 °C (98.1 °F) Pulse: 79 Respiration: 18 Blood  "Pressure : (!) 148/75  Weight: 98.7 kg (217 lb 9.5 oz) Height: 170.2 cm (5' 7\")  Pulse Oximetry: 91 % O2 (LPM): 3      Completed Laboratory Reports:  Recent Labs     23  0310 23  0224 23  0100   WBC 9.3 9.4  --    HEMOGLOBIN 12.5 11.7*  --    HEMATOCRIT 39.7 36.5*  --    PLATELETCT 146* 140*  --    SODIUM 138 140 137   POTASSIUM 3.9 3.6 3.9   BUN 15 13 13   CREATININE 0.59 0.65 0.71   ALBUMIN  --  2.6* 2.9*   GLUCOSE 135* 113* 125*     Additional Labs: Not Applicable    Prior Living Situation:   Housing / Facility: 1 Story House  Steps Into Home: 1  Steps In Home: 0  Lives with - Patient's Self Care Capacity: Alone and Able to Care For Self  Equipment Owned: 4-Wheel Walker, Tub / Shower Seat, Raised Toilet Seat Without Arms, Grab Bar(s) In Tub / Shower    Prior Level of Function / Living Situation:   Physical Therapy: Prior Services: None, Home-Independent  Housing / Facility: 1 Story House  Steps Into Home: 1  Steps In Home: 0  Bathroom Set up: Walk In Shower, Shower Chair  Equipment Owned: 4-Wheel Walker, Tub / Shower Seat, Raised Toilet Seat Without Arms, Grab Bar(s) In Tub / Shower  Lives with - Patient's Self Care Capacity: Alone and Able to Care For Self  Bed Mobility: Independent  Transfer Status: Independent  Ambulation: Independent  Assistive Devices Used:  (? poor historian regarding use of 4WW at home prior to admit and how often)  Current Level of Function:   Gait Level Of Assist: Minimal Assist  Assistive Device: Front Wheel Walker  Distance (Feet): 100  Deviation: Shuffled Gait, Bradykinetic (shuffling steps)  Supine to Sit:  (Nt, pt sitting up in chair)  Comments: [facility] pre and post  Sit to Stand: Minimal Assist  Bed, Chair, Wheelchair Transfer: Minimal Assist  Transfer Method: Stand Step  Sitting in Chair: as tolerated  Standin-7 min  Occupational Therapy:   Self Feeding: Independent  Grooming / Hygiene: Independent  Bathing: Independent  Dressing: Independent  Toileting: " "Independent  Medication Management: Independent  Laundry: Independent  Kitchen Mobility: Independent  Finances: Independent  Home Management: Independent  Shopping: Independent  Prior Level Of Mobility: Independent With Device in Home, Independent Without Device in Home (pt reports she has been using 4ww for about a year since she sprained her ankle \"because I don't want to fall\"  she did not remark on if she used it when she was out in the community)  Driving / Transportation: Driving Independent  Prior Services: None, Home-Independent  Housing / Facility: [address]  Occupation (Pre-Hospital Vocational): Retired Due To Age  Leisure Interests: Unable To Determine At This Time  Current Level of Function:   Upper Body Dressing: Minimal Assist  Lower Body Dressing: Maximal Assist (refused to attempt, \"I can't\")  Toileting:  (refused to demo, reports \"I have a higher toilet at home\" when therapist asked if she can get off the toilet HERE)  Speech Language Pathology:      Rehabilitation Prognosis/Potential: Good  Estimated Length of Stay: 10-14 days    Nursing:      Continent    Scope/Intensity of Services Recommended:  Physical Therapy: 1.5 hr / day  5 days / week. Therapeutic Interventions Required: Maximize Endurance, Mobility, Strength, and Safety  Occupational Therapy: 1.5 hr / day 5 days / week. Therapeutic Interventions Required: Maximize Self Care, ADLs, IADLs, and Energy Conservation  Rehabilitation Nursin/7. Therapeutic Interventions Required: Monitor Pain, Skin, Wound(s), Vital Signs, Intake and Output, Labs, Safety, Aspiration Risk, and Family Training  Rehabilitation Physician: 3 - 5 days / week. Therapeutic Interventions Required: Medical Management  Respiratory Care: consult. Therapeutic Interventions Required: Pulmonary Toileting  Dietician: consult. Therapeutic Interventions Required: advance diet as tolerated    She requires 24-hour rehabilitation nursing to manage bowel and bladder function, " skin care, surgical incision, wound, nutrition and fluid intake, pulmonary hygiene, pain control, safety, medication management, and patient/family goals. In addition, rehabilitation nursing will reiterate and reinforce therapy skills and equipment use, including ADLs, as well as provide education to the patient and family. Clau Vences is willing to participate in and is able to tolerate the proposed plan of care.    Rehabilitation Goals and Plan (Expected frequency & duration of treatment in the IRF):   Return to the Community and Family Able to Provide 24/7 Assistance  Anticipated Date of Rehabilitation Admission:   02/04023  Patient/Family oriented IRF level of care/facility/plan: Yes  Patient/Family willing to participate in IR  F care/facility/plan: Yes  Patient able to tolerate IRF level of care proposed: Yes  Patient has potential to benefit IRF level of care proposed: Yes  Comments:           Spoke with daughter Celeste.  Celeste is agreement for admission to [facility].  Celeste will be staying with patient in her home after being discharged from [facility].  Advised Celeste of [facility] policies on covid and visiting.  Celeste agreeable to family training, Per physiatry consult/chart review.   Patient not yet on diet so not a candidate.  TCC will follow for updated diet instructions, and discharge planning               Special Needs or Precautions - Medical Necessity:  Safety Concerns/Precautions:  Fall Risk / High Risk for Falls, Balance, and Cognition  Diet:   DIET ORDERS (From admission to next 24h)       Start     Ordered    02/03/23 1125  Diet Order Diet: Low Fiber(GI Soft)  ALL MEALS        Question:  Diet:  Answer:  Low Fiber(GI Soft)    02/03/23 1124    01/31/23 1708  Supplements  ALL MEALS        Question:  Which Supplement  Answer:  RESOURCE BREEZE JUICE    01/31/23 1707                    Anticipated Discharge Destination / Patient/Family Goal:  Destination: Home with Assistance Support System: Family   Anticipated home  health services: OT, PT, Nursing, and Social Work  Previously used HH service/ provider: Not Applicable  Anticipated DME Needs: Walker and Wheelchair  Outpatient Services: OT and PT  Alternative resources to address additional identified needs:   No future appointments.    Pre-Screen Completed: 2/3/2023 11:38 AM Elyse Chou L.P.N.

## 2023-02-04 LAB
ANION GAP SERPL CALC-SCNC: 8 MMOL/L (ref 7–16)
BUN SERPL-MCNC: 20 MG/DL (ref 8–22)
CALCIUM SERPL-MCNC: 9.2 MG/DL (ref 8.4–10.2)
CHLORIDE SERPL-SCNC: 103 MMOL/L (ref 96–112)
CO2 SERPL-SCNC: 28 MMOL/L (ref 20–33)
CREAT SERPL-MCNC: 0.71 MG/DL (ref 0.5–1.4)
GFR SERPLBLD CREATININE-BSD FMLA CKD-EPI: 87 ML/MIN/1.73 M 2
GLUCOSE BLD STRIP.AUTO-MCNC: 111 MG/DL (ref 65–99)
GLUCOSE BLD STRIP.AUTO-MCNC: 123 MG/DL (ref 65–99)
GLUCOSE SERPL-MCNC: 122 MG/DL (ref 65–99)
MAGNESIUM SERPL-MCNC: 1.9 MG/DL (ref 1.5–2.5)
PHOSPHATE SERPL-MCNC: 2.9 MG/DL (ref 2.5–4.5)
POTASSIUM SERPL-SCNC: 4.3 MMOL/L (ref 3.6–5.5)
SODIUM SERPL-SCNC: 139 MMOL/L (ref 135–145)

## 2023-02-04 PROCEDURE — 36415 COLL VENOUS BLD VENIPUNCTURE: CPT

## 2023-02-04 PROCEDURE — 80048 BASIC METABOLIC PNL TOTAL CA: CPT

## 2023-02-04 PROCEDURE — 770001 HCHG ROOM/CARE - MED/SURG/GYN PRIV*

## 2023-02-04 PROCEDURE — 97535 SELF CARE MNGMENT TRAINING: CPT

## 2023-02-04 PROCEDURE — 84100 ASSAY OF PHOSPHORUS: CPT

## 2023-02-04 PROCEDURE — 99232 SBSQ HOSP IP/OBS MODERATE 35: CPT | Performed by: INTERNAL MEDICINE

## 2023-02-04 PROCEDURE — 97112 NEUROMUSCULAR REEDUCATION: CPT

## 2023-02-04 PROCEDURE — 97530 THERAPEUTIC ACTIVITIES: CPT

## 2023-02-04 PROCEDURE — 83735 ASSAY OF MAGNESIUM: CPT

## 2023-02-04 PROCEDURE — 97116 GAIT TRAINING THERAPY: CPT

## 2023-02-04 PROCEDURE — 82962 GLUCOSE BLOOD TEST: CPT

## 2023-02-04 PROCEDURE — 700111 HCHG RX REV CODE 636 W/ 250 OVERRIDE (IP): Performed by: HOSPITALIST

## 2023-02-04 PROCEDURE — 700111 HCHG RX REV CODE 636 W/ 250 OVERRIDE (IP): Performed by: INTERNAL MEDICINE

## 2023-02-04 PROCEDURE — C9113 INJ PANTOPRAZOLE SODIUM, VIA: HCPCS | Performed by: INTERNAL MEDICINE

## 2023-02-04 RX ORDER — PREDNISONE 5 MG/1
5 TABLET ORAL DAILY
Status: DISCONTINUED | OUTPATIENT
Start: 2023-02-05 | End: 2023-02-09 | Stop reason: HOSPADM

## 2023-02-04 RX ORDER — OMEPRAZOLE 20 MG/1
20 CAPSULE, DELAYED RELEASE ORAL DAILY
Status: DISCONTINUED | OUTPATIENT
Start: 2023-02-05 | End: 2023-02-09 | Stop reason: HOSPADM

## 2023-02-04 RX ADMIN — PANTOPRAZOLE SODIUM 40 MG: 40 INJECTION, POWDER, LYOPHILIZED, FOR SOLUTION INTRAVENOUS at 06:19

## 2023-02-04 RX ADMIN — LORAZEPAM 1 MG: 2 INJECTION INTRAMUSCULAR; INTRAVENOUS at 00:38

## 2023-02-04 RX ADMIN — ONDANSETRON 4 MG: 2 INJECTION INTRAMUSCULAR; INTRAVENOUS at 20:54

## 2023-02-04 RX ADMIN — METHYLPREDNISOLONE SODIUM SUCCINATE 10 MG: 40 INJECTION, POWDER, FOR SOLUTION INTRAMUSCULAR; INTRAVENOUS at 06:18

## 2023-02-04 RX ADMIN — LORAZEPAM 1 MG: 2 INJECTION INTRAMUSCULAR; INTRAVENOUS at 20:52

## 2023-02-04 RX ADMIN — LORAZEPAM 1 MG: 2 INJECTION INTRAMUSCULAR; INTRAVENOUS at 04:49

## 2023-02-04 ASSESSMENT — COGNITIVE AND FUNCTIONAL STATUS - GENERAL
SUGGESTED CMS G CODE MODIFIER DAILY ACTIVITY: CK
DRESSING REGULAR UPPER BODY CLOTHING: A LITTLE
DRESSING REGULAR LOWER BODY CLOTHING: A LOT
TOILETING: A LITTLE
HELP NEEDED FOR BATHING: A LOT
DAILY ACTIVITIY SCORE: 18

## 2023-02-04 ASSESSMENT — ENCOUNTER SYMPTOMS
VOMITING: 0
NAUSEA: 0
SHORTNESS OF BREATH: 0
CONSTIPATION: 0
BACK PAIN: 0
COUGH: 0
DIARRHEA: 0
FOCAL WEAKNESS: 0
NERVOUS/ANXIOUS: 1
MYALGIAS: 0
ABDOMINAL PAIN: 0
FEVER: 0
DEPRESSION: 1
HEARTBURN: 0
HALLUCINATIONS: 0
CHILLS: 0
HEADACHES: 0
WEAKNESS: 0
DIZZINESS: 0

## 2023-02-04 ASSESSMENT — PAIN DESCRIPTION - PAIN TYPE
TYPE: SURGICAL PAIN
TYPE: SURGICAL PAIN
TYPE: ACUTE PAIN
TYPE: SURGICAL PAIN

## 2023-02-04 ASSESSMENT — GAIT ASSESSMENTS
ASSISTIVE DEVICE: FRONT WHEEL WALKER
DISTANCE (FEET): 150
DEVIATION: BRADYKINETIC
GAIT LEVEL OF ASSIST: MINIMAL ASSIST
DISTANCE (FEET): 60

## 2023-02-04 ASSESSMENT — FIBROSIS 4 INDEX: FIB4 SCORE: 3.11

## 2023-02-04 NOTE — THERAPY
Physical Therapy   Daily Treatment     Patient Name: Clau Vences  Age:  77 y.o., Sex:  female  Medical Record #: [id number]  Today's Date: 2/4/2023          Assessment    Pt is making progress with ambulation,still having difficulty with sit to stand    Plan    DC Equipment Recommendations: Unable to determine at this time  Discharge Recommendations: (P) Recommend post-acute placement for additional physical therapy services prior to discharge home     Objective       02/04/23 1400   Charge Group   PT Gait Training (Units) 1   PT Therapeutic Activities (Units) 1   Supine Lower Body Exercise   Supine Lower Body Exercises Yes   Sitting Lower Body Exercises   Sitting Lower Body Exercises Yes   Balance   Sitting Balance (Static) Fair +   Sitting Balance (Dynamic) Fair +   Standing Balance (Static) Fair   Standing Balance (Dynamic) Fair   Weight Shift Sitting Fair   Weight Shift Standing Fair   Gait Analysis   Gait Level Of Assist Minimal Assist   Assistive Device Front Wheel Walker   Distance (Feet) 150   # of Times Distance was Traveled 1   Deviation Bradykinetic   Functional Mobility   Sit to Stand Minimal Assist   Transfer Method Stand Step   Activity Tolerance   Sitting in Chair > 1hr   Standing 10   Short Term Goals    Short Term Goal # 1 Pt will be able to perform bed mobility and sup <> sit Dan[place] in 6 visits.   Goal Outcome # 1 Progressing as expected   Short Term Goal # 2 Pt will be able to perform sit <> stand and transfer with FWW Dan[place] in 6 visits.   Goal Outcome # 2 Progressing as expected   Short Term Goal # 3 Pt will be able to ambulate 150 ft with FWW [place] in 6 visits.   Goal Outcome # 3 Progressing as expected   Anticipated Discharge Equipment and Recommendations   Discharge Recommendations Recommend post-acute placement for additional physical therapy services prior to discharge home   Interdisciplinary Plan of Care Collaboration   IDT Collaboration with  Nursing   Session Information   Date /  Session Number  2/4-2 2/5 2/7

## 2023-02-04 NOTE — DISCHARGE PLANNING
note:  Referral sent to [facility].  TPN to be dc today per MD.   Possible dc on Monday.

## 2023-02-04 NOTE — CARE PLAN
The patient is Stable - Low risk of patient condition declining or worsening    Shift Goals  Clinical Goals: Bowel movement; Ambulation  Patient Goals: Pain control 3/10  Family Goals: n/a    Progress made toward(s) clinical / shift goals:  Able to have a decent amount of bowel movements during this shift; Ambulated hallways; Denies abdominal pain.    Patient is not progressing towards the following goals:

## 2023-02-04 NOTE — PROGRESS NOTES
[facility] Medicine Daily Progress Note    Date of Service  2/4/2023    Chief Complaint  Clau Vences is a 77 y.o. female admitted 1/24/2023 with abdominal pain and constipation    Hospital Course  History of hysterectomy and appendectomy 4 years ago, sleep apnea on CPAP, arthritis on chronic prednisone therapy, hypertension, hyperlipidemia and anxiety, who was admitted for abdominal pain and distention associated with 4 days of constipation.  She was found to have a small bowel obstruction.  General surgery was consulted and recommended conservative management NG tube placement. Unfortunately patient did not improve and SBFT showed high grade obstruction. Surgery took patient to the OR for laparotomy and lysis of adhesions on 1/28.    Interval Problem Update  1/29: SBFT showed high-grade obstruction, she was taken to the OR for SHANE.  POD 1, still with NG tube, n.p.o.  No mildly tachycardic, on 4 L of oxygen due to atelectasis  1/30: Ice chips are ok, POD2, still no gas.  Distended.  Remains on 4L.  1/31: Vitals stable. Leukocytosis resolved. Hypernatremia resolved. Ng tube clamped per surgery, if not N/V in 4 hours can dc. Patient reports she passed flatus this morning, no BM yet but feels pressure like she needs to go. Denies any N/V or abdominal pain.     2/1 Vitals stable on 2 L.  Platelets improved 146 today. NG tube removed, patient on clears. Tolerated clears overnight however this morning nauseous and vomiting up green fluid. More distended, denies any pain. Reports she is still passing gas, no BM. Added compazine prn. Encouraged ambulation to help stimulate her bowel function. PT/OT pending. Discussed plan of care with daughter at bedside.     2/2 Surgery yesterday recommended PPN, discussed with pharmacy can only do TPN. PICC placed yesterday and TPN to start tonight. PT/OT recommending post acute placement. Mg 1.4, IV replacement ordered. Patient still not having a BM. Per CM refusing all  placement.    2/3 Patient with a bowel movement x2 this morning, reports she is feeling much better, still distended. Discussed with surgery ok to place on diet, changed to GI soft. PM&R believes patient is a good candidate for rehab, if she continues to tolerate and improve possibly can dc tomorrow.      2/4 No acute events overnight. Patient continues to have Bms. She refused rehab but is agreeable to SNF.  Patient had some vomiting overnight, denies any nausea today.  She thinks that just because she was drinking her water too quickly.  Patient is still distended and having multiple bowel movements but has improved.  Surgery has cleared patient for discharge with outpatient follow-up in a week.  Patient is medically clear pending SNF placement.    I have discussed this patient's plan of care and discharge plan at IDT rounds today with Case Management, Nursing, Nursing leadership, and other members of the IDT team.    Consultants/Specialty  general surgery    Code Status  Full Code    Disposition  Patient is medically cleared for discharge.   Anticipate discharge to to skilled nursing facility.  I have placed the appropriate orders for post-discharge needs.    Review of Systems  Review of Systems   Constitutional:  Positive for malaise/fatigue. Negative for chills and fever.   Respiratory:  Negative for cough and shortness of breath.    Cardiovascular:  Negative for chest pain and leg swelling.   Gastrointestinal:  Negative for abdominal pain, constipation, diarrhea, heartburn, nausea and vomiting.   Musculoskeletal:  Negative for back pain and myalgias.   Neurological:  Negative for dizziness, focal weakness, weakness and headaches.   Psychiatric/Behavioral:  Positive for depression. Negative for hallucinations. The patient is nervous/anxious.    All other systems reviewed and are negative.     Physical Exam  Temp:  [36.5 °C (97.7 °F)-37.1 °C (98.8 °F)] 36.9 °C (98.4 °F)  Pulse:  [] 98  Resp:  [18] 18  BP:  (138-148)/(72-75) 146/75  SpO2:  [91 %-93 %] 93 %    Physical Exam  Constitutional:       General: She is not in acute distress.     Appearance: She is obese. She is ill-appearing.   HENT:      Head: Normocephalic and atraumatic.      Mouth/Throat:      Mouth: Mucous membranes are dry.      Pharynx: Oropharynx is clear.   Eyes:      Conjunctiva/sclera: Conjunctivae normal.   Cardiovascular:      Rate and Rhythm: Normal rate and regular rhythm.      Pulses: Normal pulses.   Pulmonary:      Effort: No respiratory distress.      Breath sounds: No wheezing.   Abdominal:      General: There is distension.      Palpations: Abdomen is soft.      Tenderness: There is no abdominal tenderness.   Musculoskeletal:         General: No swelling.      Cervical back: Neck supple. No muscular tenderness.   Skin:     General: Skin is warm and dry.      Findings: No rash.   Neurological:      General: No focal deficit present.      Mental Status: She is alert and oriented to person, place, and time.      Cranial Nerves: No cranial nerve deficit.      Motor: Weakness present.   Psychiatric:         Mood and Affect: Mood normal.         Thought Content: Thought content normal.       Fluids    Intake/Output Summary (Last 24 hours) at 2/4/2023 0915  Last data filed at 2/4/2023 0500  Gross per 24 hour   Intake 0 ml   Output 870 ml   Net -870 ml       Laboratory  Recent Labs     02/02/23 0224   WBC 9.4   RBC 3.56*   HEMOGLOBIN 11.7*   HEMATOCRIT 36.5*   .5*   MCH 32.9   MCHC 32.1*   RDW 45.6   PLATELETCT 140*   MPV 11.0     Recent Labs     02/02/23  0224 02/03/23  0100 02/04/23  0420   SODIUM 140 137 139   POTASSIUM 3.6 3.9 4.3   CHLORIDE 103 102 103   CO2 29 29 28   GLUCOSE 113* 125* 122*   BUN 13 13 20   CREATININE 0.65 0.71 0.71   CALCIUM 8.8 9.4 9.2             Recent Labs     02/02/23 0224   TRIGLYCERIDE 169*       Imaging  IR-PICC LINE PLACEMENT W/ GUIDANCE > AGE 5   Final Result                  Ultrasound-guided PICC  placement performed by qualified nursing staff as    above.          DX-SMALL BOWEL SERIES   Final Result      1.  Minimal progression of oral contrast in the bowel on 8 hours of imaging with dilution of contrast could be due to extreme dysmotility/ileus versus obstruction. Review of the previous CT scan shows no transition point of the small bowel dilatation    with apparent contrast in the cecum and gas seen extending through the transverse colon.      DX-ABDOMEN FOR TUBE PLACEMENT   Final Result      NG tube tip overlies the gastric body.      [id number] VIEW   Final Result      Persistent findings of small bowel obstruction      DX-ABDOMEN FOR TUBE PLACEMENT   Final Result         1.  Air-filled distended bowel loops in the upper abdomen, could represent ileus versus enterocolitis or evolving obstructive changes. Radiographic follow-up to resolution as clinically appropriate.   2.  Nasogastric tube tip terminates overlying the expected location of the gastric fundus.   3.  Left lower lobe atelectasis, infiltrate, and/or effusion.   4.  Cardiomegaly   5.  Atherosclerosis      CT-ABDOMEN-PELVIS W/O   Final Result      1.  Dilated loops of small intestine with air/blood levels consistent with small bowel obstruction.      2.  Fatty liver.      3.  Multiple left-sided renal stones measuring up to 2 cm in size.      4.  Prior hysterectomy.      5.  Sigmoid diverticulosis.             Assessment/Plan  * SBO (small bowel obstruction) (HCC)- (present on admission)  Assessment & Plan  Presented with severe abdominal pain and distention associated with vomiting.  She has not had a bowel obstruction and failed to improve with conservative management/NG tube  Small bowel follow-through on 1/28 revealed a high-grade obstruction therefore she was taken emergently to the operating room later that day  Postop day 7  -surgery cleared patient for dc   -tolerating diet   PT/OT- post acute    PM&R consulted- pt refused   Pending  SNF placement     Acute respiratory failure with hypoxia (HCC)  Assessment & Plan  At baseline she has obesity and sleep apnea her acute component was secondary to atelectasis secondary to abdominal distention  Remains on oxygen,4L  Encourage IS  Supplemental O2 as needed    Arthritis  Assessment & Plan  The patient has chronic arthritis and has been treated with prednisone 5 mg daily  Continue IV Solu-Medrol as IV substitute    Hypernatremia  Assessment & Plan  Change to d5 1/2 ns    resolved    Obesity (BMI 30-39.9)- (present on admission)  Assessment & Plan  Body mass index is 34.32 kg/m².  Outpatient weight loss management program highly recommended    Anxiety- (present on admission)  Assessment & Plan  Chronic anxiety and the patient at home was on Zoloft.    For now hold the Zoloft while npo    AZUL on CPAP- (present on admission)  Assessment & Plan  The patient at home uses CPAP unable to use with NGT  Oxygen at night  She is requiring 4L at this time due to atelectasis    Hyperlipidemia- (present on admission)  Assessment & Plan  Once the patient is able to resume oral intake low-fat low-cholesterol diet with Crestor and cholestyramine will be recommended.    HTN (hypertension)- (present on admission)  Assessment & Plan  For now we are holding Coreg and lisinopril.    Use as needed labetalol only.         VTE prophylaxis: SCDs/TEDs    I have performed a physical exam and reviewed and updated ROS and Plan today (2/4/2023). In review of yesterday's note (2/3/2023), there are no changes except as documented above.

## 2023-02-04 NOTE — PROGRESS NOTES
Surgery  POD#7  BM and gas  Wound ok  Distended, soft, incision CDI  A/p   Mobilize  Soft diet  DC home at primary teams discretion  Follow up in one week for staple removal with Dr. Valles

## 2023-02-04 NOTE — PROGRESS NOTES
Orders to d/c TPN placed today.  TPN already at 50 % estimated calorie goals, will shut TPN off and check FSBS in 1 hour, RN to notify pharmacy if result < 70 mg/dL.    Oly Alston, PharmD, BCPS

## 2023-02-04 NOTE — THERAPY
Occupational Therapy  Daily Treatment     Patient Name: Clau Vences  Age:  77 y.o., Sex:  female  Medical Record #: [id number]  Today's Date: 2/4/2023     Precautions: Fall Risk    Assessment  Pt is agreeable to OOB activity with OT. A&Ox3, [place], mild confusion, flat affect. O2@ 3L NC in place. Show improved balance, overall strength, activity tolerance during ADL's; see below for details. Tolerates EOB activity, standing at sink then walk in vora with FWW, O2. Pt is not at her baseline level; OT will continue to follow while in house.    Plan  Occupational Therapy Treatment Plan  O.T. Treatment Plan: (P) Continue Current Treatment Plan    DC Equipment Recommendations: (P) Unable to determine at this time  Discharge Recommendations: (P) Recommend post-acute placement for additional occupational therapy services prior to discharge home    Subjective  Pleasant and cooperative      Objective     02/04/23 1027   Cognition    Cognition / Consciousness X   Speech/ Communication Delayed Responses;Hard of Hearing   Level of Consciousness Alert   Comments mild confusion noted. Flat affect.   Balance   Sitting Balance (Static) Fair +   Sitting Balance (Dynamic) Fair   Standing Balance (Static) Fair   Standing Balance (Dynamic) Fair   Weight Shift Sitting Fair   Weight Shift Standing Fair   Skilled Intervention Verbal Cuing   Comments fww   Bed Mobility    Supine to Sit Moderate Assist   Skilled Intervention Sequencing;Postural Facilitation   Activities of Daily Living   Eating Supervision   Grooming Standing;Contact Guard Assist   Lower Body Dressing Moderate Assist   Skilled Intervention Verbal Cuing   How much help from another person does the patient currently need...   Putting on and taking off regular lower body clothing? 2   Bathing (including washing, rinsing, and drying)? 2   Toileting, which includes using a toilet, bedpan, or urinal? 3   Putting on and taking off regular upper body clothing? 3   Taking care of  personal grooming such as brushing teeth? 4   Eating meals? 4   6 Clicks Daily Activity Score 18   Functional Mobility   Sit to Stand Minimal Assist   Bed, Chair, Wheelchair Transfer Contact Guard Assist   Transfer Method Stand Step   Mobility fww   Distance (Feet) 60   # of Times Distance was Traveled 2   Skilled Intervention Verbal Cuing   Activity Tolerance   Sitting in Chair >1hr   Sitting Edge of Bed 14   Standing 12   Comments extra time with walk in halls   Short Term Goals   Goal Outcome # 1 Progressing as expected   Goal Outcome # 2 Progressing as expected   Goal Outcome # 3 Progressing as expected   Education Group   Education Provided Activities of Daily Living   Role of Occupational Therapist Patient Response Patient;Acceptance;Explanation;Verbal Demonstration   ADL Patient Response Action Demonstration;Explanation;Acceptance;Patient   Occupational Therapy Treatment Plan   O.T. Treatment Plan Continue Current Treatment Plan   Anticipated Discharge Equipment and Recommendations   DC Equipment Recommendations Unable to determine at this time   Discharge Recommendations Recommend post-acute placement for additional occupational therapy services prior to discharge home

## 2023-02-04 NOTE — PROGRESS NOTES
Received report from day shift nurse. Assumed pt care at 1915. Pt is A&Ox4, resting comfortably in bed. Pt on room air; no signs of respiratory distress. Labs noted. Pt denies pain at this time. Nausea and vomiting noted; with green emesis noted; given with prn ondansetron as per MAR. Needs attended well. Blood glucose checked; with result of 117 mg/dl. Fall precautions in place. Bed at lowest position. Call light and personal belongings within reach. Continue to monitor. Hourly rounding in progress.

## 2023-02-04 NOTE — DIETARY
Nutrition Services Brief Update:    Problem: Nutritional:  Goal: Nutrition support tolerated and meeting greater than 85% of estimated needs  Outcome: Discharged - not met    Current Diet: Low Fiber (GI soft); TID Boost Breeze    Problem: Nutritional:  Goal: Achieve adequate nutritional intake  Description: Patient will tolerate diet >clear liquids and consume >50-75% of meals  Outcome: Progressing    TPN discharged today, had been providing 50% of estimated nutrition needs. Diet advanced to Low Fiber on 2/3. Emesis overnight which pt attributed to drinking water too quickly; no nausea this a.m. per notes. PO intake x 1 meal documented on 2/3 at 25-50% x 1; pt refused breakfast this a.m. Will advance supplement order from clear liquid supplement Boost Breeze to Boost Plus TID w/ meals. Cleared for discharge pending SNF acceptance.    RD continues to follow.

## 2023-02-05 PROCEDURE — 700111 HCHG RX REV CODE 636 W/ 250 OVERRIDE (IP): Performed by: HOSPITALIST

## 2023-02-05 PROCEDURE — A9270 NON-COVERED ITEM OR SERVICE: HCPCS | Performed by: INTERNAL MEDICINE

## 2023-02-05 PROCEDURE — 99232 SBSQ HOSP IP/OBS MODERATE 35: CPT | Performed by: INTERNAL MEDICINE

## 2023-02-05 PROCEDURE — 770001 HCHG ROOM/CARE - MED/SURG/GYN PRIV*

## 2023-02-05 PROCEDURE — 700102 HCHG RX REV CODE 250 W/ 637 OVERRIDE(OP): Performed by: INTERNAL MEDICINE

## 2023-02-05 PROCEDURE — 97116 GAIT TRAINING THERAPY: CPT

## 2023-02-05 PROCEDURE — 94760 N-INVAS EAR/PLS OXIMETRY 1: CPT

## 2023-02-05 PROCEDURE — 97530 THERAPEUTIC ACTIVITIES: CPT

## 2023-02-05 PROCEDURE — 700111 HCHG RX REV CODE 636 W/ 250 OVERRIDE (IP): Performed by: INTERNAL MEDICINE

## 2023-02-05 RX ADMIN — ONDANSETRON 4 MG: 2 INJECTION INTRAMUSCULAR; INTRAVENOUS at 20:53

## 2023-02-05 RX ADMIN — OMEPRAZOLE 20 MG: 20 CAPSULE, DELAYED RELEASE ORAL at 05:50

## 2023-02-05 RX ADMIN — ONDANSETRON 4 MG: 2 INJECTION INTRAMUSCULAR; INTRAVENOUS at 10:16

## 2023-02-05 RX ADMIN — ONDANSETRON 4 MG: 2 INJECTION INTRAMUSCULAR; INTRAVENOUS at 16:14

## 2023-02-05 RX ADMIN — LORAZEPAM 1 MG: 2 INJECTION INTRAMUSCULAR; INTRAVENOUS at 19:59

## 2023-02-05 RX ADMIN — PREDNISONE 5 MG: 5 TABLET ORAL at 05:50

## 2023-02-05 ASSESSMENT — ENCOUNTER SYMPTOMS
WEAKNESS: 0
HEARTBURN: 0
ABDOMINAL PAIN: 0
SHORTNESS OF BREATH: 0
CHILLS: 0
HEADACHES: 0
MYALGIAS: 0
VOMITING: 1
DEPRESSION: 1
FOCAL WEAKNESS: 0
NERVOUS/ANXIOUS: 1
FEVER: 0
DIZZINESS: 0
BACK PAIN: 0
DIARRHEA: 1
CONSTIPATION: 0
NAUSEA: 1
HALLUCINATIONS: 0
COUGH: 0

## 2023-02-05 ASSESSMENT — COGNITIVE AND FUNCTIONAL STATUS - GENERAL
SUGGESTED CMS G CODE MODIFIER MOBILITY: CL
STANDING UP FROM CHAIR USING ARMS: A LITTLE
MOVING FROM LYING ON BACK TO SITTING ON SIDE OF FLAT BED: A LOT
MOVING TO AND FROM BED TO CHAIR: A LOT
MOBILITY SCORE: 13
WALKING IN HOSPITAL ROOM: A LITTLE
TURNING FROM BACK TO SIDE WHILE IN FLAT BAD: A LOT
CLIMB 3 TO 5 STEPS WITH RAILING: TOTAL

## 2023-02-05 ASSESSMENT — GAIT ASSESSMENTS
GAIT LEVEL OF ASSIST: CONTACT GUARD ASSIST
ASSISTIVE DEVICE: FRONT WHEEL WALKER
DEVIATION: BRADYKINETIC;SHUFFLED GAIT;DECREASED HEEL STRIKE;DECREASED TOE OFF
DISTANCE (FEET): 200

## 2023-02-05 ASSESSMENT — PATIENT HEALTH QUESTIONNAIRE - PHQ9
SUM OF ALL RESPONSES TO PHQ9 QUESTIONS 1 AND 2: 0
1. LITTLE INTEREST OR PLEASURE IN DOING THINGS: NOT AT ALL
2. FEELING DOWN, DEPRESSED, IRRITABLE, OR HOPELESS: NOT AT ALL

## 2023-02-05 ASSESSMENT — PAIN DESCRIPTION - PAIN TYPE
TYPE: SURGICAL PAIN

## 2023-02-05 NOTE — CARE PLAN
The patient is Stable - Low risk of patient condition declining or worsening    Shift Goals  Clinical Goals: Pt will be able to sleep comfortably at least 4 hours overnight. Pt will remain free from falls or injury. Pt will have no episode of vomiting within the shift.  Patient Goals: sleep comfortably  Family Goals: n/a    Progress made toward(s) clinical / shift goals:  Pt seen sleeping comfortably at least 4 hours overnight. Pt complained of nausea; with prn Zofran given as per MAR. No episode of vomiting noted. Pt denies pain at this time. Pt is free from falls or injury.    Patient is not progressing towards the following goals:n/a

## 2023-02-05 NOTE — CARE PLAN
The patient is Stable - Low risk of patient condition declining or worsening    Shift Goals  Clinical Goals: Ambulate; Monitor intake and output; free from falls  Patient Goals: rest; comfort  Family Goals: n/a    Progress made toward(s) clinical / shift goals:  No nausea or vomiting during this shift; Able to ambulate hallways    Patient is not progressing towards the following goals:

## 2023-02-05 NOTE — PROGRESS NOTES
Received report from day shift nurse. Assumed pt care at 1915. Pt is awake and oriented, resting comfortably in bed. Pt with ongoing oxygen at 3 L/min; no signs of respiratory distress. Pt denies pain at this moment. Needs attended well. Fall precautions in place. Bed at lowest position. Call light and personal belongings within reach. Continue to monitor. Hourly rounding in progress.

## 2023-02-05 NOTE — PROGRESS NOTES
[facility] Medicine Daily Progress Note    Date of Service  2/5/2023    Chief Complaint  Clau Vences is a 77 y.o. female admitted 1/24/2023 with abdominal pain and constipation    Hospital Course  History of hysterectomy and appendectomy 4 years ago, sleep apnea on CPAP, arthritis on chronic prednisone therapy, hypertension, hyperlipidemia and anxiety, who was admitted for abdominal pain and distention associated with 4 days of constipation.  She was found to have a small bowel obstruction.  General surgery was consulted and recommended conservative management NG tube placement. Unfortunately patient did not improve and SBFT showed high grade obstruction. Surgery took patient to the OR for laparotomy and lysis of adhesions on 1/28.    Interval Problem Update  1/29: SBFT showed high-grade obstruction, she was taken to the OR for SHANE.  POD 1, still with NG tube, n.p.o.  No mildly tachycardic, on 4 L of oxygen due to atelectasis  1/30: Ice chips are ok, POD2, still no gas.  Distended.  Remains on 4L.  1/31: Vitals stable. Leukocytosis resolved. Hypernatremia resolved. Ng tube clamped per surgery, if not N/V in 4 hours can dc. Patient reports she passed flatus this morning, no BM yet but feels pressure like she needs to go. Denies any N/V or abdominal pain.     2/1 Vitals stable on 2 L.  Platelets improved 146 today. NG tube removed, patient on clears. Tolerated clears overnight however this morning nauseous and vomiting up green fluid. More distended, denies any pain. Reports she is still passing gas, no BM. Added compazine prn. Encouraged ambulation to help stimulate her bowel function. PT/OT pending. Discussed plan of care with daughter at bedside.     2/2 Surgery yesterday recommended PPN, discussed with pharmacy can only do TPN. PICC placed yesterday and TPN to start tonight. PT/OT recommending post acute placement. Mg 1.4, IV replacement ordered. Patient still not having a BM. Per CM refusing all  placement.    2/3 Patient with a bowel movement x2 this morning, reports she is feeling much better, still distended. Discussed with surgery ok to place on diet, changed to GI soft. PM&R believes patient is a good candidate for rehab, if she continues to tolerate and improve possibly can dc tomorrow.      2/4 No acute events overnight. Patient continues to have Bms. She refused rehab but is agreeable to SNF.  Patient had some vomiting overnight, denies any nausea today.  She thinks that just because she was drinking her water too quickly.  Patient is still distended and having multiple bowel movements but has improved.  Surgery has cleared patient for discharge with outpatient follow-up in a week.  Patient is medically clear pending SNF placement.    2/5 Mild tachycardia this morning on 3 L. Continues to have multiple bowel movements and nausea with some bilious vomiting. Discussed with surgery, rec follow up in 1 week for staple removal. Pending SNF placement.     I have discussed this patient's plan of care and discharge plan at IDT rounds today with Case Management, Nursing, Nursing leadership, and other members of the IDT team.    Consultants/Specialty  general surgery    Code Status  Full Code    Disposition  Patient is medically cleared for discharge.   Anticipate discharge to to skilled nursing facility.  I have placed the appropriate orders for post-discharge needs.    Review of Systems  Review of Systems   Constitutional:  Positive for malaise/fatigue. Negative for chills and fever.   Respiratory:  Negative for cough and shortness of breath.    Cardiovascular:  Negative for chest pain and leg swelling.   Gastrointestinal:  Positive for diarrhea, nausea and vomiting. Negative for abdominal pain, constipation and heartburn.   Musculoskeletal:  Negative for back pain and myalgias.   Neurological:  Negative for dizziness, focal weakness, weakness and headaches.   Psychiatric/Behavioral:  Positive for  depression. Negative for hallucinations. The patient is nervous/anxious.    All other systems reviewed and are negative.     Physical Exam  Temp:  [36.3 °C (97.4 °F)-37.6 °C (99.6 °F)] 36.8 °C (98.3 °F)  Pulse:  [] 98  Resp:  [17-18] 17  BP: (110-176)/(60-87) 110/60  SpO2:  [83 %-96 %] 90 %    Physical Exam  Constitutional:       General: She is not in acute distress.     Appearance: She is obese. She is ill-appearing.   HENT:      Head: Normocephalic and atraumatic.      Mouth/Throat:      Pharynx: Oropharynx is clear.   Eyes:      Conjunctiva/sclera: Conjunctivae normal.   Cardiovascular:      Rate and Rhythm: Regular rhythm. Tachycardia present.      Pulses: Normal pulses.   Pulmonary:      Effort: No respiratory distress.      Breath sounds: No wheezing.   Abdominal:      General: There is distension.      Palpations: Abdomen is soft.      Tenderness: There is no abdominal tenderness.   Musculoskeletal:         General: No swelling.      Cervical back: Neck supple. No muscular tenderness.   Skin:     General: Skin is warm and dry.      Findings: No rash.   Neurological:      General: No focal deficit present.      Mental Status: She is alert and oriented to person, place, and time.      Cranial Nerves: No cranial nerve deficit.      Motor: Weakness present.   Psychiatric:         Mood and Affect: Mood normal.         Thought Content: Thought content normal.       Fluids    Intake/Output Summary (Last 24 hours) at 2/5/2023 1131  Last data filed at 2/5/2023 1024  Gross per 24 hour   Intake 360 ml   Output 500 ml   Net -140 ml       Laboratory        Recent Labs     02/03/23  0100 02/04/23  0420   SODIUM 137 139   POTASSIUM 3.9 4.3   CHLORIDE 102 103   CO2 29 28   GLUCOSE 125* 122*   BUN 13 20   CREATININE 0.71 0.71   CALCIUM 9.4 9.2                     Imaging  IR-PICC LINE PLACEMENT W/ GUIDANCE > AGE 5   Final Result                  Ultrasound-guided PICC placement performed by qualified nursing staff as     above.          DX-SMALL BOWEL SERIES   Final Result      1.  Minimal progression of oral contrast in the bowel on 8 hours of imaging with dilution of contrast could be due to extreme dysmotility/ileus versus obstruction. Review of the previous CT scan shows no transition point of the small bowel dilatation    with apparent contrast in the cecum and gas seen extending through the transverse colon.      DX-ABDOMEN FOR TUBE PLACEMENT   Final Result      NG tube tip overlies the gastric body.      [id number] VIEW   Final Result      Persistent findings of small bowel obstruction      DX-ABDOMEN FOR TUBE PLACEMENT   Final Result         1.  Air-filled distended bowel loops in the upper abdomen, could represent ileus versus enterocolitis or evolving obstructive changes. Radiographic follow-up to resolution as clinically appropriate.   2.  Nasogastric tube tip terminates overlying the expected location of the gastric fundus.   3.  Left lower lobe atelectasis, infiltrate, and/or effusion.   4.  Cardiomegaly   5.  Atherosclerosis      CT-ABDOMEN-PELVIS W/O   Final Result      1.  Dilated loops of small intestine with air/blood levels consistent with small bowel obstruction.      2.  Fatty liver.      3.  Multiple left-sided renal stones measuring up to 2 cm in size.      4.  Prior hysterectomy.      5.  Sigmoid diverticulosis.             Assessment/Plan  * SBO (small bowel obstruction) (HCC)- (present on admission)  Assessment & Plan  Presented with severe abdominal pain and distention associated with vomiting.  She has not had a bowel obstruction and failed to improve with conservative management/NG tube  Small bowel follow-through on 1/28 revealed a high-grade obstruction therefore she was taken emergently to the operating room later that day  Postop day 7  -surgery cleared patient for dc   -tolerating diet   PT/OT- post acute    PM&R consulted- pt refused   Pending SNF placement     resolved    Acute respiratory  failure with hypoxia (HCC)  Assessment & Plan  At baseline she has obesity and sleep apnea her acute component was secondary to atelectasis secondary to abdominal distention  Remains on oxygen,4L  Encourage IS  Supplemental O2 as needed    Arthritis  Assessment & Plan  The patient has chronic arthritis and has been treated with prednisone 5 mg daily  Continue IV Solu-Medrol as IV substitute    Hypernatremia  Assessment & Plan  Change to d5 1/2 ns    resolved    Obesity (BMI 30-39.9)- (present on admission)  Assessment & Plan  Body mass index is 34.32 kg/m².  Outpatient weight loss management program highly recommended    Anxiety- (present on admission)  Assessment & Plan  Chronic anxiety and the patient at home was on Zoloft.    For now hold the Zoloft while npo    AZUL on CPAP- (present on admission)  Assessment & Plan  The patient at home uses CPAP unable to use with NGT  Oxygen at night  She is requiring 4L at this time due to atelectasis    Hyperlipidemia- (present on admission)  Assessment & Plan  Once the patient is able to resume oral intake low-fat low-cholesterol diet with Crestor and cholestyramine will be recommended.    HTN (hypertension)- (present on admission)  Assessment & Plan  For now we are holding Coreg and lisinopril.    Use as needed labetalol only.         VTE prophylaxis: SCDs/TEDs    I have performed a physical exam and reviewed and updated ROS and Plan today (2/5/2023). In review of yesterday's note (2/4/2023), there are no changes except as documented above.

## 2023-02-05 NOTE — PROGRESS NOTES
Surgery  POD#8  BM and gas  Wound ok  Distended, soft, incision CDI  A/p   Mobilize  Soft diet  DC home at primary teams discretion  Follow up in one week for staple removal with Dr. Valles  We will sign off at this time do not hesitate to reconsult with questions or concerns

## 2023-02-05 NOTE — PROGRESS NOTES
Received report from NOC RN.  Assumed patient care. Patient resting comfortably, no signs of distress. Hourly rounding in place.

## 2023-02-05 NOTE — CARE PLAN
The patient is Stable - Low risk of patient condition declining or worsening    Shift Goals  Clinical Goals: patient will ambulate 2 times by end of shift  Patient Goals: rest  Family Goals: n/a    Progress made toward(s) clinical / shift goals:    Patient up walking in halls with standby assistance and walker. Pts pain controlled  Problem: Pain - Standard  Goal: Alleviation of pain or a reduction in pain to the patient’s comfort goal  Outcome: Progressing     Problem: Bowel Elimination  Goal: Establish and maintain regular bowel function  Outcome: Progressing     Problem: Early Mobilization - Post Surgery  Goal: Early mobilization post surgery  Outcome: Progressing       Patient is not progressing towards the following goals:

## 2023-02-05 NOTE — THERAPY
"Physical Therapy   Daily Treatment     Patient Name: Clau Vences  Age:  77 y.o., Sex:  female  Medical Record #: [id number]  Today's Date: 2/5/2023          Assessment    Pt seen for PT treatment including toilet transfers, standing balance and gait with  FWW. Pt required encouragement to mobilize. Pt initially refused stating the \"doctor gave me the day off\". Pt tolerated 200 with max encouragement to continue walking,   Recommend continued inpatient as well as post acute PT.    Plan         DC Equipment Recommendations: Unable to determine at this time  Discharge Recommendations: Recommend post-acute placement for additional physical therapy services prior to discharge home           Objective       02/05/23 1108   Cognition    Speech/ Communication Delayed Responses   Level of Consciousness Alert   Comments very flat affect, questionable insight and problem solving.   Balance   Sitting Balance (Static) Good   Sitting Balance (Dynamic) Fair +   Standing Balance (Static) Fair +   Standing Balance (Dynamic) Fair   Weight Shift Sitting Fair   Weight Shift Standing Fair   Skilled Intervention Verbal Cuing;Tactile Cuing   Comments FWW   Bed Mobility    Comments up in chair pre and post session.,   Gait Analysis   Gait Level Of Assist Contact Guard Assist   Assistive Device Front Wheel Walker   Distance (Feet) 200   # of Times Distance was Traveled 1   Deviation Bradykinetic;Shuffled Gait;Decreased Heel Strike;Decreased Toe Off  (heavy reliance on FWW)   Comments cues for sequencing, cues for walker to body distance.   Functional Mobility   Sit to Stand Minimal Assist  (\"give me a push\")   Bed, Chair, Wheelchair Transfer Contact Guard Assist   Transfer Method Stand Step   Mobility FWW   Skilled Intervention Verbal Cuing;Tactile Cuing   Comments cues for sequencing and hand placement   Activity Tolerance   Sitting in Chair > 1hr   Standing 12   Comments toilet x 10 min for BM   Short Term Goals    Short Term Goal " # 1 Pt will be able to perform bed mobility and sup <> sit Danuta in 6 visits.   Goal Outcome # 1 Progressing slower than expected   Short Term Goal # 2 Pt will be able to perform sit <> stand and transfer with FWW Danuta in 6 visits.   Goal Outcome # 2 Progressing slower than expected   Short Term Goal # 3 Pt will be able to ambulate 150 ft with FWW Danuta in 6 visits.   Goal Outcome # 3 Progressing slower than expected

## 2023-02-06 ENCOUNTER — APPOINTMENT (OUTPATIENT)
Dept: RADIOLOGY | Facility: MEDICAL CENTER | Age: 78
DRG: 335 | End: 2023-02-06
Attending: INTERNAL MEDICINE
Payer: MEDICARE

## 2023-02-06 PROBLEM — A41.9 SEPSIS (HCC): Status: ACTIVE | Noted: 2023-02-06

## 2023-02-06 LAB
ALBUMIN SERPL BCP-MCNC: 2.5 G/DL (ref 3.2–4.9)
ALBUMIN/GLOB SERPL: 0.8 G/DL
ALP SERPL-CCNC: 69 U/L (ref 30–99)
ALT SERPL-CCNC: 11 U/L (ref 2–50)
ANION GAP SERPL CALC-SCNC: 12 MMOL/L (ref 7–16)
APPEARANCE UR: ABNORMAL
AST SERPL-CCNC: 15 U/L (ref 12–45)
BACTERIA #/AREA URNS HPF: ABNORMAL /HPF
BILIRUB SERPL-MCNC: 0.5 MG/DL (ref 0.1–1.5)
BILIRUB UR QL STRIP.AUTO: ABNORMAL
BUN SERPL-MCNC: 35 MG/DL (ref 8–22)
CALCIUM ALBUM COR SERPL-MCNC: 10.3 MG/DL (ref 8.5–10.5)
CALCIUM SERPL-MCNC: 9.1 MG/DL (ref 8.4–10.2)
CHLORIDE SERPL-SCNC: 100 MMOL/L (ref 96–112)
CO2 SERPL-SCNC: 21 MMOL/L (ref 20–33)
COLOR UR: YELLOW
CREAT SERPL-MCNC: 1.04 MG/DL (ref 0.5–1.4)
EPI CELLS #/AREA URNS HPF: ABNORMAL /HPF
ERYTHROCYTE [DISTWIDTH] IN BLOOD BY AUTOMATED COUNT: 45.5 FL (ref 35.9–50)
GFR SERPLBLD CREATININE-BSD FMLA CKD-EPI: 55 ML/MIN/1.73 M 2
GLOBULIN SER CALC-MCNC: 3 G/DL (ref 1.9–3.5)
GLUCOSE SERPL-MCNC: 103 MG/DL (ref 65–99)
GLUCOSE UR STRIP.AUTO-MCNC: NEGATIVE MG/DL
HCT VFR BLD AUTO: 36.3 % (ref 37–47)
HGB BLD-MCNC: 12 G/DL (ref 12–16)
HYALINE CASTS #/AREA URNS LPF: ABNORMAL /LPF
KETONES UR STRIP.AUTO-MCNC: NEGATIVE MG/DL
LACTATE SERPL-SCNC: 1 MMOL/L (ref 0.5–2)
LEUKOCYTE ESTERASE UR QL STRIP.AUTO: ABNORMAL
MAGNESIUM SERPL-MCNC: 1.5 MG/DL (ref 1.5–2.5)
MCH RBC QN AUTO: 33.1 PG (ref 27–33)
MCHC RBC AUTO-ENTMCNC: 33.1 G/DL (ref 33.6–35)
MCV RBC AUTO: 100.3 FL (ref 81.4–97.8)
MICRO URNS: ABNORMAL
NITRITE UR QL STRIP.AUTO: NEGATIVE
PH UR STRIP.AUTO: 5.5 [PH] (ref 5–8)
PHOSPHATE SERPL-MCNC: 3 MG/DL (ref 2.5–4.5)
PLATELET # BLD AUTO: 238 K/UL (ref 164–446)
PMV BLD AUTO: 11.2 FL (ref 9–12.9)
POTASSIUM SERPL-SCNC: 4.1 MMOL/L (ref 3.6–5.5)
PREALB SERPL-MCNC: 9.4 MG/DL (ref 18–38)
PROCALCITONIN SERPL-MCNC: 0.19 NG/ML
PROT SERPL-MCNC: 5.5 G/DL (ref 6–8.2)
PROT UR QL STRIP: NEGATIVE MG/DL
RBC # BLD AUTO: 3.62 M/UL (ref 4.2–5.4)
RBC # URNS HPF: ABNORMAL /HPF
RBC UR QL AUTO: ABNORMAL
SODIUM SERPL-SCNC: 133 MMOL/L (ref 135–145)
SP GR UR STRIP.AUTO: 1.02
WBC # BLD AUTO: 14.6 K/UL (ref 4.8–10.8)
WBC #/AREA URNS HPF: ABNORMAL /HPF

## 2023-02-06 PROCEDURE — 770001 HCHG ROOM/CARE - MED/SURG/GYN PRIV*

## 2023-02-06 PROCEDURE — 85027 COMPLETE CBC AUTOMATED: CPT

## 2023-02-06 PROCEDURE — 99233 SBSQ HOSP IP/OBS HIGH 50: CPT | Performed by: INTERNAL MEDICINE

## 2023-02-06 PROCEDURE — 81001 URINALYSIS AUTO W/SCOPE: CPT

## 2023-02-06 PROCEDURE — 700111 HCHG RX REV CODE 636 W/ 250 OVERRIDE (IP): Performed by: HOSPITALIST

## 2023-02-06 PROCEDURE — A9270 NON-COVERED ITEM OR SERVICE: HCPCS | Performed by: INTERNAL MEDICINE

## 2023-02-06 PROCEDURE — 94760 N-INVAS EAR/PLS OXIMETRY 1: CPT

## 2023-02-06 PROCEDURE — 84145 PROCALCITONIN (PCT): CPT

## 2023-02-06 PROCEDURE — 97116 GAIT TRAINING THERAPY: CPT

## 2023-02-06 PROCEDURE — 36415 COLL VENOUS BLD VENIPUNCTURE: CPT

## 2023-02-06 PROCEDURE — 84134 ASSAY OF PREALBUMIN: CPT

## 2023-02-06 PROCEDURE — 71045 X-RAY EXAM CHEST 1 VIEW: CPT

## 2023-02-06 PROCEDURE — 97530 THERAPEUTIC ACTIVITIES: CPT

## 2023-02-06 PROCEDURE — 83735 ASSAY OF MAGNESIUM: CPT

## 2023-02-06 PROCEDURE — 700105 HCHG RX REV CODE 258: Performed by: INTERNAL MEDICINE

## 2023-02-06 PROCEDURE — 700102 HCHG RX REV CODE 250 W/ 637 OVERRIDE(OP): Performed by: INTERNAL MEDICINE

## 2023-02-06 PROCEDURE — 84100 ASSAY OF PHOSPHORUS: CPT

## 2023-02-06 PROCEDURE — 700111 HCHG RX REV CODE 636 W/ 250 OVERRIDE (IP): Performed by: INTERNAL MEDICINE

## 2023-02-06 PROCEDURE — 83605 ASSAY OF LACTIC ACID: CPT

## 2023-02-06 PROCEDURE — 80053 COMPREHEN METABOLIC PANEL: CPT

## 2023-02-06 RX ORDER — MAGNESIUM SULFATE HEPTAHYDRATE 40 MG/ML
4 INJECTION, SOLUTION INTRAVENOUS ONCE
Status: COMPLETED | OUTPATIENT
Start: 2023-02-06 | End: 2023-02-06

## 2023-02-06 RX ADMIN — LORAZEPAM 1 MG: 2 INJECTION INTRAMUSCULAR; INTRAVENOUS at 00:34

## 2023-02-06 RX ADMIN — PREDNISONE 5 MG: 5 TABLET ORAL at 05:32

## 2023-02-06 RX ADMIN — AMPICILLIN AND SULBACTAM 3 G: 1; 2 INJECTION, POWDER, FOR SOLUTION INTRAMUSCULAR; INTRAVENOUS at 13:10

## 2023-02-06 RX ADMIN — MAGNESIUM SULFATE HEPTAHYDRATE 4 G: 40 INJECTION, SOLUTION INTRAVENOUS at 09:10

## 2023-02-06 RX ADMIN — LORAZEPAM 1 MG: 2 INJECTION INTRAMUSCULAR; INTRAVENOUS at 22:07

## 2023-02-06 RX ADMIN — AMPICILLIN AND SULBACTAM 3 G: 1; 2 INJECTION, POWDER, FOR SOLUTION INTRAMUSCULAR; INTRAVENOUS at 17:20

## 2023-02-06 RX ADMIN — MORPHINE SULFATE 1 MG: 4 INJECTION INTRAVENOUS at 01:52

## 2023-02-06 RX ADMIN — OMEPRAZOLE 20 MG: 20 CAPSULE, DELAYED RELEASE ORAL at 05:32

## 2023-02-06 ASSESSMENT — PAIN DESCRIPTION - PAIN TYPE
TYPE: ACUTE PAIN

## 2023-02-06 ASSESSMENT — COGNITIVE AND FUNCTIONAL STATUS - GENERAL
MOVING FROM LYING ON BACK TO SITTING ON SIDE OF FLAT BED: UNABLE
CLIMB 3 TO 5 STEPS WITH RAILING: A LITTLE
STANDING UP FROM CHAIR USING ARMS: A LITTLE
WALKING IN HOSPITAL ROOM: A LITTLE
MOVING TO AND FROM BED TO CHAIR: UNABLE
MOBILITY SCORE: 14
TURNING FROM BACK TO SIDE WHILE IN FLAT BAD: A LITTLE
SUGGESTED CMS G CODE MODIFIER MOBILITY: CL

## 2023-02-06 ASSESSMENT — ENCOUNTER SYMPTOMS
NAUSEA: 1
DIZZINESS: 0
HALLUCINATIONS: 0
WEAKNESS: 0
CHILLS: 0
DEPRESSION: 1
MYALGIAS: 0
HEADACHES: 0
ABDOMINAL PAIN: 0
DIARRHEA: 1
HEARTBURN: 0
COUGH: 0
FEVER: 0
VOMITING: 0
FOCAL WEAKNESS: 0
BACK PAIN: 0
CONSTIPATION: 0
NERVOUS/ANXIOUS: 1
SHORTNESS OF BREATH: 0

## 2023-02-06 ASSESSMENT — GAIT ASSESSMENTS
DEVIATION: BRADYKINETIC;SHUFFLED GAIT;DECREASED HEEL STRIKE;DECREASED TOE OFF
GAIT LEVEL OF ASSIST: CONTACT GUARD ASSIST
DISTANCE (FEET): 200
ASSISTIVE DEVICE: FRONT WHEEL WALKER

## 2023-02-06 ASSESSMENT — FIBROSIS 4 INDEX: FIB4 SCORE: 1.46

## 2023-02-06 NOTE — ASSESSMENT & PLAN NOTE
This is Sepsis Not present on admission  SIRS criteria identified on my evaluation include: Tachycardia, with heart rate greater than 90 BPM and Leukocytosis, with WBC greater than 12,000  Source is suspect aspiration pneumonia   Sepsis protocol initiated  IV antibiotics as appropriate for source of sepsis  Reassessment: I have reassessed the patient's hemodynamic status    Start unasyn     2/7: It does not seem patient meets sepsis criteria today.

## 2023-02-06 NOTE — PROGRESS NOTES
Received report from day shift nurse. Assumed pt care at 1915. Pt is A&Ox4, resting comfortably in bed. Pt on  3 LPM O2 via NC. No signs of SOB/respiratory distress. Labs noted, VSS. Fall precautions in place. Bed at lowest position. Call light and personal belongings within reach. Continue to monitor.

## 2023-02-06 NOTE — CARE PLAN
The patient is Stable - Low risk of patient condition declining or worsening    Shift Goals  Clinical Goals: pain will be free from fall tonight  Patient Goals: Sleep comfortably  Family Goals: n/a    Progress made toward(s) clinical / shift goals:  Fall precautions in place, assisted patient on ambulating going to the bathroom, hourly rounding done, call light within reach.    Patient is not progressing towards the following goals: n/a

## 2023-02-06 NOTE — DISCHARGE PLANNING
Case Management Discharge Planning    Admission Date: 1/24/2023  GMLOS: 7.7  ALOS: 13    6-Clicks ADL Score: 18  6-Clicks Mobility Score: 14  PT and/or OT Eval ordered: Yes  Post-acute Referrals Ordered: Yes  Post-acute Choice Obtained: Yes  Has referral(s) been sent to post-acute provider:  Yes      Anticipated Discharge Dispo: Discharge Disposition: D/T to SNF with Medicare cert in anticipation of skilled care (03)    DME Needed: No    Action(s) Taken: Updated Provider/Nurse on Discharge Plan    0905: RN [facility] called [facility], no answer, VM left requesting call back.     Escalations Completed: Pending Discharge Destination    Medically Clear: No    Next Steps: Medical clearance, SNF acceptance & bed availability     Barriers to Discharge: Medical clearance and Pending Placement

## 2023-02-06 NOTE — PROGRESS NOTES
Received patient from  DILIP Muhammad. Patient is currently up to chair.Patient is awake and alert.On 1 liter via NC.Midline incision noted to abdomen, with staples ,ENID and small . Fall precaution in place, kept bed in lowest position and call light within reach.     Made patient aware, needs urine sample for UA as ordered.

## 2023-02-06 NOTE — DIETARY
Nutrition Services Brief Update:    Day 13 of admit.  Clau Vences is a 77 y.o. female with admitting DX of SBO (small bowel obstruction) (HCC) [K56.691]    Current Diet: low fiber (GI soft) with Boost Plus TID. Pt's PO intake overall has been poor x 72 hours at < 25%; however, PO at breakfast this morning was better at % and pt drank 50-75% of 1 Boost supplement.     Of note, pt has new pleural effusion on the right and concern for aspiration PNA due to vomiting. Plan is for oral abx. She is cleared for D/C to SNF.     Problem: Nutritional:  Goal: Achieve adequate nutritional intake  Description: Patient will consume >50% of meals  Outcome: progressing     RD will follow.

## 2023-02-06 NOTE — PROGRESS NOTES
[facility] Medicine Daily Progress Note    Date of Service  2/6/2023    Chief Complaint  Clau Vences is a 77 y.o. female admitted 1/24/2023 with abdominal pain and constipation    Hospital Course  History of hysterectomy and appendectomy 4 years ago, sleep apnea on CPAP, arthritis on chronic prednisone therapy, hypertension, hyperlipidemia and anxiety, who was admitted for abdominal pain and distention associated with 4 days of constipation.  She was found to have a small bowel obstruction.  General surgery was consulted and recommended conservative management NG tube placement. Unfortunately patient did not improve and SBFT showed high grade obstruction. Surgery took patient to the OR for laparotomy and lysis of adhesions on 1/28.    Interval Problem Update  2/2 Surgery yesterday recommended PPN, discussed with pharmacy can only do TPN. PICC placed yesterday and TPN to start tonight. PT/OT recommending post acute placement. Mg 1.4, IV replacement ordered. Patient still not having a BM. Per CM refusing all placement.    2/3 Patient with a bowel movement x2 this morning, reports she is feeling much better, still distended. Discussed with surgery ok to place on diet, changed to GI soft. PM&R believes patient is a good candidate for rehab, if she continues to tolerate and improve possibly can dc tomorrow.      2/4 No acute events overnight. Patient continues to have Bms. She refused rehab but is agreeable to SNF.  Patient had some vomiting overnight, denies any nausea today.  She thinks that just because she was drinking her water too quickly.  Patient is still distended and having multiple bowel movements but has improved.  Surgery has cleared patient for discharge with outpatient follow-up in a week.  Patient is medically clear pending SNF placement.    2/5 Mild tachycardia this morning on 3 L. Continues to have multiple bowel movements and nausea with some bilious vomiting. Discussed with surgery, rec follow  up in 1 week for staple removal. Pending SNF placement.     2/6 This morning patient tachycardic with WBC 14.6. Platelet count normalized. CXR interpreted by me shows new consolidation. Concern for aspiration pneumonia given recent vomiting. Start unasyn, if WBC improves tomorrow can likely dc to SNF on orals.     I have discussed this patient's plan of care and discharge plan at IDT rounds today with Case Management, Nursing, Nursing leadership, and other members of the IDT team.    Consultants/Specialty  general surgery    Code Status  Full Code    Disposition  Patient is medically cleared for discharge.   Anticipate discharge to to skilled nursing facility.  I have placed the appropriate orders for post-discharge needs.    Review of Systems  Review of Systems   Constitutional:  Positive for malaise/fatigue. Negative for chills and fever.   Respiratory:  Negative for cough and shortness of breath.    Cardiovascular:  Negative for chest pain and leg swelling.   Gastrointestinal:  Positive for diarrhea and nausea. Negative for abdominal pain, constipation, heartburn and vomiting.   Genitourinary:  Negative for dysuria and frequency.   Musculoskeletal:  Negative for back pain and myalgias.   Neurological:  Negative for dizziness, focal weakness, weakness and headaches.   Psychiatric/Behavioral:  Positive for depression. Negative for hallucinations. The patient is nervous/anxious.    All other systems reviewed and are negative.     Physical Exam  Temp:  [36.6 °C (97.8 °F)-36.8 °C (98.3 °F)] 36.6 °C (97.8 °F)  Pulse:  [] 105  Resp:  [17-18] 18  BP: (128-161)/(68-89) 148/75  SpO2:  [90 %-91 %] 90 %    Physical Exam  Constitutional:       General: She is not in acute distress.     Appearance: She is obese. She is ill-appearing.   HENT:      Head: Normocephalic and atraumatic.      Mouth/Throat:      Pharynx: Oropharynx is clear.   Eyes:      Conjunctiva/sclera: Conjunctivae normal.   Cardiovascular:      Rate and  Rhythm: Regular rhythm. Tachycardia present.      Pulses: Normal pulses.   Pulmonary:      Effort: No respiratory distress.      Breath sounds: No wheezing.   Abdominal:      General: There is distension.      Palpations: Abdomen is soft.      Tenderness: There is no abdominal tenderness.   Musculoskeletal:         General: No swelling.      Cervical back: Neck supple. No muscular tenderness.   Skin:     General: Skin is warm and dry.      Findings: No rash.   Neurological:      General: No focal deficit present.      Mental Status: She is alert and oriented to person, place, and time.      Cranial Nerves: No cranial nerve deficit.      Motor: Weakness present.   Psychiatric:         Mood and Affect: Mood normal.         Thought Content: Thought content normal.       Fluids    Intake/Output Summary (Last 24 hours) at 2/6/2023 1605  Last data filed at 2/6/2023 1344  Gross per 24 hour   Intake 1080 ml   Output --   Net 1080 ml       Laboratory  Recent Labs     02/06/23  0305   WBC 14.6*   RBC 3.62*   HEMOGLOBIN 12.0   HEMATOCRIT 36.3*   .3*   MCH 33.1*   MCHC 33.1*   RDW 45.5   PLATELETCT 238   MPV 11.2       Recent Labs     02/04/23  0420 02/06/23  0305   SODIUM 139 133*   POTASSIUM 4.3 4.1   CHLORIDE 103 100   CO2 28 21   GLUCOSE 122* 103*   BUN 20 35*   CREATININE 0.71 1.04   CALCIUM 9.2 9.1                     Imaging  DX-CHEST-PORTABLE (1 VIEW)   Final Result      Decreasing lung volumes with new consolidation off the cardiac apex worrisome for pneumonia      Bronchial thickening centrally could be from aspiration or infection      IR-PICC LINE PLACEMENT W/ GUIDANCE > AGE 5   Final Result                  Ultrasound-guided PICC placement performed by qualified nursing staff as    above.          DX-SMALL BOWEL SERIES   Final Result      1.  Minimal progression of oral contrast in the bowel on 8 hours of imaging with dilution of contrast could be due to extreme dysmotility/ileus versus obstruction. Review  of the previous CT scan shows no transition point of the small bowel dilatation    with apparent contrast in the cecum and gas seen extending through the transverse colon.      DX-ABDOMEN FOR TUBE PLACEMENT   Final Result      NG tube tip overlies the gastric body.      [id number] VIEW   Final Result      Persistent findings of small bowel obstruction      DX-ABDOMEN FOR TUBE PLACEMENT   Final Result         1.  Air-filled distended bowel loops in the upper abdomen, could represent ileus versus enterocolitis or evolving obstructive changes. Radiographic follow-up to resolution as clinically appropriate.   2.  Nasogastric tube tip terminates overlying the expected location of the gastric fundus.   3.  Left lower lobe atelectasis, infiltrate, and/or effusion.   4.  Cardiomegaly   5.  Atherosclerosis      CT-ABDOMEN-PELVIS W/O   Final Result      1.  Dilated loops of small intestine with air/blood levels consistent with small bowel obstruction.      2.  Fatty liver.      3.  Multiple left-sided renal stones measuring up to 2 cm in size.      4.  Prior hysterectomy.      5.  Sigmoid diverticulosis.             Assessment/Plan  * SBO (small bowel obstruction) (HCC)- (present on admission)  Assessment & Plan  Presented with severe abdominal pain and distention associated with vomiting.  She has not had a bowel obstruction and failed to improve with conservative management/NG tube  Small bowel follow-through on 1/28 revealed a high-grade obstruction therefore she was taken emergently to the operating room later that day  Postop day 7  -surgery cleared patient for dc   -tolerating diet   PT/OT- post acute    PM&R consulted- pt refused   Pending SNF placement     resolved    Sepsis (HCC)  Assessment & Plan  This is Sepsis Not present on admission  SIRS criteria identified on my evaluation include: Tachycardia, with heart rate greater than 90 BPM and Leukocytosis, with WBC greater than 12,000  Source is suspect aspiration  pneumonia   Sepsis protocol initiated  IV antibiotics as appropriate for source of sepsis  Reassessment: I have reassessed the patient's hemodynamic status    Start unasyn       Acute respiratory failure with hypoxia (HCC)  Assessment & Plan  At baseline she has obesity and sleep apnea her acute component was secondary to atelectasis secondary to abdominal distention  Remains on oxygen,4L  Encourage IS  Supplemental O2 as needed    Arthritis  Assessment & Plan  The patient has chronic arthritis and has been treated with prednisone 5 mg daily  Continue IV Solu-Medrol as IV substitute    Hypernatremia  Assessment & Plan  Change to d5 1/2 ns    resolved    Obesity (BMI 30-39.9)- (present on admission)  Assessment & Plan  Body mass index is 34.32 kg/m².  Outpatient weight loss management program highly recommended    Anxiety- (present on admission)  Assessment & Plan  Chronic anxiety and the patient at home was on Zoloft.    For now hold the Zoloft while npo    AZUL on CPAP- (present on admission)  Assessment & Plan  The patient at home uses CPAP unable to use with NGT  Oxygen at night  She is requiring 4L at this time due to atelectasis    Hyperlipidemia- (present on admission)  Assessment & Plan  Once the patient is able to resume oral intake low-fat low-cholesterol diet with Crestor and cholestyramine will be recommended.    HTN (hypertension)- (present on admission)  Assessment & Plan  For now we are holding Coreg and lisinopril.    Use as needed labetalol only.         VTE prophylaxis: SCDs/TEDs    I have performed a physical exam and reviewed and updated ROS and Plan today (2/6/2023). In review of yesterday's note (2/5/2023), there are no changes except as documented above.

## 2023-02-06 NOTE — THERAPY
Physical Therapy   Daily Treatment     Patient Name: Clau Vences  Age:  77 y.o., Sex:  female  Medical Record #: [id number]  Today's Date: 2/6/2023     Precautions  Precautions: (P) Fall Risk    Assessment    Pt is progressing with therapy, however, slowly. Pt was able to tolerate standing therapeutic exercises well, however, requires frequent encouragement and motivation due to poor effort at times. Pt was able to stand at sink for hand hygiene and maintain standing balance throughout. Pt was agreeable to ambulate in hallway, however, requires frequent encouraged to continue ambulation. Pt agreeable to sit up in chair and provided with VC and sequencing for appropriate transfers and reduce risk of falling. Recommend post-acute placement for continued physical therapy services prior to discharge home.         Plan    Physical Therapy Treatment Plan  Physical Therapy Treatment Plan: (P) Continue Current Treatment Plan    DC Equipment Recommendations: (P) Unable to determine at this time  Discharge Recommendations: (P) Recommend post-acute placement for additional physical therapy services prior to discharge home    Objective       02/06/23 0823   Precautions   Precautions Fall Risk   Vitals   O2 (LPM) 1   O2 Delivery Device Silicone Nasal Cannula   Pain 0 - 10 Group   Location Back   Location Orientation Mid   Description Aching   Therapist Pain Assessment Prior to Activity;During Activity;Post Activity;Nurse Notified;3   Cognition    Cognition / Consciousness X   Speech/ Communication Delayed Responses   Level of Consciousness Alert   Comments poor insight into current deficits; [place] and delayed at times   Standing Lower Body Exercises   Standing Lower Body Exercises Yes   Marching 1 set of 10   Heel Rise 1 set of 10   Toe Rise 1 set of 10   Balance   Sitting Balance (Static) Good   Sitting Balance (Dynamic) Fair +   Standing Balance (Static) Fair +   Standing Balance (Dynamic) Fair   Weight Shift Sitting Fair    Weight Shift Standing Fair   Skilled Intervention Verbal Cuing;Tactile Cuing   Comments w/fww   Bed Mobility    Supine to Sit Minimal Assist   Scooting Contact Guard Assist   Skilled Intervention Verbal Cuing;Sequencing   Comments VC and sequencing required for appropriate mechanics during bed mobility, pt demonstrates with poor effort at times   Gait Analysis   Gait Level Of Assist Contact Guard Assist   Assistive Device Front Wheel Walker   Distance (Feet) 200   # of Times Distance was Traveled 1   Deviation Bradykinetic;Shuffled Gait;Decreased Heel Strike;Decreased Toe Off   Weight Bearing Status fwb   Skilled Intervention Verbal Cuing;Postural Facilitation   Comments Pt provided with VC for increasing heel strike, stride length, and foot clearence. Pt provided with postural faciliation for upright posture during ambulation as she tends to lean heavily on FWW   Functional Mobility   Sit to Stand Minimal Assist   Bed, Chair, Wheelchair Transfer Contact Guard Assist   Transfer Method Stand Step   Mobility FWW   Skilled Intervention Verbal Cuing;Sequencing   Comments provided with VC and sequencing prior to standing for appropriate mechanics. Required VC for hand placement and FWW placement prior to transfer to chair   How much difficulty does the patient currently have...   Turning over in bed (including adjusting bedclothes, sheets and blankets)? 3   Sitting down on and standing up from a chair with arms (e.g., wheelchair, bedside commode, etc.) 1   Moving from lying on back to sitting on the side of the bed? 1   How much help from another person does the patient currently need...   Moving to and from a bed to a chair (including a wheelchair)? 3   Need to walk in a hospital room? 3   Climbing 3-5 steps with a railing? 3   6 clicks Mobility Score 14   Activity Tolerance   Sitting in Chair functional   Sitting Edge of Bed 5 mins   Standing 10 mins   Comments limited due to fatigue and pain   Short Term Goals     Short Term Goal # 1 Pt will be able to perform bed mobility and sup <> sit [place] in 6 visits.   Goal Outcome # 1 Progressing slower than expected   Short Term Goal # 2 Pt will be able to perform sit <> stand and transfer with FWW [place] in 6 visits.   Goal Outcome # 2 Progressing slower than expected   Short Term Goal # 3 Pt will be able to ambulate 150 ft with FWW [place] in 6 visits.   Goal Outcome # 3 Progressing slower than expected   Education Group   Role of Physical Therapist Patient Response Patient;Acceptance;Explanation;Reinforcement Needed   Physical Therapy Treatment Plan   Physical Therapy Treatment Plan Continue Current Treatment Plan   Anticipated Discharge Equipment and Recommendations   DC Equipment Recommendations Unable to determine at this time   Discharge Recommendations Recommend post-acute placement for additional physical therapy services prior to discharge home   Interdisciplinary Plan of Care Collaboration   IDT Collaboration with  Nursing   Patient Position at End of Therapy Seated;Call Light within Reach;Tray Table within Reach;Phone within Reach   Collaboration Comments aware of visit and recs   Session Information   Date / Session Number  2/6-4 (4/5, 2/7)

## 2023-02-07 PROBLEM — D64.9 ANEMIA: Status: ACTIVE | Noted: 2023-02-07

## 2023-02-07 PROBLEM — J69.0 ASPIRATION PNEUMONIA (HCC): Status: ACTIVE | Noted: 2023-02-07

## 2023-02-07 LAB
ERYTHROCYTE [DISTWIDTH] IN BLOOD BY AUTOMATED COUNT: 44.7 FL (ref 35.9–50)
HCT VFR BLD AUTO: 30.9 % (ref 37–47)
HCT VFR BLD AUTO: 35.3 % (ref 37–47)
HCT VFR BLD AUTO: 36.8 % (ref 37–47)
HGB BLD-MCNC: 10.1 G/DL (ref 12–16)
HGB BLD-MCNC: 11.7 G/DL (ref 12–16)
HGB BLD-MCNC: 11.9 G/DL (ref 12–16)
MCH RBC QN AUTO: 32.7 PG (ref 27–33)
MCHC RBC AUTO-ENTMCNC: 32.7 G/DL (ref 33.6–35)
MCV RBC AUTO: 100 FL (ref 81.4–97.8)
PLATELET # BLD AUTO: 251 K/UL (ref 164–446)
PMV BLD AUTO: 11.1 FL (ref 9–12.9)
RBC # BLD AUTO: 3.09 M/UL (ref 4.2–5.4)
WBC # BLD AUTO: 10.7 K/UL (ref 4.8–10.8)

## 2023-02-07 PROCEDURE — 36415 COLL VENOUS BLD VENIPUNCTURE: CPT

## 2023-02-07 PROCEDURE — 94760 N-INVAS EAR/PLS OXIMETRY 1: CPT

## 2023-02-07 PROCEDURE — 87040 BLOOD CULTURE FOR BACTERIA: CPT | Mod: 91

## 2023-02-07 PROCEDURE — 700111 HCHG RX REV CODE 636 W/ 250 OVERRIDE (IP): Performed by: HOSPITALIST

## 2023-02-07 PROCEDURE — 770001 HCHG ROOM/CARE - MED/SURG/GYN PRIV*

## 2023-02-07 PROCEDURE — 99233 SBSQ HOSP IP/OBS HIGH 50: CPT | Performed by: INTERNAL MEDICINE

## 2023-02-07 PROCEDURE — 85018 HEMOGLOBIN: CPT

## 2023-02-07 PROCEDURE — A9270 NON-COVERED ITEM OR SERVICE: HCPCS | Performed by: INTERNAL MEDICINE

## 2023-02-07 PROCEDURE — 85014 HEMATOCRIT: CPT

## 2023-02-07 PROCEDURE — 700111 HCHG RX REV CODE 636 W/ 250 OVERRIDE (IP): Performed by: INTERNAL MEDICINE

## 2023-02-07 PROCEDURE — 700105 HCHG RX REV CODE 258: Performed by: INTERNAL MEDICINE

## 2023-02-07 PROCEDURE — 85027 COMPLETE CBC AUTOMATED: CPT

## 2023-02-07 PROCEDURE — 700102 HCHG RX REV CODE 250 W/ 637 OVERRIDE(OP): Performed by: INTERNAL MEDICINE

## 2023-02-07 RX ORDER — ACETAMINOPHEN 500 MG
500 TABLET ORAL EVERY 6 HOURS PRN
Status: DISCONTINUED | OUTPATIENT
Start: 2023-02-07 | End: 2023-02-09 | Stop reason: HOSPADM

## 2023-02-07 RX ORDER — AMOXICILLIN AND CLAVULANATE POTASSIUM 875; 125 MG/1; MG/1
1 TABLET, FILM COATED ORAL EVERY 12 HOURS
Status: DISCONTINUED | OUTPATIENT
Start: 2023-02-07 | End: 2023-02-09 | Stop reason: HOSPADM

## 2023-02-07 RX ADMIN — AMOXICILLIN AND CLAVULANATE POTASSIUM 1 TABLET: 875; 125 TABLET, FILM COATED ORAL at 17:18

## 2023-02-07 RX ADMIN — AMOXICILLIN AND CLAVULANATE POTASSIUM 1 TABLET: 875; 125 TABLET, FILM COATED ORAL at 11:25

## 2023-02-07 RX ADMIN — AMPICILLIN AND SULBACTAM 3 G: 1; 2 INJECTION, POWDER, FOR SOLUTION INTRAMUSCULAR; INTRAVENOUS at 06:13

## 2023-02-07 RX ADMIN — PREDNISONE 5 MG: 5 TABLET ORAL at 06:14

## 2023-02-07 RX ADMIN — AMPICILLIN AND SULBACTAM 3 G: 1; 2 INJECTION, POWDER, FOR SOLUTION INTRAMUSCULAR; INTRAVENOUS at 01:49

## 2023-02-07 RX ADMIN — OMEPRAZOLE 20 MG: 20 CAPSULE, DELAYED RELEASE ORAL at 06:14

## 2023-02-07 RX ADMIN — LORAZEPAM 1 MG: 2 INJECTION INTRAMUSCULAR; INTRAVENOUS at 20:45

## 2023-02-07 ASSESSMENT — PATIENT HEALTH QUESTIONNAIRE - PHQ9
SUM OF ALL RESPONSES TO PHQ9 QUESTIONS 1 AND 2: 0
2. FEELING DOWN, DEPRESSED, IRRITABLE, OR HOPELESS: NOT AT ALL
2. FEELING DOWN, DEPRESSED, IRRITABLE, OR HOPELESS: NOT AT ALL
SUM OF ALL RESPONSES TO PHQ9 QUESTIONS 1 AND 2: 0
1. LITTLE INTEREST OR PLEASURE IN DOING THINGS: NOT AT ALL
1. LITTLE INTEREST OR PLEASURE IN DOING THINGS: NOT AT ALL

## 2023-02-07 ASSESSMENT — ENCOUNTER SYMPTOMS
VOMITING: 0
FEVER: 0
HEARTBURN: 0
WEAKNESS: 0
CHILLS: 0
DEPRESSION: 1
NERVOUS/ANXIOUS: 1
SHORTNESS OF BREATH: 0
DIARRHEA: 1
BACK PAIN: 0
HALLUCINATIONS: 0
NAUSEA: 1
DIZZINESS: 0
HEADACHES: 0
CONSTIPATION: 0
FOCAL WEAKNESS: 0
COUGH: 0
MYALGIAS: 0
ABDOMINAL PAIN: 0

## 2023-02-07 ASSESSMENT — PAIN DESCRIPTION - PAIN TYPE
TYPE: ACUTE PAIN
TYPE: ACUTE PAIN

## 2023-02-07 NOTE — DOCUMENTATION QUERY
"                                                                         [facility]                                                                       Query Response Note      PATIENT:               JACQUE SHEN  ACCT #:                  [id number]  MRN:                     [id number]  :                      1945  ADMIT DATE:       2023 6:10 PM  DISCH DATE:          RESPONDING  PROVIDER #:        [id number]           QUERY TEXT:    Patient has documented aspiration pneumonia noted as \"suspect\".  Please clarify status of this condition:  NOTE:  If an appropriate response is not listed below, please respond with a new note.    The patient's Clinical Indicators include:  76yo with dx of SBO with adhesions, acute respiratory failure with hypoxia, hyponatremia     CXR \"Decreasing lung volumes with new consolidation off the cardiac apex worrisome for pneumonia.   Bronchial thickening centrally could be from aspiration or infection\"     Eren PN \"Source is suspect aspiration pneumonia\"    Risk factors: advanced age, vomiting, sepsis, acute respiratory failure with hypoxia, obesity, sleep apnea, atelectasis, abdominal distention  Treatments: imaging, labwork, antibiotics, supplemental oxygen, monitoring    Contact me with questions.     Thank you,  KATY Rutherford, CDI  [email or web address]  Options provided:   -- Aspiration pneumonia is ruled in   -- Aspiration pneumonia is ruled out   -- Other explanation:Other explanation-, please specify   -- Unable to determine      Query created by: Elise Redman on 2023 9:40 AM    RESPONSE TEXT:    Aspiration pneumonia is ruled in          Electronically signed by:  ELOY BRUNO MD 2023 11:25 AM              "

## 2023-02-07 NOTE — PROGRESS NOTES
[facility] Medicine Daily Progress Note    Date of Service  2/7/2023    Chief Complaint  Clau Vences is a 77 y.o. female admitted 1/24/2023 with abdominal pain and constipation    Hospital Course  History of hysterectomy and appendectomy 4 years ago, sleep apnea on CPAP, arthritis on chronic prednisone therapy, hypertension, hyperlipidemia and anxiety, who was admitted for abdominal pain and distention associated with 4 days of constipation.  She was found to have a small bowel obstruction.  General surgery was consulted and recommended conservative management NG tube placement. Unfortunately patient did not improve and SBFT showed high grade obstruction. Surgery took patient to the OR for laparotomy and lysis of adhesions on 1/28.    Interval Problem Update  2/2 Surgery yesterday recommended PPN, discussed with pharmacy can only do TPN. PICC placed yesterday and TPN to start tonight. PT/OT recommending post acute placement. Mg 1.4, IV replacement ordered. Patient still not having a BM. Per CM refusing all placement.    2/3 Patient with a bowel movement x2 this morning, reports she is feeling much better, still distended. Discussed with surgery ok to place on diet, changed to GI soft. PM&R believes patient is a good candidate for rehab, if she continues to tolerate and improve possibly can dc tomorrow.      2/4 No acute events overnight. Patient continues to have Bms. She refused rehab but is agreeable to SNF.  Patient had some vomiting overnight, denies any nausea today.  She thinks that just because she was drinking her water too quickly.  Patient is still distended and having multiple bowel movements but has improved.  Surgery has cleared patient for discharge with outpatient follow-up in a week.  Patient is medically clear pending SNF placement.    2/5 Mild tachycardia this morning on 3 L. Continues to have multiple bowel movements and nausea with some bilious vomiting. Discussed with surgery, rec follow  up in 1 week for staple removal. Pending SNF placement.     2/6 This morning patient tachycardic with WBC 14.6. Platelet count normalized. CXR interpreted by me shows new consolidation. Concern for aspiration pneumonia given recent vomiting. Start unasyn, if WBC improves tomorrow can likely dc to SNF on orals.     PATIENT SEEN BY PREVIOUS HOSPITALIST UNTIL 2/6 2/7: Patient seen at bedside in morning.  Overall patient feels better.  Patient had a hemoglobin drop from 12-10.1.  No signs of bleeding.  We will repeat hemoglobin later today and trended.  Continue to monitor closely.  We will continue antibiotics, follow cultures.    I have discussed this patient's plan of care and discharge plan at IDT rounds today with Case Management, Nursing, Nursing leadership, and other members of the IDT team.    Consultants/Specialty  general surgery    Code Status  Full Code    Disposition  Patient is medically cleared for discharge.   Anticipate discharge to to skilled nursing facility.  I have placed the appropriate orders for post-discharge needs.    Review of Systems  Review of Systems   Constitutional:  Positive for malaise/fatigue. Negative for chills and fever.   Respiratory:  Negative for cough and shortness of breath.    Cardiovascular:  Negative for chest pain and leg swelling.   Gastrointestinal:  Positive for diarrhea and nausea. Negative for abdominal pain, constipation, heartburn and vomiting.   Genitourinary:  Negative for dysuria and frequency.   Musculoskeletal:  Negative for back pain and myalgias.   Neurological:  Negative for dizziness, focal weakness, weakness and headaches.   Psychiatric/Behavioral:  Positive for depression. Negative for hallucinations. The patient is nervous/anxious.    All other systems reviewed and are negative.     Physical Exam  Temp:  [36.4 °C (97.5 °F)-36.7 °C (98 °F)] 36.7 °C (98 °F)  Pulse:  [] 85  Resp:  [17-18] 17  BP: (123-155)/(56-63) 123/56  SpO2:  [91 %-95 %] 92  %    Physical Exam  Constitutional:       General: She is not in acute distress.     Appearance: She is obese. She is ill-appearing.   HENT:      Head: Normocephalic and atraumatic.      Mouth/Throat:      Pharynx: Oropharynx is clear.   Eyes:      Conjunctiva/sclera: Conjunctivae normal.   Cardiovascular:      Rate and Rhythm: Regular rhythm. Tachycardia present.      Pulses: Normal pulses.   Pulmonary:      Effort: No respiratory distress.      Breath sounds: No wheezing.   Abdominal:      General: There is distension.      Palpations: Abdomen is soft.      Tenderness: There is no abdominal tenderness.   Musculoskeletal:         General: No swelling.      Cervical back: Neck supple. No muscular tenderness.   Skin:     General: Skin is warm and dry.      Findings: No rash.   Neurological:      General: No focal deficit present.      Mental Status: She is alert and oriented to person, place, and time.      Cranial Nerves: No cranial nerve deficit.      Motor: Weakness present.   Psychiatric:         Mood and Affect: Mood normal.         Thought Content: Thought content normal.       Fluids    Intake/Output Summary (Last 24 hours) at 2/7/2023 1127  Last data filed at 2/7/2023 0900  Gross per 24 hour   Intake 1291.08 ml   Output 0 ml   Net 1291.08 ml         Laboratory  Recent Labs     02/06/23  0305 02/07/23  0535   WBC 14.6* 10.7   RBC 3.62* 3.09*   HEMOGLOBIN 12.0 10.1*   HEMATOCRIT 36.3* 30.9*   .3* 100.0*   MCH 33.1* 32.7   MCHC 33.1* 32.7*   RDW 45.5 44.7   PLATELETCT 238 251   MPV 11.2 11.1         Recent Labs     02/06/23  0305   SODIUM 133*   POTASSIUM 4.1   CHLORIDE 100   CO2 21   GLUCOSE 103*   BUN 35*   CREATININE 1.04   CALCIUM 9.1                       Imaging  DX-CHEST-PORTABLE (1 VIEW)   Final Result      Decreasing lung volumes with new consolidation off the cardiac apex worrisome for pneumonia      Bronchial thickening centrally could be from aspiration or infection      IR-PICC LINE PLACEMENT  W/ GUIDANCE > AGE 5   Final Result                  Ultrasound-guided PICC placement performed by qualified nursing staff as    above.          DX-SMALL BOWEL SERIES   Final Result      1.  Minimal progression of oral contrast in the bowel on 8 hours of imaging with dilution of contrast could be due to extreme dysmotility/ileus versus obstruction. Review of the previous CT scan shows no transition point of the small bowel dilatation    with apparent contrast in the cecum and gas seen extending through the transverse colon.      DX-ABDOMEN FOR TUBE PLACEMENT   Final Result      NG tube tip overlies the gastric body.      [id number] VIEW   Final Result      Persistent findings of small bowel obstruction      DX-ABDOMEN FOR TUBE PLACEMENT   Final Result         1.  Air-filled distended bowel loops in the upper abdomen, could represent ileus versus enterocolitis or evolving obstructive changes. Radiographic follow-up to resolution as clinically appropriate.   2.  Nasogastric tube tip terminates overlying the expected location of the gastric fundus.   3.  Left lower lobe atelectasis, infiltrate, and/or effusion.   4.  Cardiomegaly   5.  Atherosclerosis      CT-ABDOMEN-PELVIS W/O   Final Result      1.  Dilated loops of small intestine with air/blood levels consistent with small bowel obstruction.      2.  Fatty liver.      3.  Multiple left-sided renal stones measuring up to 2 cm in size.      4.  Prior hysterectomy.      5.  Sigmoid diverticulosis.             Assessment/Plan  * SBO (small bowel obstruction) (HCC)- (present on admission)  Assessment & Plan  Presented with severe abdominal pain and distention associated with vomiting.  She has not had a bowel obstruction and failed to improve with conservative management/NG tube  Small bowel follow-through on 1/28 revealed a high-grade obstruction therefore she was taken emergently to the operating room later that day  Postop day 7  -surgery cleared patient for dc    -tolerating diet   PT/OT- post acute    PM&R consulted- pt refused   Pending SNF placement     resolved    Anemia  Assessment & Plan  No signs of bleeding, however patient's Hb dropped from 12 to 10.1  We will repeat Hb later today and trend  Monitor closely    Aspiration pneumonia (HCC)  Assessment & Plan  Continue antibiotics  Monitor cultures    Acute respiratory failure with hypoxia (HCC)  Assessment & Plan  At baseline she has obesity and sleep apnea her acute component was secondary to atelectasis secondary to abdominal distention  Remains on oxygen,4L  Encourage IS  Supplemental O2 as needed    2/7: Patient currently on 1L. Concern for aspiration pneumonia, continue antibiotics.    AZUL on CPAP- (present on admission)  Assessment & Plan  The patient at home uses CPAP unable to use with NGT  Oxygen at night  She is requiring 4L at this time due to atelectasis    2/7: We will add nocturnal CPAP    Sepsis (HCC)  Assessment & Plan  This is Sepsis Not present on admission  SIRS criteria identified on my evaluation include: Tachycardia, with heart rate greater than 90 BPM and Leukocytosis, with WBC greater than 12,000  Source is suspect aspiration pneumonia   Sepsis protocol initiated  IV antibiotics as appropriate for source of sepsis  Reassessment: I have reassessed the patient's hemodynamic status    Start unasyn     2/7: It does not seem patient meets sepsis criteria today.      Arthritis  Assessment & Plan  The patient has chronic arthritis and has been treated with prednisone 5 mg daily  Continue IV Solu-Medrol as IV substitute    Hypernatremia  Assessment & Plan  Change to d5 1/2 ns    resolved    Obesity (BMI 30-39.9)- (present on admission)  Assessment & Plan  Body mass index is 34.32 kg/m².  Outpatient weight loss management program highly recommended    Anxiety- (present on admission)  Assessment & Plan  Chronic anxiety and the patient at home was on Zoloft.    For now hold the Zoloft while  npo    Hyperlipidemia- (present on admission)  Assessment & Plan  Once the patient is able to resume oral intake low-fat low-cholesterol diet with Crestor and cholestyramine will be recommended.    HTN (hypertension)- (present on admission)  Assessment & Plan  For now we are holding Coreg and lisinopril.    Use as needed labetalol only.         VTE prophylaxis: SCDs/TEDs    I have performed a physical exam and reviewed and updated ROS and Plan today (2/7/2023). In review of yesterday's note (2/6/2023), there are no changes except as documented above.

## 2023-02-07 NOTE — PROGRESS NOTES
Bedside report received from night RN. Assumed care of patient. Alert and oriented, in no acute respiratory distress. Daily plan of care discussed. Pt denies pain to abdomen, nausea and vomiting. No other complains at this time. Hourly rounding in place.

## 2023-02-07 NOTE — CARE PLAN
The patient is Stable - Low risk of patient condition declining or worsening    Shift Goals  Clinical Goals: patient will be free from falls throughout the shift  Patient Goals: rest comfortably  Family Goals: n/a    Progress made toward(s) clinical / shift goals:  Patient is free from falls, up to cardiac chair and to bathroom with the use of FWW. Fall precautions observe, kept bed in lowest position and call bell within reach    Patient is not progressing towards the following goals:    Problem: Pain - Standard  Goal: Alleviation of pain or a reduction in pain to the patient’s comfort goal  Outcome: Progressing     Problem: Respiratory  Goal: Patient will achieve/maintain optimum respiratory ventilation and gas exchange  Outcome: Progressing     Problem: Fall Risk  Goal: Patient will remain free from falls  Outcome: Progressing     Problem: Early Mobilization - Post Surgery  Goal: Early mobilization post surgery  Outcome: Progressing     Problem: Pain - Post Surgery  Goal: Alleviation or reduction of pain post surgery  Outcome: Progressing     Problem: Wound/ / Incision Healing  Goal: Patient's wound/surgical incision will decrease in size and heals properly  Outcome: Progressing     Problem: Bowel Elimination - Post Surgical  Goal: Patient will resume regular bowel sounds and function with no discomfort or distention  Outcome: Progressing

## 2023-02-07 NOTE — PROGRESS NOTES
Received beside report from Day RN. Pt is resting in bed comfortably, not in any distress, on 1L O2 via NC. AAOx4. Abdominal midline incision with staples, observed. Pt denies any pain or N/V at this time. Discussed POC. Answered all questions. Fall risk precaution in place, locked bed in lowest position, belongings and call light within reach. All needs met at this time. Hourly rounding in place.

## 2023-02-07 NOTE — CARE PLAN
The patient is Stable - Low risk of patient condition declining or worsening    Shift Goals  Clinical Goals: Pt will be from fall/injury during this shift  Patient Goals: Pt able to sleep comfortabyl, free from falls  Family Goals: n/a    Progress made toward(s) clinical / shift goals:  Pt able to sleep more than 6 hours overnight. Fall risk precaution in place, pt did not sustain any falls during this shift. Pt denies pain or N/V during this shift.    Patient is not progressing towards the following goals:

## 2023-02-07 NOTE — DISCHARGE PLANNING
Case Management Discharge Planning    Admission Date: 1/24/2023  GMLOS: 7.7  ALOS: 14    6-Clicks ADL Score: 18  6-Clicks Mobility Score: 14  PT and/or OT Eval ordered: Yes  Post-acute Referrals Ordered: Yes  Post-acute Choice Obtained: Yes  Has referral(s) been sent to post-acute provider:  Yes      Anticipated Discharge Dispo: Discharge Disposition: D/T to SNF with Medicare cert in anticipation of skilled care (03)    DME Needed: No    Action(s) Taken: Updated Provider/Nurse on Discharge Plan    [facility]  Pending medical clearance.     Escalations Completed: None    Medically Clear: No    Next Steps: Medical clearance    Barriers to Discharge: Medical clearance

## 2023-02-07 NOTE — ASSESSMENT & PLAN NOTE
No signs of bleeding, however patient's Hb dropped from 12 to 10.1  We will repeat Hb later today and trend  Monitor closely

## 2023-02-08 ENCOUNTER — PATIENT OUTREACH (OUTPATIENT)
Dept: SCHEDULING | Facility: IMAGING CENTER | Age: 78
End: 2023-02-08
Payer: MEDICARE

## 2023-02-08 LAB
ALBUMIN SERPL BCP-MCNC: 2.5 G/DL (ref 3.2–4.9)
ALBUMIN/GLOB SERPL: 0.8 G/DL
ALP SERPL-CCNC: 75 U/L (ref 30–99)
ALT SERPL-CCNC: 12 U/L (ref 2–50)
ANION GAP SERPL CALC-SCNC: 8 MMOL/L (ref 7–16)
AST SERPL-CCNC: 13 U/L (ref 12–45)
BILIRUB SERPL-MCNC: 0.4 MG/DL (ref 0.1–1.5)
BUN SERPL-MCNC: 30 MG/DL (ref 8–22)
CALCIUM ALBUM COR SERPL-MCNC: 10.2 MG/DL (ref 8.5–10.5)
CALCIUM SERPL-MCNC: 9 MG/DL (ref 8.4–10.2)
CHLORIDE SERPL-SCNC: 102 MMOL/L (ref 96–112)
CO2 SERPL-SCNC: 26 MMOL/L (ref 20–33)
CREAT SERPL-MCNC: 0.79 MG/DL (ref 0.5–1.4)
GFR SERPLBLD CREATININE-BSD FMLA CKD-EPI: 77 ML/MIN/1.73 M 2
GLOBULIN SER CALC-MCNC: 3 G/DL (ref 1.9–3.5)
GLUCOSE SERPL-MCNC: 118 MG/DL (ref 65–99)
HCT VFR BLD AUTO: 32.5 % (ref 37–47)
HGB BLD-MCNC: 10.6 G/DL (ref 12–16)
MAGNESIUM SERPL-MCNC: 1.8 MG/DL (ref 1.5–2.5)
POTASSIUM SERPL-SCNC: 3.8 MMOL/L (ref 3.6–5.5)
PROT SERPL-MCNC: 5.5 G/DL (ref 6–8.2)
SARS-COV+SARS-COV-2 AG RESP QL IA.RAPID: NOTDETECTED
SODIUM SERPL-SCNC: 136 MMOL/L (ref 135–145)
SPECIMEN SOURCE: NORMAL

## 2023-02-08 PROCEDURE — 80053 COMPREHEN METABOLIC PANEL: CPT

## 2023-02-08 PROCEDURE — 99232 SBSQ HOSP IP/OBS MODERATE 35: CPT | Performed by: INTERNAL MEDICINE

## 2023-02-08 PROCEDURE — 700111 HCHG RX REV CODE 636 W/ 250 OVERRIDE (IP): Performed by: INTERNAL MEDICINE

## 2023-02-08 PROCEDURE — 700102 HCHG RX REV CODE 250 W/ 637 OVERRIDE(OP): Performed by: INTERNAL MEDICINE

## 2023-02-08 PROCEDURE — 700111 HCHG RX REV CODE 636 W/ 250 OVERRIDE (IP): Performed by: HOSPITALIST

## 2023-02-08 PROCEDURE — 83735 ASSAY OF MAGNESIUM: CPT

## 2023-02-08 PROCEDURE — 85018 HEMOGLOBIN: CPT

## 2023-02-08 PROCEDURE — 85014 HEMATOCRIT: CPT

## 2023-02-08 PROCEDURE — A9270 NON-COVERED ITEM OR SERVICE: HCPCS | Performed by: INTERNAL MEDICINE

## 2023-02-08 PROCEDURE — 94760 N-INVAS EAR/PLS OXIMETRY 1: CPT

## 2023-02-08 PROCEDURE — 770001 HCHG ROOM/CARE - MED/SURG/GYN PRIV*

## 2023-02-08 PROCEDURE — 87426 SARSCOV CORONAVIRUS AG IA: CPT

## 2023-02-08 RX ORDER — AMOXICILLIN AND CLAVULANATE POTASSIUM 875; 125 MG/1; MG/1
1 TABLET, FILM COATED ORAL EVERY 12 HOURS
Refills: 0 | Status: SHIPPED
Start: 2023-02-08 | End: 2023-02-11

## 2023-02-08 RX ORDER — OMEPRAZOLE 20 MG/1
20 CAPSULE, DELAYED RELEASE ORAL DAILY
Qty: 30 CAPSULE | Status: SHIPPED
Start: 2023-02-09 | End: 2023-11-15

## 2023-02-08 RX ORDER — MAGNESIUM SULFATE 1 G/100ML
1 INJECTION INTRAVENOUS ONCE
Status: COMPLETED | OUTPATIENT
Start: 2023-02-08 | End: 2023-02-08

## 2023-02-08 RX ORDER — CHOLECALCIFEROL (VITAMIN D3) 125 MCG
5 CAPSULE ORAL NIGHTLY
Qty: 30 TABLET | Status: SHIPPED
Start: 2023-02-08 | End: 2023-02-15

## 2023-02-08 RX ADMIN — OMEPRAZOLE 20 MG: 20 CAPSULE, DELAYED RELEASE ORAL at 05:21

## 2023-02-08 RX ADMIN — MAGNESIUM SULFATE IN DEXTROSE 1 G: 10 INJECTION, SOLUTION INTRAVENOUS at 07:20

## 2023-02-08 RX ADMIN — AMOXICILLIN AND CLAVULANATE POTASSIUM 1 TABLET: 875; 125 TABLET, FILM COATED ORAL at 17:19

## 2023-02-08 RX ADMIN — LORAZEPAM 1 MG: 2 INJECTION INTRAMUSCULAR; INTRAVENOUS at 21:34

## 2023-02-08 RX ADMIN — AMOXICILLIN AND CLAVULANATE POTASSIUM 1 TABLET: 875; 125 TABLET, FILM COATED ORAL at 05:21

## 2023-02-08 RX ADMIN — PREDNISONE 5 MG: 5 TABLET ORAL at 05:21

## 2023-02-08 ASSESSMENT — ENCOUNTER SYMPTOMS
CHILLS: 0
HEARTBURN: 0
WEAKNESS: 0
SHORTNESS OF BREATH: 0
MYALGIAS: 0
HALLUCINATIONS: 0
VOMITING: 0
CONSTIPATION: 0
BACK PAIN: 0
NAUSEA: 1
DIARRHEA: 1
FEVER: 0
DIZZINESS: 0
FOCAL WEAKNESS: 0
HEADACHES: 0
NERVOUS/ANXIOUS: 1
COUGH: 0
DEPRESSION: 1
ABDOMINAL PAIN: 0

## 2023-02-08 ASSESSMENT — PATIENT HEALTH QUESTIONNAIRE - PHQ9
2. FEELING DOWN, DEPRESSED, IRRITABLE, OR HOPELESS: NOT AT ALL
2. FEELING DOWN, DEPRESSED, IRRITABLE, OR HOPELESS: NOT AT ALL
SUM OF ALL RESPONSES TO PHQ9 QUESTIONS 1 AND 2: 0
SUM OF ALL RESPONSES TO PHQ9 QUESTIONS 1 AND 2: 0
1. LITTLE INTEREST OR PLEASURE IN DOING THINGS: NOT AT ALL
1. LITTLE INTEREST OR PLEASURE IN DOING THINGS: NOT AT ALL

## 2023-02-08 ASSESSMENT — PAIN DESCRIPTION - PAIN TYPE
TYPE: ACUTE PAIN
TYPE: ACUTE PAIN

## 2023-02-08 NOTE — DISCHARGE SUMMARY
"Discharge Summary    CHIEF COMPLAINT ON ADMISSION  Chief Complaint   Patient presents with    Bowel Obstruction     Seen by PCP, abd xray today showing SBO. Onset of symptoms 5 days ago +diarrhea that has since resolved, bloated, N w/o V.        Reason for Admission  Abdominal Pain     Admission Date  1/24/2023     CODE STATUS  Full Code    HPI & HOSPITAL COURSE  As per chart review\"  \"This is a 77 y.o. female here with  History of hysterectomy and appendectomy 4 years ago, sleep apnea on CPAP, arthritis on chronic prednisone therapy, hypertension, hyperlipidemia and anxiety, who was admitted for abdominal pain and distention associated with 4 days of constipation.  She was found to have a small bowel obstruction.  General surgery was consulted and recommended conservative management NG tube placement. Unfortunately patient did not improve and SBFT showed high grade obstruction. Surgery took patient to the OR for laparotomy and lysis of adhesions on 1/28.\"    The patient as per chart review it seems well was started on TPN, however the patient quickly was transitioned to an oral diet she is now tolerating and having bowel movements.    At some point the patient was placed on oxygen, she was diagnosed with aspiration pneumonia and antibiotics were started with Unasyn.  We have transitioned to Augmentin and she will complete a course of antibiotics.  It seems that the oxygen supplementation is improving, currently on 1 L of oxygen.    The patient was seen at bedside this morning.  The patient continues to tolerate diet, have.  She is not complaining of pain at this time.  I spoke to Dr Valles today, and as per surgery the patient can be discharged to a skilled nursing facility.    The patient will require close follow-up with PCP and general surgery as an outpatient.    Therefore, she is discharged in fair and stable condition to skilled nursing facility.    The patient met 2-midnight criteria for an inpatient stay at " the time of discharge.      FOLLOW UP ITEMS POST DISCHARGE  Patient will be discharged to SNF and will require close follow up with PCP and General Surgery.    DISCHARGE DIAGNOSES  Principal Problem:    SBO (small bowel obstruction) (HCC) POA: Yes  Active Problems:    Acute respiratory failure with hypoxia (HCC) POA: Unknown    Aspiration pneumonia (HCC) POA: Unknown    Anemia POA: Unknown    AZUL on CPAP POA: Yes    HTN (hypertension) POA: Yes    Hyperlipidemia POA: Yes    Anxiety POA: Yes    Obesity (BMI 30-39.9) POA: Yes    Hypernatremia POA: Unknown    Arthritis POA: Unknown    Sepsis (HCC) POA: No  Resolved Problems:    Acute renal failure (ARF) (HCC) POA: Unknown    Hypercalcemia POA: Unknown      FOLLOW UP  No future appointments.  Mike Valles M.D.  [address]  [address]  [place] [address]  [phone]    Follow up  Follow up with surgery in 1 week for staple removal    Marge Brasher M.D.  [address]  [place] [address]  [phone]    Schedule an appointment as soon as possible for a visit        MEDICATIONS ON DISCHARGE     Medication List        START taking these medications        Instructions   amoxicillin-clavulanate 875-125 MG Tabs  Commonly known as: AUGMENTIN   Take 1 Tablet by mouth every 12 hours for 3 days.  Dose: 1 Tablet     melatonin 5 mg Tabs   Take 1 Tablet by mouth every evening.  Dose: 5 mg     omeprazole 20 MG delayed-release capsule  Start taking on: February 9, 2023  Commonly known as: PRILOSEC   Take 1 Capsule by mouth every day.  Dose: 20 mg            CHANGE how you take these medications        Instructions   sertraline 100 MG Tabs  What changed:   how much to take  how to take this  when to take this  Commonly known as: Zoloft   TAKE 1 TABELT BY MOUTH DAILY            CONTINUE taking these medications        Instructions   acetaminophen 500 MG Tabs  Commonly known as: TYLENOL   Take 1,000 mg by mouth every 6 hours as needed. Indications:  Pain  Dose: 1,000 mg     carvedilol 12.5 MG Tabs  Commonly known as: COREG   TAKE 1 TABLET BY MOUTH TWICE DAILY WITH MEALS     cholestyramine 4 g packet  Commonly known as: QUESTRAN   Take 4 g by mouth every day.  Dose: 1 Packet     D3 PO   Take 1 Capsule by mouth every day.  Dose: 1 Capsule     FOLIC ACID PO   Take 1 Tablet by mouth every morning.  Dose: 1 Tablet     IRON PO   Take 1 Tablet by mouth every day.  Dose: 1 Tablet     lisinopril 20 MG Tabs  Commonly known as: PRINIVIL   TAKE 2 TABLETS BY MOUTH DAILY     OMEGA 3 PO   Take 1 Capsule by mouth every day.  Dose: 1 Capsule     ondansetron 4 MG Tabs tablet  Commonly known as: ZOFRAN   Take 1 Tablet by mouth every 6 hours as needed for Nausea/Vomiting.  Dose: 4 mg     predniSONE 5 MG Tabs  Commonly known as: DELTASONE   TAKE 1 TABLET BY MOUTH DAILY     rosuvastatin 10 MG Tabs  Commonly known as: CRESTOR   TAKE ONE TABLET BY MOUTH IN THE EVENING     zolpidem 5 MG Tabs  Commonly known as: AMBIEN   Take 5 mg by mouth at bedtime.  Dose: 5 mg            ASK your doctor about these medications        Instructions   phosphorus 250 MG tablet  Commonly known as: K-Phos-Neutral  Ask about: Should I take this medication?   Take 2 Tablets by mouth 2 times a day for 3 days.  Dose: 2 Tablet              Allergies  Allergies   Allergen Reactions    Tramadol      Nausea and somnolence       DIET  Orders Placed This Encounter   Procedures    Diet Order Diet: Low Fiber(GI Soft)     Standing Status:   Standing     Number of Occurrences:   1     Order Specific Question:   Diet:     Answer:   Low Fiber(GI Soft) [2]       ACTIVITY  As tolerated and directed by skilled nursing.  Weight bearing as tolerated and directed by skilled nursing.    LINES, DRAINS, AND WOUNDS  This is an automated list. Peripheral IVs will be removed prior to discharge.  PICC Double Lumen 02/01/23 Lumen 1: Purple Lumen 2: Red Right Basilic (Active)   Site Assessment Clean;Dry;Intact 02/08/23 0700   Lumen 1  Status Flushed;Blood return noted;Scrubbed the hub prior to access 02/08/23 0700   Lumen 2 Status Scrubbed the hub prior to access;Flushed 02/08/23 0700   Line Secured at (cm) 3 cm 02/01/23 1618   Extremity Circumference (cm) 28 cm 02/01/23 1618   Dressing Type Biopatch;Transparent Film;Securing device 02/08/23 0700   Dressing Status Clean;Dry;Intact 02/08/23 0700   Dressing Intervention N/A 02/08/23 0700   Dressing Change Due 02/11/23 02/08/23 0700   Date Primary Tubing Changed 02/03/23 02/03/23 2030   Date Secondary Tubing Changed 02/07/23 02/08/23 0700   Date IV Connector(s) Changed 02/01/23 02/01/23 1618   NEXT Primary Tubing Change  02/11/23 02/08/23 0700   NEXT Secondary Tubing Change  02/08/23 02/08/23 0700   NEXT IV Connector(s) Change 02/02/23 02/01/23 1618   Line Necessity Assessed TPN Orders to Start Within 24 Hours 02/05/23 1100   $ Double Lumen PICC Charge Double kit used 02/08/23 0700       Wound 01/28/23 Incision Abdomen Island dressing (Active)   Site Assessment Clean;Dry;Intact 02/07/23 2100   Periwound Assessment Clean;Dry;Intact 02/07/23 2100   Margins Attached edges 02/07/23 2100   Closure Staples 02/07/23 2100   Drainage Amount None 02/07/23 2100   Drainage Description Serosanguineous 02/03/23 0730   Treatments Ice applied 02/05/23 1000   Dressing Options Open to Air 02/07/23 2100   Dressing Changed Observed 02/07/23 2100   Dressing Status Open to Air 02/07/23 2100      PICC Double Lumen 02/01/23 Lumen 1: Purple Lumen 2: Red Right Basilic (Active)   Site Assessment Clean;Dry;Intact 02/08/23 0700   Lumen 1 Status Flushed;Blood return noted;Scrubbed the hub prior to access 02/08/23 0700   Lumen 2 Status Scrubbed the hub prior to access;Flushed 02/08/23 0700   Line Secured at (cm) 3 cm 02/01/23 1618   Extremity Circumference (cm) 28 cm 02/01/23 1618   Dressing Type Biopatch;Transparent Film;Securing device 02/08/23 0700   Dressing Status Clean;Dry;Intact 02/08/23 0700   Dressing Intervention N/A  02/08/23 0700   Dressing Change Due 02/11/23 02/08/23 0700   Date Primary Tubing Changed 02/03/23 02/03/23 2030   Date Secondary Tubing Changed 02/07/23 02/08/23 0700   Date IV Connector(s) Changed 02/01/23 02/01/23 1618   NEXT Primary Tubing Change  02/11/23 02/08/23 0700   NEXT Secondary Tubing Change  02/08/23 02/08/23 0700   NEXT IV Connector(s) Change 02/02/23 02/01/23 1618   Line Necessity Assessed TPN Orders to Start Within 24 Hours 02/05/23 1100   $ Double Lumen PICC Charge Double kit used 02/08/23 0700                MENTAL STATUS ON TRANSFER      Alert and Oriented.       CONSULTATIONS  General surgery    PROCEDURES  1.  Exploratory laparotomy  2.  Lysis of adhesions      DX-CHEST-PORTABLE (1 VIEW)   Final Result      Decreasing lung volumes with new consolidation off the cardiac apex worrisome for pneumonia      Bronchial thickening centrally could be from aspiration or infection      IR-PICC LINE PLACEMENT W/ GUIDANCE > AGE 5   Final Result                  Ultrasound-guided PICC placement performed by qualified nursing staff as    above.          DX-SMALL BOWEL SERIES   Final Result      1.  Minimal progression of oral contrast in the bowel on 8 hours of imaging with dilution of contrast could be due to extreme dysmotility/ileus versus obstruction. Review of the previous CT scan shows no transition point of the small bowel dilatation    with apparent contrast in the cecum and gas seen extending through the transverse colon.      DX-ABDOMEN FOR TUBE PLACEMENT   Final Result      NG tube tip overlies the gastric body.      [id number] VIEW   Final Result      Persistent findings of small bowel obstruction      DX-ABDOMEN FOR TUBE PLACEMENT   Final Result         1.  Air-filled distended bowel loops in the upper abdomen, could represent ileus versus enterocolitis or evolving obstructive changes. Radiographic follow-up to resolution as clinically appropriate.   2.  Nasogastric tube tip terminates overlying  the expected location of the gastric fundus.   3.  Left lower lobe atelectasis, infiltrate, and/or effusion.   4.  Cardiomegaly   5.  Atherosclerosis      CT-ABDOMEN-PELVIS W/O   Final Result      1.  Dilated loops of small intestine with air/blood levels consistent with small bowel obstruction.      2.  Fatty liver.      3.  Multiple left-sided renal stones measuring up to 2 cm in size.      4.  Prior hysterectomy.      5.  Sigmoid diverticulosis.           LABORATORY  Lab Results   Component Value Date    SODIUM 136 02/08/2023    POTASSIUM 3.8 02/08/2023    CHLORIDE 102 02/08/2023    CO2 26 02/08/2023    GLUCOSE 118 (H) 02/08/2023    BUN 30 (H) 02/08/2023    CREATININE 0.79 02/08/2023    CREATININE 0.5 09/04/2007        Lab Results   Component Value Date    WBC 10.7 02/07/2023    HEMOGLOBIN 10.6 (L) 02/08/2023    HEMATOCRIT 32.5 (L) 02/08/2023    PLATELETCT 251 02/07/2023        Total time of the discharge process exceeds 35 minutes.

## 2023-02-08 NOTE — PROGRESS NOTES
Bedside report received from night RN. Assumed care of patient. Alert and oriented, wakes easily to voice. Daily plan of care discussed. Pt denies pain, nausea and vomiting at this time. Safety precautions discussed, pt verbalized understanding. Hourly rounding in place.       0900 Scheduling dept called in to set up follow up appointment to PCP and Ortho surgeon.

## 2023-02-08 NOTE — PROGRESS NOTES
Received pt's report from Day RN. Pt is resting comfortably in bed, not in any distress, on 1L O2 at 91% SpO2. Pt denies any pain or N/V at this time. Abdominal midline incision with staples, observed. Discussed POC. Fall risk precaution in place, locked bed in lowest position, belongings and call light within reach. All need met at this time. Hourly rounding in place.

## 2023-02-08 NOTE — PROGRESS NOTES
[facility] Medicine Daily Progress Note    Date of Service  2/8/2023    Chief Complaint  Clau Vences is a 77 y.o. female admitted 1/24/2023 with abdominal pain and constipation    Hospital Course  History of hysterectomy and appendectomy 4 years ago, sleep apnea on CPAP, arthritis on chronic prednisone therapy, hypertension, hyperlipidemia and anxiety, who was admitted for abdominal pain and distention associated with 4 days of constipation.  She was found to have a small bowel obstruction.  General surgery was consulted and recommended conservative management NG tube placement. Unfortunately patient did not improve and SBFT showed high grade obstruction. Surgery took patient to the OR for laparotomy and lysis of adhesions on 1/28.    Interval Problem Update  2/2 Surgery yesterday recommended PPN, discussed with pharmacy can only do TPN. PICC placed yesterday and TPN to start tonight. PT/OT recommending post acute placement. Mg 1.4, IV replacement ordered. Patient still not having a BM. Per CM refusing all placement.    2/3 Patient with a bowel movement x2 this morning, reports she is feeling much better, still distended. Discussed with surgery ok to place on diet, changed to GI soft. PM&R believes patient is a good candidate for rehab, if she continues to tolerate and improve possibly can dc tomorrow.      2/4 No acute events overnight. Patient continues to have Bms. She refused rehab but is agreeable to SNF.  Patient had some vomiting overnight, denies any nausea today.  She thinks that just because she was drinking her water too quickly.  Patient is still distended and having multiple bowel movements but has improved.  Surgery has cleared patient for discharge with outpatient follow-up in a week.  Patient is medically clear pending SNF placement.    2/5 Mild tachycardia this morning on 3 L. Continues to have multiple bowel movements and nausea with some bilious vomiting. Discussed with surgery, rec follow  up in 1 week for staple removal. Pending SNF placement.     2/6 This morning patient tachycardic with WBC 14.6. Platelet count normalized. CXR interpreted by me shows new consolidation. Concern for aspiration pneumonia given recent vomiting. Start unasyn, if WBC improves tomorrow can likely dc to SNF on orals.     PATIENT SEEN BY PREVIOUS HOSPITALIST UNTIL 2/6 2/7: Patient seen at bedside in morning.  Overall patient feels better.  Patient had a hemoglobin drop from 12-10.1.  No signs of bleeding.  We will repeat hemoglobin later today and trended.  Continue to monitor closely.  We will continue antibiotics, follow cultures.    2/8: Patient seen at bedside this morning. Patient not complaining of any pain at this time. Hemoglobin seems to be stable.  Patient is medically clear to discharge to SNF. I spoke to General Surgery, from their standpoint patient can be discharged to SNF.    I have discussed this patient's plan of care and discharge plan at IDT rounds today with Case Management, Nursing, Nursing leadership, and other members of the IDT team.    Consultants/Specialty  general surgery    Code Status  Full Code    Disposition  Patient is medically cleared for discharge.   Anticipate discharge to to skilled nursing facility.  I have placed the appropriate orders for post-discharge needs.    Review of Systems  Review of Systems   Constitutional:  Positive for malaise/fatigue. Negative for chills and fever.   Respiratory:  Negative for cough and shortness of breath.    Cardiovascular:  Negative for chest pain and leg swelling.   Gastrointestinal:  Positive for diarrhea and nausea. Negative for abdominal pain, constipation, heartburn and vomiting.   Genitourinary:  Negative for dysuria and frequency.   Musculoskeletal:  Negative for back pain and myalgias.   Neurological:  Negative for dizziness, focal weakness, weakness and headaches.   Psychiatric/Behavioral:  Positive for depression. Negative for  hallucinations. The patient is nervous/anxious.    All other systems reviewed and are negative.     Physical Exam  Temp:  [36.4 °C (97.6 °F)-36.9 °C (98.4 °F)] 36.8 °C (98.2 °F)  Pulse:  [] 97  Resp:  [17-18] 18  BP: (123-162)/(56-84) 140/83  SpO2:  [90 %-92 %] 92 %    Physical Exam  Constitutional:       General: She is not in acute distress.     Appearance: She is obese. She is ill-appearing.   HENT:      Head: Normocephalic and atraumatic.      Mouth/Throat:      Pharynx: Oropharynx is clear.   Eyes:      Conjunctiva/sclera: Conjunctivae normal.   Cardiovascular:      Rate and Rhythm: Regular rhythm. Tachycardia present.      Pulses: Normal pulses.   Pulmonary:      Effort: No respiratory distress.      Breath sounds: No wheezing.   Abdominal:      General: There is distension.      Palpations: Abdomen is soft.      Tenderness: There is no abdominal tenderness.   Musculoskeletal:         General: No swelling.      Cervical back: Neck supple. No muscular tenderness.   Skin:     General: Skin is warm and dry.      Findings: No rash.   Neurological:      General: No focal deficit present.      Mental Status: She is alert and oriented to person, place, and time.      Cranial Nerves: No cranial nerve deficit.      Motor: Weakness present.   Psychiatric:         Mood and Affect: Mood normal.         Thought Content: Thought content normal.       Fluids    Intake/Output Summary (Last 24 hours) at 2/8/2023 0810  Last data filed at 2/7/2023 2100  Gross per 24 hour   Intake 560 ml   Output 0 ml   Net 560 ml         Laboratory  Recent Labs     02/06/23  0305 02/07/23  0535 02/07/23  1133 02/07/23  1700 02/08/23  0230   WBC 14.6* 10.7  --   --   --    RBC 3.62* 3.09*  --   --   --    HEMOGLOBIN 12.0 10.1* 11.7* 11.9* 10.6*   HEMATOCRIT 36.3* 30.9* 35.3* 36.8* 32.5*   .3* 100.0*  --   --   --    MCH 33.1* 32.7  --   --   --    MCHC 33.1* 32.7*  --   --   --    RDW 45.5 44.7  --   --   --    PLATELETCT 238 251  --    --   --    MPV 11.2 11.1  --   --   --          Recent Labs     02/06/23  0305 02/08/23  0230   SODIUM 133* 136   POTASSIUM 4.1 3.8   CHLORIDE 100 102   CO2 21 26   GLUCOSE 103* 118*   BUN 35* 30*   CREATININE 1.04 0.79   CALCIUM 9.1 9.0                       Imaging  DX-CHEST-PORTABLE (1 VIEW)   Final Result      Decreasing lung volumes with new consolidation off the cardiac apex worrisome for pneumonia      Bronchial thickening centrally could be from aspiration or infection      IR-PICC LINE PLACEMENT W/ GUIDANCE > AGE 5   Final Result                  Ultrasound-guided PICC placement performed by qualified nursing staff as    above.          DX-SMALL BOWEL SERIES   Final Result      1.  Minimal progression of oral contrast in the bowel on 8 hours of imaging with dilution of contrast could be due to extreme dysmotility/ileus versus obstruction. Review of the previous CT scan shows no transition point of the small bowel dilatation    with apparent contrast in the cecum and gas seen extending through the transverse colon.      DX-ABDOMEN FOR TUBE PLACEMENT   Final Result      NG tube tip overlies the gastric body.      [id number] VIEW   Final Result      Persistent findings of small bowel obstruction      DX-ABDOMEN FOR TUBE PLACEMENT   Final Result         1.  Air-filled distended bowel loops in the upper abdomen, could represent ileus versus enterocolitis or evolving obstructive changes. Radiographic follow-up to resolution as clinically appropriate.   2.  Nasogastric tube tip terminates overlying the expected location of the gastric fundus.   3.  Left lower lobe atelectasis, infiltrate, and/or effusion.   4.  Cardiomegaly   5.  Atherosclerosis      CT-ABDOMEN-PELVIS W/O   Final Result      1.  Dilated loops of small intestine with air/blood levels consistent with small bowel obstruction.      2.  Fatty liver.      3.  Multiple left-sided renal stones measuring up to 2 cm in size.      4.  Prior hysterectomy.       5.  Sigmoid diverticulosis.             Assessment/Plan  * SBO (small bowel obstruction) (HCC)- (present on admission)  Assessment & Plan  Presented with severe abdominal pain and distention associated with vomiting.  She has not had a bowel obstruction and failed to improve with conservative management/NG tube  Small bowel follow-through on 1/28 revealed a high-grade obstruction therefore she was taken emergently to the operating room later that day  Postop day 7  -surgery cleared patient for dc   -tolerating diet   PT/OT- post acute    PM&R consulted- pt refused   Pending SNF placement     resolved    Anemia  Assessment & Plan  No signs of bleeding, however patient's Hb dropped from 12 to 10.1  We will repeat Hb later today and trend  Monitor closely    Aspiration pneumonia (HCC)  Assessment & Plan  Continue antibiotics  Monitor cultures    Acute respiratory failure with hypoxia (HCC)  Assessment & Plan  At baseline she has obesity and sleep apnea her acute component was secondary to atelectasis secondary to abdominal distention  Remains on oxygen,4L  Encourage IS  Supplemental O2 as needed    2/7: Patient currently on 1L. Concern for aspiration pneumonia, continue antibiotics.    AZUL on CPAP- (present on admission)  Assessment & Plan  The patient at home uses CPAP unable to use with NGT  Oxygen at night  She is requiring 4L at this time due to atelectasis    2/7: We will add nocturnal CPAP    Sepsis (HCC)  Assessment & Plan  This is Sepsis Not present on admission  SIRS criteria identified on my evaluation include: Tachycardia, with heart rate greater than 90 BPM and Leukocytosis, with WBC greater than 12,000  Source is suspect aspiration pneumonia   Sepsis protocol initiated  IV antibiotics as appropriate for source of sepsis  Reassessment: I have reassessed the patient's hemodynamic status    Start unasyn     2/7: It does not seem patient meets sepsis criteria today.      Arthritis  Assessment & Plan  The  patient has chronic arthritis and has been treated with prednisone 5 mg daily  Continue IV Solu-Medrol as IV substitute    Hypernatremia  Assessment & Plan  Change to d5 1/2 ns    resolved    Obesity (BMI 30-39.9)- (present on admission)  Assessment & Plan  Body mass index is 34.32 kg/m².  Outpatient weight loss management program highly recommended    Anxiety- (present on admission)  Assessment & Plan  Chronic anxiety and the patient at home was on Zoloft.    For now hold the Zoloft while npo    Hyperlipidemia- (present on admission)  Assessment & Plan  Once the patient is able to resume oral intake low-fat low-cholesterol diet with Crestor and cholestyramine will be recommended.    HTN (hypertension)- (present on admission)  Assessment & Plan  For now we are holding Coreg and lisinopril.    Use as needed labetalol only.         VTE prophylaxis: SCDs/TEDs    I have performed a physical exam and reviewed and updated ROS and Plan today (2/8/2023). In review of yesterday's note (2/7/2023), there are no changes except as documented above.

## 2023-02-08 NOTE — CARE PLAN
The patient is Stable - Low risk of patient condition declining or worsening    Shift Goals  Clinical Goals: Pt will be able to ambulate with zero fall/injury  Patient Goals: rest and comfort  Family Goals: n/a    Progress made toward(s) clinical / shift goals: Pt able to ambulate in the hallway on SBA, no falls/injury sustained through the shift.     Patient is not progressing towards the following goals:n/a

## 2023-02-08 NOTE — CARE PLAN
The patient is Stable - Low risk of patient condition declining or worsening    Shift Goals  Clinical Goals: Pt will be free from falls/injury during this shift  Patient Goals: Pt able to sleep comfortably, no episodes of N/V  Family Goals: n/a    Progress made toward(s) clinical / shift goals:  Pt denies any pain or N/V. Fall risk precaution in place, pt did not sustain any falls during this shift. Pt able to sleep at least 5 hours throughout the night.    Patient is not progressing towards the following goals:

## 2023-02-08 NOTE — DISCHARGE PLANNING
Agency/Facility Name: [facility]  Outcome: Left a voicemail regarding bed availability. [facility] requested a call back.  RN MERNA notified

## 2023-02-08 NOTE — DISCHARGE PLANNING
Case Management Discharge Planning    Admission Date: 1/24/2023  GMLOS: 7.7  ALOS: 15    6-Clicks ADL Score: 18  6-Clicks Mobility Score: 14  PT and/or OT Eval ordered: Yes  Post-acute Referrals Ordered: Yes  Post-acute Choice Obtained: Yes  Has referral(s) been sent to post-acute provider:  Yes      Anticipated Discharge Dispo: Discharge Disposition: D/T to SNF with Medicare cert in anticipation of skilled care (03)    DME Needed: No    Action(s) Taken: Updated Provider/Nurse on Discharge Plan    0850: DPA to follow up for bed availability with [facility].  Per MD, patient is medically cleared today.     1005: Per Dipika MINA, [facility] will have a bed available tomorrow and has scheduled transportation for 1300.  MD and RN notified.  Rapid COVID test placed.      Escalations Completed: None    Medically Clear: Yes    Next Steps: [facility] bed availability    Barriers to Discharge: [facility] bed availability

## 2023-02-09 ENCOUNTER — APPOINTMENT (OUTPATIENT)
Dept: RADIOLOGY | Facility: MEDICAL CENTER | Age: 78
DRG: 335 | End: 2023-02-09
Attending: INTERNAL MEDICINE
Payer: MEDICARE

## 2023-02-09 VITALS
OXYGEN SATURATION: 93 % | WEIGHT: 218.92 LBS | BODY MASS INDEX: 34.36 KG/M2 | SYSTOLIC BLOOD PRESSURE: 147 MMHG | HEART RATE: 94 BPM | DIASTOLIC BLOOD PRESSURE: 69 MMHG | HEIGHT: 67 IN | TEMPERATURE: 97.5 F | RESPIRATION RATE: 17 BRPM

## 2023-02-09 PROBLEM — N20.0 KIDNEY STONE: Status: ACTIVE | Noted: 2023-02-09

## 2023-02-09 LAB
ALBUMIN SERPL BCP-MCNC: 2.8 G/DL (ref 3.2–4.9)
ALBUMIN/GLOB SERPL: 0.8 G/DL
ALP SERPL-CCNC: 83 U/L (ref 30–99)
ALT SERPL-CCNC: 12 U/L (ref 2–50)
ANION GAP SERPL CALC-SCNC: 9 MMOL/L (ref 7–16)
AST SERPL-CCNC: 14 U/L (ref 12–45)
BILIRUB SERPL-MCNC: 0.4 MG/DL (ref 0.1–1.5)
BUN SERPL-MCNC: 21 MG/DL (ref 8–22)
CALCIUM ALBUM COR SERPL-MCNC: 10.3 MG/DL (ref 8.5–10.5)
CALCIUM SERPL-MCNC: 9.3 MG/DL (ref 8.4–10.2)
CHLORIDE SERPL-SCNC: 101 MMOL/L (ref 96–112)
CO2 SERPL-SCNC: 26 MMOL/L (ref 20–33)
CREAT SERPL-MCNC: 0.75 MG/DL (ref 0.5–1.4)
GFR SERPLBLD CREATININE-BSD FMLA CKD-EPI: 81 ML/MIN/1.73 M 2
GLOBULIN SER CALC-MCNC: 3.4 G/DL (ref 1.9–3.5)
GLUCOSE SERPL-MCNC: 115 MG/DL (ref 65–99)
MAGNESIUM SERPL-MCNC: 1.8 MG/DL (ref 1.5–2.5)
PHOSPHATE SERPL-MCNC: 2.4 MG/DL (ref 2.5–4.5)
POTASSIUM SERPL-SCNC: 4.2 MMOL/L (ref 3.6–5.5)
PROT SERPL-MCNC: 6.2 G/DL (ref 6–8.2)
SODIUM SERPL-SCNC: 136 MMOL/L (ref 135–145)

## 2023-02-09 PROCEDURE — 83735 ASSAY OF MAGNESIUM: CPT

## 2023-02-09 PROCEDURE — 700111 HCHG RX REV CODE 636 W/ 250 OVERRIDE (IP): Performed by: INTERNAL MEDICINE

## 2023-02-09 PROCEDURE — 74176 CT ABD & PELVIS W/O CONTRAST: CPT

## 2023-02-09 PROCEDURE — 700102 HCHG RX REV CODE 250 W/ 637 OVERRIDE(OP): Performed by: INTERNAL MEDICINE

## 2023-02-09 PROCEDURE — 700101 HCHG RX REV CODE 250: Performed by: INTERNAL MEDICINE

## 2023-02-09 PROCEDURE — 84100 ASSAY OF PHOSPHORUS: CPT

## 2023-02-09 PROCEDURE — A9270 NON-COVERED ITEM OR SERVICE: HCPCS | Performed by: INTERNAL MEDICINE

## 2023-02-09 PROCEDURE — 99239 HOSP IP/OBS DSCHRG MGMT >30: CPT | Performed by: INTERNAL MEDICINE

## 2023-02-09 PROCEDURE — 80053 COMPREHEN METABOLIC PANEL: CPT

## 2023-02-09 RX ORDER — ENEMA 19; 7 G/133ML; G/133ML
1 ENEMA RECTAL ONCE
Status: COMPLETED | OUTPATIENT
Start: 2023-02-09 | End: 2023-02-09

## 2023-02-09 RX ORDER — SENNA AND DOCUSATE SODIUM 50; 8.6 MG/1; MG/1
1 TABLET, FILM COATED ORAL DAILY
Qty: 30 TABLET | Refills: 11 | Status: SHIPPED
Start: 2023-02-09 | End: 2023-07-31

## 2023-02-09 RX ORDER — MAGNESIUM SULFATE 1 G/100ML
1 INJECTION INTRAVENOUS ONCE
Status: COMPLETED | OUTPATIENT
Start: 2023-02-09 | End: 2023-02-09

## 2023-02-09 RX ORDER — BISACODYL 10 MG
10 SUPPOSITORY, RECTAL RECTAL ONCE
Status: COMPLETED | OUTPATIENT
Start: 2023-02-09 | End: 2023-02-09

## 2023-02-09 RX ORDER — LISINOPRIL 20 MG/1
40 TABLET ORAL
Status: DISCONTINUED | OUTPATIENT
Start: 2023-02-09 | End: 2023-02-09 | Stop reason: HOSPADM

## 2023-02-09 RX ADMIN — AMOXICILLIN AND CLAVULANATE POTASSIUM 1 TABLET: 875; 125 TABLET, FILM COATED ORAL at 05:24

## 2023-02-09 RX ADMIN — SODIUM PHOSPHATE, DIBASIC AND SODIUM PHOSPHATE, MONOBASIC, UNSPECIFIED FORM 133 ML: 7; 19 ENEMA RECTAL at 08:04

## 2023-02-09 RX ADMIN — MAGNESIUM SULFATE IN DEXTROSE 1 G: 10 INJECTION, SOLUTION INTRAVENOUS at 07:43

## 2023-02-09 RX ADMIN — LISINOPRIL 40 MG: 20 TABLET ORAL at 13:10

## 2023-02-09 RX ADMIN — PREDNISONE 5 MG: 5 TABLET ORAL at 05:24

## 2023-02-09 RX ADMIN — OMEPRAZOLE 20 MG: 20 CAPSULE, DELAYED RELEASE ORAL at 05:24

## 2023-02-09 RX ADMIN — Medication 10 MG: at 08:04

## 2023-02-09 ASSESSMENT — ENCOUNTER SYMPTOMS
BACK PAIN: 0
DEPRESSION: 1
NERVOUS/ANXIOUS: 1
CHILLS: 0
FOCAL WEAKNESS: 0
COUGH: 0
CONSTIPATION: 0
ABDOMINAL PAIN: 0
WEAKNESS: 0
FEVER: 0
HEARTBURN: 0
MYALGIAS: 0
NAUSEA: 1
SHORTNESS OF BREATH: 0
DIZZINESS: 0
VOMITING: 0
HEADACHES: 0
DIARRHEA: 1
HALLUCINATIONS: 0

## 2023-02-09 ASSESSMENT — PAIN DESCRIPTION - PAIN TYPE: TYPE: ACUTE PAIN

## 2023-02-09 NOTE — DISCHARGE SUMMARY
"Discharge Summary    CHIEF COMPLAINT ON ADMISSION  Chief Complaint   Patient presents with    Bowel Obstruction     Seen by PCP, abd xray today showing SBO. Onset of symptoms 5 days ago +diarrhea that has since resolved, bloated, N w/o V.        Reason for Admission  Abdominal Pain     Admission Date  1/24/2023     DISCHARGE DATE:  02/09/2023    CODE STATUS  Full Code    HPI & HOSPITAL COURSE  As per chart review\"  \"This is a 77 y.o. female here with  History of hysterectomy and appendectomy 4 years ago, sleep apnea on CPAP, arthritis on chronic prednisone therapy, hypertension, hyperlipidemia and anxiety, who was admitted for abdominal pain and distention associated with 4 days of constipation.  She was found to have a small bowel obstruction.  General surgery was consulted and recommended conservative management NG tube placement. Unfortunately patient did not improve and SBFT showed high grade obstruction. Surgery took patient to the OR for laparotomy and lysis of adhesions on 1/28.\"    The patient as per chart review it seems well was started on TPN, however the patient quickly was transitioned to an oral diet she is now tolerating and having bowel movements.    At some point the patient was placed on oxygen, she was diagnosed with aspiration pneumonia and antibiotics were started with Unasyn.  We have transitioned to Augmentin and she will complete a course of antibiotics.  It seems that the oxygen supplementation is improving, currently on 1 L of oxygen.    The patient was seen at bedside this morning.  The patient continues to tolerate diet, have.  She is not complaining of pain at this time.  I spoke to Dr Valles today, and as per surgery the patient can be discharged to a skilled nursing facility.    The patient will require close follow-up with PCP and general surgery as an outpatient.      UPDATE: Patient was post to be discharged yesterday, however there were no beds.  This morning upon my examination " "the patient's abdomen was very distended, however she still was having bowel movements.  I discussed with general surgery who recommended we do enema and suppository.  After that the patient had more bowel movements as per nursing.  Repeat examination showed the abdomen still distended however softer than this morning.  We did order a CT scan which reported: \"Dilated loops of bowel including loops of colon in a pattern more suggestive of ileus than obstruction though a early or low-grade obstruction is a possibility.\"  Due to the fact that the patient continued to have bowel movements, and abdomen is softer than this morning this is most likely ileus.  We will continue with docusate and senna treatment as needed.  Patient is encouraged to ambulate.  She will be transferred to a skilled nursing facility where she will continue to work with physical therapy and will most likely benefit her.    --Patient was seen at bedside multiple times today, also talked to the patient's daughter at bedside.  The patient currently not complaining of pain and having bowel movements and tolerating p.o. diet.  The patient will be transferred to a skilled nursing facility for further management and care.    Of note CT scan also reported: \"LEFT renal staghorn calculus.\"  We have placed referral to urology.    Therefore, she is discharged in fair and stable condition to skilled nursing facility.    The patient met 2-midnight criteria for an inpatient stay at the time of discharge.      FOLLOW UP ITEMS POST DISCHARGE  Patient will be discharged to SNF and will require close follow up with PCP and General Surgery. Also Urology referral has been placed.    DISCHARGE DIAGNOSES  Principal Problem:    SBO (small bowel obstruction) (HCC) POA: Yes  Active Problems:    Acute respiratory failure with hypoxia (HCC) POA: Unknown    Aspiration pneumonia (HCC) POA: Unknown    Anemia POA: Unknown    AZUL on CPAP POA: Yes    HTN (hypertension) POA: Yes    " Hyperlipidemia POA: Yes    Anxiety POA: Yes    Obesity (BMI 30-39.9) POA: Yes    Hypernatremia POA: Unknown    Arthritis POA: Unknown    Sepsis (HCC) POA: No  Resolved Problems:    Acute renal failure (ARF) (HCC) POA: Unknown    Hypercalcemia POA: Unknown      FOLLOW UP  No future appointments.  Mike Valles M.D.  [address]  [address]  [place] [id number] [phone]    Follow up  Dr. Mike Valles's office will be reaching out to you to schedule a follow up visit. If you do not hear from them in 3 days please call their office.    Follow up with surgery in 1 week for staple removal    Marge Brasher M.D.  [address]  [place] [id number] [phone]    Schedule an appointment as soon as possible for a visit      Urology    Schedule an appointment as soon as possible for a visit        MEDICATIONS ON DISCHARGE     Medication List        START taking these medications        Instructions   amoxicillin-clavulanate 875-125 MG Tabs  Commonly known as: AUGMENTIN   Take 1 Tablet by mouth every 12 hours for 3 days.  Dose: 1 Tablet     melatonin 5 mg Tabs   Take 1 Tablet by mouth every evening.  Dose: 5 mg     omeprazole 20 MG delayed-release capsule  Commonly known as: PRILOSEC   Take 1 Capsule by mouth every day.  Dose: 20 mg     sennosides-docusate sodium 8.6-50 MG tablet  Commonly known as: SENOKOT-S   Take 1 Tablet by mouth every day.  Dose: 1 Tablet            CHANGE how you take these medications        Instructions   sertraline 100 MG Tabs  What changed:   how much to take  how to take this  when to take this  Commonly known as: Zoloft   TAKE 1 TABELT BY MOUTH DAILY            CONTINUE taking these medications        Instructions   acetaminophen 500 MG Tabs  Commonly known as: TYLENOL   Take 1,000 mg by mouth every 6 hours as needed. Indications: Pain  Dose: 1,000 mg     carvedilol 12.5 MG Tabs  Commonly known as: COREG   TAKE 1 TABLET BY MOUTH TWICE DAILY WITH MEALS      cholestyramine 4 g packet  Commonly known as: QUESTRAN   Take 4 g by mouth every day.  Dose: 1 Packet     D3 PO   Take 1 Capsule by mouth every day.  Dose: 1 Capsule     FOLIC ACID PO   Take 1 Tablet by mouth every morning.  Dose: 1 Tablet     IRON PO   Take 1 Tablet by mouth every day.  Dose: 1 Tablet     lisinopril 20 MG Tabs  Commonly known as: PRINIVIL   TAKE 2 TABLETS BY MOUTH DAILY     OMEGA 3 PO   Take 1 Capsule by mouth every day.  Dose: 1 Capsule     ondansetron 4 MG Tabs tablet  Commonly known as: ZOFRAN   Take 1 Tablet by mouth every 6 hours as needed for Nausea/Vomiting.  Dose: 4 mg     predniSONE 5 MG Tabs  Commonly known as: DELTASONE   TAKE 1 TABLET BY MOUTH DAILY     rosuvastatin 10 MG Tabs  Commonly known as: CRESTOR   TAKE ONE TABLET BY MOUTH IN THE EVENING     zolpidem 5 MG Tabs  Commonly known as: AMBIEN   Take 5 mg by mouth at bedtime.  Dose: 5 mg            ASK your doctor about these medications        Instructions   phosphorus 250 MG tablet  Commonly known as: K-Phos-Neutral  Ask about: Should I take this medication?   Take 2 Tablets by mouth 2 times a day for 3 days.  Dose: 2 Tablet              Allergies  Allergies   Allergen Reactions    Tramadol      Nausea and somnolence       DIET  Orders Placed This Encounter   Procedures    Diet Order Diet: Low Fiber(GI Soft)     Standing Status:   Standing     Number of Occurrences:   1     Order Specific Question:   Diet:     Answer:   Low Fiber(GI Soft) [2]       ACTIVITY  As tolerated and directed by skilled nursing.  Weight bearing as tolerated and directed by skilled nursing.    LINES, DRAINS, AND WOUNDS  This is an automated list. Peripheral IVs will be removed prior to discharge.  PICC Double Lumen 02/01/23 Lumen 1: Purple Lumen 2: Red Right Basilic (Active)   Site Assessment Clean;Dry;Intact 02/08/23 0700   Lumen 1 Status Flushed;Blood return noted;Scrubbed the hub prior to access 02/08/23 0700   Lumen 2 Status Scrubbed the hub prior to  access;Flushed 02/08/23 0700   Line Secured at (cm) 3 cm 02/01/23 1618   Extremity Circumference (cm) 28 cm 02/01/23 1618   Dressing Type Biopatch;Transparent Film;Securing device 02/08/23 0700   Dressing Status Clean;Dry;Intact 02/08/23 0700   Dressing Intervention N/A 02/08/23 0700   Dressing Change Due 02/11/23 02/08/23 0700   Date Primary Tubing Changed 02/03/23 02/03/23 2030   Date Secondary Tubing Changed 02/07/23 02/08/23 0700   Date IV Connector(s) Changed 02/01/23 02/01/23 1618   NEXT Primary Tubing Change  02/11/23 02/08/23 0700   NEXT Secondary Tubing Change  02/08/23 02/08/23 0700   NEXT IV Connector(s) Change 02/02/23 02/01/23 1618   Line Necessity Assessed TPN Orders to Start Within 24 Hours 02/05/23 1100   $ Double Lumen PICC Charge Double kit used 02/08/23 0700       Wound 01/28/23 Incision Abdomen Island dressing (Active)   Site Assessment Clean;Dry;Intact 02/07/23 2100   Periwound Assessment Clean;Dry;Intact 02/07/23 2100   Margins Attached edges 02/07/23 2100   Closure Staples 02/07/23 2100   Drainage Amount None 02/07/23 2100   Drainage Description Serosanguineous 02/03/23 0730   Treatments Ice applied 02/05/23 1000   Dressing Options Open to Air 02/07/23 2100   Dressing Changed Observed 02/07/23 2100   Dressing Status Open to Air 02/07/23 2100      PICC Double Lumen 02/01/23 Lumen 1: Purple Lumen 2: Red Right Basilic (Active)   Site Assessment Clean;Dry;Intact 02/08/23 0700   Lumen 1 Status Flushed;Blood return noted;Scrubbed the hub prior to access 02/08/23 0700   Lumen 2 Status Scrubbed the hub prior to access;Flushed 02/08/23 0700   Line Secured at (cm) 3 cm 02/01/23 1618   Extremity Circumference (cm) 28 cm 02/01/23 1618   Dressing Type Biopatch;Transparent Film;Securing device 02/08/23 0700   Dressing Status Clean;Dry;Intact 02/08/23 0700   Dressing Intervention N/A 02/08/23 0700   Dressing Change Due 02/11/23 02/08/23 0700   Date Primary Tubing Changed 02/03/23 02/03/23 2030   Date  Secondary Tubing Changed 02/07/23 02/08/23 0700   Date IV Connector(s) Changed 02/01/23 02/01/23 1618   NEXT Primary Tubing Change  02/11/23 02/08/23 0700   NEXT Secondary Tubing Change  02/08/23 02/08/23 0700   NEXT IV Connector(s) Change 02/02/23 02/01/23 1618   Line Necessity Assessed TPN Orders to Start Within 24 Hours 02/05/23 1100   $ Double Lumen PICC Charge Double kit used 02/08/23 0700                MENTAL STATUS ON TRANSFER      Alert and Oriented.       CONSULTATIONS  General surgery    PROCEDURES  1.  Exploratory laparotomy  2.  Lysis of adhesions      CT-ABDOMEN-PELVIS W/O   Final Result      1.  Dilated loops of bowel including loops of colon in a pattern more suggestive of ileus than obstruction though a early or low-grade obstruction is a possibility.   2.  LEFT renal staghorn calculus   3.  Atherosclerosis      DX-CHEST-PORTABLE (1 VIEW)   Final Result      Decreasing lung volumes with new consolidation off the cardiac apex worrisome for pneumonia      Bronchial thickening centrally could be from aspiration or infection      IR-PICC LINE PLACEMENT W/ GUIDANCE > AGE 5   Final Result                  Ultrasound-guided PICC placement performed by qualified nursing staff as    above.          DX-SMALL BOWEL SERIES   Final Result      1.  Minimal progression of oral contrast in the bowel on 8 hours of imaging with dilution of contrast could be due to extreme dysmotility/ileus versus obstruction. Review of the previous CT scan shows no transition point of the small bowel dilatation    with apparent contrast in the cecum and gas seen extending through the transverse colon.      DX-ABDOMEN FOR TUBE PLACEMENT   Final Result      NG tube tip overlies the gastric body.      [id number] VIEW   Final Result      Persistent findings of small bowel obstruction      DX-ABDOMEN FOR TUBE PLACEMENT   Final Result         1.  Air-filled distended bowel loops in the upper abdomen, could represent ileus versus  enterocolitis or evolving obstructive changes. Radiographic follow-up to resolution as clinically appropriate.   2.  Nasogastric tube tip terminates overlying the expected location of the gastric fundus.   3.  Left lower lobe atelectasis, infiltrate, and/or effusion.   4.  Cardiomegaly   5.  Atherosclerosis      CT-ABDOMEN-PELVIS W/O   Final Result      1.  Dilated loops of small intestine with air/blood levels consistent with small bowel obstruction.      2.  Fatty liver.      3.  Multiple left-sided renal stones measuring up to 2 cm in size.      4.  Prior hysterectomy.      5.  Sigmoid diverticulosis.           LABORATORY  Lab Results   Component Value Date    SODIUM 136 02/09/2023    POTASSIUM 4.2 02/09/2023    CHLORIDE 101 02/09/2023    CO2 26 02/09/2023    GLUCOSE 115 (H) 02/09/2023    BUN 21 02/09/2023    CREATININE 0.75 02/09/2023    CREATININE 0.5 09/04/2007        Lab Results   Component Value Date    WBC 10.7 02/07/2023    HEMOGLOBIN 10.6 (L) 02/08/2023    HEMATOCRIT 32.5 (L) 02/08/2023    PLATELETCT 251 02/07/2023        Total time of the discharge process exceeds 35 minutes.

## 2023-02-09 NOTE — PROGRESS NOTES
[facility] Medicine Daily Progress Note    Date of Service  2/9/2023    Chief Complaint  Clau Vences is a 77 y.o. female admitted 1/24/2023 with abdominal pain and constipation    Hospital Course  History of hysterectomy and appendectomy 4 years ago, sleep apnea on CPAP, arthritis on chronic prednisone therapy, hypertension, hyperlipidemia and anxiety, who was admitted for abdominal pain and distention associated with 4 days of constipation.  She was found to have a small bowel obstruction.  General surgery was consulted and recommended conservative management NG tube placement. Unfortunately patient did not improve and SBFT showed high grade obstruction. Surgery took patient to the OR for laparotomy and lysis of adhesions on 1/28.    Interval Problem Update  2/2 Surgery yesterday recommended PPN, discussed with pharmacy can only do TPN. PICC placed yesterday and TPN to start tonight. PT/OT recommending post acute placement. Mg 1.4, IV replacement ordered. Patient still not having a BM. Per CM refusing all placement.    2/3 Patient with a bowel movement x2 this morning, reports she is feeling much better, still distended. Discussed with surgery ok to place on diet, changed to GI soft. PM&R believes patient is a good candidate for rehab, if she continues to tolerate and improve possibly can dc tomorrow.      2/4 No acute events overnight. Patient continues to have Bms. She refused rehab but is agreeable to SNF.  Patient had some vomiting overnight, denies any nausea today.  She thinks that just because she was drinking her water too quickly.  Patient is still distended and having multiple bowel movements but has improved.  Surgery has cleared patient for discharge with outpatient follow-up in a week.  Patient is medically clear pending SNF placement.    2/5 Mild tachycardia this morning on 3 L. Continues to have multiple bowel movements and nausea with some bilious vomiting. Discussed with surgery, rec follow  up in 1 week for staple removal. Pending SNF placement.     2/6 This morning patient tachycardic with WBC 14.6. Platelet count normalized. CXR interpreted by me shows new consolidation. Concern for aspiration pneumonia given recent vomiting. Start unasyn, if WBC improves tomorrow can likely dc to SNF on orals.     PATIENT SEEN BY PREVIOUS HOSPITALIST UNTIL 2/6 2/7: Patient seen at bedside in morning.  Overall patient feels better.  Patient had a hemoglobin drop from 12-10.1.  No signs of bleeding.  We will repeat hemoglobin later today and trended.  Continue to monitor closely.  We will continue antibiotics, follow cultures.    2/8: Patient seen at bedside this morning. Patient not complaining of any pain at this time. Hemoglobin seems to be stable.  Patient is medically clear to discharge to SNF. I spoke to General Surgery, from their standpoint patient can be discharged to SNF.    2/9: Patient seen at bedside this morning.  Abdomen seems to be more distended than yesterday.  I discussed case with general surgery who are recommending we do enema and suppository.  Patient mentions she still having bowel movements.  So ordered CT scan of the abdomen.  At this point we will cancel transfer to SNF until further studies have been made and cleared by surgery.    I have discussed this patient's plan of care and discharge plan at IDT rounds today with Case Management, Nursing, Nursing leadership, and other members of the IDT team.    Consultants/Specialty  general surgery    Code Status  Full Code    Disposition  Patient is medically cleared for discharge.   Anticipate discharge to to skilled nursing facility.  I have placed the appropriate orders for post-discharge needs.    Review of Systems  Review of Systems   Constitutional:  Positive for malaise/fatigue. Negative for chills and fever.   Respiratory:  Negative for cough and shortness of breath.    Cardiovascular:  Negative for chest pain and leg swelling.    Gastrointestinal:  Positive for diarrhea and nausea. Negative for abdominal pain, constipation, heartburn and vomiting.   Genitourinary:  Negative for dysuria and frequency.   Musculoskeletal:  Negative for back pain and myalgias.   Neurological:  Negative for dizziness, focal weakness, weakness and headaches.   Psychiatric/Behavioral:  Positive for depression. Negative for hallucinations. The patient is nervous/anxious.    All other systems reviewed and are negative.     Physical Exam  Temp:  [36.4 °C (97.5 °F)-36.8 °C (98.3 °F)] 36.4 °C (97.5 °F)  Pulse:  [] 94  Resp:  [16-18] 17  BP: (148-161)/(66-85) 155/76  SpO2:  [90 %-95 %] 93 %    Physical Exam  Constitutional:       General: She is not in acute distress.     Appearance: She is obese. She is ill-appearing.   HENT:      Head: Normocephalic and atraumatic.      Mouth/Throat:      Pharynx: Oropharynx is clear.   Eyes:      Conjunctiva/sclera: Conjunctivae normal.   Cardiovascular:      Rate and Rhythm: Regular rhythm. Tachycardia present.      Pulses: Normal pulses.   Pulmonary:      Effort: No respiratory distress.      Breath sounds: No wheezing.   Abdominal:      General: There is distension.      Palpations: Abdomen is soft.      Tenderness: There is no abdominal tenderness.   Musculoskeletal:         General: No swelling.      Cervical back: Neck supple. No muscular tenderness.   Skin:     General: Skin is warm and dry.      Findings: No rash.   Neurological:      General: No focal deficit present.      Mental Status: She is alert and oriented to person, place, and time.      Cranial Nerves: No cranial nerve deficit.      Motor: Weakness present.   Psychiatric:         Mood and Affect: Mood normal.         Thought Content: Thought content normal.       Fluids    Intake/Output Summary (Last 24 hours) at 2/9/2023 1148  Last data filed at 2/9/2023 0800  Gross per 24 hour   Intake 260 ml   Output 0 ml   Net 260 ml         Laboratory  Recent Labs      02/07/23  0535 02/07/23  1133 02/07/23  1700 02/08/23  0230   WBC 10.7  --   --   --    RBC 3.09*  --   --   --    HEMOGLOBIN 10.1* 11.7* 11.9* 10.6*   HEMATOCRIT 30.9* 35.3* 36.8* 32.5*   .0*  --   --   --    MCH 32.7  --   --   --    MCHC 32.7*  --   --   --    RDW 44.7  --   --   --    PLATELETCT 251  --   --   --    MPV 11.1  --   --   --          Recent Labs     02/08/23  0230 02/09/23  0220   SODIUM 136 136   POTASSIUM 3.8 4.2   CHLORIDE 102 101   CO2 26 26   GLUCOSE 118* 115*   BUN 30* 21   CREATININE 0.79 0.75   CALCIUM 9.0 9.3                       Imaging  CT-ABDOMEN-PELVIS W/O   Final Result      1.  Dilated loops of bowel including loops of colon in a pattern more suggestive of ileus than obstruction though a early or low-grade obstruction is a possibility.   2.  LEFT renal staghorn calculus   3.  Atherosclerosis      DX-CHEST-PORTABLE (1 VIEW)   Final Result      Decreasing lung volumes with new consolidation off the cardiac apex worrisome for pneumonia      Bronchial thickening centrally could be from aspiration or infection      IR-PICC LINE PLACEMENT W/ GUIDANCE > AGE 5   Final Result                  Ultrasound-guided PICC placement performed by qualified nursing staff as    above.          DX-SMALL BOWEL SERIES   Final Result      1.  Minimal progression of oral contrast in the bowel on 8 hours of imaging with dilution of contrast could be due to extreme dysmotility/ileus versus obstruction. Review of the previous CT scan shows no transition point of the small bowel dilatation    with apparent contrast in the cecum and gas seen extending through the transverse colon.      DX-ABDOMEN FOR TUBE PLACEMENT   Final Result      NG tube tip overlies the gastric body.      SN-CUPMLTA-4 VIEW   Final Result      Persistent findings of small bowel obstruction      DX-ABDOMEN FOR TUBE PLACEMENT   Final Result         1.  Air-filled distended bowel loops in the upper abdomen, could represent ileus versus  enterocolitis or evolving obstructive changes. Radiographic follow-up to resolution as clinically appropriate.   2.  Nasogastric tube tip terminates overlying the expected location of the gastric fundus.   3.  Left lower lobe atelectasis, infiltrate, and/or effusion.   4.  Cardiomegaly   5.  Atherosclerosis      CT-ABDOMEN-PELVIS W/O   Final Result      1.  Dilated loops of small intestine with air/blood levels consistent with small bowel obstruction.      2.  Fatty liver.      3.  Multiple left-sided renal stones measuring up to 2 cm in size.      4.  Prior hysterectomy.      5.  Sigmoid diverticulosis.             Assessment/Plan  * SBO (small bowel obstruction) (HCC)- (present on admission)  Assessment & Plan  Presented with severe abdominal pain and distention associated with vomiting.  She has not had a bowel obstruction and failed to improve with conservative management/NG tube  Small bowel follow-through on 1/28 revealed a high-grade obstruction therefore she was taken emergently to the operating room later that day  Postop day 7  -surgery cleared patient for dc   -tolerating diet   PT/OT- post acute    PM&R consulted- pt refused   Pending SNF placement     resolved    Anemia  Assessment & Plan  No signs of bleeding, however patient's Hb dropped from 12 to 10.1  We will repeat Hb later today and trend  Monitor closely    Aspiration pneumonia (HCC)  Assessment & Plan  Continue antibiotics  Monitor cultures    Acute respiratory failure with hypoxia (HCC)  Assessment & Plan  At baseline she has obesity and sleep apnea her acute component was secondary to atelectasis secondary to abdominal distention  Remains on oxygen,4L  Encourage IS  Supplemental O2 as needed    2/7: Patient currently on 1L. Concern for aspiration pneumonia, continue antibiotics.    AZUL on CPAP- (present on admission)  Assessment & Plan  The patient at home uses CPAP unable to use with NGT  Oxygen at night  She is requiring 4L at this time due  to atelectasis    2/7: We will add nocturnal CPAP    Sepsis (HCC)  Assessment & Plan  This is Sepsis Not present on admission  SIRS criteria identified on my evaluation include: Tachycardia, with heart rate greater than 90 BPM and Leukocytosis, with WBC greater than 12,000  Source is suspect aspiration pneumonia   Sepsis protocol initiated  IV antibiotics as appropriate for source of sepsis  Reassessment: I have reassessed the patient's hemodynamic status    Start unasyn     2/7: It does not seem patient meets sepsis criteria today.      Arthritis  Assessment & Plan  The patient has chronic arthritis and has been treated with prednisone 5 mg daily  Continue IV Solu-Medrol as IV substitute    Hypernatremia  Assessment & Plan  Change to d5 1/2 ns    resolved    Obesity (BMI 30-39.9)- (present on admission)  Assessment & Plan  Body mass index is 34.32 kg/m².  Outpatient weight loss management program highly recommended    Anxiety- (present on admission)  Assessment & Plan  Chronic anxiety and the patient at home was on Zoloft.    For now hold the Zoloft while npo    Hyperlipidemia- (present on admission)  Assessment & Plan  Once the patient is able to resume oral intake low-fat low-cholesterol diet with Crestor and cholestyramine will be recommended.    HTN (hypertension)- (present on admission)  Assessment & Plan  For now we are holding Coreg and lisinopril.    Use as needed labetalol only.    --We will resume the patient's home dose of lisinopril         VTE prophylaxis: SCDs/TEDs    I have performed a physical exam and reviewed and updated ROS and Plan today (2/9/2023). In review of yesterday's note (2/8/2023), there are no changes except as documented above.

## 2023-02-09 NOTE — CARE PLAN
The patient is Stable - Low risk of patient condition declining or worsening    Shift Goals  Clinical Goals: Pt will be from fall/injury during this shift  Patient Goals: Pt able to sleep comfortably  Family Goals: n/a    Progress made toward(s) clinical / shift goals:  Pt denies any pain or N/V. Able to go bathroom with SBA using FWW with steady gait. Pt able to sleep for 4+ hours overnight. Fall risk precaution in place, pt did not sustain any falls during this shift.    Patient is not progressing towards the following goals:

## 2023-02-09 NOTE — DISCHARGE PLANNING
Case Management Discharge Planning    Admission Date: 1/24/2023  GMLOS: 7.7  ALOS: 16    6-Clicks ADL Score: 18  6-Clicks Mobility Score: 14  PT and/or OT Eval ordered: Yes  Post-acute Referrals Ordered: Yes  Post-acute Choice Obtained: Yes  Has referral(s) been sent to post-acute provider:  Yes      Anticipated Discharge Dispo: Discharge Disposition: D/T to SNF with Medicare cert in anticipation of skilled care (03)    DME Needed: No    Action(s) Taken: Updated Provider/Nurse on Discharge Plan    0840: Per MD, patient is no longer medically cleared.  RN MERNA attempted to call Martina with [facility] to notify her of cancellation, no answer, unable to leave VM.  DILIP BAUMAN to call Martina at a later time.     1015: Nolan CHERRY CM has notified Martina that transportation for patient will need to be canceled as patient is not medically cleared.     1353: Per MD, patient is now medically cleared.  RN CM called Martina with [facility] who states she can take patient today and has scheduled transportation for today at 1630.  MD and RN notified.     Escalations Completed: None    Medically Clear: Yes    Next Steps: DC to [facility] at 1630    Barriers to Discharge: None

## 2023-02-09 NOTE — PROGRESS NOTES
BSSR received from Day RN. Pt is resting comfortably in bed, not in any distress, on 1L O2 at SpO2 92%. AAOx4. Pt denies any pain or N/V. Discussed POC, answered all questions. Fall risk precaution in place, locked bed in lowest position, bed alarm on, personal items and call light within reach. All needs met at this time. Hourly rounding in place.

## 2023-02-09 NOTE — CARE PLAN
The patient is Stable - Low risk of patient condition declining or worsening    Shift Goals  Clinical Goals: Able to tolerate diet with no nausea and vomiting  Patient Goals: rest and comfort  Family Goals: updated with POC    Progress made toward(s) clinical / shift goals: Pt able to tolerate her diet all through the shift with no c/o nausea or vomiting. Boost supplements were offered and consumed.     Patient is not progressing towards the following goals: n/a

## 2023-02-09 NOTE — PROGRESS NOTES
Surgical Progress Note    Author: Mike Valles M.D.      Interval Events:  77-year-old female approximately 1-1/2 weeks status post exploratory laparotomy for small bowel obstruction.  Patient has been eating and having regular bowel movements and was scheduled for discharge yesterday however this was delayed secondary to some abdominal distention.  Patient is tolerating her diet and continues to have bowel movements, intermittently loose.  Recommended she receive a Dulcolax suppository and enema this morning and she has had multiple bowel movements with significant relief      ROS  Hemodynamics:  Temp (24hrs), Av.7 °C (98.1 °F), Min:36.4 °C (97.5 °F), Max:36.8 °C (98.3 °F)  Temperature: 36.4 °C (97.5 °F)  Pulse  Av.7  Min: 62  Max: 116   Blood Pressure : (!) 147/69     Respiratory:    Respiration: 17, Pulse Oximetry: 93 %     Work Of Breathing / Effort: Shallow  RUL Breath Sounds: Diminished, RML Breath Sounds: Diminished, RLL Breath Sounds: Clear;Diminished, ALAN Breath Sounds: Diminished, LLL Breath Sounds: Clear;Diminished  Neuro:  GCS       Fluids:    Intake/Output Summary (Last 24 hours) at 2023 1445  Last data filed at 2023 0800  Gross per 24 hour   Intake 260 ml   Output 0 ml   Net 260 ml        Current Diet Order   Procedures    Diet Order Diet: Low Fiber(GI Soft)     Physical Exam  Appears well  Neck supple  Regular heart rate  Normal respiratory effort  Abdomen soft round, incision intact  Skin Pink and well perfused    Labs:  Recent Results (from the past 24 hour(s))   Comp Metabolic Panel    Collection Time: 23  2:20 AM   Result Value Ref Range    Sodium 136 135 - 145 mmol/L    Potassium 4.2 3.6 - 5.5 mmol/L    Chloride 101 96 - 112 mmol/L    Co2 26 20 - 33 mmol/L    Anion Gap 9.0 7.0 - 16.0    Glucose 115 (H) 65 - 99 mg/dL    Bun 21 8 - 22 mg/dL    Creatinine 0.75 0.50 - 1.40 mg/dL    Calcium 9.3 8.4 - 10.2 mg/dL    AST(SGOT) 14 12 - 45 U/L    ALT(SGPT) 12 2 - 50 U/L     Alkaline Phosphatase 83 30 - 99 U/L    Total Bilirubin 0.4 0.1 - 1.5 mg/dL    Albumin 2.8 (L) 3.2 - 4.9 g/dL    Total Protein 6.2 6.0 - 8.2 g/dL    Globulin 3.4 1.9 - 3.5 g/dL    A-G Ratio 0.8 g/dL   Magnesium    Collection Time: 02/09/23  2:20 AM   Result Value Ref Range    Magnesium 1.8 1.5 - 2.5 mg/dL   Phosphorus    Collection Time: 02/09/23  2:20 AM   Result Value Ref Range    Phosphorus 2.4 (L) 2.5 - 4.5 mg/dL   CORRECTED CALCIUM    Collection Time: 02/09/23  2:20 AM   Result Value Ref Range    Correct Calcium 10.3 8.5 - 10.5 mg/dL   ESTIMATED GFR    Collection Time: 02/09/23  2:20 AM   Result Value Ref Range    GFR (CKD-EPI) 81 >60 mL/min/1.73 m 2     Medical Decision Making, by Problem:  Active Hospital Problems    Diagnosis     Kidney stone [N20.0]     Aspiration pneumonia (HCC) [J69.0]     Anemia [D64.9]     Sepsis (HCC) [A41.9]     Acute respiratory failure with hypoxia (HCC) [J96.01]     SBO (small bowel obstruction) (HCC) [K56.609]     Hypernatremia [E87.0]     Arthritis [M19.90]     Obesity (BMI 30-39.9) [E66.9]     Anxiety [F41.9]     AZUL on CPAP [G47.33, Z99.89]     Hyperlipidemia [E78.5]     HTN (hypertension) [I10]      Plan:  77-year-old female status post exploratory laparotomy for small bowel obstruction  1.  Aggressive stool regimen  2.  Enema/Dulcolax as needed  3.  Encourage ambulation  4.  DC planning for this afternoon  5.  Follow-up in my office in 1 to 2 weeks  6.  No heavy lifting (less than 15 pounds) for 6 weeks    Quality Measures:  Quality-Core Measures    Discussed patient condition with Family and Patient

## 2023-02-10 NOTE — PROGRESS NOTES
Discharge instructions, home medication and follow up appointment discussed with patient and understood. Denies any question at this time. All belongings sent with. Discharged alert and oriented x4 in stable condition.

## 2023-02-12 LAB
BACTERIA BLD CULT: NORMAL
BACTERIA BLD CULT: NORMAL
SIGNIFICANT IND 70042: NORMAL
SIGNIFICANT IND 70042: NORMAL
SITE SITE: NORMAL
SITE SITE: NORMAL
SOURCE SOURCE: NORMAL
SOURCE SOURCE: NORMAL

## 2023-02-15 ENCOUNTER — HOSPITAL ENCOUNTER (INPATIENT)
Facility: MEDICAL CENTER | Age: 78
LOS: 3 days | DRG: 392 | End: 2023-02-18
Attending: EMERGENCY MEDICINE | Admitting: HOSPITALIST
Payer: MEDICARE

## 2023-02-15 ENCOUNTER — APPOINTMENT (OUTPATIENT)
Dept: RADIOLOGY | Facility: MEDICAL CENTER | Age: 78
DRG: 392 | End: 2023-02-15
Attending: EMERGENCY MEDICINE
Payer: MEDICARE

## 2023-02-15 DIAGNOSIS — K57.92 DIVERTICULITIS: ICD-10-CM

## 2023-02-15 DIAGNOSIS — I15.9 SECONDARY HYPERTENSION: ICD-10-CM

## 2023-02-15 DIAGNOSIS — D64.9 ANEMIA, UNSPECIFIED TYPE: ICD-10-CM

## 2023-02-15 DIAGNOSIS — K56.7 ILEUS (HCC): ICD-10-CM

## 2023-02-15 LAB
ALBUMIN SERPL BCP-MCNC: 2.6 G/DL (ref 3.2–4.9)
ALBUMIN/GLOB SERPL: 0.9 G/DL
ALP SERPL-CCNC: 66 U/L (ref 30–99)
ALT SERPL-CCNC: 10 U/L (ref 2–50)
ANION GAP SERPL CALC-SCNC: 8 MMOL/L (ref 7–16)
AST SERPL-CCNC: 16 U/L (ref 12–45)
BASOPHILS # BLD AUTO: 0.7 % (ref 0–1.8)
BASOPHILS # BLD: 0.05 K/UL (ref 0–0.12)
BILIRUB SERPL-MCNC: 0.2 MG/DL (ref 0.1–1.5)
BLOOD CULTURE HOLD CXBCH: NORMAL
BLOOD CULTURE HOLD CXBCH: NORMAL
BUN SERPL-MCNC: 17 MG/DL (ref 8–22)
CALCIUM ALBUM COR SERPL-MCNC: 10.4 MG/DL (ref 8.5–10.5)
CALCIUM SERPL-MCNC: 9.3 MG/DL (ref 8.4–10.2)
CHLORIDE SERPL-SCNC: 106 MMOL/L (ref 96–112)
CO2 SERPL-SCNC: 23 MMOL/L (ref 20–33)
CREAT SERPL-MCNC: 0.78 MG/DL (ref 0.5–1.4)
EOSINOPHIL # BLD AUTO: 0.16 K/UL (ref 0–0.51)
EOSINOPHIL NFR BLD: 2.2 % (ref 0–6.9)
ERYTHROCYTE [DISTWIDTH] IN BLOOD BY AUTOMATED COUNT: 44 FL (ref 35.9–50)
GFR SERPLBLD CREATININE-BSD FMLA CKD-EPI: 78 ML/MIN/1.73 M 2
GLOBULIN SER CALC-MCNC: 3 G/DL (ref 1.9–3.5)
GLUCOSE SERPL-MCNC: 118 MG/DL (ref 65–99)
HCT VFR BLD AUTO: 31.5 % (ref 37–47)
HGB BLD-MCNC: 10.3 G/DL (ref 12–16)
IMM GRANULOCYTES # BLD AUTO: 0.13 K/UL (ref 0–0.11)
IMM GRANULOCYTES NFR BLD AUTO: 1.8 % (ref 0–0.9)
LIPASE SERPL-CCNC: 121 U/L (ref 7–58)
LYMPHOCYTES # BLD AUTO: 0.96 K/UL (ref 1–4.8)
LYMPHOCYTES NFR BLD: 13.2 % (ref 22–41)
MCH RBC QN AUTO: 32.5 PG (ref 27–33)
MCHC RBC AUTO-ENTMCNC: 32.7 G/DL (ref 33.6–35)
MCV RBC AUTO: 99.4 FL (ref 81.4–97.8)
MONOCYTES # BLD AUTO: 0.47 K/UL (ref 0–0.85)
MONOCYTES NFR BLD AUTO: 6.4 % (ref 0–13.4)
NEUTROPHILS # BLD AUTO: 5.52 K/UL (ref 2–7.15)
NEUTROPHILS NFR BLD: 75.7 % (ref 44–72)
NRBC # BLD AUTO: 0 K/UL
NRBC BLD-RTO: 0 /100 WBC
PLATELET # BLD AUTO: 289 K/UL (ref 164–446)
PMV BLD AUTO: 10.7 FL (ref 9–12.9)
POTASSIUM SERPL-SCNC: 3.7 MMOL/L (ref 3.6–5.5)
PROT SERPL-MCNC: 5.6 G/DL (ref 6–8.2)
RBC # BLD AUTO: 3.17 M/UL (ref 4.2–5.4)
SODIUM SERPL-SCNC: 137 MMOL/L (ref 135–145)
WBC # BLD AUTO: 7.3 K/UL (ref 4.8–10.8)

## 2023-02-15 PROCEDURE — 96375 TX/PRO/DX INJ NEW DRUG ADDON: CPT

## 2023-02-15 PROCEDURE — 36415 COLL VENOUS BLD VENIPUNCTURE: CPT

## 2023-02-15 PROCEDURE — 99223 1ST HOSP IP/OBS HIGH 75: CPT | Mod: AI | Performed by: HOSPITALIST

## 2023-02-15 PROCEDURE — 83690 ASSAY OF LIPASE: CPT

## 2023-02-15 PROCEDURE — 700102 HCHG RX REV CODE 250 W/ 637 OVERRIDE(OP): Performed by: HOSPITALIST

## 2023-02-15 PROCEDURE — A9270 NON-COVERED ITEM OR SERVICE: HCPCS | Performed by: HOSPITALIST

## 2023-02-15 PROCEDURE — 96365 THER/PROPH/DIAG IV INF INIT: CPT

## 2023-02-15 PROCEDURE — 74177 CT ABD & PELVIS W/CONTRAST: CPT

## 2023-02-15 PROCEDURE — 770001 HCHG ROOM/CARE - MED/SURG/GYN PRIV*

## 2023-02-15 PROCEDURE — 700101 HCHG RX REV CODE 250: Performed by: HOSPITALIST

## 2023-02-15 PROCEDURE — 700111 HCHG RX REV CODE 636 W/ 250 OVERRIDE (IP): Performed by: EMERGENCY MEDICINE

## 2023-02-15 PROCEDURE — 700105 HCHG RX REV CODE 258: Performed by: HOSPITALIST

## 2023-02-15 PROCEDURE — 99285 EMERGENCY DEPT VISIT HI MDM: CPT

## 2023-02-15 PROCEDURE — 700117 HCHG RX CONTRAST REV CODE 255: Performed by: EMERGENCY MEDICINE

## 2023-02-15 PROCEDURE — 80053 COMPREHEN METABOLIC PANEL: CPT

## 2023-02-15 PROCEDURE — 85025 COMPLETE CBC W/AUTO DIFF WBC: CPT

## 2023-02-15 PROCEDURE — 700101 HCHG RX REV CODE 250: Performed by: EMERGENCY MEDICINE

## 2023-02-15 RX ORDER — METRONIDAZOLE 500 MG/100ML
500 INJECTION, SOLUTION INTRAVENOUS EVERY 8 HOURS
Status: DISCONTINUED | OUTPATIENT
Start: 2023-02-15 | End: 2023-02-17

## 2023-02-15 RX ORDER — LORAZEPAM 0.5 MG/1
0.5 TABLET ORAL
COMMUNITY

## 2023-02-15 RX ORDER — CEFTRIAXONE 2 G/1
2000 INJECTION, POWDER, FOR SOLUTION INTRAMUSCULAR; INTRAVENOUS ONCE
Status: COMPLETED | OUTPATIENT
Start: 2023-02-15 | End: 2023-02-15

## 2023-02-15 RX ORDER — LORAZEPAM 0.5 MG/1
0.5 TABLET ORAL
Status: DISCONTINUED | OUTPATIENT
Start: 2023-02-15 | End: 2023-02-18 | Stop reason: HOSPADM

## 2023-02-15 RX ORDER — ONDANSETRON 4 MG/1
4 TABLET, ORALLY DISINTEGRATING ORAL EVERY 4 HOURS PRN
Status: DISCONTINUED | OUTPATIENT
Start: 2023-02-15 | End: 2023-02-18 | Stop reason: HOSPADM

## 2023-02-15 RX ORDER — POLYETHYLENE GLYCOL 3350 17 G/17G
1 POWDER, FOR SOLUTION ORAL
Status: DISCONTINUED | OUTPATIENT
Start: 2023-02-15 | End: 2023-02-18 | Stop reason: HOSPADM

## 2023-02-15 RX ORDER — SODIUM CHLORIDE, SODIUM LACTATE, POTASSIUM CHLORIDE, AND CALCIUM CHLORIDE .6; .31; .03; .02 G/100ML; G/100ML; G/100ML; G/100ML
500 INJECTION, SOLUTION INTRAVENOUS
Status: DISCONTINUED | OUTPATIENT
Start: 2023-02-15 | End: 2023-02-18 | Stop reason: HOSPADM

## 2023-02-15 RX ORDER — ACETAMINOPHEN 325 MG/1
650 TABLET ORAL EVERY 6 HOURS PRN
Status: DISCONTINUED | OUTPATIENT
Start: 2023-02-15 | End: 2023-02-18 | Stop reason: HOSPADM

## 2023-02-15 RX ORDER — METRONIDAZOLE 500 MG/100ML
500 INJECTION, SOLUTION INTRAVENOUS ONCE
Status: COMPLETED | OUTPATIENT
Start: 2023-02-15 | End: 2023-02-15

## 2023-02-15 RX ORDER — SODIUM CHLORIDE, SODIUM LACTATE, POTASSIUM CHLORIDE, AND CALCIUM CHLORIDE .6; .31; .03; .02 G/100ML; G/100ML; G/100ML; G/100ML
30 INJECTION, SOLUTION INTRAVENOUS
Status: DISCONTINUED | OUTPATIENT
Start: 2023-02-15 | End: 2023-02-18 | Stop reason: HOSPADM

## 2023-02-15 RX ORDER — LANOLIN ALCOHOL/MO/W.PET/CERES
6 CREAM (GRAM) TOPICAL
COMMUNITY
End: 2023-11-15

## 2023-02-15 RX ORDER — FERROUS SULFATE 325(65) MG
325 TABLET ORAL DAILY
COMMUNITY

## 2023-02-15 RX ORDER — HEPARIN SODIUM 5000 [USP'U]/ML
5000 INJECTION, SOLUTION INTRAVENOUS; SUBCUTANEOUS EVERY 8 HOURS
Status: DISCONTINUED | OUTPATIENT
Start: 2023-02-16 | End: 2023-02-18 | Stop reason: HOSPADM

## 2023-02-15 RX ORDER — SODIUM CHLORIDE, SODIUM LACTATE, POTASSIUM CHLORIDE, CALCIUM CHLORIDE 600; 310; 30; 20 MG/100ML; MG/100ML; MG/100ML; MG/100ML
INJECTION, SOLUTION INTRAVENOUS CONTINUOUS
Status: DISCONTINUED | OUTPATIENT
Start: 2023-02-15 | End: 2023-02-17

## 2023-02-15 RX ORDER — HYDRALAZINE HYDROCHLORIDE 20 MG/ML
10 INJECTION INTRAMUSCULAR; INTRAVENOUS EVERY 6 HOURS PRN
Status: DISCONTINUED | OUTPATIENT
Start: 2023-02-15 | End: 2023-02-18 | Stop reason: HOSPADM

## 2023-02-15 RX ORDER — ONDANSETRON 2 MG/ML
4 INJECTION INTRAMUSCULAR; INTRAVENOUS EVERY 4 HOURS PRN
Status: DISCONTINUED | OUTPATIENT
Start: 2023-02-15 | End: 2023-02-18 | Stop reason: HOSPADM

## 2023-02-15 RX ORDER — OMEPRAZOLE 20 MG/1
20 CAPSULE, DELAYED RELEASE ORAL DAILY
Status: DISCONTINUED | OUTPATIENT
Start: 2023-02-16 | End: 2023-02-18 | Stop reason: HOSPADM

## 2023-02-15 RX ORDER — ZOLPIDEM TARTRATE 5 MG/1
5 TABLET ORAL NIGHTLY PRN
Status: DISCONTINUED | OUTPATIENT
Start: 2023-02-15 | End: 2023-02-18 | Stop reason: HOSPADM

## 2023-02-15 RX ORDER — BISACODYL 10 MG
10 SUPPOSITORY, RECTAL RECTAL
Status: DISCONTINUED | OUTPATIENT
Start: 2023-02-15 | End: 2023-02-18 | Stop reason: HOSPADM

## 2023-02-15 RX ORDER — AMOXICILLIN 250 MG
2 CAPSULE ORAL 2 TIMES DAILY
Status: DISCONTINUED | OUTPATIENT
Start: 2023-02-16 | End: 2023-02-18 | Stop reason: HOSPADM

## 2023-02-15 RX ORDER — PREDNISONE 5 MG/1
5 TABLET ORAL DAILY
Status: DISCONTINUED | OUTPATIENT
Start: 2023-02-16 | End: 2023-02-18 | Stop reason: HOSPADM

## 2023-02-15 RX ORDER — ROSUVASTATIN CALCIUM 10 MG/1
10 TABLET, COATED ORAL EVERY EVENING
Status: DISCONTINUED | OUTPATIENT
Start: 2023-02-15 | End: 2023-02-18 | Stop reason: HOSPADM

## 2023-02-15 RX ORDER — CARVEDILOL 6.25 MG/1
12.5 TABLET ORAL 2 TIMES DAILY WITH MEALS
Status: DISCONTINUED | OUTPATIENT
Start: 2023-02-15 | End: 2023-02-17

## 2023-02-15 RX ORDER — AMOXICILLIN AND CLAVULANATE POTASSIUM 875; 125 MG/1; MG/1
1 TABLET, FILM COATED ORAL 2 TIMES DAILY
Status: ON HOLD | COMMUNITY
End: 2023-02-18

## 2023-02-15 RX ORDER — VITAMIN B COMPLEX
1000 TABLET ORAL DAILY
COMMUNITY

## 2023-02-15 RX ORDER — LISINOPRIL 20 MG/1
40 TABLET ORAL DAILY
Status: DISCONTINUED | OUTPATIENT
Start: 2023-02-16 | End: 2023-02-18 | Stop reason: HOSPADM

## 2023-02-15 RX ADMIN — CEFTRIAXONE SODIUM 2000 MG: 2 INJECTION, POWDER, FOR SOLUTION INTRAMUSCULAR; INTRAVENOUS at 19:31

## 2023-02-15 RX ADMIN — SODIUM CHLORIDE, POTASSIUM CHLORIDE, SODIUM LACTATE AND CALCIUM CHLORIDE: 600; 310; 30; 20 INJECTION, SOLUTION INTRAVENOUS at 20:28

## 2023-02-15 RX ADMIN — Medication 6 MG: at 21:00

## 2023-02-15 RX ADMIN — CARVEDILOL 12.5 MG: 6.25 TABLET, FILM COATED ORAL at 20:25

## 2023-02-15 RX ADMIN — METRONIDAZOLE 500 MG: 500 INJECTION, SOLUTION INTRAVENOUS at 19:37

## 2023-02-15 RX ADMIN — IOHEXOL 100 ML: 350 INJECTION, SOLUTION INTRAVENOUS at 18:45

## 2023-02-15 RX ADMIN — LORAZEPAM 0.5 MG: 0.5 TABLET ORAL at 22:20

## 2023-02-15 RX ADMIN — ROSUVASTATIN 10 MG: 10 TABLET, FILM COATED ORAL at 22:22

## 2023-02-15 RX ADMIN — METRONIDAZOLE 500 MG: 500 INJECTION, SOLUTION INTRAVENOUS at 22:33

## 2023-02-15 ASSESSMENT — ENCOUNTER SYMPTOMS
VOMITING: 0
MYALGIAS: 0
FEVER: 0
DIARRHEA: 1
NERVOUS/ANXIOUS: 0
STRIDOR: 0
SHORTNESS OF BREATH: 0
FLANK PAIN: 0
EYE DISCHARGE: 0
ABDOMINAL PAIN: 0
COUGH: 0
EYE REDNESS: 0
FOCAL WEAKNESS: 0
BRUISES/BLEEDS EASILY: 0
CHILLS: 0

## 2023-02-15 ASSESSMENT — FIBROSIS 4 INDEX
FIB4 SCORE: 1.24
FIB4 SCORE: 1.35

## 2023-02-15 ASSESSMENT — LIFESTYLE VARIABLES
TOTAL SCORE: 0
ON A TYPICAL DAY WHEN YOU DRINK ALCOHOL HOW MANY DRINKS DO YOU HAVE: 0
TOTAL SCORE: 0
HAVE PEOPLE ANNOYED YOU BY CRITICIZING YOUR DRINKING: NO
AVERAGE NUMBER OF DAYS PER WEEK YOU HAVE A DRINK CONTAINING ALCOHOL: 0
CONSUMPTION TOTAL: NEGATIVE
HAVE YOU EVER FELT YOU SHOULD CUT DOWN ON YOUR DRINKING: NO
DO YOU DRINK ALCOHOL: NO
CONSUMPTION TOTAL: NEGATIVE
TOTAL SCORE: 0
ON A TYPICAL DAY WHEN YOU DRINK ALCOHOL HOW MANY DRINKS DO YOU HAVE: 0
HOW MANY TIMES IN THE PAST YEAR HAVE YOU HAD 5 OR MORE DRINKS IN A DAY: 0
EVER HAD A DRINK FIRST THING IN THE MORNING TO STEADY YOUR NERVES TO GET RID OF A HANGOVER: NO
HAVE YOU EVER FELT YOU SHOULD CUT DOWN ON YOUR DRINKING: NO
TOTAL SCORE: 0
HAVE PEOPLE ANNOYED YOU BY CRITICIZING YOUR DRINKING: NO
EVER FELT BAD OR GUILTY ABOUT YOUR DRINKING: NO
EVER FELT BAD OR GUILTY ABOUT YOUR DRINKING: NO
HOW MANY TIMES IN THE PAST YEAR HAVE YOU HAD 5 OR MORE DRINKS IN A DAY: 0
TOTAL SCORE: 0
AVERAGE NUMBER OF DAYS PER WEEK YOU HAVE A DRINK CONTAINING ALCOHOL: 0
EVER HAD A DRINK FIRST THING IN THE MORNING TO STEADY YOUR NERVES TO GET RID OF A HANGOVER: NO
TOTAL SCORE: 0
ALCOHOL_USE: NO

## 2023-02-15 ASSESSMENT — COGNITIVE AND FUNCTIONAL STATUS - GENERAL
DAILY ACTIVITIY SCORE: 17
TURNING FROM BACK TO SIDE WHILE IN FLAT BAD: A LITTLE
SUGGESTED CMS G CODE MODIFIER MOBILITY: CK
MOVING TO AND FROM BED TO CHAIR: A LITTLE
EATING MEALS: A LITTLE
WALKING IN HOSPITAL ROOM: A LITTLE
DRESSING REGULAR UPPER BODY CLOTHING: A LITTLE
DRESSING REGULAR LOWER BODY CLOTHING: A LOT
CLIMB 3 TO 5 STEPS WITH RAILING: A LOT
PERSONAL GROOMING: A LITTLE
SUGGESTED CMS G CODE MODIFIER DAILY ACTIVITY: CK
MOVING FROM LYING ON BACK TO SITTING ON SIDE OF FLAT BED: A LITTLE
HELP NEEDED FOR BATHING: A LITTLE
STANDING UP FROM CHAIR USING ARMS: A LITTLE
TOILETING: A LITTLE
MOBILITY SCORE: 17

## 2023-02-15 ASSESSMENT — PAIN DESCRIPTION - PAIN TYPE: TYPE: ACUTE PAIN

## 2023-02-15 NOTE — ED NOTES
Med rec updated and complete, per [facility] ([phone])   Allergies reviewed, per pt

## 2023-02-15 NOTE — ED TRIAGE NOTES
Pt to ed from [facility]  Abd surgery few weeks ago for SBO  Routine abd xray today abnormal    Pt denies any n/v

## 2023-02-15 NOTE — ED PROVIDER NOTES
ER Provider Note    Scribed for Jean Paul Vasquez M.d. by Reanna Orellana. 2/15/2023  3:41 PM    Primary Care Provider: Marge Brasher M.D.    CHIEF COMPLAINT  Chief Complaint   Patient presents with    Abnormal Labs     Abd xray     EXTERNAL RECORDS REVIEWED  Inpatient Notes Patient was found to have a small bowel obstruction on January 24th, had no improvement with NG tube placement and was taken to the OR on January 28th. In the ord she was found to have omentum wrapped around the small bowl. She had obstruction removed and the rest of her healing process has been benign. and Outpatient labs & studies Patient had an abdominal x-ray today which showed the majority of the coon is distended wit maximum distention of approximately 12 cm in diameter. Minimal gas in the small bowel. Minimal gas in the rectosigmoid colon. Spondylosis and mild scoliosis. Diffuse arterial calcification. IMPRESSION: Colonic ileus early/partial distal obstruction not excludable.    HPI/ROS  LIMITATION TO HISTORY   Select: : None  OUTSIDE HISTORIAN(S):  Family Daughter    Clau Vences is a 77 y.o. female who presents to the ED complaining of diarrhea onset this morning. Patient then had a stomach x-ray which was found to be inconclusive but was sent to the ER to rule out another bowel obstruction. She states she has not had diarrhea today but last had diarrhea yesterday. She denies any abdominal pain, nausea or vomiting. She does report her stomach being more bloated than normal. Patient reports feeling normal and daughter also reports over the past 4 weeks she has been improving and getting stronger. Patient does not think she has passed gas today. Patients daughter reports her appetite has still not fully returned. She was placed on antibiotics (Amoxicillin) in her last hospital stay for pneumonia.     PAST MEDICAL HISTORY  Past Medical History:   Diagnosis Date    Anesthesia     low O2 sat    Arthritis     osteo    Back  pain     Dyslipidemia     [place] measles     Heart murmur 9/13/2017    Hypertension     Influenza     Mumps     Other specified symptom associated with female genital organs     Painful joint     Psychiatric problem     Sleep apnea     c-pap with 3 l/nc    Snoring     Sore muscles     Tonsillitis     Unspecified cataract        SURGICAL HISTORY  Past Surgical History:   Procedure Laterality Date    DE EXPLORATORY OF ABDOMEN N/A 1/28/2023    Procedure: EX LAP;  Surgeon: Mike Valles M.D.;  Location: SURGERY [facility];  Service: Gastroenterology    KNEE ARTHROPLASTY TOTAL Right 12/27/2017    HYSTERECTOMY ROBOTIC  10/23/2014    Performed by Smith Lyons M.D. at SURGERY [facility] ORS    GASTROSCOPY WITH BIOPSY  7/2/2014    Performed by Sky Vila M.D. at SURGERY [facility] ORS    OTHER ORTHOPEDIC SURGERY  2006    left total knee    APPENDECTOMY      ARTHROSCOPY, KNEE      HYSTERECTOMY LAPAROSCOPY      OTHER      meghan cataracts    OTHER ABDOMINAL SURGERY      Resection of pelvic mass, uterus and ovaries    DE BREAST REDUCTION Bilateral     1982    DE REMV 2ND CATARACT,CORN-SCLER SECTN      TONSILLECTOMY      TONSILLECTOMY AND ADENOIDECTOMY         FAMILY HISTORY  Family History   Problem Relation Age of Onset    Breast Cancer Mother     Cancer Mother 65        Breast    Cancer Father         Colon    Alcohol/Drug Father     Colon Cancer Father     Hyperlipidemia Brother     Heart Disease Brother         arrythmia       SOCIAL HISTORY   reports that she has never smoked. She has never used smokeless tobacco. She reports current alcohol use of about 0.6 - 1.2 oz per week. She reports that she does not use drugs.    CURRENT MEDICATIONS  Previous Medications    ACETAMINOPHEN (TYLENOL) 500 MG TAB    Take 1,000 mg by mouth every 6 hours as needed. Indications: Pain    CARVEDILOL (COREG) 12.5 MG TAB    TAKE 1 TABLET BY MOUTH TWICE DAILY WITH MEALS    CHOLECALCIFEROL (D3 PO)    Take 1 Capsule by mouth  every day.    CHOLESTYRAMINE (QUESTRAN) 4 G PACKET    Take 4 g by mouth every day.    FOLIC ACID PO    Take 1 Tablet by mouth every morning.    IRON PO    Take 1 Tablet by mouth every day.    LISINOPRIL (PRINIVIL) 20 MG TAB    TAKE 2 TABLETS BY MOUTH DAILY    MELATONIN 5 MG TAB    Take 1 Tablet by mouth every evening.    OMEGA-3 FATTY ACIDS (OMEGA 3 PO)    Take 1 Capsule by mouth every day.    OMEPRAZOLE (PRILOSEC) 20 MG DELAYED-RELEASE CAPSULE    Take 1 Capsule by mouth every day.    ONDANSETRON (ZOFRAN) 4 MG TAB TABLET    Take 1 Tablet by mouth every 6 hours as needed for Nausea/Vomiting.    PREDNISONE (DELTASONE) 5 MG TAB    TAKE 1 TABLET BY MOUTH DAILY    ROSUVASTATIN (CRESTOR) 10 MG TAB    TAKE ONE TABLET BY MOUTH IN THE EVENING    SENNOSIDES-DOCUSATE SODIUM (SENOKOT-S) 8.6-50 MG TABLET    Take 1 Tablet by mouth every day.    SERTRALINE (ZOLOFT) 100 MG TAB    TAKE 1 TABELT BY MOUTH DAILY    ZOLPIDEM (AMBIEN) 5 MG TAB    Take 5 mg by mouth at bedtime.       ALLERGIES  Tramadol    PHYSICAL EXAM  /62   Pulse 70   Temp 36.2 °C (97.2 °F)   Resp 18   Wt 99 kg (218 lb 4.1 oz)   SpO2 95%   BMI 34.18 kg/m²   Constitutional: Well developed, Well nourished, no distress.   HENT: Normocephalic, Atraumatic.   Eyes: Conjunctiva normal, No discharge.   Cardiovascular: Normal heart rate, Normal rhythm, No murmurs, equal pulses.   Pulmonary: Normal breath sounds, No respiratory distress, No wheezing, No rales, No rhonchi.  Chest: No chest wall tenderness or deformity.   Abdomen:Soft, No tenderness, no rebound, no guarding. Bowel sounds heard throughout, mild distention. Well healing midline abdominal incision with no swelling, erythema or tenderness. Abdomen tympanitic  Back: No CVA tenderness.   Musculoskeletal: No major deformities noted, No tenderness.   Skin: Warm, Dry, No erythema, No rash. No edema in legs.   Neurologic: Alert & oriented x 3, Normal motor function,  No focal deficits noted.   Psychiatric:  Affect normal, Judgment normal, Mood normal.     DIAGNOSTIC STUDIES    Labs:   Results for orders placed or performed during the hospital encounter of 02/15/23   CBC WITH DIFFERENTIAL   Result Value Ref Range    WBC 7.3 4.8 - 10.8 K/uL    RBC 3.17 (L) 4.20 - 5.40 M/uL    Hemoglobin 10.3 (L) 12.0 - 16.0 g/dL    Hematocrit 31.5 (L) 37.0 - 47.0 %    MCV 99.4 (H) 81.4 - 97.8 fL    MCH 32.5 27.0 - 33.0 pg    MCHC 32.7 (L) 33.6 - 35.0 g/dL    RDW 44.0 35.9 - 50.0 fL    Platelet Count 289 164 - 446 K/uL    MPV 10.7 9.0 - 12.9 fL    Neutrophils-Polys 75.70 (H) 44.00 - 72.00 %    Lymphocytes 13.20 (L) 22.00 - 41.00 %    Monocytes 6.40 0.00 - 13.40 %    Eosinophils 2.20 0.00 - 6.90 %    Basophils 0.70 0.00 - 1.80 %    Immature Granulocytes 1.80 (H) 0.00 - 0.90 %    Nucleated RBC 0.00 /100 WBC    Neutrophils (Absolute) 5.52 2.00 - 7.15 K/uL    Lymphs (Absolute) 0.96 (L) 1.00 - 4.80 K/uL    Monos (Absolute) 0.47 0.00 - 0.85 K/uL    Eos (Absolute) 0.16 0.00 - 0.51 K/uL    Baso (Absolute) 0.05 0.00 - 0.12 K/uL    Immature Granulocytes (abs) 0.13 (H) 0.00 - 0.11 K/uL    NRBC (Absolute) 0.00 K/uL   COMP METABOLIC PANEL   Result Value Ref Range    Sodium 137 135 - 145 mmol/L    Potassium 3.7 3.6 - 5.5 mmol/L    Chloride 106 96 - 112 mmol/L    Co2 23 20 - 33 mmol/L    Anion Gap 8.0 7.0 - 16.0    Glucose 118 (H) 65 - 99 mg/dL    Bun 17 8 - 22 mg/dL    Creatinine 0.78 0.50 - 1.40 mg/dL    Calcium 9.3 8.4 - 10.2 mg/dL    AST(SGOT) 16 12 - 45 U/L    ALT(SGPT) 10 2 - 50 U/L    Alkaline Phosphatase 66 30 - 99 U/L    Total Bilirubin 0.2 0.1 - 1.5 mg/dL    Albumin 2.6 (L) 3.2 - 4.9 g/dL    Total Protein 5.6 (L) 6.0 - 8.2 g/dL    Globulin 3.0 1.9 - 3.5 g/dL    A-G Ratio 0.9 g/dL   LIPASE   Result Value Ref Range    Lipase 121 (H) 7 - 58 U/L   CORRECTED CALCIUM   Result Value Ref Range    Correct Calcium 10.4 8.5 - 10.5 mg/dL   ESTIMATED GFR   Result Value Ref Range    GFR (CKD-EPI) 78 >60 mL/min/1.73 m 2     Radiology:     Radiologist  interpretation:   CT-ABDOMEN-PELVIS WITH   Final Result      1.  Sigmoid diverticulosis. There is some mild inflammatory change involving that area of the sigmoid colon which may represent presence of mild diverticulitis.      2.  Again seen mildly diffusely distended gas and fluid-filled loops of colon and small intestine likely representing ileus.      3.  2.3 cm left upper pole renal stone again seen.      4.  Bibasilar atelectasis.      5.  Fatty liver.      6.  Small fat-containing ventral hernias.          COURSE & MEDICAL DECISION MAKING     ED Observation Status? Yes; I am placing the patient in to an observation status due to a diagnostic uncertainty as well as therapeutic intensity. Patient placed in observation status at 3:46 PM, 2/15/2023.     Observation plan is as follows: CT evaluation as well as lab results    Upon Reevaluation, the patient's condition has: not improved; and will be escalated to hospitalization.    Patient discharged from ED Observation status at 7:09 PM (Time) 02/15/23 (Date).     INITIAL ASSESSMENT, COURSE AND PLAN  Care Narrative:     3:43 PM - Patient seen and examined at bedside. Discussed plan of care, including CT imaging to rule out a bowel obstruction. Patient agrees to the plan of care. Ordered for CT-abdomen pelvis with, CBC-diff, CMP and lipase to evaluate her symptoms.     7:02 PM - Patient was reevaluated at bedside. Discussed lab and radiology results with the patient and informed them imaging showed diverticulosis with no obvious signs of a small bowel obstruction. I informed the patient of my plan to admit today given the patient's current presentation and diagnostic study results. Patient verbalizes understanding and support with my plan for admission. [facility] Hospitalist.    7:31 PM - I discussed the patient's case and the above findings with Dr. Mittal (Hospitalist) who will assess the patient for hospitalization.      PROBLEM LIST  Problem #1 abdominal distention  patient presents with increased abdominal distention at this point time appears to be secondary to an ileus which includes secondary to ulcerative colitis.  Given the fact patient recently had small bowel obstruction with similar appearance I think patient would benefit from hospitalization with bowel rest and monitoring to see if this will resolve    DISPOSITION AND DISCUSSIONS  I have discussed management of the patient with the following physicians and PAULA's:  Dr. Mittal, Hospitalist    Discussion of management with other hospitals or appropriate source(s): None     Barriers to care at this time, including but not limited to:  None .         DISPOSITION:  Patient will be hospitalized by Dr. Mittal in guarded condition.    FINAL DIAGNOSIS  1. Diverticulitis    2. Ileus (HCC)       Reanna WESTON (Griffinibe), am scribing for, and in the presence of, SERINA Krishna*.    Electronically signed by: Reanna Orellana (Jeannette), 2/15/2023    Jean Paul WESTON M.* personally performed the services described in this documentation, as scribed by Reanna Orellana in my presence, and it is both accurate and complete.       The note accurately reflects work and decisions made by me.  Jean Paul Vasquez M.D.  2/15/2023  8:06 PM

## 2023-02-16 ENCOUNTER — APPOINTMENT (OUTPATIENT)
Dept: RADIOLOGY | Facility: MEDICAL CENTER | Age: 78
DRG: 392 | End: 2023-02-16
Attending: FAMILY MEDICINE
Payer: MEDICARE

## 2023-02-16 PROBLEM — R09.02 HYPOXIA: Status: ACTIVE | Noted: 2023-02-16

## 2023-02-16 LAB
ALBUMIN SERPL BCP-MCNC: 2.4 G/DL (ref 3.2–4.9)
ALBUMIN/GLOB SERPL: 0.9 G/DL
ALP SERPL-CCNC: 61 U/L (ref 30–99)
ALT SERPL-CCNC: 8 U/L (ref 2–50)
ANION GAP SERPL CALC-SCNC: 9 MMOL/L (ref 7–16)
AST SERPL-CCNC: 12 U/L (ref 12–45)
BILIRUB SERPL-MCNC: 0.2 MG/DL (ref 0.1–1.5)
BUN SERPL-MCNC: 13 MG/DL (ref 8–22)
CALCIUM ALBUM COR SERPL-MCNC: 10.1 MG/DL (ref 8.5–10.5)
CALCIUM SERPL-MCNC: 8.8 MG/DL (ref 8.4–10.2)
CHLORIDE SERPL-SCNC: 109 MMOL/L (ref 96–112)
CO2 SERPL-SCNC: 22 MMOL/L (ref 20–33)
CREAT SERPL-MCNC: 0.67 MG/DL (ref 0.5–1.4)
ERYTHROCYTE [DISTWIDTH] IN BLOOD BY AUTOMATED COUNT: 44.5 FL (ref 35.9–50)
GFR SERPLBLD CREATININE-BSD FMLA CKD-EPI: 89 ML/MIN/1.73 M 2
GLOBULIN SER CALC-MCNC: 2.7 G/DL (ref 1.9–3.5)
GLUCOSE SERPL-MCNC: 88 MG/DL (ref 65–99)
HCT VFR BLD AUTO: 29.7 % (ref 37–47)
HGB BLD-MCNC: 9.7 G/DL (ref 12–16)
LACTATE SERPL-SCNC: 0.4 MMOL/L (ref 0.5–2)
MAGNESIUM SERPL-MCNC: 1.3 MG/DL (ref 1.5–2.5)
MCH RBC QN AUTO: 32.9 PG (ref 27–33)
MCHC RBC AUTO-ENTMCNC: 32.7 G/DL (ref 33.6–35)
MCV RBC AUTO: 100.7 FL (ref 81.4–97.8)
PLATELET # BLD AUTO: 245 K/UL (ref 164–446)
PMV BLD AUTO: 10.2 FL (ref 9–12.9)
POTASSIUM SERPL-SCNC: 3.4 MMOL/L (ref 3.6–5.5)
PROT SERPL-MCNC: 5.1 G/DL (ref 6–8.2)
RBC # BLD AUTO: 2.95 M/UL (ref 4.2–5.4)
SODIUM SERPL-SCNC: 140 MMOL/L (ref 135–145)
WBC # BLD AUTO: 6.7 K/UL (ref 4.8–10.8)

## 2023-02-16 PROCEDURE — 97166 OT EVAL MOD COMPLEX 45 MIN: CPT

## 2023-02-16 PROCEDURE — 770001 HCHG ROOM/CARE - MED/SURG/GYN PRIV*

## 2023-02-16 PROCEDURE — 85027 COMPLETE CBC AUTOMATED: CPT

## 2023-02-16 PROCEDURE — 700101 HCHG RX REV CODE 250: Performed by: HOSPITALIST

## 2023-02-16 PROCEDURE — 700111 HCHG RX REV CODE 636 W/ 250 OVERRIDE (IP): Performed by: FAMILY MEDICINE

## 2023-02-16 PROCEDURE — 83735 ASSAY OF MAGNESIUM: CPT

## 2023-02-16 PROCEDURE — 700105 HCHG RX REV CODE 258: Performed by: HOSPITALIST

## 2023-02-16 PROCEDURE — 83605 ASSAY OF LACTIC ACID: CPT

## 2023-02-16 PROCEDURE — 36415 COLL VENOUS BLD VENIPUNCTURE: CPT

## 2023-02-16 PROCEDURE — 700111 HCHG RX REV CODE 636 W/ 250 OVERRIDE (IP): Performed by: HOSPITALIST

## 2023-02-16 PROCEDURE — 700102 HCHG RX REV CODE 250 W/ 637 OVERRIDE(OP): Performed by: HOSPITALIST

## 2023-02-16 PROCEDURE — 97162 PT EVAL MOD COMPLEX 30 MIN: CPT

## 2023-02-16 PROCEDURE — A9270 NON-COVERED ITEM OR SERVICE: HCPCS | Performed by: HOSPITALIST

## 2023-02-16 PROCEDURE — 80053 COMPREHEN METABOLIC PANEL: CPT

## 2023-02-16 PROCEDURE — 99232 SBSQ HOSP IP/OBS MODERATE 35: CPT | Performed by: FAMILY MEDICINE

## 2023-02-16 PROCEDURE — 71045 X-RAY EXAM CHEST 1 VIEW: CPT

## 2023-02-16 PROCEDURE — 94760 N-INVAS EAR/PLS OXIMETRY 1: CPT

## 2023-02-16 RX ORDER — MAGNESIUM SULFATE HEPTAHYDRATE 40 MG/ML
4 INJECTION, SOLUTION INTRAVENOUS ONCE
Status: COMPLETED | OUTPATIENT
Start: 2023-02-16 | End: 2023-02-16

## 2023-02-16 RX ORDER — POTASSIUM CHLORIDE 7.45 MG/ML
10 INJECTION INTRAVENOUS
Status: COMPLETED | OUTPATIENT
Start: 2023-02-16 | End: 2023-02-16

## 2023-02-16 RX ADMIN — LORAZEPAM 0.5 MG: 0.5 TABLET ORAL at 21:05

## 2023-02-16 RX ADMIN — CEFTRIAXONE SODIUM 1000 MG: 1 INJECTION, POWDER, FOR SOLUTION INTRAMUSCULAR; INTRAVENOUS at 06:43

## 2023-02-16 RX ADMIN — METRONIDAZOLE 500 MG: 500 INJECTION, SOLUTION INTRAVENOUS at 05:28

## 2023-02-16 RX ADMIN — OMEPRAZOLE 20 MG: 20 CAPSULE, DELAYED RELEASE ORAL at 05:24

## 2023-02-16 RX ADMIN — LISINOPRIL 40 MG: 20 TABLET ORAL at 05:24

## 2023-02-16 RX ADMIN — CARVEDILOL 12.5 MG: 6.25 TABLET, FILM COATED ORAL at 07:17

## 2023-02-16 RX ADMIN — POTASSIUM CHLORIDE 10 MEQ: 7.46 INJECTION, SOLUTION INTRAVENOUS at 13:11

## 2023-02-16 RX ADMIN — ROSUVASTATIN 10 MG: 10 TABLET, FILM COATED ORAL at 17:07

## 2023-02-16 RX ADMIN — HEPARIN SODIUM 5000 UNITS: 5000 INJECTION, SOLUTION INTRAVENOUS; SUBCUTANEOUS at 05:23

## 2023-02-16 RX ADMIN — SENNOSIDES AND DOCUSATE SODIUM 2 TABLET: 50; 8.6 TABLET ORAL at 17:07

## 2023-02-16 RX ADMIN — HEPARIN SODIUM 5000 UNITS: 5000 INJECTION, SOLUTION INTRAVENOUS; SUBCUTANEOUS at 13:45

## 2023-02-16 RX ADMIN — POTASSIUM CHLORIDE 10 MEQ: 7.46 INJECTION, SOLUTION INTRAVENOUS at 12:24

## 2023-02-16 RX ADMIN — CARVEDILOL 12.5 MG: 6.25 TABLET, FILM COATED ORAL at 17:07

## 2023-02-16 RX ADMIN — SERTRALINE HYDROCHLORIDE 100 MG: 50 TABLET ORAL at 05:25

## 2023-02-16 RX ADMIN — POTASSIUM CHLORIDE 10 MEQ: 7.46 INJECTION, SOLUTION INTRAVENOUS at 10:54

## 2023-02-16 RX ADMIN — POTASSIUM CHLORIDE 10 MEQ: 7.46 INJECTION, SOLUTION INTRAVENOUS at 10:31

## 2023-02-16 RX ADMIN — HEPARIN SODIUM 5000 UNITS: 5000 INJECTION, SOLUTION INTRAVENOUS; SUBCUTANEOUS at 21:05

## 2023-02-16 RX ADMIN — Medication 6 MG: at 22:08

## 2023-02-16 RX ADMIN — METRONIDAZOLE 500 MG: 500 INJECTION, SOLUTION INTRAVENOUS at 14:34

## 2023-02-16 RX ADMIN — SODIUM CHLORIDE, POTASSIUM CHLORIDE, SODIUM LACTATE AND CALCIUM CHLORIDE: 600; 310; 30; 20 INJECTION, SOLUTION INTRAVENOUS at 11:00

## 2023-02-16 RX ADMIN — METRONIDAZOLE 500 MG: 500 INJECTION, SOLUTION INTRAVENOUS at 21:12

## 2023-02-16 RX ADMIN — PREDNISONE 5 MG: 5 TABLET ORAL at 05:25

## 2023-02-16 RX ADMIN — SENNOSIDES AND DOCUSATE SODIUM 2 TABLET: 50; 8.6 TABLET ORAL at 05:25

## 2023-02-16 RX ADMIN — MAGNESIUM SULFATE HEPTAHYDRATE 4 G: 40 INJECTION, SOLUTION INTRAVENOUS at 17:01

## 2023-02-16 ASSESSMENT — COGNITIVE AND FUNCTIONAL STATUS - GENERAL
CLIMB 3 TO 5 STEPS WITH RAILING: A LITTLE
STANDING UP FROM CHAIR USING ARMS: A LITTLE
DRESSING REGULAR UPPER BODY CLOTHING: A LITTLE
PERSONAL GROOMING: A LITTLE
WALKING IN HOSPITAL ROOM: A LITTLE
MOBILITY SCORE: 20
DAILY ACTIVITIY SCORE: 18
HELP NEEDED FOR BATHING: A LOT
TOILETING: A LITTLE
DRESSING REGULAR LOWER BODY CLOTHING: A LITTLE
SUGGESTED CMS G CODE MODIFIER MOBILITY: CJ
MOVING FROM LYING ON BACK TO SITTING ON SIDE OF FLAT BED: A LITTLE
SUGGESTED CMS G CODE MODIFIER DAILY ACTIVITY: CK

## 2023-02-16 ASSESSMENT — ACTIVITIES OF DAILY LIVING (ADL): TOILETING: INDEPENDENT

## 2023-02-16 ASSESSMENT — GAIT ASSESSMENTS
DEVIATION: STEP TO
ASSISTIVE DEVICE: FRONT WHEEL WALKER
GAIT LEVEL OF ASSIST: STANDBY ASSIST
DISTANCE (FEET): 200
DISTANCE (FEET): 200

## 2023-02-16 ASSESSMENT — PAIN DESCRIPTION - PAIN TYPE
TYPE: ACUTE PAIN
TYPE: ACUTE PAIN

## 2023-02-16 NOTE — CARE PLAN
The patient is Stable - Low risk of patient condition declining or worsening    Shift Goals  Clinical Goals: Control pain and discomfort to a level of 3/10 according to patient  Patient Goals: Recieve 5+ hours of sleep/rest overnight    Progress made toward(s) clinical / shift goals:  Patient slept comfortably throughout the night with no complaints of pain    Patient is not progressing towards the following goals:

## 2023-02-16 NOTE — ED NOTES
Called [facility] where pt is staying and spoke with Hector and informed that pt will be admitted in the hospital.

## 2023-02-16 NOTE — ASSESSMENT & PLAN NOTE
CT imaging reviewed, showing evidence for sigmoid diverticulitis  Continue ceftriaxone and metronidazole, follow on cultures and sensitivities

## 2023-02-16 NOTE — PROGRESS NOTES
4 Eyes Skin Assessment Completed by DILIP Valles and DILIP Ortega.    Head WDL  Ears WDL  Nose WDL  Mouth WDL  Neck WDL  Breast/Chest WDL  Shoulder Blades WDL  Spine WDL  (R) Arm/Elbow/Hand WDL  (L) Arm/Elbow/Hand WDL  Abdomen Redness, Scar, Scab, and Incision  Groin Redness, Blanching, and Rash  Scrotum/Coccyx/Buttocks Redness and Blanching  (R) Leg WDL  (L) Leg WDL  (R) Heel/Foot/Toe WDL  (L) Heel/Foot/Toe WDL          Devices In Places Nasal Cannula      Interventions In Place Sacral Mepilex    Possible Skin Injury No    Pictures Uploaded Into Epic Yes  Wound Consult Placed Yes  RN Wound Prevention Protocol Ordered Yes

## 2023-02-16 NOTE — PROGRESS NOTES
Pt arrived to [facility] via gurney at 21:09, daughter pts daughter at bedside.  pt is AO2, O2@ at 1.5L via NC.   Pt is resting comfortably in bed, call light within reach, instructed to call for assistance. Pt verbalized understanding.   Safety and fall precautions in place.   Will continue to monitor.

## 2023-02-16 NOTE — DISCHARGE PLANNING
Case Management Discharge Planning    Admission Date: 2/15/2023  GMLOS: 2.6  ALOS: 1    6-Clicks ADL Score: 17  6-Clicks Mobility Score: 17  PT and/or OT Eval ordered: Yes  Post-acute Referrals Ordered: NA  Post-acute Choice Obtained: NA  Has referral(s) been sent to post-acute provider:  [facility]      Anticipated Discharge Dispo: Discharge Disposition: D/T to home under [facility] care in anticipation of covered skilled care (06)    DME Needed: No    Action(s) Taken: Updated Provider/Nurse on Discharge Plan    Pending PT/OT evaluations    Escalations Completed: PT/OT    Medically Clear: No    Next Steps: PT/OT evaluations and Medical clearance    Barriers to Discharge: Medical clearance and Pending PT/OT evaluations

## 2023-02-16 NOTE — ASSESSMENT & PLAN NOTE
- CT imaging shows evidence for diffuse distended gas and fluid filled loops of the colon and small intestine concerning for ileus - was present on previous discharge as well, pt states she feels significantly improved today   - Distention significantly improved today, 2 BMs yesterday - resolving  - Encourage ambulation, monitor and replace electrolytes

## 2023-02-16 NOTE — PROGRESS NOTES
Hospital Medicine Daily Progress Note    Date of Service  2/16/2023    Chief Complaint  Clau Vences is a 77 y.o. female admitted 2/15/2023 with abdominal pain    Hospital Course  Patient is a 76yo female with a history of AZUL, arthritis on chronic prednisone, HTN, HLD and anxiety who was admitted here at the end of January for SBO and went to the OR on 1/28 with Dr Valles for a high grade bowel obstruction secondary to adhesions. She was subsequently discharged on 2/9/23 after prolonged hospitalization with short course of TPN and aspiration PNA; she was noted to have an ileus prior to discharge that resolved as well as a staghorn calculus for which she was referred to Urology. She returned to hospital 2/15 from [facility] with complaints of abdominal pain; at [facility], she was reported to have a KUB indicative of ileus vs early obstruction - last BM was the day prior to presentation.     Interval Problem Update  2/16 - Mildly elevated Bps, will increase home Coreg if persistent. Anemia is consistent with Hgb on discharge, replacing K and Mag. CT with diverticulosis with question of mild diverticulitis, as well as ileus. Clinically, pt improving - states her abdominal distention is improving, having liquid Bms, pain is improving. Pt on Rocephin/Flagyl, CLD. Pt does not want to go back to SNF at [place], states she has family to help 24/7. Will order PT/OT and home health.     I have discussed this patient's plan of care and discharge plan at IDT rounds today with Case Management, Nursing, Nursing leadership, and other members of the IDT team.    Consultants/Specialty  none    Code Status  Full Code    Disposition  Patient is not medically cleared for discharge.   Anticipate discharge to to home with organized home healthcare and close outpatient follow-up.  I have placed the appropriate orders for post-discharge needs.    Review of Systems  ROS     Physical Exam  Temp:  [36.2 °C (97.2 °F)-36.8 °C (98.2 °F)] 36.5  °C (97.7 °F)  Pulse:  [70-91] 78  Resp:  [18-20] 20  BP: (109-176)/(62-80) 154/62  SpO2:  [92 %-96 %] 94 %    Physical Exam  Vitals and nursing note reviewed.   Constitutional:       Appearance: Normal appearance. She is not ill-appearing.   HENT:      Head: Normocephalic and atraumatic.      Mouth/Throat:      Mouth: Mucous membranes are moist.   Cardiovascular:      Rate and Rhythm: Normal rate and regular rhythm.   Pulmonary:      Effort: Pulmonary effort is normal. No respiratory distress.      Breath sounds: Normal breath sounds. No wheezing or rales.   Abdominal:      General: Abdomen is flat. Bowel sounds are normal. There is distension (Mild).      Palpations: Abdomen is soft.      Comments: Midline scar with steri strips.  Only mild TTP in LLQ, no rebound, no guarding   Musculoskeletal:         General: No swelling. Normal range of motion.   Skin:     General: Skin is warm and dry.   Neurological:      General: No focal deficit present.      Mental Status: She is alert and oriented to person, place, and time.   Psychiatric:         Mood and Affect: Mood normal.         Behavior: Behavior normal.       Fluids    Intake/Output Summary (Last 24 hours) at 2/16/2023 0945  Last data filed at 2/16/2023 0600  Gross per 24 hour   Intake 1008.41 ml   Output 200 ml   Net 808.41 ml       Laboratory  Recent Labs     02/15/23  1614 02/16/23  0009   WBC 7.3 6.7   RBC 3.17* 2.95*   HEMOGLOBIN 10.3* 9.7*   HEMATOCRIT 31.5* 29.7*   MCV 99.4* 100.7*   MCH 32.5 32.9   MCHC 32.7* 32.7*   RDW 44.0 44.5   PLATELETCT 289 245   MPV 10.7 10.2     Recent Labs     02/15/23  1614 02/16/23  0009   SODIUM 137 140   POTASSIUM 3.7 3.4*   CHLORIDE 106 109   CO2 23 22   GLUCOSE 118* 88   BUN 17 13   CREATININE 0.78 0.67   CALCIUM 9.3 8.8                   Imaging  CT-ABDOMEN-PELVIS WITH   Final Result      1.  Sigmoid diverticulosis. There is some mild inflammatory change involving that area of the sigmoid colon which may represent presence  of mild diverticulitis.      2.  Again seen mildly diffusely distended gas and fluid-filled loops of colon and small intestine likely representing ileus.      3.  2.3 cm left upper pole renal stone again seen.      4.  Bibasilar atelectasis.      5.  Fatty liver.      6.  Small fat-containing ventral hernias.           Assessment/Plan  * Diverticulitis- (present on admission)  Assessment & Plan  CT imaging reviewed, showing evidence for sigmoid diverticulitis  Continue ceftriaxone and metronidazole, follow on cultures and sensitivities    Hypoxia  Assessment & Plan  - On 1.5 L  - XR on previous admission with concern for aspiration  - Check CXR     Ileus (HCC)- (present on admission)  Assessment & Plan  - CT imaging shows evidence for diffuse distended gas and fluid filled loops of the colon and small intestine concerning for ileus - was present on previous discharge as well, pt states she feels significantly improved today   - We will start supportive care with bowel rest, rehydration and a modified diet - advance to FLD today  - Encourage ambulation, monitor and replace electrolytes    Anxiety- (present on admission)  Assessment & Plan  Resume home sertraline      Chronic insomnia- (present on admission)  Assessment & Plan  Resume home melatonin and zolpidem    AZUL on CPAP- (present on admission)  Assessment & Plan  - CPAP ordered     Hyperlipidemia- (present on admission)  Assessment & Plan  Resume home rosuvastatin    HTN (hypertension)- (present on admission)  Assessment & Plan  Resume home carvedilol and lisinopril with hold parameters         VTE prophylaxis: SCDs/TEDs and heparin ppx    I have performed a physical exam and reviewed and updated ROS and Plan today (2/16/2023). In review of yesterday's note (2/15/2023), there are no changes except as documented above.

## 2023-02-16 NOTE — PROGRESS NOTES
Bedside report received from night RN. Assumed care of patient. Daily plan of care discussed. Pt denies nausea or abdominal pain this AM. Pt aware of plan to increase ambulation this shift. Call light within reach. Hourly rounding in place.

## 2023-02-16 NOTE — ASSESSMENT & PLAN NOTE
- On 1.5 L  - XR on previous admission with concern for aspiration - no aspiration now, CXR significant for atelectasis only

## 2023-02-16 NOTE — H&P
[facility] Medicine History & Physical Note    Date of Service  2/15/2023    Primary Care Physician  Marge Brasher M.D.    Consultants  None     Code Status  Full Code    Chief Complaint  Chief Complaint   Patient presents with    Abnormal Labs     Abd xray     History of Presenting Illness  Clau Vences is a 77 y.o. female with a past medical history of primary hypertension, hyperlipidemia who presented 2/15/2023 with generalized weakness and diarrhea.  Patient reports that she has not been feeling well over the past 2 days.  She has been having diarrhea with some abdominal discomfort.  She denies having significant abdominal pain nausea or vomiting.  She denies having fevers or chills.         I discussed the plan of care with emergency department physician, the patient    Review of Systems  Review of Systems   Constitutional:  Positive for malaise/fatigue. Negative for chills and fever.   Eyes:  Negative for discharge and redness.   Respiratory:  Negative for cough, shortness of breath and stridor.    Cardiovascular:  Negative for chest pain and leg swelling.   Gastrointestinal:  Positive for diarrhea. Negative for abdominal pain and vomiting.   Genitourinary:  Negative for flank pain.   Musculoskeletal:  Negative for myalgias.   Skin: Negative.    Neurological:  Negative for focal weakness.   Endo/Heme/Allergies:  Does not bruise/bleed easily.   Psychiatric/Behavioral:  The patient is not nervous/anxious.      Past Medical History   has a past medical history of Anesthesia, Arthritis, Back pain, Dyslipidemia, [place] measles, Heart murmur (9/13/2017), Hypertension, Influenza, Mumps, Other specified symptom associated with female genital organs, Painful joint, Psychiatric problem, Sleep apnea, Snoring, Sore muscles, Tonsillitis, and Unspecified cataract.    Surgical History   has a past surgical history that includes gastroscopy with biopsy (7/2/2014); other orthopedic surgery (2006); other;  hysterectomy robotic (10/23/2014); tonsillectomy and adenoidectomy; other abdominal surgery; appendectomy; knee arthroplasty total (Right, 12/27/2017); pr breast reduction (Bilateral); pr remv 2nd cataract,corn-scler sectn; hysterectomy laparoscopy; tonsillectomy; arthroscopy, knee; and pr exploratory of abdomen (N/A, 1/28/2023).     Family History  family history includes Alcohol/Drug in her father; Breast Cancer in her mother; Cancer in her father; Cancer (age of onset: 65) in her mother; Colon Cancer in her father; Heart Disease in her brother; Hyperlipidemia in her brother.      Social History   reports that she has never smoked. She has never used smokeless tobacco. She reports current alcohol use of about 0.6 - 1.2 oz per week. She reports that she does not use drugs.    Allergies  Allergies   Allergen Reactions    Tramadol      Nausea and somnolence     Medications  Prior to Admission Medications   Prescriptions Last Dose Informant Patient Reported? Taking?   LORazepam (ATIVAN) 0.5 MG Tab 2/14/2023 at 1918 MAR from Other Facility Yes Yes   Sig: Take 0.5 mg by mouth at bedtime.   amoxicillin-clavulanate (AUGMENTIN) 875-125 MG Tab 2/12/2023 at FINISHED MAR from Other Facility Yes Yes   Sig: Take 1 Tablet by mouth 2 times a day. Pt started on 2/9/2023 4 day course   carvedilol (COREG) 12.5 MG Tab 2/15/2023 at 0726 MAR from Other Facility No No   Sig: TAKE 1 TABLET BY MOUTH TWICE DAILY WITH MEALS   Patient taking differently: Take 12.5 mg by mouth 2 times a day with meals.   cholestyramine (QUESTRAN) 4 g packet 2/15/2023 at 0726 MAR from Other Facility No No   Sig: Take 4 g by mouth every day.   ferrous sulfate 325 (65 Fe) MG tablet 2/15/2023 at 0726 MAR from Other Facility Yes Yes   Sig: Take 325 mg by mouth every day.   folic acid (FOLVITE) 400 MCG tablet 2/15/2023 at 0726 MAR from Other Facility Yes No   Sig: Take 400 mg by mouth every morning.   lisinopril (PRINIVIL) 20 MG Tab 2/15/2023 at 0726 MAR from Other  Facility No No   Sig: TAKE 2 TABLETS BY MOUTH DAILY   Patient taking differently: Take 40 mg by mouth every day.   melatonin 3 MG Tab 2/12/2023 at 1916 MAR from Other Facility Yes Yes   Sig: Take 6 mg by mouth at bedtime.   omeprazole (PRILOSEC) 20 MG delayed-release capsule 2/15/2023 at 0726 MAR from Other Facility No No   Sig: Take 1 Capsule by mouth every day.   ondansetron (ZOFRAN) 4 MG Tab tablet PRN MAR from Other Facility No No   Sig: Take 1 Tablet by mouth every 6 hours as needed for Nausea/Vomiting.   predniSONE (DELTASONE) 5 MG Tab 2/15/2023 at 0726 MAR from Other Facility No No   Sig: TAKE 1 TABLET BY MOUTH DAILY   Patient taking differently: Take 5 mg by mouth every day.   rosuvastatin (CRESTOR) 10 MG Tab 2/14/2023 at 1918 MAR from Other Facility No No   Sig: TAKE ONE TABLET BY MOUTH IN THE EVENING   Patient taking differently: Take 10 mg by mouth every evening.   sennosides-docusate sodium (SENOKOT-S) 8.6-50 MG tablet PRN MAR from Other Facility No No   Sig: Take 1 Tablet by mouth every day.   sertraline (ZOLOFT) 100 MG Tab 2/15/2023 at 0726 MAR from Other Facility No No   Sig: TAKE 1 TABELT BY MOUTH DAILY   Patient taking differently: Take 100 mg by mouth every day. TAKE 1 TABELT BY MOUTH DAILY   vitamin D3 (CHOLECALCIFEROL) 1000 Unit (25 mcg) Tab 2/15/2023 at 0726 MAR from Other Facility Yes Yes   Sig: Take 1,000 Units by mouth every day.   zolpidem (AMBIEN) 5 MG Tab 2/9/2023 at 2120 MAR from Other Facility Yes No   Sig: Take 5 mg by mouth at bedtime.      Facility-Administered Medications: None     Physical Exam  Temp:  [36.2 °C (97.2 °F)] 36.2 °C (97.2 °F)  Pulse:  [70-91] 88  Resp:  [18] 18  BP: (109-176)/(62-80) 160/66  SpO2:  [92 %-96 %] 95 %  Blood Pressure : (!) 176/80   Temperature: 36.2 °C (97.2 °F)   Pulse: 88   Respiration: 18   Pulse Oximetry: 93 %     Physical Exam  Constitutional:       General: She is not in acute distress.  HENT:      Head: Normocephalic and atraumatic.      Right Ear:  External ear normal.      Left Ear: External ear normal.      Nose: No congestion or rhinorrhea.      Mouth/Throat:      Mouth: Mucous membranes are dry.      Pharynx: No oropharyngeal exudate or posterior oropharyngeal erythema.   Eyes:      General: No scleral icterus.        Right eye: No discharge.         Left eye: No discharge.      Conjunctiva/sclera: Conjunctivae normal.      Pupils: Pupils are equal, round, and reactive to light.   Cardiovascular:      Rate and Rhythm: Tachycardia present.      Heart sounds:     No friction rub. No gallop.   Pulmonary:      Effort: Pulmonary effort is normal.   Abdominal:      General: Abdomen is flat. There is no distension.      Tenderness: There is no guarding.   Musculoskeletal:         General: No swelling.      Cervical back: Neck supple. No rigidity. No muscular tenderness.      Right lower leg: No edema.      Left lower leg: No edema.   Skin:     General: Skin is dry.      Capillary Refill: Capillary refill takes 2 to 3 seconds.      Coloration: Skin is not jaundiced or pale.      Findings: No bruising or erythema.   Neurological:      Mental Status: She is alert and oriented to person, place, and time.   Psychiatric:         Mood and Affect: Mood normal.         Judgment: Judgment normal.     Laboratory:  Recent Labs     02/15/23  1614   WBC 7.3   RBC 3.17*   HEMOGLOBIN 10.3*   HEMATOCRIT 31.5*   MCV 99.4*   MCH 32.5   MCHC 32.7*   RDW 44.0   PLATELETCT 289   MPV 10.7     Recent Labs     02/15/23  1614   SODIUM 137   POTASSIUM 3.7   CHLORIDE 106   CO2 23   GLUCOSE 118*   BUN 17   CREATININE 0.78   CALCIUM 9.3     Recent Labs     02/15/23  1614   ALTSGPT 10   ASTSGOT 16   ALKPHOSPHAT 66   TBILIRUBIN 0.2   LIPASE 121*   GLUCOSE 118*         No results for input(s): NTPROBNP in the last 72 hours.      No results for input(s): TROPONINT in the last 72 hours.    Imaging:  CT-ABDOMEN-PELVIS WITH   Final Result      1.  Sigmoid diverticulosis. There is some mild  inflammatory change involving that area of the sigmoid colon which may represent presence of mild diverticulitis.      2.  Again seen mildly diffusely distended gas and fluid-filled loops of colon and small intestine likely representing ileus.      3.  2.3 cm left upper pole renal stone again seen.      4.  Bibasilar atelectasis.      5.  Fatty liver.      6.  Small fat-containing ventral hernias.        Assessment/Plan:  Justification for Admission Status  I anticipate this patient will require at least two midnights for appropriate medical management, necessitating inpatient admission because patient has diverticulitis will require intravenous antibiotics and intravenous fluids    * Diverticulitis- (present on admission)  Assessment & Plan  CT imaging reviewed, showing evidence for sigmoid diverticulitis  I will start ceftriaxone and metronidazole, follow on cultures and sensitivities    Ileus (HCC)- (present on admission)  Assessment & Plan  CT imaging shows evidence for diffuse distended gas and fluid filled loops of the colon and small intestine concerning for ileus  We will start supportive care with bowel rest, rehydration and a modified diet  Encourage ambulation, monitor and replace electrolytes    HTN (hypertension)- (present on admission)  Assessment & Plan  Resume home carvedilol and lisinopril with hold parameters    Anxiety- (present on admission)  Assessment & Plan  Resume home sertraline      Chronic insomnia- (present on admission)  Assessment & Plan  Resume home melatonin and zolpidem    Hyperlipidemia- (present on admission)  Assessment & Plan  Resume home rosuvastatin    VTE prophylaxis: SCDs/TEDs and heparin ppx

## 2023-02-16 NOTE — PROGRESS NOTES
Patient stand and pivot to BSC. Patient in no apparent distress. Seizure pads placed on bed. Patient encouraged to ambulate to bathroom for further needs.

## 2023-02-16 NOTE — ED NOTES
Rounded on pt. Pt and daughter updated on POC and that we are still waiting on the CT. PT and daughter understand.

## 2023-02-16 NOTE — DIETARY
"Nutrition services: Day 1 of admit.  Clau Vences is a 77 y.o. female with admitting DX of Diverticulitis    Consult received for MST of 2 on nutrition screen due to report of 2-13 lb wt loss x 1 month and poor PO PTA      Assessment:  Height: 170.2 cm (5' 7.01\")  Weight: 93 kg (205 lb 0.4 oz)  Body mass index is 32.1 kg/m²., BMI classification: class 1 obesity  Diet/Intake: clear liquids    Evaluation:   Per H&P, pt reports that she has not been feeling well over the past 2 days.  She has been having diarrhea with some abdominal discomfort. No report of N/V. Pt may have an ileus. Pt w/ hx of exploratory of abdomen on 1/28/23.   Wt hx reviewed in Epic. Pt's wt loss x 1 year has note been significant (2.8% x 1 month and 13% x 1 year).   RD met w/ pt in her room. Pt reports PO was poor for only a few days PTA due to presence of diarrhea. She said that her appetite is currently poor.   Pt said that current wt is low but she can't recall what her UBW was.     Malnutrition Risk: ASPEN criteria not met    Recommendations/Plan:   Advance diet beyond clears when medically feasible.   Encourage intake of >50%  Document intake of all meals  as % taken in ADL's to provide interdisciplinary communication across all shifts.   Monitor weight.  Nutrition rep will continue to see patient for ongoing meal and snack preferences.     RD will follow.  "

## 2023-02-17 LAB
ANION GAP SERPL CALC-SCNC: 7 MMOL/L (ref 7–16)
BUN SERPL-MCNC: 9 MG/DL (ref 8–22)
CALCIUM SERPL-MCNC: 9.2 MG/DL (ref 8.4–10.2)
CHLORIDE SERPL-SCNC: 107 MMOL/L (ref 96–112)
CO2 SERPL-SCNC: 24 MMOL/L (ref 20–33)
CREAT SERPL-MCNC: 0.65 MG/DL (ref 0.5–1.4)
ERYTHROCYTE [DISTWIDTH] IN BLOOD BY AUTOMATED COUNT: 44.2 FL (ref 35.9–50)
GFR SERPLBLD CREATININE-BSD FMLA CKD-EPI: 90 ML/MIN/1.73 M 2
GLUCOSE SERPL-MCNC: 94 MG/DL (ref 65–99)
HCT VFR BLD AUTO: 30 % (ref 37–47)
HGB BLD-MCNC: 9.6 G/DL (ref 12–16)
MAGNESIUM SERPL-MCNC: 2.1 MG/DL (ref 1.5–2.5)
MCH RBC QN AUTO: 31.7 PG (ref 27–33)
MCHC RBC AUTO-ENTMCNC: 32 G/DL (ref 33.6–35)
MCV RBC AUTO: 99 FL (ref 81.4–97.8)
PLATELET # BLD AUTO: 262 K/UL (ref 164–446)
PMV BLD AUTO: 10.9 FL (ref 9–12.9)
POTASSIUM SERPL-SCNC: 3.7 MMOL/L (ref 3.6–5.5)
RBC # BLD AUTO: 3.03 M/UL (ref 4.2–5.4)
SODIUM SERPL-SCNC: 138 MMOL/L (ref 135–145)
WBC # BLD AUTO: 6.6 K/UL (ref 4.8–10.8)

## 2023-02-17 PROCEDURE — 83735 ASSAY OF MAGNESIUM: CPT

## 2023-02-17 PROCEDURE — A9270 NON-COVERED ITEM OR SERVICE: HCPCS | Performed by: FAMILY MEDICINE

## 2023-02-17 PROCEDURE — A9270 NON-COVERED ITEM OR SERVICE: HCPCS | Performed by: HOSPITALIST

## 2023-02-17 PROCEDURE — 770001 HCHG ROOM/CARE - MED/SURG/GYN PRIV*

## 2023-02-17 PROCEDURE — 700101 HCHG RX REV CODE 250: Performed by: HOSPITALIST

## 2023-02-17 PROCEDURE — 85027 COMPLETE CBC AUTOMATED: CPT

## 2023-02-17 PROCEDURE — 99232 SBSQ HOSP IP/OBS MODERATE 35: CPT | Performed by: FAMILY MEDICINE

## 2023-02-17 PROCEDURE — 80048 BASIC METABOLIC PNL TOTAL CA: CPT

## 2023-02-17 PROCEDURE — 36415 COLL VENOUS BLD VENIPUNCTURE: CPT

## 2023-02-17 PROCEDURE — 700105 HCHG RX REV CODE 258: Performed by: HOSPITALIST

## 2023-02-17 PROCEDURE — 94760 N-INVAS EAR/PLS OXIMETRY 1: CPT

## 2023-02-17 PROCEDURE — 700111 HCHG RX REV CODE 636 W/ 250 OVERRIDE (IP): Performed by: HOSPITALIST

## 2023-02-17 PROCEDURE — 700102 HCHG RX REV CODE 250 W/ 637 OVERRIDE(OP): Performed by: HOSPITALIST

## 2023-02-17 PROCEDURE — 700102 HCHG RX REV CODE 250 W/ 637 OVERRIDE(OP): Performed by: FAMILY MEDICINE

## 2023-02-17 PROCEDURE — 97602 WOUND(S) CARE NON-SELECTIVE: CPT

## 2023-02-17 RX ORDER — AMOXICILLIN AND CLAVULANATE POTASSIUM 875; 125 MG/1; MG/1
1 TABLET, FILM COATED ORAL EVERY 12 HOURS
Status: DISCONTINUED | OUTPATIENT
Start: 2023-02-17 | End: 2023-02-18 | Stop reason: HOSPADM

## 2023-02-17 RX ORDER — CARVEDILOL 25 MG/1
25 TABLET ORAL 2 TIMES DAILY WITH MEALS
Status: DISCONTINUED | OUTPATIENT
Start: 2023-02-17 | End: 2023-02-18 | Stop reason: HOSPADM

## 2023-02-17 RX ADMIN — PREDNISONE 5 MG: 5 TABLET ORAL at 04:51

## 2023-02-17 RX ADMIN — LISINOPRIL 40 MG: 20 TABLET ORAL at 04:50

## 2023-02-17 RX ADMIN — OMEPRAZOLE 20 MG: 20 CAPSULE, DELAYED RELEASE ORAL at 04:51

## 2023-02-17 RX ADMIN — CEFTRIAXONE SODIUM 1000 MG: 1 INJECTION, POWDER, FOR SOLUTION INTRAMUSCULAR; INTRAVENOUS at 04:44

## 2023-02-17 RX ADMIN — SENNOSIDES AND DOCUSATE SODIUM 2 TABLET: 50; 8.6 TABLET ORAL at 04:50

## 2023-02-17 RX ADMIN — AMOXICILLIN AND CLAVULANATE POTASSIUM 1 TABLET: 875; 125 TABLET, FILM COATED ORAL at 18:17

## 2023-02-17 RX ADMIN — HEPARIN SODIUM 5000 UNITS: 5000 INJECTION, SOLUTION INTRAVENOUS; SUBCUTANEOUS at 14:14

## 2023-02-17 RX ADMIN — CARVEDILOL 25 MG: 25 TABLET, FILM COATED ORAL at 07:50

## 2023-02-17 RX ADMIN — ROSUVASTATIN 10 MG: 10 TABLET, FILM COATED ORAL at 18:21

## 2023-02-17 RX ADMIN — METRONIDAZOLE 500 MG: 500 INJECTION, SOLUTION INTRAVENOUS at 05:34

## 2023-02-17 RX ADMIN — SERTRALINE HYDROCHLORIDE 100 MG: 50 TABLET ORAL at 04:51

## 2023-02-17 RX ADMIN — SODIUM CHLORIDE, POTASSIUM CHLORIDE, SODIUM LACTATE AND CALCIUM CHLORIDE: 600; 310; 30; 20 INJECTION, SOLUTION INTRAVENOUS at 05:34

## 2023-02-17 RX ADMIN — LORAZEPAM 0.5 MG: 0.5 TABLET ORAL at 21:21

## 2023-02-17 RX ADMIN — HEPARIN SODIUM 5000 UNITS: 5000 INJECTION, SOLUTION INTRAVENOUS; SUBCUTANEOUS at 04:51

## 2023-02-17 RX ADMIN — Medication 6 MG: at 21:21

## 2023-02-17 RX ADMIN — HEPARIN SODIUM 5000 UNITS: 5000 INJECTION, SOLUTION INTRAVENOUS; SUBCUTANEOUS at 21:23

## 2023-02-17 ASSESSMENT — PAIN DESCRIPTION - PAIN TYPE
TYPE: ACUTE PAIN
TYPE: ACUTE PAIN

## 2023-02-17 ASSESSMENT — FIBROSIS 4 INDEX: FIB4 SCORE: 1.25

## 2023-02-17 NOTE — PROGRESS NOTES
Received report and assumed care of patient (pt) at change of shift. Pt is A&Ox4. Vital signs are stable with the exception of blood pressure which is elevated at 165/57. Patient reports no pain at this time. Plan of care discussed. All questions answered. Safety precautions in place and all needs met at this time.

## 2023-02-17 NOTE — THERAPY
"Occupational Therapy   Initial Evaluation     Patient Name: Clau Vences  Age:  77 y.o., Sex:  female  Medical Record #: [id number]  Today's Date: 2/16/2023     Precautions  Precautions: Fall Risk  Comments: mild    Assessment  Patient is 77 y.o. female admitted here at the end of January for SBO and went to the OR on 1/28 with Dr Valles for a high grade bowel obstruction secondary to adhesions. She was subsequently discharged on 2/9/23 after prolonged hospitalization with short course of TPN and aspiration PNA; she was noted to have an ileus prior to discharge that resolved  She returned to hospital 2/15 from [facility] with complaints of abdominal pain; at [facility], she was reported to have a KUB indicative of ileus vs early obstruction.  Pt is improving clinically, wanting to go home with HH and family assist instead of back to [facility].  Pt tolerated OOB, LB dressing and walk in vora.  She did not demo s/s overt fatigue but she did require 2L O2 with walking.  O2 dropped to 84% on RA.  She demos improved cognition and safety awareness, however intermittent confusion with simple questions/concepts that most people would understand.  She will benefit from supervision from family for a bit as she transitions home to make sure she is returning to cognitive and functional baseline.  She will also benefit from HH therapy to progress with mobility and ADl's.  OT will follow while in house but pt does appear to be at a level safe to d/c home with family support and HH.    Plan    Occupational Therapy Initial Treatment Plan   Treatment Interventions: Self Care / Activities of Daily Living, Adaptive Equipment, Neuro Re-Education / Balance, Therapeutic Exercises, Therapeutic Activity  Treatment Frequency: 3 Times per Week  Duration: Until Therapy Goals Met    DC Equipment Recommendations: None  Discharge Recommendations: Recommend home health for continued occupational therapy services     Subjective    \"I am really " "feeling a lot better finally.\"     Objective       02/16/23 1610   Prior Living Situation   Prior Services Home-Independent   Housing / Facility 1 Story House   Steps Into Home 3   Steps In Home 0   Bathroom Set up Walk In Shower;Shower Chair;Grab Bars   Equipment Owned 4-Wheel Walker;Tub / Shower Seat;Grab Bar(s) In Tub / Shower;Raised Toilet Seat Without Arms   Lives with - Patient's Self Care Capacity Alone and Able to Care For Self   Comments son is here from out of town to stay with her and when he needds to leave her daughter will come in to assist   Prior Level of ADL Function   Self Feeding Independent   Grooming / Hygiene Independent   Bathing Independent   Dressing Independent   Toileting Independent   Comments Prior to ooriginal admit end of Jan 2023   Prior Level of IADL Function   Medication Management Independent   Laundry Independent   Kitchen Mobility Independent   Finances Independent   Home Management Independent   Shopping Independent   Prior Level Of Mobility Independent With Device in Home;Independent Without Device in Home   Driving / Transportation Driving Independent   Occupation (Pre-Hospital Vocational) Retired Due To Age   Precautions   Precautions Fall Risk   Comments mild   Vitals   O2 (LPM) 1.5   O2 Delivery Device Nasal Cannula   Pain 0 - 10 Group   Location Abdomen   Description Sore   Therapist Pain Assessment During Activity;Nurse Notified  (nild)   Cognition    Cognition / Consciousness WDL   Speech/ Communication Hard of Hearing   Level of Consciousness Alert   Comments Pt alert, able to provide clear PLOF information, more animated, demos fair safety awareness.  Limited at times by [place]- can't follow directions when she can't hear them. Had a little difficulty expressing her needs.  Asked for pants, and when therapist brought her pants, she said, \"those won't help if I have an accident.\"  Then asked if pt was incontinent and she said \"I don't know what that means.\"  OT introduced " "a pair of briefs and she said \"Yes!!!  Those are what I wanted.\"  Pt will benefit from supervision at home as she does not appear at her functional baseline cogntively.   Active ROM Upper Body   Active ROM Upper Body  WDL   Strength Upper Body   Upper Body Strength  WDL   Balance Assessment   Sitting Balance (Static) Good   Sitting Balance (Dynamic) Good   Standing Balance (Static) Fair +   Standing Balance (Dynamic) Fair   Weight Shift Sitting Good   Weight Shift Standing Good   Comments with 4ww   Bed Mobility    Supine to Sit Minimal Assist  (with HOB up)   Sit to Supine Modified Independent   Scooting Modified Independent   ADL Assessment   Upper Body Dressing Modified Independent   Lower Body Dressing Minimal Assist  (briefs, socks)   Toileting   (NT- pt denied need to go)   How much help from another person does the patient currently need...   Putting on and taking off regular lower body clothing? 3   Bathing (including washing, rinsing, and drying)? 2   Toileting, which includes using a toilet, bedpan, or urinal? 3   Putting on and taking off regular upper body clothing? 3   Taking care of personal grooming such as brushing teeth? 3   Eating meals? 4   6 Clicks Daily Activity Score 18   Functional Mobility   Sit to Stand Contact Guard Assist   Bed, Chair, Wheelchair Transfer Contact Guard Assist   Transfer Method Stand Step   Mobility with 4ww   Distance (Feet) 200   # of Times Distance was Traveled 1   Comments Tolerated well   Activity Tolerance   Sitting Edge of Bed 10 min, 5 min   Standing 5 min x3   Comments imrpoving compared to previous admit   Patient / Family Goals   Patient / Family Goal #1 home with HH   Short Term Goals   Short Term Goal # 1 Pt will stand 15 min at sink to groom, SBA in 3 visits   Short Term Goal # 2 Pt will dress with SBA from seated in 4 visits   Short Term Goal # 3 pt will toilet supervsed in 4 visits   Education Group   Education Provided Role of Occupational " Therapist;Activities of Daily Living   Role of Occupational Therapist Patient Response Patient;Acceptance;Explanation;Verbal Demonstration   ADL Patient Response Patient;Acceptance;Explanation;Verbal Demonstration

## 2023-02-17 NOTE — CARE PLAN
Problem: Fall Risk  Goal: Patient will remain free from falls  2/16/2023 1627 by Kaye Moyer R.N.  Outcome: Progressing  2/16/2023 1625 by Kaye Moyer R.N.  Outcome: Progressing     Problem: Mobility  Goal: Patient's capacity to carry out activities will improve  Outcome: Progressing   The patient is Stable - Low risk of patient condition declining or worsening    Shift Goals  Clinical Goals: Patient will be free from fall, seizure prec,  Patient Goals: Rest comfortably throughout the shift  Family Goals: n/a    Progress made toward(s) clinical / shift goals: Pt able to ambulate with standby assist using front wheel walker to the hallway, able to used the commode. No seizure episode throughout the shift. Maintains bed rails padded for seizure precaution. Pt able to rest comfortably.       Patient is not progressing towards the following goals:n/a

## 2023-02-17 NOTE — THERAPY
Physical Therapy   Initial Evaluation     Patient Name: Clau Vences  Age:  77 y.o., Sex:  female  Medical Record #: [id number]  Today's Date: 2/16/2023     Precautions  Precautions: (P) Fall Risk  Comments: (P) mild    Assessment  Patient is 77 y.o. female with a diagnosis of diverticulitis.[facility].She is much improved since I last saw her.Acute PT needed to improve bed mob,transfers ,amb and stairs     Plan    Physical Therapy Initial Treatment Plan   Treatment Plan : (P) Bed Mobility, Gait Training, Neuro Re-Education / Balance, Therapeutic Activities, Stair Training, Therapeutic Exercise  Treatment Frequency: (P) 4 Times per Week    DC Equipment Recommendations: (P) None  Discharge Recommendations: (P) Recommend home health for continued physical therapy services       Objective       02/16/23 1600   Charge Group   PT Evaluation PT Evaluation Mod   Precautions   Precautions Fall Risk   Comments mild   Prior Living Situation   Prior Services Home-Independent   Housing / Facility 1 Story House   Steps Into Home 3   Steps In Home 0   Equipment Owned 4-Wheel Walker   Lives with - Patient's Self Care Capacity Alone and Able to Care For Self   Comments son will stay with her   Prior Level of Functional Mobility   Bed Mobility Independent   Transfer Status Independent   Ambulation Independent   Assistive Devices Used 4-Wheel Walker   Cognition    Cognition / Consciousness WDL   Passive ROM Lower Body   Passive ROM Lower Body WDL   Active ROM Lower Body    Active ROM Lower Body  WDL   Strength Lower Body   Gross Strength Generalized Weakness, Equal Bilaterally   Coordination Lower Body    Coordination Lower Body  WDL   Balance Assessment   Sitting Balance (Static) Good   Sitting Balance (Dynamic) Good   Standing Balance (Static) Fair +   Standing Balance (Dynamic) Fair   Weight Shift Sitting Good   Weight Shift Standing Good   Bed Mobility    Supine to Sit Contact  Guard Assist   Sit to Supine Modified Independent   Scooting Modified Independent   Gait Analysis   Gait Level Of Assist Standby Assist   Assistive Device Front Wheel Walker   Distance (Feet) 200   # of Times Distance was Traveled 1   Deviation Step To   Weight Bearing Status full   Functional Mobility   Sit to Stand Contact Guard Assist   Bed, Chair, Wheelchair Transfer Contact Guard Assist   Transfer Method Stand Step   How much difficulty does the patient currently have...   Turning over in bed (including adjusting bedclothes, sheets and blankets)? 4   Sitting down on and standing up from a chair with arms (e.g., wheelchair, bedside commode, etc.) 3   Moving from lying on back to sitting on the side of the bed? 4   How much help from another person does the patient currently need...   Moving to and from a bed to a chair (including a wheelchair)? 3   Need to walk in a hospital room? 3   Climbing 3-5 steps with a railing? 3   6 clicks Mobility Score 20   Activity Tolerance   Sitting Edge of Bed 10   Standing 10   Patient / Family Goals    Patient / Family Goal #1 Home   Short Term Goals    Short Term Goal # 1 I with bed mob in 4 V   Short Term Goal # 2 I on transfers in 4 V   Short Term Goal # 3 I with amb x 200 feet in 4 V   Physical Therapy Initial Treatment Plan    Treatment Plan  Bed Mobility;Gait Training;Neuro Re-Education / Balance;Therapeutic Activities;Stair Training;Therapeutic Exercise   Treatment Frequency 4 Times per Week   Problem List    Problems Impaired Transfers;Impaired Ambulation;Functional Strength Deficit;Impaired Balance;Decreased Activity Tolerance   Anticipated Discharge Equipment and Recommendations   DC Equipment Recommendations None   Discharge Recommendations Recommend home health for continued physical therapy services   Interdisciplinary Plan of Care Collaboration   IDT Collaboration with  Nursing   Session Information   Date / Session Number  2/16-1 1/4 2/22

## 2023-02-17 NOTE — CARE PLAN
The patient is Stable - Low risk of patient condition declining or worsening    Shift Goals  Clinical Goals: Patient's pain will remain less than 3/10.  Patient Goals: Rest and sleep comfortably.  Family Goals: n/a    Progress made toward(s) clinical / shift goals:  Patient did not complain of any pain throughout shift. Belongings and call light within reach.    Patient is not progressing towards the following goals:

## 2023-02-17 NOTE — PROGRESS NOTES
Hospital Medicine Daily Progress Note    Date of Service  2/17/2023    Chief Complaint  Clau Vences is a 77 y.o. female admitted 2/15/2023 with abdominal pain    Hospital Course  Patient is a 78yo female with a history of AZUL, arthritis on chronic prednisone, HTN, HLD and anxiety who was admitted here at the end of January for SBO and went to the OR on 1/28 with Dr Valles for a high grade bowel obstruction secondary to adhesions. She was subsequently discharged on 2/9/23 after prolonged hospitalization with short course of TPN and aspiration PNA; she was noted to have an ileus prior to discharge that resolved as well as a staghorn calculus for which she was referred to Urology. She returned to hospital 2/15 from [facility] with complaints of abdominal pain; at [facility], she was reported to have a KUB indicative of ileus vs early obstruction - last BM was the day prior to presentation.     Interval Problem Update  2/16 - Mildly elevated Bps, will increase home Coreg if persistent. Anemia is consistent with Hgb on discharge, replacing K and Mag. CT with diverticulosis with question of mild diverticulitis, as well as ileus. Clinically, pt improving - states her abdominal distention is improving, having liquid Bms, pain is improving. Pt on Rocephin/Flagyl, CLD. Pt does not want to go back to SNF at [place], states she has family to help 24/7. Will order PT/OT and home health.     2/17 - Pt afebrile, O2 needs are down today, Bps are up - increase Coreg. Labs are stable, CXR are negative for infiltrate. Lost IV access, will change to Augmentin - pt having no pain today, advance diet as tolerated, home in the am if she does well. Two Bms yesterday.    I have discussed this patient's plan of care and discharge plan at IDT rounds today with Case Management, Nursing, Nursing leadership, and other members of the IDT team.    Consultants/Specialty  none    Code Status  Full Code    Disposition  Patient is not medically  cleared for discharge.   Anticipate discharge to to home with organized home healthcare and close outpatient follow-up.  I have placed the appropriate orders for post-discharge needs.    Review of Systems  ROS     Physical Exam  Temp:  [36.4 °C (97.6 °F)-36.6 °C (97.9 °F)] 36.6 °C (97.8 °F)  Pulse:  [60-74] 74  Resp:  [16-18] 18  BP: (136-165)/(57-74) 165/57  SpO2:  [87 %-93 %] 93 %    Physical Exam  Vitals and nursing note reviewed.   Constitutional:       Appearance: Normal appearance. She is not ill-appearing.   HENT:      Head: Normocephalic and atraumatic.      Mouth/Throat:      Mouth: Mucous membranes are moist.   Cardiovascular:      Rate and Rhythm: Normal rate and regular rhythm.   Pulmonary:      Effort: Pulmonary effort is normal. No respiratory distress.      Breath sounds: Normal breath sounds. No wheezing or rales.   Abdominal:      General: Abdomen is flat. Bowel sounds are normal. There is distension (Mild - improved from yesterday).      Palpations: Abdomen is soft.      Comments: Midline scar with steri strips.  Only mild TTP in LLQ, no rebound, no guarding   Musculoskeletal:         General: No swelling. Normal range of motion.   Skin:     General: Skin is warm and dry.   Neurological:      General: No focal deficit present.      Mental Status: She is alert and oriented to person, place, and time.   Psychiatric:         Mood and Affect: Mood normal.         Behavior: Behavior normal.       Fluids    Intake/Output Summary (Last 24 hours) at 2/17/2023 0719  Last data filed at 2/17/2023 0456  Gross per 24 hour   Intake 1521.15 ml   Output 1150 ml   Net 371.15 ml         Laboratory  Recent Labs     02/15/23  1614 02/16/23  0009 02/17/23  0059   WBC 7.3 6.7 6.6   RBC 3.17* 2.95* 3.03*   HEMOGLOBIN 10.3* 9.7* 9.6*   HEMATOCRIT 31.5* 29.7* 30.0*   MCV 99.4* 100.7* 99.0*   MCH 32.5 32.9 31.7   MCHC 32.7* 32.7* 32.0*   RDW 44.0 44.5 44.2   PLATELETCT 289 245 262   MPV 10.7 10.2 10.9       Recent Labs      02/15/23  1614 02/16/23  0009 02/17/23  0059   SODIUM 137 140 138   POTASSIUM 3.7 3.4* 3.7   CHLORIDE 106 109 107   CO2 23 22 24   GLUCOSE 118* 88 94   BUN 17 13 9   CREATININE 0.78 0.67 0.65   CALCIUM 9.3 8.8 9.2                     Imaging  DX-CHEST-PORTABLE (1 VIEW)   Final Result      1.  Stable left lung base atelectasis.      2.  Stable cardiomegaly.      CT-ABDOMEN-PELVIS WITH   Final Result      1.  Sigmoid diverticulosis. There is some mild inflammatory change involving that area of the sigmoid colon which may represent presence of mild diverticulitis.      2.  Again seen mildly diffusely distended gas and fluid-filled loops of colon and small intestine likely representing ileus.      3.  2.3 cm left upper pole renal stone again seen.      4.  Bibasilar atelectasis.      5.  Fatty liver.      6.  Small fat-containing ventral hernias.             Assessment/Plan  * Diverticulitis- (present on admission)  Assessment & Plan  CT imaging reviewed, showing evidence for sigmoid diverticulitis  Continue ceftriaxone and metronidazole, follow on cultures and sensitivities    Hypoxia  Assessment & Plan  - On 1.5 L  - XR on previous admission with concern for aspiration - no aspiration now, CXR significant for atelectasis only    Ileus (HCC)- (present on admission)  Assessment & Plan  - CT imaging shows evidence for diffuse distended gas and fluid filled loops of the colon and small intestine concerning for ileus - was present on previous discharge as well, pt states she feels significantly improved today   - Distention significantly improved today, 2 BMs yesterday - resolving  - Encourage ambulation, monitor and replace electrolytes    Anxiety- (present on admission)  Assessment & Plan  Resume home sertraline      Chronic insomnia- (present on admission)  Assessment & Plan  Resume home melatonin and zolpidem    AZUL on CPAP- (present on admission)  Assessment & Plan  - CPAP ordered     Hyperlipidemia- (present on  admission)  Assessment & Plan  Resume home rosuvastatin    HTN (hypertension)- (present on admission)  Assessment & Plan  Increase home carvedilol and continuelisinopril with hold parameters         VTE prophylaxis: SCDs/TEDs and heparin ppx    I have performed a physical exam and reviewed and updated ROS and Plan today (2/17/2023). In review of yesterday's note (2/16/2023), there are no changes except as documented above.

## 2023-02-17 NOTE — DISCHARGE INSTRUCTIONS
Diet    Soft Food Diet    A Soft Food Diet includes foods that are safe and easy to swallow. Generally, the food should be soft enough to be mashed with a fork. Take small bites of food, or cut food into pieces about ½ inch or smaller. Avoid foods that are dry, hard to chew, crunchy, sticky, stringy, or crispy.      High Fiber Diet    A High Fiber Diet has many health benefits such as preventing constipation, lowering blood cholesterol, helping with weight loss, and reducing your risk of heart disease, diabetes, and certain cancers . The best sources of fiber include whole fruits and vegetables, whole grains, nuts, seeds, and beans. Look for foods that contain 5 g of fiber or more per serving. Gradually increase your intake of fiber. Increasing too fast can result in cramping, bloating, and gas. Drink plenty of water while you increase your fiber.        Diet: Diet Order Diet: Low Fiber(GI Soft)  Activity: As tolerated  Follow Up: Please see [facility] in 6-8 weeks, and your primary care physician in one week for a blood pressure check  Disposition:Home with home health   Diagnosis: Diverticulitis    Follow up with your Primary Care Provider Marge Brasher M.D. as scheduled or sooner if your symptoms persist or worsen.  Return to Emergency Room for severe chest pain, shortness of breath, signs of a stroke, or any other emergencies.

## 2023-02-17 NOTE — WOUND TEAM
Wound team consulted for patient's abdomen.  Surgical incision is well approximated and intact.  No drainage noted.  Patient is comfortable with it being ENID, no advance wound care needed at this time.  LUQ skin tear from previous tape, okay to leave ENID.  Please re-consult wound team for any additional needs.  Thank you         Wound 01/28/23 Incision Abdomen (Active)   Wound Image   02/17/23 1042   Site Assessment Pink;Red 02/17/23 1042   Periwound Assessment Intact 02/17/23 1042   Margins Attached edges 02/17/23 1042   Closure Approximated 02/17/23 1042   Drainage Amount None 02/17/23 1042   Drainage Description Tan 02/15/23 2152   Treatments Cleansed;Site care 02/17/23 1042   Wound Cleansing Normal Saline Irrigation 02/17/23 1042   Periwound Protectant Not Applicable 02/17/23 1042   Dressing Cleansing/Solutions Not Applicable 02/17/23 1042   Dressing Options Open to Air 02/17/23 1042   Dressing Changed Observed 02/17/23 0800   Dressing Status Clean;Dry;Intact 02/17/23 0800

## 2023-02-17 NOTE — DISCHARGE PLANNING
Case Management Discharge Planning    Admission Date: 2/15/2023  GMLOS: 2.6  ALOS: 2    6-Clicks ADL Score: 18  6-Clicks Mobility Score: 20      Anticipated Discharge Dispo: Discharge Disposition: D/T to home under A care in anticipation of covered skilled care (06)    DME Needed: No    Action(s) Taken: Updated Provider/Nurse on Discharge Plan    1124: Choice for [facility] faxed to JACQUELYN Mack with [facility] notified that patient is now only requiring  level of care.     1437: Per Epic, patient has been accepted to [facility].     Escalations Completed: None    Medically Clear: No    Next Steps: Medical clearance     Barriers to Discharge: Medical clearance

## 2023-02-17 NOTE — CARE PLAN
The patient is Stable - Low risk of patient condition declining or worsening    Shift Goals  Clinical Goals: Manage pain; tolerate meals  Patient Goals: Rest and sleep comfortably.  Family Goals: n/a    Progress made toward(s) clinical / shift goals: Patient's pain has been well managed and her meals have been tolerated.    Patient is not progressing towards the following goals:

## 2023-02-17 NOTE — FACE TO FACE
Face to Face Supporting Documentation - Home Health    The encounter with this patient was in whole or in part the primary reason for home health admission.    Date of encounter:   Patient:                    MRN:                       YOB: 2023  Clau Vences  [id number]  1945     Home health to see patient for:  Skilled Nursing care for assessment, interventions & education, Physical Therapy evaluation and treatment, and Occupational therapy evaluation and treatment    Skilled need for:  New Onset Medical Diagnosis Diverticulitis    Skilled nursing interventions to include:  Comment: n/a    Homebound status evidenced by:  Need the aid of supportive devices such as crutches, canes, wheelchairs or walkers or Needs the assistance of another person in order to leave the home. Leaving home requires a considerable and taxing effort. There is a normal inability to leave the home.    Community Physician to provide follow up care: Marge Brasher M.D.     Optional Interventions? No      I certify the face to face encounter for this home health care referral meets the CMS requirements and the encounter/clinical assessment with the patient was, in whole, or in part, for the medical condition(s) listed above, which is the primary reason for home health care. Based on my clinical findings: the service(s) are medically necessary, support the need for home health care, and the homebound criteria are met.  I certify that this patient has had a face to face encounter by myself.  Hannah Mays M.D. - NPI: [id number]

## 2023-02-17 NOTE — PROGRESS NOTES
Received report from day shift nurse. Assumed pt care at 1915. Pt is A&Ox4, resting comfortably in bed. Pt on 3L oxygen via nasal cannula. No signs of SOB/respiratory distress. Labs noted, VSS. Pt c/o no pain at this moment. Fall precautions in place. Bed at lowest position. Call light and personal belongings within reach.

## 2023-02-18 ENCOUNTER — PHARMACY VISIT (OUTPATIENT)
Dept: PHARMACY | Facility: MEDICAL CENTER | Age: 78
End: 2023-02-18
Payer: COMMERCIAL

## 2023-02-18 VITALS
SYSTOLIC BLOOD PRESSURE: 171 MMHG | OXYGEN SATURATION: 94 % | WEIGHT: 209.88 LBS | HEIGHT: 67 IN | BODY MASS INDEX: 32.94 KG/M2 | TEMPERATURE: 98.1 F | HEART RATE: 68 BPM | RESPIRATION RATE: 18 BRPM | DIASTOLIC BLOOD PRESSURE: 81 MMHG

## 2023-02-18 LAB
ANION GAP SERPL CALC-SCNC: 8 MMOL/L (ref 7–16)
BUN SERPL-MCNC: 8 MG/DL (ref 8–22)
CALCIUM SERPL-MCNC: 9 MG/DL (ref 8.4–10.2)
CHLORIDE SERPL-SCNC: 109 MMOL/L (ref 96–112)
CO2 SERPL-SCNC: 24 MMOL/L (ref 20–33)
CREAT SERPL-MCNC: 0.6 MG/DL (ref 0.5–1.4)
ERYTHROCYTE [DISTWIDTH] IN BLOOD BY AUTOMATED COUNT: 43.7 FL (ref 35.9–50)
GFR SERPLBLD CREATININE-BSD FMLA CKD-EPI: 92 ML/MIN/1.73 M 2
GLUCOSE SERPL-MCNC: 117 MG/DL (ref 65–99)
HCT VFR BLD AUTO: 30.1 % (ref 37–47)
HGB BLD-MCNC: 9.7 G/DL (ref 12–16)
MCH RBC QN AUTO: 31.9 PG (ref 27–33)
MCHC RBC AUTO-ENTMCNC: 32.2 G/DL (ref 33.6–35)
MCV RBC AUTO: 99 FL (ref 81.4–97.8)
PLATELET # BLD AUTO: 240 K/UL (ref 164–446)
PMV BLD AUTO: 10.4 FL (ref 9–12.9)
POTASSIUM SERPL-SCNC: 3.8 MMOL/L (ref 3.6–5.5)
RBC # BLD AUTO: 3.04 M/UL (ref 4.2–5.4)
SODIUM SERPL-SCNC: 141 MMOL/L (ref 135–145)
WBC # BLD AUTO: 7 K/UL (ref 4.8–10.8)

## 2023-02-18 PROCEDURE — 700111 HCHG RX REV CODE 636 W/ 250 OVERRIDE (IP): Performed by: HOSPITALIST

## 2023-02-18 PROCEDURE — 700102 HCHG RX REV CODE 250 W/ 637 OVERRIDE(OP): Performed by: FAMILY MEDICINE

## 2023-02-18 PROCEDURE — A9270 NON-COVERED ITEM OR SERVICE: HCPCS | Performed by: HOSPITALIST

## 2023-02-18 PROCEDURE — A9270 NON-COVERED ITEM OR SERVICE: HCPCS | Performed by: FAMILY MEDICINE

## 2023-02-18 PROCEDURE — 700102 HCHG RX REV CODE 250 W/ 637 OVERRIDE(OP): Performed by: HOSPITALIST

## 2023-02-18 PROCEDURE — 80048 BASIC METABOLIC PNL TOTAL CA: CPT

## 2023-02-18 PROCEDURE — 85027 COMPLETE CBC AUTOMATED: CPT

## 2023-02-18 PROCEDURE — 36415 COLL VENOUS BLD VENIPUNCTURE: CPT

## 2023-02-18 PROCEDURE — RXMED WILLOW AMBULATORY MEDICATION CHARGE: Performed by: FAMILY MEDICINE

## 2023-02-18 PROCEDURE — 99239 HOSP IP/OBS DSCHRG MGMT >30: CPT | Performed by: FAMILY MEDICINE

## 2023-02-18 RX ORDER — CARVEDILOL 25 MG/1
25 TABLET ORAL 2 TIMES DAILY WITH MEALS
Qty: 60 TABLET | Refills: 0 | Status: SHIPPED | OUTPATIENT
Start: 2023-02-18 | End: 2023-03-21 | Stop reason: SDUPTHER

## 2023-02-18 RX ORDER — ONDANSETRON 4 MG/1
4 TABLET, ORALLY DISINTEGRATING ORAL EVERY 4 HOURS PRN
Qty: 10 TABLET | Refills: 0 | Status: SHIPPED | OUTPATIENT
Start: 2023-02-18 | End: 2023-03-08

## 2023-02-18 RX ORDER — LANOLIN ALCOHOL/MO/W.PET/CERES
400 CREAM (GRAM) TOPICAL EVERY MORNING
Qty: 30 TABLET | Refills: 0
Start: 2023-02-18

## 2023-02-18 RX ORDER — AMOXICILLIN AND CLAVULANATE POTASSIUM 875; 125 MG/1; MG/1
1 TABLET, FILM COATED ORAL EVERY 12 HOURS
Qty: 8 TABLET | Refills: 0 | Status: SHIPPED | OUTPATIENT
Start: 2023-02-18 | End: 2023-02-22

## 2023-02-18 RX ADMIN — HEPARIN SODIUM 5000 UNITS: 5000 INJECTION, SOLUTION INTRAVENOUS; SUBCUTANEOUS at 04:42

## 2023-02-18 RX ADMIN — SERTRALINE HYDROCHLORIDE 100 MG: 50 TABLET ORAL at 04:38

## 2023-02-18 RX ADMIN — PREDNISONE 5 MG: 5 TABLET ORAL at 04:38

## 2023-02-18 RX ADMIN — OMEPRAZOLE 20 MG: 20 CAPSULE, DELAYED RELEASE ORAL at 04:38

## 2023-02-18 RX ADMIN — CARVEDILOL 25 MG: 25 TABLET, FILM COATED ORAL at 07:46

## 2023-02-18 RX ADMIN — AMOXICILLIN AND CLAVULANATE POTASSIUM 1 TABLET: 875; 125 TABLET, FILM COATED ORAL at 04:38

## 2023-02-18 RX ADMIN — LISINOPRIL 40 MG: 20 TABLET ORAL at 04:38

## 2023-02-18 ASSESSMENT — PAIN DESCRIPTION - PAIN TYPE: TYPE: ACUTE PAIN

## 2023-02-18 NOTE — PROGRESS NOTES
Received report from day shift nurse. Assumed pt care at 1915. Pt is A&Ox4, resting comfortably in bed. Pt on 1.5 LPM O2 via NC No signs of SOB/respiratory distress. Labs noted, VSS. Fall precautions in place. Bed at lowest position. Call light and personal belongings within reach. Continue to monitor.

## 2023-02-18 NOTE — CARE PLAN
The patient is Stable - Low risk of patient condition declining or worsening    Shift Goals  Clinical Goals: Free from fall throughout the shift, Monitor BP  Patient Goals: sleep comfortably tonight  Family Goals: n/a    Progress made toward(s) clinical / shift goals:  Fall precautions in place, hourly rounding done, call light within reach, monitor and record BP.    Patient is not progressing towards the following goals: n/a

## 2023-02-18 NOTE — DISCHARGE SUMMARY
Discharge Summary    CHIEF COMPLAINT ON ADMISSION  Chief Complaint   Patient presents with    Abnormal Labs     Abd xray       Reason for Admission  EMS     Admission Date  2/15/2023    CODE STATUS  Full Code    HPI & HOSPITAL COURSE  Patient is a 76yo female with a history of AZUL, arthritis on chronic prednisone, HTN, HLD and anxiety who was admitted here at the end of January for SBO and went to the OR on 1/28 with Dr Valles for a high grade bowel obstruction secondary to adhesions. She was subsequently discharged on 2/9/23 after prolonged hospitalization of short course of TPN and aspiration PNA; she was noted to have an ileus prior to discharge that resolved as well as a staghorn calculus for which she was referred to Urology. She returned to hospital 2/15 from [facility] with complaints of new onset diarrhea; at [facility], she was reported to have a KUB indicative of ileus vs early obstruction - last BM was the day prior to presentation.   CT here demonstrated an acute diverticulitis of the sigmoid colon which did correspond to her symptoms. During hospitalization here, pt initially had diarrheal stools that resolved into soft/formed stools - abdominal distention resolved, and she is tolerating her diet well. She is stable for discharge home with home health and oral Augmentin.  She is aware that she needs to see Dr Watkins in 6 weeks to discuss the need for colonoscopy.       Therefore, she is discharged in good and stable condition to home with organized home healthcare and close outpatient follow-up.    The patient met 2-midnight criteria for an inpatient stay at the time of discharge.    Discharge Date  2/18/2023    FOLLOW UP ITEMS POST DISCHARGE  Pt is to see her PCP in one week, and Dr Watkins in 6 weeks (discussed with patient)    DISCHARGE DIAGNOSES  Principal Problem:    Diverticulitis POA: Yes  Active Problems:    HTN (hypertension) POA: Yes    Hyperlipidemia POA: Yes    AZUL on CPAP POA: Yes     Chronic insomnia POA: Yes    Anxiety POA: Yes    Ileus (HCC) POA: Yes    Hypoxia POA: Unknown  Resolved Problems:    * No resolved hospital problems. *      FOLLOW UP  Future Appointments   Date Time Provider Department Center   3/8/2023 10:30 AM Marge Brasher M.D. [facility] None     No follow-up provider specified.    MEDICATIONS ON DISCHARGE     Medication List        START taking these medications        Instructions   ondansetron 4 MG Tbdp  Commonly known as: ZOFRAN ODT   Take 1 Tablet by mouth every four hours as needed for Nausea/Vomiting (give PO if IV route is unavailable.).  Dose: 4 mg            CHANGE how you take these medications        Instructions   amoxicillin-clavulanate 875-125 MG Tabs  What changed:   when to take this  additional instructions  Commonly known as: AUGMENTIN   Take 1 Tablet by mouth every 12 hours for 4 days.  Dose: 1 Tablet     carvedilol 25 MG Tabs  What changed:   medication strength  how much to take  Commonly known as: COREG   Take 1 Tablet by mouth 2 times a day with meals.  Dose: 25 mg     folic acid 400 MCG tablet  What changed: how much to take  Commonly known as: FOLVITE   Take 1 Tablet by mouth every morning.  Dose: 400 mcg     sertraline 100 MG Tabs  What changed:   how much to take  how to take this  when to take this  Commonly known as: Zoloft   TAKE 1 TABELT BY MOUTH DAILY            CONTINUE taking these medications        Instructions   cholestyramine 4 g packet  Commonly known as: QUESTRAN   Take 4 g by mouth every day.  Dose: 1 Packet     ferrous sulfate 325 (65 Fe) MG tablet   Take 325 mg by mouth every day.  Dose: 325 mg     lisinopril 20 MG Tabs  Commonly known as: PRINIVIL   TAKE 2 TABLETS BY MOUTH DAILY     LORazepam 0.5 MG Tabs  Commonly known as: ATIVAN   Take 0.5 mg by mouth at bedtime.  Dose: 0.5 mg     melatonin 3 MG Tabs   Take 6 mg by mouth at bedtime.  Dose: 6 mg     omeprazole 20 MG delayed-release capsule  Commonly known as: PRILOSEC   Take 1  Capsule by mouth every day.  Dose: 20 mg     ondansetron 4 MG Tabs tablet  Commonly known as: ZOFRAN   Take 1 Tablet by mouth every 6 hours as needed for Nausea/Vomiting.  Dose: 4 mg     predniSONE 5 MG Tabs  Commonly known as: DELTASONE   TAKE 1 TABLET BY MOUTH DAILY     rosuvastatin 10 MG Tabs  Commonly known as: CRESTOR   TAKE ONE TABLET BY MOUTH IN THE EVENING     sennosides-docusate sodium 8.6-50 MG tablet  Commonly known as: SENOKOT-S   Take 1 Tablet by mouth every day.  Dose: 1 Tablet     vitamin D3 1000 Unit (25 mcg) Tabs  Commonly known as: cholecalciferol   Take 1,000 Units by mouth every day.  Dose: 1,000 Units     zolpidem 5 MG Tabs  Commonly known as: AMBIEN   Take 5 mg by mouth at bedtime.  Dose: 5 mg              Allergies  Allergies   Allergen Reactions    Tramadol      Nausea and somnolence       DIET  Orders Placed This Encounter   Procedures    Diet Order Diet: Low Fiber(GI Soft)     Standing Status:   Standing     Number of Occurrences:   1     Order Specific Question:   Diet:     Answer:   Low Fiber(GI Soft) [2]       ACTIVITY  As tolerated.  Weight bearing as tolerated    CONSULTATIONS  None    PROCEDURES  None    LABORATORY  Lab Results   Component Value Date    SODIUM 141 02/18/2023    POTASSIUM 3.8 02/18/2023    CHLORIDE 109 02/18/2023    CO2 24 02/18/2023    GLUCOSE 117 (H) 02/18/2023    BUN 8 02/18/2023    CREATININE 0.60 02/18/2023    CREATININE 0.5 09/04/2007        Lab Results   Component Value Date    WBC 7.0 02/18/2023    HEMOGLOBIN 9.7 (L) 02/18/2023    HEMATOCRIT 30.1 (L) 02/18/2023    PLATELETCT 240 02/18/2023        Total time of the discharge process exceeds 35 minutes.

## 2023-02-18 NOTE — PROGRESS NOTES
Report received from DILIP Bui. Patient A&OX4, Patient pain level 0, Patient reports eagerness to go home. No needs at this time.

## 2023-02-18 NOTE — CARE PLAN
The patient is Stable - Low risk of patient condition declining or worsening    Shift Goals  Clinical Goals: Remain fall free throughtout shift, and Maintain BP  Patient Goals: Go home  Family Goals: n/a    Progress made toward(s) clinical / shift goals: Patient remained free from fall, patient's BP was maintained with medication interventions. Patient d/c order placed.     Problem: Knowledge Deficit - Standard  Goal: Patient and family/care givers will demonstrate understanding of plan of care, disease process/condition, diagnostic tests and medications  Outcome: Met     Problem: Fluid Volume  Goal: Fluid volume balance will be maintained  Outcome: Met     Problem: Respiratory  Goal: Patient will achieve/maintain optimum respiratory ventilation and gas exchange  Outcome: Met     Problem: Fall Risk  Goal: Patient will remain free from falls  Outcome: Met     Problem: Mobility  Goal: Patient's capacity to carry out activities will improve  Outcome: Met       Patient is not progressing towards the following goals:

## 2023-02-27 DIAGNOSIS — F51.01 PRIMARY INSOMNIA: ICD-10-CM

## 2023-02-27 RX ORDER — ZOLPIDEM TARTRATE 5 MG/1
5 TABLET ORAL NIGHTLY PRN
Qty: 30 TABLET | Refills: 2 | Status: SHIPPED | OUTPATIENT
Start: 2023-02-27 | End: 2023-03-29

## 2023-03-08 ENCOUNTER — OFFICE VISIT (OUTPATIENT)
Dept: INTERNAL MEDICINE | Facility: IMAGING CENTER | Age: 78
End: 2023-03-08
Payer: MEDICARE

## 2023-03-08 ENCOUNTER — HOSPITAL ENCOUNTER (OUTPATIENT)
Facility: MEDICAL CENTER | Age: 78
End: 2023-03-08
Attending: FAMILY MEDICINE
Payer: MEDICARE

## 2023-03-08 VITALS
SYSTOLIC BLOOD PRESSURE: 118 MMHG | RESPIRATION RATE: 17 BRPM | BODY MASS INDEX: 30.7 KG/M2 | HEART RATE: 68 BPM | OXYGEN SATURATION: 94 % | WEIGHT: 195.6 LBS | TEMPERATURE: 98.5 F | DIASTOLIC BLOOD PRESSURE: 60 MMHG | HEIGHT: 67 IN

## 2023-03-08 DIAGNOSIS — K57.92 DIVERTICULITIS: ICD-10-CM

## 2023-03-08 DIAGNOSIS — D64.9 NORMOCYTIC ANEMIA: ICD-10-CM

## 2023-03-08 DIAGNOSIS — E87.8 ELECTROLYTE IMBALANCE: ICD-10-CM

## 2023-03-08 DIAGNOSIS — Z09 HOSPITAL DISCHARGE FOLLOW-UP: ICD-10-CM

## 2023-03-08 DIAGNOSIS — K56.609 SBO (SMALL BOWEL OBSTRUCTION) (HCC): ICD-10-CM

## 2023-03-08 PROBLEM — E87.0 HYPERNATREMIA: Status: RESOLVED | Noted: 2023-01-24 | Resolved: 2023-03-08

## 2023-03-08 PROBLEM — J69.0 ASPIRATION PNEUMONIA (HCC): Status: RESOLVED | Noted: 2023-02-07 | Resolved: 2023-03-08

## 2023-03-08 PROBLEM — R09.02 HYPOXIA: Status: RESOLVED | Noted: 2023-02-16 | Resolved: 2023-03-08

## 2023-03-08 PROBLEM — K56.7 ILEUS (HCC): Status: RESOLVED | Noted: 2023-02-15 | Resolved: 2023-03-08

## 2023-03-08 PROBLEM — J96.01 ACUTE RESPIRATORY FAILURE WITH HYPOXIA (HCC): Status: RESOLVED | Noted: 2023-01-25 | Resolved: 2023-03-08

## 2023-03-08 PROBLEM — A41.9 SEPSIS (HCC): Status: RESOLVED | Noted: 2023-02-06 | Resolved: 2023-03-08

## 2023-03-08 LAB
ALBUMIN SERPL BCP-MCNC: 3.6 G/DL (ref 3.2–4.9)
ALBUMIN/GLOB SERPL: 1.2 G/DL
ALP SERPL-CCNC: 59 U/L (ref 30–99)
ALT SERPL-CCNC: 10 U/L (ref 2–50)
ANION GAP SERPL CALC-SCNC: 7 MMOL/L (ref 7–16)
AST SERPL-CCNC: 12 U/L (ref 12–45)
BASOPHILS # BLD AUTO: 0.5 % (ref 0–1.8)
BASOPHILS # BLD: 0.03 K/UL (ref 0–0.12)
BILIRUB SERPL-MCNC: 0.3 MG/DL (ref 0.1–1.5)
BUN SERPL-MCNC: 14 MG/DL (ref 8–22)
CALCIUM ALBUM COR SERPL-MCNC: 10.4 MG/DL (ref 8.5–10.5)
CALCIUM SERPL-MCNC: 10.1 MG/DL (ref 8.5–10.5)
CHLORIDE SERPL-SCNC: 109 MMOL/L (ref 96–112)
CO2 SERPL-SCNC: 25 MMOL/L (ref 20–33)
CREAT SERPL-MCNC: 0.59 MG/DL (ref 0.5–1.4)
EOSINOPHIL # BLD AUTO: 0.2 K/UL (ref 0–0.51)
EOSINOPHIL NFR BLD: 3.6 % (ref 0–6.9)
ERYTHROCYTE [DISTWIDTH] IN BLOOD BY AUTOMATED COUNT: 55.1 FL (ref 35.9–50)
GFR SERPLBLD CREATININE-BSD FMLA CKD-EPI: 92 ML/MIN/1.73 M 2
GLOBULIN SER CALC-MCNC: 2.9 G/DL (ref 1.9–3.5)
GLUCOSE SERPL-MCNC: 106 MG/DL (ref 65–99)
HCT VFR BLD AUTO: 34.7 % (ref 37–47)
HGB BLD-MCNC: 10.9 G/DL (ref 12–16)
IMM GRANULOCYTES # BLD AUTO: 0.04 K/UL (ref 0–0.11)
IMM GRANULOCYTES NFR BLD AUTO: 0.7 % (ref 0–0.9)
LYMPHOCYTES # BLD AUTO: 0.96 K/UL (ref 1–4.8)
LYMPHOCYTES NFR BLD: 17.2 % (ref 22–41)
MCH RBC QN AUTO: 32.5 PG (ref 27–33)
MCHC RBC AUTO-ENTMCNC: 31.4 G/DL (ref 33.6–35)
MCV RBC AUTO: 103.6 FL (ref 81.4–97.8)
MONOCYTES # BLD AUTO: 0.44 K/UL (ref 0–0.85)
MONOCYTES NFR BLD AUTO: 7.9 % (ref 0–13.4)
NEUTROPHILS # BLD AUTO: 3.91 K/UL (ref 2–7.15)
NEUTROPHILS NFR BLD: 70.1 % (ref 44–72)
NRBC # BLD AUTO: 0 K/UL
NRBC BLD-RTO: 0 /100 WBC
PLATELET # BLD AUTO: 258 K/UL (ref 164–446)
PMV BLD AUTO: 11.1 FL (ref 9–12.9)
POTASSIUM SERPL-SCNC: 4.3 MMOL/L (ref 3.6–5.5)
PROT SERPL-MCNC: 6.5 G/DL (ref 6–8.2)
RBC # BLD AUTO: 3.35 M/UL (ref 4.2–5.4)
SODIUM SERPL-SCNC: 141 MMOL/L (ref 135–145)
WBC # BLD AUTO: 5.6 K/UL (ref 4.8–10.8)

## 2023-03-08 PROCEDURE — 99214 OFFICE O/P EST MOD 30 MIN: CPT | Performed by: FAMILY MEDICINE

## 2023-03-08 PROCEDURE — 80053 COMPREHEN METABOLIC PANEL: CPT

## 2023-03-08 PROCEDURE — 85025 COMPLETE CBC W/AUTO DIFF WBC: CPT

## 2023-03-08 ASSESSMENT — FIBROSIS 4 INDEX: FIB4 SCORE: 1.38

## 2023-03-16 DIAGNOSIS — M15.9 PRIMARY OSTEOARTHRITIS INVOLVING MULTIPLE JOINTS: ICD-10-CM

## 2023-03-16 RX ORDER — PREDNISONE 5 MG/1
TABLET ORAL
Qty: 30 TABLET | Refills: 1 | Status: SHIPPED | OUTPATIENT
Start: 2023-03-16 | End: 2023-05-30 | Stop reason: SDUPTHER

## 2023-05-09 DIAGNOSIS — R73.9 HYPERGLYCEMIA: ICD-10-CM

## 2023-05-09 DIAGNOSIS — E78.2 MIXED HYPERLIPIDEMIA: ICD-10-CM

## 2023-05-09 DIAGNOSIS — E83.52 HYPERCALCEMIA: ICD-10-CM

## 2023-05-09 DIAGNOSIS — D75.89 MACROCYTOSIS: ICD-10-CM

## 2023-05-09 DIAGNOSIS — E55.9 VITAMIN D DEFICIENCY: ICD-10-CM

## 2023-05-09 DIAGNOSIS — R53.83 OTHER FATIGUE: ICD-10-CM

## 2023-05-09 DIAGNOSIS — I10 PRIMARY HYPERTENSION: ICD-10-CM

## 2023-05-09 RX ORDER — ROSUVASTATIN CALCIUM 10 MG/1
10 TABLET, COATED ORAL EVERY EVENING
Qty: 90 TABLET | Refills: 0 | Status: SHIPPED | OUTPATIENT
Start: 2023-05-09 | End: 2023-07-31

## 2023-05-09 RX ORDER — ROSUVASTATIN CALCIUM 5 MG/1
10 TABLET, COATED ORAL EVERY EVENING
Qty: 180 TABLET | Refills: 0 | Status: SHIPPED
Start: 2023-05-09 | End: 2023-05-09

## 2023-05-12 ENCOUNTER — HOSPITAL ENCOUNTER (OUTPATIENT)
Facility: MEDICAL CENTER | Age: 78
End: 2023-05-12
Attending: FAMILY MEDICINE
Payer: MEDICARE

## 2023-05-12 ENCOUNTER — NON-PROVIDER VISIT (OUTPATIENT)
Dept: INTERNAL MEDICINE | Facility: IMAGING CENTER | Age: 78
End: 2023-05-12
Payer: MEDICARE

## 2023-05-12 DIAGNOSIS — E83.52 HYPERCALCEMIA: ICD-10-CM

## 2023-05-12 DIAGNOSIS — E78.2 MIXED HYPERLIPIDEMIA: ICD-10-CM

## 2023-05-12 DIAGNOSIS — D75.89 MACROCYTOSIS: ICD-10-CM

## 2023-05-12 DIAGNOSIS — R73.9 HYPERGLYCEMIA: ICD-10-CM

## 2023-05-12 DIAGNOSIS — R53.83 OTHER FATIGUE: ICD-10-CM

## 2023-05-12 DIAGNOSIS — I10 PRIMARY HYPERTENSION: ICD-10-CM

## 2023-05-12 DIAGNOSIS — E55.9 VITAMIN D DEFICIENCY: ICD-10-CM

## 2023-05-12 LAB
25(OH)D3 SERPL-MCNC: 28 NG/ML (ref 30–100)
ALBUMIN SERPL BCP-MCNC: 4 G/DL (ref 3.2–4.9)
ALBUMIN/GLOB SERPL: 1.3 G/DL
ALP SERPL-CCNC: 80 U/L (ref 30–99)
ALT SERPL-CCNC: 11 U/L (ref 2–50)
ANION GAP SERPL CALC-SCNC: 9 MMOL/L (ref 7–16)
AST SERPL-CCNC: 14 U/L (ref 12–45)
BASOPHILS # BLD AUTO: 0.9 % (ref 0–1.8)
BASOPHILS # BLD: 0.05 K/UL (ref 0–0.12)
BILIRUB SERPL-MCNC: 0.2 MG/DL (ref 0.1–1.5)
BUN SERPL-MCNC: 35 MG/DL (ref 8–22)
CA-I SERPL-SCNC: 1.4 MMOL/L (ref 1.1–1.3)
CALCIUM ALBUM COR SERPL-MCNC: 10.2 MG/DL (ref 8.5–10.5)
CALCIUM SERPL-MCNC: 10.2 MG/DL (ref 8.5–10.5)
CHLORIDE SERPL-SCNC: 113 MMOL/L (ref 96–112)
CHOLEST SERPL-MCNC: 209 MG/DL (ref 100–199)
CO2 SERPL-SCNC: 22 MMOL/L (ref 20–33)
CREAT SERPL-MCNC: 0.85 MG/DL (ref 0.5–1.4)
EOSINOPHIL # BLD AUTO: 0.25 K/UL (ref 0–0.51)
EOSINOPHIL NFR BLD: 4.4 % (ref 0–6.9)
ERYTHROCYTE [DISTWIDTH] IN BLOOD BY AUTOMATED COUNT: 49.3 FL (ref 35.9–50)
EST. AVERAGE GLUCOSE BLD GHB EST-MCNC: 94 MG/DL
FOLATE SERPL-MCNC: 18 NG/ML
GFR SERPLBLD CREATININE-BSD FMLA CKD-EPI: 70 ML/MIN/1.73 M 2
GLOBULIN SER CALC-MCNC: 3.1 G/DL (ref 1.9–3.5)
GLUCOSE SERPL-MCNC: 90 MG/DL (ref 65–99)
HBA1C MFR BLD: 4.9 % (ref 4–5.6)
HCT VFR BLD AUTO: 44.1 % (ref 37–47)
HDLC SERPL-MCNC: 65 MG/DL
HGB BLD-MCNC: 13.4 G/DL (ref 12–16)
IMM GRANULOCYTES # BLD AUTO: 0.03 K/UL (ref 0–0.11)
IMM GRANULOCYTES NFR BLD AUTO: 0.5 % (ref 0–0.9)
LDLC SERPL CALC-MCNC: 88 MG/DL
LYMPHOCYTES # BLD AUTO: 1.25 K/UL (ref 1–4.8)
LYMPHOCYTES NFR BLD: 22.2 % (ref 22–41)
MCH RBC QN AUTO: 31.4 PG (ref 27–33)
MCHC RBC AUTO-ENTMCNC: 30.4 G/DL (ref 33.6–35)
MCV RBC AUTO: 103.3 FL (ref 81.4–97.8)
MONOCYTES # BLD AUTO: 0.49 K/UL (ref 0–0.85)
MONOCYTES NFR BLD AUTO: 8.7 % (ref 0–13.4)
NEUTROPHILS # BLD AUTO: 3.57 K/UL (ref 2–7.15)
NEUTROPHILS NFR BLD: 63.3 % (ref 44–72)
NRBC # BLD AUTO: 0 K/UL
NRBC BLD-RTO: 0 /100 WBC
PLATELET # BLD AUTO: 191 K/UL (ref 164–446)
PMV BLD AUTO: 11.9 FL (ref 9–12.9)
POTASSIUM SERPL-SCNC: 5 MMOL/L (ref 3.6–5.5)
PROT SERPL-MCNC: 7.1 G/DL (ref 6–8.2)
RBC # BLD AUTO: 4.27 M/UL (ref 4.2–5.4)
SODIUM SERPL-SCNC: 144 MMOL/L (ref 135–145)
T4 FREE SERPL-MCNC: 1.25 NG/DL (ref 0.93–1.7)
TRIGL SERPL-MCNC: 278 MG/DL (ref 0–149)
TSH SERPL DL<=0.005 MIU/L-ACNC: 0.84 UIU/ML (ref 0.38–5.33)
VIT B12 SERPL-MCNC: 2375 PG/ML (ref 211–911)
WBC # BLD AUTO: 5.6 K/UL (ref 4.8–10.8)

## 2023-05-12 PROCEDURE — 82330 ASSAY OF CALCIUM: CPT

## 2023-05-12 PROCEDURE — 82306 VITAMIN D 25 HYDROXY: CPT

## 2023-05-12 PROCEDURE — 82607 VITAMIN B-12: CPT

## 2023-05-12 PROCEDURE — 85025 COMPLETE CBC W/AUTO DIFF WBC: CPT

## 2023-05-12 PROCEDURE — 80061 LIPID PANEL: CPT

## 2023-05-12 PROCEDURE — 84439 ASSAY OF FREE THYROXINE: CPT

## 2023-05-12 PROCEDURE — 84443 ASSAY THYROID STIM HORMONE: CPT

## 2023-05-12 PROCEDURE — 83036 HEMOGLOBIN GLYCOSYLATED A1C: CPT

## 2023-05-12 PROCEDURE — 82746 ASSAY OF FOLIC ACID SERUM: CPT

## 2023-05-12 PROCEDURE — 80053 COMPREHEN METABOLIC PANEL: CPT

## 2023-05-15 ENCOUNTER — OFFICE VISIT (OUTPATIENT)
Dept: INTERNAL MEDICINE | Facility: IMAGING CENTER | Age: 78
End: 2023-05-15
Payer: MEDICARE

## 2023-05-15 VITALS
HEART RATE: 66 BPM | RESPIRATION RATE: 17 BRPM | WEIGHT: 208.6 LBS | DIASTOLIC BLOOD PRESSURE: 62 MMHG | SYSTOLIC BLOOD PRESSURE: 122 MMHG | HEIGHT: 67 IN | TEMPERATURE: 98.7 F | OXYGEN SATURATION: 96 % | BODY MASS INDEX: 32.74 KG/M2

## 2023-05-15 DIAGNOSIS — E67.8 EXCESSIVE VITAMIN B12 INTAKE: ICD-10-CM

## 2023-05-15 DIAGNOSIS — L65.8 FEMALE PATTERN HAIR LOSS: ICD-10-CM

## 2023-05-15 DIAGNOSIS — Z87.19 HISTORY OF DIVERTICULITIS: ICD-10-CM

## 2023-05-15 DIAGNOSIS — M85.9 DISORDER OF BONE DENSITY AND STRUCTURE, UNSPECIFIED: ICD-10-CM

## 2023-05-15 DIAGNOSIS — Z12.31 BREAST CANCER SCREENING BY MAMMOGRAM: ICD-10-CM

## 2023-05-15 DIAGNOSIS — E78.2 MIXED HYPERLIPIDEMIA: ICD-10-CM

## 2023-05-15 DIAGNOSIS — E66.9 OBESITY (BMI 30-39.9): ICD-10-CM

## 2023-05-15 DIAGNOSIS — R26.89 POOR BALANCE: ICD-10-CM

## 2023-05-15 DIAGNOSIS — Z00.00 ENCOUNTER FOR MEDICARE ANNUAL WELLNESS EXAM: ICD-10-CM

## 2023-05-15 DIAGNOSIS — M19.90 ARTHRITIS: ICD-10-CM

## 2023-05-15 DIAGNOSIS — E83.52 HYPERCALCEMIA: ICD-10-CM

## 2023-05-15 DIAGNOSIS — E55.9 VITAMIN D DEFICIENCY: ICD-10-CM

## 2023-05-15 DIAGNOSIS — I10 PRIMARY HYPERTENSION: ICD-10-CM

## 2023-05-15 DIAGNOSIS — G47.33 OSA ON CPAP: ICD-10-CM

## 2023-05-15 DIAGNOSIS — E28.39 ESTROGEN DEFICIENCY: ICD-10-CM

## 2023-05-15 PROBLEM — K57.92 DIVERTICULITIS: Status: RESOLVED | Noted: 2023-02-15 | Resolved: 2023-05-15

## 2023-05-15 PROBLEM — N20.0 KIDNEY STONE: Status: RESOLVED | Noted: 2023-02-09 | Resolved: 2023-05-15

## 2023-05-15 PROCEDURE — G0439 PPPS, SUBSEQ VISIT: HCPCS | Performed by: FAMILY MEDICINE

## 2023-05-15 PROCEDURE — 3074F SYST BP LT 130 MM HG: CPT | Performed by: FAMILY MEDICINE

## 2023-05-15 PROCEDURE — 3078F DIAST BP <80 MM HG: CPT | Performed by: FAMILY MEDICINE

## 2023-05-15 ASSESSMENT — FIBROSIS 4 INDEX: FIB4 SCORE: 1.72

## 2023-05-15 ASSESSMENT — ENCOUNTER SYMPTOMS: GENERAL WELL-BEING: GOOD

## 2023-05-15 ASSESSMENT — ACTIVITIES OF DAILY LIVING (ADL): BATHING_REQUIRES_ASSISTANCE: 0

## 2023-05-15 ASSESSMENT — PATIENT HEALTH QUESTIONNAIRE - PHQ9: CLINICAL INTERPRETATION OF PHQ2 SCORE: 0

## 2023-05-15 NOTE — PROGRESS NOTES
CC:   Medicare Annual Wellness Visit    HPI:  Clau is a 78 y.o. female here for her Medicare Annual Wellness Visit and to review labs done 5/12/23.     She had a very eventful start to 2023 as summarized below:  Patient was admitted to [facility] 1/24-2/9/23 for evaluation and treatment for SBO. Conservative management efforts with NG tube decompression failed, and she underwent ex lap with lysis of adhesions 1/28/23 by Dr. Valles. She also was treated for ileus and aspiration PNA and required short term TPN. She was tolerating oral diet/having bowel movements prior to discharge to skilled nursing facility. She then returned to [facility] ER on 2/15/23 for evaluation of abnormal outpatient KUB. She was noted to have acute diverticulitis of sigmoid colon and was admitted for appropriate treatment. She was discharged home on 2/18/23 with home health services.  She has fully recovered from a surgical standpoint but continues to report ongoing stool changes. She has an appointment with her GI team on Bev 15 and will discuss colon cancer screening also at that time. She has chronic essential HTN and chronic mixed dyslipidemia controlled with daily medication use. She does not tolerate tight blood pressure control well historically. She sees Dr. Olson for ongoing dermatology care and is UTD with eye exam with Dr. Morel. She has chronic AZUL with CPAP use managed by [facility] and has chronic MARIE controlled with lifestyle changes and daily Zoloft use. She also suffers from chronic primary insomnia and uses Ambien PRN for symptom management. She continues to benefit from ongoing Ambien use, denies medication related side effects, and continues to use this controlled medication in a safe manner. She is due for repeat dexa scan and will complete imaging at time of annual screening mammogram due in July. 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 She  endorses 100% medication compliance, denies new mental health concerns, and is in good spirits overall today.     Patient Active Problem List    Diagnosis Date Noted    History of diverticulitis 05/15/2023    Vitamin D deficiency 05/15/2023    Arthritis 01/24/2023    Poor balance 04/26/2021    Thyroid nodule 03/16/2021    Obesity (BMI 30-39.9) 04/06/2018    Heart murmur 09/13/2017    Chronic insomnia 08/09/2017    Anxiety 08/09/2017    HTN (hypertension) 07/01/2014    Hyperlipidemia 07/01/2014    AZUL on CPAP 07/01/2014     Current Outpatient Medications   Medication Sig Dispense Refill    rosuvastatin (CRESTOR) 10 MG Tab Take 1 Tablet by mouth every evening. 90 Tablet 0    cholestyramine (QUESTRAN) 4 g packet Take 4 g by mouth 2 times a day. 60 Each 1    carvedilol (COREG) 25 MG Tab Take 1 Tablet by mouth 2 times a day with meals. 180 Tablet 2    predniSONE (DELTASONE) 5 MG Tab TAKE ONE TABLET BY MOUTH EVERY DAY 30 Tablet 1    folic acid (FOLVITE) 400 MCG tablet Take 1 Tablet by mouth every morning. 30 Tablet 0    ferrous sulfate 325 (65 Fe) MG tablet Take 325 mg by mouth every day.      LORazepam (ATIVAN) 0.5 MG Tab Take 0.5 mg by mouth at bedtime.      melatonin 3 MG Tab Take 6 mg by mouth at bedtime.      vitamin D3 (CHOLECALCIFEROL) 1000 Unit (25 mcg) Tab Take 1,000 Units by mouth every day.      sennosides-docusate sodium (SENOKOT-S) 8.6-50 MG tablet Take 1 Tablet by mouth every day. 30 Tablet 11    omeprazole (PRILOSEC) 20 MG delayed-release capsule Take 1 Capsule by mouth every day. 30 Capsule     sertraline (ZOLOFT) 100 MG Tab TAKE 1 TABELT BY MOUTH DAILY 90 Tablet 3    lisinopril (PRINIVIL) 20 MG Tab TAKE 2 TABLETS BY MOUTH DAILY 180 Tablet 2     No current facility-administered medications for this visit.      Current supplements: see MAR   Chronic narcotic pain medicines: no  Allergies: Tramadol  Exercise: limited  Current social contact/activities: yes  Current mood: good  Advance Directive on file:  no    Screening:  Depression Screening  Little interest or pleasure in doing things?  0 - not at all  Feeling down, depressed , or hopeless? 0 - not at all  Patient Health Questionnaire Score: 0     If depressive symptoms identified deferred to follow up visit unless specifically addressed in assessment and plan.    Interpretation of PHQ-9 Total Score   Score Severity   1-4 No Depression   5-9 Mild Depression   10-14 Moderate Depression   15-19 Moderately Severe Depression   20-27 Severe Depression    Screening for Cognitive Impairment  Three Minute Recall (daughter, heaven, mountain) 3/3    Paramjit clock face with all 12 numbers and set the hands to show 10 past 11.  Yes    Cognitive concerns identified deferred for follow up unless specifically addressed in assessment and plan.    Fall Risk Assessment  Has the patient had two or more falls in the last year or any fall with injury in the last year?  No    Safety Assessment  Throw rugs on floor.  No  Handrails on all stairs.  Yes  Good lighting in all hallways.  Yes  Difficulty hearing.  No  Patient counseled about all safety risks that were identified.    Functional Assessment ADLs  Are there any barriers preventing you from cooking for yourself or meeting nutritional needs?  No.    Are there any barriers preventing you from driving safely or obtaining transportation?  No.    Are there any barriers preventing you from using a telephone or calling for help?  No.    Are there any barriers preventing you from shopping?  No.    Are there any barriers preventing you from taking care of your own finances?  No.    Are there any barriers preventing you from managing your medications?  No.    Are there any barriers preventing you from showering, bathing or dressing yourself?  No.    Are you currently engaging in any exercise or physical activity?  Yes.     What is your perception of your health?  Good    Advance Care Planning  Do you have an Advance Directive, Living Will,  Durable Power of , or POLST?                   Health Maintenance Summary            Ordered - BONE DENSITY (Every 5 Years) Ordered on 5/15/2023      05/07/2018  DS-BONE DENSITY STUDY (DEXA)    04/27/2016  DS-BONE DENSITY STUDY (DEXA)    03/13/2013  DS-BONE DENSITY STUDY (DEXA)    02/24/2010  DS-BONE DENSITY STUDY (DEXA)    02/07/2007  DS-BONE DENSITY STUDY (DEXA)              Ordered - MAMMOGRAM (Yearly) Ordered on 5/15/2023      07/18/2022  MA-SCREENING MAMMO BILAT W/TOMOSYNTHESIS W/CAD    07/16/2021  MA-SCREENING MAMMO BILAT W/TOMOSYNTHESIS W/CAD    07/08/2020  MA-SCREENING MAMMO BILAT W/TOMOSYNTHESIS W/CAD    06/25/2019  MA-SCREENING MAMMO BILAT W/TOMOSYNTHESIS W/CAD    05/07/2018  MA-MAMMO SCREENING BILAT W/SERENA W/CAD    Only the first 5 history entries have been loaded, but more history exists.              Annual Wellness Visit (Every 366 Days) Next due on 5/15/2024      05/15/2023  Visit Dx: Encounter for Medicare annual wellness exam    05/15/2023  Subsequent Annual Wellness Visit - Includes PPPS ()    04/07/2022  Subsequent Annual Wellness Visit - Includes PPPS ()    04/07/2022  Visit Dx: Encounter for Medicare annual wellness exam    01/13/2021  Subsequent Annual Wellness Visit - Includes PPPS ()    Only the first 5 history entries have been loaded, but more history exists.              IMM DTaP/Tdap/Td Vaccine (2 - Td or Tdap) Next due on 10/11/2026      10/11/2016  Imm Admin: Tdap Vaccine              IMM PNEUMOCOCCAL VACCINE: 65+ Years (Series Information) Completed      02/01/2016  Imm Admin: Pneumococcal polysaccharide vaccine (PPSV-23)    12/22/2014  Imm Admin: Pneumococcal Conjugate Vaccine (Prevnar/PCV-13)              IMM ZOSTER VACCINES (Series Information) Completed      01/29/2020  Imm Admin: Zoster Vaccine Recombinant (RZV) (SHINGRIX)    11/21/2019  Imm Admin: Zoster Vaccine Recombinant (RZV) (SHINGRIX)    08/08/2015  Imm Admin: Zoster Vaccine Live (ZVL) (Zostavax) -  HISTORICAL DATA              HEPATITIS C SCREENING  Completed      12/09/2020  HEP C VIRUS ANTIBODY              IMM INFLUENZA (Series Information) Completed      09/27/2022  Imm Admin: Influenza Vaccine Adult HD    09/23/2021  Imm Admin: Influenza Vaccine Adult HD    10/01/2020  Imm Admin: Influenza Vaccine Adult HD    10/16/2019  Imm Admin: Influenza Vaccine Adult HD    10/20/2018  Imm Admin: Influenza Vaccine Adult HD    Only the first 5 history entries have been loaded, but more history exists.              COVID-19 Vaccine (Series Information) Completed      10/27/2022  Imm Admin: PFIZER BIVALENT BOOSTER SARS-COV-2 VACCINE (12+)    04/09/2022  Imm Admin: COVID-19 Vaccine, unspecified - HISTORICAL DATA    09/30/2021  Imm Admin: PFIZER PURPLE CAP SARS-COV-2 VACCINATION (12+)    02/12/2021  Imm Admin: PFIZER PURPLE CAP SARS-COV-2 VACCINATION (12+)    01/22/2021  Imm Admin: PFIZER PURPLE CAP SARS-COV-2 VACCINATION (12+)              HPV Vaccines (Series Information) Aged Out      No completion history exists for this topic.              IMM MENINGOCOCCAL ACWY VACCINE (Series Information) Aged Out      No completion history exists for this topic.              Discontinued - COLORECTAL CANCER SCREENING  Discontinued        Frequency changed to Never automatically (Topic No Longer Applies)    07/01/2014  OCCULT BLOOD X3 (STOOL)    02/04/2013  REFERRAL TO GI FOR COLONOSCOPY              Discontinued - IMM HEP B VACCINE  Discontinued      No completion history exists for this topic.                    Patient Care Team:  Marge Brasher M.D. as PCP - General (Family Medicine)  Savannah Dolan R.N. as Registered Nurse  Charu Godfrey, PT, DPT as Physical Therapist (Physical Therapy)  [employer] (DME Supplier)      Social History     Tobacco Use    Smoking status: Never    Smokeless tobacco: Never   Vaping Use    Vaping Use: Never used   Substance Use Topics    Alcohol use: Yes     Alcohol/week: 0.6 - 1.2 oz      Types: 1 - 2 Glasses of wine per week     Comment: 2 glasses of wine a day     Drug use: No     Family History   Problem Relation Age of Onset    Breast Cancer Mother     Cancer Mother 65        Breast    Cancer Father         Colon    Alcohol/Drug Father     Colon Cancer Father     Hyperlipidemia Brother     Heart Disease Brother         arrythmia     She  has a past medical history of Anesthesia, Arthritis, Back pain, Dyslipidemia, Jordanian measles, Heart murmur (9/13/2017), Hypertension, Influenza, Mumps, Other specified symptom associated with female genital organs, Painful joint, Psychiatric problem, Sleep apnea, Snoring, Sore muscles, Tonsillitis, and Unspecified cataract.    She has no past medical history of Breast cancer (HCC) or CAD (coronary artery disease).   Past Surgical History:   Procedure Laterality Date    MN EXPLORATORY OF ABDOMEN N/A 1/28/2023    Procedure: EX LAP;  Surgeon: Mike Valles M.D.;  Location: [facility];  Service: Gastroenterology    KNEE ARTHROPLASTY TOTAL Right 12/27/2017    HYSTERECTOMY ROBOTIC  10/23/2014    Performed by Smith Lyosn M.D. at [facility]    GASTROSCOPY WITH BIOPSY  7/2/2014    Performed by Sky Vila M.D. at [facility] OR[facility]    OTHER ORTHOPEDIC SURGERY  2006    left total knee    APPENDECTOMY      ARTHROSCOPY, KNEE      HYSTERECTOMY LAPAROSCOPY      OTHER      meghan cataracts    OTHER ABDOMINAL SURGERY      Resection of pelvic mass, uterus and ovaries    MN BREAST REDUCTION Bilateral     1982    MN REMV 2ND CATARACT,CORN-SCLER SECTN      TONSILLECTOMY      TONSILLECTOMY AND ADENOIDECTOMY         ROS:    All positives noted in HPI. All others reviewed and are negative.    Ostomy or other tubes or amputations: no  Chronic oxygen use: no  Last eye exam: UTD per report  : denies urinary incontinence; does not interfere with ADLs/ sleep  Gait: Uses cane  Problems with balance/ difficulty walking: yes  Hearing: good with  "hearing aid use  Dentition: adequate    Lab results 5/12/23 reviewed with patient at visit today.     Exam:   /62 (BP Location: Left arm, Patient Position: Sitting, BP Cuff Size: Adult)   Pulse 66   Temp 37.1 °C (98.7 °F) (Temporal)   Resp 17   Ht 1.702 m (5' 7\")   Wt 94.6 kg (208 lb 9.6 oz)   SpO2 96%  Body mass index is 32.67 kg/m².    Gen: Well developed; well nourished; no acute distress; age appropriate appearance   HEENT: Normocephalic; atraumatic; PEERLA b/l; sclera clear b/l; b/l external auditory canals WNL; b/l TM WNL after hearing aids removed; nares patent; oropharynx clear; oral mucosa moist; tongue midline; dentition adequate   Neck: No adenopathy; no thyromegaly  CV: Regular rate and rhythm; S1/ S2 present; no murmur, gallop or rub noted  Pulm: No respiratory distress; clear to ascultation b/l; no wheezing or stridor noted b/l  Abd: Adequate bowel sounds noted; soft and nontender; no rebound, rigidity, nor distention; well healed surgical scars present   Extremities: No new peripheral edema b/l LE extremities/ no clubbing nor cyanosis noted  Skin: Warm and dry; no rashes noted   Neuro: No focal deficits noted; pt is able to get up out of chair unassisted and walk forward using cane  Psych: AAOx4; mood and affect are appropriate    Assessment and Plan:  1. Primary hypertension  Stable/ recommend patient continue daily Lisinopril and Coreg use.   - Subsequent Annual Wellness Visit - Includes PPPS ()    2. Disorder of bone density and structure, unspecified  Patient is due for repeat dexa scan for ongoing management.   - DS-BONE DENSITY STUDY (DEXA); Future  - Subsequent Annual Wellness Visit - Includes PPPS ()    3. Estrogen deficiency  - DS-BONE DENSITY STUDY (DEXA); Future  - Subsequent Annual Wellness Visit - Includes PPPS ()    4. Breast cancer screening by mammogram  Patient will be due for annual screening mammogram 7/18/23 for ongoing management.   - MA-SCREENING MAMMO " "BILAT W/TOMOSYNTHESIS W/CAD; Future  - Subsequent Annual Wellness Visit - Includes PPPS ()    5. Mixed hyperlipidemia  Uncontrolled despite nightly Crestor use. Condition discussed with patient at visit today, and I suspect diet/prior health issues have made significant impact. Recommend patient pay close attention to daily food choices, continue current nightly Crestor use, do regular mobility as able, and repeat fasting lipid panel in three months for ongoing management.   - Lipid Profile; Future  - Subsequent Annual Wellness Visit - Includes PPPS ()    6. AZUL on CPAP  Stable/ condition managed by [facility].   - Subsequent Annual Wellness Visit - Includes PPPS ()    7. Obesity (BMI 30-39.9)  Stable  - Subsequent Annual Wellness Visit - Includes PPPS ()    8. Poor balance  Chronic condition for patient - she uses a cane for ambulation and continues to ensure fall precautions as able.   - Subsequent Annual Wellness Visit - Includes PPPS ()    9. Arthritis  Chronic condition for patient managed with low dose prednisone use. We discussed patient paying close attention to possible food and environmental triggers when she is feeling more \"ache\" than usual baseline.   - Subsequent Annual Wellness Visit - Includes PPPS ()    10. History of diverticulitis  Refer to HPI for details. Patient has appointment with GI team July 15 for ongoing management and will also discuss colon cancer screening at that visit.   - Subsequent Annual Wellness Visit - Includes PPPS ()    11. Female pattern hair loss  Chronic condition for patient not responsive to targeted shampoo use in past. Recommend patient research Nutrafol use.   - Subsequent Annual Wellness Visit - Includes PPPS ()    12. Excessive vitamin B12 intake  Recommend patient discontinue all supplemental B12 intake  - Subsequent Annual Wellness Visit - Includes PPPS ()    13. Vitamin D deficiency  Uncontrolled/ recommend " patient increase daily Vitamin D intake to 5000 IU and will recheck Vitamin D level with next lab draw.   - VITAMIN D,25 HYDROXY (DEFICIENCY); Future  - Subsequent Annual Wellness Visit - Includes PPPS ()    14. Hypercalcemia  Mild increase above normal baseline noted on recent lab work. Patient is not taking supplemental calcium at this time, and recommend drawing labs with next lab draw for ongoing management.   - PTH INTACT (PTH ONLY); Future  - IONIZED CALCIUM; Future  - Subsequent Annual Wellness Visit - Includes PPPS ()    15. Encounter for Medicare annual wellness exam  Patient remains well informed about chronic medical conditions that need additional attention moving forward.   - Subsequent Annual Wellness Visit - Includes PPPS ()       Services needed: no new services needed at this time  Health Care Screening: recommendations as per orders if indicated.  Referrals offered: none  Counseling provided today:  Prevent falls and reduce trip hazards; Secure or remove rugs if present   Maintain working fire alarm and carbon monoxide detectors   Engage in regular physical activity daily and social activities weekly as tolerated

## 2023-05-18 DIAGNOSIS — F51.01 PRIMARY INSOMNIA: ICD-10-CM

## 2023-05-18 RX ORDER — ZOLPIDEM TARTRATE 5 MG/1
5 TABLET ORAL NIGHTLY PRN
Qty: 30 TABLET | Refills: 2 | Status: SHIPPED | OUTPATIENT
Start: 2023-06-01 | End: 2023-08-28

## 2023-05-18 RX ORDER — ZOLPIDEM TARTRATE 5 MG/1
5 TABLET ORAL NIGHTLY PRN
Qty: 30 TABLET | Refills: 2 | OUTPATIENT
Start: 2023-05-18 | End: 2023-06-17

## 2023-05-30 DIAGNOSIS — M15.9 PRIMARY OSTEOARTHRITIS INVOLVING MULTIPLE JOINTS: ICD-10-CM

## 2023-05-30 DIAGNOSIS — R19.5 LOOSE STOOLS: ICD-10-CM

## 2023-05-30 RX ORDER — CHOLESTYRAMINE 4 G/9G
1 POWDER, FOR SUSPENSION ORAL 2 TIMES DAILY
Qty: 60 EACH | Refills: 1 | Status: SHIPPED | OUTPATIENT
Start: 2023-05-30 | End: 2023-09-19

## 2023-05-30 RX ORDER — PREDNISONE 5 MG/1
5 TABLET ORAL
Qty: 30 TABLET | Refills: 1 | Status: SHIPPED | OUTPATIENT
Start: 2023-05-30 | End: 2023-07-31

## 2023-06-28 NOTE — PATIENT INSTRUCTIONS
1.  Continue with CPAP therapy as you are currently doing  2. We have prescribed a chinstrap to be used with the nasal mask  3. Recommend follow-up in one year   Consent: The patient's consent was obtained including but not limited to risks of crusting, scabbing, blistering, scarring, darker or lighter pigmentary change, recurrence, incomplete removal and infection. Show Aperture Variable?: Yes Detail Level: Detailed Render Post-Care Instructions In Note?: no Post-Care Instructions: I reviewed with the patient in detail post-care instructions. Patient is to wear sunprotection, and avoid picking at any of the treated lesions. Pt may apply Vaseline to crusted or scabbing areas. Duration Of Freeze Thaw-Cycle (Seconds): 0

## 2023-07-03 DIAGNOSIS — I10 PRIMARY HYPERTENSION: ICD-10-CM

## 2023-07-03 RX ORDER — LISINOPRIL 20 MG/1
40 TABLET ORAL DAILY
Qty: 180 TABLET | Refills: 2 | Status: SHIPPED | OUTPATIENT
Start: 2023-07-03

## 2023-07-05 NOTE — PROGRESS NOTES
Surgery  S/p ex lap libby  Slow recovery as expected (per operating MD)  Had small emesis  Mobilize!  Enemas  Awaiting return of bowel function  Recommend PPN as patient hasn't eaten in a week.  Will follow   4

## 2023-07-17 DIAGNOSIS — F41.9 ANXIETY: ICD-10-CM

## 2023-07-17 RX ORDER — SERTRALINE HYDROCHLORIDE 100 MG/1
TABLET, FILM COATED ORAL
Qty: 90 TABLET | Refills: 3 | Status: SHIPPED | OUTPATIENT
Start: 2023-07-17 | End: 2023-11-15

## 2023-07-18 DIAGNOSIS — I10 PRIMARY HYPERTENSION: ICD-10-CM

## 2023-07-18 RX ORDER — LISINOPRIL 20 MG/1
TABLET ORAL
Qty: 180 TABLET | Refills: 2 | OUTPATIENT
Start: 2023-07-18

## 2023-07-25 ENCOUNTER — HOSPITAL ENCOUNTER (OUTPATIENT)
Dept: RADIOLOGY | Facility: MEDICAL CENTER | Age: 78
End: 2023-07-25
Attending: FAMILY MEDICINE
Payer: MEDICARE

## 2023-07-25 DIAGNOSIS — Z12.31 BREAST CANCER SCREENING BY MAMMOGRAM: ICD-10-CM

## 2023-07-25 DIAGNOSIS — E28.39 ESTROGEN DEFICIENCY: ICD-10-CM

## 2023-07-25 DIAGNOSIS — M85.9 DISORDER OF BONE DENSITY AND STRUCTURE, UNSPECIFIED: ICD-10-CM

## 2023-07-25 PROCEDURE — 77063 BREAST TOMOSYNTHESIS BI: CPT

## 2023-07-25 PROCEDURE — 77080 DXA BONE DENSITY AXIAL: CPT

## 2023-07-31 ENCOUNTER — OFFICE VISIT (OUTPATIENT)
Dept: INTERNAL MEDICINE | Facility: IMAGING CENTER | Age: 78
End: 2023-07-31
Payer: MEDICARE

## 2023-07-31 VITALS
RESPIRATION RATE: 17 BRPM | BODY MASS INDEX: 32.73 KG/M2 | WEIGHT: 208.56 LBS | HEIGHT: 67 IN | OXYGEN SATURATION: 90 % | DIASTOLIC BLOOD PRESSURE: 62 MMHG | TEMPERATURE: 98.9 F | SYSTOLIC BLOOD PRESSURE: 142 MMHG | HEART RATE: 71 BPM

## 2023-07-31 DIAGNOSIS — M85.852 OSTEOPENIA OF NECK OF LEFT FEMUR: ICD-10-CM

## 2023-07-31 PROCEDURE — 99213 OFFICE O/P EST LOW 20 MIN: CPT | Performed by: FAMILY MEDICINE

## 2023-07-31 PROCEDURE — 3078F DIAST BP <80 MM HG: CPT | Performed by: FAMILY MEDICINE

## 2023-07-31 PROCEDURE — 3077F SYST BP >= 140 MM HG: CPT | Performed by: FAMILY MEDICINE

## 2023-07-31 ASSESSMENT — FIBROSIS 4 INDEX: FIB4 SCORE: 1.72

## 2023-07-31 NOTE — PROGRESS NOTES
"Chief Complaint   Patient presents with    Results       HPI:  Patient is a 78 y.o. female established patient who presents today for a counseling appointment to review dexa scan results done 7/25/23.     Patient Active Problem List    Diagnosis Date Noted    Osteopenia of neck of left femur 07/31/2023    History of diverticulitis 05/15/2023    Vitamin D deficiency 05/15/2023    Arthritis 01/24/2023    Poor balance 04/26/2021    Thyroid nodule 03/16/2021    Obesity (BMI 30-39.9) 04/06/2018    Heart murmur 09/13/2017    Chronic insomnia 08/09/2017    Anxiety 08/09/2017    HTN (hypertension) 07/01/2014    Hyperlipidemia 07/01/2014    AZUL on CPAP 07/01/2014       Past medical, surgical, family, and social history was reviewed and updated in Epic chart by me today.     Medications and allergies reviewed and updated in Epic chart by me today.     Dexa scan results 7/25/23 review with patient at visit today.     ROS:  Pertinent positives listed above in HPI. All other systems have been reviewed and are negative.    PE:   BP (!) 142/62 (BP Location: Left arm, Patient Position: Sitting, BP Cuff Size: Adult)   Pulse 71   Temp 37.2 °C (98.9 °F) (Temporal)   Resp 17   Ht 1.702 m (5' 7\")   Wt 94.6 kg (208 lb 8.9 oz)   SpO2 90%   BMI 32.66 kg/m²   Vital signs reviewed with patient.     Gen: Well developed; well nourished; no acute distress; non toxic appearance   Neuro: No new focal deficits noted   Psych: AAOx4; mood and affect are at baseline    A/P:  1. Osteopenia of neck of left femur  There has been statistically significant changes in her bone density overall. Dexa scan report reviewed in detail with patient today, and she is not keenly interested in starting related medication. She would first like to increase daily Vitamin D3 to 5000 IU, continue current dietary calcium intake, continue weight bearing movement daily as always, continue fall precautions using her cane, and will follow closely. Will plan on " repeating dexa scan in 2 years unless condition changes.

## 2023-09-19 ENCOUNTER — OFFICE VISIT (OUTPATIENT)
Dept: INTERNAL MEDICINE | Facility: IMAGING CENTER | Age: 78
End: 2023-09-19
Payer: MEDICARE

## 2023-09-19 ENCOUNTER — HOSPITAL ENCOUNTER (OUTPATIENT)
Facility: MEDICAL CENTER | Age: 78
End: 2023-09-19
Attending: FAMILY MEDICINE
Payer: MEDICARE

## 2023-09-19 VITALS
HEART RATE: 61 BPM | HEIGHT: 67 IN | BODY MASS INDEX: 32.73 KG/M2 | SYSTOLIC BLOOD PRESSURE: 122 MMHG | OXYGEN SATURATION: 94 % | WEIGHT: 208.56 LBS | TEMPERATURE: 97.9 F | RESPIRATION RATE: 17 BRPM | DIASTOLIC BLOOD PRESSURE: 62 MMHG

## 2023-09-19 DIAGNOSIS — E55.9 VITAMIN D DEFICIENCY: ICD-10-CM

## 2023-09-19 DIAGNOSIS — E83.52 HYPERCALCEMIA: ICD-10-CM

## 2023-09-19 DIAGNOSIS — Z23 NEED FOR VACCINATION: ICD-10-CM

## 2023-09-19 DIAGNOSIS — N32.81 OAB (OVERACTIVE BLADDER): ICD-10-CM

## 2023-09-19 DIAGNOSIS — E78.2 MIXED HYPERLIPIDEMIA: ICD-10-CM

## 2023-09-19 DIAGNOSIS — R14.0 ABDOMINAL BLOATING: ICD-10-CM

## 2023-09-19 LAB
25(OH)D3 SERPL-MCNC: 46 NG/ML (ref 30–100)
CA-I SERPL-SCNC: 1.3 MMOL/L (ref 1.1–1.3)
CHOLEST SERPL-MCNC: 169 MG/DL (ref 100–199)
HDLC SERPL-MCNC: 78 MG/DL
LDLC SERPL CALC-MCNC: 61 MG/DL
PTH-INTACT SERPL-MCNC: 118 PG/ML (ref 14–72)
TRIGL SERPL-MCNC: 151 MG/DL (ref 0–149)

## 2023-09-19 PROCEDURE — 3074F SYST BP LT 130 MM HG: CPT | Performed by: FAMILY MEDICINE

## 2023-09-19 PROCEDURE — 90662 IIV NO PRSV INCREASED AG IM: CPT | Performed by: FAMILY MEDICINE

## 2023-09-19 PROCEDURE — G0008 ADMIN INFLUENZA VIRUS VAC: HCPCS | Performed by: FAMILY MEDICINE

## 2023-09-19 PROCEDURE — 82306 VITAMIN D 25 HYDROXY: CPT

## 2023-09-19 PROCEDURE — 3078F DIAST BP <80 MM HG: CPT | Performed by: FAMILY MEDICINE

## 2023-09-19 PROCEDURE — 80061 LIPID PANEL: CPT

## 2023-09-19 PROCEDURE — 99214 OFFICE O/P EST MOD 30 MIN: CPT | Mod: 25 | Performed by: FAMILY MEDICINE

## 2023-09-19 PROCEDURE — 82330 ASSAY OF CALCIUM: CPT

## 2023-09-19 PROCEDURE — 83970 ASSAY OF PARATHORMONE: CPT

## 2023-09-19 RX ORDER — TOLTERODINE 4 MG/1
4 CAPSULE, EXTENDED RELEASE ORAL DAILY
Qty: 30 CAPSULE | Refills: 3 | Status: SHIPPED | OUTPATIENT
Start: 2023-09-19 | End: 2023-11-15

## 2023-09-19 ASSESSMENT — FIBROSIS 4 INDEX: FIB4 SCORE: 1.72

## 2023-09-19 NOTE — PROGRESS NOTES
"Chief Complaint   Patient presents with    Bloating     Colonoscopy scheduled in 3 weeks.        HPI:  Patient is a 78 y.o. female established patient who presents today to discuss current health concerns. She suffers from chronic OAB and has noticed more leakage events when out in public or with certain maneuvers. She is ready for medication management and is seeking treatment options. She continues to experience episodes of abdominal bloating without pain/cramping and has tried Gas X, Imodium, infant colic drops, and increased movement without success. She reports passing flatus regularly, denies known food nor time of day triggers for bloating, and has a screening colonoscopy scheduled with Dr. Vila in three weeks for further management. She is fasting for repeat lab draw today with Ale CHERRY and denies other new concerns at this time.     Patient Active Problem List    Diagnosis Date Noted    Abdominal bloating 09/19/2023    OAB (overactive bladder) 09/19/2023    Osteopenia of neck of left femur 07/31/2023    History of diverticulitis 05/15/2023    Vitamin D deficiency 05/15/2023    Arthritis 01/24/2023    Poor balance 04/26/2021    Thyroid nodule 03/16/2021    Obesity (BMI 30-39.9) 04/06/2018    Heart murmur 09/13/2017    Chronic insomnia 08/09/2017    Anxiety 08/09/2017    HTN (hypertension) 07/01/2014    Hyperlipidemia 07/01/2014    AZUL on CPAP 07/01/2014       Past medical, surgical, family, and social history was reviewed and updated in Epic chart by me today.     Medications and allergies reviewed and updated in Epic chart by me today.     ROS:  Pertinent positives listed above in HPI. All other systems have been reviewed and are negative.    PE:   /62 (BP Location: Left arm, Patient Position: Sitting, BP Cuff Size: Adult)   Pulse 61   Temp 36.6 °C (97.9 °F) (Temporal)   Resp 17   Ht 1.702 m (5' 7\")   Wt 94.6 kg (208 lb 8.9 oz)   SpO2 94%   BMI 32.66 kg/m²   Vital signs reviewed with " patient.     Gen: Well developed; well nourished; no acute distress; age appropriate appearance   CV: Regular rate and rhythm; S1/ S2 present; no murmur, gallop or rub noted  Pulm: No respiratory distress; clear to ascultation b/l; no wheezing or stridor noted b/l  Abd: Adequate bowel sounds noted; soft and nontender; no rebound, rigidity, nor distention  Extremities: No new peripheral edema b/l LE extremities/ no clubbing nor cyanosis noted  Skin: Warm and dry; no rashes noted   Neuro: No new focal deficits noted   Psych: AAOx4; mood and affect are at her baseline     A/P:  1. Abdominal bloating  Ongoing condition as described above in HPI. She has tried using infant colic drops, Gas X, Imodium, and increased mobility without improving symptoms. She reports passing flatus easily, and bloating does not cause pain/cramping. She has a screening colonoscopy with Dr. Vila in three weeks, and I will defer further management to his expertise. I suggested patient stop consumption of all carbonated beverages to see if this change will have a positive impact on current symptoms.     2. OAB (overactive bladder)  Chronic condition for patient as described above in HPI. She is ready to initiate medication management, and new RX sent to her pharmacy. Will follow clinical response.   - tolterodine ER (DETROL-LA) 4 MG CAPSULE SR 24 HR; Take 1 Capsule by mouth every day.  Dispense: 30 Capsule; Refill: 3    3. Need for vaccination  Vaccine administered at visit today.  - INFLUENZA VACCINE, HIGH DOSE (65+ ONLY)    4. Mixed hyperlipidemia  Patient is fasting for repeat lipid panel lab draw today for ongoing management.     5. Vitamin D deficiency  Patient will have Vitamin D level drawn today for ongoing management.

## 2023-09-20 DIAGNOSIS — R79.89 INCREASED PTH LEVEL: ICD-10-CM

## 2023-09-25 ENCOUNTER — HOSPITAL ENCOUNTER (OUTPATIENT)
Facility: MEDICAL CENTER | Age: 78
End: 2023-09-25
Attending: FAMILY MEDICINE
Payer: MEDICARE

## 2023-09-25 DIAGNOSIS — R79.89 INCREASED PTH LEVEL: ICD-10-CM

## 2023-09-25 LAB
CREAT 24H UR-MSRATE: 812 MG/24 HR (ref 800–1800)
CREAT UR-MCNC: 62.47 MG/DL
SPECIMEN VOL UR: 1300 ML

## 2023-09-25 PROCEDURE — 82570 ASSAY OF URINE CREATININE: CPT

## 2023-09-25 PROCEDURE — 82340 ASSAY OF CALCIUM IN URINE: CPT

## 2023-09-25 PROCEDURE — 81050 URINALYSIS VOLUME MEASURE: CPT

## 2023-09-27 LAB
CALCIUM 24H UR-MCNC: 4.2 MG/DL
CALCIUM 24H UR-MRATE: 55 MG/D (ref 100–250)
CALCIUM/CREAT 24H UR: 65 MG/G (ref 20–300)
COLLECT DURATION TIME SPEC: 24 HRS
CREAT 24H UR-MCNC: 65 MG/DL
CREAT 24H UR-MRATE: 845 MG/D (ref 500–1400)
SPECIMEN VOL ?TM UR: 1300 ML

## 2023-10-02 ENCOUNTER — HOSPITAL ENCOUNTER (OUTPATIENT)
Dept: RADIOLOGY | Facility: MEDICAL CENTER | Age: 78
End: 2023-10-02
Attending: FAMILY MEDICINE
Payer: MEDICARE

## 2023-10-02 DIAGNOSIS — R79.89 INCREASED PTH LEVEL: ICD-10-CM

## 2023-10-02 PROCEDURE — A9500 TC99M SESTAMIBI: HCPCS

## 2023-10-05 DIAGNOSIS — I15.9 SECONDARY HYPERTENSION: ICD-10-CM

## 2023-10-05 RX ORDER — CARVEDILOL 25 MG/1
25 TABLET ORAL 2 TIMES DAILY WITH MEALS
Qty: 180 TABLET | Refills: 3 | Status: SHIPPED | OUTPATIENT
Start: 2023-10-05

## 2023-10-18 ENCOUNTER — OFFICE VISIT (OUTPATIENT)
Dept: INTERNAL MEDICINE | Facility: IMAGING CENTER | Age: 78
End: 2023-10-18
Payer: MEDICARE

## 2023-10-18 VITALS
SYSTOLIC BLOOD PRESSURE: 114 MMHG | OXYGEN SATURATION: 90 % | BODY MASS INDEX: 32.66 KG/M2 | TEMPERATURE: 97.6 F | RESPIRATION RATE: 14 BRPM | HEART RATE: 74 BPM | HEIGHT: 67 IN | DIASTOLIC BLOOD PRESSURE: 62 MMHG

## 2023-10-18 DIAGNOSIS — L03.116 CELLULITIS OF LEFT LOWER EXTREMITY: ICD-10-CM

## 2023-10-18 PROCEDURE — 3074F SYST BP LT 130 MM HG: CPT | Performed by: FAMILY MEDICINE

## 2023-10-18 PROCEDURE — 99213 OFFICE O/P EST LOW 20 MIN: CPT | Performed by: FAMILY MEDICINE

## 2023-10-18 PROCEDURE — 3078F DIAST BP <80 MM HG: CPT | Performed by: FAMILY MEDICINE

## 2023-10-18 RX ORDER — SULFAMETHOXAZOLE AND TRIMETHOPRIM 800; 160 MG/1; MG/1
1 TABLET ORAL 2 TIMES DAILY
Qty: 14 TABLET | Refills: 0 | Status: SHIPPED | OUTPATIENT
Start: 2023-10-18 | End: 2023-10-25

## 2023-10-18 NOTE — PROGRESS NOTES
"Chief Complaint   Patient presents with    Wound Check       HPI:  Patient is a 78 y.o. female established patient who presents today for evaluation of new left shin wound. Unfortunately she has tripped twice within the past few days to weeks and hit same spot on anterior left shin. She reports skin scab is smaller but there is a large hematoma under the skin, and now redness around entire area. She completed her repeat colonoscopy yesterday at Dr. Vila's office and his nurse encouraged her to make a primary care appointment for wound evaluation. She denies associated N/V/D/F/no skin streaking, and she continues to walk with her cane. She is otherwise stable and remains in good spirits.      Patient Active Problem List    Diagnosis Date Noted    Abdominal bloating 09/19/2023    OAB (overactive bladder) 09/19/2023    Osteopenia of neck of left femur 07/31/2023    History of diverticulitis 05/15/2023    Vitamin D deficiency 05/15/2023    Arthritis 01/24/2023    Poor balance 04/26/2021    Thyroid nodule 03/16/2021    Obesity (BMI 30-39.9) 04/06/2018    Heart murmur 09/13/2017    Chronic insomnia 08/09/2017    Anxiety 08/09/2017    HTN (hypertension) 07/01/2014    Hyperlipidemia 07/01/2014    AZUL on CPAP 07/01/2014       Past medical, surgical, family, and social history was reviewed and updated in Epic chart by me today.     Medications and allergies reviewed and updated in Epic chart by me today.     ROS:  Pertinent positives listed above in HPI. All other systems have been reviewed and are negative.    PE:   /62   Pulse 74   Temp 36.4 °C (97.6 °F)   Resp 14   Ht 1.702 m (5' 7.01\")   SpO2 90%   BMI 32.66 kg/m²   Vital signs reviewed with patient.     Gen: Well developed; well nourished; no acute distress; non toxic appearance   CV: Regular rate and rhythm; S1/ S2 present; no murmur, gallop or rub noted  Pulm: No respiratory distress; clear to ascultation b/l; no wheezing or stridor noted b/l  Lower " extremities: No peripheral edema b/l LE extremities/ no clubbing nor cyanosis noted; large hematoma on anterior left shin with surrounding skin erythema (small central wound scab also present)  Skin: Warm and dry; no rashes noted   Neuro: No new focal deficits noted; ambulating with cane  Psych: AAOx4; mood and affect are at her baseline     A/P:  1. Cellulitis of left lower extremity  New condition as described above in HPI. Recommend patient keep area clean/dry, start Bactrim DS use today (take with food), apply ice to covered skin area at least once daily, continue with fall precautions and using cane, and follow clinical response. New RX sent to pharmacy.   - sulfamethoxazole-trimethoprim (BACTRIM DS) 800-160 MG tablet; Take 1 Tablet by mouth 2 times a day for 7 days.  Dispense: 14 Tablet; Refill: 0

## 2023-11-03 DIAGNOSIS — R19.5 LOOSE STOOLS: ICD-10-CM

## 2023-11-03 RX ORDER — CHOLESTYRAMINE 4 G/9G
1 POWDER, FOR SUSPENSION ORAL 2 TIMES DAILY
Qty: 60 EACH | Refills: 1 | Status: SHIPPED | OUTPATIENT
Start: 2023-11-03 | End: 2023-11-15

## 2023-11-14 DIAGNOSIS — E78.2 MIXED HYPERLIPIDEMIA: ICD-10-CM

## 2023-11-14 RX ORDER — ROSUVASTATIN CALCIUM 10 MG/1
10 TABLET, COATED ORAL EVERY EVENING
Qty: 90 TABLET | Refills: 3 | Status: SHIPPED | OUTPATIENT
Start: 2023-11-14

## 2023-11-15 ENCOUNTER — HOSPITAL ENCOUNTER (INPATIENT)
Facility: MEDICAL CENTER | Age: 78
LOS: 1 days | DRG: 603 | End: 2023-11-16
Attending: EMERGENCY MEDICINE | Admitting: HOSPITALIST
Payer: MEDICARE

## 2023-11-15 ENCOUNTER — OFFICE VISIT (OUTPATIENT)
Dept: INTERNAL MEDICINE | Facility: IMAGING CENTER | Age: 78
End: 2023-11-15
Payer: MEDICARE

## 2023-11-15 VITALS
HEART RATE: 58 BPM | TEMPERATURE: 97.9 F | OXYGEN SATURATION: 97 % | RESPIRATION RATE: 17 BRPM | WEIGHT: 208.56 LBS | HEIGHT: 67 IN | BODY MASS INDEX: 32.73 KG/M2 | DIASTOLIC BLOOD PRESSURE: 62 MMHG | SYSTOLIC BLOOD PRESSURE: 124 MMHG

## 2023-11-15 DIAGNOSIS — Z96.652 TOTAL KNEE REPLACEMENT STATUS, LEFT: ICD-10-CM

## 2023-11-15 DIAGNOSIS — S81.802A NON-HEALING WOUND OF LEFT LOWER EXTREMITY: ICD-10-CM

## 2023-11-15 DIAGNOSIS — L03.116 CELLULITIS OF LEFT LOWER LEG: ICD-10-CM

## 2023-11-15 DIAGNOSIS — Z78.9 FAILURE OF OUTPATIENT TREATMENT: ICD-10-CM

## 2023-11-15 DIAGNOSIS — L03.116 CELLULITIS OF LEFT LOWER EXTREMITY: ICD-10-CM

## 2023-11-15 DIAGNOSIS — I82.432 ACUTE DEEP VEIN THROMBOSIS (DVT) OF POPLITEAL VEIN OF LEFT LOWER EXTREMITY (HCC): ICD-10-CM

## 2023-11-15 PROBLEM — Z66 DNR (DO NOT RESUSCITATE): Status: ACTIVE | Noted: 2023-11-15

## 2023-11-15 LAB
ALBUMIN SERPL BCP-MCNC: 3.6 G/DL (ref 3.2–4.9)
ALBUMIN/GLOB SERPL: 1.1 G/DL
ALP SERPL-CCNC: 73 U/L (ref 30–99)
ALT SERPL-CCNC: 12 U/L (ref 2–50)
ANION GAP SERPL CALC-SCNC: 8 MMOL/L (ref 7–16)
AST SERPL-CCNC: 13 U/L (ref 12–45)
BASOPHILS # BLD AUTO: 0.6 % (ref 0–1.8)
BASOPHILS # BLD: 0.06 K/UL (ref 0–0.12)
BILIRUB SERPL-MCNC: 0.3 MG/DL (ref 0.1–1.5)
BUN SERPL-MCNC: 22 MG/DL (ref 8–22)
CALCIUM ALBUM COR SERPL-MCNC: 10.7 MG/DL (ref 8.5–10.5)
CALCIUM SERPL-MCNC: 10.4 MG/DL (ref 8.4–10.2)
CHLORIDE SERPL-SCNC: 106 MMOL/L (ref 96–112)
CO2 SERPL-SCNC: 26 MMOL/L (ref 20–33)
CREAT SERPL-MCNC: 0.76 MG/DL (ref 0.5–1.4)
EOSINOPHIL # BLD AUTO: 0.07 K/UL (ref 0–0.51)
EOSINOPHIL NFR BLD: 0.7 % (ref 0–6.9)
ERYTHROCYTE [DISTWIDTH] IN BLOOD BY AUTOMATED COUNT: 43.9 FL (ref 35.9–50)
GFR SERPLBLD CREATININE-BSD FMLA CKD-EPI: 80 ML/MIN/1.73 M 2
GLOBULIN SER CALC-MCNC: 3.2 G/DL (ref 1.9–3.5)
GLUCOSE SERPL-MCNC: 123 MG/DL (ref 65–99)
HCT VFR BLD AUTO: 41.6 % (ref 37–47)
HGB BLD-MCNC: 13.5 G/DL (ref 12–16)
IMM GRANULOCYTES # BLD AUTO: 0.06 K/UL (ref 0–0.11)
IMM GRANULOCYTES NFR BLD AUTO: 0.6 % (ref 0–0.9)
LACTATE SERPL-SCNC: 0.8 MMOL/L (ref 0.5–2)
LACTATE SERPL-SCNC: 1 MMOL/L (ref 0.5–2)
LYMPHOCYTES # BLD AUTO: 0.99 K/UL (ref 1–4.8)
LYMPHOCYTES NFR BLD: 10 % (ref 22–41)
MCH RBC QN AUTO: 32.2 PG (ref 27–33)
MCHC RBC AUTO-ENTMCNC: 32.5 G/DL (ref 32.2–35.5)
MCV RBC AUTO: 99.3 FL (ref 81.4–97.8)
MONOCYTES # BLD AUTO: 0.45 K/UL (ref 0–0.85)
MONOCYTES NFR BLD AUTO: 4.5 % (ref 0–13.4)
NEUTROPHILS # BLD AUTO: 8.27 K/UL (ref 1.82–7.42)
NEUTROPHILS NFR BLD: 83.6 % (ref 44–72)
NRBC # BLD AUTO: 0 K/UL
NRBC BLD-RTO: 0 /100 WBC (ref 0–0.2)
PLATELET # BLD AUTO: 228 K/UL (ref 164–446)
PMV BLD AUTO: 10.6 FL (ref 9–12.9)
POTASSIUM SERPL-SCNC: 4.7 MMOL/L (ref 3.6–5.5)
PROT SERPL-MCNC: 6.8 G/DL (ref 6–8.2)
RBC # BLD AUTO: 4.19 M/UL (ref 4.2–5.4)
SODIUM SERPL-SCNC: 140 MMOL/L (ref 135–145)
WBC # BLD AUTO: 9.9 K/UL (ref 4.8–10.8)

## 2023-11-15 PROCEDURE — 99223 1ST HOSP IP/OBS HIGH 75: CPT | Mod: AI | Performed by: HOSPITALIST

## 2023-11-15 PROCEDURE — 700102 HCHG RX REV CODE 250 W/ 637 OVERRIDE(OP): Performed by: HOSPITALIST

## 2023-11-15 PROCEDURE — 85025 COMPLETE CBC W/AUTO DIFF WBC: CPT

## 2023-11-15 PROCEDURE — 36415 COLL VENOUS BLD VENIPUNCTURE: CPT

## 2023-11-15 PROCEDURE — 770001 HCHG ROOM/CARE - MED/SURG/GYN PRIV*

## 2023-11-15 PROCEDURE — A9270 NON-COVERED ITEM OR SERVICE: HCPCS | Performed by: HOSPITALIST

## 2023-11-15 PROCEDURE — 3074F SYST BP LT 130 MM HG: CPT | Performed by: FAMILY MEDICINE

## 2023-11-15 PROCEDURE — 83605 ASSAY OF LACTIC ACID: CPT

## 2023-11-15 PROCEDURE — 700111 HCHG RX REV CODE 636 W/ 250 OVERRIDE (IP): Performed by: EMERGENCY MEDICINE

## 2023-11-15 PROCEDURE — 99285 EMERGENCY DEPT VISIT HI MDM: CPT

## 2023-11-15 PROCEDURE — 87040 BLOOD CULTURE FOR BACTERIA: CPT | Mod: 91

## 2023-11-15 PROCEDURE — 94760 N-INVAS EAR/PLS OXIMETRY 1: CPT

## 2023-11-15 PROCEDURE — 700111 HCHG RX REV CODE 636 W/ 250 OVERRIDE (IP): Performed by: HOSPITALIST

## 2023-11-15 PROCEDURE — 700105 HCHG RX REV CODE 258: Performed by: HOSPITALIST

## 2023-11-15 PROCEDURE — 96374 THER/PROPH/DIAG INJ IV PUSH: CPT

## 2023-11-15 PROCEDURE — 3078F DIAST BP <80 MM HG: CPT | Performed by: FAMILY MEDICINE

## 2023-11-15 PROCEDURE — 99214 OFFICE O/P EST MOD 30 MIN: CPT | Performed by: FAMILY MEDICINE

## 2023-11-15 PROCEDURE — 80053 COMPREHEN METABOLIC PANEL: CPT

## 2023-11-15 RX ORDER — VITAMIN B COMPLEX
1000 TABLET ORAL DAILY
Status: DISCONTINUED | OUTPATIENT
Start: 2023-11-16 | End: 2023-11-16 | Stop reason: HOSPADM

## 2023-11-15 RX ORDER — LABETALOL HYDROCHLORIDE 5 MG/ML
10 INJECTION, SOLUTION INTRAVENOUS EVERY 4 HOURS PRN
Status: DISCONTINUED | OUTPATIENT
Start: 2023-11-15 | End: 2023-11-16 | Stop reason: HOSPADM

## 2023-11-15 RX ORDER — ACETAMINOPHEN 325 MG/1
650 TABLET ORAL EVERY 6 HOURS PRN
Status: DISCONTINUED | OUTPATIENT
Start: 2023-11-15 | End: 2023-11-16 | Stop reason: HOSPADM

## 2023-11-15 RX ORDER — ONDANSETRON 4 MG/1
4 TABLET, ORALLY DISINTEGRATING ORAL EVERY 4 HOURS PRN
Status: DISCONTINUED | OUTPATIENT
Start: 2023-11-15 | End: 2023-11-16 | Stop reason: HOSPADM

## 2023-11-15 RX ORDER — ONDANSETRON 2 MG/ML
4 INJECTION INTRAMUSCULAR; INTRAVENOUS EVERY 4 HOURS PRN
Status: DISCONTINUED | OUTPATIENT
Start: 2023-11-15 | End: 2023-11-16 | Stop reason: HOSPADM

## 2023-11-15 RX ORDER — OXYCODONE HYDROCHLORIDE 5 MG/1
5 TABLET ORAL EVERY 4 HOURS PRN
Status: DISCONTINUED | OUTPATIENT
Start: 2023-11-15 | End: 2023-11-16 | Stop reason: HOSPADM

## 2023-11-15 RX ORDER — ROSUVASTATIN CALCIUM 10 MG/1
10 TABLET, COATED ORAL EVERY EVENING
Status: DISCONTINUED | OUTPATIENT
Start: 2023-11-15 | End: 2023-11-16 | Stop reason: HOSPADM

## 2023-11-15 RX ORDER — CHOLESTYRAMINE LIGHT 4 G/5.7G
1 POWDER, FOR SUSPENSION ORAL
Status: DISCONTINUED | OUTPATIENT
Start: 2023-11-15 | End: 2023-11-16 | Stop reason: HOSPADM

## 2023-11-15 RX ORDER — LISINOPRIL 20 MG/1
20 TABLET ORAL EVERY EVENING
Status: DISCONTINUED | OUTPATIENT
Start: 2023-11-15 | End: 2023-11-16 | Stop reason: HOSPADM

## 2023-11-15 RX ORDER — SERTRALINE HYDROCHLORIDE 100 MG/1
100 TABLET, FILM COATED ORAL
COMMUNITY

## 2023-11-15 RX ORDER — SODIUM CHLORIDE 9 MG/ML
INJECTION, SOLUTION INTRAVENOUS CONTINUOUS
Status: ACTIVE | OUTPATIENT
Start: 2023-11-15 | End: 2023-11-16

## 2023-11-15 RX ORDER — SULFAMETHOXAZOLE AND TRIMETHOPRIM 800; 160 MG/1; MG/1
1 TABLET ORAL 2 TIMES DAILY
Status: ON HOLD | COMMUNITY
End: 2023-11-16

## 2023-11-15 RX ORDER — BISACODYL 10 MG
10 SUPPOSITORY, RECTAL RECTAL
Status: DISCONTINUED | OUTPATIENT
Start: 2023-11-15 | End: 2023-11-16 | Stop reason: HOSPADM

## 2023-11-15 RX ORDER — LANOLIN ALCOHOL/MO/W.PET/CERES
400 CREAM (GRAM) TOPICAL EVERY MORNING
Status: DISCONTINUED | OUTPATIENT
Start: 2023-11-15 | End: 2023-11-15

## 2023-11-15 RX ORDER — PREDNISONE 5 MG/1
5 TABLET ORAL DAILY
Status: DISCONTINUED | OUTPATIENT
Start: 2023-11-16 | End: 2023-11-16 | Stop reason: HOSPADM

## 2023-11-15 RX ORDER — POLYETHYLENE GLYCOL 3350 17 G/17G
1 POWDER, FOR SOLUTION ORAL
Status: DISCONTINUED | OUTPATIENT
Start: 2023-11-15 | End: 2023-11-16 | Stop reason: HOSPADM

## 2023-11-15 RX ORDER — CEFAZOLIN 2 G/1
2 INJECTION, POWDER, FOR SOLUTION INTRAMUSCULAR; INTRAVENOUS ONCE
Status: COMPLETED | OUTPATIENT
Start: 2023-11-15 | End: 2023-11-15

## 2023-11-15 RX ORDER — AMOXICILLIN 250 MG
2 CAPSULE ORAL 2 TIMES DAILY
Status: DISCONTINUED | OUTPATIENT
Start: 2023-11-15 | End: 2023-11-16 | Stop reason: HOSPADM

## 2023-11-15 RX ORDER — CHOLESTYRAMINE 4 G/9G
1 POWDER, FOR SUSPENSION ORAL PRN
COMMUNITY

## 2023-11-15 RX ORDER — FERROUS SULFATE 325(65) MG
325 TABLET ORAL DAILY
Status: DISCONTINUED | OUTPATIENT
Start: 2023-11-16 | End: 2023-11-16 | Stop reason: HOSPADM

## 2023-11-15 RX ORDER — LORAZEPAM 0.5 MG/1
0.5 TABLET ORAL
Status: DISCONTINUED | OUTPATIENT
Start: 2023-11-15 | End: 2023-11-16 | Stop reason: HOSPADM

## 2023-11-15 RX ORDER — CARVEDILOL 25 MG/1
25 TABLET ORAL 2 TIMES DAILY WITH MEALS
Status: DISCONTINUED | OUTPATIENT
Start: 2023-11-15 | End: 2023-11-16 | Stop reason: HOSPADM

## 2023-11-15 RX ADMIN — LISINOPRIL 20 MG: 20 TABLET ORAL at 17:53

## 2023-11-15 RX ADMIN — ROSUVASTATIN 10 MG: 10 TABLET, FILM COATED ORAL at 17:53

## 2023-11-15 RX ADMIN — CEFAZOLIN 2 G: 2 INJECTION, POWDER, FOR SOLUTION INTRAMUSCULAR; INTRAVENOUS at 21:50

## 2023-11-15 RX ADMIN — SODIUM CHLORIDE: 9 INJECTION, SOLUTION INTRAVENOUS at 18:26

## 2023-11-15 RX ADMIN — RIVAROXABAN 10 MG: 10 TABLET, FILM COATED ORAL at 17:53

## 2023-11-15 RX ADMIN — CEFAZOLIN 2 G: 2 INJECTION, POWDER, FOR SOLUTION INTRAMUSCULAR; INTRAVENOUS at 14:50

## 2023-11-15 RX ADMIN — LORAZEPAM 0.5 MG: 0.5 TABLET ORAL at 21:50

## 2023-11-15 RX ADMIN — ACETAMINOPHEN 650 MG: 325 TABLET ORAL at 22:52

## 2023-11-15 RX ADMIN — CARVEDILOL 25 MG: 25 TABLET, FILM COATED ORAL at 17:53

## 2023-11-15 ASSESSMENT — LIFESTYLE VARIABLES
CONSUMPTION TOTAL: NEGATIVE
HOW MANY TIMES IN THE PAST YEAR HAVE YOU HAD 5 OR MORE DRINKS IN A DAY: 0
TOTAL SCORE: 1
AVERAGE NUMBER OF DAYS PER WEEK YOU HAVE A DRINK CONTAINING ALCOHOL: 5
ALCOHOL_USE: YES
HAVE YOU EVER FELT YOU SHOULD CUT DOWN ON YOUR DRINKING: YES
HAVE PEOPLE ANNOYED YOU BY CRITICIZING YOUR DRINKING: NO
EVER HAD A DRINK FIRST THING IN THE MORNING TO STEADY YOUR NERVES TO GET RID OF A HANGOVER: NO
ON A TYPICAL DAY WHEN YOU DRINK ALCOHOL HOW MANY DRINKS DO YOU HAVE: 1
TOTAL SCORE: 1
TOTAL SCORE: 1
EVER FELT BAD OR GUILTY ABOUT YOUR DRINKING: NO

## 2023-11-15 ASSESSMENT — ENCOUNTER SYMPTOMS
SEIZURES: 0
NEUROLOGICAL NEGATIVE: 1
BLOOD IN STOOL: 0
FOCAL WEAKNESS: 0
LOSS OF CONSCIOUSNESS: 0
HEMOPTYSIS: 0
HEARTBURN: 0
CHILLS: 0
EYES NEGATIVE: 1
BRUISES/BLEEDS EASILY: 0
WHEEZING: 0
ABDOMINAL PAIN: 0
VOMITING: 0
HEADACHES: 0
MYALGIAS: 1
CONSTIPATION: 0
DIZZINESS: 0
PALPITATIONS: 0
RESPIRATORY NEGATIVE: 1
DIAPHORESIS: 0
NERVOUS/ANXIOUS: 0
COUGH: 0
GASTROINTESTINAL NEGATIVE: 1
DIARRHEA: 0
NAUSEA: 0
CARDIOVASCULAR NEGATIVE: 1
PSYCHIATRIC NEGATIVE: 1
DOUBLE VISION: 0
CONSTITUTIONAL NEGATIVE: 1
FEVER: 0

## 2023-11-15 ASSESSMENT — COGNITIVE AND FUNCTIONAL STATUS - GENERAL
SUGGESTED CMS G CODE MODIFIER DAILY ACTIVITY: CI
STANDING UP FROM CHAIR USING ARMS: A LITTLE
HELP NEEDED FOR BATHING: A LITTLE
MOVING FROM LYING ON BACK TO SITTING ON SIDE OF FLAT BED: A LITTLE
MOBILITY SCORE: 20
CLIMB 3 TO 5 STEPS WITH RAILING: A LITTLE
MOVING TO AND FROM BED TO CHAIR: A LITTLE
SUGGESTED CMS G CODE MODIFIER MOBILITY: CJ
DAILY ACTIVITIY SCORE: 23

## 2023-11-15 ASSESSMENT — FIBROSIS 4 INDEX
FIB4 SCORE: 1.72
FIB4 SCORE: 1.72
FIB4 SCORE: 1.28

## 2023-11-15 ASSESSMENT — VISUAL ACUITY: OU: 1

## 2023-11-15 ASSESSMENT — PAIN DESCRIPTION - PAIN TYPE
TYPE: ACUTE PAIN

## 2023-11-15 NOTE — ED TRIAGE NOTES
Pt states tripped over walker and hit L leg on it about 6 weeks ago. Pt saw PCP and was placed on abx. It did help her leg a little but now it is swollen, red and hot to the touch. Pt was sent here by PCP. Denies fever

## 2023-11-15 NOTE — PROGRESS NOTES
Chief Complaint   Patient presents with    Leg Swelling     LLE swelling. Reports it is not painful but is tight feeling.       HPI:  Patient is a 78 y.o. female established patient who presents today for evaluation of worsening left lower leg swelling and redness. Patient saw me on 10/18/23 after sustaining superficial left mid shin wound (hit spot twice at her home) with underlying hematoma. She has surrounding mild cellulitis, and I prescribed Bactrim DS for seven days. Patient completed Bactrim RX but did not tolerate it well from GI perspective. She reports condition improved and then started to worsen within the past few days. Her entire left lower leg is now diffusely swollen, mid left shin wound has not healed, skin is red/hot to touch/feels tight, and she is seeking further management. She has remote prior history of b/l TKA also to consider. She denies associated N/V/F and is ambulating with her cane.     Patient Active Problem List    Diagnosis Date Noted    Total knee replacement status, left 11/15/2023    Abdominal bloating 09/19/2023    OAB (overactive bladder) 09/19/2023    Osteopenia of neck of left femur 07/31/2023    History of diverticulitis 05/15/2023    Vitamin D deficiency 05/15/2023    Arthritis 01/24/2023    Poor balance 04/26/2021    Thyroid nodule 03/16/2021    Obesity (BMI 30-39.9) 04/06/2018    Heart murmur 09/13/2017    Chronic insomnia 08/09/2017    Anxiety 08/09/2017    HTN (hypertension) 07/01/2014    Hyperlipidemia 07/01/2014    AZUL on CPAP 07/01/2014       Past medical, surgical, family, and social history was reviewed and updated in Epic chart by me today.     Medications and allergies reviewed and updated in Epic chart by me today.     ROS:  Pertinent positives listed above in HPI. All other systems have been reviewed and are negative.    PE:   /62 (BP Location: Left arm, Patient Position: Sitting, BP Cuff Size: Adult)   Pulse (!) 58   Temp 36.6 °C (97.9 °F) (Temporal)    "Resp 17   Ht 1.702 m (5' 7\")   Wt 94.6 kg (208 lb 8.9 oz)   SpO2 97%   BMI 32.66 kg/m²   Vital signs reviewed with patient.     Gen: Well developed; well nourished; no acute distress; non toxic appearance   Lower extremities: No peripheral edema RLE extremity/ no clubbing nor cyanosis noted; LLE is twice the size of RLE and patient has L TKA history. There is a non healing wound on left mid shin with significant edema, erythema, induration extending to calf, skin warmth, and tenderness to touch  Skin: Warm and dry; no rashes noted   Neuro: No focal deficits noted; ambulating with cane   Psych: AAOx4; mood and affect are appropriate    A/P:  1. Non-healing wound of left lower extremity  Patient has non healing wound of left mid shin with significant surrounding erythema, induration, edema, and skin warmth. Patient was treated in October with 7 day course of Bactrim (did not tolerate well) as described above in HPI. Refer to #2    2. Cellulitis of left lower leg  Patient has significant induration, edema, redness of left lower extremity c/w advanced cellulitis (consider IV abx tx). Left leg is also twice the size of right leg and is tender - need to rule out DVT also. Considering all presenting issues and recent prior trouble tolerating PO Bactrim/ did not achieve complete symptom resolution, I recommend patient proceed to [facility] ER for further evaluation and treatment. She is stable to go via private car (her friend will drive her), and I will call transfer center to alert them accordingly.     3. Total knee replacement status, left  Remote history of L TKA but significant concern about increased risk with current infection present.        "

## 2023-11-15 NOTE — ED NOTES
Socks placed on pt per request. Pt amb to restroom with cane and 1 person standby assist. Gait strong and steady. Pt now sitting at edge of bed eating. No other needs at this time

## 2023-11-15 NOTE — ED NOTES
ERP at bedside. Pt agrees with plan of care discussed by ERP. Caro in low position, side rail up for pt safety. Call light within reach. Plan of care on-going

## 2023-11-15 NOTE — ED NOTES
Medication history reviewed with pt. Med rec is complete.  Allergies reviewed, per pt  Interviewed pt with daughter at bedside with permission from pt.    Pt reports that she is not taking TOLTERODINE ER 4MG or DETROL-LA    Patient has not had any outpatient antibiotics in the last 30 days.    Pt is not on any anticoagulants

## 2023-11-16 ENCOUNTER — APPOINTMENT (OUTPATIENT)
Dept: RADIOLOGY | Facility: MEDICAL CENTER | Age: 78
DRG: 603 | End: 2023-11-16
Attending: STUDENT IN AN ORGANIZED HEALTH CARE EDUCATION/TRAINING PROGRAM
Payer: MEDICARE

## 2023-11-16 ENCOUNTER — PHARMACY VISIT (OUTPATIENT)
Dept: PHARMACY | Facility: MEDICAL CENTER | Age: 78
End: 2023-11-16
Payer: COMMERCIAL

## 2023-11-16 VITALS
TEMPERATURE: 97.8 F | BODY MASS INDEX: 34.72 KG/M2 | RESPIRATION RATE: 18 BRPM | SYSTOLIC BLOOD PRESSURE: 168 MMHG | HEART RATE: 60 BPM | WEIGHT: 216.05 LBS | DIASTOLIC BLOOD PRESSURE: 59 MMHG | HEIGHT: 66 IN | OXYGEN SATURATION: 94 %

## 2023-11-16 LAB
ANION GAP SERPL CALC-SCNC: 9 MMOL/L (ref 7–16)
BUN SERPL-MCNC: 23 MG/DL (ref 8–22)
CALCIUM SERPL-MCNC: 9.4 MG/DL (ref 8.4–10.2)
CHLORIDE SERPL-SCNC: 109 MMOL/L (ref 96–112)
CO2 SERPL-SCNC: 24 MMOL/L (ref 20–33)
CREAT SERPL-MCNC: 0.86 MG/DL (ref 0.5–1.4)
ERYTHROCYTE [DISTWIDTH] IN BLOOD BY AUTOMATED COUNT: 43.2 FL (ref 35.9–50)
GFR SERPLBLD CREATININE-BSD FMLA CKD-EPI: 69 ML/MIN/1.73 M 2
GLUCOSE SERPL-MCNC: 97 MG/DL (ref 65–99)
HCT VFR BLD AUTO: 37.7 % (ref 37–47)
HGB BLD-MCNC: 12.4 G/DL (ref 12–16)
MCH RBC QN AUTO: 32.5 PG (ref 27–33)
MCHC RBC AUTO-ENTMCNC: 32.9 G/DL (ref 32.2–35.5)
MCV RBC AUTO: 98.7 FL (ref 81.4–97.8)
PLATELET # BLD AUTO: 199 K/UL (ref 164–446)
PMV BLD AUTO: 10.6 FL (ref 9–12.9)
POTASSIUM SERPL-SCNC: 4.4 MMOL/L (ref 3.6–5.5)
RBC # BLD AUTO: 3.82 M/UL (ref 4.2–5.4)
SCCMEC + MECA PNL NOSE NAA+PROBE: NEGATIVE
SODIUM SERPL-SCNC: 142 MMOL/L (ref 135–145)
WBC # BLD AUTO: 8.5 K/UL (ref 4.8–10.8)

## 2023-11-16 PROCEDURE — 700105 HCHG RX REV CODE 258: Performed by: HOSPITALIST

## 2023-11-16 PROCEDURE — A9270 NON-COVERED ITEM OR SERVICE: HCPCS | Performed by: STUDENT IN AN ORGANIZED HEALTH CARE EDUCATION/TRAINING PROGRAM

## 2023-11-16 PROCEDURE — 700102 HCHG RX REV CODE 250 W/ 637 OVERRIDE(OP): Performed by: HOSPITALIST

## 2023-11-16 PROCEDURE — 700102 HCHG RX REV CODE 250 W/ 637 OVERRIDE(OP): Performed by: STUDENT IN AN ORGANIZED HEALTH CARE EDUCATION/TRAINING PROGRAM

## 2023-11-16 PROCEDURE — RXMED WILLOW AMBULATORY MEDICATION CHARGE: Performed by: STUDENT IN AN ORGANIZED HEALTH CARE EDUCATION/TRAINING PROGRAM

## 2023-11-16 PROCEDURE — 93971 EXTREMITY STUDY: CPT | Mod: LT

## 2023-11-16 PROCEDURE — 80048 BASIC METABOLIC PNL TOTAL CA: CPT

## 2023-11-16 PROCEDURE — 700111 HCHG RX REV CODE 636 W/ 250 OVERRIDE (IP): Performed by: HOSPITALIST

## 2023-11-16 PROCEDURE — 99239 HOSP IP/OBS DSCHRG MGMT >30: CPT | Performed by: STUDENT IN AN ORGANIZED HEALTH CARE EDUCATION/TRAINING PROGRAM

## 2023-11-16 PROCEDURE — 97162 PT EVAL MOD COMPLEX 30 MIN: CPT

## 2023-11-16 PROCEDURE — 85027 COMPLETE CBC AUTOMATED: CPT

## 2023-11-16 PROCEDURE — 36415 COLL VENOUS BLD VENIPUNCTURE: CPT

## 2023-11-16 PROCEDURE — 94760 N-INVAS EAR/PLS OXIMETRY 1: CPT

## 2023-11-16 PROCEDURE — 87641 MR-STAPH DNA AMP PROBE: CPT

## 2023-11-16 PROCEDURE — A9270 NON-COVERED ITEM OR SERVICE: HCPCS | Performed by: HOSPITALIST

## 2023-11-16 RX ORDER — CEFDINIR 300 MG/1
300 CAPSULE ORAL 2 TIMES DAILY
Qty: 10 CAPSULE | Refills: 0 | Status: ACTIVE | OUTPATIENT
Start: 2023-11-16 | End: 2023-11-21

## 2023-11-16 RX ORDER — MENTHOL AND METHYL SALICYLATE 7.6; 29 G/100G; G/100G
OINTMENT TOPICAL PRN
Status: DISCONTINUED | OUTPATIENT
Start: 2023-11-16 | End: 2023-11-16 | Stop reason: HOSPADM

## 2023-11-16 RX ADMIN — SERTRALINE HYDROCHLORIDE 100 MG: 50 TABLET ORAL at 05:21

## 2023-11-16 RX ADMIN — CARVEDILOL 25 MG: 25 TABLET, FILM COATED ORAL at 08:26

## 2023-11-16 RX ADMIN — PREDNISONE 5 MG: 5 TABLET ORAL at 05:20

## 2023-11-16 RX ADMIN — FERROUS SULFATE TAB 325 MG (65 MG ELEMENTAL FE) 325 MG: 325 (65 FE) TAB at 05:21

## 2023-11-16 RX ADMIN — Medication 1000 UNITS: at 05:21

## 2023-11-16 RX ADMIN — CEFAZOLIN 2 G: 2 INJECTION, POWDER, FOR SOLUTION INTRAMUSCULAR; INTRAVENOUS at 05:25

## 2023-11-16 RX ADMIN — MENTHOL AND METHYL SALICYLATE: 7.6; 29 OINTMENT TOPICAL at 12:18

## 2023-11-16 RX ADMIN — APIXABAN 10 MG: 5 TABLET, FILM COATED ORAL at 16:00

## 2023-11-16 RX ADMIN — CEFAZOLIN 2 G: 2 INJECTION, POWDER, FOR SOLUTION INTRAMUSCULAR; INTRAVENOUS at 14:06

## 2023-11-16 RX ADMIN — ACETAMINOPHEN 650 MG: 325 TABLET ORAL at 05:20

## 2023-11-16 ASSESSMENT — GAIT ASSESSMENTS
ASSISTIVE DEVICE: SINGLE POINT CANE
DISTANCE (FEET): 20
GAIT LEVEL OF ASSIST: MODIFIED INDEPENDENT

## 2023-11-16 ASSESSMENT — COGNITIVE AND FUNCTIONAL STATUS - GENERAL
SUGGESTED CMS G CODE MODIFIER MOBILITY: CI
CLIMB 3 TO 5 STEPS WITH RAILING: A LITTLE
MOBILITY SCORE: 23

## 2023-11-16 ASSESSMENT — PAIN DESCRIPTION - PAIN TYPE: TYPE: ACUTE PAIN

## 2023-11-16 NOTE — PROGRESS NOTES
Assumed care of patient at bedside report from day shift RN. Updated on POC. Patient currently A & O x 4, on room air O2 93%, up in bed, without complaints of acute pain. Assessment completed. Call light within reach. Whiteboard updated. Fall precautions in place. Bed locked and in lowest position. All questions answered. No other needs indicated at this time.

## 2023-11-16 NOTE — CARE PLAN
The patient is Stable - Low risk of patient condition declining or worsening    Shift Goals  Clinical Goals: Monitor labs; monitor bowel movements; abx as ordered; monitor LLE redness/swelling  Patient Goals: rest  Family Goals: BENJAMIN    Progress made toward(s) clinical / shift goals:    Problem: Knowledge Deficit - Standard  Goal: Patient and family/care givers will demonstrate understanding of plan of care, disease process/condition, diagnostic tests and medications  Outcome: Progressing  Note: Patient updated on plan of care, continue abx, monitor LLE, potential discharge today.      Problem: Fall Risk  Goal: Patient will remain free from falls  Outcome: Progressing  Note: Appropriate fall risk prevention interventions in place. Patient calls appropriately, call light within reach.      Problem: Pain - Standard  Goal: Alleviation of pain or a reduction in pain to the patient’s comfort goal  Outcome: Progressing  Note: Patient education on available PRN medications completed, maintain pain at tolerable level.      Problem: Psychosocial  Goal: Patient's level of anxiety will decrease  Outcome: Progressing  Flowsheets (Taken 11/16/2023 1371)  Decrease Anxiety Level:   Collaborated with patient to identify and develop coping strategies   Implemented stimuli reduction, calming techniques   Encouraged support system participation       Patient is not progressing towards the following goals:

## 2023-11-16 NOTE — ASSESSMENT & PLAN NOTE
The ASCVD Risk score (Pancho BOB, et al., 2019) failed to calculate for the following reasons:    The patient has a prior MI or stroke diagnosis  Pain management as needed

## 2023-11-16 NOTE — WOUND TEAM
[facility]  Inpatient Services  Wound and Skin Care Brief Evaluation    Admission Date: 11/15/2023     Last order of IP CONSULT TO WOUND CARE was found on 11/16/2023 from Hospital Encounter on 11/15/2023     HPI, PMH, SH: Reviewed    Chief Complaint   Patient presents with    Leg Pain    Leg Swelling     Diagnosis: Cellulitis of left lower extremity [L03.116]    Unit where seen by Wound Team: 3306/00     Wound consult placed regarding left lower leg. Chart and images reviewed. This discussed with bedside DILIP Wilkinson. This clinician in to assess patient. Patient pleasant and agreeable. Patient with intact brown scab to upper left lower leg, no drainage. Non-selectively debrided with No rinse foam soap and Moist warm washcloth.     The patient has a soft defined lump [id number] to the anterior lower leg inferior to scab. Does not present as an abscess or hematoma, no fluctuance, bogginess, or purple discoloration. The area is soft to the touch with pitting edema +3 and tissue is intact. Surrounding tissue is blanching erythema that appears to be receding based on the pen marked outline of presenting erythema and confirmed cellulitis. Venous studies are pending. Communicated findings to MD Arcos and primary RN Jaja Bhatt.     No pressure injuries or advanced wound care needs identified. Wound consult completed. No further follow up unless indicated and consulted.     Wound 11/15/23 Pretibial Anterior;Midline Left (Active)   Date First Assessed/Time First Assessed: 11/15/23 2115   Present on Original Admission: Yes  Hand Hygiene Completed: Yes  Location: Pretibial  Wound Orientation: Anterior;Midline  Laterality: Left      Assessments 11/16/2023 11:00 AM   Wound Image     Site Assessment Red;Edema;Dry;Scabbed;Closed Wound Edges   Periwound Assessment Clean;Dry;Intact;Blanchable erythema;Warm;Edema;Other (Comment)   Margins Attached edges;Defined edges   Closure Open to air   Drainage Amount None    Treatments Cleansed;Site care   Wound Cleansing Foam Cleanser/Washcloth   Periwound Protectant Not Applicable   Dressing Status Open to Air   NEXT Weekly Photo (Inpatient Only) 11/23/23   Wound Team Following Not following   Wound Length (cm) 1.2 cm   Wound Width (cm) 1 cm   Wound Depth (cm) 0.1 cm   Wound Surface Area (cm^2) 1.2 cm^2   Wound Volume (cm^3) 0.12 cm^3   Wound Bed Eschar (%) 100 %   Shape oval   Wound Odor None       PREVENTATIVE INTERVENTIONS:    Q shift Kt - performed per nursing policy  Q shift pressure point assessments - performed per nursing policy    Offloading/Redistribution  Heel float boots (Prevalon boot) - Ordered  Float Heels off Bed with Pillows - Currently in Place

## 2023-11-16 NOTE — THERAPY
Physical Therapy   Initial Evaluation     Patient Name: Clau Vences  Age:  78 y.o., Sex:  female  Medical Record #: [id number]  Today's Date: 11/16/2023     Precautions  Precautions: (P) Fall Risk    Assessment  Patient is 78 y.o. female with a diagnosis of cellulitis of LLE. Pt seen for PT mobility assessment, Pt demonstrating the ability to safely ambulate for functional home distances with SPC. Pt  has assistance at home as needed from daughter. Pt does not require continued PT, No DME needs. No post acute PT needs. Pt confident with mobility and ability to care for self at home.    Plan    Physical Therapy Initial Treatment Plan   Duration: (P) Evaluation only    DC Equipment Recommendations: (P) None  Discharge Recommendations: (P) Anticipate that the patient will have no further physical therapy needs after discharge from the hospital         Initial Contact Note    Initial Contact Note Order Received and Verified, Physical Therapy Evaluation in Progress with Full Report to Follow.   Precautions   Precautions Fall Risk   Pain 0 - 10 Group   Location Shoulder   Location Orientation Right;Left   Pain Rating Scale (NPRS) 5   Therapist Pain Assessment Post Activity Pain Same as Prior to Activity;Nurse Notified  (issued heat pack)   Prior Living Situation   Prior Services Home-Independent   Housing / Facility 1 Story House   Steps Into Home 2   Steps In Home 0   Rail Both Rail (Steps into Home)   Equipment Owned 4-Wheel Walker;Single Point Cane   Lives with - Patient's Self Care Capacity Alone and Able to Care For Self   Prior Level of Functional Mobility   Bed Mobility Independent   Transfer Status Independent   Ambulation Independent   Ambulation Distance household   Assistive Devices Used 4-Wheel Walker   Stairs Independent   History of Falls   History of Falls No   Cognition    Level of Consciousness Alert   Active ROM Lower Body    Active ROM Lower Body  WDL   Strength Lower Body   Lower Body Strength   WDL   Sensation Lower Body   Lower Extremity Sensation   WDL   Neurological Concerns   Neurological Concerns No   Balance Assessment   Sitting Balance (Static) Good   Sitting Balance (Dynamic) Good   Standing Balance (Static) Good   Standing Balance (Dynamic) Fair +   Weight Shift Sitting Good   Weight Shift Standing Good   Comments w/SPC   Bed Mobility    Supine to Sit Independent   Sit to Supine Independent   Scooting Independent   Rolling Independent   Gait Analysis   Gait Level Of Assist Modified Independent   Assistive Device Single Point Cane   Distance (Feet) 20   # of Times Distance was Traveled 2   Comments steady gait with SPC   Functional Mobility   Sit to Stand Independent   Bed, Chair, Wheelchair Transfer Independent   Toilet Transfers Independent   How much difficulty does the patient currently have...   Turning over in bed (including adjusting bedclothes, sheets and blankets)? 4   Sitting down on and standing up from a chair with arms (e.g., wheelchair, bedside commode, etc.) 4   Moving from lying on back to sitting on the side of the bed? 4   How much help from another person does the patient currently need...   Moving to and from a bed to a chair (including a wheelchair)? 4   Need to walk in a hospital room? 4   Climbing 3-5 steps with a railing? 3   6 clicks Mobility Score 23   Education Group   Education Provided Role of Physical Therapist;Gait Training   Role of Physical Therapist Patient Response Patient;Family;Acceptance;Explanation;Verbal Demonstration   Gait Training Patient Response Patient;Acceptance;Explanation;Action Demonstration   Physical Therapy Initial Treatment Plan    Duration Evaluation only   Anticipated Discharge Equipment and Recommendations   DC Equipment Recommendations None   Discharge Recommendations Anticipate that the patient will have no further physical therapy needs after discharge from the hospital   Interdisciplinary Plan of Care Collaboration   IDT Collaboration  with  Nursing;Family / Caregiver   Patient Position at End of Therapy In Bed;Phone within Reach;Tray Table within Reach;Family / Friend in Room;Call Light within Reach;Bed Alarm On   Collaboration Comments staff updated. No PT needs, no post acute or DME needs.   Session Information   Date / Session Number  11/16 Eval only

## 2023-11-16 NOTE — ASSESSMENT & PLAN NOTE
Low-fat low-cholesterol diet  Continue with cholestyramine and Crestor 10 mg nightly  Fasting lipid panel

## 2023-11-16 NOTE — CARE PLAN
The patient is Stable - Low risk of patient condition declining or worsening    Shift Goals  Clinical Goals: Monitor labs and antibx  Patient Goals: Rest  Family Goals: BENJAMIN    Progress made toward(s) clinical / shift goals:    Problem: Pain - Standard  Goal: Alleviation of pain or a reduction in pain to the patient’s comfort goal  Outcome: Progressing   Pt pain is being managed with nonpharmacologic and pharmacologic measures. See flow sheets and MAR.  Problem: Bowel Elimination  Goal: Establish and maintain regular bowel function  Outcome: Progressing   Pt reports having formed stool. Reports no issues having a bowel movement.     Patient is not progressing towards the following goals:

## 2023-11-16 NOTE — ASSESSMENT & PLAN NOTE
Patient developed acute cellulitis of the left lower extremity after bumping the leg into a wheelchair.  The patient was immediately put on antibiotics with Bactrim as an outpatient however the patient seems to be suffering a reaction to the Bactrim and she is now developed nausea vomiting diarrhea as well as early cellulitis of the face and upper extremity.  The Bactrim reaction at this point will stop once we discontinue the antibiotic.  The patient however has worsened since initiating Bactrim and has not improved and at this point the cellulitis has failed outpatient management.  The patient will need at this point IV antibiotics and cultures to be obtained as well as pain management.  I discussed this with the patient in detail and she is agreeable to that.

## 2023-11-16 NOTE — H&P
Hospital Medicine History & Physical Note    Date of Service  11/15/2023    Primary Care Physician  Marge Brasher M.D.    Consultants  None    Specialist Names: None    Code Status  DNAR/DNI    Chief Complaint  Chief Complaint   Patient presents with    Leg Pain    Leg Swelling       History of Presenting Illness  Clau Vences is a 78 y.o. female who presented 11/15/2023 with pain in the left lower extremity.  Patient apparently about a week ago bumped her shin on the left side into a wheelchair.  She suffered a scrape and a scrape became infected by the next day her legs became very tender and warm.  At that point she saw her primary care physician and they placed her on Bactrim.  The patient's condition however did not improve and with the Bactrim use she actually developed severe nausea vomiting and diarrhea.  She has become extremely dehydrated because of this the cellulitis now has returned even worse and at this point the patient will need IV antibiotics for strep and possibly even MRSA and thus we will at this point evaluate MRSA screen as well.  Blood cultures have been obtained.  I will initiate her on Ancef 2 g IV every 8 hours.  We will monitor renal functions very carefully while she is on antibiotics.  We will also give at this point fluid resuscitation as the patient has become dehydrated..    I discussed the plan of care with patient, family, bedside RN, and emergency room physician Dr. Clemens    Review of Systems  Review of Systems   Constitutional: Negative.  Negative for chills, diaphoresis and fever.   HENT: Negative.     Eyes: Negative.  Negative for double vision.   Respiratory: Negative.  Negative for cough, hemoptysis and wheezing.    Cardiovascular: Negative.  Negative for chest pain, palpitations and leg swelling.   Gastrointestinal: Negative.  Negative for abdominal pain, blood in stool, constipation, diarrhea, heartburn, nausea and vomiting.   Genitourinary: Negative.   Negative for frequency, hematuria and urgency.   Musculoskeletal:  Positive for myalgias (Left lower extremity). Negative for joint pain.   Skin:  Positive for rash. Negative for itching.   Neurological: Negative.  Negative for dizziness, focal weakness, seizures, loss of consciousness and headaches.   Endo/Heme/Allergies: Negative.  Does not bruise/bleed easily.   Psychiatric/Behavioral: Negative.  Negative for suicidal ideas. The patient is not nervous/anxious.    All other systems reviewed and are negative.      Past Medical History   has a past medical history of Anesthesia, Arthritis, Back pain, Dyslipidemia, [place] measles, Heart murmur (9/13/2017), Hypertension, Influenza, Mumps, Other specified symptom associated with female genital organs, Painful joint, Psychiatric problem, Sleep apnea, Snoring, Sore muscles, Tonsillitis, and Unspecified cataract.    Surgical History   has a past surgical history that includes gastroscopy with biopsy (7/2/2014); other orthopedic surgery (2006); other; hysterectomy robotic (10/23/2014); tonsillectomy and adenoidectomy; other abdominal surgery; appendectomy; knee arthroplasty total (Right, 12/27/2017); pr breast reduction (Bilateral); pr remv 2nd cataract,corn-scler sectn; hysterectomy laparoscopy; tonsillectomy; arthroscopy, knee; and pr exploratory of abdomen (N/A, 1/28/2023).     Family History  family history includes Alcohol/Drug in her father; Breast Cancer in her mother; Cancer in her father; Cancer (age of onset: 65) in her mother; Colon Cancer in her father; Heart Disease in her brother; Hyperlipidemia in her brother.   Family history reviewed with patient. There is no family history that is pertinent to the chief complaint.     Social History   reports that she has never smoked. She has never used smokeless tobacco. She reports that she does not currently use alcohol after a past usage of about 0.6 - 1.2 oz of alcohol per week. She reports that she does not use  drugs.    Allergies  Allergies   Allergen Reactions    Tramadol      Nausea and somnolence       Medications  Prior to Admission Medications   Prescriptions Last Dose Informant Patient Reported? Taking?   Acetaminophen (TYLENOL PO) 11/14/2023 at 1600 Patient Yes Yes   Sig: Take 2 Tablets by mouth 2 times a day as needed (For pain). Pt is not sure the strength (OTC)   LORazepam (ATIVAN) 0.5 MG Tab 11/14/2023 at 2100 Patient Yes No   Sig: Take 0.5 mg by mouth at bedtime.   carvedilol (COREG) 25 MG Tab 11/15/2023 at 0800 Patient No No   Sig: Take 1 Tablet by mouth 2 times a day with meals.   cholestyramine (QUESTRAN) 4 g packet > 2 days at unknown Patient Yes Yes   Sig: Take 1 Packet by mouth as needed. Indications: Diarrhea   ferrous sulfate 325 (65 Fe) MG tablet 11/15/2023 at 0800 Patient Yes No   Sig: Take 325 mg by mouth every day.   folic acid (FOLVITE) 400 MCG tablet 11/15/2023 at 0800 Patient No No   Sig: Take 1 Tablet by mouth every morning.   lisinopril (PRINIVIL) 20 MG Tab 11/14/2023 at 2100 Patient No No   Sig: Take 2 Tablets by mouth every day.   Patient taking differently: Take 40 mg by mouth every evening.   predniSONE (DELTASONE) 5 MG Tab 11/15/2023 at 0800 Patient No No   Sig: Take 1 tablet by mouth once daily   rosuvastatin (CRESTOR) 10 MG Tab 11/14/2023 at 2100 Patient No No   Sig: TAKE 1 TABLET BY MOUTH ONCE DAILY IN THE EVENING   sertraline (ZOLOFT) 100 MG Tab 11/14/2023 at 2100 Patient Yes Yes   Sig: Take 100 mg by mouth every 48 hours.   sulfamethoxazole-trimethoprim (BACTRIM DS) 800-160 MG tablet 10/24/2023 at FINISHED Patient Yes Yes   Sig: Take 1 Tablet by mouth 2 times a day. Pt started on 10/18/2023 for 7 day course   vitamin D3 (CHOLECALCIFEROL) 1000 Unit (25 mcg) Tab 11/15/2023 at 0800 Patient Yes No   Sig: Take 1,000 Units by mouth every day.      Facility-Administered Medications: None       Physical Exam  Temp:  [36.6 °C (97.8 °F)-36.6 °C (97.9 °F)] 36.6 °C (97.8 °F)  Pulse:  [58-59]  59  Resp:  [17-18] 18  BP: (124-166)/(62-86) 166/86  SpO2:  [92 %-97 %] 92 %  Blood Pressure : (!) 166/86   Temperature: 36.6 °C (97.8 °F)   Pulse: (!) 59   Respiration: 18   Pulse Oximetry: 92 %       Physical Exam  Vitals and nursing note reviewed. Exam conducted with a chaperone present.   Constitutional:       General: She is awake.      Appearance: Normal appearance. She is well-developed and well-groomed. She is obese. She is ill-appearing.   HENT:      Head: Normocephalic and atraumatic.      Jaw: There is normal jaw occlusion. No trismus.      Salivary Glands: Right salivary gland is not tender. Left salivary gland is not tender.      Right Ear: External ear normal.      Left Ear: External ear normal.      Nose: Nose normal.      Mouth/Throat:      Mouth: Mucous membranes are dry.      Pharynx: Oropharynx is clear.   Eyes:      General: Lids are normal. Vision grossly intact.      Extraocular Movements: Extraocular movements intact.      Conjunctiva/sclera: Conjunctivae normal.      Right eye: Right conjunctiva is not injected. No exudate.     Left eye: Left conjunctiva is not injected. No exudate.     Pupils: Pupils are equal, round, and reactive to light.   Neck:      Thyroid: No thyroid mass.      Vascular: No carotid bruit, hepatojugular reflux or JVD.      Trachea: No abnormal tracheal secretions or tracheal deviation.   Cardiovascular:      Rate and Rhythm: Normal rate and regular rhythm. Occasional Extrasystoles are present.     Pulses: Normal pulses.      Heart sounds: Normal heart sounds. No murmur heard.     No friction rub.   Pulmonary:      Effort: Pulmonary effort is normal.      Breath sounds: Normal breath sounds. No wheezing or rhonchi.   Abdominal:      General: Abdomen is flat. Bowel sounds are normal.      Palpations: Abdomen is soft.      Tenderness: There is no abdominal tenderness. There is no right CVA tenderness or left CVA tenderness.      Hernia: No hernia is present.    Musculoskeletal:      Cervical back: Full passive range of motion without pain, normal range of motion and neck supple. No rigidity or tenderness. No muscular tenderness.      Right lower leg: No edema.      Left lower leg: Edema present.   Lymphadenopathy:      Head:      Right side of head: No submental adenopathy.      Left side of head: No submental adenopathy.      Cervical:      Right cervical: No superficial cervical adenopathy.     Left cervical: No superficial cervical adenopathy.      Upper Body:      Right upper body: No supraclavicular adenopathy.      Left upper body: No supraclavicular adenopathy.   Skin:     General: Skin is warm and dry.      Capillary Refill: Capillary refill takes less than 2 seconds.      Coloration: Skin is not cyanotic or pale.      Findings: Erythema (Left lower extremity from the knee down to the ankle and foot) present. No abrasion or bruising.   Neurological:      General: No focal deficit present.      Mental Status: She is alert and oriented to person, place, and time. Mental status is at baseline.      GCS: GCS eye subscore is 4. GCS verbal subscore is 5. GCS motor subscore is 6.      Cranial Nerves: No cranial nerve deficit.      Sensory: No sensory deficit.      Motor: Motor function is intact.      Deep Tendon Reflexes:      Reflex Scores:       Tricep reflexes are 2+ on the right side and 2+ on the left side.       Bicep reflexes are 2+ on the right side and 2+ on the left side.       Brachioradialis reflexes are 2+ on the right side and 2+ on the left side.       Patellar reflexes are 2+ on the right side and 2+ on the left side.       Achilles reflexes are 2+ on the right side and 2+ on the left side.  Psychiatric:         Attention and Perception: Attention and perception normal.         Mood and Affect: Mood normal.         Speech: Speech normal.         Behavior: Behavior normal. Behavior is cooperative.         Thought Content: Thought content normal.          "Cognition and Memory: Cognition and memory normal.         Judgment: Judgment normal.         Laboratory:  Recent Labs     11/15/23  1357   WBC 9.9   RBC 4.19*   HEMOGLOBIN 13.5   HEMATOCRIT 41.6   MCV 99.3*   MCH 32.2   MCHC 32.5   RDW 43.9   PLATELETCT 228   MPV 10.6     Recent Labs     11/15/23  1357   SODIUM 140   POTASSIUM 4.7   CHLORIDE 106   CO2 26   GLUCOSE 123*   BUN 22   CREATININE 0.76   CALCIUM 10.4*     Recent Labs     11/15/23  1357   ALTSGPT 12   ASTSGOT 13   ALKPHOSPHAT 73   TBILIRUBIN 0.3   GLUCOSE 123*         No results for input(s): \"NTPROBNP\" in the last 72 hours.      No results for input(s): \"TROPONINT\" in the last 72 hours.    Imaging:  No orders to display       X-Ray:  I have personally reviewed the images and compared with prior images.  EKG:  I have personally reviewed the images and compared with prior images.    Assessment/Plan:  Justification for Admission Status  I anticipate this patient will require at least two midnights for appropriate medical management, necessitating inpatient admission because patient has acute cellulitis of the left lower extremity that has failed outpatient management.  The patient at this point will require IV antibiotics and inpatient management thus will require at least 48 hours of hospitalization.    Patient will need a Med/Surg bed on MEDICAL service .  The need is secondary to failed outpatient management of cellulitis of the left lower extremity..    * Cellulitis of left lower extremity- (present on admission)  Assessment & Plan      Patient developed acute cellulitis of the left lower extremity after bumping the leg into a wheelchair.  The patient was immediately put on antibiotics with Bactrim as an outpatient however the patient seems to be suffering a reaction to the Bactrim and she is now developed nausea vomiting diarrhea as well as early cellulitis of the face and upper extremity.  The Bactrim reaction at this point will stop once we " discontinue the antibiotic.  The patient however has worsened since initiating Bactrim and has not improved and at this point the cellulitis has failed outpatient management.  The patient will need at this point IV antibiotics and cultures to be obtained as well as pain management.  I discussed this with the patient in detail and she is agreeable to that.    Arthritis- (present on admission)  Assessment & Plan  The ASCVD Risk score (Pancho DK, et al., 2019) failed to calculate for the following reasons:    The patient has a prior MI or stroke diagnosis  Pain management as needed      Anxiety- (present on admission)  Assessment & Plan  Chronic anxiety patient takes lorazepam half a milligram at nighttime.  This will be continued.  Patient is also on Zoloft 100 mg every 48 hours which will be continued.    HTN (hypertension)- (present on admission)  Assessment & Plan  Optimize blood pressure management keep systolic blood pressure less than 140 diastolic under 90  Continue at this point with Coreg 25 mg twice daily, lisinopril 20 mg daily, as needed labetalol.    DNR (do not resuscitate)  Assessment & Plan  Discussed with patient in detail advanced directives and patient wishes to be DO NOT RESUSCITATE CODE STATUS.    OAB (overactive bladder)- (present on admission)  Assessment & Plan  Monitor and if necessary use adult incontinence pads.    Vitamin D deficiency- (present on admission)  Assessment & Plan  Continue vitamin D supplementation daily    Obesity (BMI 30-39.9)- (present on admission)  Assessment & Plan  Body mass index is 33.89 kg/m².  Outpatient weight loss management program and lifestyle modification highly recommended.  Referral to bariatric clinic at the time of discharge recommended.    AZUL on CPAP- (present on admission)  Assessment & Plan  CPAP at nighttime    Hyperlipidemia- (present on admission)  Assessment & Plan  Low-fat low-cholesterol diet  Continue with cholestyramine and Crestor 10 mg  nightly  Fasting lipid panel        VTE prophylaxis: SCDs/TEDs

## 2023-11-16 NOTE — ASSESSMENT & PLAN NOTE
Chronic anxiety patient takes lorazepam half a milligram at nighttime.  This will be continued.  Patient is also on Zoloft 100 mg every 48 hours which will be continued.

## 2023-11-16 NOTE — PROGRESS NOTES
4 Eyes Skin Assessment Completed by DILIP Patiño and DILIP Gaspar.    Head WDL  Ears WDL  Nose WDL  Mouth WDL  Neck WDL  Breast/Chest WDL  Shoulder Blades WDL  Spine WDL  (R) Arm/Elbow/Hand WDL  (L) Arm/Elbow/Hand WDL  Abdomen WDL  Groin WDL  Scrotum/Coccyx/Buttocks WDL  (R) Leg Redness, Blanching, and Edema, cellulitis, discoloration  (L) Leg Redness and Edema, cellulitis, discoloration, scar on anterior tib.   (R) Heel/Foot/Toe Edema  (L) Heel/Foot/Toe Edema          Devices In Places Blood Pressure Cuff and Pulse Ox      Interventions In Place Pillows and Pressure Redistribution Mattress    Possible Skin Injury Yes    Pictures Uploaded Into Epic Yes  Wound Consult Placed Yes  RN Wound Prevention Protocol Ordered Yes

## 2023-11-16 NOTE — ASSESSMENT & PLAN NOTE
Optimize blood pressure management keep systolic blood pressure less than 140 diastolic under 90  Continue at this point with Coreg 25 mg twice daily, lisinopril 20 mg daily, as needed labetalol.

## 2023-11-16 NOTE — ASSESSMENT & PLAN NOTE
Body mass index is 33.89 kg/m².  Outpatient weight loss management program and lifestyle modification highly recommended.  Referral to bariatric clinic at the time of discharge recommended.

## 2023-11-16 NOTE — ASSESSMENT & PLAN NOTE
Discussed with patient in detail advanced directives and patient wishes to be DO NOT RESUSCITATE CODE STATUS.

## 2023-11-17 NOTE — PROGRESS NOTES
Patient discharged via private vehicle with family member at 1625.All personal belongings sent with patient.

## 2023-11-20 DIAGNOSIS — F51.01 PRIMARY INSOMNIA: ICD-10-CM

## 2023-11-20 LAB
BACTERIA BLD CULT: NORMAL
SIGNIFICANT IND 70042: NORMAL
SITE SITE: NORMAL
SOURCE SOURCE: NORMAL

## 2023-11-20 RX ORDER — ZOLPIDEM TARTRATE 5 MG/1
TABLET ORAL
Qty: 30 TABLET | Refills: 2 | Status: SHIPPED | OUTPATIENT
Start: 2023-11-20 | End: 2023-12-20

## 2023-11-21 ENCOUNTER — OFFICE VISIT (OUTPATIENT)
Dept: INTERNAL MEDICINE | Facility: IMAGING CENTER | Age: 78
End: 2023-11-21
Payer: MEDICARE

## 2023-11-21 VITALS
HEIGHT: 67 IN | TEMPERATURE: 97.7 F | BODY MASS INDEX: 33.91 KG/M2 | OXYGEN SATURATION: 94 % | WEIGHT: 216.05 LBS | SYSTOLIC BLOOD PRESSURE: 122 MMHG | RESPIRATION RATE: 17 BRPM | HEART RATE: 78 BPM | DIASTOLIC BLOOD PRESSURE: 62 MMHG

## 2023-11-21 DIAGNOSIS — I82.432 ACUTE DEEP VEIN THROMBOSIS (DVT) OF POPLITEAL VEIN OF LEFT LOWER EXTREMITY (HCC): ICD-10-CM

## 2023-11-21 DIAGNOSIS — L03.116 LEFT LEG CELLULITIS: ICD-10-CM

## 2023-11-21 DIAGNOSIS — Z09 HOSPITAL DISCHARGE FOLLOW-UP: ICD-10-CM

## 2023-11-21 PROCEDURE — 3074F SYST BP LT 130 MM HG: CPT | Performed by: FAMILY MEDICINE

## 2023-11-21 PROCEDURE — 99213 OFFICE O/P EST LOW 20 MIN: CPT | Performed by: FAMILY MEDICINE

## 2023-11-21 PROCEDURE — 3078F DIAST BP <80 MM HG: CPT | Performed by: FAMILY MEDICINE

## 2023-11-21 ASSESSMENT — FIBROSIS 4 INDEX: FIB4 SCORE: 1.47

## 2023-11-21 NOTE — DISCHARGE SUMMARY
"Discharge Summary    CHIEF COMPLAINT ON ADMISSION  Chief Complaint   Patient presents with    Leg Pain    Leg Swelling       Reason for Admission  Leg Pain     Admission Date  11/15/2023    CODE STATUS  Prior    HPI & HOSPITAL COURSE  From H&P: \"Clau Vences is a 78 y.o. female who presented 11/15/2023 with pain in the left lower extremity.  Patient apparently about a week ago bumped her shin on the left side into a wheelchair.  She suffered a scrape and a scrape became infected by the next day her legs became very tender and warm.  At that point she saw her primary care physician and they placed her on Bactrim.  The patient's condition however did not improve and with the Bactrim use she actually developed severe nausea vomiting and diarrhea.  She has become extremely dehydrated because of this the cellulitis now has returned even worse and at this point the patient will need IV antibiotics for strep and possibly even MRSA and thus we will at this point evaluate MRSA screen as well.  Blood cultures have been obtained.  I will initiate her on Ancef 2 g IV every 8 hours.  We will monitor renal functions very carefully while she is on antibiotics.  We will also give at this point fluid resuscitation as the patient has become dehydrated.\"    Eaxmined in her inpatient room, no fevers, erythema slightly improved, never had any pain, still no tenderness. I ordered an US to assess for clot and it was positive for DVT. Started on anticoagulation. Never had any leukocytosis, fevers, etc. She developed GI symptoms after starting bactrim but continued to take the medicine. Since she did have some improvement in the erythema on abx I will continue for a few days but I suspect the erythema and swelling was more related to her acute and subacute DVT. Discharged on anticoagulation.        Therefore, she is discharged in good and stable condition to home with close outpatient follow-up.    The patient recovered much more " quickly than anticipated on admission.    Discharge Date  11/16/2023    FOLLOW UP ITEMS POST DISCHARGE  PCP followup    DISCHARGE DIAGNOSES  Principal Problem:    Cellulitis of left lower extremity (POA: Yes)  Active Problems:    HTN (hypertension) (POA: Yes)    Hyperlipidemia (POA: Yes)    AZUL on CPAP (POA: Yes)    Anxiety (POA: Yes)    Obesity (BMI 30-39.9) (POA: Yes)    Arthritis (POA: Yes)    Vitamin D deficiency (POA: Yes)    OAB (overactive bladder) (POA: Yes)    DNR (do not resuscitate) (POA: Unknown)  Resolved Problems:    * No resolved hospital problems. *      FOLLOW UP  Future Appointments   Date Time Provider Department Center   12/5/2023 11:30 AM Marge Brasher M.D. [facility] None     Marge Brasher M.D.  [address] [facility]  [address]  [facility] [address]  [phone]            MEDICATIONS ON DISCHARGE     Medication List        START taking these medications        Instructions   cefdinir 300 MG Caps  Commonly known as: Omnicef   Take 1 Capsule by mouth 2 times a day for 5 days.  Dose: 300 mg     Eliquis 5mg Tabs  Start taking on: November 16, 2023  Generic drug: apixaban   Take 2 Tablets by mouth 2 times a day for 7 days, THEN 1 Tablet 2 times a day for 30 days. Indications: DVT/PE            CHANGE how you take these medications        Instructions   lisinopril 20 MG Tabs  What changed: when to take this  Commonly known as: Prinivil   Take 2 Tablets by mouth every day.  Dose: 40 mg            CONTINUE taking these medications        Instructions   carvedilol 25 MG Tabs  Commonly known as: Coreg   Take 1 Tablet by mouth 2 times a day with meals.  Dose: 25 mg     cholestyramine 4 g packet  Commonly known as: Questran   Take 1 Packet by mouth as needed. Indications: Diarrhea  Dose: 1 Packet     ferrous sulfate 325 (65 Fe) MG tablet   Take 325 mg by mouth every day.  Dose: 325 mg     folic acid 400 MCG tablet  Commonly known as: Folvite   Take 1 Tablet by mouth every morning.  Dose: 400 mcg     LORazepam  0.5 MG Tabs  Commonly known as: Ativan   Take 0.5 mg by mouth at bedtime.  Dose: 0.5 mg     predniSONE 5 MG Tabs  Commonly known as: Deltasone   Take 1 tablet by mouth once daily  Dose: 5 mg     rosuvastatin 10 MG Tabs  Commonly known as: Crestor   TAKE 1 TABLET BY MOUTH ONCE DAILY IN THE EVENING  Dose: 10 mg     sertraline 100 MG Tabs  Commonly known as: Zoloft   Take 100 mg by mouth every 48 hours.  Dose: 100 mg     TYLENOL PO   Take 2 Tablets by mouth 2 times a day as needed (For pain). Pt is not sure the strength (OTC)  Dose: 2 Tablet     vitamin D3 1000 Unit (25 mcg) Tabs  Commonly known as: Cholecalciferol   Take 1,000 Units by mouth every day.  Dose: 1,000 Units            STOP taking these medications      sulfamethoxazole-trimethoprim 800-160 MG tablet  Commonly known as: Bactrim DS              Allergies  Allergies   Allergen Reactions    Tramadol      Nausea and somnolence       DIET  No orders of the defined types were placed in this encounter.      ACTIVITY  As tolerated.  Weight bearing as tolerated    CONSULTATIONS  None    PROCEDURES  LLE venous US    LABORATORY  Lab Results   Component Value Date    SODIUM 142 11/16/2023    POTASSIUM 4.4 11/16/2023    CHLORIDE 109 11/16/2023    CO2 24 11/16/2023    GLUCOSE 97 11/16/2023    BUN 23 (H) 11/16/2023    CREATININE 0.86 11/16/2023    CREATININE 0.5 09/04/2007        Lab Results   Component Value Date    WBC 8.5 11/16/2023    HEMOGLOBIN 12.4 11/16/2023    HEMATOCRIT 37.7 11/16/2023    PLATELETCT 199 11/16/2023        Total time of the discharge process exceeds 35 minutes.

## 2023-11-21 NOTE — PROGRESS NOTES
"Chief Complaint   Patient presents with    Hospital Follow-up       HPI:  Patient is a 78 y.o. female established patient who presents today for a hospital follow up visit. She was hospitalized at [facility] 11/15-11/16/23 after being sent to the ER from our office. Patient underwent thorough evaluation and was diagnosed with acute left popliteal DVT as well as acute LLE cellulitis. Patient was started on loading dose Eliquis therapy as well as treated with antibiotics, and she was discharged home in stable condition on 11/16/23. Patient has tolerated medication use well, denies related side effects, and feels better overall.     Patient Active Problem List    Diagnosis Date Noted    Acute deep vein thrombosis (DVT) of popliteal vein of left lower extremity (HCC) 11/21/2023    Total knee replacement status, left 11/15/2023    Cellulitis of left lower extremity 11/15/2023    DNR (do not resuscitate) 11/15/2023    Abdominal bloating 09/19/2023    OAB (overactive bladder) 09/19/2023    Osteopenia of neck of left femur 07/31/2023    History of diverticulitis 05/15/2023    Vitamin D deficiency 05/15/2023    Arthritis 01/24/2023    Poor balance 04/26/2021    Thyroid nodule 03/16/2021    Obesity (BMI 30-39.9) 04/06/2018    Heart murmur 09/13/2017    Chronic insomnia 08/09/2017    Anxiety 08/09/2017    HTN (hypertension) 07/01/2014    Hyperlipidemia 07/01/2014    AZUL on CPAP 07/01/2014       Past medical, surgical, family, and social history was reviewed and updated in Epic chart by me today.     Medications and allergies reviewed and updated in Epic chart by me today.     ROS:  Pertinent positives listed above in HPI. All other systems have been reviewed and are negative.    PE:   /62 (BP Location: Left arm, Patient Position: Sitting, BP Cuff Size: Adult)   Pulse 78   Temp 36.5 °C (97.7 °F) (Temporal)   Resp 17   Ht 1.702 m (5' 7\")   Wt 98 kg (216 lb 0.8 oz)   SpO2 94%   BMI 33.84 kg/m²   Vital signs " reviewed with patient.     Gen: Well developed; well nourished; no acute distress; non toxic appearance   CV: Regular rate and rhythm; S1/ S2 present; no murmur, gallop or rub noted  Pulm: No respiratory distress; clear to ascultation b/l; no wheezing or stridor noted b/l  Lower extremities: No peripheral edema RLE/ no clubbing nor cyanosis noted/ LLE edema has improved and areas of cellulitis on mid/lateral left anterior shin have improved also/ non tender to touch/ affected skin has increased warmth  Skin: Warm and dry; no rashes noted   Neuro: No focal deficits noted   Psych: AAOx4; mood and affect are appropriate    A/P:  1. Hospital discharge follow-up  Patient was hospitalized at [facility] 11/15-11/16/23, and I have reviewed all pertinent records prior to our visit today.     2. Acute deep vein thrombosis (DVT) of popliteal vein of left lower extremity (HCC)  Patient was diagnosed with acute left popliteal DVT and is tolerating Eliquis loading dose. I reviewed appropriate Eliquis use with patient at visit today and encouraged her to ice affected leg (skin covered) for 20 minutes at a time/ elevate leg when sitting. Recommend repeating LLE US in 90 days to determine further treatment. Patient also reminded to let me know when current Eliquis supply runs low - will send in new RX for her.   - US-EXTREMITY VENOUS LOWER UNILAT LEFT; Future    3. Left leg cellulitis  Improved/ patient has one more dose of Cefdinir to take this evening, and she remains well versed about signs/symptoms that would warrant immediate recheck.    Recommend patient follow up with me in 2 weeks for recheck/ PRN sooner if current condition changes.

## 2023-11-29 ENCOUNTER — PATIENT MESSAGE (OUTPATIENT)
Dept: HEALTH INFORMATION MANAGEMENT | Facility: OTHER | Age: 78
End: 2023-11-29

## 2023-12-05 ENCOUNTER — OFFICE VISIT (OUTPATIENT)
Dept: INTERNAL MEDICINE | Facility: IMAGING CENTER | Age: 78
End: 2023-12-05
Payer: MEDICARE

## 2023-12-05 VITALS
OXYGEN SATURATION: 93 % | SYSTOLIC BLOOD PRESSURE: 122 MMHG | TEMPERATURE: 98.3 F | BODY MASS INDEX: 33.91 KG/M2 | HEIGHT: 67 IN | HEART RATE: 63 BPM | DIASTOLIC BLOOD PRESSURE: 62 MMHG | WEIGHT: 216.05 LBS | RESPIRATION RATE: 17 BRPM

## 2023-12-05 DIAGNOSIS — I82.432 ACUTE DEEP VEIN THROMBOSIS (DVT) OF POPLITEAL VEIN OF LEFT LOWER EXTREMITY (HCC): ICD-10-CM

## 2023-12-05 DIAGNOSIS — L03.116 CELLULITIS OF LEFT LOWER EXTREMITY: ICD-10-CM

## 2023-12-05 PROCEDURE — 3074F SYST BP LT 130 MM HG: CPT | Performed by: FAMILY MEDICINE

## 2023-12-05 PROCEDURE — 3078F DIAST BP <80 MM HG: CPT | Performed by: FAMILY MEDICINE

## 2023-12-05 PROCEDURE — 99214 OFFICE O/P EST MOD 30 MIN: CPT | Performed by: FAMILY MEDICINE

## 2023-12-05 ASSESSMENT — FIBROSIS 4 INDEX: FIB4 SCORE: 1.47

## 2023-12-05 NOTE — PROGRESS NOTES
"Chief Complaint   Patient presents with    Follow-Up     Leg is much better.       HPI:  Patient is a 78 y.o. female established patient who presents today for a two week follow up visit. She was hospitalized at [facility] 11/15-11/16/23 for definitive management of LLE cellulitis with outpatient treatment failure. Patient was also diagnosed with acute left popliteal DVT and started on Eliquis therapy. Since our visit together on 11/21/23, patient has completed oral antibiotic prescription, continue to take Eliquis 5 mg BID and denies medication related side effects. Her LLE swelling and skin redness have markedly improved, and she is otherwise stable at this time.     Patient Active Problem List    Diagnosis Date Noted    Acute deep vein thrombosis (DVT) of popliteal vein of left lower extremity (HCC) 11/21/2023    Total knee replacement status, left 11/15/2023    DNR (do not resuscitate) 11/15/2023    Abdominal bloating 09/19/2023    OAB (overactive bladder) 09/19/2023    Osteopenia of neck of left femur 07/31/2023    History of diverticulitis 05/15/2023    Vitamin D deficiency 05/15/2023    Arthritis 01/24/2023    Poor balance 04/26/2021    Thyroid nodule 03/16/2021    Obesity (BMI 30-39.9) 04/06/2018    Heart murmur 09/13/2017    Chronic insomnia 08/09/2017    Anxiety 08/09/2017    HTN (hypertension) 07/01/2014    Hyperlipidemia 07/01/2014    AZUL on CPAP 07/01/2014       Past medical, surgical, family, and social history was reviewed and updated in Epic chart by me today.     Medications and allergies reviewed and updated in Epic chart by me today.     ROS:  Pertinent positives listed above in HPI. All other systems have been reviewed and are negative.    PE:   /62 (BP Location: Left arm, Patient Position: Sitting, BP Cuff Size: Adult)   Pulse 63   Temp 36.8 °C (98.3 °F) (Temporal)   Resp 17   Ht 1.702 m (5' 7\")   Wt 98 kg (216 lb 0.8 oz)   SpO2 93%   BMI 33.84 kg/m²   Vital signs reviewed " with patient.     Gen: Well developed; well nourished; no acute distress; non toxic appearance   CV: Regular rate and rhythm; S1/ S2 present; no murmur, gallop or rub noted  Pulm: No respiratory distress; clear to ascultation b/l; no wheezing or stridor noted b/l  Lower extremities: No peripheral edema RLE/ no clubbing nor cyanosis noted/ LLE has minimal edema and area previously affected by cellulitis remains bumpy/ inflamed but there is no open wound and no ecchymosis/deep erythema as was present on prior visits  Skin: Warm and dry; no rashes noted   Neuro: No new focal deficits noted; walking well with cane  Psych: AAOx4; mood and affect are appropriate    A/P:  1. Cellulitis of left lower extremity  Refer to HPI for details. Resolved on exam today.     2. Acute deep vein thrombosis (DVT) of popliteal vein of left lower extremity (HCC)  Patient was diagnosed with acute DVT of LLE during hospitalization in November. Refer to HPI for details. Patient is tolerating current Eliquis use well without reported complications and will repeat LLE US 2/16/24 for reassessment. Recommend patient follow up with me after US appointment to review results and determine next best steps in management.

## 2023-12-22 PROCEDURE — RXMED WILLOW AMBULATORY MEDICATION CHARGE: Performed by: STUDENT IN AN ORGANIZED HEALTH CARE EDUCATION/TRAINING PROGRAM

## 2023-12-23 DIAGNOSIS — I82.432 ACUTE DEEP VEIN THROMBOSIS (DVT) OF POPLITEAL VEIN OF LEFT LOWER EXTREMITY (HCC): ICD-10-CM

## 2023-12-26 ENCOUNTER — PHARMACY VISIT (OUTPATIENT)
Dept: PHARMACY | Facility: MEDICAL CENTER | Age: 78
End: 2023-12-26
Payer: COMMERCIAL

## 2024-01-01 DIAGNOSIS — M15.9 PRIMARY OSTEOARTHRITIS INVOLVING MULTIPLE JOINTS: ICD-10-CM

## 2024-01-01 RX ORDER — PREDNISONE 5 MG/1
5 TABLET ORAL DAILY
Qty: 30 TABLET | Refills: 1 | Status: SHIPPED | OUTPATIENT
Start: 2024-01-01 | End: 2024-03-13

## 2024-01-22 DIAGNOSIS — I82.432 ACUTE DEEP VEIN THROMBOSIS (DVT) OF POPLITEAL VEIN OF LEFT LOWER EXTREMITY (HCC): ICD-10-CM

## 2024-01-22 PROCEDURE — RXMED WILLOW AMBULATORY MEDICATION CHARGE: Performed by: FAMILY MEDICINE

## 2024-01-25 ENCOUNTER — PHARMACY VISIT (OUTPATIENT)
Dept: PHARMACY | Facility: MEDICAL CENTER | Age: 79
End: 2024-01-25
Payer: COMMERCIAL

## 2024-02-05 DIAGNOSIS — F41.9 ANXIETY: ICD-10-CM

## 2024-02-05 RX ORDER — SERTRALINE HYDROCHLORIDE 100 MG/1
TABLET, FILM COATED ORAL
Qty: 90 TABLET | Refills: 2 | Status: SHIPPED | OUTPATIENT
Start: 2024-02-05

## 2024-02-13 DIAGNOSIS — F51.01 PRIMARY INSOMNIA: ICD-10-CM

## 2024-02-13 RX ORDER — ZOLPIDEM TARTRATE 5 MG/1
5 TABLET ORAL NIGHTLY PRN
Qty: 30 TABLET | Refills: 2 | Status: SHIPPED | OUTPATIENT
Start: 2024-02-16 | End: 2024-03-17

## 2024-02-13 RX ORDER — ZOLPIDEM TARTRATE 5 MG/1
TABLET ORAL
Qty: 30 TABLET | Refills: 0 | OUTPATIENT
Start: 2024-02-13 | End: 2024-03-14

## 2024-02-16 ENCOUNTER — TELEPHONE (OUTPATIENT)
Dept: INTERNAL MEDICINE | Facility: IMAGING CENTER | Age: 79
End: 2024-02-16

## 2024-02-16 ENCOUNTER — HOSPITAL ENCOUNTER (OUTPATIENT)
Dept: RADIOLOGY | Facility: MEDICAL CENTER | Age: 79
End: 2024-02-16
Attending: FAMILY MEDICINE
Payer: MEDICARE

## 2024-02-16 DIAGNOSIS — I82.432 ACUTE DEEP VEIN THROMBOSIS (DVT) OF POPLITEAL VEIN OF LEFT LOWER EXTREMITY (HCC): ICD-10-CM

## 2024-02-16 PROCEDURE — 93971 EXTREMITY STUDY: CPT | Mod: LT

## 2024-02-16 NOTE — TELEPHONE ENCOUNTER
LLE US negative for DVT and patient can discontinue Eliquis use. Will follow and all questions answered as able.

## 2024-02-29 ENCOUNTER — APPOINTMENT (OUTPATIENT)
Dept: SLEEP MEDICINE | Facility: MEDICAL CENTER | Age: 79
End: 2024-02-29
Attending: STUDENT IN AN ORGANIZED HEALTH CARE EDUCATION/TRAINING PROGRAM
Payer: MEDICARE

## 2024-03-13 DIAGNOSIS — M15.9 PRIMARY OSTEOARTHRITIS INVOLVING MULTIPLE JOINTS: ICD-10-CM

## 2024-03-13 RX ORDER — PREDNISONE 5 MG/1
5 TABLET ORAL DAILY
Qty: 30 TABLET | Refills: 0 | Status: SHIPPED | OUTPATIENT
Start: 2024-03-13

## 2024-04-02 ENCOUNTER — OFFICE VISIT (OUTPATIENT)
Dept: INTERNAL MEDICINE | Facility: IMAGING CENTER | Age: 79
End: 2024-04-02
Payer: MEDICARE

## 2024-04-02 VITALS
DIASTOLIC BLOOD PRESSURE: 64 MMHG | TEMPERATURE: 98 F | SYSTOLIC BLOOD PRESSURE: 126 MMHG | HEIGHT: 67 IN | BODY MASS INDEX: 33.91 KG/M2 | WEIGHT: 216.05 LBS | RESPIRATION RATE: 17 BRPM | OXYGEN SATURATION: 95 % | HEART RATE: 67 BPM

## 2024-04-02 DIAGNOSIS — R14.0 ABDOMINAL BLOATING: ICD-10-CM

## 2024-04-02 DIAGNOSIS — N32.81 OAB (OVERACTIVE BLADDER): ICD-10-CM

## 2024-04-02 DIAGNOSIS — R15.2 FECAL URGENCY: ICD-10-CM

## 2024-04-02 PROBLEM — I82.432 ACUTE DEEP VEIN THROMBOSIS (DVT) OF POPLITEAL VEIN OF LEFT LOWER EXTREMITY (HCC): Status: RESOLVED | Noted: 2023-11-21 | Resolved: 2024-04-02

## 2024-04-02 PROCEDURE — 3078F DIAST BP <80 MM HG: CPT | Performed by: FAMILY MEDICINE

## 2024-04-02 PROCEDURE — 99214 OFFICE O/P EST MOD 30 MIN: CPT | Performed by: FAMILY MEDICINE

## 2024-04-02 PROCEDURE — 3074F SYST BP LT 130 MM HG: CPT | Performed by: FAMILY MEDICINE

## 2024-04-02 ASSESSMENT — FIBROSIS 4 INDEX: FIB4 SCORE: 1.49

## 2024-04-02 ASSESSMENT — PATIENT HEALTH QUESTIONNAIRE - PHQ9: CLINICAL INTERPRETATION OF PHQ2 SCORE: 0

## 2024-04-02 NOTE — PROGRESS NOTES
"Chief Complaint   Patient presents with    Bladder Problem     OAB    Bowel Problem     Urgency x 1 year       HPI:  Patient is a 79 y.o. female established patient who presents today to discuss chronic OAB and chronic fecal urgency/abominal bloating issues. She is not currently taking medication for OAB management and wears pad daily at this time. She also suffers from abdominal bloating related to gas and fecal urgency: does not often have much time to find bathroom when out and about. She reports having one soft daytime stool daily and has discussed these conditions with Dr. Vila in the past. She denies known triggers for abdominal bloating and stool urgency and reports needing to drink more water on a daily basis.     Patient Active Problem List    Diagnosis Date Noted    Fecal urgency 04/02/2024    Total knee replacement status, left 11/15/2023    DNR (do not resuscitate) 11/15/2023    Abdominal bloating 09/19/2023    OAB (overactive bladder) 09/19/2023    Osteopenia of neck of left femur 07/31/2023    History of diverticulitis 05/15/2023    Vitamin D deficiency 05/15/2023    Arthritis 01/24/2023    Poor balance 04/26/2021    Thyroid nodule 03/16/2021    Obesity (BMI 30-39.9) 04/06/2018    Heart murmur 09/13/2017    Chronic insomnia 08/09/2017    Anxiety 08/09/2017    HTN (hypertension) 07/01/2014    Hyperlipidemia 07/01/2014    AZUL on CPAP 07/01/2014       Past medical, surgical, family, and social history was reviewed and updated in Epic chart by me today.     Medications and allergies reviewed and updated in Epic chart by me today.     ROS:  Pertinent positives listed above in HPI. All other systems have been reviewed and are negative.    PE:   /64 (BP Location: Left arm, Patient Position: Sitting, BP Cuff Size: Adult)   Pulse 67   Temp 36.7 °C (98 °F) (Temporal)   Resp 17   Ht 1.702 m (5' 7\")   Wt 98 kg (216 lb 0.8 oz)   SpO2 95%   BMI 33.84 kg/m²   Vital signs reviewed with patient. "     Gen: Well developed; well nourished; no acute distress; age appropriate appearance   Pulm: No respiratory distress; clear to ascultation b/l; no wheezing or stridor noted b/l  Skin: Warm and dry; no rashes noted   Neuro: No focal deficits noted   Psych: AAOx4; mood and affect are appropriate    A/P:  1. OAB (overactive bladder)  Chronic condition for patient currently requiring pad use daily. Patient is interested in medication management, and new RX sent to pharmacy for Myrbetriq use. Will follow clinical response.   - mirabegron ER (MYRBETRIQ) 25 MG TABLET SR 24 HR; Take 1 Tablet by mouth every day.  Dispense: 30 Tablet; Refill: 3    2. Abdominal bloating/ Fecal urgency  Chronic condition managed historically by Dr. Vila with normal colonoscopy done in 2023. I reviewed GI visit notes with patient today and she is not seemingly following his recommendations currently. Recommend patient resume daily cholestyramine use, use Imodium PRN prior to going out for the day, and take simethicone prior to each meal.

## 2024-04-08 DIAGNOSIS — M15.9 PRIMARY OSTEOARTHRITIS INVOLVING MULTIPLE JOINTS: ICD-10-CM

## 2024-04-08 RX ORDER — PREDNISONE 5 MG/1
5 TABLET ORAL DAILY
Qty: 30 TABLET | Refills: 2 | Status: SHIPPED | OUTPATIENT
Start: 2024-04-08

## 2024-04-18 DIAGNOSIS — I10 PRIMARY HYPERTENSION: ICD-10-CM

## 2024-04-18 RX ORDER — LISINOPRIL 20 MG/1
40 TABLET ORAL DAILY
Qty: 180 TABLET | Refills: 3 | Status: SHIPPED | OUTPATIENT
Start: 2024-04-18

## 2024-04-23 ENCOUNTER — OFFICE VISIT (OUTPATIENT)
Dept: SLEEP MEDICINE | Facility: MEDICAL CENTER | Age: 79
End: 2024-04-23
Attending: STUDENT IN AN ORGANIZED HEALTH CARE EDUCATION/TRAINING PROGRAM
Payer: MEDICARE

## 2024-04-23 VITALS
BODY MASS INDEX: 35.61 KG/M2 | RESPIRATION RATE: 16 BRPM | DIASTOLIC BLOOD PRESSURE: 68 MMHG | HEIGHT: 67 IN | OXYGEN SATURATION: 91 % | SYSTOLIC BLOOD PRESSURE: 142 MMHG | WEIGHT: 226.9 LBS | HEART RATE: 74 BPM

## 2024-04-23 DIAGNOSIS — G47.33 OSA (OBSTRUCTIVE SLEEP APNEA): ICD-10-CM

## 2024-04-23 PROCEDURE — 99213 OFFICE O/P EST LOW 20 MIN: CPT | Performed by: STUDENT IN AN ORGANIZED HEALTH CARE EDUCATION/TRAINING PROGRAM

## 2024-04-23 PROCEDURE — 3077F SYST BP >= 140 MM HG: CPT | Performed by: STUDENT IN AN ORGANIZED HEALTH CARE EDUCATION/TRAINING PROGRAM

## 2024-04-23 PROCEDURE — 3078F DIAST BP <80 MM HG: CPT | Performed by: STUDENT IN AN ORGANIZED HEALTH CARE EDUCATION/TRAINING PROGRAM

## 2024-04-23 ASSESSMENT — FIBROSIS 4 INDEX: FIB4 SCORE: 1.49

## 2024-04-23 NOTE — PROGRESS NOTES
[facility] Follow-up Visit    CC: Yearly follow-up for management of obstructive sleep apnea      HPI:  Clua Vences is a 79 y.o.female  with hypertension, hyperlipidemia, anxiety, obesity, and obstructive sleep apnea on CPAP.  Presents to sleep clinic for yearly follow-up for management of obstructive sleep apnea.    She continues to use her CPAP machine nightly.  Overall finds her mask and pressures comfortable.  Currently has no acute complaints regarding her machine.  She states the only updates since last visit that she has had some health issues over the past year.  She had a bowel obstruction last year.  She feels bloated since the bowel obstruction at times.  She does not feel is related to the PAP machine.      DME provider: CPAP and more   Device: Airsense 10 (received in Oct 2022)  Mask: fullface   Aerophagia: No   Snoring: No   Dry mouth: Yes  Leak: No   Skin irritation: No   Chin strap: No         Sleep History  Pt was diagnosed with AZUL on 5/7/07 via split night study. Severe obstructive sleep apnea with AHI of 93.3/hr and O2 hollis 74 %. Due to severity of the disease she met the split study protocol. The titration started with CPAP 5 cm and the best tolerated was CPAP 17 cm. The AHI improved to 5.6/hr with improved O2 hollis of 89%. She has been on CPAP 17 cm since then.    Patient Active Problem List    Diagnosis Date Noted    Fecal urgency 04/02/2024    Total knee replacement status, left 11/15/2023    DNR (do not resuscitate) 11/15/2023    Abdominal bloating 09/19/2023    OAB (overactive bladder) 09/19/2023    Osteopenia of neck of left femur 07/31/2023    History of diverticulitis 05/15/2023    Vitamin D deficiency 05/15/2023    Arthritis 01/24/2023    Poor balance 04/26/2021    Thyroid nodule 03/16/2021    Obesity (BMI 30-39.9) 04/06/2018    Heart murmur 09/13/2017    Chronic insomnia 08/09/2017    Anxiety 08/09/2017    HTN (hypertension) 07/01/2014    Hyperlipidemia 07/01/2014     AZUL on CPAP 07/01/2014       Past Medical History:   Diagnosis Date    Anesthesia     low O2 sat    Arthritis     osteo    Back pain     Dyslipidemia     [place] measles     Heart murmur 9/13/2017    Hypertension     Influenza     Mumps     Other specified symptom associated with female genital organs     Painful joint     Psychiatric problem     Sleep apnea     c-pap with 3 l/nc    Snoring     Sore muscles     Tonsillitis     Unspecified cataract         Past Surgical History:   Procedure Laterality Date    MT EXPLORATORY OF ABDOMEN N/A 1/28/2023    Procedure: EX LAP;  Surgeon: Mike Valles M.D.;  Location: SURGERY [facility];  Service: Gastroenterology    KNEE ARTHROPLASTY TOTAL Right 12/27/2017    HYSTERECTOMY ROBOTIC  10/23/2014    Performed by Smith Lyons M.D. at SURGERY [facility] ORS    GASTROSCOPY WITH BIOPSY  7/2/2014    Performed by Sky Vila M.D. at [facility] ORS    OTHER ORTHOPEDIC SURGERY  2006    left total knee    APPENDECTOMY      ARTHROSCOPY, KNEE      HYSTERECTOMY LAPAROSCOPY      OTHER      meghan cataracts    OTHER ABDOMINAL SURGERY      Resection of pelvic mass, uterus and ovaries    MT BREAST REDUCTION Bilateral     1982    MT REMV 2ND CATARACT,CORN-SCLER SECTN      TONSILLECTOMY      TONSILLECTOMY AND ADENOIDECTOMY         Family History   Problem Relation Age of Onset    Breast Cancer Mother     Cancer Mother 65        Breast    Cancer Father         Colon    Alcohol/Drug Father     Colon Cancer Father     Hyperlipidemia Brother     Heart Disease Brother         arrythmia       Social History     Socioeconomic History    Marital status:      Spouse name: Not on file    Number of children: Not on file    Years of education: Not on file    Highest education level: Not on file   Occupational History    Not on file   Tobacco Use    Smoking status: Never    Smokeless tobacco: Never   Vaping Use    Vaping Use: Never used   Substance and Sexual Activity     Alcohol use: Not Currently     Alcohol/week: 0.6 - 1.2 oz     Types: 1 - 2 Glasses of wine per week     Comment: 2 glasses of wine a day     Drug use: No    Sexual activity: Not on file     Comment: , 2 children   Other Topics Concern    Not on file   Social History Narrative    Not on file     Social Determinants of Health     Financial Resource Strain: Not on file   Food Insecurity: Not on file   Transportation Needs: Not on file   Physical Activity: Not on file   Stress: Not on file   Social Connections: Not on file   Intimate Partner Violence: Not on file   Housing Stability: Not on file       Current Outpatient Medications   Medication Sig Dispense Refill    lisinopril (PRINIVIL) 20 MG Tab Take 2 tablets by mouth once daily 180 Tablet 3    predniSONE (DELTASONE) 5 MG Tab Take 1 tablet by mouth once daily 30 Tablet 2    mirabegron ER (MYRBETRIQ) 25 MG TABLET SR 24 HR Take 1 Tablet by mouth every day. 30 Tablet 3    sertraline (ZOLOFT) 100 MG Tab TAKE 1 TABELT BY MOUTH DAILY 90 Tablet 2    cholestyramine (QUESTRAN) 4 g packet Take 1 Packet by mouth as needed. Indications: Diarrhea      Acetaminophen (TYLENOL PO) Take 2 Tablets by mouth 2 times a day as needed (For pain). Pt is not sure the strength (OTC)      sertraline (ZOLOFT) 100 MG Tab Take 100 mg by mouth every 48 hours.      rosuvastatin (CRESTOR) 10 MG Tab TAKE 1 TABLET BY MOUTH ONCE DAILY IN THE EVENING 90 Tablet 3    carvedilol (COREG) 25 MG Tab Take 1 Tablet by mouth 2 times a day with meals. 180 Tablet 3    folic acid (FOLVITE) 400 MCG tablet Take 1 Tablet by mouth every morning. 30 Tablet 0    ferrous sulfate 325 (65 Fe) MG tablet Take 325 mg by mouth every day.      LORazepam (ATIVAN) 0.5 MG Tab Take 0.5 mg by mouth at bedtime.      vitamin D3 (CHOLECALCIFEROL) 1000 Unit (25 mcg) Tab Take 1,000 Units by mouth every day.       No current facility-administered medications for this visit.        ALLERGIES: Tramadol    ROS  Constitutional: Denies  "fevers, Denies weight changes  Ears/Nose/Throat/Mouth: Denies nasal congestion or sore throat   Cardiovascular: Denies chest pain  Respiratory: Denies shortness of breath, Denies cough  Gastrointestinal/Hepatic: Denies nausea, vomiting  Sleep: see HPI      PHYSICAL EXAM  BP (!) 142/68 (BP Location: Left arm, Patient Position: Sitting, BP Cuff Size: Adult)   Pulse 74   Resp 16   Ht 1.702 m (5' 7\")   Wt 103 kg (226 lb 14.4 oz)   SpO2 91%   BMI 35.54 kg/m²   Appearance: Well-nourished, well-developed, no acute distress  Eyes:  No scleral icterus , EOMI  Musculoskeletal:  Grossly normal; gait walks with walker digits and nails normal  Skin:  No rashes, petechiae, cyanosis  Neurologic: without focal signs; oriented to person, time, place, and purpose; judgement intact      Medical Decision Making   Assessment and Plan  Clau Vences is a 79 y.o.female  with hypertension, hyperlipidemia, anxiety, obesity, and obstructive sleep apnea on CPAP.  Presents to sleep clinic for yearly follow-up for management of obstructive sleep apnea.    The medical record was reviewed.    Obstructive sleep apnea  Diagnostic and titration nocturnal polysomnogram's, home sleep apnea tests, continuous nocturnal oximetry results, multiple sleep latency tests, and compliance reports reviewed.  Compliance data reviewed showing 77% usage > 4hours in last 30  days. Average AHI 0.6 events/hour. Pt continues to use and benefit from machine.      Current Settings CPAP 17 cmH2O    PLAN:   -Order placed for mask and supplies   -Advised to reach out via MyChart with questions     Has been advised to continue the current CPAP, clean equipment frequently, and get new mask and supplies as allowed by insurance and DME. Recommend an earlier appointment, if significant treatment barriers develop.    Patients with AZUL are at increased risk of cardiovascular disease including coronary artery disease, systemic arterial hypertension, pulmonary arterial " hypertension, cardiac arrythmias, and stroke. The patient was advised to avoid driving a motor vehicle when drowsy.    Positive airway pressure will favorably impact many of the adverse conditions and effects provoked by AZUL.      Return in about 1 year (around 4/23/2025).      Please note portions of this record was created using voice recognition software. I have made every reasonable attempt to correct obvious errors, but I expect that there are errors of grammar and possibly content I did not discover before finalizing the note.

## 2024-05-13 ENCOUNTER — TELEPHONE (OUTPATIENT)
Dept: INTERNAL MEDICINE | Facility: IMAGING CENTER | Age: 79
End: 2024-05-13
Payer: MEDICARE

## 2024-05-13 DIAGNOSIS — F51.01 PRIMARY INSOMNIA: ICD-10-CM

## 2024-05-13 RX ORDER — ZOLPIDEM TARTRATE 5 MG/1
TABLET ORAL
Qty: 30 TABLET | Refills: 2 | Status: SHIPPED | OUTPATIENT
Start: 2024-05-13 | End: 2024-06-12

## 2024-05-13 NOTE — TELEPHONE ENCOUNTER
Patient  has been experiencing side effects of dizziness & lightheadedness since starting Myrbetriq.  Myrbetriq has not helped her overactive bladder symptoms.  She is going to discontinue the medication and discuss further with Dr Brasher at her upcoming appointment on 5/20/2024.

## 2024-05-15 DIAGNOSIS — D75.89 MACROCYTOSIS: ICD-10-CM

## 2024-05-15 DIAGNOSIS — E78.2 MIXED HYPERLIPIDEMIA: ICD-10-CM

## 2024-05-15 DIAGNOSIS — E83.52 HYPERCALCEMIA: ICD-10-CM

## 2024-05-15 DIAGNOSIS — E55.9 VITAMIN D DEFICIENCY: ICD-10-CM

## 2024-05-15 DIAGNOSIS — R53.83 OTHER FATIGUE: ICD-10-CM

## 2024-05-15 DIAGNOSIS — R73.9 HYPERGLYCEMIA: ICD-10-CM

## 2024-05-15 DIAGNOSIS — I10 PRIMARY HYPERTENSION: ICD-10-CM

## 2024-05-20 ENCOUNTER — HOSPITAL ENCOUNTER (OUTPATIENT)
Facility: MEDICAL CENTER | Age: 79
End: 2024-05-20
Attending: FAMILY MEDICINE
Payer: MEDICARE

## 2024-05-20 ENCOUNTER — NON-PROVIDER VISIT (OUTPATIENT)
Dept: INTERNAL MEDICINE | Facility: IMAGING CENTER | Age: 79
End: 2024-05-20
Payer: MEDICARE

## 2024-05-20 DIAGNOSIS — E55.9 VITAMIN D DEFICIENCY: ICD-10-CM

## 2024-05-20 DIAGNOSIS — R53.83 OTHER FATIGUE: ICD-10-CM

## 2024-05-20 DIAGNOSIS — I10 PRIMARY HYPERTENSION: ICD-10-CM

## 2024-05-20 DIAGNOSIS — R73.9 HYPERGLYCEMIA: ICD-10-CM

## 2024-05-20 DIAGNOSIS — D75.89 MACROCYTOSIS: ICD-10-CM

## 2024-05-20 DIAGNOSIS — E83.52 HYPERCALCEMIA: ICD-10-CM

## 2024-05-20 DIAGNOSIS — E78.2 MIXED HYPERLIPIDEMIA: ICD-10-CM

## 2024-05-20 LAB
25(OH)D3 SERPL-MCNC: 72 NG/ML (ref 30–100)
ALBUMIN SERPL BCP-MCNC: 4 G/DL (ref 3.2–4.9)
ALBUMIN/GLOB SERPL: 1.3 G/DL
ALP SERPL-CCNC: 64 U/L (ref 30–99)
ALT SERPL-CCNC: 25 U/L (ref 2–50)
ANION GAP SERPL CALC-SCNC: 9 MMOL/L (ref 7–16)
AST SERPL-CCNC: 20 U/L (ref 12–45)
BASOPHILS # BLD AUTO: 0.8 % (ref 0–1.8)
BASOPHILS # BLD: 0.05 K/UL (ref 0–0.12)
BILIRUB SERPL-MCNC: 0.4 MG/DL (ref 0.1–1.5)
BUN SERPL-MCNC: 24 MG/DL (ref 8–22)
CA-I SERPL-SCNC: 1.4 MMOL/L (ref 1.1–1.3)
CALCIUM ALBUM COR SERPL-MCNC: 10.4 MG/DL (ref 8.5–10.5)
CALCIUM SERPL-MCNC: 10.4 MG/DL (ref 8.5–10.5)
CHLORIDE SERPL-SCNC: 105 MMOL/L (ref 96–112)
CHOLEST SERPL-MCNC: 166 MG/DL (ref 100–199)
CO2 SERPL-SCNC: 26 MMOL/L (ref 20–33)
CREAT SERPL-MCNC: 0.86 MG/DL (ref 0.5–1.4)
EOSINOPHIL # BLD AUTO: 0.32 K/UL (ref 0–0.51)
EOSINOPHIL NFR BLD: 5 % (ref 0–6.9)
ERYTHROCYTE [DISTWIDTH] IN BLOOD BY AUTOMATED COUNT: 49 FL (ref 35.9–50)
EST. AVERAGE GLUCOSE BLD GHB EST-MCNC: 117 MG/DL
FOLATE SERPL-MCNC: 12.1 NG/ML
GFR SERPLBLD CREATININE-BSD FMLA CKD-EPI: 69 ML/MIN/1.73 M 2
GLOBULIN SER CALC-MCNC: 3 G/DL (ref 1.9–3.5)
GLUCOSE SERPL-MCNC: 97 MG/DL (ref 65–99)
HBA1C MFR BLD: 5.7 % (ref 4–5.6)
HCT VFR BLD AUTO: 45 % (ref 37–47)
HDLC SERPL-MCNC: 67 MG/DL
HGB BLD-MCNC: 14.8 G/DL (ref 12–16)
IMM GRANULOCYTES # BLD AUTO: 0.05 K/UL (ref 0–0.11)
IMM GRANULOCYTES NFR BLD AUTO: 0.8 % (ref 0–0.9)
LDLC SERPL CALC-MCNC: 60 MG/DL
LYMPHOCYTES # BLD AUTO: 1.12 K/UL (ref 1–4.8)
LYMPHOCYTES NFR BLD: 17.3 % (ref 22–41)
MCH RBC QN AUTO: 33.3 PG (ref 27–33)
MCHC RBC AUTO-ENTMCNC: 32.9 G/DL (ref 32.2–35.5)
MCV RBC AUTO: 101.1 FL (ref 81.4–97.8)
MONOCYTES # BLD AUTO: 0.49 K/UL (ref 0–0.85)
MONOCYTES NFR BLD AUTO: 7.6 % (ref 0–13.4)
NEUTROPHILS # BLD AUTO: 4.43 K/UL (ref 1.82–7.42)
NEUTROPHILS NFR BLD: 68.5 % (ref 44–72)
NRBC # BLD AUTO: 0 K/UL
NRBC BLD-RTO: 0 /100 WBC (ref 0–0.2)
PLATELET # BLD AUTO: 228 K/UL (ref 164–446)
PMV BLD AUTO: 11.1 FL (ref 9–12.9)
POTASSIUM SERPL-SCNC: 5.4 MMOL/L (ref 3.6–5.5)
PROT SERPL-MCNC: 7 G/DL (ref 6–8.2)
RBC # BLD AUTO: 4.45 M/UL (ref 4.2–5.4)
SODIUM SERPL-SCNC: 140 MMOL/L (ref 135–145)
T4 FREE SERPL-MCNC: 1.4 NG/DL (ref 0.93–1.7)
TRIGL SERPL-MCNC: 193 MG/DL (ref 0–149)
TSH SERPL DL<=0.005 MIU/L-ACNC: 1 UIU/ML (ref 0.38–5.33)
VIT B12 SERPL-MCNC: 700 PG/ML (ref 211–911)
WBC # BLD AUTO: 6.5 K/UL (ref 4.8–10.8)

## 2024-05-20 PROCEDURE — 99999 PR NO CHARGE: CPT

## 2024-05-22 ENCOUNTER — OFFICE VISIT (OUTPATIENT)
Dept: INTERNAL MEDICINE | Facility: IMAGING CENTER | Age: 79
End: 2024-05-22
Payer: MEDICARE

## 2024-05-22 VITALS
HEART RATE: 65 BPM | TEMPERATURE: 97.9 F | WEIGHT: 225 LBS | HEIGHT: 67 IN | OXYGEN SATURATION: 95 % | DIASTOLIC BLOOD PRESSURE: 62 MMHG | SYSTOLIC BLOOD PRESSURE: 132 MMHG | BODY MASS INDEX: 35.31 KG/M2 | RESPIRATION RATE: 17 BRPM

## 2024-05-22 DIAGNOSIS — F51.04 CHRONIC INSOMNIA: ICD-10-CM

## 2024-05-22 DIAGNOSIS — Z12.31 BREAST CANCER SCREENING BY MAMMOGRAM: ICD-10-CM

## 2024-05-22 DIAGNOSIS — E55.9 VITAMIN D DEFICIENCY: ICD-10-CM

## 2024-05-22 DIAGNOSIS — E78.2 MIXED HYPERLIPIDEMIA: ICD-10-CM

## 2024-05-22 DIAGNOSIS — N32.81 OAB (OVERACTIVE BLADDER): ICD-10-CM

## 2024-05-22 DIAGNOSIS — E66.9 OBESITY (BMI 30-39.9): ICD-10-CM

## 2024-05-22 DIAGNOSIS — Z00.00 ENCOUNTER FOR MEDICARE ANNUAL WELLNESS EXAM: ICD-10-CM

## 2024-05-22 DIAGNOSIS — G47.33 OSA ON CPAP: ICD-10-CM

## 2024-05-22 DIAGNOSIS — F41.9 ANXIETY: ICD-10-CM

## 2024-05-22 DIAGNOSIS — Z72.3 PHYSICALLY INACTIVE: ICD-10-CM

## 2024-05-22 DIAGNOSIS — I10 PRIMARY HYPERTENSION: ICD-10-CM

## 2024-05-22 DIAGNOSIS — M85.852 OSTEOPENIA OF NECK OF LEFT FEMUR: ICD-10-CM

## 2024-05-22 DIAGNOSIS — R14.0 ABDOMINAL BLOATING: ICD-10-CM

## 2024-05-22 DIAGNOSIS — M19.90 ARTHRITIS: ICD-10-CM

## 2024-05-22 DIAGNOSIS — K43.2 INCISIONAL HERNIA, WITHOUT OBSTRUCTION OR GANGRENE: ICD-10-CM

## 2024-05-22 DIAGNOSIS — R26.89 POOR BALANCE: ICD-10-CM

## 2024-05-22 PROBLEM — R15.2 FECAL URGENCY: Status: RESOLVED | Noted: 2024-04-02 | Resolved: 2024-05-22

## 2024-05-22 PROCEDURE — G0439 PPPS, SUBSEQ VISIT: HCPCS | Performed by: FAMILY MEDICINE

## 2024-05-22 PROCEDURE — 3078F DIAST BP <80 MM HG: CPT | Performed by: FAMILY MEDICINE

## 2024-05-22 PROCEDURE — 3075F SYST BP GE 130 - 139MM HG: CPT | Performed by: FAMILY MEDICINE

## 2024-05-22 ASSESSMENT — PATIENT HEALTH QUESTIONNAIRE - PHQ9: CLINICAL INTERPRETATION OF PHQ2 SCORE: 0

## 2024-05-22 ASSESSMENT — ACTIVITIES OF DAILY LIVING (ADL): BATHING_REQUIRES_ASSISTANCE: 0

## 2024-05-22 ASSESSMENT — ENCOUNTER SYMPTOMS: GENERAL WELL-BEING: GOOD

## 2024-05-22 ASSESSMENT — FIBROSIS 4 INDEX: FIB4 SCORE: 1.385964912280701754

## 2024-05-22 NOTE — PROGRESS NOTES
CC:   Medicare Annual Wellness Visit    HPI:  Clau is a 79 y.o. female here for her Medicare Annual Wellness Visit and to review labs done 5/20/24. She reports being very sedentary and has trouble getting going in the morning due to fear of falling. She has chronic essential HTN and chronic mixed dyslipidemia controlled with daily medication use. She does not tolerate tight blood pressure control well historically. She sees Dr. Olson for ongoing dermatology care and is UTD with eye exam with Dr. Morel. She has chronic AZUL with CPAP use managed by [facility] and has chronic MARIE controlled with lifestyle changes and daily Zoloft use. She also suffers from chronic primary insomnia and uses Ambien PRN for symptom management. She continues to benefit from ongoing Ambien use, denies medication related side effects, and continues to use this controlled medication in a safe manner. She suffers from diffuse OA managed with low dose prednisone use (has failed other treatment alternatives), and she continues to experience OAB with urinary urgency. She has not tolerated OAB medications to date and is aware of Crystal Chastity and other non medicinal treatments available in the community. She also experiences chronic abdominal bloating and gas managed by Dr. Vila. She endorses 100% medication compliance, denies new mental health concerns, and is in good spirits overall today.     Patient Active Problem List    Diagnosis Date Noted    Incisional hernia, without obstruction or gangrene 05/22/2024    Physically inactive 05/22/2024    Total knee replacement status, left 11/15/2023    DNR (do not resuscitate) 11/15/2023    Abdominal bloating 09/19/2023    OAB (overactive bladder) 09/19/2023    Osteopenia of neck of left femur 07/31/2023    History of diverticulitis 05/15/2023    Vitamin D deficiency 05/15/2023    Arthritis 01/24/2023    Poor balance 04/26/2021    Thyroid nodule 03/16/2021    Obesity (BMI 30-39.9) 04/06/2018     Heart murmur 09/13/2017    Chronic insomnia 08/09/2017    Anxiety 08/09/2017    HTN (hypertension) 07/01/2014    Hyperlipidemia 07/01/2014    AZUL on CPAP 07/01/2014     Current Outpatient Medications   Medication Sig Dispense Refill    zolpidem (AMBIEN) 5 MG Tab TAKE 1 TABLET BY MOUTH ONCE DAILY AT BEDTIME AS NEEDED FOR SLEEP FOR UP TO 30 DAYS 30 Tablet 2    lisinopril (PRINIVIL) 20 MG Tab Take 2 tablets by mouth once daily 180 Tablet 3    predniSONE (DELTASONE) 5 MG Tab Take 1 tablet by mouth once daily 30 Tablet 2    sertraline (ZOLOFT) 100 MG Tab TAKE 1 TABELT BY MOUTH DAILY 90 Tablet 2    cholestyramine (QUESTRAN) 4 g packet Take 1 Packet by mouth as needed. Indications: Diarrhea      Acetaminophen (TYLENOL PO) Take 2 Tablets by mouth 2 times a day as needed (For pain). Pt is not sure the strength (OTC)      rosuvastatin (CRESTOR) 10 MG Tab TAKE 1 TABLET BY MOUTH ONCE DAILY IN THE EVENING 90 Tablet 3    carvedilol (COREG) 25 MG Tab Take 1 Tablet by mouth 2 times a day with meals. 180 Tablet 3    folic acid (FOLVITE) 400 MCG tablet Take 1 Tablet by mouth every morning. 30 Tablet 0    ferrous sulfate 325 (65 Fe) MG tablet Take 325 mg by mouth every day.      LORazepam (ATIVAN) 0.5 MG Tab Take 0.5 mg by mouth at bedtime.      vitamin D3 (CHOLECALCIFEROL) 1000 Unit (25 mcg) Tab Take 1,000 Units by mouth every day.      mirabegron ER (MYRBETRIQ) 25 MG TABLET SR 24 HR Take 1 Tablet by mouth every day. (Patient not taking: Reported on 5/22/2024) 30 Tablet 3     No current facility-administered medications for this visit.      Current supplements: see MAR   Chronic narcotic pain medicines: no  Allergies: Tramadol and Myrbetriq [mirabegron]  Exercise: no  Current social contact/activities: yes  Current mood: good  Advance Directive on file: no    Screening:  Depression Screening  Little interest or pleasure in doing things?  0 - not at all  Feeling down, depressed , or hopeless? 0 - not at all  Patient Health  Questionnaire Score: 0     If depressive symptoms identified deferred to follow up visit unless specifically addressed in assessment and plan.    Interpretation of PHQ-9 Total Score   Score Severity   1-4 No Depression   5-9 Mild Depression   10-14 Moderate Depression   15-19 Moderately Severe Depression   20-27 Severe Depression    Screening for Cognitive Impairment  Do you or any of your friends or family members have any concern about your memory? No  Three Minute Recall (Leader, Season, Table) 3/3    Paramjit clock face with all 12 numbers and set the hands to show 10 minutes after 11.  Yes    Cognitive concerns identified deferred for follow up unless specifically addressed in assessment and plan.    Fall Risk Assessment  Has the patient had two or more falls in the last year or any fall with injury in the last year?  No    Safety Assessment  Do you always wear your seatbelt?  Yes  Any changes to home needed to function safely? No  Difficulty hearing.  No  Patient counseled about all safety risks that were identified.    Functional Assessment ADLs  Are there any barriers preventing you from cooking for yourself or meeting nutritional needs?  No.    Are there any barriers preventing you from driving safely or obtaining transportation?  No.    Are there any barriers preventing you from using a telephone or calling for help?  No    Are there any barriers preventing you from shopping?  No.    Are there any barriers preventing you from taking care of your own finances?  No    Are there any barriers preventing you from managing your medications?  No    Are there any barriers preventing you from showering, bathing or dressing yourself? No    Are there any barriers preventing you from doing housework or laundry? No  Are there any barriers preventing you from using the toilet?No  Are you currently engaging in any exercise or physical activity?  Yes.      Self-Assessment of Health  What is your perception of your health?  Good  Do you sleep more than six hours a night? Yes  In the past 7 days, how much did pain keep you from doing your normal work? None  Do you spend quality time with family or friends (virtually or in person)? Yes  Do you usually eat a heart healthy diet that constists of a variety of fruits, vegetables, whole grains and fiber? Yes  Do you eat foods high in fat and/or Fast Food more than three times per week? No    Advance Care Planning  Do you have an Advance Directive, Living Will, Durable Power of , or POLST?                   Health Maintenance Summary            Ordered - Mammogram (Yearly) Ordered on 5/22/2024 07/25/2023  MA-SCREENING MAMMO BILAT W/TOMOSYNTHESIS W/CAD    07/18/2022  MA-SCREENING MAMMO BILAT W/TOMOSYNTHESIS W/CAD    07/16/2021  MA-SCREENING MAMMO BILAT W/TOMOSYNTHESIS W/CAD    07/08/2020  MA-SCREENING MAMMO BILAT W/TOMOSYNTHESIS W/CAD    06/25/2019  MA-SCREENING MAMMO BILAT W/TOMOSYNTHESIS W/CAD    Only the first 5 history entries have been loaded, but more history exists.              Annual Wellness Visit (Yearly) Next due on 5/22/2025 05/22/2024  Visit Dx: Encounter for Medicare annual wellness exam    05/22/2024  Subsequent Annual Wellness Visit - Includes PPPS ()    05/15/2023  Visit Dx: Encounter for Medicare annual wellness exam    05/15/2023  Subsequent Annual Wellness Visit - Includes PPPS ()    04/07/2022  Subsequent Annual Wellness Visit - Includes PPPS ()    Only the first 5 history entries have been loaded, but more history exists.              Bone Density Scan (Every 2 Years) Next due on 7/25/2025 07/25/2023  DS-BONE DENSITY STUDY (DEXA)    05/07/2018  DS-BONE DENSITY STUDY (DEXA)    04/27/2016  DS-BONE DENSITY STUDY (DEXA)    03/13/2013  DS-BONE DENSITY STUDY (DEXA)    02/24/2010  DS-BONE DENSITY STUDY (DEXA)    Only the first 5 history entries have been loaded, but more history exists.              IMM DTaP/Tdap/Td Vaccine (2 - Td or Tdap)  Next due on 10/11/2026      10/11/2016  Imm Admin: Tdap Vaccine              Pneumococcal Vaccine: 65+ Years (Series Information) Completed      02/01/2016  Imm Admin: Pneumococcal polysaccharide vaccine (PPSV-23)    12/22/2014  Imm Admin: Pneumococcal Conjugate Vaccine (Prevnar/PCV-13)              Zoster (Shingles) Vaccines (Series Information) Completed      01/29/2020  Imm Admin: Zoster Vaccine Recombinant (RZV) (SHINGRIX)    11/21/2019  Imm Admin: Zoster Vaccine Recombinant (RZV) (SHINGRIX)    08/08/2015  Imm Admin: Zoster Vaccine Live (ZVL) (Zostavax) - HISTORICAL DATA              Hepatitis C Screening  Completed      12/09/2020  Hepatitis C Antibody component of HEP C VIRUS ANTIBODY    12/09/2020  Done    10/23/2014  Hepatitis C Antibody component of NEEDLESTICK SOURCE              Influenza Vaccine (Series Information) Completed      09/19/2023  Imm Admin: Influenza Vaccine Adult HD    09/27/2022  Imm Admin: Influenza Vaccine Adult HD    09/01/2022  Imm Admin: Influenza Seasonal Injectable - Historical Data    09/23/2021  Imm Admin: Influenza Vaccine Adult HD    10/01/2020  Imm Admin: Influenza Vaccine Adult HD    Only the first 5 history entries have been loaded, but more history exists.              COVID-19 Vaccine (Series Information) Completed      10/19/2023  Imm Admin: Covid-19 Mrna (Spikevax) Moderna 12+ Years    10/27/2022  Imm Admin: PFIZER BIVALENT SARS-COV-2 VACCINE (12+)    04/09/2022  Imm Admin: COVID-19 Vaccine, unspecified - HISTORICAL DATA    09/30/2021  Imm Admin: PFIZER PURPLE CAP SARS-COV-2 VACCINATION (12+)    02/12/2021  Imm Admin: PFIZER PURPLE CAP SARS-COV-2 VACCINATION (12+)    Only the first 5 history entries have been loaded, but more history exists.              Hepatitis A Vaccine (Hep A) (Series Information) Aged Out      No completion history exists for this topic.              HPV Vaccines (Series Information) Aged Out      No completion history exists for this topic.               Polio Vaccine (Inactivated Polio) (Series Information) Aged Out      No completion history exists for this topic.              Meningococcal Immunization (Series Information) Aged Out      No completion history exists for this topic.              Discontinued - Colorectal Cancer Screening  Discontinued        Frequency changed to Never automatically (Topic No Longer Applies)    10/17/2023  AMB EXTERNAL COLONOSCOPY RESULTS    07/01/2014  OCCULT BLOOD X3 (STOOL)    02/04/2013  REFERRAL TO GI FOR COLONOSCOPY              Discontinued - Hepatitis B Vaccine (Hep B)  Discontinued      No completion history exists for this topic.                    Patient Care Team:  Marge Brasher M.D. as PCP - General (Family Medicine)  Savannah Dolan R.N. as Registered Nurse  Charu Godfrey, PT, DPT as Physical Therapist (Physical Therapy)  [employer] (DME Supplier)      Social History     Tobacco Use    Smoking status: Never    Smokeless tobacco: Never   Vaping Use    Vaping status: Never Used   Substance Use Topics    Alcohol use: Not Currently     Alcohol/week: 0.6 - 1.2 oz     Types: 1 - 2 Glasses of wine per week     Comment: 2 glasses of wine a day     Drug use: No     Family History   Problem Relation Age of Onset    Breast Cancer Mother     Cancer Mother 65        Breast    Cancer Father         Colon    Alcohol/Drug Father     Colon Cancer Father     Hyperlipidemia Brother     Heart Disease Brother         arrythmia     She  has a past medical history of Anesthesia, Arthritis, Back pain, Dyslipidemia, Croatian measles, Heart murmur (9/13/2017), Hypertension, Influenza, Mumps, Other specified symptom associated with female genital organs, Painful joint, Psychiatric problem, Sleep apnea, Snoring, Sore muscles, Tonsillitis, and Unspecified cataract.    She has no past medical history of Breast cancer (HCC) or CAD (coronary artery disease).   Past Surgical History:   Procedure Laterality Date    NC EXPLORATORY OF  "ABDOMEN N/A 1/28/2023    Procedure: EX LAP;  Surgeon: Mike Valles M.D.;  Location: SURGERY [facility];  Service: Gastroenterology    KNEE ARTHROPLASTY TOTAL Right 12/27/2017    HYSTERECTOMY ROBOTIC  10/23/2014    Performed by Smith Lyons M.D. at SURGERY [facility] ORS    GASTROSCOPY WITH BIOPSY  7/2/2014    Performed by Sky Vila M.D. at SURGERY [facility] ORS    OTHER ORTHOPEDIC SURGERY  2006    left total knee    APPENDECTOMY      ARTHROSCOPY, KNEE      HYSTERECTOMY LAPAROSCOPY      OTHER      meghan cataracts    OTHER ABDOMINAL SURGERY      Resection of pelvic mass, uterus and ovaries    AL BREAST REDUCTION Bilateral     1982    AL REMV 2ND CATARACT,CORN-SCLER SECTN      TONSILLECTOMY      TONSILLECTOMY AND ADENOIDECTOMY         ROS:    All positives noted in HPI. All others reviewed and are negative.    Ostomy or other tubes or amputations: no  Chronic oxygen use: no  Last eye exam: UTD per report  : suffers from OAB and urinary urgency; does interfere with ADLs/ sleep  Gait: Uses a cane  Problems with balance/ difficulty walking: yes  Hearing: good with bilateral hearing aid use  Dentition: adequate     Lab results 5/20/24 reviewed with patient at visit today.     Exam:   /62 (BP Location: Left arm, Patient Position: Sitting, BP Cuff Size: Adult)   Pulse 65   Temp 36.6 °C (97.9 °F) (Temporal)   Resp 17   Ht 1.702 m (5' 7\")   Wt 102 kg (225 lb)   SpO2 95%  Body mass index is 35.24 kg/m².    Gen: Well developed; well nourished; no acute distress; age appropriate appearance   HEENT: Normocephalic; atraumatic; PEERLA b/l; sclera clear b/l; b/l external auditory canals WNL after hearing aids removed; b/l TM WNL; nares patent; oropharynx clear; oral mucosa moist; tongue midline; dentition adequate   Neck: No adenopathy; no thyromegaly  CV: Regular rate and rhythm; S1/ S2 present; no murmur, gallop or rub noted  Pulm: No respiratory distress; clear to ascultation b/l; no " wheezing or stridor noted b/l  Abd: Adequate bowel sounds noted; soft and nontender; no rebound, rigidity, nor distention; well healed midline vertical surgical scar; very small reducible incisional hernia near umbilicus  Extremities: No new peripheral edema b/l LE extremities/ no clubbing nor cyanosis noted  Skin: Warm and dry; no rashes noted   Neuro: No new focal deficits noted; pt is able to get up out of chair unassisted and walk forward using her cane  Psych: AAOx4; mood and affect are at her baseline     Assessment and Plan:  1. Primary hypertension  Stable/ recommend patient continue Lisinopril 40 mg daily and Coreg 25 mg BID use.  - Subsequent Annual Wellness Visit - Includes PPPS ()    2. Mixed hyperlipidemia  Stable/ recommend patient continue Crestor 10 mg daily use.   - Subsequent Annual Wellness Visit - Includes PPPS ()    3. Breast cancer screening by mammogram  Patient will be due for annual screening mammogram 7/25/24, and she will make imaging appointment accordingly.   - MA-SCREENING MAMMO BILAT W/TOMOSYNTHESIS W/CAD; Future  - Subsequent Annual Wellness Visit - Includes PPPS ()    4. Obesity (BMI 30-39.9)  - Patient identified as having weight management issue.  Appropriate orders and counseling given.  - Subsequent Annual Wellness Visit - Includes PPPS ()    5. Arthritis  Chronic condition managed with low dose prednisone use (failed other treatment modalities). Patient educated about need to increase general mobility to avoid stiffness and to pay close attention to foods that can increase blood sugar.   - Subsequent Annual Wellness Visit - Includes PPPS ()    6. Chronic insomnia  Stable/ condition managed well with PRN Ambien use.   - Subsequent Annual Wellness Visit - Includes PPPS ()    7. Anxiety  Stable/ well managed with Zoloft use and lifestyle habits  - Subsequent Annual Wellness Visit - Includes PPPS ()    8. Abdominal bloating  Chronic condition  managed by Dr. Vila.   - Subsequent Annual Wellness Visit - Includes PPPS ()    9. Incisional hernia, without obstruction or gangrene  Very small reducible incisional hernia noted on abdominal exam today. Patient remains asymptomatic and will follow. Patient educated about signs and symptoms to look for moving forward.   - Subsequent Annual Wellness Visit - Includes PPPS ()    10. OAB (overactive bladder)  Chronic condition - refer to [facility] for details.   - Subsequent Annual Wellness Visit - Includes PPPS ()    11. AZUL on CPAP  Stable/ patient endorses CPAP compliance and condition managed by [facility].   - Subsequent Annual Wellness Visit - Includes PPPS ()    12. Osteopenia of neck of left femur  Stable/ recommend patient continue adequate calcium and vitamin D intake, increase weight bearing activities daily, and continue fall precautions.   - Subsequent Annual Wellness Visit - Includes PPPS ()    13. Vitamin D deficiency  Stable/ recommend patient continue current Vitamin D supplementation   - Subsequent Annual Wellness Visit - Includes PPPS ()    14. Poor balance  Chronic condition. Patient uses cane for mobility and has attended PT sessions in the past for balance training. She felt that they were too strenuous for her, and she declines my offer for new PT referral.   - Subsequent Annual Wellness Visit - Includes PPPS ()    15. Physically inactive  Patient endorses that she has not been physically active, and we discussed strategies to help her move more each day. Will follow.   - Subsequent Annual Wellness Visit - Includes PPPS ()    16. Encounter for Medicare annual wellness exam  Patient remains well versed about medical conditions that need additional attention moving forward.   - Subsequent Annual Wellness Visit - Includes PPPS ()       Services needed: no new services needed at this time  Health Care Screening: recommendations as per orders if  indicated.  Referrals offered: none  Counseling provided today:  Prevent falls and reduce trip hazards; Secure or remove rugs if present   Maintain working fire alarm and carbon monoxide detectors   Engage in regular physical activity daily and social activities weekly as tolerated   F/U with me PRN as new needs arise

## 2024-07-02 ENCOUNTER — TELEPHONE (OUTPATIENT)
Dept: INTERNAL MEDICINE | Facility: IMAGING CENTER | Age: 79
End: 2024-07-02
Payer: MEDICARE

## 2024-07-13 DIAGNOSIS — M15.9 PRIMARY OSTEOARTHRITIS INVOLVING MULTIPLE JOINTS: ICD-10-CM

## 2024-07-15 RX ORDER — PREDNISONE 5 MG/1
5 TABLET ORAL DAILY
Qty: 30 TABLET | Refills: 0 | Status: SHIPPED | OUTPATIENT
Start: 2024-07-15

## 2024-07-26 ENCOUNTER — HOSPITAL ENCOUNTER (OUTPATIENT)
Dept: RADIOLOGY | Facility: MEDICAL CENTER | Age: 79
End: 2024-07-26
Attending: FAMILY MEDICINE
Payer: MEDICARE

## 2024-07-26 DIAGNOSIS — Z12.31 BREAST CANCER SCREENING BY MAMMOGRAM: ICD-10-CM

## 2024-07-26 PROCEDURE — 77063 BREAST TOMOSYNTHESIS BI: CPT

## 2024-08-02 DIAGNOSIS — R19.5 LOOSE STOOLS: ICD-10-CM

## 2024-08-02 RX ORDER — CHOLESTYRAMINE 4 G/9G
1 POWDER, FOR SUSPENSION ORAL 2 TIMES DAILY
Qty: 60 EACH | Refills: 1 | Status: SHIPPED | OUTPATIENT
Start: 2024-08-02

## 2024-08-07 DIAGNOSIS — M15.9 PRIMARY OSTEOARTHRITIS INVOLVING MULTIPLE JOINTS: ICD-10-CM

## 2024-08-07 DIAGNOSIS — F51.01 PRIMARY INSOMNIA: ICD-10-CM

## 2024-08-07 RX ORDER — ZOLPIDEM TARTRATE 5 MG/1
TABLET ORAL
Qty: 30 TABLET | Refills: 0 | Status: SHIPPED | OUTPATIENT
Start: 2024-08-07 | End: 2024-09-06

## 2024-08-07 RX ORDER — PREDNISONE 5 MG/1
5 TABLET ORAL DAILY
Qty: 30 TABLET | Refills: 2 | Status: SHIPPED | OUTPATIENT
Start: 2024-08-07

## 2024-08-19 ENCOUNTER — OFFICE VISIT (OUTPATIENT)
Dept: INTERNAL MEDICINE | Facility: IMAGING CENTER | Age: 79
End: 2024-08-19
Payer: MEDICARE

## 2024-08-19 VITALS
BODY MASS INDEX: 36.57 KG/M2 | TEMPERATURE: 98.3 F | HEART RATE: 61 BPM | SYSTOLIC BLOOD PRESSURE: 138 MMHG | HEIGHT: 67 IN | OXYGEN SATURATION: 92 % | WEIGHT: 233 LBS | RESPIRATION RATE: 22 BRPM | DIASTOLIC BLOOD PRESSURE: 70 MMHG

## 2024-08-19 DIAGNOSIS — I10 PRIMARY HYPERTENSION: ICD-10-CM

## 2024-08-19 DIAGNOSIS — E61.1 IRON DEFICIENCY: ICD-10-CM

## 2024-08-19 DIAGNOSIS — R73.9 HYPERGLYCEMIA: ICD-10-CM

## 2024-08-19 DIAGNOSIS — R06.02 SHORTNESS OF BREATH: ICD-10-CM

## 2024-08-19 DIAGNOSIS — R22.30 SHOULDER MASS: ICD-10-CM

## 2024-08-19 DIAGNOSIS — R53.83 OTHER FATIGUE: ICD-10-CM

## 2024-08-19 DIAGNOSIS — R60.0 PERIPHERAL EDEMA: ICD-10-CM

## 2024-08-19 PROCEDURE — 99214 OFFICE O/P EST MOD 30 MIN: CPT | Performed by: FAMILY MEDICINE

## 2024-08-19 PROCEDURE — 3075F SYST BP GE 130 - 139MM HG: CPT | Performed by: FAMILY MEDICINE

## 2024-08-19 PROCEDURE — 3078F DIAST BP <80 MM HG: CPT | Performed by: FAMILY MEDICINE

## 2024-08-19 RX ORDER — POTASSIUM CHLORIDE 750 MG/1
10 TABLET, EXTENDED RELEASE ORAL EVERY MORNING
Qty: 7 TABLET | Refills: 0 | Status: SHIPPED | OUTPATIENT
Start: 2024-08-19 | End: 2024-08-26

## 2024-08-19 RX ORDER — MINOXIDIL 2.5 MG/1
TABLET ORAL
COMMUNITY
Start: 2024-07-25

## 2024-08-19 RX ORDER — FUROSEMIDE 20 MG
20 TABLET ORAL EVERY MORNING
Qty: 7 TABLET | Refills: 0 | Status: SHIPPED | OUTPATIENT
Start: 2024-08-19 | End: 2024-08-26

## 2024-08-19 ASSESSMENT — FIBROSIS 4 INDEX: FIB4 SCORE: 1.385964912280701754

## 2024-08-20 NOTE — PROGRESS NOTES
"Chief Complaint   Patient presents with    Edema    Lump     Right shoulder       HPI:  Patient is a 79 y.o. female established patient who presents today for evaluation of new health concerns.     Patient Active Problem List    Diagnosis Date Noted    Shoulder mass 08/19/2024    Incisional hernia, without obstruction or gangrene 05/22/2024    Physically inactive 05/22/2024    Total knee replacement status, left 11/15/2023    DNR (do not resuscitate) 11/15/2023    Abdominal bloating 09/19/2023    OAB (overactive bladder) 09/19/2023    Osteopenia of neck of left femur 07/31/2023    History of diverticulitis 05/15/2023    Vitamin D deficiency 05/15/2023    Arthritis 01/24/2023    Poor balance 04/26/2021    Thyroid nodule 03/16/2021    Obesity (BMI 30-39.9) 04/06/2018    Heart murmur 09/13/2017    Chronic insomnia 08/09/2017    Anxiety 08/09/2017    HTN (hypertension) 07/01/2014    Hyperlipidemia 07/01/2014    AZUL on CPAP 07/01/2014       Past medical, surgical, family, and social history was reviewed and updated in Epic chart by me today.     Medications and allergies reviewed and updated in Epic chart by me today.     ROS:  Pertinent positives listed above in HPI. All other systems have been reviewed and are negative.    PE:   /70   Pulse 61   Temp 36.8 °C (98.3 °F)   Resp (!) 22   Ht 1.702 m (5' 7.01\")   Wt 106 kg (233 lb)   SpO2 92%   BMI 36.48 kg/m²   Vital signs reviewed with patient.     Gen: Well developed; well nourished; no acute distress; age appropriate/ non toxic appearance   CV: Regular rate and rhythm; S1/ S2 present; no murmur, gallop or rub noted  Pulm: No respiratory distress; clear to ascultation b/l; no wheezing or stridor noted b/l; short of breath with exertion  Extremities:+ pitting peripheral edema b/l LE extremities (L>R)/ no clubbing nor cyanosis noted  Skin: Warm and dry; no rashes noted   RUE: prominent mass under skin (overlying right shoulder joint) that is firm and non tender " with direct palpation/ patient has full ROM of RUE and no overlying skin changes present   Neuro: No new focal deficits noted; ambulating with cane  Psych: AAOx4; mood and affect are at her baseline     A/P:  1. Shoulder mass  Patient has very prominent mass under skin (overlying right should joint) that is firm but non tender. Recommend patient proceed with US for further characterization, and patient provided with number to call to schedule imaging appointment.   - US-EXTREMITY NON VASCULAR UNILATERAL RIGHT; Future    2. Shortness of breath  Patient is visibly short of breath with exertion at visit today, and I am concerned about underlying etiology. Recommend patient obtain CXR and echo for further work up, and patient provided with number to call to schedule imaging appointments.   - DX-CHEST-2 VIEWS; Future  - EC-ECHOCARDIOGRAM COMPLETE W/O CONT; Future    3. Peripheral edema  Patient has been experiencing bilateral lower extremity edema (L>R) for approximately two weeks and has had considerable weight gain since May 22, 2024 visit (225 lb -> 234 lb) without change in activities/diet. I am concerned about heart failure (refer to #2) and recommend patient obtain labs as well as start daily Lasix/KCl for seven days for diuresis trial. New RX sent to pharmacy, and recommend patient follow up with me in one week for follow up exam.   - furosemide (LASIX) 20 MG Tab; Take 1 Tablet by mouth every morning for 7 days.  Dispense: 7 Tablet; Refill: 0  - potassium chloride ER (KLOR-CON) 10 MEQ tablet; Take 1 Tablet by mouth every morning for 7 days.  Dispense: 7 Tablet; Refill: 0  - proBrain Natriuretic Peptide, NT; Future    4. Primary hypertension  Stable/ recommend patient obtain fasting labs this week for further work up of presenting problems.   - CBC WITH DIFFERENTIAL; Future  - Comp Metabolic Panel; Future    5. Other fatigue  - TSH; Future  - FREE THYROXINE; Future    6. Hyperglycemia  - HEMOGLOBIN A1C;  Future    7. Iron deficiency  - IRON/TOTAL IRON BIND; Future

## 2024-08-21 ENCOUNTER — ANCILLARY PROCEDURE (OUTPATIENT)
Dept: CARDIOLOGY | Facility: MEDICAL CENTER | Age: 79
End: 2024-08-21
Attending: FAMILY MEDICINE
Payer: MEDICARE

## 2024-08-21 ENCOUNTER — APPOINTMENT (OUTPATIENT)
Dept: RADIOLOGY | Facility: MEDICAL CENTER | Age: 79
End: 2024-08-21
Attending: FAMILY MEDICINE
Payer: MEDICARE

## 2024-08-21 ENCOUNTER — HOSPITAL ENCOUNTER (OUTPATIENT)
Facility: MEDICAL CENTER | Age: 79
End: 2024-08-21
Attending: FAMILY MEDICINE
Payer: MEDICARE

## 2024-08-21 ENCOUNTER — NON-PROVIDER VISIT (OUTPATIENT)
Dept: INTERNAL MEDICINE | Facility: IMAGING CENTER | Age: 79
End: 2024-08-21
Payer: MEDICARE

## 2024-08-21 DIAGNOSIS — R53.83 OTHER FATIGUE: ICD-10-CM

## 2024-08-21 DIAGNOSIS — E61.1 IRON DEFICIENCY: ICD-10-CM

## 2024-08-21 DIAGNOSIS — R60.0 PERIPHERAL EDEMA: ICD-10-CM

## 2024-08-21 DIAGNOSIS — I10 PRIMARY HYPERTENSION: ICD-10-CM

## 2024-08-21 DIAGNOSIS — R73.9 HYPERGLYCEMIA: ICD-10-CM

## 2024-08-21 DIAGNOSIS — R06.02 SHORTNESS OF BREATH: ICD-10-CM

## 2024-08-21 LAB
ALBUMIN SERPL BCP-MCNC: 4.1 G/DL (ref 3.2–4.9)
ALBUMIN/GLOB SERPL: 1.6 G/DL
ALP SERPL-CCNC: 66 U/L (ref 30–99)
ALT SERPL-CCNC: 14 U/L (ref 2–50)
ANION GAP SERPL CALC-SCNC: 12 MMOL/L (ref 7–16)
AST SERPL-CCNC: 11 U/L (ref 12–45)
BASOPHILS # BLD AUTO: 0.7 % (ref 0–1.8)
BASOPHILS # BLD: 0.04 K/UL (ref 0–0.12)
BILIRUB SERPL-MCNC: 0.4 MG/DL (ref 0.1–1.5)
BUN SERPL-MCNC: 30 MG/DL (ref 8–22)
CALCIUM ALBUM COR SERPL-MCNC: 10.5 MG/DL (ref 8.5–10.5)
CALCIUM SERPL-MCNC: 10.6 MG/DL (ref 8.5–10.5)
CHLORIDE SERPL-SCNC: 104 MMOL/L (ref 96–112)
CO2 SERPL-SCNC: 25 MMOL/L (ref 20–33)
CREAT SERPL-MCNC: 1.13 MG/DL (ref 0.5–1.4)
EOSINOPHIL # BLD AUTO: 0.25 K/UL (ref 0–0.51)
EOSINOPHIL NFR BLD: 4.3 % (ref 0–6.9)
ERYTHROCYTE [DISTWIDTH] IN BLOOD BY AUTOMATED COUNT: 51.4 FL (ref 35.9–50)
EST. AVERAGE GLUCOSE BLD GHB EST-MCNC: 111 MG/DL
GFR SERPLBLD CREATININE-BSD FMLA CKD-EPI: 49 ML/MIN/1.73 M 2
GLOBULIN SER CALC-MCNC: 2.6 G/DL (ref 1.9–3.5)
GLUCOSE SERPL-MCNC: 90 MG/DL (ref 65–99)
HBA1C MFR BLD: 5.5 % (ref 4–5.6)
HCT VFR BLD AUTO: 44.6 % (ref 37–47)
HGB BLD-MCNC: 14.3 G/DL (ref 12–16)
IMM GRANULOCYTES # BLD AUTO: 0.02 K/UL (ref 0–0.11)
IMM GRANULOCYTES NFR BLD AUTO: 0.3 % (ref 0–0.9)
IRON SATN MFR SERPL: 16 % (ref 15–55)
IRON SERPL-MCNC: 44 UG/DL (ref 40–170)
LYMPHOCYTES # BLD AUTO: 1.08 K/UL (ref 1–4.8)
LYMPHOCYTES NFR BLD: 18.5 % (ref 22–41)
MCH RBC QN AUTO: 34.3 PG (ref 27–33)
MCHC RBC AUTO-ENTMCNC: 32.1 G/DL (ref 32.2–35.5)
MCV RBC AUTO: 107 FL (ref 81.4–97.8)
MONOCYTES # BLD AUTO: 0.54 K/UL (ref 0–0.85)
MONOCYTES NFR BLD AUTO: 9.3 % (ref 0–13.4)
NEUTROPHILS # BLD AUTO: 3.9 K/UL (ref 1.82–7.42)
NEUTROPHILS NFR BLD: 66.9 % (ref 44–72)
NRBC # BLD AUTO: 0 K/UL
NRBC BLD-RTO: 0 /100 WBC (ref 0–0.2)
NT-PROBNP SERPL IA-MCNC: 906 PG/ML (ref 0–125)
PLATELET # BLD AUTO: 176 K/UL (ref 164–446)
PMV BLD AUTO: 11.7 FL (ref 9–12.9)
POTASSIUM SERPL-SCNC: 4.8 MMOL/L (ref 3.6–5.5)
PROT SERPL-MCNC: 6.7 G/DL (ref 6–8.2)
RBC # BLD AUTO: 4.17 M/UL (ref 4.2–5.4)
SODIUM SERPL-SCNC: 141 MMOL/L (ref 135–145)
T4 FREE SERPL-MCNC: 1.63 NG/DL (ref 0.93–1.7)
TIBC SERPL-MCNC: 270 UG/DL (ref 250–450)
TSH SERPL-ACNC: 1.08 UIU/ML (ref 0.35–5.5)
UIBC SERPL-MCNC: 226 UG/DL (ref 110–370)
WBC # BLD AUTO: 5.8 K/UL (ref 4.8–10.8)

## 2024-08-21 PROCEDURE — 84439 ASSAY OF FREE THYROXINE: CPT

## 2024-08-21 PROCEDURE — 83550 IRON BINDING TEST: CPT

## 2024-08-21 PROCEDURE — 71046 X-RAY EXAM CHEST 2 VIEWS: CPT

## 2024-08-21 PROCEDURE — 93306 TTE W/DOPPLER COMPLETE: CPT

## 2024-08-21 PROCEDURE — 83880 ASSAY OF NATRIURETIC PEPTIDE: CPT

## 2024-08-21 PROCEDURE — 80053 COMPREHEN METABOLIC PANEL: CPT

## 2024-08-21 PROCEDURE — 85025 COMPLETE CBC W/AUTO DIFF WBC: CPT

## 2024-08-21 PROCEDURE — 99999 PR NO CHARGE: CPT

## 2024-08-21 PROCEDURE — 83036 HEMOGLOBIN GLYCOSYLATED A1C: CPT

## 2024-08-21 PROCEDURE — 83540 ASSAY OF IRON: CPT

## 2024-08-21 PROCEDURE — 84443 ASSAY THYROID STIM HORMONE: CPT

## 2024-08-23 LAB
LV EJECT FRACT  99904: 70
LV EJECT FRACT MOD 2C 99903: 48.32
LV EJECT FRACT MOD 4C 99902: 81.89
LV EJECT FRACT MOD BP 99901: 70.14

## 2024-08-23 PROCEDURE — 93306 TTE W/DOPPLER COMPLETE: CPT | Mod: 26 | Performed by: INTERNAL MEDICINE

## 2024-08-27 ENCOUNTER — OFFICE VISIT (OUTPATIENT)
Dept: INTERNAL MEDICINE | Facility: IMAGING CENTER | Age: 79
End: 2024-08-27
Payer: MEDICARE

## 2024-08-27 VITALS
TEMPERATURE: 97.9 F | BODY MASS INDEX: 36.57 KG/M2 | WEIGHT: 233 LBS | HEART RATE: 66 BPM | SYSTOLIC BLOOD PRESSURE: 120 MMHG | HEIGHT: 67 IN | DIASTOLIC BLOOD PRESSURE: 62 MMHG | RESPIRATION RATE: 17 BRPM | OXYGEN SATURATION: 91 %

## 2024-08-27 DIAGNOSIS — R60.0 PERIPHERAL EDEMA: ICD-10-CM

## 2024-08-27 DIAGNOSIS — R06.02 SHORTNESS OF BREATH: ICD-10-CM

## 2024-08-27 PROCEDURE — 3078F DIAST BP <80 MM HG: CPT | Performed by: FAMILY MEDICINE

## 2024-08-27 PROCEDURE — 3074F SYST BP LT 130 MM HG: CPT | Performed by: FAMILY MEDICINE

## 2024-08-27 PROCEDURE — 99214 OFFICE O/P EST MOD 30 MIN: CPT | Performed by: FAMILY MEDICINE

## 2024-08-27 RX ORDER — FUROSEMIDE 20 MG
20 TABLET ORAL EVERY MORNING
Qty: 7 TABLET | Refills: 0 | Status: SHIPPED | OUTPATIENT
Start: 2024-08-27 | End: 2024-09-03

## 2024-08-27 RX ORDER — POTASSIUM CHLORIDE 750 MG/1
10 TABLET, EXTENDED RELEASE ORAL EVERY MORNING
Qty: 7 TABLET | Refills: 0 | Status: SHIPPED | OUTPATIENT
Start: 2024-08-27 | End: 2024-09-03

## 2024-08-27 ASSESSMENT — FIBROSIS 4 INDEX: FIB4 SCORE: 1.32

## 2024-09-04 ENCOUNTER — HOSPITAL ENCOUNTER (OUTPATIENT)
Facility: MEDICAL CENTER | Age: 79
End: 2024-09-04
Attending: FAMILY MEDICINE
Payer: MEDICARE

## 2024-09-04 ENCOUNTER — OFFICE VISIT (OUTPATIENT)
Dept: INTERNAL MEDICINE | Facility: IMAGING CENTER | Age: 79
End: 2024-09-04
Payer: MEDICARE

## 2024-09-04 VITALS
RESPIRATION RATE: 14 BRPM | WEIGHT: 234 LBS | TEMPERATURE: 97.8 F | HEIGHT: 67 IN | HEART RATE: 69 BPM | OXYGEN SATURATION: 91 % | DIASTOLIC BLOOD PRESSURE: 70 MMHG | BODY MASS INDEX: 36.73 KG/M2 | SYSTOLIC BLOOD PRESSURE: 124 MMHG

## 2024-09-04 DIAGNOSIS — R60.0 PERIPHERAL EDEMA: ICD-10-CM

## 2024-09-04 DIAGNOSIS — I10 PRIMARY HYPERTENSION: ICD-10-CM

## 2024-09-04 DIAGNOSIS — I35.0 AORTIC STENOSIS, MILD: ICD-10-CM

## 2024-09-04 DIAGNOSIS — R19.5 LOOSE STOOLS: ICD-10-CM

## 2024-09-04 DIAGNOSIS — E87.8 ELECTROLYTE IMBALANCE: ICD-10-CM

## 2024-09-04 DIAGNOSIS — R06.02 SHORTNESS OF BREATH: ICD-10-CM

## 2024-09-04 LAB
ALBUMIN SERPL BCP-MCNC: 4 G/DL (ref 3.2–4.9)
ALBUMIN/GLOB SERPL: 1.7 G/DL
ALP SERPL-CCNC: 63 U/L (ref 30–99)
ALT SERPL-CCNC: 15 U/L (ref 2–50)
ANION GAP SERPL CALC-SCNC: 10 MMOL/L (ref 7–16)
AST SERPL-CCNC: 21 U/L (ref 12–45)
BILIRUB SERPL-MCNC: 0.3 MG/DL (ref 0.1–1.5)
BUN SERPL-MCNC: 30 MG/DL (ref 8–22)
CALCIUM ALBUM COR SERPL-MCNC: 11.1 MG/DL (ref 8.5–10.5)
CALCIUM SERPL-MCNC: 11.1 MG/DL (ref 8.5–10.5)
CHLORIDE SERPL-SCNC: 106 MMOL/L (ref 96–112)
CO2 SERPL-SCNC: 25 MMOL/L (ref 20–33)
CREAT SERPL-MCNC: 0.92 MG/DL (ref 0.5–1.4)
GFR SERPLBLD CREATININE-BSD FMLA CKD-EPI: 63 ML/MIN/1.73 M 2
GLOBULIN SER CALC-MCNC: 2.3 G/DL (ref 1.9–3.5)
GLUCOSE SERPL-MCNC: 104 MG/DL (ref 65–99)
POTASSIUM SERPL-SCNC: 5.3 MMOL/L (ref 3.6–5.5)
PROT SERPL-MCNC: 6.3 G/DL (ref 6–8.2)
SODIUM SERPL-SCNC: 141 MMOL/L (ref 135–145)

## 2024-09-04 PROCEDURE — 3078F DIAST BP <80 MM HG: CPT | Performed by: FAMILY MEDICINE

## 2024-09-04 PROCEDURE — 99214 OFFICE O/P EST MOD 30 MIN: CPT | Performed by: FAMILY MEDICINE

## 2024-09-04 PROCEDURE — 3074F SYST BP LT 130 MM HG: CPT | Performed by: FAMILY MEDICINE

## 2024-09-04 PROCEDURE — 80053 COMPREHEN METABOLIC PANEL: CPT

## 2024-09-04 ASSESSMENT — FIBROSIS 4 INDEX: FIB4 SCORE: 1.32

## 2024-09-05 ENCOUNTER — HOSPITAL ENCOUNTER (OUTPATIENT)
Dept: RADIOLOGY | Facility: MEDICAL CENTER | Age: 79
End: 2024-09-05
Attending: FAMILY MEDICINE
Payer: MEDICARE

## 2024-09-05 DIAGNOSIS — R60.0 PERIPHERAL EDEMA: ICD-10-CM

## 2024-09-05 PROCEDURE — 93970 EXTREMITY STUDY: CPT

## 2024-09-06 DIAGNOSIS — F51.01 PRIMARY INSOMNIA: ICD-10-CM

## 2024-09-06 RX ORDER — ZOLPIDEM TARTRATE 5 MG/1
5 TABLET ORAL NIGHTLY PRN
Qty: 30 TABLET | Refills: 2 | Status: SHIPPED | OUTPATIENT
Start: 2024-09-10 | End: 2024-10-10

## 2024-09-09 NOTE — PROGRESS NOTES
"Chief Complaint   Patient presents with    Follow-Up     Feeling well. Weight is stable.       HPI:  Patient is a 79 y.o. female established patient who presents today for a one week follow up appointment. She has been taking Lasix 20 mg daily and reports home body weight has not changed. Peripheral edema has improved (not resolved) and shortness of breath has minimized.     Patient Active Problem List    Diagnosis Date Noted    Aortic stenosis, mild 09/04/2024    Shortness of breath 09/04/2024    Loose stools 09/04/2024    Shoulder mass 08/19/2024    Incisional hernia, without obstruction or gangrene 05/22/2024    Physically inactive 05/22/2024    Total knee replacement status, left 11/15/2023    DNR (do not resuscitate) 11/15/2023    Abdominal bloating 09/19/2023    OAB (overactive bladder) 09/19/2023    Osteopenia of neck of left femur 07/31/2023    History of diverticulitis 05/15/2023    Vitamin D deficiency 05/15/2023    Arthritis 01/24/2023    Poor balance 04/26/2021    Thyroid nodule 03/16/2021    Obesity (BMI 30-39.9) 04/06/2018    Heart murmur 09/13/2017    Chronic insomnia 08/09/2017    Anxiety 08/09/2017    HTN (hypertension) 07/01/2014    Hyperlipidemia 07/01/2014    AZUL on CPAP 07/01/2014       Past medical, surgical, family, and social history was reviewed and updated in Epic chart by me today.     Medications and allergies reviewed and updated in Epic chart by me today.     ROS:  Pertinent positives listed above in HPI. All other systems have been reviewed and are negative.    PE:   /62 (BP Location: Left arm, Patient Position: Sitting, BP Cuff Size: Adult)   Pulse 66   Temp 36.6 °C (97.9 °F) (Temporal)   Resp 17   Ht 1.702 m (5' 7\")   Wt 106 kg (233 lb)   SpO2 91%   BMI 36.49 kg/m²   Vital signs reviewed with patient.     Gen: Well developed; well nourished; no acute distress; age appropriate appearance   HEENT: Normocephalic; atraumatic; PEERLA b/l; sclera clear b/l; b/l external " auditory canals WNL; b/l TM WNL; nares patent; oropharynx clear; oral mucosa moist; tongue midline; dentition adequate   Neck: No adenopathy; no thyromegaly  CV: Regular rate and rhythm; S1/ S2 present; no murmur, gallop or rub noted  Pulm: No respiratory distress; clear to ascultation b/l; no wheezing or stridor noted b/l  Abd: Adequate bowel sounds noted; soft and nontender; no rebound, rigidity, nor distention; no hepatosplenogmegaly   Extremities: pitting peripheral edema b/l LE extremities but improved as compared to prior visitn/ no clubbing nor cyanosis noted  Skin: Warm and dry; no rashes noted   Neuro: No new focal deficits noted   Psych: AAOx4; mood and affect are appropriate    A/P:  1. Peripheral edema  Improved/ recommend patient continue another course of Lasix with KCl for seven days, and new RX sent to pharmacy. Will follow response.   - furosemide (LASIX) 20 MG Tab; Take 1 Tablet by mouth every morning for 7 days.  Dispense: 7 Tablet; Refill: 0  - potassium chloride ER (KLOR-CON) 10 MEQ tablet; Take 1 Tablet by mouth every morning for 7 days.  Dispense: 7 Tablet; Refill: 0    2. Shortness of breath  Not a current concern for patient/ CXR 8/22/24 showed enlarged cardiac silhouette but no acute abnormalities.

## 2024-09-09 NOTE — PROGRESS NOTES
"Chief Complaint   Patient presents with    Follow-Up    GI Problem       HPI:  Patient is a 79 y.o. female established patient who presents today for a follow up appointment. Patient has been taking Lasix for peripheral edema management and has not noticed much of a change in edema status (not worse but not better). She has had increased loose bowel movements (acute on chronic problem) that she feels is related to Lasix use and denies associated cough/ shortness of breath/recent illness.     Patient Active Problem List    Diagnosis Date Noted    Aortic stenosis, mild 09/04/2024    Shortness of breath 09/04/2024    Loose stools 09/04/2024    Shoulder mass 08/19/2024    Incisional hernia, without obstruction or gangrene 05/22/2024    Physically inactive 05/22/2024    Total knee replacement status, left 11/15/2023    DNR (do not resuscitate) 11/15/2023    Abdominal bloating 09/19/2023    OAB (overactive bladder) 09/19/2023    Osteopenia of neck of left femur 07/31/2023    History of diverticulitis 05/15/2023    Vitamin D deficiency 05/15/2023    Arthritis 01/24/2023    Poor balance 04/26/2021    Thyroid nodule 03/16/2021    Obesity (BMI 30-39.9) 04/06/2018    Heart murmur 09/13/2017    Chronic insomnia 08/09/2017    Anxiety 08/09/2017    HTN (hypertension) 07/01/2014    Hyperlipidemia 07/01/2014    AZUL on CPAP 07/01/2014       Past medical, surgical, family, and social history was reviewed and updated in Epic chart by me today.     Medications and allergies reviewed and updated in Epic chart by me today.     ROS:  Pertinent positives listed above in HPI. All other systems have been reviewed and are negative.    PE:   /70   Pulse 69   Temp 36.6 °C (97.8 °F)   Resp 14   Ht 1.702 m (5' 7.01\")   Wt 106 kg (234 lb)   SpO2 91%   BMI 36.64 kg/m²   Vital signs reviewed with patient.     Gen: Well developed; well nourished; no acute distress; age appropriate appearance   CV: Regular rate and rhythm; S1/ S2 present; " no murmur, gallop or rub noted  Pulm: No respiratory distress; clear to ascultation b/l; no wheezing or stridor noted b/l  Abd: Adequate bowel sounds noted; soft and nontender; no rebound, rigidity, nor distention; no hepatosplenogmegaly   Extremities: pitting peripheral edema b/l LE extremities (L>R)/ no clubbing nor cyanosis noted  Skin: Warm and dry; no rashes noted  Neuro: No new focal deficits noted/ walking with single point cane  Psych: AAOx4; mood and affect are at her baseline     A/P:  1. Peripheral edema  Ongoing issue for patient not responding as anticipated to recent Lasix use. Recommend patient proceed with bilateral US of legs, and I called [facility] to schedule appointment for patient. She is scheduled to have US done on 9/5/24 and I will follow up with her when results are available. Also recommend patient establish with [facility] for evaluation and treatment.   - US-EXTREMITY VENOUS LOWER BILAT; Future  - REFERRAL TO CARDIOLOGY    2. Electrolyte imbalance  Recommend checking labs today for further work up of current symptoms.   - Comp Metabolic Panel; Future    3. Shortness of breath  Currently resolved but was present last week; CXR 8/22/24 showed enlarged cardiac silhouette but no acute abnormalities.   - REFERRAL TO CARDIOLOGY    4. Aortic stenosis, mild  Echo 8/23/24 showed evidence of mild AS that I feel needs ongoing cardiology surveillance - especially considering current symptoms.   - REFERRAL TO CARDIOLOGY    5. Primary hypertension  Stable     6. Loose stools  Normalizing now that patient is no longer taking Lasix. Chronic GI condition managed by Dr. Vila.      I will call patient with results when available for further management.

## 2024-09-11 ENCOUNTER — TELEPHONE (OUTPATIENT)
Dept: INTERNAL MEDICINE | Facility: IMAGING CENTER | Age: 79
End: 2024-09-11
Payer: MEDICARE

## 2024-09-11 ENCOUNTER — HOSPITAL ENCOUNTER (OUTPATIENT)
Dept: RADIOLOGY | Facility: MEDICAL CENTER | Age: 79
End: 2024-09-11
Attending: FAMILY MEDICINE
Payer: MEDICARE

## 2024-09-11 DIAGNOSIS — R22.30 SHOULDER MASS: ICD-10-CM

## 2024-09-11 PROCEDURE — 76882 US LMTD JT/FCL EVL NVASC XTR: CPT | Mod: RT

## 2024-09-13 ENCOUNTER — TELEPHONE (OUTPATIENT)
Dept: CARDIOLOGY | Facility: MEDICAL CENTER | Age: 79
End: 2024-09-13

## 2024-09-13 ENCOUNTER — OFFICE VISIT (OUTPATIENT)
Dept: CARDIOLOGY | Facility: MEDICAL CENTER | Age: 79
End: 2024-09-13
Attending: FAMILY MEDICINE
Payer: MEDICARE

## 2024-09-13 VITALS
HEART RATE: 62 BPM | OXYGEN SATURATION: 91 % | DIASTOLIC BLOOD PRESSURE: 76 MMHG | HEIGHT: 67 IN | BODY MASS INDEX: 36.88 KG/M2 | WEIGHT: 235 LBS | SYSTOLIC BLOOD PRESSURE: 140 MMHG | RESPIRATION RATE: 16 BRPM

## 2024-09-13 DIAGNOSIS — I50.30 ACC/AHA STAGE C HEART FAILURE WITH PRESERVED EJECTION FRACTION (HCC): ICD-10-CM

## 2024-09-13 DIAGNOSIS — G47.33 OSA ON CPAP: ICD-10-CM

## 2024-09-13 DIAGNOSIS — I83.813 VARICOSE VEINS OF BOTH LOWER EXTREMITIES WITH PAIN: ICD-10-CM

## 2024-09-13 DIAGNOSIS — E78.00 PURE HYPERCHOLESTEROLEMIA: ICD-10-CM

## 2024-09-13 DIAGNOSIS — I10 HTN (HYPERTENSION), MALIGNANT: ICD-10-CM

## 2024-09-13 DIAGNOSIS — R60.0 PERIPHERAL EDEMA: ICD-10-CM

## 2024-09-13 DIAGNOSIS — I35.0 AORTIC STENOSIS, MILD: ICD-10-CM

## 2024-09-13 DIAGNOSIS — I51.89 LEFT VENTRICULAR DIASTOLIC DYSFUNCTION, NYHA CLASS 3: ICD-10-CM

## 2024-09-13 DIAGNOSIS — R06.09 DYSPNEA ON EXERTION: ICD-10-CM

## 2024-09-13 DIAGNOSIS — R06.02 SHORTNESS OF BREATH: ICD-10-CM

## 2024-09-13 LAB — EKG IMPRESSION: NORMAL

## 2024-09-13 PROCEDURE — 93010 ELECTROCARDIOGRAM REPORT: CPT | Performed by: INTERNAL MEDICINE

## 2024-09-13 PROCEDURE — 99213 OFFICE O/P EST LOW 20 MIN: CPT | Performed by: INTERNAL MEDICINE

## 2024-09-13 PROCEDURE — 3077F SYST BP >= 140 MM HG: CPT | Performed by: INTERNAL MEDICINE

## 2024-09-13 PROCEDURE — 99204 OFFICE O/P NEW MOD 45 MIN: CPT | Performed by: INTERNAL MEDICINE

## 2024-09-13 PROCEDURE — G2211 COMPLEX E/M VISIT ADD ON: HCPCS | Performed by: INTERNAL MEDICINE

## 2024-09-13 PROCEDURE — 93005 ELECTROCARDIOGRAM TRACING: CPT | Performed by: INTERNAL MEDICINE

## 2024-09-13 PROCEDURE — 3078F DIAST BP <80 MM HG: CPT | Performed by: INTERNAL MEDICINE

## 2024-09-13 RX ORDER — SPIRONOLACTONE 25 MG/1
25 TABLET ORAL DAILY
Qty: 30 TABLET | Refills: 3 | Status: SHIPPED | OUTPATIENT
Start: 2024-09-13

## 2024-09-13 RX ORDER — EMPAGLIFLOZIN 10 MG/1
10 TABLET, FILM COATED ORAL DAILY
Qty: 90 TABLET | Refills: 2 | Status: SHIPPED | OUTPATIENT
Start: 2024-09-13

## 2024-09-13 ASSESSMENT — ENCOUNTER SYMPTOMS
DEPRESSION: 0
CHILLS: 0
COUGH: 0
SPEECH CHANGE: 0
NAUSEA: 0
PALPITATIONS: 0
LOSS OF CONSCIOUSNESS: 0
WEIGHT LOSS: 0
EYE DISCHARGE: 0
HALLUCINATIONS: 0
VOMITING: 0
ORTHOPNEA: 0
FEVER: 0
PND: 0
HEADACHES: 0
DOUBLE VISION: 0
BLURRED VISION: 0
BLOOD IN STOOL: 0
SENSORY CHANGE: 0
DIZZINESS: 0
EYE PAIN: 0
ABDOMINAL PAIN: 0
MYALGIAS: 0
FALLS: 0
BRUISES/BLEEDS EASILY: 0
SHORTNESS OF BREATH: 1
CLAUDICATION: 0

## 2024-09-13 ASSESSMENT — FIBROSIS 4 INDEX: FIB4 SCORE: 2.43

## 2024-09-13 NOTE — TELEPHONE ENCOUNTER
TT    Caller:  Clau     Topic/issue:   Clau was in to see TT today, and believes she is to receive 2 medications, however only 1 has been sent.    Callback Number: [phone]    Thank you,   Rosa JAMIL

## 2024-09-13 NOTE — PROGRESS NOTES
Chief Complaint   Patient presents with    New Patient     NP Dx:Peripheral edema  NP Dx:Shortness of breath  NP Dx:Aortic stenosis, mild       Subjective     Rosy Vences is a 79 y.o. female who presents today for management of mild aortic stenosis, HTN and persistent peripheral edema.    I have independently interpreted and reviewed blood tests results with patient in clinic which shows LDL level of 60, triglycerides level of 193, GFR of 63, NT pro BNP of 906, K of 5.3.    I have independently interpreted and reviewed echocardiogram's actual images with patient which showed normal left ventricular systolic function. No wall motion abnormality. No evidence of pulmonary hypertension. Mild AS.    Patient gets winded with daily living activities and exertion. No symptoms at rest.        Past Medical History:   Diagnosis Date    Anesthesia     low O2 sat    Arthritis     osteo    Back pain     Dyslipidemia     [place] measles     Heart murmur 9/13/2017    Hypertension     Influenza     Mumps     Other specified symptom associated with female genital organs     Painful joint     Psychiatric problem     Sleep apnea     c-pap with 3 l/nc    Snoring     Sore muscles     Tonsillitis     Unspecified cataract      Past Surgical History:   Procedure Laterality Date    IN EXPLORATORY OF ABDOMEN N/A 1/28/2023    Procedure: EX LAP;  Surgeon: Mike Valles M.D.;  Location: SURGERY [facility];  Service: Gastroenterology    KNEE ARTHROPLASTY TOTAL Right 12/27/2017    HYSTERECTOMY ROBOTIC  10/23/2014    Performed by Smith Lyons M.D. at SURGERY [facility]S    GASTROSCOPY WITH BIOPSY  7/2/2014    Performed by Sky Vila M.D. at [facility] ORS    OTHER ORTHOPEDIC SURGERY  2006    left total knee    APPENDECTOMY      ARTHROSCOPY, KNEE      HYSTERECTOMY LAPAROSCOPY      OTHER      meghan cataracts    OTHER ABDOMINAL SURGERY      Resection of pelvic mass, uterus and ovaries    IN BREAST REDUCTION  Bilateral     1982    RI REMV 2ND CATARACT,CORN-SCLER SECTN      TONSILLECTOMY      TONSILLECTOMY AND ADENOIDECTOMY       Family History   Problem Relation Age of Onset    Breast Cancer Mother     Cancer Mother 65        Breast    Cancer Father         Colon    Alcohol/Drug Father     Colon Cancer Father     Hyperlipidemia Brother     Heart Disease Brother         arrythmia     Social History     Socioeconomic History    Marital status:      Spouse name: Not on file    Number of children: Not on file    Years of education: Not on file    Highest education level: Not on file   Occupational History    Not on file   Tobacco Use    Smoking status: Never    Smokeless tobacco: Never   Vaping Use    Vaping status: Never Used   Substance and Sexual Activity    Alcohol use: Not Currently     Alcohol/week: 0.6 - 1.2 oz     Types: 1 - 2 Glasses of wine per week     Comment: 2 glasses of wine a day     Drug use: No    Sexual activity: Not on file     Comment: , 2 children   Other Topics Concern    Not on file   Social History Narrative    Not on file     Social Determinants of Health     Financial Resource Strain: Not on file   Food Insecurity: Not on file   Transportation Needs: Not on file   Physical Activity: Not on file   Stress: Not on file   Social Connections: Not on file   Intimate Partner Violence: Not on file   Housing Stability: Not on file     Allergies   Allergen Reactions    Tramadol      Nausea and somnolence    Myrbetriq [Mirabegron] Unspecified     Dizziness & lightheadedness     Outpatient Encounter Medications as of 9/13/2024   Medication Sig Dispense Refill    spironolactone (ALDACTONE) 25 MG Tab Take 1 Tablet by mouth every day. 30 Tablet 3    zolpidem (AMBIEN) 5 MG Tab Take 1 Tablet by mouth at bedtime as needed for Sleep for up to 30 days. 30 Tablet 2    minoxidil (LONITEN) 2.5 MG Tab TAKE 1/2 (ONE-HALF) TABLET BY MOUTH ONCE DAILY FOR 30 DAYS THEN TAKE 1 TABLET DAILY      predniSONE  (DELTASONE) 5 MG Tab Take 1 tablet by mouth once daily 30 Tablet 2    cholestyramine (QUESTRAN) 4 g packet Take 4 g by mouth 2 times a day. 60 Each 1    lisinopril (PRINIVIL) 20 MG Tab Take 2 tablets by mouth once daily 180 Tablet 3    sertraline (ZOLOFT) 100 MG Tab TAKE 1 TABELT BY MOUTH DAILY 90 Tablet 2    Acetaminophen (TYLENOL PO) Take 2 Tablets by mouth 2 times a day as needed (For pain). Pt is not sure the strength (OTC)      rosuvastatin (CRESTOR) 10 MG Tab TAKE 1 TABLET BY MOUTH ONCE DAILY IN THE EVENING 90 Tablet 3    carvedilol (COREG) 25 MG Tab Take 1 Tablet by mouth 2 times a day with meals. 180 Tablet 3    folic acid (FOLVITE) 400 MCG tablet Take 1 Tablet by mouth every morning. 30 Tablet 0    ferrous sulfate 325 (65 Fe) MG tablet Take 325 mg by mouth every day.      vitamin D3 (CHOLECALCIFEROL) 1000 Unit (25 mcg) Tab Take 1,000 Units by mouth every day.       No facility-administered encounter medications on file as of 9/13/2024.     Review of Systems   Constitutional:  Negative for chills, fever, malaise/fatigue and weight loss.   HENT:  Negative for ear discharge, ear pain, hearing loss and nosebleeds.    Eyes:  Negative for blurred vision, double vision, pain and discharge.   Respiratory:  Positive for shortness of breath. Negative for cough.    Cardiovascular:  Negative for chest pain, palpitations, orthopnea, claudication, leg swelling and PND.   Gastrointestinal:  Negative for abdominal pain, blood in stool, melena, nausea and vomiting.   Genitourinary:  Negative for dysuria and hematuria.   Musculoskeletal:  Negative for falls, joint pain and myalgias.   Skin:  Negative for itching and rash.   Neurological:  Negative for dizziness, sensory change, speech change, loss of consciousness and headaches.   Endo/Heme/Allergies:  Negative for environmental allergies. Does not bruise/bleed easily.   Psychiatric/Behavioral:  Negative for depression, hallucinations and suicidal ideas.          "      Objective     BP (!) 140/76 (BP Location: Left arm, Patient Position: Sitting, BP Cuff Size: Adult)   Pulse 62   Resp 16   Ht 1.702 m (5' 7\")   Wt 107 kg (235 lb)   SpO2 91%   BMI 36.81 kg/m²     Physical Exam  Vitals and nursing note reviewed.   Constitutional:       General: She is not in acute distress.     Appearance: She is not diaphoretic.   HENT:      Head: Normocephalic and atraumatic.      Right Ear: External ear normal.      Left Ear: External ear normal.      Nose: No congestion or rhinorrhea.   Eyes:      General:         Right eye: No discharge.         Left eye: No discharge.   Neck:      Thyroid: No thyromegaly.      Vascular: No JVD.   Cardiovascular:      Rate and Rhythm: Normal rate and regular rhythm.      Pulses: Normal pulses.      Heart sounds: Murmur heard.   Pulmonary:      Effort: No respiratory distress.   Abdominal:      General: There is no distension.      Tenderness: There is no abdominal tenderness.   Musculoskeletal:         General: No swelling or tenderness.      Right lower leg: No edema.      Left lower leg: No edema.      Comments: + varicose veins without evidence of ulcer, +discoloration.     Skin:     General: Skin is warm and dry.   Neurological:      Mental Status: She is alert and oriented to person, place, and time.      Cranial Nerves: No cranial nerve deficit.   Psychiatric:         Behavior: Behavior normal.                Assessment & Plan     1. ACC/AHA stage C heart failure with preserved ejection fraction (HCC)  spironolactone (ALDACTONE) 25 MG Tab      2. Left ventricular diastolic dysfunction, NYHA class 3  spironolactone (ALDACTONE) 25 MG Tab      3. Aortic stenosis, mild  EKG      4. Varicose veins of both lower extremities with pain  US-EXTREMITY VENOUS LOWER BILAT      5. HTN (hypertension), malignant        6. Pure hypercholesterolemia        7. Dyspnea on exertion  proBrain Natriuretic Peptide, NT    spironolactone (ALDACTONE) 25 MG Tab      8. " Peripheral edema [R60.0]        9. Shortness of breath [R06.02]        10. AZUL on CPAP            Medical Decision Making: Today's Assessment/Status/Plan:   Patient meets criteria for HFpEF with clinical presentation and elevated NT pro BNP.  Based on recent data on SGLT2 and heart failure with preserved ejection fraction, patient will be benefited from Jardiance 10 mg p.o. once a day for further reduction in mortality and hospitalization with absolute risk reduction of 3.8%.  Therefore, I will start patient on Jardiance 10 mg p.o. once a day.  Risks and benefits were explained to patient and patient has agreed to proceed.    Will add Spironolactone 25 mg daily for better BP control and diuresis as well.    Continue Carvedilol 25 mg bid, Lisinopril 20 mg daily.    Continue Rosuvastatin 10 mg daily for LDL control.    Optimize CPAP tx for AZUL.    Clinical monitoring of mild aortic stenosis.    This visit encounter signifies the visit complexity inherent to evaluation and management associated with medical care services that serve as the continuing focal point for all needed health care services and/or with medical care services that are part of ongoing care related to this patient's single, serious condition, complex cardiac condition.    Jeannie Chandra M.D.

## 2024-09-16 ENCOUNTER — TELEPHONE (OUTPATIENT)
Dept: CARDIOLOGY | Facility: MEDICAL CENTER | Age: 79
End: 2024-09-16
Payer: MEDICARE

## 2024-09-16 NOTE — TELEPHONE ENCOUNTER
Received [employer] request via [employer]  for Jardiance 10mg. (Quantity:90, Day Supply:90)     Insurance: [employer]  Member ID:  [id number]  BIN: [id number]  PCN: [id number]  Group: [id number]     Ran Test claim via [employer] & medication Pays for a $256.93 copay. Will outreach to patient to offer specialty pharmacy services and or release to preferred pharmacy

## 2024-09-16 NOTE — TELEPHONE ENCOUNTER
Caller: Clau Vences    Topic/issue: Patient calling to check on status of Jardiance. Looks like we have already requested for this to be released to pharmacy.   Patient would like to be advised once it is sent so that she does not keep going over to check at the pharmacy.    Callback Number: [phone] or cell [phone] (cell) , ok to leave message    Thank you,  Chastity RAINEY

## 2024-09-16 NOTE — TELEPHONE ENCOUNTER
Spoke to Rosy and let her know about her copay while in her initial benefit. She requested the prescription to be released to the pharmacy on file.

## 2024-09-16 NOTE — TELEPHONE ENCOUNTER
Received [employer] request via [employer]  for Jardiance 10mg. (Quantity:90, Day Supply:90)     Insurance: [employer]  Member ID:  [id number]  BIN: [id number]  PCN: [facility]  Group: [id number]      Ran Test claim via [employer] & medication Pays for a $256.93 copay. Will outreach to patient to offer specialty pharmacy services and or release to preferred pharmacy

## 2024-09-17 ENCOUNTER — NON-PROVIDER VISIT (OUTPATIENT)
Dept: INTERNAL MEDICINE | Facility: IMAGING CENTER | Age: 79
End: 2024-09-17
Payer: MEDICARE

## 2024-09-17 ENCOUNTER — HOSPITAL ENCOUNTER (OUTPATIENT)
Facility: MEDICAL CENTER | Age: 79
End: 2024-09-17
Attending: INTERNAL MEDICINE
Payer: MEDICARE

## 2024-09-17 DIAGNOSIS — R06.09 DYSPNEA ON EXERTION: ICD-10-CM

## 2024-09-17 LAB — NT-PROBNP SERPL IA-MCNC: 953 PG/ML (ref 0–125)

## 2024-09-17 PROCEDURE — 83880 ASSAY OF NATRIURETIC PEPTIDE: CPT

## 2024-10-01 ENCOUNTER — APPOINTMENT (OUTPATIENT)
Dept: INTERNAL MEDICINE | Facility: IMAGING CENTER | Age: 79
End: 2024-10-01
Payer: MEDICARE

## 2024-10-01 VITALS
WEIGHT: 236.8 LBS | HEART RATE: 65 BPM | DIASTOLIC BLOOD PRESSURE: 60 MMHG | BODY MASS INDEX: 37.17 KG/M2 | TEMPERATURE: 98.7 F | SYSTOLIC BLOOD PRESSURE: 120 MMHG | RESPIRATION RATE: 17 BRPM | OXYGEN SATURATION: 91 % | HEIGHT: 67 IN

## 2024-10-01 DIAGNOSIS — I51.89 LEFT VENTRICULAR DIASTOLIC DYSFUNCTION, NYHA CLASS 3: ICD-10-CM

## 2024-10-01 DIAGNOSIS — I35.0 AORTIC STENOSIS, MILD: ICD-10-CM

## 2024-10-01 DIAGNOSIS — Z23 NEED FOR VACCINATION: ICD-10-CM

## 2024-10-01 DIAGNOSIS — R22.30 SHOULDER MASS: ICD-10-CM

## 2024-10-01 DIAGNOSIS — I50.30 ACC/AHA STAGE C HEART FAILURE WITH PRESERVED EJECTION FRACTION (HCC): ICD-10-CM

## 2024-10-01 DIAGNOSIS — G47.33 OSA ON CPAP: ICD-10-CM

## 2024-10-01 PROBLEM — R06.02 SHORTNESS OF BREATH: Status: RESOLVED | Noted: 2024-09-04 | Resolved: 2024-10-01

## 2024-10-01 PROCEDURE — 3074F SYST BP LT 130 MM HG: CPT | Performed by: FAMILY MEDICINE

## 2024-10-01 PROCEDURE — 3078F DIAST BP <80 MM HG: CPT | Performed by: FAMILY MEDICINE

## 2024-10-01 PROCEDURE — 90662 IIV NO PRSV INCREASED AG IM: CPT | Performed by: FAMILY MEDICINE

## 2024-10-01 PROCEDURE — 99214 OFFICE O/P EST MOD 30 MIN: CPT | Mod: 25 | Performed by: FAMILY MEDICINE

## 2024-10-01 PROCEDURE — G0008 ADMIN INFLUENZA VIRUS VAC: HCPCS | Performed by: FAMILY MEDICINE

## 2024-10-01 ASSESSMENT — FIBROSIS 4 INDEX: FIB4 SCORE: 2.43

## 2024-10-04 ENCOUNTER — HOSPITAL ENCOUNTER (INPATIENT)
Facility: MEDICAL CENTER | Age: 79
LOS: 4 days | DRG: 243 | End: 2024-10-08
Attending: EMERGENCY MEDICINE | Admitting: STUDENT IN AN ORGANIZED HEALTH CARE EDUCATION/TRAINING PROGRAM
Payer: MEDICARE

## 2024-10-04 ENCOUNTER — APPOINTMENT (OUTPATIENT)
Dept: RADIOLOGY | Facility: MEDICAL CENTER | Age: 79
DRG: 243 | End: 2024-10-04
Attending: EMERGENCY MEDICINE
Payer: MEDICARE

## 2024-10-04 DIAGNOSIS — I51.89 LEFT VENTRICULAR DIASTOLIC DYSFUNCTION, NYHA CLASS 3: ICD-10-CM

## 2024-10-04 DIAGNOSIS — I50.32 CHRONIC HEART FAILURE WITH PRESERVED EJECTION FRACTION (HFPEF) (HCC): ICD-10-CM

## 2024-10-04 DIAGNOSIS — R00.1 SYMPTOMATIC BRADYCARDIA: ICD-10-CM

## 2024-10-04 PROBLEM — R55 SYNCOPE: Status: ACTIVE | Noted: 2024-10-04

## 2024-10-04 PROBLEM — E87.5 HYPERKALEMIA: Status: ACTIVE | Noted: 2024-10-04

## 2024-10-04 PROBLEM — M79.89 LEG SWELLING: Status: ACTIVE | Noted: 2024-10-04

## 2024-10-04 LAB
ALBUMIN SERPL BCP-MCNC: 3.8 G/DL (ref 3.2–4.9)
ALBUMIN/GLOB SERPL: 1.5 G/DL
ALP SERPL-CCNC: 60 U/L (ref 30–99)
ALT SERPL-CCNC: 18 U/L (ref 2–50)
ANION GAP SERPL CALC-SCNC: 12 MMOL/L (ref 7–16)
APTT PPP: 20.7 SEC (ref 24.7–36)
AST SERPL-CCNC: 17 U/L (ref 12–45)
BASOPHILS # BLD AUTO: 0.3 % (ref 0–1.8)
BASOPHILS # BLD: 0.02 K/UL (ref 0–0.12)
BILIRUB SERPL-MCNC: 0.4 MG/DL (ref 0.1–1.5)
BUN SERPL-MCNC: 45 MG/DL (ref 8–22)
CALCIUM ALBUM COR SERPL-MCNC: 9.8 MG/DL (ref 8.5–10.5)
CALCIUM SERPL-MCNC: 9.6 MG/DL (ref 8.5–10.5)
CHLORIDE SERPL-SCNC: 106 MMOL/L (ref 96–112)
CO2 SERPL-SCNC: 20 MMOL/L (ref 20–33)
CREAT SERPL-MCNC: 1.35 MG/DL (ref 0.5–1.4)
EKG IMPRESSION: NORMAL
EKG IMPRESSION: NORMAL
EOSINOPHIL # BLD AUTO: 0.09 K/UL (ref 0–0.51)
EOSINOPHIL NFR BLD: 1.5 % (ref 0–6.9)
ERYTHROCYTE [DISTWIDTH] IN BLOOD BY AUTOMATED COUNT: 48.3 FL (ref 35.9–50)
GFR SERPLBLD CREATININE-BSD FMLA CKD-EPI: 40 ML/MIN/1.73 M 2
GLOBULIN SER CALC-MCNC: 2.5 G/DL (ref 1.9–3.5)
GLUCOSE SERPL-MCNC: 130 MG/DL (ref 65–99)
HCT VFR BLD AUTO: 39.7 % (ref 37–47)
HGB BLD-MCNC: 12.8 G/DL (ref 12–16)
IMM GRANULOCYTES # BLD AUTO: 0.02 K/UL (ref 0–0.11)
IMM GRANULOCYTES NFR BLD AUTO: 0.3 % (ref 0–0.9)
INR PPP: 1.02 (ref 0.87–1.13)
LYMPHOCYTES # BLD AUTO: 0.69 K/UL (ref 1–4.8)
LYMPHOCYTES NFR BLD: 11.6 % (ref 22–41)
MAGNESIUM SERPL-MCNC: 2.1 MG/DL (ref 1.5–2.5)
MCH RBC QN AUTO: 33.3 PG (ref 27–33)
MCHC RBC AUTO-ENTMCNC: 32.2 G/DL (ref 32.2–35.5)
MCV RBC AUTO: 103.4 FL (ref 81.4–97.8)
MONOCYTES # BLD AUTO: 0.42 K/UL (ref 0–0.85)
MONOCYTES NFR BLD AUTO: 7.1 % (ref 0–13.4)
NEUTROPHILS # BLD AUTO: 4.69 K/UL (ref 1.82–7.42)
NEUTROPHILS NFR BLD: 79.2 % (ref 44–72)
NRBC # BLD AUTO: 0 K/UL
NRBC BLD-RTO: 0 /100 WBC (ref 0–0.2)
NT-PROBNP SERPL IA-MCNC: 1192 PG/ML (ref 0–125)
PHOSPHATE SERPL-MCNC: 3.8 MG/DL (ref 2.5–4.5)
PLATELET # BLD AUTO: 149 K/UL (ref 164–446)
PMV BLD AUTO: 11.7 FL (ref 9–12.9)
POTASSIUM SERPL-SCNC: 5.6 MMOL/L (ref 3.6–5.5)
PROCALCITONIN SERPL-MCNC: 0.1 NG/ML
PROT SERPL-MCNC: 6.3 G/DL (ref 6–8.2)
PROTHROMBIN TIME: 13.4 SEC (ref 12–14.6)
RBC # BLD AUTO: 3.84 M/UL (ref 4.2–5.4)
SODIUM SERPL-SCNC: 138 MMOL/L (ref 135–145)
TROPONIN T SERPL-MCNC: 14 NG/L (ref 6–19)
TROPONIN T SERPL-MCNC: 20 NG/L (ref 6–19)
TSH SERPL DL<=0.005 MIU/L-ACNC: 1.34 UIU/ML (ref 0.38–5.33)
WBC # BLD AUTO: 5.9 K/UL (ref 4.8–10.8)

## 2024-10-04 PROCEDURE — 700102 HCHG RX REV CODE 250 W/ 637 OVERRIDE(OP): Performed by: STUDENT IN AN ORGANIZED HEALTH CARE EDUCATION/TRAINING PROGRAM

## 2024-10-04 PROCEDURE — 99222 1ST HOSP IP/OBS MODERATE 55: CPT | Performed by: STUDENT IN AN ORGANIZED HEALTH CARE EDUCATION/TRAINING PROGRAM

## 2024-10-04 PROCEDURE — A9270 NON-COVERED ITEM OR SERVICE: HCPCS | Performed by: STUDENT IN AN ORGANIZED HEALTH CARE EDUCATION/TRAINING PROGRAM

## 2024-10-04 PROCEDURE — 99285 EMERGENCY DEPT VISIT HI MDM: CPT

## 2024-10-04 PROCEDURE — 84100 ASSAY OF PHOSPHORUS: CPT

## 2024-10-04 PROCEDURE — 84145 PROCALCITONIN (PCT): CPT

## 2024-10-04 PROCEDURE — 83880 ASSAY OF NATRIURETIC PEPTIDE: CPT

## 2024-10-04 PROCEDURE — 99223 1ST HOSP IP/OBS HIGH 75: CPT | Mod: AI | Performed by: STUDENT IN AN ORGANIZED HEALTH CARE EDUCATION/TRAINING PROGRAM

## 2024-10-04 PROCEDURE — 84443 ASSAY THYROID STIM HORMONE: CPT

## 2024-10-04 PROCEDURE — 700111 HCHG RX REV CODE 636 W/ 250 OVERRIDE (IP): Mod: JZ | Performed by: STUDENT IN AN ORGANIZED HEALTH CARE EDUCATION/TRAINING PROGRAM

## 2024-10-04 PROCEDURE — 80053 COMPREHEN METABOLIC PANEL: CPT

## 2024-10-04 PROCEDURE — 700102 HCHG RX REV CODE 250 W/ 637 OVERRIDE(OP)

## 2024-10-04 PROCEDURE — 71045 X-RAY EXAM CHEST 1 VIEW: CPT

## 2024-10-04 PROCEDURE — 83735 ASSAY OF MAGNESIUM: CPT

## 2024-10-04 PROCEDURE — A9270 NON-COVERED ITEM OR SERVICE: HCPCS

## 2024-10-04 PROCEDURE — 93005 ELECTROCARDIOGRAM TRACING: CPT

## 2024-10-04 PROCEDURE — 770020 HCHG ROOM/CARE - TELE (206)

## 2024-10-04 PROCEDURE — 85025 COMPLETE CBC W/AUTO DIFF WBC: CPT

## 2024-10-04 PROCEDURE — 85730 THROMBOPLASTIN TIME PARTIAL: CPT

## 2024-10-04 PROCEDURE — 84484 ASSAY OF TROPONIN QUANT: CPT

## 2024-10-04 PROCEDURE — 36415 COLL VENOUS BLD VENIPUNCTURE: CPT

## 2024-10-04 PROCEDURE — 93005 ELECTROCARDIOGRAM TRACING: CPT | Performed by: EMERGENCY MEDICINE

## 2024-10-04 PROCEDURE — 85610 PROTHROMBIN TIME: CPT

## 2024-10-04 RX ORDER — CHOLESTYRAMINE LIGHT 4 G/5.7G
1 POWDER, FOR SUSPENSION ORAL
Status: DISCONTINUED | OUTPATIENT
Start: 2024-10-04 | End: 2024-10-08 | Stop reason: HOSPADM

## 2024-10-04 RX ORDER — FOLIC ACID 1 MG/1
1 TABLET ORAL DAILY
Status: DISCONTINUED | OUTPATIENT
Start: 2024-10-05 | End: 2024-10-08 | Stop reason: HOSPADM

## 2024-10-04 RX ORDER — ROSUVASTATIN CALCIUM 10 MG/1
10 TABLET, COATED ORAL EVERY EVENING
Status: DISCONTINUED | OUTPATIENT
Start: 2024-10-04 | End: 2024-10-08 | Stop reason: HOSPADM

## 2024-10-04 RX ORDER — SERTRALINE HYDROCHLORIDE 100 MG/1
100 TABLET, FILM COATED ORAL DAILY
Status: DISCONTINUED | OUTPATIENT
Start: 2024-10-05 | End: 2024-10-08 | Stop reason: HOSPADM

## 2024-10-04 RX ORDER — ENOXAPARIN SODIUM 100 MG/ML
40 INJECTION SUBCUTANEOUS DAILY
Status: DISCONTINUED | OUTPATIENT
Start: 2024-10-04 | End: 2024-10-07

## 2024-10-04 RX ORDER — PREDNISONE 5 MG/1
5 TABLET ORAL DAILY
Status: DISCONTINUED | OUTPATIENT
Start: 2024-10-05 | End: 2024-10-08 | Stop reason: HOSPADM

## 2024-10-04 RX ORDER — ZOLPIDEM TARTRATE 5 MG/1
5 TABLET ORAL NIGHTLY PRN
Status: DISCONTINUED | OUTPATIENT
Start: 2024-10-04 | End: 2024-10-08 | Stop reason: HOSPADM

## 2024-10-04 RX ORDER — CHOLESTYRAMINE 4 G/9G
1 POWDER, FOR SUSPENSION ORAL
COMMUNITY
End: 2024-10-17 | Stop reason: SDUPTHER

## 2024-10-04 RX ORDER — DAPAGLIFLOZIN 10 MG/1
10 TABLET, FILM COATED ORAL DAILY
Status: DISCONTINUED | OUTPATIENT
Start: 2024-10-05 | End: 2024-10-08 | Stop reason: HOSPADM

## 2024-10-04 RX ADMIN — ROSUVASTATIN CALCIUM 10 MG: 10 TABLET, FILM COATED ORAL at 18:01

## 2024-10-04 RX ADMIN — ENOXAPARIN SODIUM 40 MG: 100 INJECTION SUBCUTANEOUS at 18:02

## 2024-10-04 RX ADMIN — ZOLPIDEM TARTRATE 5 MG: 5 TABLET ORAL at 21:59

## 2024-10-04 ASSESSMENT — ENCOUNTER SYMPTOMS
PALPITATIONS: 0
VOMITING: 0
BACK PAIN: 0
HEADACHES: 0
PHOTOPHOBIA: 0
MYALGIAS: 0
DOUBLE VISION: 0
FEVER: 0
NAUSEA: 0
CHILLS: 0

## 2024-10-04 ASSESSMENT — PAIN DESCRIPTION - PAIN TYPE: TYPE: ACUTE PAIN

## 2024-10-04 ASSESSMENT — FIBROSIS 4 INDEX
FIB4 SCORE: 2.43
FIB4 SCORE: 2.12

## 2024-10-05 ENCOUNTER — APPOINTMENT (OUTPATIENT)
Dept: RADIOLOGY | Facility: MEDICAL CENTER | Age: 79
DRG: 243 | End: 2024-10-05
Attending: STUDENT IN AN ORGANIZED HEALTH CARE EDUCATION/TRAINING PROGRAM
Payer: MEDICARE

## 2024-10-05 ENCOUNTER — APPOINTMENT (OUTPATIENT)
Dept: CARDIOLOGY | Facility: MEDICAL CENTER | Age: 79
DRG: 243 | End: 2024-10-05
Attending: NURSE PRACTITIONER
Payer: MEDICARE

## 2024-10-05 LAB
ANION GAP SERPL CALC-SCNC: 7 MMOL/L (ref 7–16)
BUN SERPL-MCNC: 38 MG/DL (ref 8–22)
CALCIUM SERPL-MCNC: 9.5 MG/DL (ref 8.5–10.5)
CHLORIDE SERPL-SCNC: 111 MMOL/L (ref 96–112)
CO2 SERPL-SCNC: 23 MMOL/L (ref 20–33)
CREAT SERPL-MCNC: 1.19 MG/DL (ref 0.5–1.4)
EKG IMPRESSION: NORMAL
GFR SERPLBLD CREATININE-BSD FMLA CKD-EPI: 46 ML/MIN/1.73 M 2
GLUCOSE SERPL-MCNC: 86 MG/DL (ref 65–99)
LV EJECT FRACT  99904: 70
LV EJECT FRACT MOD 2C 99903: 76.47
LV EJECT FRACT MOD 4C 99902: 71.9
LV EJECT FRACT MOD BP 99901: 75.04
POTASSIUM SERPL-SCNC: 5.2 MMOL/L (ref 3.6–5.5)
SODIUM SERPL-SCNC: 141 MMOL/L (ref 135–145)

## 2024-10-05 PROCEDURE — 700102 HCHG RX REV CODE 250 W/ 637 OVERRIDE(OP): Performed by: NURSE PRACTITIONER

## 2024-10-05 PROCEDURE — 36415 COLL VENOUS BLD VENIPUNCTURE: CPT

## 2024-10-05 PROCEDURE — 93306 TTE W/DOPPLER COMPLETE: CPT | Mod: 26 | Performed by: INTERNAL MEDICINE

## 2024-10-05 PROCEDURE — 700102 HCHG RX REV CODE 250 W/ 637 OVERRIDE(OP): Performed by: STUDENT IN AN ORGANIZED HEALTH CARE EDUCATION/TRAINING PROGRAM

## 2024-10-05 PROCEDURE — 770020 HCHG ROOM/CARE - TELE (206)

## 2024-10-05 PROCEDURE — 80048 BASIC METABOLIC PNL TOTAL CA: CPT

## 2024-10-05 PROCEDURE — A9270 NON-COVERED ITEM OR SERVICE: HCPCS | Performed by: NURSE PRACTITIONER

## 2024-10-05 PROCEDURE — 93306 TTE W/DOPPLER COMPLETE: CPT

## 2024-10-05 PROCEDURE — 93971 EXTREMITY STUDY: CPT | Mod: LT

## 2024-10-05 PROCEDURE — 700102 HCHG RX REV CODE 250 W/ 637 OVERRIDE(OP)

## 2024-10-05 PROCEDURE — 99232 SBSQ HOSP IP/OBS MODERATE 35: CPT | Mod: FS | Performed by: STUDENT IN AN ORGANIZED HEALTH CARE EDUCATION/TRAINING PROGRAM

## 2024-10-05 PROCEDURE — A9270 NON-COVERED ITEM OR SERVICE: HCPCS | Performed by: STUDENT IN AN ORGANIZED HEALTH CARE EDUCATION/TRAINING PROGRAM

## 2024-10-05 PROCEDURE — A9270 NON-COVERED ITEM OR SERVICE: HCPCS

## 2024-10-05 PROCEDURE — 700111 HCHG RX REV CODE 636 W/ 250 OVERRIDE (IP): Performed by: STUDENT IN AN ORGANIZED HEALTH CARE EDUCATION/TRAINING PROGRAM

## 2024-10-05 PROCEDURE — 99233 SBSQ HOSP IP/OBS HIGH 50: CPT | Performed by: STUDENT IN AN ORGANIZED HEALTH CARE EDUCATION/TRAINING PROGRAM

## 2024-10-05 PROCEDURE — 93005 ELECTROCARDIOGRAM TRACING: CPT | Performed by: STUDENT IN AN ORGANIZED HEALTH CARE EDUCATION/TRAINING PROGRAM

## 2024-10-05 RX ORDER — ACETAMINOPHEN 325 MG/1
650 TABLET ORAL EVERY 6 HOURS PRN
Status: DISCONTINUED | OUTPATIENT
Start: 2024-10-05 | End: 2024-10-08 | Stop reason: HOSPADM

## 2024-10-05 RX ORDER — SPIRONOLACTONE 25 MG/1
25 TABLET ORAL
Status: DISCONTINUED | OUTPATIENT
Start: 2024-10-05 | End: 2024-10-08 | Stop reason: HOSPADM

## 2024-10-05 RX ADMIN — PREDNISONE 5 MG: 5 TABLET ORAL at 05:26

## 2024-10-05 RX ADMIN — ACETAMINOPHEN 650 MG: 325 TABLET ORAL at 04:04

## 2024-10-05 RX ADMIN — ZOLPIDEM TARTRATE 5 MG: 5 TABLET ORAL at 22:16

## 2024-10-05 RX ADMIN — FOLIC ACID 1 MG: 1 TABLET ORAL at 05:26

## 2024-10-05 RX ADMIN — ROSUVASTATIN CALCIUM 10 MG: 10 TABLET, FILM COATED ORAL at 17:28

## 2024-10-05 RX ADMIN — DAPAGLIFLOZIN 10 MG: 10 TABLET, FILM COATED ORAL at 05:26

## 2024-10-05 RX ADMIN — SERTRALINE 100 MG: 100 TABLET, FILM COATED ORAL at 05:26

## 2024-10-05 RX ADMIN — SPIRONOLACTONE 25 MG: 25 TABLET ORAL at 12:30

## 2024-10-05 RX ADMIN — ENOXAPARIN SODIUM 40 MG: 100 INJECTION SUBCUTANEOUS at 17:28

## 2024-10-05 ASSESSMENT — COGNITIVE AND FUNCTIONAL STATUS - GENERAL
SUGGESTED CMS G CODE MODIFIER DAILY ACTIVITY: CK
DAILY ACTIVITIY SCORE: 19
CLIMB 3 TO 5 STEPS WITH RAILING: A LOT
DRESSING REGULAR UPPER BODY CLOTHING: A LITTLE
PERSONAL GROOMING: A LITTLE
TOILETING: A LITTLE
SUGGESTED CMS G CODE MODIFIER MOBILITY: CK
MOVING TO AND FROM BED TO CHAIR: A LITTLE
MOVING FROM LYING ON BACK TO SITTING ON SIDE OF FLAT BED: A LITTLE
MOBILITY SCORE: 17
TURNING FROM BACK TO SIDE WHILE IN FLAT BAD: A LITTLE
DRESSING REGULAR LOWER BODY CLOTHING: A LITTLE
STANDING UP FROM CHAIR USING ARMS: A LITTLE
WALKING IN HOSPITAL ROOM: A LITTLE
EATING MEALS: A LITTLE

## 2024-10-05 ASSESSMENT — ENCOUNTER SYMPTOMS
COUGH: 0
APNEA: 0
CHOKING: 0
WHEEZING: 0
CHEST TIGHTNESS: 0
STRIDOR: 0
SHORTNESS OF BREATH: 0

## 2024-10-05 ASSESSMENT — PAIN DESCRIPTION - PAIN TYPE
TYPE: ACUTE PAIN

## 2024-10-05 ASSESSMENT — FIBROSIS 4 INDEX: FIB4 SCORE: 2.12

## 2024-10-06 ENCOUNTER — APPOINTMENT (OUTPATIENT)
Dept: RADIOLOGY | Facility: MEDICAL CENTER | Age: 79
DRG: 243 | End: 2024-10-06
Attending: INTERNAL MEDICINE
Payer: MEDICARE

## 2024-10-06 LAB
ANION GAP SERPL CALC-SCNC: 6 MMOL/L (ref 7–16)
BASOPHILS # BLD AUTO: 0.8 % (ref 0–1.8)
BASOPHILS # BLD: 0.06 K/UL (ref 0–0.12)
BUN SERPL-MCNC: 29 MG/DL (ref 8–22)
CALCIUM SERPL-MCNC: 10.1 MG/DL (ref 8.5–10.5)
CHLORIDE SERPL-SCNC: 108 MMOL/L (ref 96–112)
CO2 SERPL-SCNC: 25 MMOL/L (ref 20–33)
CREAT SERPL-MCNC: 1.04 MG/DL (ref 0.5–1.4)
EOSINOPHIL # BLD AUTO: 0.35 K/UL (ref 0–0.51)
EOSINOPHIL NFR BLD: 4.5 % (ref 0–6.9)
ERYTHROCYTE [DISTWIDTH] IN BLOOD BY AUTOMATED COUNT: 49 FL (ref 35.9–50)
GFR SERPLBLD CREATININE-BSD FMLA CKD-EPI: 54 ML/MIN/1.73 M 2
GLUCOSE SERPL-MCNC: 106 MG/DL (ref 65–99)
HCT VFR BLD AUTO: 44.1 % (ref 37–47)
HGB BLD-MCNC: 14 G/DL (ref 12–16)
IMM GRANULOCYTES # BLD AUTO: 0.02 K/UL (ref 0–0.11)
IMM GRANULOCYTES NFR BLD AUTO: 0.3 % (ref 0–0.9)
LYMPHOCYTES # BLD AUTO: 1.1 K/UL (ref 1–4.8)
LYMPHOCYTES NFR BLD: 14.3 % (ref 22–41)
MAGNESIUM SERPL-MCNC: 1.8 MG/DL (ref 1.5–2.5)
MCH RBC QN AUTO: 33.7 PG (ref 27–33)
MCHC RBC AUTO-ENTMCNC: 31.7 G/DL (ref 32.2–35.5)
MCV RBC AUTO: 106 FL (ref 81.4–97.8)
MONOCYTES # BLD AUTO: 0.69 K/UL (ref 0–0.85)
MONOCYTES NFR BLD AUTO: 8.9 % (ref 0–13.4)
NEUTROPHILS # BLD AUTO: 5.49 K/UL (ref 1.82–7.42)
NEUTROPHILS NFR BLD: 71.2 % (ref 44–72)
NRBC # BLD AUTO: 0 K/UL
NRBC BLD-RTO: 0 /100 WBC (ref 0–0.2)
PLATELET # BLD AUTO: 153 K/UL (ref 164–446)
PMV BLD AUTO: 11.2 FL (ref 9–12.9)
POTASSIUM SERPL-SCNC: 5 MMOL/L (ref 3.6–5.5)
RBC # BLD AUTO: 4.16 M/UL (ref 4.2–5.4)
SODIUM SERPL-SCNC: 139 MMOL/L (ref 135–145)
WBC # BLD AUTO: 7.7 K/UL (ref 4.8–10.8)

## 2024-10-06 PROCEDURE — 700102 HCHG RX REV CODE 250 W/ 637 OVERRIDE(OP): Performed by: NURSE PRACTITIONER

## 2024-10-06 PROCEDURE — 99233 SBSQ HOSP IP/OBS HIGH 50: CPT | Mod: FS | Performed by: STUDENT IN AN ORGANIZED HEALTH CARE EDUCATION/TRAINING PROGRAM

## 2024-10-06 PROCEDURE — 770020 HCHG ROOM/CARE - TELE (206)

## 2024-10-06 PROCEDURE — 71045 X-RAY EXAM CHEST 1 VIEW: CPT

## 2024-10-06 PROCEDURE — 83735 ASSAY OF MAGNESIUM: CPT

## 2024-10-06 PROCEDURE — 700111 HCHG RX REV CODE 636 W/ 250 OVERRIDE (IP): Mod: JZ | Performed by: STUDENT IN AN ORGANIZED HEALTH CARE EDUCATION/TRAINING PROGRAM

## 2024-10-06 PROCEDURE — 700102 HCHG RX REV CODE 250 W/ 637 OVERRIDE(OP): Performed by: INTERNAL MEDICINE

## 2024-10-06 PROCEDURE — A9270 NON-COVERED ITEM OR SERVICE: HCPCS

## 2024-10-06 PROCEDURE — 80048 BASIC METABOLIC PNL TOTAL CA: CPT

## 2024-10-06 PROCEDURE — 36415 COLL VENOUS BLD VENIPUNCTURE: CPT

## 2024-10-06 PROCEDURE — 700102 HCHG RX REV CODE 250 W/ 637 OVERRIDE(OP)

## 2024-10-06 PROCEDURE — 99233 SBSQ HOSP IP/OBS HIGH 50: CPT | Performed by: INTERNAL MEDICINE

## 2024-10-06 PROCEDURE — A9270 NON-COVERED ITEM OR SERVICE: HCPCS | Performed by: NURSE PRACTITIONER

## 2024-10-06 PROCEDURE — A9270 NON-COVERED ITEM OR SERVICE: HCPCS | Performed by: STUDENT IN AN ORGANIZED HEALTH CARE EDUCATION/TRAINING PROGRAM

## 2024-10-06 PROCEDURE — 700102 HCHG RX REV CODE 250 W/ 637 OVERRIDE(OP): Performed by: STUDENT IN AN ORGANIZED HEALTH CARE EDUCATION/TRAINING PROGRAM

## 2024-10-06 PROCEDURE — A9270 NON-COVERED ITEM OR SERVICE: HCPCS | Performed by: INTERNAL MEDICINE

## 2024-10-06 PROCEDURE — 85025 COMPLETE CBC W/AUTO DIFF WBC: CPT

## 2024-10-06 RX ORDER — LISINOPRIL 20 MG/1
40 TABLET ORAL DAILY
Status: DISCONTINUED | OUTPATIENT
Start: 2024-10-06 | End: 2024-10-08 | Stop reason: HOSPADM

## 2024-10-06 RX ADMIN — PREDNISONE 5 MG: 5 TABLET ORAL at 05:24

## 2024-10-06 RX ADMIN — ENOXAPARIN SODIUM 40 MG: 100 INJECTION SUBCUTANEOUS at 17:37

## 2024-10-06 RX ADMIN — FOLIC ACID 1 MG: 1 TABLET ORAL at 05:24

## 2024-10-06 RX ADMIN — ZOLPIDEM TARTRATE 5 MG: 5 TABLET ORAL at 22:01

## 2024-10-06 RX ADMIN — LISINOPRIL 40 MG: 20 TABLET ORAL at 13:46

## 2024-10-06 RX ADMIN — ACETAMINOPHEN 650 MG: 325 TABLET ORAL at 12:49

## 2024-10-06 RX ADMIN — ACETAMINOPHEN 650 MG: 325 TABLET ORAL at 03:41

## 2024-10-06 RX ADMIN — DAPAGLIFLOZIN 10 MG: 10 TABLET, FILM COATED ORAL at 05:24

## 2024-10-06 RX ADMIN — ACETAMINOPHEN 650 MG: 325 TABLET ORAL at 22:01

## 2024-10-06 RX ADMIN — ROSUVASTATIN CALCIUM 10 MG: 10 TABLET, FILM COATED ORAL at 17:37

## 2024-10-06 RX ADMIN — SPIRONOLACTONE 25 MG: 25 TABLET ORAL at 05:24

## 2024-10-06 RX ADMIN — SERTRALINE 100 MG: 100 TABLET, FILM COATED ORAL at 05:24

## 2024-10-06 ASSESSMENT — ENCOUNTER SYMPTOMS
APNEA: 0
NAUSEA: 0
SHORTNESS OF BREATH: 0
WHEEZING: 0
CHILLS: 0
FEVER: 0
LOSS OF CONSCIOUSNESS: 1
DEPRESSION: 0
STRIDOR: 0
COUGH: 0
ABDOMINAL PAIN: 0
MYALGIAS: 0
CHEST TIGHTNESS: 0
FALLS: 1
CHOKING: 0
DIZZINESS: 0
HEADACHES: 0

## 2024-10-06 ASSESSMENT — PAIN DESCRIPTION - PAIN TYPE: TYPE: ACUTE PAIN

## 2024-10-06 ASSESSMENT — FIBROSIS 4 INDEX: FIB4 SCORE: 2.12

## 2024-10-07 ENCOUNTER — APPOINTMENT (OUTPATIENT)
Dept: CARDIOLOGY | Facility: MEDICAL CENTER | Age: 79
DRG: 243 | End: 2024-10-07
Attending: NURSE PRACTITIONER
Payer: MEDICARE

## 2024-10-07 ENCOUNTER — APPOINTMENT (OUTPATIENT)
Dept: RADIOLOGY | Facility: MEDICAL CENTER | Age: 79
DRG: 243 | End: 2024-10-07
Attending: STUDENT IN AN ORGANIZED HEALTH CARE EDUCATION/TRAINING PROGRAM
Payer: MEDICARE

## 2024-10-07 LAB — EKG IMPRESSION: NORMAL

## 2024-10-07 PROCEDURE — A9270 NON-COVERED ITEM OR SERVICE: HCPCS | Performed by: STUDENT IN AN ORGANIZED HEALTH CARE EDUCATION/TRAINING PROGRAM

## 2024-10-07 PROCEDURE — 0JH606Z INSERTION OF PACEMAKER, DUAL CHAMBER INTO CHEST SUBCUTANEOUS TISSUE AND FASCIA, OPEN APPROACH: ICD-10-PCS | Performed by: STUDENT IN AN ORGANIZED HEALTH CARE EDUCATION/TRAINING PROGRAM

## 2024-10-07 PROCEDURE — 99233 SBSQ HOSP IP/OBS HIGH 50: CPT | Performed by: INTERNAL MEDICINE

## 2024-10-07 PROCEDURE — 700102 HCHG RX REV CODE 250 W/ 637 OVERRIDE(OP): Performed by: STUDENT IN AN ORGANIZED HEALTH CARE EDUCATION/TRAINING PROGRAM

## 2024-10-07 PROCEDURE — 33208 INSRT HEART PM ATRIAL & VENT: CPT | Mod: KX | Performed by: STUDENT IN AN ORGANIZED HEALTH CARE EDUCATION/TRAINING PROGRAM

## 2024-10-07 PROCEDURE — A9270 NON-COVERED ITEM OR SERVICE: HCPCS | Performed by: NURSE PRACTITIONER

## 2024-10-07 PROCEDURE — A9270 NON-COVERED ITEM OR SERVICE: HCPCS | Performed by: INTERNAL MEDICINE

## 2024-10-07 PROCEDURE — 93005 ELECTROCARDIOGRAM TRACING: CPT | Performed by: STUDENT IN AN ORGANIZED HEALTH CARE EDUCATION/TRAINING PROGRAM

## 2024-10-07 PROCEDURE — 700102 HCHG RX REV CODE 250 W/ 637 OVERRIDE(OP): Performed by: NURSE PRACTITIONER

## 2024-10-07 PROCEDURE — 700111 HCHG RX REV CODE 636 W/ 250 OVERRIDE (IP): Mod: JG

## 2024-10-07 PROCEDURE — 770020 HCHG ROOM/CARE - TELE (206)

## 2024-10-07 PROCEDURE — 700111 HCHG RX REV CODE 636 W/ 250 OVERRIDE (IP): Performed by: STUDENT IN AN ORGANIZED HEALTH CARE EDUCATION/TRAINING PROGRAM

## 2024-10-07 PROCEDURE — 02H63JZ INSERTION OF PACEMAKER LEAD INTO RIGHT ATRIUM, PERCUTANEOUS APPROACH: ICD-10-PCS | Performed by: STUDENT IN AN ORGANIZED HEALTH CARE EDUCATION/TRAINING PROGRAM

## 2024-10-07 PROCEDURE — 700102 HCHG RX REV CODE 250 W/ 637 OVERRIDE(OP): Performed by: INTERNAL MEDICINE

## 2024-10-07 PROCEDURE — 99153 MOD SED SAME PHYS/QHP EA: CPT

## 2024-10-07 PROCEDURE — A9270 NON-COVERED ITEM OR SERVICE: HCPCS

## 2024-10-07 PROCEDURE — 02HK3JZ INSERTION OF PACEMAKER LEAD INTO RIGHT VENTRICLE, PERCUTANEOUS APPROACH: ICD-10-PCS | Performed by: STUDENT IN AN ORGANIZED HEALTH CARE EDUCATION/TRAINING PROGRAM

## 2024-10-07 PROCEDURE — 700101 HCHG RX REV CODE 250

## 2024-10-07 PROCEDURE — 700102 HCHG RX REV CODE 250 W/ 637 OVERRIDE(OP)

## 2024-10-07 PROCEDURE — 99152 MOD SED SAME PHYS/QHP 5/>YRS: CPT | Performed by: STUDENT IN AN ORGANIZED HEALTH CARE EDUCATION/TRAINING PROGRAM

## 2024-10-07 PROCEDURE — 99232 SBSQ HOSP IP/OBS MODERATE 35: CPT | Performed by: INTERNAL MEDICINE

## 2024-10-07 PROCEDURE — 71045 X-RAY EXAM CHEST 1 VIEW: CPT

## 2024-10-07 RX ORDER — MIDAZOLAM HYDROCHLORIDE 1 MG/ML
INJECTION INTRAMUSCULAR; INTRAVENOUS
Status: COMPLETED
Start: 2024-10-07 | End: 2024-10-07

## 2024-10-07 RX ORDER — LIDOCAINE HYDROCHLORIDE 20 MG/ML
INJECTION, SOLUTION INFILTRATION; PERINEURAL
Status: COMPLETED
Start: 2024-10-07 | End: 2024-10-07

## 2024-10-07 RX ORDER — AMOXICILLIN 250 MG
2 CAPSULE ORAL EVERY EVENING
Status: DISCONTINUED | OUTPATIENT
Start: 2024-10-07 | End: 2024-10-08 | Stop reason: HOSPADM

## 2024-10-07 RX ORDER — DIPHENHYDRAMINE HYDROCHLORIDE 50 MG/ML
INJECTION INTRAMUSCULAR; INTRAVENOUS
Status: COMPLETED
Start: 2024-10-07 | End: 2024-10-07

## 2024-10-07 RX ORDER — HYDROCODONE BITARTRATE AND ACETAMINOPHEN 5; 325 MG/1; MG/1
1 TABLET ORAL ONCE
Status: COMPLETED | OUTPATIENT
Start: 2024-10-07 | End: 2024-10-07

## 2024-10-07 RX ORDER — POLYETHYLENE GLYCOL 3350 17 G/17G
1 POWDER, FOR SOLUTION ORAL
Status: DISCONTINUED | OUTPATIENT
Start: 2024-10-07 | End: 2024-10-08 | Stop reason: HOSPADM

## 2024-10-07 RX ORDER — HYDRALAZINE HYDROCHLORIDE 20 MG/ML
INJECTION INTRAMUSCULAR; INTRAVENOUS
Status: COMPLETED
Start: 2024-10-07 | End: 2024-10-07

## 2024-10-07 RX ORDER — BUPIVACAINE HYDROCHLORIDE 5 MG/ML
INJECTION, SOLUTION EPIDURAL; INTRACAUDAL
Status: COMPLETED
Start: 2024-10-07 | End: 2024-10-07

## 2024-10-07 RX ORDER — CEFAZOLIN SODIUM 1 G/3ML
INJECTION, POWDER, FOR SOLUTION INTRAMUSCULAR; INTRAVENOUS
Status: COMPLETED
Start: 2024-10-07 | End: 2024-10-07

## 2024-10-07 RX ADMIN — LISINOPRIL 40 MG: 20 TABLET ORAL at 06:33

## 2024-10-07 RX ADMIN — MIDAZOLAM HYDROCHLORIDE 2 MG: 1 INJECTION, SOLUTION INTRAMUSCULAR; INTRAVENOUS at 14:51

## 2024-10-07 RX ADMIN — DIPHENHYDRAMINE HYDROCHLORIDE 25 MG: 50 INJECTION, SOLUTION INTRAMUSCULAR; INTRAVENOUS at 16:17

## 2024-10-07 RX ADMIN — SPIRONOLACTONE 25 MG: 25 TABLET ORAL at 06:33

## 2024-10-07 RX ADMIN — ACETAMINOPHEN 650 MG: 325 TABLET ORAL at 18:24

## 2024-10-07 RX ADMIN — HYDRALAZINE HYDROCHLORIDE 10 MG: 20 INJECTION INTRAMUSCULAR; INTRAVENOUS at 16:44

## 2024-10-07 RX ADMIN — FENTANYL CITRATE 100 MCG: 50 INJECTION, SOLUTION INTRAMUSCULAR; INTRAVENOUS at 14:51

## 2024-10-07 RX ADMIN — LIDOCAINE HYDROCHLORIDE: 20 INJECTION, SOLUTION INFILTRATION; PERINEURAL at 14:45

## 2024-10-07 RX ADMIN — MIDAZOLAM HYDROCHLORIDE 2 MG: 1 INJECTION, SOLUTION INTRAMUSCULAR; INTRAVENOUS at 15:28

## 2024-10-07 RX ADMIN — MIDAZOLAM HYDROCHLORIDE 2 MG: 2 INJECTION, SOLUTION INTRAMUSCULAR; INTRAVENOUS at 14:19

## 2024-10-07 RX ADMIN — HYDROCODONE BITARTRATE AND ACETAMINOPHEN 1 TABLET: 5; 325 TABLET ORAL at 20:57

## 2024-10-07 RX ADMIN — ROSUVASTATIN CALCIUM 10 MG: 10 TABLET, FILM COATED ORAL at 17:25

## 2024-10-07 RX ADMIN — FENTANYL CITRATE 100 MCG: 50 INJECTION, SOLUTION INTRAMUSCULAR; INTRAVENOUS at 14:19

## 2024-10-07 RX ADMIN — CEFAZOLIN 3000 MG: 1 INJECTION, POWDER, FOR SOLUTION INTRAMUSCULAR; INTRAVENOUS at 14:09

## 2024-10-07 RX ADMIN — CEFAZOLIN 2000 MG: 1 INJECTION, POWDER, FOR SOLUTION INTRAMUSCULAR; INTRAVENOUS at 16:42

## 2024-10-07 RX ADMIN — BUPIVACAINE HYDROCHLORIDE: 5 INJECTION, SOLUTION EPIDURAL; INTRACAUDAL at 14:09

## 2024-10-07 RX ADMIN — LIDOCAINE HYDROCHLORIDE: 20 INJECTION, SOLUTION INFILTRATION; PERINEURAL at 14:09

## 2024-10-07 RX ADMIN — FOLIC ACID 1 MG: 1 TABLET ORAL at 06:33

## 2024-10-07 RX ADMIN — LIDOCAINE HYDROCHLORIDE: 20 INJECTION, SOLUTION INFILTRATION; PERINEURAL at 15:34

## 2024-10-07 RX ADMIN — PREDNISONE 5 MG: 5 TABLET ORAL at 06:33

## 2024-10-07 RX ADMIN — ZOLPIDEM TARTRATE 5 MG: 5 TABLET ORAL at 21:40

## 2024-10-07 RX ADMIN — MIDAZOLAM HYDROCHLORIDE 2 MG: 2 INJECTION, SOLUTION INTRAMUSCULAR; INTRAVENOUS at 15:00

## 2024-10-07 RX ADMIN — DAPAGLIFLOZIN 10 MG: 10 TABLET, FILM COATED ORAL at 06:33

## 2024-10-07 RX ADMIN — MIDAZOLAM HYDROCHLORIDE 2 MG: 1 INJECTION, SOLUTION INTRAMUSCULAR; INTRAVENOUS at 14:59

## 2024-10-07 RX ADMIN — HYDRALAZINE HYDROCHLORIDE 10 MG: 20 INJECTION INTRAMUSCULAR; INTRAVENOUS at 16:36

## 2024-10-07 RX ADMIN — SERTRALINE 100 MG: 100 TABLET, FILM COATED ORAL at 06:33

## 2024-10-07 RX ADMIN — FENTANYL CITRATE 100 MCG: 50 INJECTION, SOLUTION INTRAMUSCULAR; INTRAVENOUS at 16:16

## 2024-10-07 ASSESSMENT — ENCOUNTER SYMPTOMS
WEAKNESS: 0
SPEECH DIFFICULTY: 0
DEPRESSION: 0
CHILLS: 0
HEADACHES: 0
FEVER: 0
DIZZINESS: 0
NERVOUS/ANXIOUS: 1
COUGH: 0
CHEST TIGHTNESS: 0
TROUBLE SWALLOWING: 0
SHORTNESS OF BREATH: 0
NAUSEA: 0
WHEEZING: 0
FATIGUE: 0
PALPITATIONS: 0
NUMBNESS: 0
MYALGIAS: 0
FALLS: 1
LIGHT-HEADEDNESS: 0

## 2024-10-07 ASSESSMENT — PAIN DESCRIPTION - PAIN TYPE
TYPE: ACUTE PAIN

## 2024-10-07 ASSESSMENT — FIBROSIS 4 INDEX: FIB4 SCORE: 2.07

## 2024-10-08 ENCOUNTER — APPOINTMENT (OUTPATIENT)
Dept: RADIOLOGY | Facility: MEDICAL CENTER | Age: 79
DRG: 243 | End: 2024-10-08
Attending: STUDENT IN AN ORGANIZED HEALTH CARE EDUCATION/TRAINING PROGRAM
Payer: MEDICARE

## 2024-10-08 ENCOUNTER — HOME HEALTH ADMISSION (OUTPATIENT)
Dept: HOME HEALTH SERVICES | Facility: HOME HEALTHCARE | Age: 79
End: 2024-10-08
Payer: MEDICARE

## 2024-10-08 VITALS
HEIGHT: 67 IN | WEIGHT: 225.31 LBS | OXYGEN SATURATION: 95 % | BODY MASS INDEX: 35.36 KG/M2 | TEMPERATURE: 97.7 F | DIASTOLIC BLOOD PRESSURE: 53 MMHG | HEART RATE: 60 BPM | SYSTOLIC BLOOD PRESSURE: 112 MMHG | RESPIRATION RATE: 17 BRPM

## 2024-10-08 LAB
ALBUMIN SERPL BCP-MCNC: 3.6 G/DL (ref 3.2–4.9)
ANION GAP SERPL CALC-SCNC: 7 MMOL/L (ref 7–16)
BUN SERPL-MCNC: 33 MG/DL (ref 8–22)
CALCIUM ALBUM COR SERPL-MCNC: 10.2 MG/DL (ref 8.5–10.5)
CALCIUM SERPL-MCNC: 9.9 MG/DL (ref 8.5–10.5)
CHLORIDE SERPL-SCNC: 108 MMOL/L (ref 96–112)
CO2 SERPL-SCNC: 25 MMOL/L (ref 20–33)
CREAT SERPL-MCNC: 1.34 MG/DL (ref 0.5–1.4)
ERYTHROCYTE [DISTWIDTH] IN BLOOD BY AUTOMATED COUNT: 47.2 FL (ref 35.9–50)
GFR SERPLBLD CREATININE-BSD FMLA CKD-EPI: 40 ML/MIN/1.73 M 2
GLUCOSE SERPL-MCNC: 103 MG/DL (ref 65–99)
HCT VFR BLD AUTO: 44.3 % (ref 37–47)
HGB BLD-MCNC: 14.2 G/DL (ref 12–16)
MCH RBC QN AUTO: 33.3 PG (ref 27–33)
MCHC RBC AUTO-ENTMCNC: 32.1 G/DL (ref 32.2–35.5)
MCV RBC AUTO: 103.7 FL (ref 81.4–97.8)
PHOSPHATE SERPL-MCNC: 3.7 MG/DL (ref 2.5–4.5)
PLATELET # BLD AUTO: 160 K/UL (ref 164–446)
PMV BLD AUTO: 10.9 FL (ref 9–12.9)
POTASSIUM SERPL-SCNC: 5 MMOL/L (ref 3.6–5.5)
RBC # BLD AUTO: 4.27 M/UL (ref 4.2–5.4)
SODIUM SERPL-SCNC: 140 MMOL/L (ref 135–145)
WBC # BLD AUTO: 10 K/UL (ref 4.8–10.8)

## 2024-10-08 PROCEDURE — 97535 SELF CARE MNGMENT TRAINING: CPT

## 2024-10-08 PROCEDURE — 700102 HCHG RX REV CODE 250 W/ 637 OVERRIDE(OP): Performed by: NURSE PRACTITIONER

## 2024-10-08 PROCEDURE — A9270 NON-COVERED ITEM OR SERVICE: HCPCS | Performed by: NURSE PRACTITIONER

## 2024-10-08 PROCEDURE — 700111 HCHG RX REV CODE 636 W/ 250 OVERRIDE (IP): Performed by: NURSE PRACTITIONER

## 2024-10-08 PROCEDURE — 700102 HCHG RX REV CODE 250 W/ 637 OVERRIDE(OP): Performed by: STUDENT IN AN ORGANIZED HEALTH CARE EDUCATION/TRAINING PROGRAM

## 2024-10-08 PROCEDURE — A9270 NON-COVERED ITEM OR SERVICE: HCPCS | Performed by: INTERNAL MEDICINE

## 2024-10-08 PROCEDURE — 700105 HCHG RX REV CODE 258: Performed by: NURSE PRACTITIONER

## 2024-10-08 PROCEDURE — 99239 HOSP IP/OBS DSCHRG MGMT >30: CPT | Performed by: INTERNAL MEDICINE

## 2024-10-08 PROCEDURE — 80069 RENAL FUNCTION PANEL: CPT

## 2024-10-08 PROCEDURE — 97166 OT EVAL MOD COMPLEX 45 MIN: CPT

## 2024-10-08 PROCEDURE — 85027 COMPLETE CBC AUTOMATED: CPT

## 2024-10-08 PROCEDURE — 97162 PT EVAL MOD COMPLEX 30 MIN: CPT

## 2024-10-08 PROCEDURE — 36415 COLL VENOUS BLD VENIPUNCTURE: CPT

## 2024-10-08 PROCEDURE — 99232 SBSQ HOSP IP/OBS MODERATE 35: CPT | Performed by: INTERNAL MEDICINE

## 2024-10-08 PROCEDURE — 71045 X-RAY EXAM CHEST 1 VIEW: CPT

## 2024-10-08 PROCEDURE — 700102 HCHG RX REV CODE 250 W/ 637 OVERRIDE(OP): Performed by: INTERNAL MEDICINE

## 2024-10-08 PROCEDURE — A9270 NON-COVERED ITEM OR SERVICE: HCPCS | Performed by: STUDENT IN AN ORGANIZED HEALTH CARE EDUCATION/TRAINING PROGRAM

## 2024-10-08 PROCEDURE — 97165 OT EVAL LOW COMPLEX 30 MIN: CPT

## 2024-10-08 PROCEDURE — 700111 HCHG RX REV CODE 636 W/ 250 OVERRIDE (IP): Performed by: STUDENT IN AN ORGANIZED HEALTH CARE EDUCATION/TRAINING PROGRAM

## 2024-10-08 RX ORDER — MINOXIDIL 2.5 MG/1
2.5 TABLET ORAL
Status: DISCONTINUED | OUTPATIENT
Start: 2024-10-08 | End: 2024-10-08 | Stop reason: HOSPADM

## 2024-10-08 RX ADMIN — LISINOPRIL 40 MG: 20 TABLET ORAL at 05:04

## 2024-10-08 RX ADMIN — PREDNISONE 5 MG: 5 TABLET ORAL at 05:04

## 2024-10-08 RX ADMIN — MINOXIDIL 2.5 MG: 2.5 TABLET ORAL at 07:59

## 2024-10-08 RX ADMIN — ACETAMINOPHEN 650 MG: 325 TABLET ORAL at 10:44

## 2024-10-08 RX ADMIN — ACETAMINOPHEN 650 MG: 325 TABLET ORAL at 05:04

## 2024-10-08 RX ADMIN — SPIRONOLACTONE 25 MG: 25 TABLET ORAL at 05:04

## 2024-10-08 RX ADMIN — FOLIC ACID 1 MG: 1 TABLET ORAL at 05:04

## 2024-10-08 RX ADMIN — DAPAGLIFLOZIN 10 MG: 10 TABLET, FILM COATED ORAL at 05:04

## 2024-10-08 RX ADMIN — CEFAZOLIN 2 G: 2 INJECTION, POWDER, FOR SOLUTION INTRAMUSCULAR; INTRAVENOUS at 10:50

## 2024-10-08 RX ADMIN — SERTRALINE 100 MG: 100 TABLET, FILM COATED ORAL at 05:04

## 2024-10-08 ASSESSMENT — PATIENT HEALTH QUESTIONNAIRE - PHQ9
1. LITTLE INTEREST OR PLEASURE IN DOING THINGS: NOT AT ALL
SUM OF ALL RESPONSES TO PHQ9 QUESTIONS 1 AND 2: 0
2. FEELING DOWN, DEPRESSED, IRRITABLE, OR HOPELESS: NOT AT ALL

## 2024-10-08 ASSESSMENT — GAIT ASSESSMENTS
DISTANCE (FEET): 300
GAIT LEVEL OF ASSIST: STANDBY ASSIST
DEVIATION: NO DEVIATION
ASSISTIVE DEVICE: FRONT WHEEL WALKER

## 2024-10-08 ASSESSMENT — COGNITIVE AND FUNCTIONAL STATUS - GENERAL
MOVING TO AND FROM BED TO CHAIR: A LITTLE
TOILETING: A LITTLE
WALKING IN HOSPITAL ROOM: A LITTLE
HELP NEEDED FOR BATHING: A LITTLE
SUGGESTED CMS G CODE MODIFIER DAILY ACTIVITY: CK
MOVING FROM LYING ON BACK TO SITTING ON SIDE OF FLAT BED: A LITTLE
MOBILITY SCORE: 18
DRESSING REGULAR LOWER BODY CLOTHING: A LOT
STANDING UP FROM CHAIR USING ARMS: A LITTLE
TURNING FROM BACK TO SIDE WHILE IN FLAT BAD: A LITTLE
PERSONAL GROOMING: A LITTLE
DRESSING REGULAR UPPER BODY CLOTHING: A LITTLE
SUGGESTED CMS G CODE MODIFIER MOBILITY: CK
DAILY ACTIVITIY SCORE: 18
CLIMB 3 TO 5 STEPS WITH RAILING: A LITTLE

## 2024-10-08 ASSESSMENT — ENCOUNTER SYMPTOMS
CHEST TIGHTNESS: 0
PALPITATIONS: 0
BLOOD IN STOOL: 0
SHORTNESS OF BREATH: 0
ABDOMINAL PAIN: 0
FEVER: 0
DIZZINESS: 0

## 2024-10-08 ASSESSMENT — FIBROSIS 4 INDEX: FIB4 SCORE: 1.98

## 2024-10-08 ASSESSMENT — PAIN DESCRIPTION - PAIN TYPE: TYPE: ACUTE PAIN

## 2024-10-08 ASSESSMENT — ACTIVITIES OF DAILY LIVING (ADL): TOILETING: INDEPENDENT

## 2024-10-12 ENCOUNTER — HOME CARE VISIT (OUTPATIENT)
Dept: HOME HEALTH SERVICES | Facility: HOME HEALTHCARE | Age: 79
End: 2024-10-12

## 2024-10-14 ENCOUNTER — TELEPHONE (OUTPATIENT)
Dept: INTERNAL MEDICINE | Facility: IMAGING CENTER | Age: 79
End: 2024-10-14
Payer: MEDICARE

## 2024-10-15 ENCOUNTER — NON-PROVIDER VISIT (OUTPATIENT)
Dept: CARDIOLOGY | Facility: MEDICAL CENTER | Age: 79
End: 2024-10-15

## 2024-10-15 ENCOUNTER — NON-PROVIDER VISIT (OUTPATIENT)
Dept: CARDIOLOGY | Facility: MEDICAL CENTER | Age: 79
End: 2024-10-15
Attending: INTERNAL MEDICINE
Payer: MEDICARE

## 2024-10-15 DIAGNOSIS — Z95.0 CARDIAC PACEMAKER IN SITU: ICD-10-CM

## 2024-10-15 DIAGNOSIS — R00.1 SYMPTOMATIC BRADYCARDIA: ICD-10-CM

## 2024-10-15 PROCEDURE — 93280 PM DEVICE PROGR EVAL DUAL: CPT | Performed by: INTERNAL MEDICINE

## 2024-10-16 ENCOUNTER — OFFICE VISIT (OUTPATIENT)
Dept: INTERNAL MEDICINE | Facility: IMAGING CENTER | Age: 79
End: 2024-10-16
Payer: MEDICARE

## 2024-10-16 VITALS
HEART RATE: 60 BPM | TEMPERATURE: 98.7 F | BODY MASS INDEX: 34.75 KG/M2 | OXYGEN SATURATION: 100 % | HEIGHT: 67 IN | DIASTOLIC BLOOD PRESSURE: 62 MMHG | WEIGHT: 221.4 LBS | SYSTOLIC BLOOD PRESSURE: 120 MMHG | RESPIRATION RATE: 17 BRPM

## 2024-10-16 DIAGNOSIS — Z09 HOSPITAL DISCHARGE FOLLOW-UP: ICD-10-CM

## 2024-10-16 DIAGNOSIS — R22.30 SHOULDER MASS: ICD-10-CM

## 2024-10-16 DIAGNOSIS — Z95.0 PACEMAKER: ICD-10-CM

## 2024-10-16 DIAGNOSIS — M79.89 LEG SWELLING: ICD-10-CM

## 2024-10-16 PROBLEM — I50.32 CHRONIC HEART FAILURE WITH PRESERVED EJECTION FRACTION (HFPEF) (HCC): Status: RESOLVED | Noted: 2024-10-04 | Resolved: 2024-10-16

## 2024-10-16 PROBLEM — R00.1 SYMPTOMATIC BRADYCARDIA: Status: RESOLVED | Noted: 2024-10-04 | Resolved: 2024-10-16

## 2024-10-16 PROBLEM — E87.5 HYPERKALEMIA: Status: RESOLVED | Noted: 2024-10-04 | Resolved: 2024-10-16

## 2024-10-16 PROBLEM — R55 SYNCOPE: Status: RESOLVED | Noted: 2024-10-04 | Resolved: 2024-10-16

## 2024-10-16 PROCEDURE — 99213 OFFICE O/P EST LOW 20 MIN: CPT | Performed by: FAMILY MEDICINE

## 2024-10-16 RX ORDER — ZOLPIDEM TARTRATE 5 MG/1
5 TABLET ORAL
COMMUNITY
Start: 2024-10-09

## 2024-10-16 ASSESSMENT — FIBROSIS 4 INDEX: FIB4 SCORE: 1.98

## 2024-10-17 DIAGNOSIS — R19.5 LOOSE STOOLS: ICD-10-CM

## 2024-10-17 RX ORDER — CHOLESTYRAMINE 4 G/9G
1 POWDER, FOR SUSPENSION ORAL
Qty: 60 EACH | Refills: 3 | Status: SHIPPED | OUTPATIENT
Start: 2024-10-17

## 2024-10-18 DIAGNOSIS — I15.9 SECONDARY HYPERTENSION: ICD-10-CM

## 2024-10-18 RX ORDER — CARVEDILOL 25 MG/1
25 TABLET ORAL 2 TIMES DAILY WITH MEALS
Qty: 180 TABLET | Refills: 0 | Status: SHIPPED | OUTPATIENT
Start: 2024-10-18

## 2024-10-20 ENCOUNTER — APPOINTMENT (OUTPATIENT)
Dept: RADIOLOGY | Facility: MEDICAL CENTER | Age: 79
End: 2024-10-20
Attending: FAMILY MEDICINE
Payer: MEDICARE

## 2024-10-21 ENCOUNTER — OFFICE VISIT (OUTPATIENT)
Dept: CARDIOLOGY | Facility: MEDICAL CENTER | Age: 79
End: 2024-10-21
Attending: INTERNAL MEDICINE
Payer: MEDICARE

## 2024-10-21 VITALS
BODY MASS INDEX: 36.54 KG/M2 | RESPIRATION RATE: 16 BRPM | SYSTOLIC BLOOD PRESSURE: 182 MMHG | HEART RATE: 64 BPM | DIASTOLIC BLOOD PRESSURE: 90 MMHG | OXYGEN SATURATION: 91 % | HEIGHT: 67 IN | WEIGHT: 232.8 LBS

## 2024-10-21 DIAGNOSIS — E78.00 PURE HYPERCHOLESTEROLEMIA: ICD-10-CM

## 2024-10-21 DIAGNOSIS — I10 HTN (HYPERTENSION), MALIGNANT: ICD-10-CM

## 2024-10-21 DIAGNOSIS — G47.33 OSA ON CPAP: ICD-10-CM

## 2024-10-21 DIAGNOSIS — I83.813 VARICOSE VEINS OF BOTH LOWER EXTREMITIES WITH PAIN: ICD-10-CM

## 2024-10-21 DIAGNOSIS — R06.09 DYSPNEA ON EXERTION: ICD-10-CM

## 2024-10-21 DIAGNOSIS — I35.0 AORTIC STENOSIS, MILD: ICD-10-CM

## 2024-10-21 DIAGNOSIS — I51.89 LEFT VENTRICULAR DIASTOLIC DYSFUNCTION, NYHA CLASS 3: ICD-10-CM

## 2024-10-21 DIAGNOSIS — Z79.899 HIGH RISK MEDICATION USE: ICD-10-CM

## 2024-10-21 DIAGNOSIS — R06.02 SHORTNESS OF BREATH: ICD-10-CM

## 2024-10-21 DIAGNOSIS — I50.30 ACC/AHA STAGE C HEART FAILURE WITH PRESERVED EJECTION FRACTION (HCC): ICD-10-CM

## 2024-10-21 PROCEDURE — 99214 OFFICE O/P EST MOD 30 MIN: CPT | Performed by: INTERNAL MEDICINE

## 2024-10-21 PROCEDURE — 3077F SYST BP >= 140 MM HG: CPT | Performed by: INTERNAL MEDICINE

## 2024-10-21 PROCEDURE — G2211 COMPLEX E/M VISIT ADD ON: HCPCS | Performed by: INTERNAL MEDICINE

## 2024-10-21 PROCEDURE — 99213 OFFICE O/P EST LOW 20 MIN: CPT | Performed by: INTERNAL MEDICINE

## 2024-10-21 PROCEDURE — 3080F DIAST BP >= 90 MM HG: CPT | Performed by: INTERNAL MEDICINE

## 2024-10-21 RX ORDER — VALSARTAN 320 MG/1
320 TABLET ORAL DAILY
Qty: 100 TABLET | Refills: 4 | Status: SHIPPED | OUTPATIENT
Start: 2024-10-21

## 2024-10-21 ASSESSMENT — ENCOUNTER SYMPTOMS
FEVER: 0
LOSS OF CONSCIOUSNESS: 0
NAUSEA: 0
MYALGIAS: 0
HALLUCINATIONS: 0
DEPRESSION: 0
ORTHOPNEA: 0
ABDOMINAL PAIN: 0
CLAUDICATION: 0
COUGH: 0
DOUBLE VISION: 0
PND: 0
CHILLS: 0
SHORTNESS OF BREATH: 1
SPEECH CHANGE: 0
BRUISES/BLEEDS EASILY: 0
WEIGHT LOSS: 0
BLURRED VISION: 0
FALLS: 0
EYE DISCHARGE: 0
EYE PAIN: 0
DIZZINESS: 0
VOMITING: 0
BLOOD IN STOOL: 0
PALPITATIONS: 0
SENSORY CHANGE: 0
HEADACHES: 0

## 2024-10-21 ASSESSMENT — FIBROSIS 4 INDEX: FIB4 SCORE: 1.98

## 2024-11-05 ENCOUNTER — TELEPHONE (OUTPATIENT)
Dept: CARDIOLOGY | Facility: MEDICAL CENTER | Age: 79
End: 2024-11-05
Payer: MEDICARE

## 2024-11-08 DIAGNOSIS — M15.0 PRIMARY OSTEOARTHRITIS INVOLVING MULTIPLE JOINTS: ICD-10-CM

## 2024-11-08 RX ORDER — PREDNISONE 5 MG/1
5 TABLET ORAL DAILY
Qty: 30 TABLET | Refills: 6 | Status: SHIPPED | OUTPATIENT
Start: 2024-11-08

## 2024-11-14 DIAGNOSIS — F41.9 ANXIETY: ICD-10-CM

## 2024-11-14 DIAGNOSIS — E78.2 MIXED HYPERLIPIDEMIA: Primary | ICD-10-CM

## 2024-11-14 RX ORDER — SERTRALINE HYDROCHLORIDE 100 MG/1
TABLET, FILM COATED ORAL
Qty: 90 TABLET | Refills: 3 | Status: SHIPPED | OUTPATIENT
Start: 2024-11-14

## 2024-11-15 RX ORDER — ROSUVASTATIN CALCIUM 10 MG/1
10 TABLET, COATED ORAL EVERY EVENING
Qty: 90 TABLET | Refills: 3 | Status: SHIPPED | OUTPATIENT
Start: 2024-11-15

## 2024-11-19 ENCOUNTER — NON-PROVIDER VISIT (OUTPATIENT)
Dept: INTERNAL MEDICINE | Facility: IMAGING CENTER | Age: 79
End: 2024-11-19
Payer: MEDICARE

## 2024-11-19 ENCOUNTER — HOSPITAL ENCOUNTER (OUTPATIENT)
Facility: MEDICAL CENTER | Age: 79
End: 2024-11-19
Attending: INTERNAL MEDICINE
Payer: MEDICARE

## 2024-11-19 DIAGNOSIS — R06.02 SHORTNESS OF BREATH: ICD-10-CM

## 2024-11-19 DIAGNOSIS — R06.09 DYSPNEA ON EXERTION: ICD-10-CM

## 2024-11-19 DIAGNOSIS — Z79.899 HIGH RISK MEDICATION USE: ICD-10-CM

## 2024-11-19 LAB
ANION GAP SERPL CALC-SCNC: 9 MMOL/L (ref 7–16)
BUN SERPL-MCNC: 33 MG/DL (ref 8–22)
CALCIUM SERPL-MCNC: 10.2 MG/DL (ref 8.5–10.5)
CHLORIDE SERPL-SCNC: 107 MMOL/L (ref 96–112)
CO2 SERPL-SCNC: 23 MMOL/L (ref 20–33)
CREAT SERPL-MCNC: 1.15 MG/DL (ref 0.5–1.4)
GFR SERPLBLD CREATININE-BSD FMLA CKD-EPI: 48 ML/MIN/1.73 M 2
GLUCOSE SERPL-MCNC: 94 MG/DL (ref 65–99)
NT-PROBNP SERPL IA-MCNC: 903 PG/ML (ref 0–125)
POTASSIUM SERPL-SCNC: 5.9 MMOL/L (ref 3.6–5.5)
SODIUM SERPL-SCNC: 139 MMOL/L (ref 135–145)

## 2024-11-19 PROCEDURE — 99999 PR NO CHARGE: CPT

## 2024-11-19 PROCEDURE — 83880 ASSAY OF NATRIURETIC PEPTIDE: CPT

## 2024-11-19 PROCEDURE — 80048 BASIC METABOLIC PNL TOTAL CA: CPT

## 2024-11-20 ENCOUNTER — NON-PROVIDER VISIT (OUTPATIENT)
Dept: CARDIOLOGY | Facility: MEDICAL CENTER | Age: 79
End: 2024-11-20
Attending: INTERNAL MEDICINE
Payer: MEDICARE

## 2024-11-20 ENCOUNTER — TELEPHONE (OUTPATIENT)
Dept: CARDIOLOGY | Facility: MEDICAL CENTER | Age: 79
End: 2024-11-20

## 2024-11-20 ENCOUNTER — TELEPHONE (OUTPATIENT)
Dept: INTERNAL MEDICINE | Facility: IMAGING CENTER | Age: 79
End: 2024-11-20
Payer: MEDICARE

## 2024-11-20 ENCOUNTER — HOSPITAL ENCOUNTER (OUTPATIENT)
Facility: MEDICAL CENTER | Age: 79
End: 2024-11-20
Attending: FAMILY MEDICINE
Payer: MEDICARE

## 2024-11-20 ENCOUNTER — TELEPHONE (OUTPATIENT)
Dept: INTERNAL MEDICINE | Facility: IMAGING CENTER | Age: 79
End: 2024-11-20

## 2024-11-20 ENCOUNTER — NON-PROVIDER VISIT (OUTPATIENT)
Dept: INTERNAL MEDICINE | Facility: IMAGING CENTER | Age: 79
End: 2024-11-20
Payer: MEDICARE

## 2024-11-20 ENCOUNTER — NON-PROVIDER VISIT (OUTPATIENT)
Dept: CARDIOLOGY | Facility: MEDICAL CENTER | Age: 79
End: 2024-11-20

## 2024-11-20 DIAGNOSIS — E87.8 ELECTROLYTE IMBALANCE: ICD-10-CM

## 2024-11-20 DIAGNOSIS — Z95.0 PACEMAKER: ICD-10-CM

## 2024-11-20 DIAGNOSIS — Z79.899 HIGH RISK MEDICATION USE: ICD-10-CM

## 2024-11-20 DIAGNOSIS — I49.5 SICK SINUS SYNDROME (HCC): ICD-10-CM

## 2024-11-20 DIAGNOSIS — I10 PRIMARY HYPERTENSION: ICD-10-CM

## 2024-11-20 LAB
ANION GAP SERPL CALC-SCNC: 9 MMOL/L (ref 7–16)
BUN SERPL-MCNC: 41 MG/DL (ref 8–22)
CALCIUM SERPL-MCNC: 10.1 MG/DL (ref 8.5–10.5)
CHLORIDE SERPL-SCNC: 110 MMOL/L (ref 96–112)
CO2 SERPL-SCNC: 21 MMOL/L (ref 20–33)
CREAT SERPL-MCNC: 1.02 MG/DL (ref 0.5–1.4)
GFR SERPLBLD CREATININE-BSD FMLA CKD-EPI: 56 ML/MIN/1.73 M 2
GLUCOSE SERPL-MCNC: 109 MG/DL (ref 65–99)
POTASSIUM SERPL-SCNC: 6.3 MMOL/L (ref 3.6–5.5)
SODIUM SERPL-SCNC: 140 MMOL/L (ref 135–145)

## 2024-11-20 PROCEDURE — 99999 PR NO CHARGE: CPT

## 2024-11-20 PROCEDURE — 93280 PM DEVICE PROGR EVAL DUAL: CPT | Performed by: INTERNAL MEDICINE

## 2024-11-20 PROCEDURE — 80048 BASIC METABOLIC PNL TOTAL CA: CPT

## 2024-11-20 NOTE — TELEPHONE ENCOUNTER
JANET Tomlinson5 minutes ago (3:47 PM)     Stop spironolactone for now.   -----------------------------------------------------------------    Called pt and informed her. Pt will repeat BMP in one week.

## 2024-11-20 NOTE — TELEPHONE ENCOUNTER
Caller:  Tarsha    Name and Department:   [facility] office    Topic/Issue:   Elevated BMP labs  (See previous encounter)    Callback Number or Extension: [phone]    Thank you,   Rosa JAMIL

## 2024-11-22 ENCOUNTER — OFFICE VISIT (OUTPATIENT)
Dept: CARDIOLOGY | Facility: MEDICAL CENTER | Age: 79
End: 2024-11-22
Payer: MEDICARE

## 2024-11-22 VITALS
SYSTOLIC BLOOD PRESSURE: 148 MMHG | WEIGHT: 229 LBS | DIASTOLIC BLOOD PRESSURE: 46 MMHG | HEIGHT: 67 IN | RESPIRATION RATE: 16 BRPM | BODY MASS INDEX: 35.94 KG/M2 | HEART RATE: 62 BPM | OXYGEN SATURATION: 90 %

## 2024-11-22 DIAGNOSIS — I83.813 VARICOSE VEINS OF BOTH LOWER EXTREMITIES WITH PAIN: ICD-10-CM

## 2024-11-22 DIAGNOSIS — E78.00 PURE HYPERCHOLESTEROLEMIA: ICD-10-CM

## 2024-11-22 DIAGNOSIS — G47.33 OSA ON CPAP: ICD-10-CM

## 2024-11-22 DIAGNOSIS — I49.5 SICK SINUS SYNDROME (HCC): ICD-10-CM

## 2024-11-22 DIAGNOSIS — I10 HTN (HYPERTENSION), MALIGNANT: ICD-10-CM

## 2024-11-22 DIAGNOSIS — I10 PRIMARY HYPERTENSION: ICD-10-CM

## 2024-11-22 DIAGNOSIS — R60.0 PERIPHERAL EDEMA: ICD-10-CM

## 2024-11-22 DIAGNOSIS — I50.30 ACC/AHA STAGE C HEART FAILURE WITH PRESERVED EJECTION FRACTION (HCC): ICD-10-CM

## 2024-11-22 DIAGNOSIS — Z95.0 PACEMAKER: ICD-10-CM

## 2024-11-22 PROCEDURE — 3077F SYST BP >= 140 MM HG: CPT

## 2024-11-22 PROCEDURE — 3078F DIAST BP <80 MM HG: CPT

## 2024-11-22 PROCEDURE — 99214 OFFICE O/P EST MOD 30 MIN: CPT

## 2024-11-22 PROCEDURE — 99213 OFFICE O/P EST LOW 20 MIN: CPT

## 2024-11-22 RX ORDER — AMLODIPINE BESYLATE 5 MG/1
5 TABLET ORAL DAILY
Qty: 90 TABLET | Refills: 3 | Status: SHIPPED | OUTPATIENT
Start: 2024-11-22

## 2024-11-22 ASSESSMENT — ENCOUNTER SYMPTOMS
SYNCOPE: 0
SHORTNESS OF BREATH: 0
PND: 0
DYSPNEA ON EXERTION: 0
DIZZINESS: 0
NEAR-SYNCOPE: 0
HEADACHES: 0
LIGHT-HEADEDNESS: 0
PALPITATIONS: 0
ORTHOPNEA: 0

## 2024-11-22 ASSESSMENT — FIBROSIS 4 INDEX: FIB4 SCORE: 1.98

## 2024-11-22 NOTE — PROGRESS NOTES
Chief Complaint   Patient presents with    Hypertension     Follow up          Subjective:   Clau Vences is a 79 y.o. female who presents today for follow-up.     Patient of Dr. Chandra.  Current medical problems include mild aortic stenosis, HTN, HLD, edema, PPM. Their last clinic visit was 10/21/2024 with Dr. Chadnra.    Today's visit:  Patient reports since last follow up she is feeling much better from a cardiac standpoint. Her blood pressure has significantly improved since her medication adjustments at last follow up. Patient does not currently take her blood pressure at home due to not feeling confident in doing it correctly. She is taking all her medications as prescribed. She denies chest pain, palpitations, shortness of breath, orthopnea, PND, lightheadedness/dizziness or syncope. She does endorse bilateral lower extremity swelling left > right. She is able to ambulate with a walker.     Cardiovascular Risk Factors:  1. Smoking status: Never  2. Type II Diabetes Mellitus: No   Lab Results   Component Value Date/Time    HBA1C 5.5 08/21/2024 09:15 AM    HBA1C 5.7 (H) 05/20/2024 02:15 PM     3. Hypertension: Yes  4. Dyslipidemia: Yes   Cholesterol,Tot   Date Value Ref Range Status   05/20/2024 166 100 - 199 mg/dL Final     LDL   Date Value Ref Range Status   05/20/2024 60 <100 mg/dL Final     HDL   Date Value Ref Range Status   05/20/2024 67 >=40 mg/dL Final     Triglycerides   Date Value Ref Range Status   05/20/2024 193 (H) 0 - 149 mg/dL Final       Past Medical History:   Diagnosis Date    Anesthesia     low O2 sat    Arthritis     osteo    Back pain     Dyslipidemia     [place] measles     Heart murmur 09/13/2017    Hypertension     Influenza     Mumps     Other specified symptom associated with female genital organs     Painful joint     Psychiatric problem     Sleep apnea     c-pap with 3 l/nc    Snoring     Sore muscles     Tonsillitis     Unspecified cataract          Family History   Problem Relation  Age of Onset    Breast Cancer Mother     Cancer Mother 65        Breast    Cancer Father         Colon    Alcohol/Drug Father     Colon Cancer Father     Hyperlipidemia Brother     Heart Disease Brother         arrythmia         Social History     Tobacco Use    Smoking status: Never    Smokeless tobacco: Never   Vaping Use    Vaping status: Never Used   Substance Use Topics    Alcohol use: Not Currently     Alcohol/week: 0.6 - 1.2 oz     Types: 1 - 2 Glasses of wine per week    Drug use: No         Allergies   Allergen Reactions    Myrbetriq [Mirabegron] Unspecified     Dizziness & lightheadedness    Tramadol Nausea     Nausea and somnolence         Current Outpatient Medications   Medication Sig    amLODIPine (NORVASC) 5 MG Tab Take 1 Tablet by mouth every day.    rosuvastatin (CRESTOR) 10 MG Tab TAKE 1 TABLET BY MOUTH ONCE DAILY IN THE EVENING    sertraline (ZOLOFT) 100 MG Tab Take 1 tablet by mouth once daily    predniSONE (DELTASONE) 5 MG Tab Take 1 tablet by mouth once daily    valsartan (DIOVAN) 320 MG tablet Take 1 Tablet by mouth every day.    carvedilol (COREG) 25 MG Tab Take 1 Tablet by mouth 2 times a day with meals.    cholestyramine (QUESTRAN) 4 g packet Take 4 g by mouth 1 time a day as needed (Diarrhea).    zolpidem (AMBIEN) 5 MG Tab Take 5 mg by mouth at bedtime as needed for Sleep.    Ferrous Sulfate (IRON PO) Take 1 Tablet by mouth every morning.    Folic Acid (FOLATE PO) Take 1 Tablet by mouth every morning.    Cholecalciferol (VITAMIN D3 PO) Take 1 Tablet by mouth every morning.    Empagliflozin (JARDIANCE) 10 MG Tab tablet Take 1 Tablet by mouth every day.         Review of Systems   Constitutional: Negative for malaise/fatigue.   Cardiovascular:  Positive for leg swelling. Negative for chest pain, dyspnea on exertion, near-syncope, orthopnea, palpitations, paroxysmal nocturnal dyspnea and syncope.   Respiratory:  Negative for shortness of breath.    Neurological:  Negative for dizziness,  "headaches and light-headedness.           Objective:   BP (!) 148/46 (BP Location: Left arm, Patient Position: Sitting)   Pulse 62   Resp 16   Ht 1.702 m (5' 7\")   Wt 104 kg (229 lb)   SpO2 90%  Body mass index is 35.87 kg/m².         Physical Exam  Constitutional:       General: She is not in acute distress.  HENT:      Head: Normocephalic and atraumatic.   Cardiovascular:      Rate and Rhythm: Normal rate and regular rhythm.      Pulses: Normal pulses.      Heart sounds: No murmur heard.  Pulmonary:      Effort: Pulmonary effort is normal. No respiratory distress.      Breath sounds: Normal breath sounds.   Musculoskeletal:      Right lower le+ Edema present.      Left lower le+ Edema present.   Neurological:      Mental Status: She is alert.      Gait: Gait normal.             Lab Results   Component Value Date/Time    SODIUM 140 2024 01:20 PM    POTASSIUM 6.3 (H) 2024 01:20 PM    CHLORIDE 110 2024 01:20 PM    CO2 21 2024 01:20 PM    GLUCOSE 109 (H) 2024 01:20 PM    BUN 41 (H) 2024 01:20 PM    CREATININE 1.02 2024 01:20 PM    CREATININE 0.5 2007 03:20 AM      Lab Results   Component Value Date/Time    WBC 10.0 10/08/2024 12:48 AM    RBC 4.27 10/08/2024 12:48 AM    HEMOGLOBIN 14.2 10/08/2024 12:48 AM    HEMATOCRIT 44.3 10/08/2024 12:48 AM    .7 (H) 10/08/2024 12:48 AM    MCH 33.3 (H) 10/08/2024 12:48 AM    MCHC 32.1 (L) 10/08/2024 12:48 AM    MPV 10.9 10/08/2024 12:48 AM    NEUTSPOLYS 71.20 10/06/2024 06:46 AM    LYMPHOCYTES 14.30 (L) 10/06/2024 06:46 AM    MONOCYTES 8.90 10/06/2024 06:46 AM    EOSINOPHILS 4.50 10/06/2024 06:46 AM    BASOPHILS 0.80 10/06/2024 06:46 AM    HYPOCHROMIA 1+ 2014 08:18 AM    ANISOCYTOSIS 1+ 2014 08:18 AM      Lab Results   Component Value Date/Time    CHOLSTRLTOT 166 2024 02:15 PM    LDL 60 2024 02:15 PM    HDL 67 2024 02:15 PM    TRIGLYCERIDE 193 (H) 2024 02:15 PM       Lab Results "   Component Value Date/Time    TROPONINT 14 10/04/2024 1447     Lab Results   Component Value Date/Time    NTPROBNP 903 (H) 11/19/2024 0950     Assessment:   1. HTN (hypertension), malignant    2. Primary hypertension  - amLODIPine (NORVASC) 5 MG Tab; Take 1 Tablet by mouth every day.  Dispense: 90 Tablet; Refill: 3    3. ACC/AHA stage C heart failure with preserved ejection fraction (HCC)    4. Pure hypercholesterolemia    5. Sick sinus syndrome (HCC)    6. Pacemaker    7. AZUL on CPAP    8. Varicose veins of both lower extremities with pain    9. Peripheral edema [R60.0]        Medical Decision Making:  Today's Assessment / Plan:   Hypertension  - Poor control but greatly improved from prior visit. Patient not currently monitoring blood pressure at home  - continue valsartan 320 mg daily  -Continue Carvedilol 25 mg twice a day  -Start amlodipine 5 mg daily  - goal < 130/80  -Educated patient to start taking blood pressure daily  -2 week non provider visit with home blood pressure cuff  -Patient had elevated K 6.3 after starting valsartan. Spironolactone was stopped and repeat BMP pending. Patient denies any symptoms.     HFpEF, Stage C, Class III, LVEF 70: mild bilateral lower extremity swelling on exam, no ascites, no JVD and lungs clear on auscultation   -ACE/ARB/ARNI continue valsartan 320 mg daily  -Aldosterone antagonist stopped due to hyperkalemia  -SGLT2i Continue Jardiance 10 mg daily  -Reinforced s/sx of worsening heart failure with patient and weight monitoring. Pt verbalizes understanding. Pt to call office or RTC if present.     Hyperlipidemia  -Most recent LDL 60  -Continue rosuvastatin 10 mg daily  -Goal of less than 100  -Check lipid panel in 12 months    SSS  S/p PPM  -Continue follow up with device clinic  -Last device chest 11/20/2024 device functioning appropriately    AZUL on CPAP  -Continue follow up with PCP and pulmonology   -Continue compliance with CPAP      Varicose veins  Lower extremity  edema  -BLE ultrasound ordered and scheduled  -Discussed compression stockings and elevating legs to help swelling.     Return in about 2 months (around 1/22/2025).  Sooner if problems.    MARYBETH Hopkins.

## 2024-11-22 NOTE — CARDIAC REMOTE MONITOR - SCAN
Device transmission reviewed. Device demonstrated appropriate function.       Electronically Signed by: Matti Dolan M.D.    11/22/2024  5:08 PM

## 2024-11-27 ENCOUNTER — NON-PROVIDER VISIT (OUTPATIENT)
Dept: INTERNAL MEDICINE | Facility: IMAGING CENTER | Age: 79
End: 2024-11-27
Payer: MEDICARE

## 2024-11-27 ENCOUNTER — HOSPITAL ENCOUNTER (OUTPATIENT)
Facility: MEDICAL CENTER | Age: 79
End: 2024-11-27
Attending: INTERNAL MEDICINE
Payer: MEDICARE

## 2024-11-27 ENCOUNTER — APPOINTMENT (OUTPATIENT)
Dept: INTERNAL MEDICINE | Facility: IMAGING CENTER | Age: 79
End: 2024-11-27
Payer: MEDICARE

## 2024-11-27 DIAGNOSIS — Z79.899 HIGH RISK MEDICATION USE: ICD-10-CM

## 2024-11-27 DIAGNOSIS — I10 PRIMARY HYPERTENSION: ICD-10-CM

## 2024-11-27 LAB
ANION GAP SERPL CALC-SCNC: 8 MMOL/L (ref 7–16)
BUN SERPL-MCNC: 40 MG/DL (ref 8–22)
CALCIUM SERPL-MCNC: 10.3 MG/DL (ref 8.5–10.5)
CHLORIDE SERPL-SCNC: 109 MMOL/L (ref 96–112)
CO2 SERPL-SCNC: 22 MMOL/L (ref 20–33)
CREAT SERPL-MCNC: 1.11 MG/DL (ref 0.5–1.4)
GFR SERPLBLD CREATININE-BSD FMLA CKD-EPI: 50 ML/MIN/1.73 M 2
GLUCOSE SERPL-MCNC: 109 MG/DL (ref 65–99)
POTASSIUM SERPL-SCNC: 5.8 MMOL/L (ref 3.6–5.5)
SODIUM SERPL-SCNC: 139 MMOL/L (ref 135–145)

## 2024-11-27 PROCEDURE — 80048 BASIC METABOLIC PNL TOTAL CA: CPT

## 2024-12-02 DIAGNOSIS — F51.01 PRIMARY INSOMNIA: ICD-10-CM

## 2024-12-02 RX ORDER — ZOLPIDEM TARTRATE 5 MG/1
5 TABLET ORAL
Qty: 30 TABLET | Refills: 2 | Status: SHIPPED | OUTPATIENT
Start: 2024-12-02 | End: 2025-01-01

## 2024-12-11 ENCOUNTER — NON-PROVIDER VISIT (OUTPATIENT)
Dept: CARDIOLOGY | Facility: MEDICAL CENTER | Age: 79
End: 2024-12-11
Attending: FAMILY MEDICINE
Payer: MEDICARE

## 2024-12-11 ENCOUNTER — TELEPHONE (OUTPATIENT)
Dept: CARDIOLOGY | Facility: MEDICAL CENTER | Age: 79
End: 2024-12-11

## 2024-12-11 VITALS
RESPIRATION RATE: 17 BRPM | SYSTOLIC BLOOD PRESSURE: 124 MMHG | HEART RATE: 61 BPM | HEIGHT: 67 IN | DIASTOLIC BLOOD PRESSURE: 60 MMHG | OXYGEN SATURATION: 91 % | BODY MASS INDEX: 35.87 KG/M2

## 2024-12-11 NOTE — TELEPHONE ENCOUNTER
Received notification from VALERIY Mujica pt did not bring a BP log or her BP monitor with her.  Noted progress note signed by Sil including BP recorded in chart.    Upon chart review, pt is active on [employer].  Last login 12/10/24.  HTP message sent regarding response    ===============    Upon chart review, per HL last OV note    - goal < 130/80   -2 week non provider visit with home blood pressure cuff

## 2024-12-11 NOTE — PROGRESS NOTES
Patient was here today for BP check. BP readings located in vital sign section. Informed patient we will forward readings to nurse and they will receive a call with recommendations.  Did patient present with home cuff? No   Is patient reporting any symptoms? No  If Yes, RN to visit exam room  50    Patient did not had any questions at this time.   75

## 2024-12-27 ENCOUNTER — HOSPITAL ENCOUNTER (OUTPATIENT)
Dept: RADIOLOGY | Facility: MEDICAL CENTER | Age: 79
End: 2024-12-27
Attending: INTERNAL MEDICINE
Payer: MEDICARE

## 2024-12-27 DIAGNOSIS — I83.813 VARICOSE VEINS OF BOTH LOWER EXTREMITIES WITH PAIN: ICD-10-CM

## 2024-12-27 PROCEDURE — 93970 EXTREMITY STUDY: CPT

## 2024-12-30 PROCEDURE — 93970 EXTREMITY STUDY: CPT | Mod: 26 | Performed by: INTERNAL MEDICINE

## 2025-01-06 ENCOUNTER — NON-PROVIDER VISIT (OUTPATIENT)
Dept: CARDIOLOGY | Facility: MEDICAL CENTER | Age: 80
End: 2025-01-06
Payer: MEDICARE

## 2025-01-06 PROCEDURE — 93294 REM INTERROG EVL PM/LDLS PM: CPT | Performed by: STUDENT IN AN ORGANIZED HEALTH CARE EDUCATION/TRAINING PROGRAM

## 2025-01-06 NOTE — CARDIAC REMOTE MONITOR - SCAN
Device transmission reviewed. Device demonstrated appropriate function.       Electronically Signed by: Charlotte Quiroz MD, PhD    1/9/2025  2:01 PM

## 2025-01-12 DIAGNOSIS — I15.9 SECONDARY HYPERTENSION: ICD-10-CM

## 2025-01-13 RX ORDER — CARVEDILOL 25 MG/1
25 TABLET ORAL 2 TIMES DAILY WITH MEALS
Qty: 180 TABLET | Refills: 1 | Status: SHIPPED | OUTPATIENT
Start: 2025-01-13

## 2025-01-29 ENCOUNTER — OFFICE VISIT (OUTPATIENT)
Dept: CARDIOLOGY | Facility: MEDICAL CENTER | Age: 80
End: 2025-01-29
Attending: INTERNAL MEDICINE
Payer: MEDICARE

## 2025-01-29 VITALS
BODY MASS INDEX: 37.37 KG/M2 | HEIGHT: 67 IN | OXYGEN SATURATION: 90 % | RESPIRATION RATE: 18 BRPM | WEIGHT: 238.08 LBS | SYSTOLIC BLOOD PRESSURE: 148 MMHG | DIASTOLIC BLOOD PRESSURE: 80 MMHG | HEART RATE: 69 BPM

## 2025-01-29 DIAGNOSIS — I50.30 ACC/AHA STAGE C HEART FAILURE WITH PRESERVED EJECTION FRACTION (HCC): ICD-10-CM

## 2025-01-29 DIAGNOSIS — Z79.899 HIGH RISK MEDICATION USE: ICD-10-CM

## 2025-01-29 DIAGNOSIS — E78.2 MIXED HYPERLIPIDEMIA: ICD-10-CM

## 2025-01-29 DIAGNOSIS — Z95.0 PACEMAKER: ICD-10-CM

## 2025-01-29 DIAGNOSIS — I10 HTN (HYPERTENSION), MALIGNANT: ICD-10-CM

## 2025-01-29 DIAGNOSIS — R06.09 DYSPNEA ON EXERTION: ICD-10-CM

## 2025-01-29 DIAGNOSIS — I49.5 SICK SINUS SYNDROME (HCC): ICD-10-CM

## 2025-01-29 DIAGNOSIS — I51.89 LEFT VENTRICULAR DIASTOLIC DYSFUNCTION, NYHA CLASS 3: ICD-10-CM

## 2025-01-29 DIAGNOSIS — G47.33 OSA ON CPAP: ICD-10-CM

## 2025-01-29 DIAGNOSIS — E78.00 PURE HYPERCHOLESTEROLEMIA: ICD-10-CM

## 2025-01-29 PROCEDURE — 99213 OFFICE O/P EST LOW 20 MIN: CPT | Performed by: INTERNAL MEDICINE

## 2025-01-29 RX ORDER — SPIRONOLACTONE 25 MG/1
25 TABLET ORAL DAILY
Qty: 30 TABLET | Refills: 3 | Status: SHIPPED | OUTPATIENT
Start: 2025-01-29

## 2025-01-29 RX ORDER — AMLODIPINE BESYLATE 10 MG/1
10 TABLET ORAL DAILY
Qty: 100 TABLET | Refills: 3 | Status: SHIPPED | OUTPATIENT
Start: 2025-01-29 | End: 2026-03-05

## 2025-01-29 RX ORDER — ROSUVASTATIN CALCIUM 10 MG/1
10 TABLET, COATED ORAL EVERY EVENING
Qty: 100 TABLET | Refills: 3 | Status: SHIPPED | OUTPATIENT
Start: 2025-01-29

## 2025-01-29 ASSESSMENT — ENCOUNTER SYMPTOMS
FALLS: 0
NAUSEA: 0
HEADACHES: 0
PALPITATIONS: 0
DIZZINESS: 0
BLOOD IN STOOL: 0
COUGH: 0
EYE PAIN: 0
LOSS OF CONSCIOUSNESS: 0
SPEECH CHANGE: 0
MYALGIAS: 0
VOMITING: 0
ABDOMINAL PAIN: 0
DEPRESSION: 0
CLAUDICATION: 0
ORTHOPNEA: 0
SHORTNESS OF BREATH: 1
HALLUCINATIONS: 0
PND: 0
BLURRED VISION: 0
EYE DISCHARGE: 0
DOUBLE VISION: 0
SENSORY CHANGE: 0
WEIGHT LOSS: 0
CHILLS: 0
BRUISES/BLEEDS EASILY: 0
FEVER: 0

## 2025-01-29 ASSESSMENT — FIBROSIS 4 INDEX: FIB4 SCORE: 1.98

## 2025-01-29 NOTE — PROGRESS NOTES
Chief Complaint   Patient presents with    Hypertension     F/V DX: HTN (hypertension), malignant           Subjective     Rosy Vences is a 79 y.o. female who presents today for management of mild aortic stenosis, HTN and persistent peripheral edema, HRpEF.    10/2024 She fainted and ended up with PPM.    10/2024 I have independently interpreted and reviewed echocardiogram's actual images with patient which showed normal left ventricular systolic function. No wall motion abnormality. No evidence of pulmonary hypertension. Mild AS.    12/2024 I personally interpreted the venous duplex ultrasound which showed NO significant reflux disease venous insufficiency seen in B small and great saphenous vein(s).  I personally reviewed the images with patient in clinic today as well.    Patient gets winded with daily living activities and exertion. No symptoms at rest.    I have independently interpreted and reviewed blood tests results with patient in clinic which shows LDL level of 60, triglycerides level of 193, GFR of 50 down from 63, NT pro BNP of 903, K of 5.8.    Past Medical History:   Diagnosis Date    Anesthesia     low O2 sat    Arthritis     osteo    Back pain     Dyslipidemia     [place] measles     Heart murmur 09/13/2017    Hypertension     Influenza     Mumps     Other specified symptom associated with female genital organs     Painful joint     Psychiatric problem     Sleep apnea     c-pap with 3 l/nc    Snoring     Sore muscles     Tonsillitis     Unspecified cataract      Past Surgical History:   Procedure Laterality Date    [place] EXPLORATORY OF ABDOMEN N/A 1/28/2023    Procedure: EX LAP;  Surgeon: Mike Valles M.D.;  Location: SURGERY [facility];  Service: Gastroenterology    KNEE ARTHROPLASTY TOTAL Right 12/27/2017    HYSTERECTOMY ROBOTIC  10/23/2014    Performed by Smith Lyons M.D. at SURGERY [facility]    GASTROSCOPY WITH BIOPSY  7/2/2014    Performed by Sky Vila M.D.  at SURGERY [facility] ORS    OTHER ORTHOPEDIC SURGERY  2006    left total knee    APPENDECTOMY      ARTHROSCOPY, KNEE      HYSTERECTOMY LAPAROSCOPY      OTHER      meghan cataracts    OTHER ABDOMINAL SURGERY      Resection of pelvic mass, uterus and ovaries    ME BREAST REDUCTION Bilateral     1982    ME REMV 2ND CATARACT,CORN-SCLER SECTN      TONSILLECTOMY      TONSILLECTOMY AND ADENOIDECTOMY       Family History   Problem Relation Age of Onset    Breast Cancer Mother     Cancer Mother 65        Breast    Cancer Father         Colon    Alcohol/Drug Father     Colon Cancer Father     Hyperlipidemia Brother     Heart Disease Brother         arrythmia     Social History     Socioeconomic History    Marital status:      Spouse name: Not on file    Number of children: Not on file    Years of education: Not on file    Highest education level: Not on file   Occupational History    Not on file   Tobacco Use    Smoking status: Never    Smokeless tobacco: Never   Vaping Use    Vaping status: Never Used   Substance and Sexual Activity    Alcohol use: Not Currently     Alcohol/week: 0.6 - 1.2 oz     Types: 1 - 2 Glasses of wine per week    Drug use: No    Sexual activity: Not on file     Comment: , 2 children   Other Topics Concern    Not on file   Social History Narrative    Not on file     Social Drivers of Health     Financial Resource Strain: Not on file   Food Insecurity: Not on file   Transportation Needs: Not on file   Physical Activity: Not on file   Stress: Not on file   Social Connections: Not on file   Intimate Partner Violence: Not on file   Housing Stability: Not on file     Allergies   Allergen Reactions    Myrbetriq [Mirabegron] Unspecified     Dizziness & lightheadedness    Tramadol Nausea     Nausea and somnolence     Outpatient Encounter Medications as of 1/29/2025   Medication Sig Dispense Refill    amLODIPine (NORVASC) 10 MG Tab Take 1 Tablet by mouth every day. 100 Tablet 3    spironolactone  (ALDACTONE) 25 MG Tab Take 1 Tablet by mouth every day. 30 Tablet 3    carvedilol (COREG) 25 MG Tab TAKE 1 TABLET BY MOUTH TWICE DAILY WITH MEALS 180 Tablet 1    rosuvastatin (CRESTOR) 10 MG Tab TAKE 1 TABLET BY MOUTH ONCE DAILY IN THE EVENING 90 Tablet 3    sertraline (ZOLOFT) 100 MG Tab Take 1 tablet by mouth once daily 90 Tablet 3    predniSONE (DELTASONE) 5 MG Tab Take 1 tablet by mouth once daily 30 Tablet 6    valsartan (DIOVAN) 320 MG tablet Take 1 Tablet by mouth every day. 100 Tablet 4    cholestyramine (QUESTRAN) 4 g packet Take 4 g by mouth 1 time a day as needed (Diarrhea). 60 Each 3    Ferrous Sulfate (IRON PO) Take 1 Tablet by mouth every morning.      Folic Acid (FOLATE PO) Take 1 Tablet by mouth every morning.      Cholecalciferol (VITAMIN D3 PO) Take 1 Tablet by mouth every morning.      Empagliflozin (JARDIANCE) 10 MG Tab tablet Take 1 Tablet by mouth every day. 90 Tablet 2    [DISCONTINUED] amLODIPine (NORVASC) 5 MG Tab Take 1 Tablet by mouth every day. 90 Tablet 3     No facility-administered encounter medications on file as of 1/29/2025.     Review of Systems   Constitutional:  Negative for chills, fever, malaise/fatigue and weight loss.   HENT:  Negative for ear discharge, ear pain, hearing loss and nosebleeds.    Eyes:  Negative for blurred vision, double vision, pain and discharge.   Respiratory:  Positive for shortness of breath. Negative for cough.    Cardiovascular:  Negative for chest pain, palpitations, orthopnea, claudication, leg swelling and PND.   Gastrointestinal:  Negative for abdominal pain, blood in stool, melena, nausea and vomiting.   Genitourinary:  Negative for dysuria and hematuria.   Musculoskeletal:  Negative for falls, joint pain and myalgias.   Skin:  Negative for itching and rash.   Neurological:  Negative for dizziness, sensory change, speech change, loss of consciousness and headaches.   Endo/Heme/Allergies:  Negative for environmental allergies. Does not bruise/bleed  "easily.   Psychiatric/Behavioral:  Negative for depression, hallucinations and suicidal ideas.               Objective     BP (!) 148/80 (BP Location: Left arm, Patient Position: Sitting, BP Cuff Size: Adult)   Pulse 69   Resp 18   Ht 1.702 m (5' 7\")   Wt 108 kg (238 lb 1.3 oz)   SpO2 90%   BMI 37.29 kg/m²     Physical Exam  Vitals and nursing note reviewed.   Constitutional:       General: She is not in acute distress.     Appearance: She is not diaphoretic.   HENT:      Head: Normocephalic and atraumatic.      Right Ear: External ear normal.      Left Ear: External ear normal.      Nose: No congestion or rhinorrhea.   Eyes:      General:         Right eye: No discharge.         Left eye: No discharge.   Neck:      Thyroid: No thyromegaly.      Vascular: No JVD.   Cardiovascular:      Rate and Rhythm: Normal rate and regular rhythm.      Pulses: Normal pulses.      Heart sounds: Murmur heard.   Pulmonary:      Effort: No respiratory distress.   Abdominal:      General: There is no distension.      Tenderness: There is no abdominal tenderness.   Musculoskeletal:         General: No swelling or tenderness.      Right lower leg: No edema.      Left lower leg: No edema.      Comments: + varicose veins without evidence of ulcer, +discoloration.     Skin:     General: Skin is warm and dry.   Neurological:      Mental Status: She is alert and oriented to person, place, and time.      Cranial Nerves: No cranial nerve deficit.   Psychiatric:         Behavior: Behavior normal.                Assessment & Plan     1. ACC/AHA stage C heart failure with preserved ejection fraction (HCC)  CRP HIGH SENSITIVE (CARDIAC)      2. Left ventricular diastolic dysfunction, NYHA class 3  CRP HIGH SENSITIVE (CARDIAC)      3. HTN (hypertension), malignant  amLODIPine (NORVASC) 10 MG Tab    spironolactone (ALDACTONE) 25 MG Tab      4. Dyspnea on exertion  proBrain Natriuretic Peptide, NT    CRP HIGH SENSITIVE (CARDIAC)      5. High " risk medication use  Basic Metabolic Panel      6. Pacemaker        7. Sick sinus syndrome (HCC)        8. AZUL on CPAP        9. Pure hypercholesterolemia                Medical Decision Making: Today's Assessment/Status/Plan:   Patient meets criteria for HFpEF with clinical presentation and elevated NT pro BNP.  Based on recent data on SGLT2 and heart failure with preserved ejection fraction, patient will be benefited from Jardiance 10 mg p.o. once a day for further reduction in mortality and hospitalization with absolute risk reduction of 3.8%.  Therefore, I will continue patient on Jardiance 10 mg p.o. once a day.  Risks and benefits were explained to patient and patient has agreed to proceed.    Will continue Spironolactone 25 mg daily for better BP control and diuresis as well.    Blood pressure is still high. Will increase Amlodipine to 10 mg daily and start Spironolactone 25 mg daily for better BP control.    Will continue Valsartan 320 mg daily, Carvedilol 25 mg bid.    Continue Rosuvastatin 10 mg daily for LDL control.    Optimize CPAP tx for AZUL.    Clinical monitoring of mild aortic stenosis.    Will screen for [place].    Continue Rosuvastatin 10 mg daily for LDL control.    Today, based on physical examination findings, patient is euvolemic. No JVD, lungs are clear to auscultation, no pitting edema in bilateral lower extremities, no ascites.    Dry weight is 238 lbs.      This visit encounter signifies the visit complexity inherent to evaluation and management associated with medical care services that serve as the continuing focal point for all needed health care services and/or with medical care services that are part of ongoing care related to this patient's single, serious condition, complex cardiac condition.    Jeannie Chandra M.D.

## 2025-01-31 ENCOUNTER — HOSPITAL ENCOUNTER (OUTPATIENT)
Facility: MEDICAL CENTER | Age: 80
End: 2025-01-31
Attending: INTERNAL MEDICINE
Payer: MEDICARE

## 2025-01-31 ENCOUNTER — NON-PROVIDER VISIT (OUTPATIENT)
Dept: INTERNAL MEDICINE | Facility: IMAGING CENTER | Age: 80
End: 2025-01-31
Payer: MEDICARE

## 2025-01-31 DIAGNOSIS — R06.09 DYSPNEA ON EXERTION: ICD-10-CM

## 2025-01-31 DIAGNOSIS — I50.30 ACC/AHA STAGE C HEART FAILURE WITH PRESERVED EJECTION FRACTION (HCC): ICD-10-CM

## 2025-01-31 DIAGNOSIS — I51.89 LEFT VENTRICULAR DIASTOLIC DYSFUNCTION, NYHA CLASS 3: ICD-10-CM

## 2025-01-31 DIAGNOSIS — Z01.89 ENCOUNTER FOR ROUTINE LABORATORY TESTING: ICD-10-CM

## 2025-01-31 DIAGNOSIS — Z79.899 HIGH RISK MEDICATION USE: ICD-10-CM

## 2025-01-31 LAB
ANION GAP SERPL CALC-SCNC: 10 MMOL/L (ref 7–16)
BUN SERPL-MCNC: 35 MG/DL (ref 8–22)
CALCIUM SERPL-MCNC: 11.1 MG/DL (ref 8.5–10.5)
CHLORIDE SERPL-SCNC: 107 MMOL/L (ref 96–112)
CO2 SERPL-SCNC: 24 MMOL/L (ref 20–33)
CREAT SERPL-MCNC: 1.24 MG/DL (ref 0.5–1.4)
GFR SERPLBLD CREATININE-BSD FMLA CKD-EPI: 44 ML/MIN/1.73 M 2
GLUCOSE SERPL-MCNC: 107 MG/DL (ref 65–99)
NT-PROBNP SERPL IA-MCNC: 670 PG/ML (ref 0–125)
POTASSIUM SERPL-SCNC: 5 MMOL/L (ref 3.6–5.5)
SODIUM SERPL-SCNC: 141 MMOL/L (ref 135–145)

## 2025-01-31 PROCEDURE — 86141 C-REACTIVE PROTEIN HS: CPT

## 2025-01-31 PROCEDURE — 80048 BASIC METABOLIC PNL TOTAL CA: CPT

## 2025-01-31 PROCEDURE — 83880 ASSAY OF NATRIURETIC PEPTIDE: CPT

## 2025-02-03 ENCOUNTER — PATIENT MESSAGE (OUTPATIENT)
Dept: CARDIOLOGY | Facility: MEDICAL CENTER | Age: 80
End: 2025-02-03
Payer: MEDICARE

## 2025-02-03 ENCOUNTER — TELEPHONE (OUTPATIENT)
Dept: CARDIOLOGY | Facility: MEDICAL CENTER | Age: 80
End: 2025-02-03
Payer: MEDICARE

## 2025-02-03 DIAGNOSIS — E78.2 MIXED HYPERLIPIDEMIA: ICD-10-CM

## 2025-02-03 DIAGNOSIS — I35.0 AORTIC STENOSIS, MILD: ICD-10-CM

## 2025-02-03 DIAGNOSIS — I10 PRIMARY HYPERTENSION: ICD-10-CM

## 2025-02-03 DIAGNOSIS — I51.89 LEFT VENTRICULAR DIASTOLIC DYSFUNCTION, NYHA CLASS 3: ICD-10-CM

## 2025-02-03 DIAGNOSIS — Z95.0 CARDIAC PACEMAKER IN SITU: ICD-10-CM

## 2025-02-03 DIAGNOSIS — I49.5 SICK SINUS SYNDROME (HCC): ICD-10-CM

## 2025-02-03 DIAGNOSIS — E78.00 PURE HYPERCHOLESTEROLEMIA: ICD-10-CM

## 2025-02-03 DIAGNOSIS — R06.09 DYSPNEA ON EXERTION: ICD-10-CM

## 2025-02-03 DIAGNOSIS — I50.30 ACC/AHA STAGE C HEART FAILURE WITH PRESERVED EJECTION FRACTION (HCC): ICD-10-CM

## 2025-02-03 DIAGNOSIS — Z95.0 PACEMAKER: ICD-10-CM

## 2025-02-03 DIAGNOSIS — R00.1 SYMPTOMATIC BRADYCARDIA: ICD-10-CM

## 2025-02-03 DIAGNOSIS — I10 HTN (HYPERTENSION), MALIGNANT: ICD-10-CM

## 2025-02-03 DIAGNOSIS — R06.02 SHORTNESS OF BREATH: ICD-10-CM

## 2025-02-03 NOTE — TELEPHONE ENCOUNTER
Chart reviewed, noted CRP lab from 01/31/25 was cancelled d/t denial by Medicare. Lab order placed with appropriate dx. [employer] message sent to pt.

## 2025-02-03 NOTE — TELEPHONE ENCOUNTER
TT    Caller: Zion Doyle    Name and Department:     [facility]  P: [phone]    Topic/Issue: MEDICAL ADVICE    Per Vivek;    Rosy came in an had the labs that were ordered drawn on 1/31. However the order that was brought in for the CRP HIGH SENSITIVE lab had CPT codes that would not pass Medicare. Please advise.    Thank you,  Sean SERVIN    Callback Number or Extension: [phone]

## 2025-02-04 ENCOUNTER — APPOINTMENT (OUTPATIENT)
Dept: INTERNAL MEDICINE | Facility: IMAGING CENTER | Age: 80
End: 2025-02-04
Payer: MEDICARE

## 2025-02-04 ENCOUNTER — TELEPHONE (OUTPATIENT)
Dept: CARDIOLOGY | Facility: MEDICAL CENTER | Age: 80
End: 2025-02-04
Payer: MEDICARE

## 2025-02-04 LAB — CRP SERPL HS-MCNC: 0.9 MG/L (ref 0–3)

## 2025-02-04 NOTE — TELEPHONE ENCOUNTER
TT    Caller:  Lo     Name and Department:   [facility]    Topic/Issue:   Needs corrected DC code for lab    Callback Number or Extension:  [phone]    Thank you,   Rosa JAMIL

## 2025-02-05 ENCOUNTER — APPOINTMENT (OUTPATIENT)
Dept: CARDIOLOGY | Facility: MEDICAL CENTER | Age: 80
End: 2025-02-05
Payer: MEDICARE

## 2025-02-19 ENCOUNTER — OFFICE VISIT (OUTPATIENT)
Dept: CARDIOLOGY | Facility: MEDICAL CENTER | Age: 80
End: 2025-02-19
Attending: NURSE PRACTITIONER
Payer: MEDICARE

## 2025-02-19 VITALS
HEART RATE: 65 BPM | DIASTOLIC BLOOD PRESSURE: 56 MMHG | HEIGHT: 67 IN | WEIGHT: 248.2 LBS | BODY MASS INDEX: 38.96 KG/M2 | OXYGEN SATURATION: 91 % | SYSTOLIC BLOOD PRESSURE: 138 MMHG | RESPIRATION RATE: 18 BRPM

## 2025-02-19 DIAGNOSIS — I35.0 AORTIC STENOSIS, MILD: ICD-10-CM

## 2025-02-19 DIAGNOSIS — I50.30 ACC/AHA STAGE C HEART FAILURE WITH PRESERVED EJECTION FRACTION (HCC): ICD-10-CM

## 2025-02-19 DIAGNOSIS — E78.2 MIXED HYPERLIPIDEMIA: ICD-10-CM

## 2025-02-19 DIAGNOSIS — I10 HTN (HYPERTENSION), MALIGNANT: ICD-10-CM

## 2025-02-19 DIAGNOSIS — G47.33 OSA ON CPAP: ICD-10-CM

## 2025-02-19 DIAGNOSIS — R06.09 DYSPNEA ON EXERTION: ICD-10-CM

## 2025-02-19 DIAGNOSIS — I51.89 LEFT VENTRICULAR DIASTOLIC DYSFUNCTION, NYHA CLASS 3: ICD-10-CM

## 2025-02-19 DIAGNOSIS — Z95.0 PACEMAKER: ICD-10-CM

## 2025-02-19 DIAGNOSIS — I49.5 SICK SINUS SYNDROME (HCC): ICD-10-CM

## 2025-02-19 PROCEDURE — 99213 OFFICE O/P EST LOW 20 MIN: CPT | Performed by: NURSE PRACTITIONER

## 2025-02-19 PROCEDURE — 3078F DIAST BP <80 MM HG: CPT | Performed by: NURSE PRACTITIONER

## 2025-02-19 PROCEDURE — 3075F SYST BP GE 130 - 139MM HG: CPT | Performed by: NURSE PRACTITIONER

## 2025-02-19 PROCEDURE — 99214 OFFICE O/P EST MOD 30 MIN: CPT | Performed by: NURSE PRACTITIONER

## 2025-02-19 RX ORDER — FUROSEMIDE 20 MG/1
20 TABLET ORAL DAILY
Qty: 90 TABLET | Refills: 1 | Status: SHIPPED | OUTPATIENT
Start: 2025-02-19

## 2025-02-19 ASSESSMENT — ENCOUNTER SYMPTOMS
PALPITATIONS: 0
FEVER: 0
COUGH: 0
SHORTNESS OF BREATH: 1
PND: 0
ABDOMINAL PAIN: 0
CLAUDICATION: 0
ORTHOPNEA: 0
DIZZINESS: 1
MYALGIAS: 0

## 2025-02-19 ASSESSMENT — FIBROSIS 4 INDEX: FIB4 SCORE: 1.98

## 2025-02-19 NOTE — PROGRESS NOTES
Chief Complaint   Patient presents with    Follow-Up     Dx: ACC/AHA stage C heart failure with preserved ejection fraction (HCC)        Hypertension     F/V Dx: HTN (hypertension), malignant      Hyperlipidemia       Subjective     Rosy Vences is a 79 y.o. female who presents today for follow up on on symptoms.    Patient of Dr. Chandra.  She was last seen in clinic on 11/29/2025.  During that visit, she was recommended to increase amlodipine to 10 mg daily and start spironolactone 25 mg daily for hypertension.  Patient was also sent for lab testing.    She reports no problems with her medication changes for about 5 days then she started to feel swollen and started to gain weight.  She mentions she may have gained about 10 to 15 pounds since her last visit.  She also mentions feeling short of breath, bloated, and has had some episodes of dizziness.    She denies chest pain, palpitations, with apnea or PND.    She does have a history of sleep apnea and does use her CPAP regularly.    She admits she is not great about staying hydrated, and typically drinks about 20 ounces of fluid per day.    She mentions she is compliant with the lower sodium diet.    She uses a walker to get around    Additionally, patient has the following medical problems:     -Hypertension    -Hyperlipidemia    -Mild aortic stenosis    -Pacemaker    Past Medical History:   Diagnosis Date    Anesthesia     low O2 sat    Arthritis     osteo    Back pain     Dyslipidemia     [place] measles     Heart murmur 09/13/2017    Hypertension     Influenza     Mumps     Other specified symptom associated with female genital organs     Painful joint     Psychiatric problem     Sleep apnea     c-pap with 3 l/nc    Snoring     Sore muscles     Tonsillitis     Unspecified cataract      Past Surgical History:   Procedure Laterality Date    TX EXPLORATORY OF ABDOMEN N/A 1/28/2023    Procedure: EX LAP;  Surgeon: Mike Valles M.D.;  Location: SURGERY  [facility];  Service: Gastroenterology    KNEE ARTHROPLASTY TOTAL Right 12/27/2017    HYSTERECTOMY ROBOTIC  10/23/2014    Performed by Smith Lyons M.D. at SURGERY [facility] ORS    GASTROSCOPY WITH BIOPSY  7/2/2014    Performed by Sky Vila M.D. at SURGERY [facility]    OTHER ORTHOPEDIC SURGERY  2006    left total knee    APPENDECTOMY      ARTHROSCOPY, KNEE      HYSTERECTOMY LAPAROSCOPY      OTHER      meghan cataracts    OTHER ABDOMINAL SURGERY      Resection of pelvic mass, uterus and ovaries    LA BREAST REDUCTION Bilateral     1982    LA REMV 2ND CATARACT,CORN-SCLER SECTN      TONSILLECTOMY      TONSILLECTOMY AND ADENOIDECTOMY       Family History   Problem Relation Age of Onset    Breast Cancer Mother     Cancer Mother 65        Breast    Cancer Father         Colon    Alcohol/Drug Father     Colon Cancer Father     Hyperlipidemia Brother     Heart Disease Brother         arrythmia     Social History     Socioeconomic History    Marital status:      Spouse name: Not on file    Number of children: Not on file    Years of education: Not on file    Highest education level: Not on file   Occupational History    Not on file   Tobacco Use    Smoking status: Never    Smokeless tobacco: Never   Vaping Use    Vaping status: Never Used   Substance and Sexual Activity    Alcohol use: Not Currently     Alcohol/week: 0.6 - 1.2 oz     Types: 1 - 2 Glasses of wine per week    Drug use: No    Sexual activity: Not on file     Comment: , 2 children   Other Topics Concern    Not on file   Social History Narrative    Not on file     Social Drivers of Health     Financial Resource Strain: Not on file   Food Insecurity: Not on file   Transportation Needs: Not on file   Physical Activity: Not on file   Stress: Not on file   Social Connections: Not on file   Intimate Partner Violence: Not on file   Housing Stability: Not on file     Allergies   Allergen Reactions    Myrbetriq [Mirabegron] Unspecified  "    Dizziness & lightheadedness    Tramadol Nausea     Nausea and somnolence     Outpatient Encounter Medications as of 2/19/2025   Medication Sig Dispense Refill    amLODIPine (NORVASC) 10 MG Tab Take 1 Tablet by mouth every day. 100 Tablet 3    spironolactone (ALDACTONE) 25 MG Tab Take 1 Tablet by mouth every day. 30 Tablet 3    rosuvastatin (CRESTOR) 10 MG Tab Take 1 Tablet by mouth every evening. 100 Tablet 3    carvedilol (COREG) 25 MG Tab TAKE 1 TABLET BY MOUTH TWICE DAILY WITH MEALS 180 Tablet 1    sertraline (ZOLOFT) 100 MG Tab Take 1 tablet by mouth once daily 90 Tablet 3    predniSONE (DELTASONE) 5 MG Tab Take 1 tablet by mouth once daily 30 Tablet 6    valsartan (DIOVAN) 320 MG tablet Take 1 Tablet by mouth every day. 100 Tablet 4    cholestyramine (QUESTRAN) 4 g packet Take 4 g by mouth 1 time a day as needed (Diarrhea). 60 Each 3    Ferrous Sulfate (IRON PO) Take 1 Tablet by mouth every morning.      Folic Acid (FOLATE PO) Take 1 Tablet by mouth every morning.      Cholecalciferol (VITAMIN D3 PO) Take 1 Tablet by mouth every morning.      Empagliflozin (JARDIANCE) 10 MG Tab tablet Take 1 Tablet by mouth every day. 90 Tablet 2     No facility-administered encounter medications on file as of 2/19/2025.     Review of Systems   Constitutional:  Negative for fever and malaise/fatigue.   Respiratory:  Positive for shortness of breath. Negative for cough.    Cardiovascular:  Positive for leg swelling. Negative for chest pain, palpitations, orthopnea, claudication and PND.   Gastrointestinal:  Negative for abdominal pain.   Musculoskeletal:  Negative for myalgias.   Neurological:  Positive for dizziness (at times).   All other systems reviewed and are negative.             Objective     /56 (BP Location: Left arm, Patient Position: Sitting, BP Cuff Size: Adult)   Pulse 65   Resp 18   Ht 1.702 m (5' 7\")   Wt 113 kg (248 lb 3.2 oz)   SpO2 91%   BMI 38.87 kg/m²     Physical Exam  Vitals reviewed. "   Constitutional:       Appearance: She is well-developed.   HENT:      Head: Normocephalic and atraumatic.   Eyes:      Pupils: Pupils are equal, round, and reactive to light.   Neck:      Vascular: No JVD.   Cardiovascular:      Rate and Rhythm: Normal rate and regular rhythm.      Heart sounds: Normal heart sounds.      Comments: Device site-no erosion, drainage or erythema  Pulmonary:      Effort: Pulmonary effort is normal. No respiratory distress.      Breath sounds: Rales (Bilateral lower lobes) present. No wheezing.   Abdominal:      General: Bowel sounds are normal.      Palpations: Abdomen is soft.   Musculoskeletal:         General: Normal range of motion.      Cervical back: Normal range of motion and neck supple.      Right lower leg: 3+ Pitting Edema present.      Left lower leg: 3+ Pitting Edema present.   Skin:     General: Skin is warm and dry.   Neurological:      General: No focal deficit present.      Mental Status: She is alert and oriented to person, place, and time.   Psychiatric:         Behavior: Behavior normal.       Lab Results   Component Value Date/Time    SODIUM 141 01/31/2025 1445    POTASSIUM 5.0 01/31/2025 1445    CHLORIDE 107 01/31/2025 1445    CO2 24 01/31/2025 1445    ANION 10.0 01/31/2025 1445    GLUCOSE 107 (H) 01/31/2025 1445    BUN 35 (H) 01/31/2025 1445    CREATININE 1.24 01/31/2025 1445    GFRCKD 44 (A) 01/31/2025 1445    CALCIUM 11.1 (H) 01/31/2025 1445    CORRCALC 10.2 10/08/2024 0048    ASTSGOT 17 10/04/2024 1227    ALTSGPT 18 10/04/2024 1227    ALKPHOSPHAT 60 10/04/2024 1227    TBILIRUBIN 0.4 10/04/2024 1227    ALBUMIN 3.6 10/08/2024 0048    TOTPROTEIN 6.3 10/04/2024 1227    GLOBULIN 2.5 10/04/2024 1227    AGRATIO 1.5 10/04/2024 1227     NT-proBNP   Date Value Ref Range Status   01/31/2025 670 (H) 0 - 125 pg/mL Final   11/19/2024 903 (H) 0 - 125 pg/mL Final   10/04/2024 1192 (H) 0 - 125 pg/mL Final     Lab Results   Component Value Date/Time    CHOLSTRLTOT 166  05/20/2024 1415    CHOLSTRLTOT 169 09/19/2023 1030    TRIGLYCERIDE 193 (H) 05/20/2024 1415    TRIGLYCERIDE 151 (H) 09/19/2023 1030    HDL 67 05/20/2024 1415    HDL 78 09/19/2023 1030    LDL 60 05/20/2024 1415    LDL 61 09/19/2023 1030     Lab Results   Component Value Date/Time    WBC 10.0 10/08/2024 0048    WBC 7.7 10/06/2024 0646    HEMOGLOBIN 14.2 10/08/2024 0048    HEMOGLOBIN 14.0 10/06/2024 0646    HEMATOCRIT 44.3 10/08/2024 0048    HEMATOCRIT 44.1 10/06/2024 0646    PLATELETCT 160 (L) 10/08/2024 0048    PLATELETCT 153 (L) 10/06/2024 0646      Echocardiogram 7/1/2014  CONCLUSIONS   Normal left ventricular chamber size. Mild concentric left ventricular hypertrophy. Normal left ventricular systolic function. Left ventricular ejection fraction is 60% to 65%. Grade II diastolic dysfunction is present.   Trace mitral regurgitation. Possible prolapse of the anterior mitral valve leaflet.   The aortic valve is not well visualized. Aortic sclerosis without stenosis. AV MG 8.25 mmHg, PG 15 mmHg. Trace aortic insufficiency.   Trace tricuspid regurgitation. Unable to estimate pulmonary artery pressure due to an inadequate tricuspid regurgitant jet.   Small pericardial effusion without evidence of hemodynamic compromise.   Normal aortic root diameter 2.8 cm.   No prior study for comparison.    Transthoracic Echo Report 4/13/2021  Normal left ventricular chamber size.  Left ventricular ejection fraction is visually estimated to be 70%.  Moderate concentric left ventricular hypertrophy.  Grade I diastolic dysfunction.  Mild aortic stenosis.  Normal inferior vena cava size and inspiratory collapse.     Transthoracic Echo Report 8/21/2024  Compared to the prior study on 4/13/2021, no significant changes  The ejection fraction is measured to be 70% by Arriaga's biplane.  No regional wall motion abnormalities.  Mild aortic valve stenosis. Vmax is 2.6  m/s.     Transthoracic Echo Report 10/5/2024  Normal right and left  ventricular size and function.   The left ventricular ejection fraction is visually estimated to be 70%.  Mild concentric left ventricular hypertrophy.  Grade II diastolic dysfunction.  Mildly dilated left atrium.  Mild aortic valve stenosis.  Right heart pressures are normal.      Compared to the prior study on 8/21/2024: No significant change.        Assessment & Plan     1. Left ventricular diastolic dysfunction, NYHA class 3        2. ACC/AHA stage C heart failure with preserved ejection fraction (HCC)  Basic Metabolic Panel    furosemide (LASIX) 20 MG Tab      3. HTN (hypertension), malignant  Basic Metabolic Panel    furosemide (LASIX) 20 MG Tab      4. Dyspnea on exertion        5. Sick sinus syndrome (HCC)        6. Pacemaker        7. Mixed hyperlipidemia        8. AZUL on CPAP        9. Aortic stenosis, mild            Medical Decision Making: Today's Assessment/Status/Plan:        HFpEF, Stage C, Class 3, LVEF 70%: pt does exhibit Bilateral LE edema.   -Patient does have mild aortic valve stenosis, mild LVH  -Start furosemide 20 mg daily  -BMP within the week  -Continue spironolactone 25 mg daily  -Continue Jardiance 10 mg daily  -Reinforced s/sx of worsening heart failure with patient and weight monitoring. Pt verbalizes understanding. Pt to call office or RTC if present.    -Patient did have lower extremity venous reflux study which did not show any reflux disease in her bilateral small and great saphenous veins on 12/27/2024    Hypertension:  -BP is borderline elevated today, but patient reports she is upset by her parking situation today  -Recommend patient to discontinue amlodipine at this time as a possible cause of her lower extremity edema  -Continue spironolactone 25 mg daily  -Continue valsartan 320 mg daily  -Continue carvedilol 25 mg twice a day    SSS, Second-degree AV block , s/p pacemaker on 10/7/2024:  -Last device check 1/6/2025 through remote transmission    Sleep apnea:  -Patient reports  she has been using her CPAP regularly    Hyperlipidemia:  -Last LDL 60 on 5/20/2024  -Continue rosuvastatin 10 mg daily    FU in clinic in 1 week with labs. Sooner if needed.    Patient verbalizes understanding and agrees with the plan of care.     PLEASE NOTE: This Note was created using voice recognition Software. I have made every reasonable attempt to correct obvious errors, but I expect that there are errors of grammar and possibly content that I did not discover before finalizing the note

## 2025-02-21 ENCOUNTER — RESULTS FOLLOW-UP (OUTPATIENT)
Dept: CARDIOLOGY | Facility: MEDICAL CENTER | Age: 80
End: 2025-02-21

## 2025-02-21 ENCOUNTER — HOSPITAL ENCOUNTER (OUTPATIENT)
Facility: MEDICAL CENTER | Age: 80
End: 2025-02-21
Attending: NURSE PRACTITIONER
Payer: MEDICARE

## 2025-02-21 ENCOUNTER — NON-PROVIDER VISIT (OUTPATIENT)
Dept: INTERNAL MEDICINE | Facility: IMAGING CENTER | Age: 80
End: 2025-02-21
Payer: MEDICARE

## 2025-02-21 VITALS — DIASTOLIC BLOOD PRESSURE: 60 MMHG | SYSTOLIC BLOOD PRESSURE: 138 MMHG

## 2025-02-21 DIAGNOSIS — I50.30 ACC/AHA STAGE C HEART FAILURE WITH PRESERVED EJECTION FRACTION (HCC): ICD-10-CM

## 2025-02-21 DIAGNOSIS — I10 HTN (HYPERTENSION), MALIGNANT: ICD-10-CM

## 2025-02-21 LAB
ANION GAP SERPL CALC-SCNC: 13 MMOL/L (ref 7–16)
BUN SERPL-MCNC: 34 MG/DL (ref 8–22)
CALCIUM SERPL-MCNC: 11.3 MG/DL (ref 8.5–10.5)
CHLORIDE SERPL-SCNC: 100 MMOL/L (ref 96–112)
CO2 SERPL-SCNC: 24 MMOL/L (ref 20–33)
CREAT SERPL-MCNC: 1.21 MG/DL (ref 0.5–1.4)
GFR SERPLBLD CREATININE-BSD FMLA CKD-EPI: 45 ML/MIN/1.73 M 2
GLUCOSE SERPL-MCNC: 102 MG/DL (ref 65–99)
POTASSIUM SERPL-SCNC: 5.5 MMOL/L (ref 3.6–5.5)
SODIUM SERPL-SCNC: 137 MMOL/L (ref 135–145)

## 2025-02-21 PROCEDURE — 80048 BASIC METABOLIC PNL TOTAL CA: CPT

## 2025-02-21 PROCEDURE — 99999 PR NO CHARGE: CPT

## 2025-02-25 ENCOUNTER — APPOINTMENT (OUTPATIENT)
Dept: CARDIOLOGY | Facility: MEDICAL CENTER | Age: 80
End: 2025-02-25
Attending: NURSE PRACTITIONER
Payer: MEDICARE

## 2025-02-26 ENCOUNTER — OFFICE VISIT (OUTPATIENT)
Dept: CARDIOLOGY | Facility: MEDICAL CENTER | Age: 80
End: 2025-02-26
Attending: NURSE PRACTITIONER
Payer: MEDICARE

## 2025-02-26 VITALS
DIASTOLIC BLOOD PRESSURE: 50 MMHG | OXYGEN SATURATION: 94 % | HEART RATE: 66 BPM | BODY MASS INDEX: 37.07 KG/M2 | SYSTOLIC BLOOD PRESSURE: 128 MMHG | RESPIRATION RATE: 18 BRPM | HEIGHT: 67 IN | WEIGHT: 236.2 LBS

## 2025-02-26 DIAGNOSIS — R06.09 DYSPNEA ON EXERTION: ICD-10-CM

## 2025-02-26 DIAGNOSIS — E78.2 MIXED HYPERLIPIDEMIA: ICD-10-CM

## 2025-02-26 DIAGNOSIS — I50.30 ACC/AHA STAGE C HEART FAILURE WITH PRESERVED EJECTION FRACTION (HCC): ICD-10-CM

## 2025-02-26 DIAGNOSIS — Z95.0 PACEMAKER: ICD-10-CM

## 2025-02-26 DIAGNOSIS — G47.33 OSA ON CPAP: ICD-10-CM

## 2025-02-26 DIAGNOSIS — I49.5 SICK SINUS SYNDROME (HCC): ICD-10-CM

## 2025-02-26 DIAGNOSIS — I10 HTN (HYPERTENSION), MALIGNANT: ICD-10-CM

## 2025-02-26 DIAGNOSIS — Z79.899 HIGH RISK MEDICATION USE: ICD-10-CM

## 2025-02-26 DIAGNOSIS — I35.0 AORTIC STENOSIS, MILD: ICD-10-CM

## 2025-02-26 PROCEDURE — 99213 OFFICE O/P EST LOW 20 MIN: CPT | Performed by: NURSE PRACTITIONER

## 2025-02-26 RX ORDER — ACETAMINOPHEN 500 MG
500-1000 TABLET ORAL EVERY 6 HOURS PRN
COMMUNITY

## 2025-02-26 RX ORDER — ZOLPIDEM TARTRATE 5 MG/1
5 TABLET ORAL NIGHTLY PRN
COMMUNITY

## 2025-02-26 ASSESSMENT — ENCOUNTER SYMPTOMS
MYALGIAS: 0
SHORTNESS OF BREATH: 1
CLAUDICATION: 0
PALPITATIONS: 0
DIZZINESS: 1
ORTHOPNEA: 0
FEVER: 0
COUGH: 0
PND: 0
ABDOMINAL PAIN: 0

## 2025-02-26 ASSESSMENT — FIBROSIS 4 INDEX: FIB4 SCORE: 1.98

## 2025-02-26 NOTE — PROGRESS NOTES
Chief Complaint   Patient presents with    Follow-Up     F/V Dx: Left ventricular diastolic dysfunction, NYHA class 3    Hypertension     F/V Dx: HTN (hypertension), malignant    Congestive Heart Failure     F/V Dx: ACC/AHA stage C heart failure with preserved ejection fraction (HCC)       Subjective     Rosy Vences is a 79 y.o. female who presents today for follow up on on symptoms.    Patient of Dr. Chandra.  She was last seen in clinic on 2/19/2025 with myself.  At that visit, patient was started on furosemide 20 mg daily for her lower extremity edema and shortness of breath.  She was sent for lab testing and follow-up in 1 week.  She was also recommended to stop amlodipine    Patient mentions over the week, her lower extremity edema has improved and she notes improvement of her shortness of breath.    She denies chest pain, palpitations, orthopnea, PND.  She occasionally has to dizziness.    Her weights have gone from 245 pounds down to 232 pounds.    She does have a history of sleep apnea and does use her CPAP regularly.    She reports she is hydrating better.    She mentions she is compliant with the lower sodium diet.    She uses a walker to get around    Additionally, patient has the following medical problems:     -Hypertension    -Hyperlipidemia    -Mild aortic stenosis    -Pacemaker    Past Medical History:   Diagnosis Date    Anesthesia     low O2 sat    Arthritis     osteo    Back pain     Dyslipidemia     Yi measles     Heart murmur 09/13/2017    Hypertension     Influenza     Mumps     Other specified symptom associated with female genital organs     Painful joint     Psychiatric problem     Sleep apnea     c-pap with 3 l/nc    Snoring     Sore muscles     Tonsillitis     Unspecified cataract      Past Surgical History:   Procedure Laterality Date    AK EXPLORATORY OF ABDOMEN N/A 1/28/2023    Procedure: EX LAP;  Surgeon: Mike Valles M.D.;  Location: SURGERY [facility];  Service:  Gastroenterology    KNEE ARTHROPLASTY TOTAL Right 12/27/2017    HYSTERECTOMY ROBOTIC  10/23/2014    Performed by Smith Lyons M.D. at SURGERY [facility]    GASTROSCOPY WITH BIOPSY  7/2/2014    Performed by Sky Vila M.D. at SURGERY [facility]    OTHER ORTHOPEDIC SURGERY  2006    left total knee    APPENDECTOMY      ARTHROSCOPY, KNEE      HYSTERECTOMY LAPAROSCOPY      OTHER      meghan cataracts    OTHER ABDOMINAL SURGERY      Resection of pelvic mass, uterus and ovaries    WI BREAST REDUCTION Bilateral     1982    WI REMV 2ND CATARACT,CORN-SCLER SECTN      TONSILLECTOMY      TONSILLECTOMY AND ADENOIDECTOMY       Family History   Problem Relation Age of Onset    Breast Cancer Mother     Cancer Mother 65        Breast    Cancer Father         Colon    Alcohol/Drug Father     Colon Cancer Father     Hyperlipidemia Brother     Heart Disease Brother         arrythmia     Social History     Socioeconomic History    Marital status:      Spouse name: Not on file    Number of children: Not on file    Years of education: Not on file    Highest education level: Not on file   Occupational History    Not on file   Tobacco Use    Smoking status: Never    Smokeless tobacco: Never   Vaping Use    Vaping status: Never Used   Substance and Sexual Activity    Alcohol use: Not Currently     Alcohol/week: 0.6 - 1.2 oz     Types: 1 - 2 Glasses of wine per week    Drug use: No    Sexual activity: Not on file     Comment: , 2 children   Other Topics Concern    Not on file   Social History Narrative    Not on file     Social Drivers of Health     Financial Resource Strain: Not on file   Food Insecurity: Not on file   Transportation Needs: Not on file   Physical Activity: Not on file   Stress: Not on file   Social Connections: Not on file   Intimate Partner Violence: Not on file   Housing Stability: Not on file     Allergies   Allergen Reactions    Myrbetriq [Mirabegron] Unspecified     Dizziness &  lightheadedness    Tramadol Nausea     Nausea and somnolence     Outpatient Encounter Medications as of 2/26/2025   Medication Sig Dispense Refill    zolpidem (AMBIEN) 5 MG Tab Take 5 mg by mouth at bedtime as needed for Sleep.      acetaminophen (TYLENOL) 500 MG Tab Take 500-1,000 mg by mouth every 6 hours as needed.      furosemide (LASIX) 20 MG Tab Take 1 Tablet by mouth every day. 90 Tablet 1    spironolactone (ALDACTONE) 25 MG Tab Take 1 Tablet by mouth every day. 30 Tablet 3    rosuvastatin (CRESTOR) 10 MG Tab Take 1 Tablet by mouth every evening. 100 Tablet 3    carvedilol (COREG) 25 MG Tab TAKE 1 TABLET BY MOUTH TWICE DAILY WITH MEALS 180 Tablet 1    sertraline (ZOLOFT) 100 MG Tab Take 1 tablet by mouth once daily 90 Tablet 3    predniSONE (DELTASONE) 5 MG Tab Take 1 tablet by mouth once daily 30 Tablet 6    valsartan (DIOVAN) 320 MG tablet Take 1 Tablet by mouth every day. 100 Tablet 4    cholestyramine (QUESTRAN) 4 g packet Take 4 g by mouth 1 time a day as needed (Diarrhea). 60 Each 3    Ferrous Sulfate (IRON PO) Take 1 Tablet by mouth every morning.      Folic Acid (FOLATE PO) Take 1 Tablet by mouth every morning.      Cholecalciferol (VITAMIN D3 PO) Take 1 Tablet by mouth every morning.      Empagliflozin (JARDIANCE) 10 MG Tab tablet Take 1 Tablet by mouth every day. 90 Tablet 2     No facility-administered encounter medications on file as of 2/26/2025.     Review of Systems   Constitutional:  Negative for fever and malaise/fatigue.   Respiratory:  Positive for shortness of breath (Improved). Negative for cough.    Cardiovascular:  Positive for leg swelling (Improved). Negative for chest pain, palpitations, orthopnea, claudication and PND.   Gastrointestinal:  Negative for abdominal pain.   Musculoskeletal:  Negative for myalgias.   Neurological:  Positive for dizziness (at times).   All other systems reviewed and are negative.             Objective     /50 (BP Location: Left arm, Patient  "Position: Sitting, BP Cuff Size: Adult)   Pulse 66   Resp 18   Ht 1.702 m (5' 7.01\")   Wt 107 kg (236 lb 3.2 oz)   SpO2 94%   BMI 36.99 kg/m²     Physical Exam  Vitals reviewed.   Constitutional:       Appearance: She is well-developed.   HENT:      Head: Normocephalic and atraumatic.   Eyes:      Pupils: Pupils are equal, round, and reactive to light.   Neck:      Vascular: No JVD.   Cardiovascular:      Rate and Rhythm: Normal rate and regular rhythm.      Heart sounds: Normal heart sounds.      Comments: Device site-no erosion, drainage or erythema; bilateral lower extremity edema now from mid calf/shin down to feet  Pulmonary:      Effort: Pulmonary effort is normal. No respiratory distress.      Breath sounds: No wheezing or rales.   Abdominal:      General: Bowel sounds are normal.      Palpations: Abdomen is soft.   Musculoskeletal:         General: Normal range of motion.      Cervical back: Normal range of motion and neck supple.      Right lower le+ Pitting Edema present.      Left lower le+ Pitting Edema present.   Skin:     General: Skin is warm and dry.   Neurological:      General: No focal deficit present.      Mental Status: She is alert and oriented to person, place, and time.   Psychiatric:         Behavior: Behavior normal.       Lab Results   Component Value Date/Time    SODIUM 141 2025 1445    POTASSIUM 5.0 2025 1445    CHLORIDE 107 2025 1445    CO2 24 2025 1445    ANION 10.0 2025 1445    GLUCOSE 107 (H) 2025 1445    BUN 35 (H) 2025 1445    CREATININE 1.24 2025 1445    GFRCKD 44 (A) 2025 1445    CALCIUM 11.1 (H) 2025 1445    CORRCALC 10.2 10/08/2024 0048    ASTSGOT 17 10/04/2024 1227    ALTSGPT 18 10/04/2024 1227    ALKPHOSPHAT 60 10/04/2024 1227    TBILIRUBIN 0.4 10/04/2024 1227    ALBUMIN 3.6 10/08/2024 0048    TOTPROTEIN 6.3 10/04/2024 1227    GLOBULIN 2.5 10/04/2024 1227    AGRATIO 1.5 10/04/2024 1227     NT-proBNP "   Date Value Ref Range Status   01/31/2025 670 (H) 0 - 125 pg/mL Final   11/19/2024 903 (H) 0 - 125 pg/mL Final   10/04/2024 1192 (H) 0 - 125 pg/mL Final     Lab Results   Component Value Date/Time    CHOLSTRLTOT 166 05/20/2024 1415    CHOLSTRLTOT 169 09/19/2023 1030    TRIGLYCERIDE 193 (H) 05/20/2024 1415    TRIGLYCERIDE 151 (H) 09/19/2023 1030    HDL 67 05/20/2024 1415    HDL 78 09/19/2023 1030    LDL 60 05/20/2024 1415    LDL 61 09/19/2023 1030     Lab Results   Component Value Date/Time    WBC 10.0 10/08/2024 0048    WBC 7.7 10/06/2024 0646    HEMOGLOBIN 14.2 10/08/2024 0048    HEMOGLOBIN 14.0 10/06/2024 0646    HEMATOCRIT 44.3 10/08/2024 0048    HEMATOCRIT 44.1 10/06/2024 0646    PLATELETCT 160 (L) 10/08/2024 0048    PLATELETCT 153 (L) 10/06/2024 0646      Echocardiogram 7/1/2014  CONCLUSIONS   Normal left ventricular chamber size. Mild concentric left ventricular hypertrophy. Normal left ventricular systolic function. Left ventricular ejection fraction is 60% to 65%. Grade II diastolic dysfunction is present.   Trace mitral regurgitation. Possible prolapse of the anterior mitral valve leaflet.   The aortic valve is not well visualized. Aortic sclerosis without stenosis. AV MG 8.25 mmHg, PG 15 mmHg. Trace aortic insufficiency.   Trace tricuspid regurgitation. Unable to estimate pulmonary artery pressure due to an inadequate tricuspid regurgitant jet.   Small pericardial effusion without evidence of hemodynamic compromise.   Normal aortic root diameter 2.8 cm.   No prior study for comparison.    Transthoracic Echo Report 4/13/2021  Normal left ventricular chamber size.  Left ventricular ejection fraction is visually estimated to be 70%.  Moderate concentric left ventricular hypertrophy.  Grade I diastolic dysfunction.  Mild aortic stenosis.  Normal inferior vena cava size and inspiratory collapse.     Transthoracic Echo Report 8/21/2024  Compared to the prior study on 4/13/2021, no significant changes  The  ejection fraction is measured to be 70% by Arriaga's biplane.  No regional wall motion abnormalities.  Mild aortic valve stenosis. Vmax is 2.6  m/s.     Transthoracic Echo Report 10/5/2024  Normal right and left ventricular size and function.   The left ventricular ejection fraction is visually estimated to be 70%.  Mild concentric left ventricular hypertrophy.  Grade II diastolic dysfunction.  Mildly dilated left atrium.  Mild aortic valve stenosis.  Right heart pressures are normal.      Compared to the prior study on 8/21/2024: No significant change.        Assessment & Plan     1. ACC/AHA stage C heart failure with preserved ejection fraction (HCC)  Basic Metabolic Panel    proBrain Natriuretic Peptide, NT      2. High risk medication use  Basic Metabolic Panel    proBrain Natriuretic Peptide, NT      3. Dyspnea on exertion  proBrain Natriuretic Peptide, NT      4. HTN (hypertension), malignant        5. Sick sinus syndrome (HCC)        6. Pacemaker        7. AZUL on CPAP        8. Mixed hyperlipidemia        9. Aortic stenosis, mild              Medical Decision Making: Today's Assessment/Status/Plan:        HFpEF, Stage C, Class 2-3, LVEF 70%: pt does exhibit Bilateral LE edema, but improving  -Patient does have mild aortic valve stenosis, mild LVH  -Continue furosemide 20 mg daily  -BMP, NT proBNP in 1 month  -, Discussed with patient if her weights decrease another 10 pounds and her swelling resolves, to contact our office for a decrease in her diuretics.  -Continue spironolactone 25 mg daily  -Continue Jardiance 10 mg daily  -Reinforced s/sx of worsening heart failure with patient and weight monitoring. Pt verbalizes understanding. Pt to call office or RTC if present.    -Patient did have lower extremity venous reflux study which did not show any reflux disease in her bilateral small and great saphenous veins on 12/27/2024    Hypertension:  -BP stable  -She is off of amlodipine at this time as a possible  cause of her lower extremity edema  -Continue spironolactone 25 mg daily  -Continue valsartan 320 mg daily  -Continue carvedilol 25 mg twice a day    SSS, Second-degree AV block , s/p pacemaker on 10/7/2024:  -Last device check 1/6/2025 through remote transmission    Sleep apnea:  -Patient reports she has been using her CPAP regularly    Hyperlipidemia:  -Last LDL 60 on 5/20/2024  -Continue rosuvastatin 10 mg daily    Also recommended patient to discuss her high calcium on her lab testing with her PCP.    FU in clinic in 1 month with labs. Sooner if needed.    Patient verbalizes understanding and agrees with the plan of care.     PLEASE NOTE: This Note was created using voice recognition Software. I have made every reasonable attempt to correct obvious errors, but I expect that there are errors of grammar and possibly content that I did not discover before finalizing the note

## 2025-03-18 ENCOUNTER — TELEPHONE (OUTPATIENT)
Dept: CARDIOLOGY | Facility: MEDICAL CENTER | Age: 80
End: 2025-03-18
Payer: MEDICARE

## 2025-03-25 ENCOUNTER — HOSPITAL ENCOUNTER (OUTPATIENT)
Dept: RADIOLOGY | Facility: MEDICAL CENTER | Age: 80
End: 2025-03-25
Attending: FAMILY MEDICINE
Payer: MEDICARE

## 2025-03-25 VITALS
RESPIRATION RATE: 16 BRPM | HEART RATE: 70 BPM | SYSTOLIC BLOOD PRESSURE: 119 MMHG | OXYGEN SATURATION: 92 % | DIASTOLIC BLOOD PRESSURE: 42 MMHG

## 2025-03-25 DIAGNOSIS — R22.30 SHOULDER MASS: ICD-10-CM

## 2025-03-25 PROCEDURE — 700117 HCHG RX CONTRAST REV CODE 255: Mod: JZ | Performed by: FAMILY MEDICINE

## 2025-03-25 PROCEDURE — A9579 GAD-BASE MR CONTRAST NOS,1ML: HCPCS | Mod: JZ | Performed by: FAMILY MEDICINE

## 2025-03-25 PROCEDURE — 73223 MRI JOINT UPR EXTR W/O&W/DYE: CPT | Mod: RT

## 2025-03-25 RX ADMIN — GADOTERIDOL 20 ML: 279.3 INJECTION, SOLUTION INTRAVENOUS at 15:00

## 2025-03-25 NOTE — PROGRESS NOTES
MRI Nursing Note:    Pt's PPM set to MRI safe mode, pacing at DOO 70 BPM prior to MRI scan by this RN w/ [employer] Representative, [place], on the phone. During MRI scan, pt monitored with ECG, BP and SPO2. Pt tolerated scan well. Pt's device to reset to pre-MRI mode by 1435 (3 hours after set) per rep[place]. Pt independently ambulated to MRI Department exit at the time of discharge and walked to her car by this RN. Pt denies CP/SOB/HA/lightheadedness/nausea at the time of discharge.

## 2025-03-26 ENCOUNTER — HOSPITAL ENCOUNTER (OUTPATIENT)
Facility: MEDICAL CENTER | Age: 80
End: 2025-03-26
Attending: NURSE PRACTITIONER
Payer: MEDICARE

## 2025-03-26 ENCOUNTER — NON-PROVIDER VISIT (OUTPATIENT)
Dept: INTERNAL MEDICINE | Facility: IMAGING CENTER | Age: 80
End: 2025-03-26
Payer: MEDICARE

## 2025-03-26 DIAGNOSIS — Z79.899 HIGH RISK MEDICATION USE: ICD-10-CM

## 2025-03-26 DIAGNOSIS — I50.30 ACC/AHA STAGE C HEART FAILURE WITH PRESERVED EJECTION FRACTION (HCC): ICD-10-CM

## 2025-03-26 DIAGNOSIS — R06.09 DYSPNEA ON EXERTION: ICD-10-CM

## 2025-03-26 LAB
ANION GAP SERPL CALC-SCNC: 10 MMOL/L (ref 7–16)
BUN SERPL-MCNC: 62 MG/DL (ref 8–22)
CALCIUM SERPL-MCNC: 10 MG/DL (ref 8.5–10.5)
CHLORIDE SERPL-SCNC: 107 MMOL/L (ref 96–112)
CO2 SERPL-SCNC: 20 MMOL/L (ref 20–33)
CREAT SERPL-MCNC: 1.55 MG/DL (ref 0.5–1.4)
GFR SERPLBLD CREATININE-BSD FMLA CKD-EPI: 34 ML/MIN/1.73 M 2
GLUCOSE SERPL-MCNC: 97 MG/DL (ref 65–99)
NT-PROBNP SERPL IA-MCNC: 501 PG/ML (ref 0–125)
POTASSIUM SERPL-SCNC: 6 MMOL/L (ref 3.6–5.5)
SODIUM SERPL-SCNC: 137 MMOL/L (ref 135–145)

## 2025-03-26 PROCEDURE — 80048 BASIC METABOLIC PNL TOTAL CA: CPT

## 2025-03-26 PROCEDURE — 83880 ASSAY OF NATRIURETIC PEPTIDE: CPT

## 2025-03-27 ENCOUNTER — RESULTS FOLLOW-UP (OUTPATIENT)
Dept: CARDIOLOGY | Facility: MEDICAL CENTER | Age: 80
End: 2025-03-27
Payer: MEDICARE

## 2025-04-02 ENCOUNTER — OFFICE VISIT (OUTPATIENT)
Dept: CARDIOLOGY | Facility: MEDICAL CENTER | Age: 80
End: 2025-04-02
Attending: NURSE PRACTITIONER
Payer: MEDICARE

## 2025-04-02 VITALS
DIASTOLIC BLOOD PRESSURE: 62 MMHG | HEART RATE: 69 BPM | RESPIRATION RATE: 16 BRPM | OXYGEN SATURATION: 91 % | BODY MASS INDEX: 37.35 KG/M2 | SYSTOLIC BLOOD PRESSURE: 138 MMHG | WEIGHT: 238 LBS | HEIGHT: 67 IN

## 2025-04-02 DIAGNOSIS — I10 HTN (HYPERTENSION), MALIGNANT: ICD-10-CM

## 2025-04-02 DIAGNOSIS — E78.2 MIXED HYPERLIPIDEMIA: ICD-10-CM

## 2025-04-02 DIAGNOSIS — I49.5 SICK SINUS SYNDROME (HCC): ICD-10-CM

## 2025-04-02 DIAGNOSIS — I50.9 HEART FAILURE, NYHA CLASS 2 (HCC): ICD-10-CM

## 2025-04-02 DIAGNOSIS — I50.30 ACC/AHA STAGE C HEART FAILURE WITH PRESERVED EJECTION FRACTION (HCC): ICD-10-CM

## 2025-04-02 DIAGNOSIS — G47.33 OSA ON CPAP: ICD-10-CM

## 2025-04-02 DIAGNOSIS — I35.0 AORTIC STENOSIS, MILD: ICD-10-CM

## 2025-04-02 DIAGNOSIS — Z95.0 PACEMAKER: ICD-10-CM

## 2025-04-02 PROCEDURE — 3075F SYST BP GE 130 - 139MM HG: CPT | Performed by: NURSE PRACTITIONER

## 2025-04-02 PROCEDURE — 99214 OFFICE O/P EST MOD 30 MIN: CPT | Performed by: NURSE PRACTITIONER

## 2025-04-02 PROCEDURE — 99213 OFFICE O/P EST LOW 20 MIN: CPT | Performed by: NURSE PRACTITIONER

## 2025-04-02 PROCEDURE — 3078F DIAST BP <80 MM HG: CPT | Performed by: NURSE PRACTITIONER

## 2025-04-02 RX ORDER — SPIRONOLACTONE 25 MG/1
12.5 TABLET ORAL DAILY
Qty: 45 TABLET | Refills: 3 | Status: SHIPPED | OUTPATIENT
Start: 2025-04-02

## 2025-04-02 ASSESSMENT — ENCOUNTER SYMPTOMS
MYALGIAS: 0
COUGH: 0
ORTHOPNEA: 0
PALPITATIONS: 0
DIZZINESS: 0
SHORTNESS OF BREATH: 0
ABDOMINAL PAIN: 0
PND: 0
FEVER: 0
CLAUDICATION: 0

## 2025-04-02 ASSESSMENT — FIBROSIS 4 INDEX: FIB4 SCORE: 2.003469213361884652

## 2025-04-02 NOTE — PROGRESS NOTES
Chief Complaint   Patient presents with    Congestive Heart Failure     F/V DX: ACC/AHA stage C heart failure with preserved ejection fraction (HCC)           Subjective     Rosy Vences is a 80 y.o. female who presents today for follow up on on symptoms.    Patient of Dr. Chandra.  She was last seen in clinic on 2/26/2025 with myself.  At that visit, patient was recommended to continue to furosemide 20 mg daily and  complete lab testing in 1 month.    Over the last month, pt reports her weight went down a few more lbs. She notes her LE edema has resolved.    She denies chest pain, palpitations, orthopnea, PND, SOB and dizziness/lightheadedness.    Her weights have gone from 245 pounds Initially down to 227 pounds today.    She does have a history of sleep apnea and does use her CPAP regularly.    She reports she is trying to stay hydrated.     She mentions she is compliant with the lower sodium diet. She does not take potassium supplements but eats a regular banana. She denies eating any other regular food with higher amounts of potassium.    Additionally, patient has the following medical problems:     -Hypertension    -Hyperlipidemia    -Mild aortic stenosis    -Pacemaker    Past Medical History:   Diagnosis Date    Anesthesia     low O2 sat    Arthritis     osteo    Back pain     Dyslipidemia     [place] measles     Heart murmur 09/13/2017    Hypertension     Influenza     Mumps     Other specified symptom associated with female genital organs     Painful joint     Psychiatric problem     Sleep apnea     c-pap with 3 l/nc    Snoring     Sore muscles     Tonsillitis     Unspecified cataract      Past Surgical History:   Procedure Laterality Date    MD EXPLORATORY OF ABDOMEN N/A 1/28/2023    Procedure: EX LAP;  Surgeon: Mike Valles M.D.;  Location: SURGERY [facility];  Service: Gastroenterology    KNEE ARTHROPLASTY TOTAL Right 12/27/2017    HYSTERECTOMY ROBOTIC  10/23/2014    Performed by Smith TEE  JANET Lyons at SURGERY [facility] ORS    GASTROSCOPY WITH BIOPSY  7/2/2014    Performed by Sky Vila M.D. at SURGERY [facility] ORS    OTHER ORTHOPEDIC SURGERY  2006    left total knee    APPENDECTOMY      ARTHROSCOPY, KNEE      HYSTERECTOMY LAPAROSCOPY      OTHER      meghan cataracts    OTHER ABDOMINAL SURGERY      Resection of pelvic mass, uterus and ovaries    UT BREAST REDUCTION Bilateral     1982    UT REMV 2ND CATARACT,CORN-SCLER SECTN      TONSILLECTOMY      TONSILLECTOMY AND ADENOIDECTOMY       Family History   Problem Relation Age of Onset    Breast Cancer Mother     Cancer Mother 65        Breast    Cancer Father         Colon    Alcohol/Drug Father     Colon Cancer Father     Hyperlipidemia Brother     Heart Disease Brother         arrythmia     Social History     Socioeconomic History    Marital status:      Spouse name: Not on file    Number of children: Not on file    Years of education: Not on file    Highest education level: Not on file   Occupational History    Not on file   Tobacco Use    Smoking status: Never    Smokeless tobacco: Never   Vaping Use    Vaping status: Never Used   Substance and Sexual Activity    Alcohol use: Yes     Alcohol/week: 0.6 - 1.2 oz     Types: 1 - 2 Glasses of wine per week     Comment: occ.    Drug use: No    Sexual activity: Not on file     Comment: , 2 children   Other Topics Concern    Not on file   Social History Narrative    Not on file     Social Drivers of Health     Financial Resource Strain: Not on file   Food Insecurity: Not on file   Transportation Needs: Not on file   Physical Activity: Not on file   Stress: Not on file   Social Connections: Not on file   Intimate Partner Violence: Not on file   Housing Stability: Not on file     Allergies   Allergen Reactions    Myrbetriq [Mirabegron] Unspecified     Dizziness & lightheadedness    Tramadol Nausea     Nausea and somnolence     Outpatient Encounter Medications as of 4/2/2025    Medication Sig Dispense Refill    spironolactone (ALDACTONE) 25 MG Tab Take 0.5 Tablets by mouth every day. 45 Tablet 3    acetaminophen (TYLENOL) 500 MG Tab Take 500-1,000 mg by mouth every 6 hours as needed.      furosemide (LASIX) 20 MG Tab Take 1 Tablet by mouth every day. 90 Tablet 1    rosuvastatin (CRESTOR) 10 MG Tab Take 1 Tablet by mouth every evening. 100 Tablet 3    carvedilol (COREG) 25 MG Tab TAKE 1 TABLET BY MOUTH TWICE DAILY WITH MEALS 180 Tablet 1    sertraline (ZOLOFT) 100 MG Tab Take 1 tablet by mouth once daily 90 Tablet 3    predniSONE (DELTASONE) 5 MG Tab Take 1 tablet by mouth once daily 30 Tablet 6    valsartan (DIOVAN) 320 MG tablet Take 1 Tablet by mouth every day. 100 Tablet 4    cholestyramine (QUESTRAN) 4 g packet Take 4 g by mouth 1 time a day as needed (Diarrhea). 60 Each 3    Ferrous Sulfate (IRON PO) Take 1 Tablet by mouth every morning.      Folic Acid (FOLATE PO) Take 1 Tablet by mouth every morning.      Cholecalciferol (VITAMIN D3 PO) Take 1 Tablet by mouth every morning.      Empagliflozin (JARDIANCE) 10 MG Tab tablet Take 1 Tablet by mouth every day. 90 Tablet 2    zolpidem (AMBIEN) 5 MG Tab Take 5 mg by mouth at bedtime as needed for Sleep. (Patient not taking: Reported on 4/2/2025)      [DISCONTINUED] spironolactone (ALDACTONE) 25 MG Tab Take 1 Tablet by mouth every day. 30 Tablet 3     No facility-administered encounter medications on file as of 4/2/2025.     Review of Systems   Constitutional:  Negative for fever and malaise/fatigue.   Respiratory:  Negative for cough and shortness of breath.    Cardiovascular:  Negative for chest pain, palpitations, orthopnea, claudication, leg swelling and PND.   Gastrointestinal:  Negative for abdominal pain.   Musculoskeletal:  Negative for myalgias.   Neurological:  Negative for dizziness.   All other systems reviewed and are negative.             Objective     /62 (BP Location: Left arm, Patient Position: Sitting, BP Cuff Size:  "Adult)   Pulse 69   Resp 16   Ht 1.702 m (5' 7.01\")   Wt 108 kg (238 lb)   SpO2 91%   BMI 37.26 kg/m²     Physical Exam  Vitals reviewed.   Constitutional:       Appearance: She is well-developed. She is obese.   HENT:      Head: Normocephalic and atraumatic.   Eyes:      Pupils: Pupils are equal, round, and reactive to light.   Neck:      Vascular: No JVD.   Cardiovascular:      Rate and Rhythm: Normal rate and regular rhythm.      Heart sounds: Normal heart sounds.      Comments: Device site-no erosion, drainage or erythema; bilateral lower extremity edema now from mid calf/shin down to feet  Pulmonary:      Effort: Pulmonary effort is normal. No respiratory distress.      Breath sounds: No wheezing or rales.   Abdominal:      General: Bowel sounds are normal.      Palpations: Abdomen is soft.   Musculoskeletal:         General: Normal range of motion.      Cervical back: Normal range of motion and neck supple.      Right lower leg: No edema.      Left lower leg: No edema.   Skin:     General: Skin is warm and dry.   Neurological:      General: No focal deficit present.      Mental Status: She is alert and oriented to person, place, and time.   Psychiatric:         Behavior: Behavior normal.       Lab Results   Component Value Date/Time    SODIUM 141 01/31/2025 1445    POTASSIUM 5.0 01/31/2025 1445    CHLORIDE 107 01/31/2025 1445    CO2 24 01/31/2025 1445    ANION 10.0 01/31/2025 1445    GLUCOSE 107 (H) 01/31/2025 1445    BUN 35 (H) 01/31/2025 1445    CREATININE 1.24 01/31/2025 1445    GFRCKD 44 (A) 01/31/2025 1445    CALCIUM 11.1 (H) 01/31/2025 1445    CORRCALC 10.2 10/08/2024 0048    ASTSGOT 17 10/04/2024 1227    ALTSGPT 18 10/04/2024 1227    ALKPHOSPHAT 60 10/04/2024 1227    TBILIRUBIN 0.4 10/04/2024 1227    ALBUMIN 3.6 10/08/2024 0048    TOTPROTEIN 6.3 10/04/2024 1227    GLOBULIN 2.5 10/04/2024 1227    AGRATIO 1.5 10/04/2024 1227     NT-proBNP   Date Value Ref Range Status   03/26/2025 501 (H) 0 - 125 " pg/mL Final   01/31/2025 670 (H) 0 - 125 pg/mL Final   11/19/2024 903 (H) 0 - 125 pg/mL Final     Lab Results   Component Value Date/Time    CHOLSTRLTOT 166 05/20/2024 1415    CHOLSTRLTOT 169 09/19/2023 1030    TRIGLYCERIDE 193 (H) 05/20/2024 1415    TRIGLYCERIDE 151 (H) 09/19/2023 1030    HDL 67 05/20/2024 1415    HDL 78 09/19/2023 1030    LDL 60 05/20/2024 1415    LDL 61 09/19/2023 1030     Lab Results   Component Value Date/Time    WBC 10.0 10/08/2024 0048    WBC 7.7 10/06/2024 0646    HEMOGLOBIN 14.2 10/08/2024 0048    HEMOGLOBIN 14.0 10/06/2024 0646    HEMATOCRIT 44.3 10/08/2024 0048    HEMATOCRIT 44.1 10/06/2024 0646    PLATELETCT 160 (L) 10/08/2024 0048    PLATELETCT 153 (L) 10/06/2024 0646      Echocardiogram 7/1/2014  CONCLUSIONS   Normal left ventricular chamber size. Mild concentric left ventricular hypertrophy. Normal left ventricular systolic function. Left ventricular ejection fraction is 60% to 65%. Grade II diastolic dysfunction is present.   Trace mitral regurgitation. Possible prolapse of the anterior mitral valve leaflet.   The aortic valve is not well visualized. Aortic sclerosis without stenosis. AV MG 8.25 mmHg, PG 15 mmHg. Trace aortic insufficiency.   Trace tricuspid regurgitation. Unable to estimate pulmonary artery pressure due to an inadequate tricuspid regurgitant jet.   Small pericardial effusion without evidence of hemodynamic compromise.   Normal aortic root diameter 2.8 cm.   No prior study for comparison.    Transthoracic Echo Report 4/13/2021  Normal left ventricular chamber size.  Left ventricular ejection fraction is visually estimated to be 70%.  Moderate concentric left ventricular hypertrophy.  Grade I diastolic dysfunction.  Mild aortic stenosis.  Normal inferior vena cava size and inspiratory collapse.     Transthoracic Echo Report 8/21/2024  Compared to the prior study on 4/13/2021, no significant changes  The ejection fraction is measured to be 70% by Arriaga's biplane.  No  regional wall motion abnormalities.  Mild aortic valve stenosis. Vmax is 2.6  m/s.     Transthoracic Echo Report 10/5/2024  Normal right and left ventricular size and function.   The left ventricular ejection fraction is visually estimated to be 70%.  Mild concentric left ventricular hypertrophy.  Grade II diastolic dysfunction.  Mildly dilated left atrium.  Mild aortic valve stenosis.  Right heart pressures are normal.      Compared to the prior study on 8/21/2024: No significant change.        Assessment & Plan     1. HTN (hypertension), malignant  spironolactone (ALDACTONE) 25 MG Tab    Basic Metabolic Panel      2. ACC/AHA stage C heart failure with preserved ejection fraction (HCC)        3. Heart failure, NYHA class 2 (HCC)        4. Sick sinus syndrome (HCC)        5. Pacemaker        6. AZUL on CPAP        7. Mixed hyperlipidemia        8. Aortic stenosis, mild                Medical Decision Making: Today's Assessment/Status/Plan:        HFpEF, Stage C, Class 1-2, LVEF 70%: Based on physical examination findings, patient is euvolemic. No JVD, lungs are clear to auscultation, no pitting edema in bilateral lower extremities, no ascites.  -Patient does have mild aortic valve stenosis, mild LVH  -Continue furosemide 20 mg daily  -Decrease spironolactone to 12.5 mg daily due to high potassium level on recent labs and some ABHILASH  -Repeat a BMP in 1 week, to follow potassium level  -Continue Jardiance 10 mg daily  -Reinforced s/sx of worsening heart failure with patient and weight monitoring. Pt verbalizes understanding. Pt to call office or RTC if present.    -Patient did have lower extremity venous reflux study which did not show any reflux disease in her bilateral small and great saphenous veins on 12/27/2024    Hypertension:  -BP stable  -She is off of amlodipine at this time as a possible cause of her lower extremity edema  -Decrease spironolactone to 12.5 mg daily  -Continue valsartan 320 mg daily  -Continue  carvedilol 25 mg twice a day    SSS, Second-degree AV block , s/p pacemaker on 10/7/2024:  -Last device check 1/6/2025 through remote transmission    Sleep apnea:  -Patient reports she has been using her CPAP regularly    Hyperlipidemia:  -Last LDL 60 on 5/20/2024  -Continue rosuvastatin 10 mg daily    FU in clinic in 6 to 7 weeks when she is back in clinic for pacemaker check. Sooner if needed.    Patient verbalizes understanding and agrees with the plan of care.     PLEASE NOTE: This Note was created using voice recognition Software. I have made every reasonable attempt to correct obvious errors, but I expect that there are errors of grammar and possibly content that I did not discover before finalizing the note

## 2025-04-02 NOTE — PATIENT INSTRUCTIONS
Decrease Spironolactone to 12.5 mg daily.  Cut pill in half    Stop eating bananas     Repeat labs next week

## 2025-04-05 ENCOUNTER — APPOINTMENT (OUTPATIENT)
Dept: RADIOLOGY | Facility: MEDICAL CENTER | Age: 80
DRG: 480 | End: 2025-04-05
Attending: EMERGENCY MEDICINE
Payer: MEDICARE

## 2025-04-05 ENCOUNTER — HOSPITAL ENCOUNTER (INPATIENT)
Facility: MEDICAL CENTER | Age: 80
LOS: 4 days | DRG: 480 | End: 2025-04-09
Attending: EMERGENCY MEDICINE
Payer: MEDICARE

## 2025-04-05 DIAGNOSIS — Z79.52 CURRENT CHRONIC USE OF SYSTEMIC STEROIDS: ICD-10-CM

## 2025-04-05 DIAGNOSIS — N17.9 AKI (ACUTE KIDNEY INJURY) (HCC): ICD-10-CM

## 2025-04-05 DIAGNOSIS — S72.492A OTHER CLOSED FRACTURE OF DISTAL END OF LEFT FEMUR, INITIAL ENCOUNTER (HCC): ICD-10-CM

## 2025-04-05 DIAGNOSIS — E87.5 HYPERKALEMIA: ICD-10-CM

## 2025-04-05 PROBLEM — S72.22XA CLOSED LEFT SUBTROCHANTERIC FEMUR FRACTURE, INITIAL ENCOUNTER (HCC): Status: ACTIVE | Noted: 2025-04-05

## 2025-04-05 LAB
ANION GAP SERPL CALC-SCNC: 10 MMOL/L (ref 7–16)
ANION GAP SERPL CALC-SCNC: 9 MMOL/L (ref 7–16)
APTT PPP: 26.6 SEC (ref 24.7–36)
BASOPHILS # BLD AUTO: 0.3 % (ref 0–1.8)
BASOPHILS # BLD: 0.02 K/UL (ref 0–0.12)
BUN SERPL-MCNC: 48 MG/DL (ref 8–22)
BUN SERPL-MCNC: 53 MG/DL (ref 8–22)
CALCIUM SERPL-MCNC: 10.7 MG/DL (ref 8.5–10.5)
CALCIUM SERPL-MCNC: 10.8 MG/DL (ref 8.5–10.5)
CHLORIDE SERPL-SCNC: 106 MMOL/L (ref 96–112)
CHLORIDE SERPL-SCNC: 109 MMOL/L (ref 96–112)
CO2 SERPL-SCNC: 19 MMOL/L (ref 20–33)
CO2 SERPL-SCNC: 20 MMOL/L (ref 20–33)
CREAT SERPL-MCNC: 1.13 MG/DL (ref 0.5–1.4)
CREAT SERPL-MCNC: 1.19 MG/DL (ref 0.5–1.4)
EKG IMPRESSION: NORMAL
EOSINOPHIL # BLD AUTO: 0.08 K/UL (ref 0–0.51)
EOSINOPHIL NFR BLD: 1.2 % (ref 0–6.9)
ERYTHROCYTE [DISTWIDTH] IN BLOOD BY AUTOMATED COUNT: 46.4 FL (ref 35.9–50)
GFR SERPLBLD CREATININE-BSD FMLA CKD-EPI: 46 ML/MIN/1.73 M 2
GFR SERPLBLD CREATININE-BSD FMLA CKD-EPI: 49 ML/MIN/1.73 M 2
GLUCOSE BLD STRIP.AUTO-MCNC: 148 MG/DL (ref 65–99)
GLUCOSE BLD STRIP.AUTO-MCNC: 199 MG/DL (ref 65–99)
GLUCOSE BLD STRIP.AUTO-MCNC: 82 MG/DL (ref 65–99)
GLUCOSE SERPL-MCNC: 106 MG/DL (ref 65–99)
GLUCOSE SERPL-MCNC: 185 MG/DL (ref 65–99)
HCT VFR BLD AUTO: 45.1 % (ref 37–47)
HGB BLD-MCNC: 14.5 G/DL (ref 12–16)
IMM GRANULOCYTES # BLD AUTO: 0.05 K/UL (ref 0–0.11)
IMM GRANULOCYTES NFR BLD AUTO: 0.7 % (ref 0–0.9)
INR PPP: 1.06 (ref 0.87–1.13)
LYMPHOCYTES # BLD AUTO: 0.9 K/UL (ref 1–4.8)
LYMPHOCYTES NFR BLD: 13 % (ref 22–41)
MCH RBC QN AUTO: 33.2 PG (ref 27–33)
MCHC RBC AUTO-ENTMCNC: 32.2 G/DL (ref 32.2–35.5)
MCV RBC AUTO: 103.2 FL (ref 81.4–97.8)
MONOCYTES # BLD AUTO: 0.41 K/UL (ref 0–0.85)
MONOCYTES NFR BLD AUTO: 5.9 % (ref 0–13.4)
NEUTROPHILS # BLD AUTO: 5.48 K/UL (ref 1.82–7.42)
NEUTROPHILS NFR BLD: 78.9 % (ref 44–72)
NRBC # BLD AUTO: 0 K/UL
NRBC BLD-RTO: 0 /100 WBC (ref 0–0.2)
PLATELET # BLD AUTO: 162 K/UL (ref 164–446)
PMV BLD AUTO: 11.3 FL (ref 9–12.9)
POTASSIUM SERPL-SCNC: 4.8 MMOL/L (ref 3.6–5.5)
POTASSIUM SERPL-SCNC: 6.4 MMOL/L (ref 3.6–5.5)
PROTHROMBIN TIME: 13.8 SEC (ref 12–14.6)
RBC # BLD AUTO: 4.37 M/UL (ref 4.2–5.4)
SODIUM SERPL-SCNC: 135 MMOL/L (ref 135–145)
SODIUM SERPL-SCNC: 138 MMOL/L (ref 135–145)
WBC # BLD AUTO: 6.9 K/UL (ref 4.8–10.8)

## 2025-04-05 PROCEDURE — 700102 HCHG RX REV CODE 250 W/ 637 OVERRIDE(OP)

## 2025-04-05 PROCEDURE — 82962 GLUCOSE BLOOD TEST: CPT

## 2025-04-05 PROCEDURE — 80048 BASIC METABOLIC PNL TOTAL CA: CPT | Mod: 91

## 2025-04-05 PROCEDURE — 36415 COLL VENOUS BLD VENIPUNCTURE: CPT

## 2025-04-05 PROCEDURE — 700111 HCHG RX REV CODE 636 W/ 250 OVERRIDE (IP): Performed by: EMERGENCY MEDICINE

## 2025-04-05 PROCEDURE — 85025 COMPLETE CBC W/AUTO DIFF WBC: CPT

## 2025-04-05 PROCEDURE — 85610 PROTHROMBIN TIME: CPT

## 2025-04-05 PROCEDURE — 72170 X-RAY EXAM OF PELVIS: CPT

## 2025-04-05 PROCEDURE — 700102 HCHG RX REV CODE 250 W/ 637 OVERRIDE(OP): Performed by: EMERGENCY MEDICINE

## 2025-04-05 PROCEDURE — A9270 NON-COVERED ITEM OR SERVICE: HCPCS | Performed by: EMERGENCY MEDICINE

## 2025-04-05 PROCEDURE — 700101 HCHG RX REV CODE 250: Performed by: EMERGENCY MEDICINE

## 2025-04-05 PROCEDURE — 85730 THROMBOPLASTIN TIME PARTIAL: CPT

## 2025-04-05 PROCEDURE — 99223 1ST HOSP IP/OBS HIGH 75: CPT | Mod: AI

## 2025-04-05 PROCEDURE — A9270 NON-COVERED ITEM OR SERVICE: HCPCS

## 2025-04-05 PROCEDURE — 96376 TX/PRO/DX INJ SAME DRUG ADON: CPT

## 2025-04-05 PROCEDURE — 770020 HCHG ROOM/CARE - TELE (206)

## 2025-04-05 PROCEDURE — 73552 X-RAY EXAM OF FEMUR 2/>: CPT | Mod: LT

## 2025-04-05 PROCEDURE — 96375 TX/PRO/DX INJ NEW DRUG ADDON: CPT

## 2025-04-05 PROCEDURE — 93005 ELECTROCARDIOGRAM TRACING: CPT | Mod: TC | Performed by: EMERGENCY MEDICINE

## 2025-04-05 PROCEDURE — 700105 HCHG RX REV CODE 258: Performed by: EMERGENCY MEDICINE

## 2025-04-05 PROCEDURE — 99285 EMERGENCY DEPT VISIT HI MDM: CPT

## 2025-04-05 PROCEDURE — 94640 AIRWAY INHALATION TREATMENT: CPT

## 2025-04-05 PROCEDURE — 73700 CT LOWER EXTREMITY W/O DYE: CPT | Mod: LT

## 2025-04-05 PROCEDURE — 96374 THER/PROPH/DIAG INJ IV PUSH: CPT

## 2025-04-05 PROCEDURE — 700105 HCHG RX REV CODE 258

## 2025-04-05 RX ORDER — SERTRALINE HYDROCHLORIDE 100 MG/1
100 TABLET, FILM COATED ORAL DAILY
Status: DISCONTINUED | OUTPATIENT
Start: 2025-04-06 | End: 2025-04-09 | Stop reason: HOSPADM

## 2025-04-05 RX ORDER — HYDROMORPHONE HYDROCHLORIDE 1 MG/ML
0.25 INJECTION, SOLUTION INTRAMUSCULAR; INTRAVENOUS; SUBCUTANEOUS
Status: DISCONTINUED | OUTPATIENT
Start: 2025-04-05 | End: 2025-04-08

## 2025-04-05 RX ORDER — OXYCODONE HYDROCHLORIDE 5 MG/1
5 TABLET ORAL
Refills: 0 | Status: DISCONTINUED | OUTPATIENT
Start: 2025-04-05 | End: 2025-04-08

## 2025-04-05 RX ORDER — ENOXAPARIN SODIUM 100 MG/ML
40 INJECTION SUBCUTANEOUS DAILY
Status: DISCONTINUED | OUTPATIENT
Start: 2025-04-05 | End: 2025-04-08

## 2025-04-05 RX ORDER — DEXTROSE MONOHYDRATE 25 G/50ML
50 INJECTION, SOLUTION INTRAVENOUS ONCE
Status: COMPLETED | OUTPATIENT
Start: 2025-04-05 | End: 2025-04-05

## 2025-04-05 RX ORDER — FERROUS SULFATE 325(65) MG
325 TABLET ORAL DAILY
COMMUNITY

## 2025-04-05 RX ORDER — ONDANSETRON 2 MG/ML
4 INJECTION INTRAMUSCULAR; INTRAVENOUS ONCE
Status: COMPLETED | OUTPATIENT
Start: 2025-04-05 | End: 2025-04-05

## 2025-04-05 RX ORDER — ROSUVASTATIN CALCIUM 5 MG/1
10 TABLET, COATED ORAL EVERY EVENING
Status: DISCONTINUED | OUTPATIENT
Start: 2025-04-05 | End: 2025-04-09 | Stop reason: HOSPADM

## 2025-04-05 RX ORDER — VITAMIN B COMPLEX
1000 TABLET ORAL DAILY
COMMUNITY

## 2025-04-05 RX ORDER — ACETAMINOPHEN 325 MG/1
650 TABLET ORAL EVERY 6 HOURS PRN
Status: DISCONTINUED | OUTPATIENT
Start: 2025-04-05 | End: 2025-04-05

## 2025-04-05 RX ORDER — ONDANSETRON 2 MG/ML
4 INJECTION INTRAMUSCULAR; INTRAVENOUS EVERY 4 HOURS PRN
Status: DISCONTINUED | OUTPATIENT
Start: 2025-04-05 | End: 2025-04-09 | Stop reason: HOSPADM

## 2025-04-05 RX ORDER — ONDANSETRON 4 MG/1
4 TABLET, ORALLY DISINTEGRATING ORAL EVERY 4 HOURS PRN
Status: DISCONTINUED | OUTPATIENT
Start: 2025-04-05 | End: 2025-04-09 | Stop reason: HOSPADM

## 2025-04-05 RX ORDER — SODIUM CHLORIDE 9 MG/ML
1000 INJECTION, SOLUTION INTRAVENOUS ONCE
Status: COMPLETED | OUTPATIENT
Start: 2025-04-05 | End: 2025-04-05

## 2025-04-05 RX ORDER — FERROUS SULFATE 325(65) MG
325 TABLET ORAL DAILY
Status: DISCONTINUED | OUTPATIENT
Start: 2025-04-06 | End: 2025-04-09 | Stop reason: HOSPADM

## 2025-04-05 RX ORDER — CHOLESTYRAMINE LIGHT 4 G/5.7G
1 POWDER, FOR SUSPENSION ORAL
Status: DISCONTINUED | OUTPATIENT
Start: 2025-04-05 | End: 2025-04-09 | Stop reason: HOSPADM

## 2025-04-05 RX ORDER — OXYCODONE HYDROCHLORIDE 5 MG/1
2.5 TABLET ORAL
Refills: 0 | Status: DISCONTINUED | OUTPATIENT
Start: 2025-04-05 | End: 2025-04-08

## 2025-04-05 RX ORDER — POLYETHYLENE GLYCOL 3350 17 G/17G
1 POWDER, FOR SOLUTION ORAL
Status: DISCONTINUED | OUTPATIENT
Start: 2025-04-05 | End: 2025-04-08

## 2025-04-05 RX ORDER — INDOMETHACIN 25 MG/1
50 CAPSULE ORAL ONCE
Status: COMPLETED | OUTPATIENT
Start: 2025-04-05 | End: 2025-04-05

## 2025-04-05 RX ORDER — FOLIC ACID 1 MG/1
1 TABLET ORAL DAILY
Status: DISCONTINUED | OUTPATIENT
Start: 2025-04-05 | End: 2025-04-09 | Stop reason: HOSPADM

## 2025-04-05 RX ORDER — CARVEDILOL 25 MG/1
25 TABLET ORAL 2 TIMES DAILY WITH MEALS
Status: DISCONTINUED | OUTPATIENT
Start: 2025-04-06 | End: 2025-04-08

## 2025-04-05 RX ORDER — FUROSEMIDE 10 MG/ML
40 INJECTION INTRAMUSCULAR; INTRAVENOUS ONCE
Status: COMPLETED | OUTPATIENT
Start: 2025-04-05 | End: 2025-04-05

## 2025-04-05 RX ORDER — PREDNISONE 5 MG/1
5 TABLET ORAL DAILY
Status: DISCONTINUED | OUTPATIENT
Start: 2025-04-06 | End: 2025-04-09 | Stop reason: HOSPADM

## 2025-04-05 RX ORDER — DEXTROSE MONOHYDRATE 25 G/50ML
25 INJECTION, SOLUTION INTRAVENOUS ONCE
Status: COMPLETED | OUTPATIENT
Start: 2025-04-05 | End: 2025-04-05

## 2025-04-05 RX ORDER — ACETAMINOPHEN 500 MG
1000 TABLET ORAL 3 TIMES DAILY PRN
Status: DISCONTINUED | OUTPATIENT
Start: 2025-04-10 | End: 2025-04-09 | Stop reason: HOSPADM

## 2025-04-05 RX ORDER — FOLIC ACID 0.8 MG
800 TABLET ORAL DAILY
COMMUNITY

## 2025-04-05 RX ORDER — ACETAMINOPHEN 500 MG
1000 TABLET ORAL 3 TIMES DAILY
Status: DISCONTINUED | OUTPATIENT
Start: 2025-04-05 | End: 2025-04-09 | Stop reason: HOSPADM

## 2025-04-05 RX ORDER — VALSARTAN 80 MG/1
320 TABLET ORAL DAILY
Status: DISCONTINUED | OUTPATIENT
Start: 2025-04-06 | End: 2025-04-09 | Stop reason: HOSPADM

## 2025-04-05 RX ORDER — DAPAGLIFLOZIN 10 MG/1
10 TABLET, FILM COATED ORAL DAILY
Status: DISCONTINUED | OUTPATIENT
Start: 2025-04-06 | End: 2025-04-09 | Stop reason: HOSPADM

## 2025-04-05 RX ORDER — ALBUTEROL SULFATE 5 MG/ML
2.5 SOLUTION RESPIRATORY (INHALATION) ONCE
Status: COMPLETED | OUTPATIENT
Start: 2025-04-05 | End: 2025-04-05

## 2025-04-05 RX ORDER — CALCIUM CHLORIDE 100 MG/ML
1 INJECTION INTRAVENOUS; INTRAVENTRICULAR ONCE
Status: COMPLETED | OUTPATIENT
Start: 2025-04-05 | End: 2025-04-05

## 2025-04-05 RX ORDER — AMOXICILLIN 250 MG
2 CAPSULE ORAL EVERY EVENING
Status: DISCONTINUED | OUTPATIENT
Start: 2025-04-05 | End: 2025-04-08

## 2025-04-05 RX ORDER — FUROSEMIDE 20 MG/1
20 TABLET ORAL DAILY
Status: DISCONTINUED | OUTPATIENT
Start: 2025-04-06 | End: 2025-04-09 | Stop reason: HOSPADM

## 2025-04-05 RX ORDER — SODIUM CHLORIDE 9 MG/ML
INJECTION, SOLUTION INTRAVENOUS CONTINUOUS
Status: DISCONTINUED | OUTPATIENT
Start: 2025-04-05 | End: 2025-04-05

## 2025-04-05 RX ORDER — MORPHINE SULFATE 4 MG/ML
4 INJECTION INTRAVENOUS ONCE
Status: COMPLETED | OUTPATIENT
Start: 2025-04-05 | End: 2025-04-05

## 2025-04-05 RX ORDER — HYDROMORPHONE HYDROCHLORIDE 1 MG/ML
0.5 INJECTION, SOLUTION INTRAMUSCULAR; INTRAVENOUS; SUBCUTANEOUS ONCE
Status: COMPLETED | OUTPATIENT
Start: 2025-04-05 | End: 2025-04-05

## 2025-04-05 RX ADMIN — ROSUVASTATIN CALCIUM 10 MG: 20 TABLET, FILM COATED ORAL at 23:12

## 2025-04-05 RX ADMIN — CALCIUM CHLORIDE 1 G: 100 INJECTION INTRAVENOUS; INTRAVENTRICULAR at 19:33

## 2025-04-05 RX ADMIN — FUROSEMIDE 40 MG: 10 INJECTION INTRAMUSCULAR; INTRAVENOUS at 20:03

## 2025-04-05 RX ADMIN — DEXTROSE MONOHYDRATE 25 G: 25 INJECTION, SOLUTION INTRAVENOUS at 19:33

## 2025-04-05 RX ADMIN — SODIUM CHLORIDE: 9 INJECTION, SOLUTION INTRAVENOUS at 22:27

## 2025-04-05 RX ADMIN — ONDANSETRON 4 MG: 2 INJECTION INTRAMUSCULAR; INTRAVENOUS at 18:19

## 2025-04-05 RX ADMIN — HYDROMORPHONE HYDROCHLORIDE 0.5 MG: 1 INJECTION, SOLUTION INTRAMUSCULAR; INTRAVENOUS; SUBCUTANEOUS at 19:32

## 2025-04-05 RX ADMIN — ALBUTEROL SULFATE 2.5 MG: 2.5 SOLUTION RESPIRATORY (INHALATION) at 19:58

## 2025-04-05 RX ADMIN — SODIUM CHLORIDE 1000 ML: 9 INJECTION, SOLUTION INTRAVENOUS at 19:56

## 2025-04-05 RX ADMIN — DEXTROSE MONOHYDRATE 50 ML: 25 INJECTION, SOLUTION INTRAVENOUS at 19:35

## 2025-04-05 RX ADMIN — FOLIC ACID 1 MG: 1 TABLET ORAL at 23:12

## 2025-04-05 RX ADMIN — SODIUM ZIRCONIUM CYCLOSILICATE 10 G: 10 POWDER, FOR SUSPENSION ORAL at 19:57

## 2025-04-05 RX ADMIN — ACETAMINOPHEN 1000 MG: 500 TABLET, FILM COATED ORAL at 23:12

## 2025-04-05 RX ADMIN — INSULIN HUMAN 5 UNITS: 100 INJECTION, SOLUTION PARENTERAL at 19:34

## 2025-04-05 RX ADMIN — OXYCODONE HYDROCHLORIDE 2.5 MG: 5 TABLET ORAL at 23:11

## 2025-04-05 RX ADMIN — SODIUM BICARBONATE 50 MEQ: 84 INJECTION INTRAVENOUS at 19:33

## 2025-04-05 RX ADMIN — MORPHINE SULFATE 4 MG: 4 INJECTION INTRAVENOUS at 18:19

## 2025-04-05 ASSESSMENT — ENCOUNTER SYMPTOMS
PALPITATIONS: 0
WHEEZING: 0
HEADACHES: 0
NAUSEA: 0
DIARRHEA: 0
SHORTNESS OF BREATH: 0
FEVER: 0
CONSTIPATION: 0
FALLS: 1
VOMITING: 0
ABDOMINAL PAIN: 0
CHILLS: 0
MYALGIAS: 0
SORE THROAT: 0
COUGH: 0
DIZZINESS: 0

## 2025-04-05 ASSESSMENT — FIBROSIS 4 INDEX: FIB4 SCORE: 2.003469213361884652

## 2025-04-05 ASSESSMENT — PAIN DESCRIPTION - PAIN TYPE
TYPE: ACUTE PAIN

## 2025-04-06 ENCOUNTER — APPOINTMENT (OUTPATIENT)
Dept: RADIOLOGY | Facility: MEDICAL CENTER | Age: 80
DRG: 480 | End: 2025-04-06
Attending: STUDENT IN AN ORGANIZED HEALTH CARE EDUCATION/TRAINING PROGRAM
Payer: MEDICARE

## 2025-04-06 ENCOUNTER — SURGERY (OUTPATIENT)
Age: 80
End: 2025-04-06
Payer: MEDICARE

## 2025-04-06 ENCOUNTER — ANESTHESIA (OUTPATIENT)
Dept: SURGERY | Facility: MEDICAL CENTER | Age: 80
DRG: 480 | End: 2025-04-06
Payer: MEDICARE

## 2025-04-06 ENCOUNTER — ANESTHESIA EVENT (OUTPATIENT)
Dept: SURGERY | Facility: MEDICAL CENTER | Age: 80
DRG: 480 | End: 2025-04-06
Payer: MEDICARE

## 2025-04-06 LAB
ALBUMIN SERPL BCP-MCNC: 3.6 G/DL (ref 3.2–4.9)
ALBUMIN/GLOB SERPL: 1.5 G/DL
ALP SERPL-CCNC: 50 U/L (ref 30–99)
ALT SERPL-CCNC: 13 U/L (ref 2–50)
ANION GAP SERPL CALC-SCNC: 10 MMOL/L (ref 7–16)
AST SERPL-CCNC: 19 U/L (ref 12–45)
BASOPHILS # BLD AUTO: 0.4 % (ref 0–1.8)
BASOPHILS # BLD: 0.03 K/UL (ref 0–0.12)
BILIRUB SERPL-MCNC: 0.4 MG/DL (ref 0.1–1.5)
BUN SERPL-MCNC: 51 MG/DL (ref 8–22)
CALCIUM ALBUM COR SERPL-MCNC: 10.6 MG/DL (ref 8.5–10.5)
CALCIUM SERPL-MCNC: 10.3 MG/DL (ref 8.5–10.5)
CHLORIDE SERPL-SCNC: 107 MMOL/L (ref 96–112)
CO2 SERPL-SCNC: 20 MMOL/L (ref 20–33)
CREAT SERPL-MCNC: 1.59 MG/DL (ref 0.5–1.4)
EOSINOPHIL # BLD AUTO: 0.12 K/UL (ref 0–0.51)
EOSINOPHIL NFR BLD: 1.6 % (ref 0–6.9)
ERYTHROCYTE [DISTWIDTH] IN BLOOD BY AUTOMATED COUNT: 48.9 FL (ref 35.9–50)
GFR SERPLBLD CREATININE-BSD FMLA CKD-EPI: 33 ML/MIN/1.73 M 2
GLOBULIN SER CALC-MCNC: 2.4 G/DL (ref 1.9–3.5)
GLUCOSE SERPL-MCNC: 105 MG/DL (ref 65–99)
HCT VFR BLD AUTO: 42.6 % (ref 37–47)
HGB BLD-MCNC: 13.6 G/DL (ref 12–16)
IMM GRANULOCYTES # BLD AUTO: 0.06 K/UL (ref 0–0.11)
IMM GRANULOCYTES NFR BLD AUTO: 0.8 % (ref 0–0.9)
LYMPHOCYTES # BLD AUTO: 0.67 K/UL (ref 1–4.8)
LYMPHOCYTES NFR BLD: 9.1 % (ref 22–41)
MAGNESIUM SERPL-MCNC: 1.6 MG/DL (ref 1.5–2.5)
MCH RBC QN AUTO: 33.7 PG (ref 27–33)
MCHC RBC AUTO-ENTMCNC: 31.9 G/DL (ref 32.2–35.5)
MCV RBC AUTO: 105.7 FL (ref 81.4–97.8)
MONOCYTES # BLD AUTO: 0.6 K/UL (ref 0–0.85)
MONOCYTES NFR BLD AUTO: 8.2 % (ref 0–13.4)
NEUTROPHILS # BLD AUTO: 5.87 K/UL (ref 1.82–7.42)
NEUTROPHILS NFR BLD: 79.9 % (ref 44–72)
NRBC # BLD AUTO: 0 K/UL
NRBC BLD-RTO: 0 /100 WBC (ref 0–0.2)
PLATELET # BLD AUTO: 111 K/UL (ref 164–446)
PMV BLD AUTO: 11 FL (ref 9–12.9)
POTASSIUM SERPL-SCNC: 5.6 MMOL/L (ref 3.6–5.5)
PROT SERPL-MCNC: 6 G/DL (ref 6–8.2)
RBC # BLD AUTO: 4.03 M/UL (ref 4.2–5.4)
SODIUM SERPL-SCNC: 137 MMOL/L (ref 135–145)
WBC # BLD AUTO: 7.4 K/UL (ref 4.8–10.8)

## 2025-04-06 PROCEDURE — 700111 HCHG RX REV CODE 636 W/ 250 OVERRIDE (IP)

## 2025-04-06 PROCEDURE — 36415 COLL VENOUS BLD VENIPUNCTURE: CPT

## 2025-04-06 PROCEDURE — 160009 HCHG ANES TIME/MIN: Performed by: STUDENT IN AN ORGANIZED HEALTH CARE EDUCATION/TRAINING PROGRAM

## 2025-04-06 PROCEDURE — 160002 HCHG RECOVERY MINUTES (STAT): Performed by: STUDENT IN AN ORGANIZED HEALTH CARE EDUCATION/TRAINING PROGRAM

## 2025-04-06 PROCEDURE — 83735 ASSAY OF MAGNESIUM: CPT

## 2025-04-06 PROCEDURE — C1713 ANCHOR/SCREW BN/BN,TIS/BN: HCPCS | Performed by: STUDENT IN AN ORGANIZED HEALTH CARE EDUCATION/TRAINING PROGRAM

## 2025-04-06 PROCEDURE — 700111 HCHG RX REV CODE 636 W/ 250 OVERRIDE (IP): Performed by: ANESTHESIOLOGY

## 2025-04-06 PROCEDURE — A9270 NON-COVERED ITEM OR SERVICE: HCPCS

## 2025-04-06 PROCEDURE — 85025 COMPLETE CBC W/AUTO DIFF WBC: CPT

## 2025-04-06 PROCEDURE — 700101 HCHG RX REV CODE 250: Performed by: STUDENT IN AN ORGANIZED HEALTH CARE EDUCATION/TRAINING PROGRAM

## 2025-04-06 PROCEDURE — 160028 HCHG SURGERY MINUTES - 1ST 30 MINS LEVEL 3: Performed by: STUDENT IN AN ORGANIZED HEALTH CARE EDUCATION/TRAINING PROGRAM

## 2025-04-06 PROCEDURE — 700102 HCHG RX REV CODE 250 W/ 637 OVERRIDE(OP)

## 2025-04-06 PROCEDURE — 770020 HCHG ROOM/CARE - TELE (206)

## 2025-04-06 PROCEDURE — 160039 HCHG SURGERY MINUTES - EA ADDL 1 MIN LEVEL 3: Performed by: STUDENT IN AN ORGANIZED HEALTH CARE EDUCATION/TRAINING PROGRAM

## 2025-04-06 PROCEDURE — 73552 X-RAY EXAM OF FEMUR 2/>: CPT | Mod: LT

## 2025-04-06 PROCEDURE — 502000 HCHG MISC OR IMPLANTS RC 0278: Performed by: STUDENT IN AN ORGANIZED HEALTH CARE EDUCATION/TRAINING PROGRAM

## 2025-04-06 PROCEDURE — 99221 1ST HOSP IP/OBS SF/LOW 40: CPT | Mod: 57 | Performed by: STUDENT IN AN ORGANIZED HEALTH CARE EDUCATION/TRAINING PROGRAM

## 2025-04-06 PROCEDURE — 160015 HCHG STAT PREOP MINUTES: Performed by: STUDENT IN AN ORGANIZED HEALTH CARE EDUCATION/TRAINING PROGRAM

## 2025-04-06 PROCEDURE — 160036 HCHG PACU - EA ADDL 30 MINS PHASE I: Performed by: STUDENT IN AN ORGANIZED HEALTH CARE EDUCATION/TRAINING PROGRAM

## 2025-04-06 PROCEDURE — 160048 HCHG OR STATISTICAL LEVEL 1-5: Performed by: STUDENT IN AN ORGANIZED HEALTH CARE EDUCATION/TRAINING PROGRAM

## 2025-04-06 PROCEDURE — 110371 HCHG SHELL REV 272: Performed by: STUDENT IN AN ORGANIZED HEALTH CARE EDUCATION/TRAINING PROGRAM

## 2025-04-06 PROCEDURE — 160035 HCHG PACU - 1ST 60 MINS PHASE I: Performed by: STUDENT IN AN ORGANIZED HEALTH CARE EDUCATION/TRAINING PROGRAM

## 2025-04-06 PROCEDURE — 700101 HCHG RX REV CODE 250: Performed by: ANESTHESIOLOGY

## 2025-04-06 PROCEDURE — 80053 COMPREHEN METABOLIC PANEL: CPT

## 2025-04-06 PROCEDURE — 99233 SBSQ HOSP IP/OBS HIGH 50: CPT | Mod: GC | Performed by: STUDENT IN AN ORGANIZED HEALTH CARE EDUCATION/TRAINING PROGRAM

## 2025-04-06 PROCEDURE — 700111 HCHG RX REV CODE 636 W/ 250 OVERRIDE (IP): Performed by: STUDENT IN AN ORGANIZED HEALTH CARE EDUCATION/TRAINING PROGRAM

## 2025-04-06 PROCEDURE — 0QSC04Z REPOSITION LEFT LOWER FEMUR WITH INTERNAL FIXATION DEVICE, OPEN APPROACH: ICD-10-PCS | Performed by: STUDENT IN AN ORGANIZED HEALTH CARE EDUCATION/TRAINING PROGRAM

## 2025-04-06 PROCEDURE — 27513 TREATMENT OF THIGH FRACTURE: CPT | Mod: LT | Performed by: STUDENT IN AN ORGANIZED HEALTH CARE EDUCATION/TRAINING PROGRAM

## 2025-04-06 DEVICE — IMPLANTABLE DEVICE: Type: IMPLANTABLE DEVICE | Site: FEMUR | Status: FUNCTIONAL

## 2025-04-06 DEVICE — WIRE FIXATION KIRSCHNER TROCAR POINT: Type: IMPLANTABLE DEVICE | Site: FEMUR | Status: FUNCTIONAL

## 2025-04-06 RX ORDER — HYDROMORPHONE HYDROCHLORIDE 1 MG/ML
0.2 INJECTION, SOLUTION INTRAMUSCULAR; INTRAVENOUS; SUBCUTANEOUS
Status: DISCONTINUED | OUTPATIENT
Start: 2025-04-06 | End: 2025-04-06 | Stop reason: HOSPADM

## 2025-04-06 RX ORDER — OXYCODONE HCL 5 MG/5 ML
10 SOLUTION, ORAL ORAL
Status: DISCONTINUED | OUTPATIENT
Start: 2025-04-06 | End: 2025-04-06 | Stop reason: HOSPADM

## 2025-04-06 RX ORDER — HYDROMORPHONE HYDROCHLORIDE 1 MG/ML
0.1 INJECTION, SOLUTION INTRAMUSCULAR; INTRAVENOUS; SUBCUTANEOUS
Status: DISCONTINUED | OUTPATIENT
Start: 2025-04-06 | End: 2025-04-06 | Stop reason: HOSPADM

## 2025-04-06 RX ORDER — TOBRAMYCIN 1.2 G/30ML
INJECTION, POWDER, LYOPHILIZED, FOR SOLUTION INTRAVENOUS
Status: DISCONTINUED | OUTPATIENT
Start: 2025-04-06 | End: 2025-04-06 | Stop reason: HOSPADM

## 2025-04-06 RX ORDER — ALBUTEROL SULFATE 5 MG/ML
2.5 SOLUTION RESPIRATORY (INHALATION)
Status: DISCONTINUED | OUTPATIENT
Start: 2025-04-06 | End: 2025-04-06 | Stop reason: HOSPADM

## 2025-04-06 RX ORDER — VANCOMYCIN HYDROCHLORIDE 500 MG/10ML
INJECTION, POWDER, LYOPHILIZED, FOR SOLUTION INTRAVENOUS
Status: COMPLETED | OUTPATIENT
Start: 2025-04-06 | End: 2025-04-06

## 2025-04-06 RX ORDER — DIPHENHYDRAMINE HYDROCHLORIDE 50 MG/ML
12.5 INJECTION, SOLUTION INTRAMUSCULAR; INTRAVENOUS
Status: DISCONTINUED | OUTPATIENT
Start: 2025-04-06 | End: 2025-04-06 | Stop reason: HOSPADM

## 2025-04-06 RX ORDER — ONDANSETRON 2 MG/ML
4 INJECTION INTRAMUSCULAR; INTRAVENOUS
Status: DISCONTINUED | OUTPATIENT
Start: 2025-04-06 | End: 2025-04-06 | Stop reason: HOSPADM

## 2025-04-06 RX ORDER — EPHEDRINE SULFATE 50 MG/ML
INJECTION, SOLUTION INTRAVENOUS PRN
Status: DISCONTINUED | OUTPATIENT
Start: 2025-04-06 | End: 2025-04-06 | Stop reason: SURG

## 2025-04-06 RX ORDER — CEFAZOLIN SODIUM 1 G/3ML
INJECTION, POWDER, FOR SOLUTION INTRAMUSCULAR; INTRAVENOUS PRN
Status: DISCONTINUED | OUTPATIENT
Start: 2025-04-06 | End: 2025-04-06 | Stop reason: SURG

## 2025-04-06 RX ORDER — ONDANSETRON 2 MG/ML
INJECTION INTRAMUSCULAR; INTRAVENOUS PRN
Status: DISCONTINUED | OUTPATIENT
Start: 2025-04-06 | End: 2025-04-06 | Stop reason: HOSPADM

## 2025-04-06 RX ORDER — DEXAMETHASONE SODIUM PHOSPHATE 4 MG/ML
INJECTION, SOLUTION INTRA-ARTICULAR; INTRALESIONAL; INTRAMUSCULAR; INTRAVENOUS; SOFT TISSUE PRN
Status: DISCONTINUED | OUTPATIENT
Start: 2025-04-06 | End: 2025-04-06 | Stop reason: SURG

## 2025-04-06 RX ORDER — BUPIVACAINE HYDROCHLORIDE AND EPINEPHRINE 5; 5 MG/ML; UG/ML
INJECTION, SOLUTION EPIDURAL; INTRACAUDAL; PERINEURAL
Status: DISCONTINUED | OUTPATIENT
Start: 2025-04-06 | End: 2025-04-06 | Stop reason: HOSPADM

## 2025-04-06 RX ORDER — OXYCODONE HCL 5 MG/5 ML
5 SOLUTION, ORAL ORAL
Status: DISCONTINUED | OUTPATIENT
Start: 2025-04-06 | End: 2025-04-06 | Stop reason: HOSPADM

## 2025-04-06 RX ORDER — SODIUM CHLORIDE, SODIUM LACTATE, POTASSIUM CHLORIDE, CALCIUM CHLORIDE 600; 310; 30; 20 MG/100ML; MG/100ML; MG/100ML; MG/100ML
INJECTION, SOLUTION INTRAVENOUS CONTINUOUS
Status: DISCONTINUED | OUTPATIENT
Start: 2025-04-06 | End: 2025-04-06 | Stop reason: HOSPADM

## 2025-04-06 RX ORDER — HYDROMORPHONE HYDROCHLORIDE 1 MG/ML
0.4 INJECTION, SOLUTION INTRAMUSCULAR; INTRAVENOUS; SUBCUTANEOUS
Status: DISCONTINUED | OUTPATIENT
Start: 2025-04-06 | End: 2025-04-06 | Stop reason: HOSPADM

## 2025-04-06 RX ORDER — HYDROMORPHONE HYDROCHLORIDE 2 MG/ML
INJECTION, SOLUTION INTRAMUSCULAR; INTRAVENOUS; SUBCUTANEOUS PRN
Status: DISCONTINUED | OUTPATIENT
Start: 2025-04-06 | End: 2025-04-06 | Stop reason: SURG

## 2025-04-06 RX ADMIN — FUROSEMIDE 20 MG: 20 TABLET ORAL at 05:11

## 2025-04-06 RX ADMIN — OXYCODONE HYDROCHLORIDE 2.5 MG: 5 TABLET ORAL at 21:15

## 2025-04-06 RX ADMIN — TOBRAMYCIN 1.2 G: 1200 INJECTION, POWDER, FOR SOLUTION INTRAVENOUS at 15:50

## 2025-04-06 RX ADMIN — CARVEDILOL 25 MG: 25 TABLET, FILM COATED ORAL at 09:16

## 2025-04-06 RX ADMIN — PROPOFOL 200 MG: 10 INJECTION, EMULSION INTRAVENOUS at 14:59

## 2025-04-06 RX ADMIN — HYDROMORPHONE HYDROCHLORIDE 1 MG: 2 INJECTION INTRAMUSCULAR; INTRAVENOUS; SUBCUTANEOUS at 16:01

## 2025-04-06 RX ADMIN — BUPIVACAINE HYDROCHLORIDE AND EPINEPHRINE BITARTRATE 20 ML: 5; .0091 INJECTION, SOLUTION EPIDURAL; INTRACAUDAL; PERINEURAL at 16:08

## 2025-04-06 RX ADMIN — FENTANYL CITRATE 100 MCG: 50 INJECTION, SOLUTION INTRAMUSCULAR; INTRAVENOUS at 15:10

## 2025-04-06 RX ADMIN — ONDANSETRON 4 MG: 2 INJECTION INTRAMUSCULAR; INTRAVENOUS at 15:37

## 2025-04-06 RX ADMIN — OXYCODONE HYDROCHLORIDE 2.5 MG: 5 TABLET ORAL at 10:03

## 2025-04-06 RX ADMIN — OXYCODONE HYDROCHLORIDE 2.5 MG: 5 TABLET ORAL at 13:19

## 2025-04-06 RX ADMIN — FERROUS SULFATE TAB 325 MG (65 MG ELEMENTAL FE) 325 MG: 325 (65 FE) TAB at 05:10

## 2025-04-06 RX ADMIN — VANCOMYCIN HYDROCHLORIDE 1 G: 500 INJECTION, POWDER, LYOPHILIZED, FOR SOLUTION INTRAVENOUS at 15:50

## 2025-04-06 RX ADMIN — FOLIC ACID 1 MG: 1 TABLET ORAL at 18:29

## 2025-04-06 RX ADMIN — SENNOSIDES AND DOCUSATE SODIUM 2 TABLET: 50; 8.6 TABLET ORAL at 18:28

## 2025-04-06 RX ADMIN — SERTRALINE 100 MG: 100 TABLET, FILM COATED ORAL at 05:09

## 2025-04-06 RX ADMIN — PREDNISONE 5 MG: 5 TABLET ORAL at 05:11

## 2025-04-06 RX ADMIN — EPHEDRINE SULFATE 25 MG: 50 INJECTION, SOLUTION INTRAVENOUS at 15:09

## 2025-04-06 RX ADMIN — CEFAZOLIN 2 G: 1 INJECTION, POWDER, FOR SOLUTION INTRAMUSCULAR; INTRAVENOUS at 15:01

## 2025-04-06 RX ADMIN — OXYCODONE HYDROCHLORIDE 2.5 MG: 5 TABLET ORAL at 05:07

## 2025-04-06 RX ADMIN — DEXAMETHASONE SODIUM PHOSPHATE 4 MG: 4 INJECTION INTRA-ARTICULAR; INTRALESIONAL; INTRAMUSCULAR; INTRAVENOUS; SOFT TISSUE at 15:37

## 2025-04-06 RX ADMIN — EPHEDRINE SULFATE 25 MG: 50 INJECTION, SOLUTION INTRAVENOUS at 15:42

## 2025-04-06 RX ADMIN — ROSUVASTATIN CALCIUM 10 MG: 20 TABLET, FILM COATED ORAL at 18:28

## 2025-04-06 RX ADMIN — FENTANYL CITRATE 100 MCG: 50 INJECTION, SOLUTION INTRAMUSCULAR; INTRAVENOUS at 14:59

## 2025-04-06 RX ADMIN — VALSARTAN 320 MG: 80 TABLET ORAL at 05:09

## 2025-04-06 RX ADMIN — ACETAMINOPHEN 1000 MG: 500 TABLET, FILM COATED ORAL at 09:18

## 2025-04-06 SDOH — ECONOMIC STABILITY: TRANSPORTATION INSECURITY
IN THE PAST 12 MONTHS, HAS THE LACK OF TRANSPORTATION KEPT YOU FROM MEDICAL APPOINTMENTS OR FROM GETTING MEDICATIONS?: NO

## 2025-04-06 SDOH — ECONOMIC STABILITY: TRANSPORTATION INSECURITY
IN THE PAST 12 MONTHS, HAS LACK OF RELIABLE TRANSPORTATION KEPT YOU FROM MEDICAL APPOINTMENTS, MEETINGS, WORK OR FROM GETTING THINGS NEEDED FOR DAILY LIVING?: NO

## 2025-04-06 ASSESSMENT — SOCIAL DETERMINANTS OF HEALTH (SDOH)
WITHIN THE PAST 12 MONTHS, YOU WORRIED THAT YOUR FOOD WOULD RUN OUT BEFORE YOU GOT THE MONEY TO BUY MORE: SOMETIMES TRUE
WITHIN THE PAST 12 MONTHS, THE FOOD YOU BOUGHT JUST DIDN'T LAST AND YOU DIDN'T HAVE MONEY TO GET MORE: SOMETIMES TRUE
WITHIN THE LAST YEAR, HAVE TO BEEN RAPED OR FORCED TO HAVE ANY KIND OF SEXUAL ACTIVITY BY YOUR PARTNER OR EX-PARTNER?: NO
WITHIN THE LAST YEAR, HAVE YOU BEEN AFRAID OF YOUR PARTNER OR EX-PARTNER?: NO
IN THE PAST 12 MONTHS, HAS THE ELECTRIC, GAS, OIL, OR WATER COMPANY THREATENED TO SHUT OFF SERVICE IN YOUR HOME?: NO
WITHIN THE LAST YEAR, HAVE YOU BEEN KICKED, HIT, SLAPPED, OR OTHERWISE PHYSICALLY HURT BY YOUR PARTNER OR EX-PARTNER?: NO
WITHIN THE LAST YEAR, HAVE YOU BEEN HUMILIATED OR EMOTIONALLY ABUSED IN OTHER WAYS BY YOUR PARTNER OR EX-PARTNER?: NO

## 2025-04-06 ASSESSMENT — ENCOUNTER SYMPTOMS
DIZZINESS: 0
HEADACHES: 0
SHORTNESS OF BREATH: 0
BLURRED VISION: 0
DIARRHEA: 0
NAUSEA: 0
HEARTBURN: 0
PALPITATIONS: 0
MYALGIAS: 0
FALLS: 1

## 2025-04-06 ASSESSMENT — LIFESTYLE VARIABLES
TOTAL SCORE: 0
EVER FELT BAD OR GUILTY ABOUT YOUR DRINKING: NO
HAVE PEOPLE ANNOYED YOU BY CRITICIZING YOUR DRINKING: NO
HOW MANY TIMES IN THE PAST YEAR HAVE YOU HAD 5 OR MORE DRINKS IN A DAY: 0
HAVE YOU EVER FELT YOU SHOULD CUT DOWN ON YOUR DRINKING: NO
DOES PATIENT WANT TO STOP DRINKING: NO
EVER HAD A DRINK FIRST THING IN THE MORNING TO STEADY YOUR NERVES TO GET RID OF A HANGOVER: NO
CONSUMPTION TOTAL: NEGATIVE
TOTAL SCORE: 0
AVERAGE NUMBER OF DAYS PER WEEK YOU HAVE A DRINK CONTAINING ALCOHOL: 7
TOTAL SCORE: 0
ON A TYPICAL DAY WHEN YOU DRINK ALCOHOL HOW MANY DRINKS DO YOU HAVE: 1
ALCOHOL_USE: YES

## 2025-04-06 ASSESSMENT — PATIENT HEALTH QUESTIONNAIRE - PHQ9
1. LITTLE INTEREST OR PLEASURE IN DOING THINGS: NOT AT ALL
1. LITTLE INTEREST OR PLEASURE IN DOING THINGS: NOT AT ALL
SUM OF ALL RESPONSES TO PHQ9 QUESTIONS 1 AND 2: 0
2. FEELING DOWN, DEPRESSED, IRRITABLE, OR HOPELESS: NOT AT ALL
2. FEELING DOWN, DEPRESSED, IRRITABLE, OR HOPELESS: NOT AT ALL
SUM OF ALL RESPONSES TO PHQ9 QUESTIONS 1 AND 2: 0

## 2025-04-06 ASSESSMENT — PAIN DESCRIPTION - PAIN TYPE
TYPE: CHRONIC PAIN
TYPE: ACUTE PAIN
TYPE: ACUTE PAIN
TYPE: CHRONIC PAIN
TYPE: ACUTE PAIN

## 2025-04-06 ASSESSMENT — COGNITIVE AND FUNCTIONAL STATUS - GENERAL
TOILETING: TOTAL
SUGGESTED CMS G CODE MODIFIER DAILY ACTIVITY: CK
MOVING FROM LYING ON BACK TO SITTING ON SIDE OF FLAT BED: TOTAL
WALKING IN HOSPITAL ROOM: TOTAL
MOBILITY SCORE: 6
DRESSING REGULAR LOWER BODY CLOTHING: TOTAL
SUGGESTED CMS G CODE MODIFIER MOBILITY: CN
DRESSING REGULAR UPPER BODY CLOTHING: A LITTLE
HELP NEEDED FOR BATHING: TOTAL
DAILY ACTIVITIY SCORE: 14
TURNING FROM BACK TO SIDE WHILE IN FLAT BAD: TOTAL
MOVING TO AND FROM BED TO CHAIR: TOTAL
STANDING UP FROM CHAIR USING ARMS: TOTAL
CLIMB 3 TO 5 STEPS WITH RAILING: TOTAL

## 2025-04-06 ASSESSMENT — FIBROSIS 4 INDEX
FIB4 SCORE: 1.98
FIB4 SCORE: 1.98

## 2025-04-06 NOTE — ANESTHESIA PROCEDURE NOTES
Airway    Date/Time: 4/6/2025 2:59 PM    Performed by: Tra Holley M.D.  Authorized by: Tra Holley M.D.    Location:  OR  Urgency:  Elective  Difficult Airway: No    Indications for Airway Management:  Anesthesia      Spontaneous Ventilation: absent    Sedation Level:  Deep  Preoxygenated: Yes    Mask Difficulty Assessment:  0 - not attempted  Final Airway Type:  Supraglottic airway  Final Supraglottic Airway:  Standard LMA    SGA Size:  4  Number of Attempts at Approach:  1

## 2025-04-06 NOTE — H&P
Hospital Medicine History & Physical Note    Date of Service  4/5/2025    Primary Care Physician  Marge Brasher M.D.    Consultants  orthopedics    Specialist Names: Dr. Negrete    Code Status  DNAR/DNI    Chief Complaint  Chief Complaint   Patient presents with    T-5000 GLF     Pt bib remsa for mgl, states stepped on dustpan then slipped and fell. - head strike -loc. +shortening in left lower extremity. CMS intact. Gcs of 15. Aaox4. Was given total of Fentanyl 150 mcg iv and zofran 4 mg iv by ems.        History of Presenting Illness  Patient is an 80-year-old female with past medical history of chronic heart failure with preserved ejection fraction, chronic steroid use for shoulder arthritis, and left knee replacement who presents due to mechanical ground-level fall after slipping in her kitchen.  Patient was cooking a meal, cleaning up something on the floor when she slipped and fell on the ground without loss of consciousness.  She felt immediate pain in her left knee and was unable to bear weight and presented to the emergency department.  Here, patient was noted to have hyperkalemia to 6.4 as well as a distal left femur fracture.  Patient has better pain control at this time, with [facility] surgery was consulted and will be kept n.p.o. at midnight and plan for OR in the a.m.  In terms of hyperkalemia, patient was provided some fluid resuscitation, then received calcium gluconate, insulin and dextrose, and Lasix with resolution of the hyperkalemia to 4.8.  Patient will be admitted to telemetry for monitoring of the hyperkalemia, repeat potassium level in the a.m., and n.p.o. at midnight.    I discussed the plan of care with patient.    Review of Systems  Review of Systems   Constitutional:  Negative for chills and fever.   HENT:  Negative for congestion and sore throat.    Respiratory:  Negative for cough, shortness of breath and wheezing.    Cardiovascular:  Negative for chest pain, palpitations and leg  swelling.   Gastrointestinal:  Negative for abdominal pain, constipation, diarrhea, nausea and vomiting.   Genitourinary:  Negative for dysuria.   Musculoskeletal:  Positive for falls and joint pain. Negative for myalgias.   Neurological:  Negative for dizziness and headaches.       Past Medical History   has a past medical history of Anesthesia, Arthritis, Back pain, Dyslipidemia, Albanian measles, Heart murmur (09/13/2017), Hypertension, Influenza, Mumps, Other specified symptom associated with female genital organs, Painful joint, Psychiatric problem, Sleep apnea, Snoring, Sore muscles, Tonsillitis, and Unspecified cataract.    Surgical History   has a past surgical history that includes gastroscopy with biopsy (7/2/2014); other orthopedic surgery (2006); other; hysterectomy robotic (10/23/2014); tonsillectomy and adenoidectomy; other abdominal surgery; appendectomy; knee arthroplasty total (Right, 12/27/2017); pr breast reduction (Bilateral); pr remv 2nd cataract,corn-scler sectn; hysterectomy laparoscopy; tonsillectomy; arthroscopy, knee; and pr exploratory of abdomen (N/A, 1/28/2023).     Family History  family history includes Alcohol/Drug in her father; Breast Cancer in her mother; Cancer in her father; Cancer (age of onset: 65) in her mother; Colon Cancer in her father; Heart Disease in her brother; Hyperlipidemia in her brother.   Family history reviewed with patient. There is no family history that is pertinent to the chief complaint.     Social History   reports that she has never smoked. She has never used smokeless tobacco. She reports current alcohol use of about 0.6 - 1.2 oz of alcohol per week. She reports that she does not use drugs.    Allergies  Allergies   Allergen Reactions    Mirabegron Unspecified     Dizziness & lightheadedness    Tramadol Nausea     Nausea and somnolence       Medications  Prior to Admission Medications   Prescriptions Last Dose Informant Patient Reported? Taking?    Empagliflozin (JARDIANCE) 10 MG Tab tablet 4/4/2025 Bedtime Patient No Yes   Sig: Take 1 Tablet by mouth every day.   acetaminophen (TYLENOL) 500 MG Tab 4/4/2025 Bedtime Patient Yes Yes   Sig: Take 1,000 mg by mouth every 8 hours as needed for Mild Pain. 500 mg x 2 tablets = 1000 mg   carvedilol (COREG) 25 MG Tab 4/5/2025 Morning Patient No Yes   Sig: TAKE 1 TABLET BY MOUTH TWICE DAILY WITH MEALS   cholestyramine (QUESTRAN) 4 g packet Unknown Patient No No   Sig: Take 4 g by mouth 1 time a day as needed (Diarrhea).   ferrous sulfate 325 (65 Fe) MG tablet 4/5/2025 Morning Patient Yes Yes   Sig: Take 325 mg by mouth every day.   folic acid (FOLVITE) 800 MCG tablet 4/5/2025 Morning Patient Yes Yes   Sig: Take 800 mcg by mouth every day at 6 PM.   furosemide (LASIX) 20 MG Tab 4/5/2025 Morning Patient No Yes   Sig: Take 1 Tablet by mouth every day.   predniSONE (DELTASONE) 5 MG Tab 4/5/2025 Morning Patient No Yes   Sig: Take 1 tablet by mouth once daily   rosuvastatin (CRESTOR) 10 MG Tab 4/4/2025 Bedtime Patient No Yes   Sig: Take 1 Tablet by mouth every evening.   sertraline (ZOLOFT) 100 MG Tab 4/4/2025 Bedtime Patient No Yes   Sig: Take 1 tablet by mouth once daily   spironolactone (ALDACTONE) 25 MG Tab 4/5/2025 Morning Patient No Yes   Sig: Take 0.5 Tablets by mouth every day.   valsartan (DIOVAN) 320 MG tablet 4/4/2025 Bedtime Patient No Yes   Sig: Take 1 Tablet by mouth every day.   vitamin D3 (CHOLECALCIFEROL) 1000 Unit (25 mcg) Tab 4/5/2025 Morning Patient Yes Yes   Sig: Take 1,000 Units by mouth every day.      Facility-Administered Medications: None       Physical Exam  Temp:  [36.4 °C (97.5 °F)] 36.4 °C (97.5 °F)  Pulse:  [60-73] 61  Resp:  [17-20] 18  BP: (124-210)/(58-90) 124/58  SpO2:  [88 %-99 %] 94 %  Blood Pressure : 124/58   Temperature: 36.4 °C (97.5 °F)   Pulse: 61   Respiration: 18   Pulse Oximetry: 94 %       Physical Exam  Vitals and nursing note reviewed.   Constitutional:       General: She is  "not in acute distress.  HENT:      Head: Normocephalic and atraumatic.      Nose: Nose normal.      Mouth/Throat:      Mouth: Mucous membranes are moist.   Eyes:      General: No scleral icterus.     Extraocular Movements: Extraocular movements intact.   Cardiovascular:      Rate and Rhythm: Normal rate and regular rhythm.      Heart sounds: No murmur heard.     No friction rub. No gallop.   Pulmonary:      Breath sounds: No wheezing, rhonchi or rales.   Abdominal:      General: Abdomen is flat. Bowel sounds are normal. There is no distension.      Palpations: Abdomen is soft.      Tenderness: There is no abdominal tenderness.   Musculoskeletal:         General: Tenderness and signs of injury present. No swelling.   Skin:     General: Skin is warm.      Capillary Refill: Capillary refill takes less than 2 seconds.   Neurological:      General: No focal deficit present.      Mental Status: She is alert.   Psychiatric:         Mood and Affect: Mood normal.         Laboratory:  Recent Labs     04/05/25 1746   WBC 6.9   RBC 4.37   HEMOGLOBIN 14.5   HEMATOCRIT 45.1   .2*   MCH 33.2*   MCHC 32.2   RDW 46.4   PLATELETCT 162*   MPV 11.3     Recent Labs     04/05/25  1746 04/05/25  2110   SODIUM 135 138   POTASSIUM 6.4* 4.8   CHLORIDE 106 109   CO2 20 19*   GLUCOSE 106* 185*   BUN 53* 48*   CREATININE 1.19 1.13   CALCIUM 10.7* 10.8*     Recent Labs     04/05/25  1746 04/05/25  2110   GLUCOSE 106* 185*     Recent Labs     04/05/25  1746   APTT 26.6   INR 1.06     No results for input(s): \"NTPROBNP\" in the last 72 hours.      No results for input(s): \"TROPONINT\" in the last 72 hours.    Imaging:  CT-KNEE W/O PLUS RECONS LEFT   Final Result         1. Total knee arthroplasty.   2. Comminuted and displaced fracture of the distal femoral metaphysis.   3. Osteoarthrosis.   4. Osteopenia.      DX-FEMUR-2+ LEFT   Final Result      Acute, comminuted distal femoral fracture, just above the knee arthroplasty.      DX-PELVIS-1 " OR 2 VIEWS   Final Result      No acute osseous abnormality.          X-Ray:  I have personally reviewed the images and compared with prior images.  EKG:  I have personally reviewed the images and compared with prior images.    Assessment/Plan:  Justification for Admission Status  I anticipate this patient will require at least two midnights for appropriate medical management, necessitating inpatient admission because left femur fracture    Patient will need a Telemetry bed on ORTHOPEDICS service .  The need is secondary to left femur fracture with hyperkalemia.    * Closed left subtrochanteric femur fracture, initial encounter (HCC)- (present on admission)  Assessment & Plan  Patient with mechanical ground-level fall resulting in fracture distal left femur above her left knee replacement.  Orthopedic surgery consulted, will plan to see patient 2 OR in a.m.  - N.p.o.  - Multimodal pain control  - DVT prophylaxis    Current chronic use of systemic steroids  Assessment & Plan  Patient states he is on chronic prednisone 5 mg for 3 years due to shoulder arthritis  - Counseled patient on cessation, especially given the fact that she had a mechanical ground-level fall with a left distal femur fracture  - Will attempt prednisone taper while inpatient and to continue as an outpatient  - Decrease home prednisone from 5 mg to 4 mg  - Patient becomes hypotensive, may need stress dose steroids    Hyperkalemia- (present on admission)  Assessment & Plan  Initially 6.4 on admission, down to 4.8 after calcium gluconate, insulin dextrose, and Lasix.  As an outpatient, patient has a history of hyperkalemia in attempt to renew spironolactone dose to 12.5 from last visit  - Holding home spironolactone  - Patient provided fluid resuscitation in emergency department, will continue home Lasix  - Telemetry monitoring    ACC/AHA stage C heart failure with preserved ejection fraction (HCC)- (present on admission)  Assessment & Plan  History  of chronic heart failure with preserved ejection fraction on home Coreg, Jardiance, Lasix, Crestor, valsartan  - Continue home meds  - Holding home spironolactone due to hyperkalemia    Acute hypoxic respiratory failure (HCC)- (present on admission)  Assessment & Plan  Likely secondary to atelectasis from fall and hypoventilation from pain medications  - Incentive spirometer  - COVID/flu/RSV        VTE prophylaxis: enoxaparin ppx

## 2025-04-06 NOTE — ANESTHESIA TIME REPORT
Anesthesia Start and Stop Event Times       Date Time Event    4/6/2025 1445 Ready for Procedure     1450 Anesthesia Start     1620 Anesthesia Stop          Responsible Staff  04/06/25      Name Role Begin End    Tra Holley M.D. Anesth 1450 1620          Overtime Reason:  no overtime (within assigned shift)    Comments:

## 2025-04-06 NOTE — ED PROVIDER NOTES
ED Provider Note    CHIEF COMPLAINT  Chief Complaint   Patient presents with    T-5000 GLF     Pt bib remsa for mgl, states stepped on dustpan then slipped and fell. - head strike -loc. +shortening in left lower extremity. CMS intact. Gcs of 15. Aaox4. Was given total of Fentanyl 150 mcg iv and zofran 4 mg iv by ems.        EXTERNAL RECORDS REVIEWED  Outpatient Notes patient has a history of hypertension sick sinus syndrome with a pacemaker obstructive sleep apnea on CPAP CHF    HPI/ROS  LIMITATION TO HISTORY   Select: : None  OUTSIDE HISTORIAN(S):  EMS patient had a fall this evening treated with 150 mcg of fentanyl and 4 Zofran prior to arrival    Clau Vences is a 80 y.o. female who presents to the emergency department for a fall.  The patient states she was sleeping in her kitchen then she stepped on the dust pan and her left leg twisted and went under her and she fell backwards.  She endorses a left knee replacement by Dr. Starr in the past with the [facility].  No weakness numbness or tingling she just has pretty severe pain in her left leg she states that somewhere near the mid thigh as best but no significant pelvic or back pain.  She did not hit her head and does not take blood thinners.    PAST MEDICAL HISTORY   has a past medical history of Anesthesia, Arthritis, Back pain, Dyslipidemia, Divehi measles, Heart murmur (09/13/2017), Hypertension, Influenza, Mumps, Other specified symptom associated with female genital organs, Painful joint, Psychiatric problem, Sleep apnea, Snoring, Sore muscles, Tonsillitis, and Unspecified cataract.    SURGICAL HISTORY   has a past surgical history that includes gastroscopy with biopsy (7/2/2014); other orthopedic surgery (2006); other; hysterectomy robotic (10/23/2014); tonsillectomy and adenoidectomy; other abdominal surgery; appendectomy; knee arthroplasty total (Right, 12/27/2017); breast reduction (Bilateral); remv 2nd cataract,corn-scler sectn; hysterectomy  "laparoscopy; tonsillectomy; arthroscopy, knee; and exploratory of abdomen (N/A, 1/28/2023).    FAMILY HISTORY  Family History   Problem Relation Age of Onset    Breast Cancer Mother     Cancer Mother 65        Breast    Cancer Father         Colon    Alcohol/Drug Father     Colon Cancer Father     Hyperlipidemia Brother     Heart Disease Brother         arrythmia       SOCIAL HISTORY  Social History     Tobacco Use    Smoking status: Never    Smokeless tobacco: Never   Vaping Use    Vaping status: Never Used   Substance and Sexual Activity    Alcohol use: Yes     Alcohol/week: 0.6 - 1.2 oz     Types: 1 - 2 Glasses of wine per week     Comment: occ.    Drug use: No    Sexual activity: Not on file     Comment: , 2 children       CURRENT MEDICATIONS  Home Medications    **Home medications have not yet been reviewed for this encounter**         ALLERGIES  Allergies   Allergen Reactions    Myrbetriq [Mirabegron] Unspecified     Dizziness & lightheadedness    Tramadol Nausea     Nausea and somnolence       PHYSICAL EXAM  VITAL SIGNS: BP (!) 195/82   Pulse 60   Temp 36.4 °C (97.5 °F) (Temporal)   Resp 20   Ht 1.702 m (5' 7\")   Wt 99.8 kg (220 lb)   SpO2 92%   BMI 34.46 kg/m²    Pulse OX: Pulse Oxygen level is within normal limits on room air  Constitutional: Alert in mild distress  HENT: Normocephalic, Atraumatic, MMM  Eyes: PERound. Conjunctiva normal, non-icteric.   Heart: Regular rate and rhythm, intact distal pulses   Lungs: Symmetrical movement, no resp distress   Abdomen: Non-tender, non-distended, normal bowel sounds  EXT/Back left lower extremity more significant swelling along the distal femur aspect then swelling or tenderness over the hip she is shortened and internally rotated DP pulse 2+ no tenderness to the tib-fib or ankle  Skin: Warm, Dry, No erythema, No rash.   Neurologic: Alert and oriented, Grossly non-focal.       EKG/LABS  Labs Reviewed   CBC WITH DIFFERENTIAL - Abnormal; Notable for the " following components:       Result Value    .2 (*)     MCH 33.2 (*)     Platelet Count 162 (*)     Neutrophils-Polys 78.90 (*)     Lymphocytes 13.00 (*)     Lymphs (Absolute) 0.90 (*)     All other components within normal limits   BASIC METABOLIC PANEL - Abnormal; Notable for the following components:    Potassium 6.4 (*)     Glucose 106 (*)     Bun 53 (*)     Calcium 10.7 (*)     All other components within normal limits   ESTIMATED GFR - Abnormal; Notable for the following components:    GFR (CKD-EPI) 46 (*)     All other components within normal limits   BASIC METABOLIC PANEL - Abnormal; Notable for the following components:    Co2 19 (*)     Glucose 185 (*)     Bun 48 (*)     Calcium 10.8 (*)     All other components within normal limits   ESTIMATED GFR - Abnormal; Notable for the following components:    GFR (CKD-EPI) 49 (*)     All other components within normal limits   POCT GLUCOSE DEVICE RESULTS - Abnormal; Notable for the following components:    POC Glucose, Blood 199 (*)     All other components within normal limits   POCT GLUCOSE DEVICE RESULTS - Abnormal; Notable for the following components:    POC Glucose, Blood 148 (*)     All other components within normal limits   PROTHROMBIN TIME   APTT   CBC WITH DIFFERENTIAL   COMP METABOLIC PANEL   MAGNESIUM   POCT GLUCOSE   POCT GLUCOSE   POCT GLUCOSE   POCT GLUCOSE   POCT GLUCOSE DEVICE RESULTS     Results for orders placed or performed during the hospital encounter of 25   EKG (NOW)   Result Value Ref Range    Report       [facility] Emergency Dept.    Test Date:  2025  Pt Name:    JACQUE SHEN                Department: ER  MRN:        [id number]                      Room:        13  Gender:     Female                       Technician: [id number]  :        1945                   Requested By:ASHLEY STEPHENSON  Order #:    [id number]                    Libra MD: Ashley Stephenson MD    Measurements  Intervals                                 Axis  Rate:       60                           P:          0  WA:         244                          QRS:        -74  QRSD:       100                          T:          36  QT:         378  QTc:        378    Interpretive Statements  Atrial-paced complexes With LVH no significant change from prior no  significant peaked T's  Compared to ECG 10/07/2024 17:35:17  atrial paced  Electronically Signed On 04- 19:32:16 PDT by Ashley Berkowitz MD         I have independently interpreted this EKG    RADIOLOGY/PROCEDURES   I have independently interpreted the diagnostic imaging associated with this visit and am waiting the final reading from the radiologist.   My preliminary interpretation is as follows:     Pelvis x-ray without fracture  X-ray of the femur on the left with distal femoral fracture just proximal to the knee replacement hardware  CT femur continued fracture    Radiologist interpretation:  CT-KNEE W/O PLUS RECONS LEFT   Final Result         1. Total knee arthroplasty.   2. Comminuted and displaced fracture of the distal femoral metaphysis.   3. Osteoarthrosis.   4. Osteopenia.      DX-FEMUR-2+ LEFT   Final Result      Acute, comminuted distal femoral fracture, just above the knee arthroplasty.      DX-PELVIS-1 OR 2 VIEWS   Final Result      No acute osseous abnormality.          COURSE & MEDICAL DECISION MAKING    ASSESSMENT, COURSE AND PLAN  Care Narrative:       Patient is an 80-year-old female who presents to the emergency department after ground-level fall with obvious deformity of the left lower extremity likely femur fracture versus hip fracture.  X-ray of the pelvis and femur ordered she will be treated with IV pain management laboratory and Alysis EKG for preop screening and then reassess.    DISPOSITION AND DISCUSSIONS  7:01 PM  Spoke w Dr Negrete on-call for orthopedics recommends n.p.o. midnight the OR tomorrow.      7:34 PM  Unfortunately the patient's lab came back  and after review it looks like she has had this slow steady increase in her potassium over the last several patient was just started on Aldactone for hypertension a few days ago.  Now she is significantly hyperkalemic of 6.4.  All medications were ordered at this time bicarb calcium insulin D50 and albuterol.      I spoke w nephrology Aziza who recommends Lokelma the medications that were already provided some IV fluids followed by Lasix and repeat chemistry in 2 hours    Patient was placed in a knee immobilizer at this time and is feeling improved    7:48 PM  I discussed this with the patient and the family EKG shows no significant peaked T's or any other concerning signs she is getting all of the treatment requesting further pain management given IV Dilaudid.      Spoke with Dr. Ervin after results of the repeat chemistry are significantly improved he feels comfortable the patient just getting a mild IV fluid per hour rate somewhere between 60 and 80 and will see in the morning.    10:18 PM  Spoke w DR Butcher on-call for the medicine service and he has accepted the patient for hospitalization.    Hydration: Based on the patient's presentation of Other hypekalemia the patient was given IV fluids. IV Hydration was used because oral hydration was not as rapid as required. Upon recheck following hydration, the patient was improved.        CRITICAL CARE  The very real possibilty of a deterioration of this patient's condition required the highest level of my preparedness for sudden, emergent intervention.  I provided critical care services, which included medication orders, frequent reevaluations of the patient's condition and response to treatment, ordering and reviewing test results, and discussing the case with various consultants.  The critical care time associated with the care of the patient was 38 minutes. Review chart for interventions. This time is exclusive of any other billable procedures.           I  have discussed management of the patient with the following physicians and PAULA's:  Aziza sanches Matsunaka    Discussion of management with other QHP or appropriate source(s): RT for breathing tx  pharmacy for hyper K meds      FINAL DIAGNOSIS  1. Other closed fracture of distal end of left femur, initial encounter ([facility])    2. ABHILASH (acute kidney injury) ([facility])    3. Hyperkalemia         Electronically signed by: Ashley Berkowitz M.D., 4/5/2025 6:14 PM

## 2025-04-06 NOTE — PROGRESS NOTES
Telemetry Report:        Per Telestrip from Monitor Room     Rhythm: PACED 60, 1ST DEGREE BLOCK    Ectopy: PVC    FL: .24  QRS: .08  Qt: .37

## 2025-04-06 NOTE — PROGRESS NOTES
..4 Eyes Skin Assessment Completed by Sue RN and DILIP Delgado.    Head WDL  Ears WDL  Nose WDL  Mouth WDL  Neck WDL  Breast/Chest WDL  Shoulder Blades WDL  Spine WDL  (R) Arm/Elbow/Hand WDL  (L) Arm/Elbow/Hand WDL  Abdomen WDL surgical Scab  Groin WDL  Scrotum/Coccyx/Buttocks Redness and Blanching  (R) Leg WDL  (L) Leg WDL  (R) Heel/Foot/Toe WDL  (L) Heel/Foot/Toe WDL          Devices In Places Tele Box, Blood Pressure Cuff, Pulse Ox, and Leg Immobilizer      Interventions In Place NC Cheek Stickers, Heel Mepilex, Pillows, Low Air Loss Mattress, and Barrier Cream    Possible Skin Injury No    Pictures Uploaded Into Epic N/A  Wound Consult Placed N/A  RN Wound Prevention Protocol Ordered Yes

## 2025-04-06 NOTE — ED NOTES
Med rec updated and complete.  Allergies reviewed.    Pt confirmed name and date of birth.      No anticoagulant or antiplatelet medications.    No antibiotic use in last 30 days.      Preferred Pharmacy    [facility] = [phone]

## 2025-04-06 NOTE — ED NOTES
Bedside report received from off going RN/tech: Dionne, assumed care of patient.  POC discussed with patient. Call light within reach, all needs addressed at this time.       Fall risk interventions in place: Move the patient closer to the nurse's station, Patient's personal possessions are with in their safe reach, Place socks on patient, and Bed Alarm in use (all applicable per [place] Fall risk assessment)   Continuous monitoring: Cardiac Leads, Pulse Ox, or Blood Pressure  IVF/IV medications: Not Applicable   Oxygen: How many liters 2L  Bedside sitter: Not Applicable   Isolation: Not Applicable

## 2025-04-06 NOTE — OP REPORT
DATE OF OPERATION: 4/6/2025     PREOPERATIVE DIAGNOSIS: Left distal femur periprosthetic fracture    POSTOPERATIVE DIAGNOSIS: Same    PROCEDURE PERFORMED: Open reduction internal fixation left distal femur fracture with intra prosthetic extension    SURGEON: Poncho Negrete M.D.     ASSISTANT: None    ANESTHESIA: General    SPECIMEN: None    ESTIMATED BLOOD LOSS: 150 mL    IMPLANTS: Mary Kate distal femoral locking plate      INDICATIONS: The patient is a 80 y.o. female who presented with above.  I discussed the risks and benefits of the procedure which include but are not limited to risks of infection, wound healing complication, neurovascular injury, pain, malunion, non-union, malrotation, and the medical risks of anesthesia including MI, stroke, and death.  Alternatives to surgery were also discussed, including non-operative management, which I did not recommend.  The patient was in agreement with the plan to proceed, and the informed consent was signed and documented.  I met with the patient pre-operatively and marked the operative extremity with their agreement.  We proceeded to the operating room.     DESCRIPTION OF PROCEDURE:  Patient was seen in the preoperative holding area on the day of surgery. The operative site was marked with my initials.  she was taken to the operating room and placed supine on the operative table.  Anesthesia was induced.  The operative extremity was prepped and draped in the normal sterile fashion.  Operative pause was conducted and the correct patient, site, side, procedure, and surgeon's initials on extremity were identified.  Standard lateral incision was performed over the distal thigh.  Fascia was split in line with skin incision.  Fracture site was identified noted extend into the prosthesis.  Using a radiolucent triangle well-placed bump and a collinear clamp were able to reduce the fracture to reasonable position.  Then placed our plate in position and slid submuscularly  proximally.  Once it improved position were then drilled and placed multiple locking screws to the distal articular segment.  We then placed our plate into position along the shaft.  Once were happy with it we then placed our collinear clamp around the plate reducing her fracture once again.  We then drilled and placed several additional locking screws using percutaneous technique proximally.  The clamp was then removed final images were obtained showing appropriate reduction and implant position.  Wounds were irrigated with sterile saline solution.  Vancomycin tobramycin powder placed in the wound.  Local anesthetic injected around the wound.  She was awoken taken to PACU in stable condition.    POSTOPERATIVE PLAN: Weightbearing as tolerated with motion as tolerated left lower extremity weight bearing.  Mobilize with physical and occupational therapies.  DVT prophylaxis with SCDs and Lovenox until mobilizing independently and then can be switched to aspirin for 4 weeks.  The patient will follow up in clinic in 2 weeks to check wounds and remove sutures/staples.      ___________________________________Lizette Negrete M.D.   DD: 4/6/2025  4:17 PM

## 2025-04-06 NOTE — PROGRESS NOTES
GLF with left DF periprosthetic fx  Plan for ORIF today  Risks discussed at length      Poncho Negrete MD  Orthopedic Trauma Surgery

## 2025-04-06 NOTE — ANESTHESIA POSTPROCEDURE EVALUATION
Patient: Clau Vences    Procedure Summary       Date: 04/06/25 Room / Location: [place] [address] / SURGERY [facility]    Anesthesia Start: 1450 Anesthesia Stop: 1620    Procedure: ORIF, FRACTURE, FEMUR, DISTAL (Left: Leg Upper) Diagnosis: (LEFT DISTAL FEMUR FRACTURE)    Surgeons: Poncho Negrete M.D. Responsible Provider: Tra Holley M.D.    Anesthesia Type: general ASA Status: 3            Final Anesthesia Type: general  Last vitals  BP   Blood Pressure : 122/55    Temp   36.1 °C (97 °F)    Pulse   60   Resp   16    SpO2   97 %      Anesthesia Post Evaluation    Patient location during evaluation: PACU  Patient participation: complete - patient participated  Level of consciousness: awake and alert    Airway patency: patent  Anesthetic complications: no  Cardiovascular status: hemodynamically stable  Respiratory status: face mask    PONV: none          No notable events documented.     Nurse Pain Score: 6 (NPRS)

## 2025-04-06 NOTE — ASSESSMENT & PLAN NOTE
K: 6.4 on admission  Received calcium gluconate, insulin dextrose, and lasix. Improved to 4.8.  As an outpatient, patient has a history of hyperkalemia in attempt to renew spironolactone dose to 12.5 from last visit  - Holding home spironolactone and valsartan  - Telemetry monitoring  -Lokelma once daily

## 2025-04-06 NOTE — ASSESSMENT & PLAN NOTE
Likely secondary to atelectasis from fall.  Less likely due to hypoventilation secondary to pain medication as patient has high normal respiratory rate  - Incentive spirometer  - Mobilize patient as able for treatment of atelectasis, use oxycodone for pain control if necessary to encourage mobilization  - Wean O2 as able  - PT/OT consult

## 2025-04-06 NOTE — ASSESSMENT & PLAN NOTE
Patient with mechanical ground-level fall resulting in fracture distal left femur above her left knee replacement. Orthopedic surgery consulted.  Continue to monitor hemoglobin drop following procedure.  No signs/symptoms of hematoma formation.  - Will continue oxycodone for pain control to encourage mobility

## 2025-04-06 NOTE — ASSESSMENT & PLAN NOTE
Latest Reference Range & Units Most Recent   Cholesterol,Tot 100 - 199 mg/dL 166  5/20/24 14:15   Triglycerides 0 - 149 mg/dL 193 (H)  5/20/24 14:15   HDL >=40 mg/dL 67  5/20/24 14:15   LDL <100 mg/dL 60  5/20/24 14:15   - Continue Rosuvastatin 10 mg daily.

## 2025-04-06 NOTE — ANESTHESIA PREPROCEDURE EVALUATION
" Case: [id number] Date/Time: 04/06/25 1447    Procedure: ORIF, FRACTURE, FEMUR, DISTAL (Left: Leg Upper)    Anesthesia type: General    Pre-op diagnosis: LEFT DISTAL FEMUR FRACTURE    Location: [facility] OR 16 / SURGERY [facility]    Surgeons: Poncho Negrete M.D.            Relevant Problems   ANESTHESIA   (positive) AZUL on CPAP      CARDIAC   (positive) HTN (hypertension)   (positive) Heart murmur   (positive) Nonrheumatic aortic valve stenosis   (positive) Pacemaker      Other   (positive) ACC/AHA stage C heart failure with preserved ejection fraction (HCC)   (positive) Arthritis   (positive) Closed left subtrochanteric femur fracture, initial encounter ([facility])     /55   Pulse 60   Temp 36.1 °C (97 °F) (Temporal)   Resp 16   Ht 1.702 m (5' 7\")   Wt 101 kg (222 lb 10.6 oz)   SpO2 97%   BMI 34.87 kg/m²       Physical Exam    Airway   Mallampati: II  TM distance: >3 FB  Neck ROM: full       Cardiovascular - normal exam  Rhythm: regular  Rate: normal  (-) murmur     Dental - normal exam           Pulmonary - normal exam  Breath sounds clear to auscultation     Abdominal    Neurological - normal exam                   Anesthesia Plan    ASA 3       Plan - general       Airway plan will be ETT          Induction: intravenous    Postoperative Plan: Postoperative administration of opioids is intended.    Pertinent diagnostic labs and testing reviewed    Informed Consent:    Anesthetic plan and risks discussed with patient.    Use of blood products discussed with: patient whom consented to blood products.           "

## 2025-04-06 NOTE — PROGRESS NOTES
[facility] Internal Medicine Daily Progress Note    Date of Service  4/6/2025    [employer] Team: R IM Blue Team   Attending: Floyd Reyes M.d.  Senior Resident: Dr. Lake Cuevas  Intern:  Dr. Anuel Hdz  Contact Number: [phone]    Chief Complaint    Hospital Course  Clau Vences is a 80 y.o. female with PMH HFpEF, Dyslipidemia, chronic steroid therapy for osteoarthritis-related pain. Admitted 4/5/2025 d/t GLF with left distal femur fracture; secondary diagnosis of hyperkalemia. Orthopedic surgery consulted, placed NPO for OR on 04/06. Received calcium gluconate, IV fluids, and insulin for hyperkalemia with resolution.     Interval Problem Update  04/06/25. No acute events overnight. Patient's pain is well tolerated. Planning for surgery today.     I have discussed this patient's plan of care and discharge plan at IDT rounds today with Case Management, Nursing, Nursing leadership, and other members of the IDT team.    Consultants/Specialty  orthopedics    Code Status  DNAR/DNI    Disposition  The patient is not medically cleared for discharge to home or a post-acute facility.      I have placed the appropriate orders for post-discharge needs.    Review of Systems  Review of Systems   Constitutional:  Negative for malaise/fatigue.   HENT:  Negative for tinnitus.    Eyes:  Negative for blurred vision.   Respiratory:  Negative for shortness of breath.    Cardiovascular:  Negative for chest pain and palpitations.   Gastrointestinal:  Negative for diarrhea, heartburn and nausea.   Genitourinary:  Negative for dysuria.   Musculoskeletal:  Positive for falls. Negative for myalgias.   Neurological:  Negative for dizziness and headaches.        Physical Exam  Temp:  [36.1 °C (97 °F)-36.5 °C (97.7 °F)] 36.1 °C (97 °F)  Pulse:  [60-73] 60  Resp:  [16-20] 16  BP: (106-210)/(47-90) 122/55  SpO2:  [88 %-99 %] 97 %    Physical Exam  Constitutional:       General: She is not in acute distress.     Appearance: She is obese.  She is not ill-appearing.   Cardiovascular:      Rate and Rhythm: Normal rate and regular rhythm.      Pulses: Normal pulses.      Heart sounds: Murmur (Aortic) heard.   Pulmonary:      Effort: Pulmonary effort is normal. No respiratory distress.   Chest:      Chest wall: No tenderness.   Abdominal:      General: Bowel sounds are normal. There is no distension.      Palpations: Abdomen is soft.      Tenderness: There is no abdominal tenderness.   Musculoskeletal:         General: Tenderness (Mild. Mid/distal left femur) present. No swelling.      Comments: Left leg with braze         Laboratory  Recent Labs     04/05/25 1746 04/06/25  1210   WBC 6.9 7.4   RBC 4.37 4.03*   HEMOGLOBIN 14.5 13.6   HEMATOCRIT 45.1 42.6   .2* 105.7*   MCH 33.2* 33.7*   MCHC 32.2 31.9*   RDW 46.4 48.9   PLATELETCT 162* 111*   MPV 11.3 11.0     Recent Labs     04/05/25  1746 04/05/25  2110 04/06/25  1210   SODIUM 135 138 137   POTASSIUM 6.4* 4.8 5.6*   CHLORIDE 106 109 107   CO2 20 19* 20   GLUCOSE 106* 185* 105*   BUN 53* 48* 51*   CREATININE 1.19 1.13 1.59*   CALCIUM 10.7* 10.8* 10.3     Recent Labs     04/05/25  1746   APTT 26.6   INR 1.06               Imaging  CT-KNEE W/O PLUS RECONS LEFT   Final Result         1. Total knee arthroplasty.   2. Comminuted and displaced fracture of the distal femoral metaphysis.   3. Osteoarthrosis.   4. Osteopenia.      DX-FEMUR-2+ LEFT   Final Result      Acute, comminuted distal femoral fracture, just above the knee arthroplasty.      DX-PELVIS-1 OR 2 VIEWS   Final Result      No acute osseous abnormality.      DX-PORTABLE FLUORO > 1 HOUR    (Results Pending)   DX-FEMUR-2+ LEFT    (Results Pending)        Assessment/Plan  Problem Representation:  80 y.o. female with PMH HFpEF, Dyslipidemia, chronic steroid therapy for osteoarthritis-related pain. Admitted 04/05/25 d/t left distal femur fracture.    * Closed left subtrochanteric femur fracture, initial encounter ([facility])- (present on  admission)  Assessment & Plan  Patient with mechanical ground-level fall resulting in fracture distal left femur above her left knee replacement.   Orthopedic surgery consulted.  - OR today for repair.   - Resume DVT prophylaxis post op.     Current chronic use of systemic steroids  Assessment & Plan  Patient states he is on chronic prednisone 5 mg for 3 years due to shoulder arthritis.   - Continue prednisone at this time.   - Will readdress potential cessation on post-op.    Hyperkalemia- (present on admission)  Assessment & Plan  K: 6.4 on admission  Received calcium gluconate, insulin dextrose, and lasix. Improved to 4.8.  As an outpatient, patient has a history of hyperkalemia in attempt to renew spironolactone dose to 12.5 from last visit  - Holding home spironolactone  - Telemetry monitoring    ACC/AHA stage C heart failure with preserved ejection fraction (HCC)- (present on admission)  Assessment & Plan  History of chronic heart failure with preserved ejection fraction on home Coreg, Jardiance, Lasix, Crestor, valsartan  No acute exacerbation.  - Continue home meds  - Holding home spironolactone due to hyperkalemia    Acute hypoxic respiratory failure (HCC)- (present on admission)  Assessment & Plan  Likely secondary to atelectasis from fall and hypoventilation from pain medications  - Incentive spirometer    Dyslipidemia- (present on admission)  Assessment & Plan   Latest Reference Range & Units Most Recent   Cholesterol,Tot 100 - 199 mg/dL 166  5/20/24 14:15   Triglycerides 0 - 149 mg/dL 193 (H)  5/20/24 14:15   HDL >=40 mg/dL 67  5/20/24 14:15   LDL <100 mg/dL 60  5/20/24 14:15   - Continue Rosuvastatin 10 mg daily.         VTE prophylaxis: enoxaparin ppx    I have performed a physical exam and reviewed and updated ROS and Plan today (4/6/2025). In review of yesterday's note (4/5/2025), there are no changes except as documented above.        Lake Meza M.D.  PGY-3 Internal Medicine  Resident

## 2025-04-06 NOTE — ED NOTES
Attempted to apply Basic Knee immobilizer to Left Lower Extremity, however patient states her pain is too great at this time and requested staff round with the doctor for options.

## 2025-04-06 NOTE — ED NOTES
Repeat BMP collected and sent to lab. Patient resting comfortably with daughter at bedside. NO further needs at this time.

## 2025-04-06 NOTE — ASSESSMENT & PLAN NOTE
History of chronic heart failure with preserved ejection fraction on home Coreg, Jardiance, Lasix, Crestor, valsartan  No acute exacerbation.  - Continue home meds except for as below  - Holding home spironolactone and losartan due to hyperkalemia and orthostatic hypotension

## 2025-04-06 NOTE — ASSESSMENT & PLAN NOTE
Patient states she is on chronic prednisone 5 mg for 3 years due to shoulder arthritis.   - Continue prednisone at this time.   - Will readdress potential cessation outpatient

## 2025-04-06 NOTE — CARE PLAN
The patient is Stable - Low risk of patient condition declining or worsening    Shift Goals  Clinical Goals: NPO, Pain management, Monitor VS  Patient Goals: Sleep  Family Goals: BENJAMIN    Progress made toward(s) clinical / shift goals:      Problem: Pain - Standard  Goal: Alleviation of pain or a reduction in pain to the patient’s comfort goal  Description: Target End Date:  Prior to discharge or change in level of careDocument on Vitals flowsheet1.  Document pain using the appropriate pain scale per order or unit policy2.  Educate and implement non-pharmacologic comfort measures (i.e. relaxation, distraction, massage, cold/heat therapy, etc.)3.  Pain management medications as ordered4.  Reassess pain after pain med administration per policy5.  If opiods administered assess patient's response to pain medication is appropriate per POSS sedation scale6.  Follow pain management plan developed in collaboration with patient and interdisciplinary team (including palliative care or pain specialists if applicable)  Outcome: Progressing  Note: Reports pain relief with oxycodone. RN provided education on using pain rating scale to get adequate pian relief      Problem: Knowledge Deficit - Standard  Goal: Patient and family/care givers will demonstrate understanding of plan of care, disease process/condition, diagnostic tests and medications  Description: Target End Date:  1-3 days or as soon as patient condition allowsDocument in Patient Education1.  Patient and family/caregiver oriented to unit, equipment, visitation policy and means for communicating concern2.  Complete/review Learning Assessment3.  Assess knowledge level of disease process/condition, treatment plan, diagnostic tests and medications4.  Explain disease process/condition, treatment plan, diagnostic tests and medications  Outcome: Progressing  Note: Reviewed plan of car. Verbalized understanding

## 2025-04-06 NOTE — ED TRIAGE NOTES
Chief Complaint   Patient presents with    T-5000 GLF     Pt bib remsa for mgl, states stepped on dustpan then slipped and fell. - head strike -loc. +shortening in left lower extremity. CMS intact. Gcs of 15. Aaox4. Was given total of Fentanyl 150 mcg iv and zofran 4 mg iv by ems.      Unable to ambulate and weight bear on left lower extremity after that fall.

## 2025-04-06 NOTE — HOSPITAL COURSE
Clau Vences is a 80 y.o. female with PMH HFpEF, Dyslipidemia, chronic steroid therapy for osteoarthritis-related pain. Admitted 4/5/2025 d/t GLF with left distal femur fracture; secondary diagnosis of hyperkalemia. Orthopedic surgery consulted, placed NPO for OR on 04/06. Received calcium gluconate, IV fluids, and insulin for hyperkalemia with resolution.

## 2025-04-06 NOTE — CARE PLAN
The patient is Stable - Low risk of patient condition declining or worsening    Shift Goals  Clinical Goals: Surgery today, Pain mangament, Rst  Patient Goals: Pain mangment, Rest  Family Goals: BENJAMIN    Patient went for surgery this afternoon.   Pain management.   NPO for surgery.     Progress made toward(s) clinical / shift goals:    Problem: Pain - Standard  Goal: Alleviation of pain or a reduction in pain to the patient’s comfort goal  Description: Target End Date:  Prior to discharge or change in level of careDocument on Vitals flowsheet1.  Document pain using the appropriate pain scale per order or unit policy2.  Educate and implement non-pharmacologic comfort measures (i.e. relaxation, distraction, massage, cold/heat therapy, etc.)3.  Pain management medications as ordered4.  Reassess pain after pain med administration per policy5.  If opiods administered assess patient's response to pain medication is appropriate per POSS sedation scale6.  Follow pain management plan developed in collaboration with patient and interdisciplinary team (including palliative care or pain specialists if applicable)  Outcome: Progressing     Problem: Knowledge Deficit - Standard  Goal: Patient and family/care givers will demonstrate understanding of plan of care, disease process/condition, diagnostic tests and medications  Description: Target End Date:  1-3 days or as soon as patient condition allowsDocument in Patient Education1.  Patient and family/caregiver oriented to unit, equipment, visitation policy and means for communicating concern2.  Complete/review Learning Assessment3.  Assess knowledge level of disease process/condition, treatment plan, diagnostic tests and medications4.  Explain disease process/condition, treatment plan, diagnostic tests and medications  Outcome: Progressing     Problem: Fall Risk  Goal: Patient will remain free from falls  Description: Target End Date:  Prior to discharge or change in level of  careDocument interventions on the [facility] Fall Risk Assessment1.  Assess for fall risk factors2.  Implement fall precautions  Outcome: Progressing     Problem: Skin Integrity  Goal: Skin integrity is maintained or improved  Description: Target End Date:  Prior to discharge or change in level of careDocument interventions on Skin Risk/Kt flowsheet groups and corresponding LDA1.  Assess and monitor skin integrity, appearance and/or temperature2.  Assess risk factors for impaired skin integrity and/or pressures ulcers3.  Implement precautions to protect skin integrity in collaboration with interdisciplinary team4.  Implement pressure ulcer prevention protocol if at risk for skin breakdown5.  Confirm wound care consult if at risk for skin breakdown6.  Ensure patient use of pressure relieving devices  (Low air loss bed, waffle overlay, heel protectors, ROHO cushion, etc)  Outcome: Progressing       Patient is not progressing towards the following goals:       Additional Progress Note...

## 2025-04-06 NOTE — ED NOTES
Patient back from CT, verified with CT tech that O2 tank removed from [facility], patient on wall oxygen. Hooked up to all monitors and bed alarm. No further needs or concerns at this time.

## 2025-04-07 LAB
ANION GAP SERPL CALC-SCNC: 8 MMOL/L (ref 7–16)
BASOPHILS # BLD AUTO: 0.2 % (ref 0–1.8)
BASOPHILS # BLD: 0.02 K/UL (ref 0–0.12)
BUN SERPL-MCNC: 49 MG/DL (ref 8–22)
CALCIUM SERPL-MCNC: 9.7 MG/DL (ref 8.5–10.5)
CHLORIDE SERPL-SCNC: 103 MMOL/L (ref 96–112)
CO2 SERPL-SCNC: 20 MMOL/L (ref 20–33)
CREAT SERPL-MCNC: 1.31 MG/DL (ref 0.5–1.4)
EOSINOPHIL # BLD AUTO: 0 K/UL (ref 0–0.51)
EOSINOPHIL NFR BLD: 0 % (ref 0–6.9)
ERYTHROCYTE [DISTWIDTH] IN BLOOD BY AUTOMATED COUNT: 48 FL (ref 35.9–50)
GFR SERPLBLD CREATININE-BSD FMLA CKD-EPI: 41 ML/MIN/1.73 M 2
GLUCOSE SERPL-MCNC: 149 MG/DL (ref 65–99)
HCT VFR BLD AUTO: 36.2 % (ref 37–47)
HGB BLD-MCNC: 11.4 G/DL (ref 12–16)
IMM GRANULOCYTES # BLD AUTO: 0.07 K/UL (ref 0–0.11)
IMM GRANULOCYTES NFR BLD AUTO: 0.6 % (ref 0–0.9)
LYMPHOCYTES # BLD AUTO: 0.5 K/UL (ref 1–4.8)
LYMPHOCYTES NFR BLD: 4.5 % (ref 22–41)
MCH RBC QN AUTO: 33.8 PG (ref 27–33)
MCHC RBC AUTO-ENTMCNC: 31.5 G/DL (ref 32.2–35.5)
MCV RBC AUTO: 107.4 FL (ref 81.4–97.8)
MONOCYTES # BLD AUTO: 0.7 K/UL (ref 0–0.85)
MONOCYTES NFR BLD AUTO: 6.4 % (ref 0–13.4)
NEUTROPHILS # BLD AUTO: 9.7 K/UL (ref 1.82–7.42)
NEUTROPHILS NFR BLD: 88.3 % (ref 44–72)
NRBC # BLD AUTO: 0 K/UL
NRBC BLD-RTO: 0 /100 WBC (ref 0–0.2)
PLATELET # BLD AUTO: 112 K/UL (ref 164–446)
PMV BLD AUTO: 11.3 FL (ref 9–12.9)
POTASSIUM SERPL-SCNC: 5.6 MMOL/L (ref 3.6–5.5)
RBC # BLD AUTO: 3.37 M/UL (ref 4.2–5.4)
SODIUM SERPL-SCNC: 131 MMOL/L (ref 135–145)
WBC # BLD AUTO: 11 K/UL (ref 4.8–10.8)

## 2025-04-07 PROCEDURE — 700102 HCHG RX REV CODE 250 W/ 637 OVERRIDE(OP)

## 2025-04-07 PROCEDURE — 36415 COLL VENOUS BLD VENIPUNCTURE: CPT

## 2025-04-07 PROCEDURE — 700101 HCHG RX REV CODE 250

## 2025-04-07 PROCEDURE — 97166 OT EVAL MOD COMPLEX 45 MIN: CPT

## 2025-04-07 PROCEDURE — 85025 COMPLETE CBC W/AUTO DIFF WBC: CPT

## 2025-04-07 PROCEDURE — 80048 BASIC METABOLIC PNL TOTAL CA: CPT

## 2025-04-07 PROCEDURE — 97162 PT EVAL MOD COMPLEX 30 MIN: CPT

## 2025-04-07 PROCEDURE — A9270 NON-COVERED ITEM OR SERVICE: HCPCS

## 2025-04-07 PROCEDURE — 700111 HCHG RX REV CODE 636 W/ 250 OVERRIDE (IP): Mod: JZ

## 2025-04-07 PROCEDURE — 770001 HCHG ROOM/CARE - MED/SURG/GYN PRIV*

## 2025-04-07 PROCEDURE — 99232 SBSQ HOSP IP/OBS MODERATE 35: CPT | Mod: GC | Performed by: STUDENT IN AN ORGANIZED HEALTH CARE EDUCATION/TRAINING PROGRAM

## 2025-04-07 PROCEDURE — 97535 SELF CARE MNGMENT TRAINING: CPT

## 2025-04-07 PROCEDURE — 700105 HCHG RX REV CODE 258

## 2025-04-07 RX ORDER — SODIUM CHLORIDE, SODIUM LACTATE, POTASSIUM CHLORIDE, AND CALCIUM CHLORIDE .6; .31; .03; .02 G/100ML; G/100ML; G/100ML; G/100ML
500 INJECTION, SOLUTION INTRAVENOUS ONCE
Status: COMPLETED | OUTPATIENT
Start: 2025-04-07 | End: 2025-04-07

## 2025-04-07 RX ORDER — SODIUM CHLORIDE 9 MG/ML
1000 INJECTION, SOLUTION INTRAVENOUS ONCE
Status: DISCONTINUED | OUTPATIENT
Start: 2025-04-07 | End: 2025-04-07

## 2025-04-07 RX ORDER — SODIUM CHLORIDE 9 MG/ML
500 INJECTION, SOLUTION INTRAVENOUS ONCE
Status: COMPLETED | OUTPATIENT
Start: 2025-04-07 | End: 2025-04-07

## 2025-04-07 RX ORDER — DEXTROSE MONOHYDRATE 25 G/50ML
25 INJECTION, SOLUTION INTRAVENOUS ONCE
Status: COMPLETED | OUTPATIENT
Start: 2025-04-07 | End: 2025-04-07

## 2025-04-07 RX ADMIN — VALSARTAN 320 MG: 80 TABLET ORAL at 04:19

## 2025-04-07 RX ADMIN — ONDANSETRON 4 MG: 2 INJECTION INTRAMUSCULAR; INTRAVENOUS at 09:36

## 2025-04-07 RX ADMIN — DEXTROSE MONOHYDRATE 25 G: 25 INJECTION, SOLUTION INTRAVENOUS at 08:03

## 2025-04-07 RX ADMIN — OXYCODONE HYDROCHLORIDE 5 MG: 5 TABLET ORAL at 08:47

## 2025-04-07 RX ADMIN — SODIUM ZIRCONIUM CYCLOSILICATE 10 G: 10 POWDER, FOR SUSPENSION ORAL at 14:21

## 2025-04-07 RX ADMIN — SODIUM ZIRCONIUM CYCLOSILICATE 10 G: 10 POWDER, FOR SUSPENSION ORAL at 21:34

## 2025-04-07 RX ADMIN — ACETAMINOPHEN 1000 MG: 500 TABLET, FILM COATED ORAL at 08:48

## 2025-04-07 RX ADMIN — FOLIC ACID 1 MG: 1 TABLET ORAL at 18:20

## 2025-04-07 RX ADMIN — OXYCODONE HYDROCHLORIDE 2.5 MG: 5 TABLET ORAL at 04:06

## 2025-04-07 RX ADMIN — SENNOSIDES AND DOCUSATE SODIUM 2 TABLET: 50; 8.6 TABLET ORAL at 18:18

## 2025-04-07 RX ADMIN — ACETAMINOPHEN 1000 MG: 500 TABLET, FILM COATED ORAL at 21:33

## 2025-04-07 RX ADMIN — ACETAMINOPHEN 1000 MG: 500 TABLET, FILM COATED ORAL at 14:20

## 2025-04-07 RX ADMIN — CARVEDILOL 25 MG: 25 TABLET, FILM COATED ORAL at 08:02

## 2025-04-07 RX ADMIN — CARVEDILOL 25 MG: 25 TABLET, FILM COATED ORAL at 18:23

## 2025-04-07 RX ADMIN — ROSUVASTATIN CALCIUM 10 MG: 20 TABLET, FILM COATED ORAL at 18:21

## 2025-04-07 RX ADMIN — PREDNISONE 5 MG: 5 TABLET ORAL at 04:20

## 2025-04-07 RX ADMIN — HYDROMORPHONE HYDROCHLORIDE 0.25 MG: 1 INJECTION, SOLUTION INTRAMUSCULAR; INTRAVENOUS; SUBCUTANEOUS at 05:36

## 2025-04-07 RX ADMIN — OXYCODONE HYDROCHLORIDE 2.5 MG: 5 TABLET ORAL at 00:45

## 2025-04-07 RX ADMIN — FUROSEMIDE 20 MG: 20 TABLET ORAL at 04:20

## 2025-04-07 RX ADMIN — FERROUS SULFATE TAB 325 MG (65 MG ELEMENTAL FE) 325 MG: 325 (65 FE) TAB at 04:20

## 2025-04-07 RX ADMIN — OXYCODONE HYDROCHLORIDE 5 MG: 5 TABLET ORAL at 14:21

## 2025-04-07 RX ADMIN — SODIUM CHLORIDE 500 ML: 9 INJECTION, SOLUTION INTRAVENOUS at 13:26

## 2025-04-07 RX ADMIN — INSULIN HUMAN 5 UNITS: 100 INJECTION, SOLUTION PARENTERAL at 08:02

## 2025-04-07 RX ADMIN — SODIUM CHLORIDE, POTASSIUM CHLORIDE, SODIUM LACTATE AND CALCIUM CHLORIDE 500 ML: 600; 310; 30; 20 INJECTION, SOLUTION INTRAVENOUS at 09:45

## 2025-04-07 RX ADMIN — OXYCODONE HYDROCHLORIDE 5 MG: 5 TABLET ORAL at 21:33

## 2025-04-07 RX ADMIN — ENOXAPARIN SODIUM 40 MG: 100 INJECTION SUBCUTANEOUS at 18:20

## 2025-04-07 RX ADMIN — SERTRALINE 100 MG: 100 TABLET, FILM COATED ORAL at 04:21

## 2025-04-07 RX ADMIN — OXYCODONE HYDROCHLORIDE 2.5 MG: 5 TABLET ORAL at 18:19

## 2025-04-07 RX ADMIN — SODIUM ZIRCONIUM CYCLOSILICATE 10 G: 10 POWDER, FOR SUSPENSION ORAL at 08:47

## 2025-04-07 ASSESSMENT — ENCOUNTER SYMPTOMS
VOMITING: 0
WEAKNESS: 0
HEADACHES: 0
PALPITATIONS: 0
TINGLING: 1
DIZZINESS: 0
DIARRHEA: 0
BACK PAIN: 0
HEARTBURN: 0
CHILLS: 0
ORTHOPNEA: 0
DIZZINESS: 1
FEVER: 0
COUGH: 0
FALLS: 1
WHEEZING: 0
SHORTNESS OF BREATH: 0
BLURRED VISION: 0
ABDOMINAL PAIN: 0
NAUSEA: 0
MYALGIAS: 0

## 2025-04-07 ASSESSMENT — PAIN DESCRIPTION - PAIN TYPE
TYPE: CHRONIC PAIN
TYPE: ACUTE PAIN
TYPE: CHRONIC PAIN
TYPE: ACUTE PAIN

## 2025-04-07 ASSESSMENT — COGNITIVE AND FUNCTIONAL STATUS - GENERAL
MOVING FROM LYING ON BACK TO SITTING ON SIDE OF FLAT BED: A LOT
PERSONAL GROOMING: A LITTLE
MOVING TO AND FROM BED TO CHAIR: A LOT
DRESSING REGULAR UPPER BODY CLOTHING: A LITTLE
SUGGESTED CMS G CODE MODIFIER DAILY ACTIVITY: CK
SUGGESTED CMS G CODE MODIFIER MOBILITY: CL
WALKING IN HOSPITAL ROOM: A LOT
CLIMB 3 TO 5 STEPS WITH RAILING: TOTAL
HELP NEEDED FOR BATHING: A LOT
TURNING FROM BACK TO SIDE WHILE IN FLAT BAD: A LOT
MOBILITY SCORE: 11
DRESSING REGULAR LOWER BODY CLOTHING: A LOT
TOILETING: A LOT
DAILY ACTIVITIY SCORE: 16
STANDING UP FROM CHAIR USING ARMS: A LOT

## 2025-04-07 ASSESSMENT — GAIT ASSESSMENTS
DISTANCE (FEET): 6
GAIT LEVEL OF ASSIST: MODERATE ASSIST
ASSISTIVE DEVICE: FRONT WHEEL WALKER

## 2025-04-07 ASSESSMENT — ACTIVITIES OF DAILY LIVING (ADL): TOILETING: INDEPENDENT

## 2025-04-07 ASSESSMENT — FIBROSIS 4 INDEX: FIB4 SCORE: 3.76

## 2025-04-07 NOTE — PROGRESS NOTES
Monitor Summary:    Rhythm: Paced  HR: 60-52  Ectopy: R PVCs  -/. 04/.52    Per strip from monitor room

## 2025-04-07 NOTE — CARE PLAN
The patient is Stable - Low risk of patient condition declining or worsening    Shift Goals  Clinical Goals: PT/OT Today, Pain control, Rest  Patient Goals: Pain managment, Rest  Family Goals: Discuss rehab options    Patient met with PT/OT today.  Rehabilitation education discussed and education provided to patient and family.   Pain control adequate.    Progress made toward(s) clinical / shift goals:    Problem: Pain - Standard  Goal: Alleviation of pain or a reduction in pain to the patient’s comfort goal  Description: Target End Date:  Prior to discharge or change in level of careDocument on Vitals flowsheet1.  Document pain using the appropriate pain scale per order or unit policy2.  Educate and implement non-pharmacologic comfort measures (i.e. relaxation, distraction, massage, cold/heat therapy, etc.)3.  Pain management medications as ordered4.  Reassess pain after pain med administration per policy5.  If opiods administered assess patient's response to pain medication is appropriate per POSS sedation scale6.  Follow pain management plan developed in collaboration with patient and interdisciplinary team (including palliative care or pain specialists if applicable)  Outcome: Progressing     Problem: Knowledge Deficit - Standard  Goal: Patient and family/care givers will demonstrate understanding of plan of care, disease process/condition, diagnostic tests and medications  Description: Target End Date:  1-3 days or as soon as patient condition allowsDocument in Patient Education1.  Patient and family/caregiver oriented to unit, equipment, visitation policy and means for communicating concern2.  Complete/review Learning Assessment3.  Assess knowledge level of disease process/condition, treatment plan, diagnostic tests and medications4.  Explain disease process/condition, treatment plan, diagnostic tests and medications  Outcome: Progressing     Problem: Fall Risk  Goal: Patient will remain free from  falls  Description: Target End Date:  Prior to discharge or change in level of careDocument interventions on the [facility] Fall Risk Assessment1.  Assess for fall risk factors2.  Implement fall precautions  Outcome: Progressing     Problem: Skin Integrity  Goal: Skin integrity is maintained or improved  Description: Target End Date:  Prior to discharge or change in level of careDocument interventions on Skin Risk/Kt flowsheet groups and corresponding LDA1.  Assess and monitor skin integrity, appearance and/or temperature2.  Assess risk factors for impaired skin integrity and/or pressures ulcers3.  Implement precautions to protect skin integrity in collaboration with interdisciplinary team4.  Implement pressure ulcer prevention protocol if at risk for skin breakdown5.  Confirm wound care consult if at risk for skin breakdown6.  Ensure patient use of pressure relieving devices  (Low air loss bed, waffle overlay, heel protectors, ROHO cushion, etc)  Outcome: Progressing       Patient is not progressing towards the following goals:

## 2025-04-07 NOTE — PROGRESS NOTES
"      Orthopaedic Progress Note    Interval changes:  Patient doing well post op  L distal femur dressings are CDI  Cleared for DC to SNF by ortho pending medicine clearance    ROS - Patient denies any new issues.  Pain well controlled.    /48   Pulse 77   Temp 36.4 °C (97.5 °F) (Temporal)   Resp 19   Ht 1.702 m (5' 7\")   Wt 99.8 kg (220 lb 0.3 oz)   SpO2 93%     Patient seen and examined  No acute distress  Breathing non labored  RRR  LLE dressings CDI, DNVI, moves all toes, cap refill <2 sec.     Recent Labs     04/05/25  1746 04/06/25  1210 04/07/25  0146   WBC 6.9 7.4 11.0*   RBC 4.37 4.03* 3.37*   HEMOGLOBIN 14.5 13.6 11.4*   HEMATOCRIT 45.1 42.6 36.2*   .2* 105.7* 107.4*   MCH 33.2* 33.7* 33.8*   MCHC 32.2 31.9* 31.5*   RDW 46.4 48.9 48.0   PLATELETCT 162* 111* 112*   MPV 11.3 11.0 11.3       Active Hospital Problems    Diagnosis     Closed left subtrochanteric femur fracture, initial encounter ([facility]) [S72.22XA]     Current chronic use of systemic steroids [Z79.52]     Hyperkalemia [E87.5]     ACC/AHA stage C heart failure with preserved ejection fraction (HCC) [I50.30]     Acute hypoxic respiratory failure ([facility]) [J96.01]     Dyslipidemia [E78.5]        Assessment/Plan:  Patient doing well post op  L distal femur dressings are CDI  Cleared for DC to SNF by ortho pending medicine clearance  POD#1 S/P Open reduction internal fixation left distal femur fracture with intra prosthetic extension   Wt bearing status - WBAT LLE  Wound care/Drains - Dressings to be changed every other day by nursing. Or PRN for saturation    Future Procedures - none planned   Lovenox: Start 4/5, Duration-until ambulatory > 150'  Sutures/Staples out- 14-21 days post operatively. Removal by ortho mid levels only.  PT/OT-initiated  Antibiotics: Perioperative completed  DVT Prophylaxis- TEDS/SCDs/Foot pumps  Victor-not needed per ortho  Case Coordination for Discharge Planning - Disposition per therapy recs.    "

## 2025-04-07 NOTE — OR NURSING
Report called to receiving [facility]. Pt taken via bed to S151 w/ transport. VSS. No distress. Daughter updated.

## 2025-04-07 NOTE — THERAPY
Occupational Therapy   Initial Evaluation     Patient Name: Clau Vences  Age:  80 y.o., Sex:  female  Medical Record #: [id number]  Today's Date: 4/7/2025     Precautions  Precautions: Fall Risk, Swallow Precautions, Weight Bearing As Tolerated Left Lower Extremity, Other (See Comments)  Comments: ROM as tolerated    Assessment    Patient is a very pleasant 80 y.o. female with a PMHx significant for CHF, HTN, DM, arthritis, back pain, heart murmur, AZUL on CPAP, SSS with PPM. Presented to the hospital following GLF. Found to have AK, hyperkalemia and L distal femur fracture s/p ORIF with intra prosthetic extension on 4/6. Pt greeted and seen for OT eval and treatment; daughter present and supportive. Required mod A for bed mobility and max A for LB dressing. Session limited by orthostatic hypotension with BP 71/40 after sitting EOB for a few minutes. RN notified. Extensive education provided on role of OT (acute v post-acute v HH), discharge recommendations, reason for assessment/evaluation, concern for safety at [facility], energy conservation techniques, home safety, compensatory strategies for ADLs, use of AE to maximize occupational independence, weightbearing precautions and importance of frequent EOB/OOB activity. Educated on importance of checking BP at home with positional changes, especially if feeling dizzy. Receptive to education; all questions answered at this time. Currently limited by impaired vision, cognition/safety awareness, AROM, strength, balance, activity tolerance, functional mobility, and pain which are currently affecting patients ability to complete ADL/IADLs at baseline. Will continue to follow.    Plan    Occupational Therapy Initial Treatment Plan   Treatment Interventions: Self Care / Activities of Daily Living, Adaptive Equipment, Neuro Re-Education / Balance, Therapeutic Exercises, Therapeutic Activity, Family / Caregiver Training  Treatment Frequency: 5 Times per Week  Duration: Until  Therapy Goals Met    DC Equipment Recommendations: Unable to determine at this time  Discharge Recommendations: (P) Recommend post-acute placement for additional occupational therapy services prior to discharge home (Pt became tearful and stated not agreeable to placement with preference to return home with HH. Recommend family/caregiver training prior to DC if to DC home.)     Objective     04/07/25 0935   Prior Living Situation   Prior Services Housekeeping / Homemaker Services  (housekeeping every other week; groceries delivered)   Housing / Facility 1 Story House   Steps Into Home 1   Steps In Home 0   Bathroom Set up Walk In Shower;Grab Bars;Shower Chair   Equipment Owned Front-Wheel Walker;4-Wheel Walker;Quad Cane;Tub / Shower Seat;Grab Bar(s) In Tub / Shower;Grab Bar(s) By Toilet;Raised Toilet Seat With Arms;Hand Held Shower;Reacher   Lives with - Patient's Self Care Capacity Alone and Able to Care For Self   Comments Daughter lives nearby and is able to assist PRN. Daughter works from home when not traveling and reports willingness to take FMLA if needed to provide amount of assist needed.   Prior Level of ADL Function   Self Feeding Independent   Grooming / Hygiene Independent   Bathing Independent   Dressing Independent   Toileting Independent   Prior Level of IADL Function   Medication Management Independent   Laundry Independent   Kitchen Mobility Independent   Finances Independent   Home Management Requires Assist   Shopping Requires Assist   Prior Level Of Mobility Independent With Device in Community;Independent With Device in Home  (Preference for 4WW)   Driving / Transportation Driving Independent   Occupation (Pre-Hospital Vocational) Retired Due To Age   History of Falls   History of Falls Yes   Date of Last Fall   (Reason for admit)   Precautions   Precautions Fall Risk;Weight Bearing As Tolerated Left Lower Extremity   Comments Motion as tolerated   Vitals   Pulse 67   Patient BP Position  Sandoval's Position   Blood Pressure  102/49   Pulse Oximetry 94 %   O2 (LPM) 1   O2 Delivery Device Silicone Nasal Cannula   Vitals Comments Reports of dizziness following transition from supine to sitting EOB. BP taken sitting found to be 90/48 and then 87/43 after sitting a few minutes. Assisted pt back to bed with BP noted to be 71/40 and then 81/42 while supine. RN present at end of session and aware.   Pain 0 - 10 Group   Therapist Pain Assessment During Activity;Post Activity Pain Same as Prior to Activity;Nurse Notified  (Increased pain with mobility; not quantified.)   Cognition    Cognition / Consciousness X   Speech/ Communication Hard of Hearing   Level of Consciousness Alert   Safety Awareness Impaired   Comments Very pleasant and participatory. Impaired insight into current deficits. Became upset/tearful with recommendation of post-acute for additional therapy and expressed preference to return home.   Active ROM Upper Body   Active ROM Upper Body  X   Dominant Hand Right   Comments B shoulder flexion/abduction limited but functional for ADLs. Reports pain/stiffness at end range.   Strength Upper Body   Upper Body Strength  X   Gross Strength Generalized Weakness, Equal Bilaterally.    Sensation Upper Body   Comments Denies N/T   Coordination Upper Body   Coordination WDL   Balance Assessment   Sitting Balance (Static) Fair -   Sitting Balance (Dynamic) Poor +   Weight Shift Sitting Poor   Bed Mobility    Supine to Sit Moderate Assist   Sit to Supine Moderate Assist   Scooting Moderate Assist   Comments HOB elevated with heavy use of rail   ADL Assessment   Eating Supervision   Grooming Supervision  (bed-level)   Lower Body Dressing Maximal Assist   Toileting   (purewick)   Functional Mobility   Comments EOB only d/t orthostatic hypotension   Visual Perception   Comments Denies changes; glasses for reading   Edema / Skin Assessment   Comments Ace wrap CDI L LE   Activity Tolerance   Comments Limtied by  "weakness, fatigue, dizziness and pain   Patient / Family Goals   Patient / Family Goal #1 \"I want to go home\"   Short Term Goals   Short Term Goal # 1 Pt will be able to tolerate sitting EOB for g/h routine with SPV   Short Term Goal # 2 Pt will be able to complete LB dressing with SPV and AE PRN   Short Term Goal # 3 Pt will be able to complete BSC transfer with min A   Education Group   Education Provided Energy Conservation;Home Safety;Role of Occupational Therapist;Activities of Daily Living;Adaptive Equipment;Weight Bearing Precautions;Pathology of bedrest   Role of Occupational Therapist Patient Response Patient;Family;Acceptance;Explanation;Verbal Demonstration   Energy Conservation Patient Response Patient;Family;Acceptance;Explanation;Verbal Demonstration   Home Safety Patient Response Patient;Family;Acceptance;Explanation;Verbal Demonstration;Reinforcement Needed   ADL Patient Response Patient;Family;Acceptance;Explanation;Verbal Demonstration;Reinforcement Needed   Adaptive Equipment Patient Response Patient;Family;Acceptance;Explanation;Verbal Demonstration;Reinforcement Needed   Weight Bearing Precautions Patient Response Patient;Family;Acceptance;Explanation;Verbal Demonstration;Reinforcement Needed   Pathology of Bedrest Patient Response Patient;Family;Acceptance;Explanation;Verbal Demonstration;Action Demonstration;Reinforcement Needed   Additional Comments Extensive education provided on role of OT (acute v post-acute v HH), discharge recommendations, reason for assessment/evaluation, concern for safety at [facility], energy conservation techniques, home safet, compensatory strategies for ADLs, use of AE to maximize occupational independence, weightbearing precautions and importance of frequent EOB/OOB activity. Educated on importance of checking BP at home with positional changes, especially if feeling dizzy. Receptive to education; all questions answered at this time.   Occupational Therapy Initial " Treatment Plan    Treatment Interventions Self Care / Activities of Daily Living;Adaptive Equipment;Neuro Re-Education / Balance;Therapeutic Exercises;Therapeutic Activity;Family / Caregiver Training   Treatment Frequency 5 Times per Week   Duration Until Therapy Goals Met   Problem List   Problem List Decreased Active Daily Living Skills;Decreased Homemaking Skills;Decreased Upper Extremity Strength Right;Decreased Upper Extremity Strength Left;Decreased Upper Extremity AROM Right;Decreased Upper Extremity AROM Left;Decreased Functional Mobility;Decreased Activity Tolerance;Safety Awareness Deficits / Cognition;Impaired Vision;Impaired Postural Control / Balance;Limited Knowledge of Post Op Precautions   Anticipated Discharge Equipment and Recommendations   DC Equipment Recommendations Unable to determine at this time   Discharge Recommendations Recommend post-acute placement for additional occupational therapy services prior to discharge home  (Pt became tearful and stated not agreeable to placement with preference to return home with HH. Recommend family/caregiver training prior to DC if to DC home.)   Interdisciplinary Plan of Care Collaboration   IDT Collaboration with  Nursing;Physical Therapist   Patient Position at End of Therapy In Bed;Call Light within Reach;Tray Table within Reach;Phone within Reach;Family / Friend in Room  (No alarm on entry; RN aware)   Collaboration Comments OT report and recs; RN updated   Session Information   Date / Session Number  4/7, 1 (1/5, 4/13)

## 2025-04-07 NOTE — PROGRESS NOTES
Daily Progress Note    This note is for teaching purposes only. I evaluated and examined the patient independently of the medical student. Please refer to the provider's note for full clinical details.     Date of Service  4/7/2025    Chief Complaint  Clau Vences is a 80 y.o. female admitted 4/5/2025 with a distal left periprosthetic fracture of the femur who is POD 1 ORIF of the left femur.     Hospital Course  Clau Vences is a 80 y.o. female with PMH HFpEF, Dyslipidemia, chronic steroid therapy for osteoarthritis-related pain. Admitted 4/5/2025 d/t GLF with left distal femur fracture; secondary diagnosis of hyperkalemia. Orthopedic surgery consulted, placed NPO for OR on 04/06. Received calcium gluconate, IV fluids, and insulin for hyperkalemia with resolution today K+ is at 5.6. Today she is Post-op day 1 s/p ORIF of the distal left femur.     Interval Problem Update  - 4/5 Distal Left periprosthetic femur fracture  - 4/5 Hyperkalemia   - 4/6 Hyperkalemia resolved, ORIF of Left femur.  - 4/7 Referrals for placement, PT/OT Eval.    I have discussed this patient's plan of care and discharge plan at IDT rounds today with Case Management, Nursing, Nursing leadership, and other members of the IDT team.    Consultants/Specialty  PT/OT for evaluation of placement/discharge    Code Status  DNAR/DNI    Disposition  Medically Cleared  I have placed the appropriate orders for post-discharge needs.    Review of Systems  Review of Systems   Constitutional:  Negative for chills and fever.   HENT:  Negative for congestion.    Eyes:  Negative for blurred vision.   Respiratory:  Negative for cough, shortness of breath and wheezing.    Cardiovascular:  Negative for chest pain, palpitations, orthopnea and leg swelling.   Gastrointestinal:  Negative for abdominal pain, diarrhea, heartburn, nausea and vomiting.   Musculoskeletal:  Negative for back pain.   Neurological:  Positive for dizziness and tingling.  Negative for weakness and headaches.        Physical Exam  Temp:  [36.1 °C (97 °F)-37.4 °C (99.3 °F)] 36.3 °C (97.3 °F)  Pulse:  [60-86] 79  Resp:  [16-18] 18  BP: (106-163)/(47-83) 133/61  SpO2:  [93 %-99 %] 95 %    Physical Exam  Constitutional:       Appearance: Normal appearance.   HENT:      Head: Normocephalic and atraumatic.      Nose: No congestion or rhinorrhea.      Mouth/Throat:      Mouth: Mucous membranes are moist.   Eyes:      General: No scleral icterus.     Extraocular Movements: Extraocular movements intact.      Conjunctiva/sclera: Conjunctivae normal.   Cardiovascular:      Rate and Rhythm: Normal rate and regular rhythm.      Pulses: Normal pulses.      Heart sounds:      No friction rub. No gallop.   Pulmonary:      Effort: No respiratory distress.      Breath sounds: No stridor. No wheezing, rhonchi or rales.   Chest:      Chest wall: No tenderness.   Abdominal:      General: Bowel sounds are normal. There is no distension.      Palpations: Abdomen is soft.      Tenderness: There is no abdominal tenderness. There is no guarding.   Musculoskeletal:         General: No swelling.      Cervical back: No tenderness.      Right lower leg: No edema.      Left lower leg: No edema.   Skin:     Findings: No erythema.   Neurological:      Mental Status: She is alert and oriented to person, place, and time.       Physical Exam  Constitutional:       Appearance: Normal appearance.   HENT:      Head: Normocephalic and atraumatic.      Nose: No congestion or rhinorrhea.      Mouth/Throat:      Mouth: Mucous membranes are moist.   Eyes:      General: No scleral icterus.     Extraocular Movements: Extraocular movements intact.      Conjunctiva/sclera: Conjunctivae normal.   Cardiovascular:      Rate and Rhythm: Normal rate and regular rhythm.      Pulses: Normal pulses.      Heart sounds:      No friction rub. No gallop.   Pulmonary:      Effort: No respiratory distress.      Breath sounds: No stridor. No  wheezing, rhonchi or rales.   Chest:      Chest wall: No tenderness.   Abdominal:      General: Bowel sounds are normal. There is no distension.      Palpations: Abdomen is soft.      Tenderness: There is no abdominal tenderness. There is no guarding.   Musculoskeletal:         General: No swelling.      Cervical back: No tenderness.      Right lower leg: No edema.      Left lower leg: No edema.   Skin:     Findings: No erythema.   Neurological:      Mental Status: She is alert and oriented to person, place, and time.          Fluids    Intake/Output Summary (Last 24 hours) at 4/7/2025 0957  Last data filed at 4/7/2025 0406  Gross per 24 hour   Intake 0 ml   Output 625 ml   Net -625 ml        Laboratory  Recent Labs     04/05/25  1746 04/06/25  1210 04/07/25  0146   WBC 6.9 7.4 11.0*   RBC 4.37 4.03* 3.37*   HEMOGLOBIN 14.5 13.6 11.4*   HEMATOCRIT 45.1 42.6 36.2*   .2* 105.7* 107.4*   MCH 33.2* 33.7* 33.8*   MCHC 32.2 31.9* 31.5*   RDW 46.4 48.9 48.0   PLATELETCT 162* 111* 112*   MPV 11.3 11.0 11.3     Recent Labs     04/05/25  2110 04/06/25  1210 04/07/25  0146   SODIUM 138 137 131*   POTASSIUM 4.8 5.6* 5.6*   CHLORIDE 109 107 103   CO2 19* 20 20   GLUCOSE 185* 105* 149*   BUN 48* 51* 49*   CREATININE 1.13 1.59* 1.31   CALCIUM 10.8* 10.3 9.7     Recent Labs     04/05/25  1746   APTT 26.6   INR 1.06               Imaging  No New imaging      Assessment/Plan  * Closed left subtrochanteric femur fracture, initial encounter (HCC)- (present on admission)  Assessment & Plan  #Distal Left femur f(x)  #S/P ORIF distal left Femur  -Patient with mechanical ground-level fall resulting in fracture distal left femur above her left knee replacement.   - ORIF yesterday  - Lovenox and SCDs  - Will discharge with ASA for minimum 4 weeks will follow-up outpatient  - PT/OT eval for ambulation and placement  - Discharge pending eval and placement    Current chronic use of systemic steroids  Assessment & Plan  #Osteoarthritis of  the shoulder  Patient states he is on chronic prednisone 5 mg for 3 years due to shoulder arthritis.   - Continue prednisone at this time.   - Will readdress potential cessation on post-op.  - Consider up to date DEXA scan (last was 7/2023)    Hyperkalemia- (present on admission)  Assessment & Plan  K: 6.4 on admission  Received calcium gluconate, insulin dextrose, and lasix. Improved to 4.8. Back up today to 5.6 marginally elevated  As an outpatient, patient has a history of hyperkalemia in attempt to renew spironolactone dose to 12.5 from last visit  - Holding home spironolactone  - Telemetry monitoring  - Patient reports dizziness  - Will give NS to assist with rehydration and to address the hyperkalemia    ACC/AHA stage C heart failure with preserved ejection fraction (HCC)- (present on admission)  Assessment & Plan  #HFpEF  #2nd degree AV block s/p PPM placement 10/7/24  History of chronic heart failure with preserved ejection fraction on home Coreg, Jardiance, Lasix, Crestor, valsartan  No acute exacerbation.  - Continue home meds  - On GDMT  - Holding home spironolactone due to hyperkalemia    Acute hypoxic respiratory failure (HCC)- (present on admission)  Assessment & Plan  Likely secondary to atelectasis from fall and hypoventilation from pain medications  - Incentive spirometer  - Ambulate as tolerated     Dyslipidemia- (present on admission)  Assessment & Plan   Latest Reference Range & Units Most Recent   Cholesterol,Tot 100 - 199 mg/dL 166  5/20/24 14:15   Triglycerides 0 - 149 mg/dL 193 (H)  5/20/24 14:15   HDL >=40 mg/dL 67  5/20/24 14:15   LDL <100 mg/dL 60  5/20/24 14:15   - Continue Rosuvastatin 10 mg daily.         VTE prophylaxis: Lovenox and SCDs    I have performed a physical exam and reviewed and updated ROS and Plan today (4/7/2025). In review of yesterday's note (4/6/2025), there are no changes except as documented above.    Code status: DNAR/DNI    Disposition: Pending PT/OT eval and plan  to send home when her vitals are more stable. Patient has had some dizziness upon standing.    Mike Hernandez, MS3

## 2025-04-07 NOTE — PROGRESS NOTES
[facility] (formerly [facility]) Internal Medicine Daily Progress Note    Date of Service  4/7/2025    [employer] Team: R IM Blue Team   Attending: Floyd Reyes M.d.  Senior Resident: Dr. Lake Cuevas  Intern:  Dr. Anuel Hdz  Contact Number: [phone]    Chief Complaint: Left femur fracture    Hospital Course  Clau Vences is a 80 y.o. female with PMH HFpEF, Dyslipidemia, chronic steroid therapy for osteoarthritis-related pain. Admitted 4/5/2025 d/t GLF with left distal femur fracture; secondary diagnosis of hyperkalemia. Orthopedic surgery consulted, placed NPO for OR on 04/06. Received calcium gluconate, IV fluids, and insulin for hyperkalemia with resolution.     04/06/25. No acute events overnight. Patient's pain is well tolerated. Planning for surgery today.     Interval Problem Update  Patient to work with physical/occupational therapy today.  Reports 4/10 pain at rest, will provide oxycodone to encourage appropriate mobility.  Potassium mildly elevated 5.6, will provide IV fluids to encourage clearance of potassium via kidneys.  Patient blood pressure dropped to 80s/40s on walking around, will bolus total of 1 L for orthostatic hypotension and take orthostatic vitals.  Hold valsartan for this and for hyperkalemia.    I have discussed this patient's plan of care and discharge plan at IDT rounds today with Case Management, Nursing, Nursing leadership, and other members of the IDT team.    Consultants/Specialty  orthopedics    Code Status  DNAR/DNI    Disposition  The patient is not medically cleared for discharge to home or a post-acute facility.      I have placed the appropriate orders for post-discharge needs.    Review of Systems  Review of Systems   Constitutional:  Negative for malaise/fatigue.   HENT:  Negative for tinnitus.    Eyes:  Negative for blurred vision.   Respiratory:  Negative for shortness of breath.    Cardiovascular:  Negative for chest pain and palpitations.   Gastrointestinal:  Negative for diarrhea,  heartburn and nausea.   Genitourinary:  Negative for dysuria.   Musculoskeletal:  Positive for falls. Negative for myalgias.   Neurological:  Negative for dizziness and headaches.        Physical Exam  Temp:  [36.1 °C (97 °F)-37.4 °C (99.3 °F)] 36.4 °C (97.5 °F)  Pulse:  [60-86] 77  Resp:  [15-18] 15  BP: (102-163)/(47-83) 102/48  SpO2:  [93 %-99 %] 93 %    Physical Exam  Vitals and nursing note reviewed.   Constitutional:       General: She is not in acute distress.     Appearance: Normal appearance. She is obese. She is not ill-appearing.   HENT:      Head: Normocephalic and atraumatic.      Right Ear: External ear normal.      Left Ear: External ear normal.      Nose: Nose normal. No congestion or rhinorrhea.      Mouth/Throat:      Mouth: Mucous membranes are moist.      Pharynx: No oropharyngeal exudate or posterior oropharyngeal erythema.   Eyes:      General: No scleral icterus.     Extraocular Movements: Extraocular movements intact.      Pupils: Pupils are equal, round, and reactive to light.   Cardiovascular:      Rate and Rhythm: Normal rate and regular rhythm.      Pulses: Normal pulses.      Heart sounds: No murmur (Aortic) heard.     No friction rub. No gallop.   Pulmonary:      Effort: Pulmonary effort is normal. No respiratory distress.      Breath sounds: Normal breath sounds. No wheezing, rhonchi or rales.   Chest:      Chest wall: No tenderness.   Abdominal:      General: Abdomen is flat. Bowel sounds are normal. There is no distension.      Palpations: Abdomen is soft.      Tenderness: There is no abdominal tenderness. There is no guarding or rebound.   Musculoskeletal:         General: Tenderness (Mild. Mid/distal left femur) present. No swelling.      Cervical back: No rigidity.      Comments: Left leg with overlying bandage clean/dry/intact   Neurological:      General: No focal deficit present.      Mental Status: She is alert and oriented to person, place, and time. Mental status is at  baseline.      Motor: No weakness.   Psychiatric:         Mood and Affect: Mood normal.         Behavior: Behavior normal.         Laboratory  Recent Labs     04/05/25  1746 04/06/25  1210 04/07/25  0146   WBC 6.9 7.4 11.0*   RBC 4.37 4.03* 3.37*   HEMOGLOBIN 14.5 13.6 11.4*   HEMATOCRIT 45.1 42.6 36.2*   .2* 105.7* 107.4*   MCH 33.2* 33.7* 33.8*   MCHC 32.2 31.9* 31.5*   RDW 46.4 48.9 48.0   PLATELETCT 162* 111* 112*   MPV 11.3 11.0 11.3     Recent Labs     04/05/25  2110 04/06/25  1210 04/07/25  0146   SODIUM 138 137 131*   POTASSIUM 4.8 5.6* 5.6*   CHLORIDE 109 107 103   CO2 19* 20 20   GLUCOSE 185* 105* 149*   BUN 48* 51* 49*   CREATININE 1.13 1.59* 1.31   CALCIUM 10.8* 10.3 9.7     Recent Labs     04/05/25  1746   APTT 26.6   INR 1.06               Imaging  DX-PORTABLE FLUORO > 1 HOUR   Final Result      Portable fluoroscopy utilized for 1 minute 13 seconds.         INTERPRETING LOCATION: [address] [facility], [address]      DX-FEMUR-2+ LEFT   Final Result      Digitized intraoperative radiograph is submitted for review. This examination is not for diagnostic purpose but for guidance during a surgical procedure. Please see the patient's chart for full procedural details.         INTERPRETING LOCATION: [address] [facility], [address]      CT-KNEE W/O PLUS RECONS LEFT   Final Result         1. Total knee arthroplasty.   2. Comminuted and displaced fracture of the distal femoral metaphysis.   3. Osteoarthrosis.   4. Osteopenia.      DX-FEMUR-2+ LEFT   Final Result      Acute, comminuted distal femoral fracture, just above the knee arthroplasty.      DX-PELVIS-1 OR 2 VIEWS   Final Result      No acute osseous abnormality.           Assessment/Plan  Problem Representation:  80 y.o. female with PMH HFpEF, Dyslipidemia, chronic steroid therapy for osteoarthritis-related pain. Admitted 04/05/25 d/t left distal femur fracture.    * Closed left subtrochanteric femur fracture, initial encounter (HCC)- (present on  admission)  Assessment & Plan  Patient with mechanical ground-level fall resulting in fracture distal left femur above her left knee replacement.   Orthopedic surgery consulted.  - Resumed DVT prophylaxis    Current chronic use of systemic steroids  Assessment & Plan  Patient states she is on chronic prednisone 5 mg for 3 years due to shoulder arthritis.   - Continue prednisone at this time.   - Will readdress potential cessation outpatient    Hyperkalemia- (present on admission)  Assessment & Plan  K: 6.4 on admission  Received calcium gluconate, insulin dextrose, and lasix. Improved to 4.8.  As an outpatient, patient has a history of hyperkalemia in attempt to renew spironolactone dose to 12.5 from last visit  - Holding home spironolactone and valsartan  - Telemetry monitoring    ACC/AHA stage C heart failure with preserved ejection fraction (HCC)- (present on admission)  Assessment & Plan  History of chronic heart failure with preserved ejection fraction on home Coreg, Jardiance, Lasix, Crestor, valsartan  No acute exacerbation.  - Continue home meds except for as below  - Holding home spironolactone and losartan due to hyperkalemia and orthostatic hypotension    Acute hypoxic respiratory failure (HCC)- (present on admission)  Assessment & Plan  Likely secondary to atelectasis from fall and hypoventilation from pain medications  - Incentive spirometer  - Wean O2 as able  - PT/OT consult    Dyslipidemia- (present on admission)  Assessment & Plan   Latest Reference Range & Units Most Recent   Cholesterol,Tot 100 - 199 mg/dL 166  5/20/24 14:15   Triglycerides 0 - 149 mg/dL 193 (H)  5/20/24 14:15   HDL >=40 mg/dL 67  5/20/24 14:15   LDL <100 mg/dL 60  5/20/24 14:15   - Continue Rosuvastatin 10 mg daily.         VTE prophylaxis: enoxaparin ppx    I have performed a physical exam and reviewed and updated ROS and Plan today (4/7/2025). In review of yesterday's note (4/6/2025), there are no changes except as documented  above.

## 2025-04-07 NOTE — CARE PLAN
The patient is Stable - Low risk of patient condition declining or worsening    Shift Goals  Clinical Goals: Post op VS, Manage pain, Manage anxiety  Patient Goals: pain managememt  Family Goals: zo    Progress made toward(s) clinical / shift goals:    Problem: Pain - Standard  Goal: Alleviation of pain or a reduction in pain to the patient’s comfort goal  Description: Target End Date:  Prior to discharge or change in level of careDocument on Vitals flowsheet1.  Document pain using the appropriate pain scale per order or unit policy2.  Educate and implement non-pharmacologic comfort measures (i.e. relaxation, distraction, massage, cold/heat therapy, etc.)3.  Pain management medications as ordered4.  Reassess pain after pain med administration per policy5.  If opiods administered assess patient's response to pain medication is appropriate per POSS sedation scale6.  Follow pain management plan developed in collaboration with patient and interdisciplinary team (including palliative care or pain specialists if applicable)  Outcome: Progressing  Note: Refused scheduled tylenol. Reports pain relief with oxy 2.5     Problem: Skin Integrity  Goal: Skin integrity is maintained or improved  Description: Target End Date:  Prior to discharge or change in level of careDocument interventions on Skin Risk/Kt flowsheet groups and corresponding LDA1.  Assess and monitor skin integrity, appearance and/or temperature2.  Assess risk factors for impaired skin integrity and/or pressures ulcers3.  Implement precautions to protect skin integrity in collaboration with interdisciplinary team4.  Implement pressure ulcer prevention protocol if at risk for skin breakdown5.  Confirm wound care consult if at risk for skin breakdown6.  Ensure patient use of pressure relieving devices  (Low air loss bed, waffle overlay, heel protectors, ROHO cushion, etc)  Outcome: Progressing  Note: Agreeable to q2 turns.

## 2025-04-07 NOTE — THERAPY
Physical Therapy   Initial Evaluation     Patient Name: Clau Vences  Age:  80 y.o., Sex:  female  Medical Record #: [id number]  Today's Date: 4/7/2025     Precautions  Precautions: Fall Risk;Swallow Precautions;Weight Bearing As Tolerated Left Lower Extremity;Other (See Comments)  Comments: ROM as tolerated    Assessment  Patient is 80 y.o. female admitted 4/5/2025 following GLF     Found to have Left distal femur periprosthetic fracture, Underwent Open reduction internal fixation left distal femur fracture with intra prosthetic extension on 4/6/2025     Currently been managed for Hyperkalemia, acute hypoxic respiratory failure     PMH: HFpEF, Dyslipidemia, chronic steroid therapy for osteoarthritis-related pain    Patient seen for PT evaluation. Patient in bed, agreeable for the session. Able to demonstrate functional mobility tasks as detailed below. Currently appears to be below baseline level of functional mobility. Will continue to benefit from PT services to help improve overall functional mobility. Recommend post-acute placement at this time.     Plan    Physical Therapy Initial Treatment Plan   Treatment Plan : Bed Mobility, Equipment, Family / Caregiver Training, Gait Training, Neuro Re-Education / Balance, Stair Training, Therapeutic Activities, Therapeutic Exercise  Treatment Frequency: 5 Times per Week  Duration: Until Therapy Goals Met    DC Equipment Recommendations: Unable to determine at this time  Discharge Recommendations: Recommend post-acute placement for additional physical therapy services prior to discharge home (PM & R Consult)    Objective     04/07/25 1330   Time In/Time Out   Therapy Start Time 1254   Therapy End Time 1330   Total Therapy Time 36   Initial Contact Note    Initial Contact Note Order Received and Verified, Physical Therapy Evaluation in Progress with Full Report to Follow.   Precautions   Precautions Fall Risk;Swallow Precautions;Weight Bearing As Tolerated Left Lower  Extremity;Other (See Comments)   Comments ROM as tolerated   Vitals   Pulse 77   Patient BP Position Sitting  (EOB)   Blood Pressure  102/48  (MAP 66)   Pulse Oximetry 93 %   O2 (LPM) 1   O2 Delivery Device Nasal Cannula   Vitals Comments Standing EOB: BP 97/52, MAP 67, HR 74, SpO2 95   Pain   Pain Scales 0 to 10 Scale    Intervention Distraction;Rest;Repositioned   Pain 0 - 10 Group   Location Knee;Leg   Location Orientation Left   Therapist Pain Assessment Prior to Activity;During Activity;Post Activity;Post Activity Pain Same as Prior to Activity;Nurse Notified   Prior Living Situation   Prior Services Home-Independent   Housing / Facility 1 Story House   Steps Into Home 1   Steps In Home 0   Rail Left Rail  (Steps into Home)   Equipment Owned Front-Wheel Walker;4-Wheel Walker;Quad Cane   Lives with - Patient's Self Care Capacity Alone and Able to Care For Self   Comments Patient mentioned that her daughter lives nearby, her son lives in [facility]. He was planning to visit Friday, since her daughter will be away. Patient has a neighbor who is a PT would be able to help her post DC from Rehab as needed.   Prior Level of Functional Mobility   Bed Mobility Independent   Transfer Status Independent   Ambulation Independent   Ambulation Distance Limited community   Assistive Devices Used Quad Cane   Stairs Independent   Comments Patient uses Rollator indoors, carries her quad cane in her car for outdoor mobility.   History of Falls   History of Falls Yes   Date of Last Fall   (H/O 1 fall PTA)   Cognition    Cognition / Consciousness X   Speech/ Communication Hard of Hearing  (B/L hearing aids-not charged)   Level of Consciousness Alert   Passive ROM Lower Body   Passive ROM Lower Body X   Comments RLE-WFL, L hip flexion, L knee flexion/extension-limited due to pain, L ankle DF/PF-WFL   Active ROM Lower Body    Active ROM Lower Body  X   Comments Seated EOB: RLE-WFL, L hip flexion-90 degree, L knee flexion-90 degree, L  knee extension-barely able to extend from 90 degree flexion, L ankle DF/PF WFL   Strength Lower Body   Lower Body Strength  X   Comments Grossly RLE 4/5, L hip flexor 3-/5, L knee flexor 3-/5, L knee extensor 3-/5, L ankle DF/PF 3+/5   Sensation Lower Body   Lower Extremity Sensation   WDL   Comments Denies any numbness/tingling in LE   Vision   Vision Comments Glasses-reading   Other Treatments   Other Treatments Provided Discussed with patient & her daughter about DC recommendations, explained about possible need for short term rehab placement, patient agreeable for placement at this time. Reinforced importance of daily & frequent mobility, OOB to chair for meals and ambulate with assistance from nursing staff. Educated on WB precautions. Explained about benefits of early mobility (with respect to pain, improving blood pressure changes, reducing stiffness, increasing confidence and independence)   Balance Assessment   Sitting Balance (Static) Fair   Sitting Balance (Dynamic) Fair   Standing Balance (Static) Poor +   Standing Balance (Dynamic) Poor +   Weight Shift Sitting Fair   Weight Shift Standing Poor   Comments Seated EOB; Standing with FWW   Bed Mobility    Supine to Sit Maximal Assist   Sit to Supine Moderate Assist   Scooting Moderate Assist  (Towards EOB)   Rolling Moderate Assist to Rt.   Comments HOB flat, use of bed rail, towards R side; increased time & effort to perform the task; increased assistance to bring LLE OOB and bring trunk upright   Gait Analysis   Gait Level Of Assist Moderate Assist   Assistive Device Front Wheel Walker   Distance (Feet) 6  (Forward/backward steps from EOB)   Deviation Bradykinetic;Shuffled Gait;Decreased Base Of Support;Decreased Heel Strike;Decreased Toe Off;Other (Comment)  (Reduced weight shift to LLE resulting in increased difficulty and time to advance RLE)   Comments Cues for weight shift, appropriate use of UE on FWW, sequencing of LE/AD   Functional Mobility    Sit to Stand Moderate Assist   Bed, Chair, Wheelchair Transfer Refused   Mobility Bed-EOB sitting-sit-stand-forward/backward steps from EOB-EOB-bed-HOB raised   Comments W FWW; cues for hand placement, LLE placement, sequencing   6 Clicks Assessment - How much HELP from from another person do you currently need... (If the patient hasn't done an activity recently, how much help from another person do you think he/she would need if he/she tried?)   Turning from your back to your side while in a flat bed without using bedrails? 2   Moving from lying on your back to sitting on the side of a flat bed without using bedrails? 2   Moving to and from a bed to a chair (including a wheelchair)? 2   Standing up from a chair using your arms (e.g., wheelchair, or bedside chair)? 2   Walking in hospital room? 2   Climbing 3-5 steps with a railing? 1   6 clicks Mobility Score 11   Edema / Skin Assessment   Edema / Skin  X   Comments LLE-ace wrap (+)   Patient / Family Goals    Patient / Family Goal #1 To return to prior level of functional mobility   Short Term Goals    Short Term Goal # 1 Patient will perform supine-sit, sit-supine with HOB flat without rails with supervision in 6 visits to safely get in & out of bed   Short Term Goal # 2 Patient will perform sit-stand with FWW with supervision in 6 visits to progress with functional mobility   Short Term Goal # 3 Patient will perform chair transfers with FWW with supervision in 6 visits to safely get OOB to chair   Short Term Goal # 4 Patient will ambulate 100 feet with FWW with supervision in 6 visits to safely ambulate household distance   Short Term Goal # 5 Patient will negotiate 1 step with L side rail with supervision in 6 visits to safely get in & out home   Education Group   Education Provided Role of Physical Therapist   Role of Physical Therapist Patient Response Patient;Family;Acceptance;Explanation;Verbal Demonstration   Physical Therapy Initial Treatment Plan     Treatment Plan  Bed Mobility;Equipment;Family / Caregiver Training;Gait Training;Neuro Re-Education / Balance;Stair Training;Therapeutic Activities;Therapeutic Exercise   Treatment Frequency 5 Times per Week   Duration Until Therapy Goals Met   Problem List    Problems Pain;Impaired Bed Mobility;Impaired Transfers;Impaired Ambulation;Functional Strength Deficit;Impaired Balance;Decreased Activity Tolerance;Safety Awareness Deficits / Cognition;Functional ROM Deficit   Anticipated Discharge Equipment and Recommendations   DC Equipment Recommendations Unable to determine at this time   Discharge Recommendations Recommend post-acute placement for additional physical therapy services prior to discharge home  (PM & R Consult)   Interdisciplinary Plan of Care Collaboration   IDT Collaboration with  Occupational Therapist;Certified Nursing Assistant;Family / Caregiver;Nursing,    Patient Position at End of Therapy Call Light within Reach;Tray Table within Reach;Seated;In Bed;Bed Alarm On;Family / Friend in Room   Session Information   Date / Session Number  4/7-1(1/5, 4/13)   Priority   (L distal femur Sx 4/6/2025)

## 2025-04-08 ENCOUNTER — APPOINTMENT (OUTPATIENT)
Dept: INTERNAL MEDICINE | Facility: IMAGING CENTER | Age: 80
End: 2025-04-08
Payer: MEDICARE

## 2025-04-08 PROBLEM — N17.9 AKI (ACUTE KIDNEY INJURY) (HCC): Status: ACTIVE | Noted: 2025-04-08

## 2025-04-08 LAB
ANION GAP SERPL CALC-SCNC: 9 MMOL/L (ref 7–16)
APPEARANCE UR: CLEAR
BACTERIA #/AREA URNS HPF: ABNORMAL /HPF
BASOPHILS # BLD AUTO: 0.4 % (ref 0–1.8)
BASOPHILS # BLD: 0.03 K/UL (ref 0–0.12)
BILIRUB UR QL STRIP.AUTO: NEGATIVE
BUN SERPL-MCNC: 56 MG/DL (ref 8–22)
CALCIUM SERPL-MCNC: 9.3 MG/DL (ref 8.5–10.5)
CASTS URNS QL MICRO: ABNORMAL /LPF (ref 0–2)
CHLORIDE SERPL-SCNC: 103 MMOL/L (ref 96–112)
CHLORIDE UR-SCNC: 67 MMOL/L
CO2 SERPL-SCNC: 21 MMOL/L (ref 20–33)
COLOR UR: YELLOW
CREAT SERPL-MCNC: 1.9 MG/DL (ref 0.5–1.4)
CREAT UR-MCNC: 109 MG/DL
EOSINOPHIL # BLD AUTO: 0.16 K/UL (ref 0–0.51)
EOSINOPHIL NFR BLD: 1.9 % (ref 0–6.9)
EPITHELIAL CELLS 1715: ABNORMAL /HPF (ref 0–5)
ERYTHROCYTE [DISTWIDTH] IN BLOOD BY AUTOMATED COUNT: 47.7 FL (ref 35.9–50)
GFR SERPLBLD CREATININE-BSD FMLA CKD-EPI: 26 ML/MIN/1.73 M 2
GLUCOSE SERPL-MCNC: 123 MG/DL (ref 65–99)
GLUCOSE UR STRIP.AUTO-MCNC: 100 MG/DL
HCT VFR BLD AUTO: 31.5 % (ref 37–47)
HGB BLD-MCNC: 9.9 G/DL (ref 12–16)
IMM GRANULOCYTES # BLD AUTO: 0.06 K/UL (ref 0–0.11)
IMM GRANULOCYTES NFR BLD AUTO: 0.7 % (ref 0–0.9)
KETONES UR STRIP.AUTO-MCNC: ABNORMAL MG/DL
LEUKOCYTE ESTERASE UR QL STRIP.AUTO: ABNORMAL
LYMPHOCYTES # BLD AUTO: 0.77 K/UL (ref 1–4.8)
LYMPHOCYTES NFR BLD: 9.2 % (ref 22–41)
MCH RBC QN AUTO: 33.3 PG (ref 27–33)
MCHC RBC AUTO-ENTMCNC: 31.4 G/DL (ref 32.2–35.5)
MCV RBC AUTO: 106.1 FL (ref 81.4–97.8)
MICRO URNS: ABNORMAL
MONOCYTES # BLD AUTO: 0.94 K/UL (ref 0–0.85)
MONOCYTES NFR BLD AUTO: 11.3 % (ref 0–13.4)
NEUTROPHILS # BLD AUTO: 6.39 K/UL (ref 1.82–7.42)
NEUTROPHILS NFR BLD: 76.5 % (ref 44–72)
NITRITE UR QL STRIP.AUTO: NEGATIVE
NRBC # BLD AUTO: 0 K/UL
NRBC BLD-RTO: 0 /100 WBC (ref 0–0.2)
PH UR STRIP.AUTO: 5 [PH] (ref 5–8)
PLATELET # BLD AUTO: 102 K/UL (ref 164–446)
PMV BLD AUTO: 11.7 FL (ref 9–12.9)
POTASSIUM SERPL-SCNC: 5.2 MMOL/L (ref 3.6–5.5)
POTASSIUM UR-SCNC: 36.4 MMOL/L
PROT UR QL STRIP: NEGATIVE MG/DL
PROT UR-MCNC: 16.6 MG/DL (ref 0–15)
RBC # BLD AUTO: 2.97 M/UL (ref 4.2–5.4)
RBC # URNS HPF: ABNORMAL /HPF (ref 0–2)
RBC UR QL AUTO: NEGATIVE
SODIUM SERPL-SCNC: 133 MMOL/L (ref 135–145)
SODIUM UR-SCNC: 53 MMOL/L
SP GR UR STRIP.AUTO: 1.02
UROBILINOGEN UR STRIP.AUTO-MCNC: 0.2 EU/DL
UUN UR-MCNC: 584 MG/DL
WBC # BLD AUTO: 8.4 K/UL (ref 4.8–10.8)
WBC #/AREA URNS HPF: ABNORMAL /HPF

## 2025-04-08 PROCEDURE — 84133 ASSAY OF URINE POTASSIUM: CPT

## 2025-04-08 PROCEDURE — A9270 NON-COVERED ITEM OR SERVICE: HCPCS

## 2025-04-08 PROCEDURE — 82436 ASSAY OF URINE CHLORIDE: CPT

## 2025-04-08 PROCEDURE — 82570 ASSAY OF URINE CREATININE: CPT

## 2025-04-08 PROCEDURE — 97110 THERAPEUTIC EXERCISES: CPT | Mod: CQ

## 2025-04-08 PROCEDURE — 81001 URINALYSIS AUTO W/SCOPE: CPT

## 2025-04-08 PROCEDURE — 700102 HCHG RX REV CODE 250 W/ 637 OVERRIDE(OP)

## 2025-04-08 PROCEDURE — 770001 HCHG ROOM/CARE - MED/SURG/GYN PRIV*

## 2025-04-08 PROCEDURE — 97530 THERAPEUTIC ACTIVITIES: CPT | Mod: CQ

## 2025-04-08 PROCEDURE — 700111 HCHG RX REV CODE 636 W/ 250 OVERRIDE (IP)

## 2025-04-08 PROCEDURE — 84540 ASSAY OF URINE/UREA-N: CPT

## 2025-04-08 PROCEDURE — 84300 ASSAY OF URINE SODIUM: CPT

## 2025-04-08 PROCEDURE — 80048 BASIC METABOLIC PNL TOTAL CA: CPT

## 2025-04-08 PROCEDURE — 700105 HCHG RX REV CODE 258

## 2025-04-08 PROCEDURE — 85025 COMPLETE CBC W/AUTO DIFF WBC: CPT

## 2025-04-08 PROCEDURE — 36415 COLL VENOUS BLD VENIPUNCTURE: CPT

## 2025-04-08 PROCEDURE — 84156 ASSAY OF PROTEIN URINE: CPT

## 2025-04-08 RX ORDER — HEPARIN SODIUM 5000 [USP'U]/ML
5000 INJECTION, SOLUTION INTRAVENOUS; SUBCUTANEOUS EVERY 8 HOURS
Status: DISCONTINUED | OUTPATIENT
Start: 2025-04-08 | End: 2025-04-09 | Stop reason: HOSPADM

## 2025-04-08 RX ORDER — OXYCODONE HYDROCHLORIDE 5 MG/1
5 TABLET ORAL EVERY 4 HOURS PRN
Refills: 0 | Status: DISCONTINUED | OUTPATIENT
Start: 2025-04-08 | End: 2025-04-09 | Stop reason: HOSPADM

## 2025-04-08 RX ORDER — OXYCODONE HYDROCHLORIDE 5 MG/1
2.5 TABLET ORAL EVERY 4 HOURS PRN
Refills: 0 | Status: DISCONTINUED | OUTPATIENT
Start: 2025-04-08 | End: 2025-04-09 | Stop reason: HOSPADM

## 2025-04-08 RX ORDER — SODIUM CHLORIDE 9 MG/ML
INJECTION, SOLUTION INTRAVENOUS CONTINUOUS
Status: ACTIVE | OUTPATIENT
Start: 2025-04-08 | End: 2025-04-09

## 2025-04-08 RX ORDER — HYDROMORPHONE HYDROCHLORIDE 1 MG/ML
0.25 INJECTION, SOLUTION INTRAMUSCULAR; INTRAVENOUS; SUBCUTANEOUS EVERY 4 HOURS PRN
Status: DISCONTINUED | OUTPATIENT
Start: 2025-04-08 | End: 2025-04-09 | Stop reason: HOSPADM

## 2025-04-08 RX ORDER — METOPROLOL SUCCINATE 25 MG/1
100 TABLET, EXTENDED RELEASE ORAL EVERY EVENING
Status: DISCONTINUED | OUTPATIENT
Start: 2025-04-08 | End: 2025-04-09 | Stop reason: HOSPADM

## 2025-04-08 RX ORDER — ALENDRONATE SODIUM 10 MG/1
10 TABLET ORAL
Status: CANCELLED | OUTPATIENT
Start: 2025-04-08

## 2025-04-08 RX ORDER — AMOXICILLIN 250 MG
2 CAPSULE ORAL EVERY EVENING
Status: DISCONTINUED | OUTPATIENT
Start: 2025-04-08 | End: 2025-04-09 | Stop reason: HOSPADM

## 2025-04-08 RX ORDER — POLYETHYLENE GLYCOL 3350 17 G/17G
1 POWDER, FOR SOLUTION ORAL 2 TIMES DAILY
Status: DISCONTINUED | OUTPATIENT
Start: 2025-04-08 | End: 2025-04-09 | Stop reason: HOSPADM

## 2025-04-08 RX ADMIN — METOPROLOL SUCCINATE 100 MG: 25 TABLET, EXTENDED RELEASE ORAL at 17:12

## 2025-04-08 RX ADMIN — ACETAMINOPHEN 1000 MG: 500 TABLET, FILM COATED ORAL at 20:07

## 2025-04-08 RX ADMIN — SERTRALINE 100 MG: 100 TABLET, FILM COATED ORAL at 04:21

## 2025-04-08 RX ADMIN — CARVEDILOL 25 MG: 25 TABLET, FILM COATED ORAL at 08:40

## 2025-04-08 RX ADMIN — ACETAMINOPHEN 1000 MG: 500 TABLET, FILM COATED ORAL at 08:40

## 2025-04-08 RX ADMIN — POLYETHYLENE GLYCOL 3350 1 PACKET: 17 POWDER, FOR SOLUTION ORAL at 17:12

## 2025-04-08 RX ADMIN — FERROUS SULFATE TAB 325 MG (65 MG ELEMENTAL FE) 325 MG: 325 (65 FE) TAB at 04:21

## 2025-04-08 RX ADMIN — OXYCODONE HYDROCHLORIDE 5 MG: 5 TABLET ORAL at 02:48

## 2025-04-08 RX ADMIN — OXYCODONE HYDROCHLORIDE 2.5 MG: 5 TABLET ORAL at 20:08

## 2025-04-08 RX ADMIN — DAPAGLIFLOZIN 10 MG: 10 TABLET, FILM COATED ORAL at 04:21

## 2025-04-08 RX ADMIN — HEPARIN SODIUM 5000 UNITS: 5000 INJECTION, SOLUTION INTRAVENOUS; SUBCUTANEOUS at 17:12

## 2025-04-08 RX ADMIN — FUROSEMIDE 20 MG: 20 TABLET ORAL at 04:20

## 2025-04-08 RX ADMIN — ROSUVASTATIN CALCIUM 10 MG: 20 TABLET, FILM COATED ORAL at 17:12

## 2025-04-08 RX ADMIN — ACETAMINOPHEN 1000 MG: 500 TABLET, FILM COATED ORAL at 15:08

## 2025-04-08 RX ADMIN — SODIUM ZIRCONIUM CYCLOSILICATE 10 G: 10 POWDER, FOR SUSPENSION ORAL at 08:40

## 2025-04-08 RX ADMIN — POLYETHYLENE GLYCOL 3350 1 PACKET: 17 POWDER, FOR SOLUTION ORAL at 04:20

## 2025-04-08 RX ADMIN — FOLIC ACID 1 MG: 1 TABLET ORAL at 17:12

## 2025-04-08 RX ADMIN — PREDNISONE 5 MG: 5 TABLET ORAL at 04:21

## 2025-04-08 RX ADMIN — SENNOSIDES AND DOCUSATE SODIUM 2 TABLET: 50; 8.6 TABLET ORAL at 17:12

## 2025-04-08 RX ADMIN — OXYCODONE HYDROCHLORIDE 5 MG: 5 TABLET ORAL at 08:39

## 2025-04-08 RX ADMIN — SODIUM CHLORIDE: 9 INJECTION, SOLUTION INTRAVENOUS at 17:13

## 2025-04-08 ASSESSMENT — COGNITIVE AND FUNCTIONAL STATUS - GENERAL
MOVING FROM LYING ON BACK TO SITTING ON SIDE OF FLAT BED: A LOT
MOBILITY SCORE: 10
WALKING IN HOSPITAL ROOM: TOTAL
MOVING TO AND FROM BED TO CHAIR: A LOT
STANDING UP FROM CHAIR USING ARMS: A LOT
TURNING FROM BACK TO SIDE WHILE IN FLAT BAD: A LOT
CLIMB 3 TO 5 STEPS WITH RAILING: TOTAL
SUGGESTED CMS G CODE MODIFIER MOBILITY: CL

## 2025-04-08 ASSESSMENT — ENCOUNTER SYMPTOMS
ORTHOPNEA: 0
BLURRED VISION: 0
SHORTNESS OF BREATH: 0
HEARTBURN: 0
BACK PAIN: 0
CHILLS: 0
VOMITING: 0
FALLS: 1
NAUSEA: 0
DIZZINESS: 0
DIARRHEA: 0
HEADACHES: 0
ABDOMINAL PAIN: 0
PALPITATIONS: 0
COUGH: 0
MYALGIAS: 0
FEVER: 0
DIZZINESS: 1
WEAKNESS: 0
WHEEZING: 0

## 2025-04-08 ASSESSMENT — PAIN DESCRIPTION - PAIN TYPE
TYPE: CHRONIC PAIN
TYPE: ACUTE PAIN
TYPE: ACUTE PAIN;SURGICAL PAIN

## 2025-04-08 ASSESSMENT — GAIT ASSESSMENTS: GAIT LEVEL OF ASSIST: UNABLE TO PARTICIPATE

## 2025-04-08 ASSESSMENT — FIBROSIS 4 INDEX: FIB4 SCORE: 4.13

## 2025-04-08 NOTE — PROGRESS NOTES
"      Orthopaedic Progress Note    Interval changes:  Patient doing well    L distal femur dressings changed incisions without issues  Cleared for DC to SNF by ortho pending medicine clearance    ROS - Patient denies any new issues.  Pain well controlled.    BP (!) 156/59   Pulse 83   Temp 36.4 °C (97.5 °F) (Temporal)   Resp 19   Ht 1.702 m (5' 7\")   Wt 104 kg (229 lb 4.5 oz)   SpO2 93%     Patient seen and examined  No acute distress  Breathing non labored  RRR  L distal femur dressings changed incisions without issues, DNVI, moves all toes, cap refill <2 sec.     Recent Labs     04/06/25  1210 04/07/25  0146 04/08/25  0352   WBC 7.4 11.0* 8.4   RBC 4.03* 3.37* 2.97*   HEMOGLOBIN 13.6 11.4* 9.9*   HEMATOCRIT 42.6 36.2* 31.5*   .7* 107.4* 106.1*   MCH 33.7* 33.8* 33.3*   MCHC 31.9* 31.5* 31.4*   RDW 48.9 48.0 47.7   PLATELETCT 111* 112* 102*   MPV 11.0 11.3 11.7       Active Hospital Problems    Diagnosis     Closed left subtrochanteric femur fracture, initial encounter ([facility]) [S72.22XA]     Current chronic use of systemic steroids [Z79.52]     Hyperkalemia [E87.5]     ACC/AHA stage C heart failure with preserved ejection fraction ([facility]) [I50.30]     Acute hypoxic respiratory failure ([facility]) [J96.01]     Dyslipidemia [E78.5]        Assessment/Plan:  Patient doing well    L distal femur dressings changed incisions without issues  Cleared for DC to SNF by ortho pending medicine clearance  POD#2 S/P Open reduction internal fixation left distal femur fracture with intra prosthetic extension   Wt bearing status - WBAT LLE  Wound care/Drains - Dressings to be changed every other day by nursing. Or PRN for saturation    Future Procedures - none planned   Lovenox: Start 4/5, Duration-until ambulatory > 150'  Sutures/Staples out- 14-21 days post operatively. Removal by ortho mid levels only.  PT/OT-initiated  Antibiotics: Perioperative completed  DVT Prophylaxis- TEDS/SCDs/Foot pumps  Victor-not needed per " ortho  Case Coordination for Discharge Planning - Disposition per therapy recs.

## 2025-04-08 NOTE — THERAPY
Physical Therapy   Daily Treatment     Patient Name: Clau Vences  Age:  80 y.o., Sex:  female  Medical Record #: [id number]  Today's Date: 4/8/2025     Precautions  Precautions: Fall Risk;Swallow Precautions;Weight Bearing As Tolerated Left Lower Extremity  Comments: ROMAT    Assessment    Pt greeted and seen for PT treatment. Pt fearful of mobility today, agreeable to practice stand x2, done with ModAx2 person. MaxA for bed mobility. Pt performed LE therex, handout provided due to poor recall at end of session. Pt's dtr reported cog slightly below baseline, discussed wake/sleep hours, benefit from visitors and having hearing aids in. Pt currently limited by impaired balance, decreased strength and decreased activity tolerance which negatively impacts functional mobility. Pt will continue to benefit from skilled PT to address deficits.       Plan    Treatment Plan Status: Continue Current Treatment Plan  Type of Treatment: Bed Mobility, Equipment, Family / Caregiver Training, Gait Training, Neuro Re-Education / Balance, Stair Training, Therapeutic Activities, Therapeutic Exercise  Treatment Frequency: 5 Times per Week  Treatment Duration: Until Therapy Goals Met    DC Equipment Recommendations: Unable to determine at this time  Discharge Recommendations: Recommend post-acute placement for additional physical therapy services prior to discharge home (PM&R consult)       04/08/25 1137   Vitals   Patient BP Position Sitting   Blood Pressure  91/78   O2 (LPM) 1   O2 Delivery Device Silicone Nasal Cannula   Pain 0 - 10 Group   Therapist Pain Assessment Post Activity Pain Same as Prior to Activity;Nurse Notified  (mod pain)   Cognition    Level of Consciousness Alert   Comments Pleasant, [place], dtr reported cog slightly below baseline.   Supine Lower Body Exercise   Supine Lower Body Exercises Yes   Heel Slide Left;1 set of 10   Ankle Pumps 1 set of 10;Reciprocal   Quadriceps Isometrics 1 set of 10;Left   Comments  poor AROM, assist provided for increased ROm.   Sitting Lower Body Exercises   Sitting Lower Body Exercises Yes   Long Arc Quad 1 set of 10;Left  (Pt able to just clear foot from floor.)   Comments pt unable to recall any performed therex during session, handout provided.   Balance   Sitting Balance (Static) Fair -   Sitting Balance (Dynamic) Fair -   Standing Balance (Static) Trace +   Weight Shift Sitting Poor   Weight Shift Standing Absent   Skilled Intervention Verbal Cuing;Tactile Cuing;Sequencing;Facilitation;Compensatory Strategies   Bed Mobility    Supine to Sit Maximal Assist   Sit to Supine Maximal Assist   Scooting Maximal Assist   Rolling Maximal Assist to Rt.;Maximum Assist to Lt.   Skilled Intervention Verbal Cuing;Tactile Cuing;Sequencing;Facilitation;Compensatory Strategies   Comments HOB raised. Pt unable to scoot sitting EOB with use of B UE and B feet on floor.   Gait Analysis   Gait Level Of Assist Unable to Participate   Functional Mobility   Sit to Stand Moderate Assist  (x2 P)   Bed, Chair, Wheelchair Transfer Unable to Participate   Transfer Method Stand Step   Mobility STS x2, unable to stand 1st attempt, x2 person for second stand.   Skilled Intervention Verbal Cuing;Tactile Cuing;Sequencing;Facilitation;Compensatory Strategies   Comments Pt very fearful of standing, poor initiation first attempt, needs VC for anterior weight shift. Pt's dtr providing firm encouragement   Short Term Goals    Short Term Goal # 1 Patient will perform supine-sit, sit-supine with HOB flat without rails with supervision in 6 visits to safely get in & out of bed   Goal Outcome # 1 goal not met   Short Term Goal # 2 Patient will perform sit-stand with FWW with supervision in 6 visits to progress with functional mobility   Goal Outcome # 2 Goal not met   Short Term Goal # 3 Patient will perform chair transfers with FWW with supervision in 6 visits to safely get OOB to chair   Goal Outcome # 3 Goal not met   Short  Term Goal # 4 Patient will ambulate 100 feet with FWW with supervision in 6 visits to safely ambulate household distance   Goal Outcome # 4 Goal not met   Short Term Goal # 5 Patient will negotiate 1 step with L side rail with supervision in 6 visits to safely get in & out home   Goal Outcome # 5 Goal not met   Supervising Physical Therapist (PTA Treatments Only)   Supervising Physical Therapist Ashley Prather

## 2025-04-08 NOTE — DOCUMENTATION QUERY
[facility]                                                                       Query Response Note      PATIENT:               JACQUE SHEN  ACCT #:                  [id number]  MRN:                     [id number]  :                      1945  ADMIT DATE:       2025 5:38 PM  DISCH DATE:          RESPONDING  PROVIDER #:        [id number]           QUERY TEXT:    Chronic Kidney Disease (CKD) is documented in the Medical Record.      Please specify the disease stage.     Stages are defined by the [employer] as follows:  CKD Stage I  GFR >= 90 ml / min per 1.73 m2 and persistent albuminuria  CKD Stage 2 GFR between 60 and 89 with persistent albuminuria  CKD Stage 3a GFR between 45 - 59  CKD Stage 3b GFR between 30-44  CKD Stage 4 GFR between 15 and 29   CKD Stage 5 GFR between <15 or End Stage Renal Disease    The patient's Clinical Indicators include:  81yo with dx of HTN, chronic diastolic heart failure, acute respiratory failure with hypoxia, AZUL, hyperkalemia    Epic documentation 10/08/2024 GFR 40  Epic documentation 2025 GFR 44    / GFR 46    CKD noted in medical record.     Risk factors: CKD, HTN, chronic diastolic heart failure, acute respiratory failure with hypoxia, AZUL, hyperkalemia  Treatments: labwork, electrolyte repletion, monitoring    Note: If you agree with a diagnosis listed above, please remember to include it in your concurrent daily documentation and onto the Discharge Summary.    Contact me with questions.     Thank you,  KATY Rutherford, CDI  [email or web address]  Options provided:   -- Chronic kidney disease Stage 3 unspecified   -- Chronic kidney disease Stage 3a   -- Chronic kidney disease Stage 3b   -- Other explanation-please specify   -- Unable to determine      Query created by: Elise Redman on 2025 1:19 PM    RESPONSE TEXT:    Chronic kidney disease Stage 3b           Electronically signed by:  CAITLIN BOYKIN MD 4/7/2025 8:04 PM

## 2025-04-08 NOTE — CARE PLAN
The patient is Stable - Low risk of patient condition declining or worsening    Shift Goals  Clinical Goals: pain management, PT/OT, monitor electrolytes  Patient Goals: pain management  Family Goals: zo    Progress made toward(s) clinical / shift goals:      Problem: Pain - Standard  Goal: Alleviation of pain or a reduction in pain to the patient’s comfort goal  Description: Target End Date:  Prior to discharge or change in level of careDocument on Vitals flowsheet1.  Document pain using the appropriate pain scale per order or unit policy2.  Educate and implement non-pharmacologic comfort measures (i.e. relaxation, distraction, massage, cold/heat therapy, etc.)3.  Pain management medications as ordered4.  Reassess pain after pain med administration per policy5.  If opiods administered assess patient's response to pain medication is appropriate per POSS sedation scale6.  Follow pain management plan developed in collaboration with patient and interdisciplinary team (including palliative care or pain specialists if applicable)  Outcome: Progressing     Problem: Knowledge Deficit - Standard  Goal: Patient and family/care givers will demonstrate understanding of plan of care, disease process/condition, diagnostic tests and medications  Description: Target End Date:  1-3 days or as soon as patient condition allowsDocument in Patient Education1.  Patient and family/caregiver oriented to unit, equipment, visitation policy and means for communicating concern2.  Complete/review Learning Assessment3.  Assess knowledge level of disease process/condition, treatment plan, diagnostic tests and medications4.  Explain disease process/condition, treatment plan, diagnostic tests and medications  Outcome: Progressing     Problem: Fall Risk  Goal: Patient will remain free from falls  Description: Target End Date:  Prior to discharge or change in level of careDocument interventions on the [place] Fall Risk Assessment1.  Assess for  fall risk factors2.  Implement fall precautions  Outcome: Progressing     Problem: Skin Integrity  Goal: Skin integrity is maintained or improved  Description: Target End Date:  Prior to discharge or change in level of careDocument interventions on Skin Risk/Kt flowsheet groups and corresponding LDA1.  Assess and monitor skin integrity, appearance and/or temperature2.  Assess risk factors for impaired skin integrity and/or pressures ulcers3.  Implement precautions to protect skin integrity in collaboration with interdisciplinary team4.  Implement pressure ulcer prevention protocol if at risk for skin breakdown5.  Confirm wound care consult if at risk for skin breakdown6.  Ensure patient use of pressure relieving devices  (Low air loss bed, waffle overlay, heel protectors, ROHO cushion, etc)  Outcome: Progressing       Patient is not progressing towards the following goals:

## 2025-04-08 NOTE — PROGRESS NOTES
Daily Progress Note    This note is for teaching purposes only. I evaluated and examined the patient independently of the medical student. Please refer to the provider's note for full clinical details.     Date of Service  4/8/2025    Chief Complaint  Clau Vences is a 80 y.o. female admitted 4/5/2025 with a distal left periprosthetic fracture of the femur who is POD 2 ORIF of the left femur.     Hospital Course  Clau Vences is a 80 y.o. female with PMH HFpEF, Dyslipidemia, chronic steroid therapy for osteoarthritis-related pain. Admitted 4/5/2025 d/t GLF with left distal femur fracture; secondary diagnosis of hyperkalemia. Orthopedic surgery consulted, placed NPO for OR on 04/06. Received calcium gluconate, IV fluids, and insulin for hyperkalemia with resolution today K+ is at 5.6. Patient was given 2 500cc boluses yesterday, 1 of NS and 1 of LR. She has since developed an ABHILASH. PT/OT evaluated her yesterday and were able to get her sitting up in bed and were able to get her up on her feet but only took about 2 little steps. They discussed with her their recommendation of placement in a Post-Acute Rehab facility to get her ambulating with a more intensive rehab plan. Today she is Post-op day 2 s/p ORIF of the distal left femur.    Interval Problem Update  - 4/5 Distal Left periprosthetic femur fracture  - 4/5 Hyperkalemia   - 4/6 Hyperkalemia resolved, ORIF of Left femur.  - 4/7 Referrals for placement, PT/OT Eval.  - 4/8 Patient developed an ABHILASH with BUN of 56 (up from 49 yesterday) and creatinine of 1.9 (Up from 1.3 yesterday)    I have discussed this patient's plan of care and discharge plan at IDT rounds today with Case Management, Nursing, Nursing leadership, and other members of the IDT team.    Consultants/Specialty  PT/OT for evaluation of placement/discharge    Code Status  DNAR/DNI    Disposition  Patient is not currently medically cleared for discharge. Recently developed ABHILASH that  needs to be addressed.     Review of Systems  Review of Systems   Constitutional:  Negative for chills and fever.   HENT:  Negative for congestion.    Eyes:  Negative for blurred vision.   Respiratory:  Negative for cough, shortness of breath and wheezing.    Cardiovascular:  Negative for chest pain, palpitations, orthopnea and leg swelling.   Gastrointestinal:  Negative for abdominal pain, diarrhea, heartburn, nausea and vomiting.   Musculoskeletal:  Negative for back pain.   Neurological:  Positive for dizziness. Negative for weakness and headaches.        Physical Exam  Temp:  [36 °C (96.8 °F)-36.4 °C (97.5 °F)] 36.4 °C (97.5 °F)  Pulse:  [67-86] 83  Resp:  [17-19] 19  BP: ()/(46-78) 91/78  SpO2:  [91 %-95 %] 93 %    Physical Exam  Constitutional:       Appearance: Normal appearance.   HENT:      Head: Normocephalic and atraumatic.      Nose: No congestion or rhinorrhea.      Mouth/Throat:      Mouth: Mucous membranes are moist.   Eyes:      General: No scleral icterus.     Extraocular Movements: Extraocular movements intact.      Conjunctiva/sclera: Conjunctivae normal.   Cardiovascular:      Rate and Rhythm: Normal rate and regular rhythm.      Pulses: Normal pulses.      Heart sounds:      No friction rub. No gallop.   Pulmonary:      Effort: No respiratory distress.      Breath sounds: No stridor. No wheezing, rhonchi or rales.   Chest:      Chest wall: No tenderness.   Abdominal:      General: Bowel sounds are normal. There is no distension.      Palpations: Abdomen is soft.      Tenderness: There is no abdominal tenderness. There is no guarding.   Musculoskeletal:         General: No swelling.      Cervical back: No tenderness.      Right lower leg: No edema.      Left lower leg: No edema.        Legs:       Comments: Distal left femur surgical incision. Dressings were clean and dry on PE.    Skin:     Findings: No erythema.   Neurological:      Mental Status: She is alert and oriented to person,  place, and time.   Psychiatric:         Mood and Affect: Mood normal.         Behavior: Behavior normal.         Thought Content: Thought content normal.       Physical Exam  Constitutional:       Appearance: Normal appearance.   HENT:      Head: Normocephalic and atraumatic.      Nose: No congestion or rhinorrhea.      Mouth/Throat:      Mouth: Mucous membranes are moist.   Eyes:      General: No scleral icterus.     Extraocular Movements: Extraocular movements intact.      Conjunctiva/sclera: Conjunctivae normal.   Cardiovascular:      Rate and Rhythm: Normal rate and regular rhythm.      Pulses: Normal pulses.      Heart sounds:      No friction rub. No gallop.   Pulmonary:      Effort: No respiratory distress.      Breath sounds: No stridor. No wheezing, rhonchi or rales.   Chest:      Chest wall: No tenderness.   Abdominal:      General: Bowel sounds are normal. There is no distension.      Palpations: Abdomen is soft.      Tenderness: There is no abdominal tenderness. There is no guarding.   Musculoskeletal:         General: No swelling.      Cervical back: No tenderness.      Right lower leg: No edema.      Left lower leg: No edema.        Legs:       Comments: Distal left femur surgical incision. Dressings were clean and dry on PE.    Skin:     Findings: No erythema.   Neurological:      Mental Status: She is alert and oriented to person, place, and time.   Psychiatric:         Mood and Affect: Mood normal.         Behavior: Behavior normal.         Thought Content: Thought content normal.          Fluids    Intake/Output Summary (Last 24 hours) at 4/8/2025 1400  Last data filed at 4/8/2025 1000  Gross per 24 hour   Intake 480 ml   Output 502 ml   Net -22 ml        Laboratory  Recent Labs     04/06/25  1210 04/07/25  0146 04/08/25  0352   WBC 7.4 11.0* 8.4   RBC 4.03* 3.37* 2.97*   HEMOGLOBIN 13.6 11.4* 9.9*   HEMATOCRIT 42.6 36.2* 31.5*   .7* 107.4* 106.1*   MCH 33.7* 33.8* 33.3*   MCHC 31.9* 31.5*  31.4*   RDW 48.9 48.0 47.7   PLATELETCT 111* 112* 102*   MPV 11.0 11.3 11.7     Recent Labs     04/06/25  1210 04/07/25  0146 04/08/25  0352   SODIUM 137 131* 133*   POTASSIUM 5.6* 5.6* 5.2   CHLORIDE 107 103 103   CO2 20 20 21   GLUCOSE 105* 149* 123*   BUN 51* 49* 56*   CREATININE 1.59* 1.31 1.90*   CALCIUM 10.3 9.7 9.3     Recent Labs     04/05/25  1746   APTT 26.6   INR 1.06               Imaging  No New imaging      Assessment/Plan  * Closed left subtrochanteric femur fracture, initial encounter ([facility])- (present on admission)  Assessment & Plan  #Distal Left femur f(x)  #S/P ORIF distal left Femur  -Patient with mechanical ground-level fall resulting in fracture distal left femur above her left knee replacement.   - ORIF 4/6  - Heparin and SCDs  - Will discharge with ASA for minimum 4 weeks will follow-up outpatient  - PT/OT eval for ambulation and placement  - PT eval, recommend placement in Post-Acute Rehab center.  -PT/OT work   #Decreasing Hhb, Hct, and platelets  - Patient was previously placed on Lovenox for DVT PPx, switched to heparin,   - Will continue to trend CBC.   - Examine patient leg and rule out large hematoma  - Legs looked symmetrical in size this am.     Acute Kidney Injury       #ABHILASH today       Patient developed an ABHILASH likely related to Farxiga or Valsartan, could also be related to diuresis        - Discontinued Valsartan and Farxiga        - Tightly monitor I/Os, let nursing know that we need accurate I/Os    Orthostatic Hypotension       #Acute Hypotension         Patient has had signs and symptoms of orthostatic hypotension, likely related to aggressive diuresis       - Will continue with lasix so that patient does not become volume overloaded        - Will encourage PO oral hydration       - Orthostatic blood pressures taken 3 times a day. None at night.     Current chronic use of systemic steroids  Assessment & Plan  #Osteoarthritis of the shoulder  Patient states he is on chronic  prednisone 5 mg for 3 years due to shoulder arthritis.   - Continue prednisone at this time.   - Will readdress potential cessation on post-op.  - Consider up to date DEXA scan outpatient (last was 7/2023)    Hyperkalemia- (present on admission)  Assessment & Plan  K: 6.4 on admission  Received calcium gluconate, insulin dextrose, and lasix. Improved to 4.8. Up today to 5.2.   As an outpatient, patient has a history of hyperkalemia in attempt to renew spironolactone dose to 12.5 from last visit  - Holding home spironolactone  - Telemetry monitoring  - Patient reports dizziness  - Gave NS 4/7 to address hyperkalemia  - Holding IV Fluids at this time  - Encourage PO oral hydration    ACC/AHA stage C heart failure with preserved ejection fraction (HCC)- (present on admission)  Assessment & Plan  #HFpEF  #2nd degree AV block s/p PPM placement 10/7/24  History of chronic heart failure with preserved ejection fraction on home Coreg, Jardiance, Lasix, Crestor, valsartan  No acute exacerbation.  - Continue home meds  - On GDMT  - Holding home spironolactone due to hyperkalemia  - Holding Farxiga and Valsartan     Acute hypoxic respiratory failure (HCC)- (present on admission)  Assessment & Plan  Likely secondary to atelectasis from fall and hypoventilation from pain medications  - Incentive spirometer  - Ambulate as tolerated   - Cleared for WBAT    Dyslipidemia- (present on admission)  Assessment & Plan   Latest Reference Range & Units Most Recent   Cholesterol,Tot 100 - 199 mg/dL 166  5/20/24 14:15   Triglycerides 0 - 149 mg/dL 193 (H)  5/20/24 14:15   HDL >=40 mg/dL 67  5/20/24 14:15   LDL <100 mg/dL 60  5/20/24 14:15   - Continue Rosuvastatin 10 mg daily.         VTE prophylaxis: Lovenox and SCDs    I have performed a physical exam and reviewed and updated ROS and Plan today (4/8/2025). In review of yesterday's note (4/7/2025), there are no changes except as documented above.    Code status: DNAR/DNI    Disposition:  PT/OT eval and patient has had some dizziness upon sitting/standing. Will need to correct ABHILASH and then place referrals for SNF placement.     Mike Hernandez, MS3

## 2025-04-08 NOTE — DISCHARGE PLANNING
1016  Agency/Facility Name: [facility]  Spoke To: Martina  Outcome: DPA received Ph call advising they have beds available today for Pt if needed SNF. DPA advised Cristy Pt has declined [facility].     1209  Agency/Facility Name: [facility]  Spoke To: Adrianna  Outcome: DPA inquired if any private room is available, per Adrianna does not have any beds at [facility].     1213  Agency/Facility Name: [facility]  Spoke To: Branden - via teams  Outcome: DPA inquired if facility has a private room available, per Branden has private room available.   DPA advised RN CM via person.

## 2025-04-08 NOTE — CARE PLAN
The patient is Stable - Low risk of patient condition declining or worsening    Shift Goals  Clinical Goals: Pain management, Monitor electrolytes, Monitor VS  Patient Goals: rest, pian control  Family Goals: BENJAMIN    Progress made toward(s) clinical / shift goals:        Problem: Pain - Standard  Goal: Alleviation of pain or a reduction in pain to the patient’s comfort goal  Description: Target End Date:  Prior to discharge or change in level of careDocument on Vitals flowsheet1.  Document pain using the appropriate pain scale per order or unit policy2.  Educate and implement non-pharmacologic comfort measures (i.e. relaxation, distraction, massage, cold/heat therapy, etc.)3.  Pain management medications as ordered4.  Reassess pain after pain med administration per policy5.  If opiods administered assess patient's response to pain medication is appropriate per POSS sedation scale6.  Follow pain management plan developed in collaboration with patient and interdisciplinary team (including palliative care or pain specialists if applicable)  Outcome: Progressing  Note: Patient slept comfortably between medication administrations. Reviewed side effects of oxycodone with patient such as effects on respiratory system and digestive system. Patient verbalized understanding. Patient was given IS and discussed having miralax for a bowel movement      Problem: Knowledge Deficit - Standard  Goal: Patient and family/care givers will demonstrate understanding of plan of care, disease process/condition, diagnostic tests and medications  Description: Target End Date:  1-3 days or as soon as patient condition allowsDocument in Patient Education1.  Patient and family/caregiver oriented to unit, equipment, visitation policy and means for communicating concern2.  Complete/review Learning Assessment3.  Assess knowledge level of disease process/condition, treatment plan, diagnostic tests and medications4.  Explain disease  process/condition, treatment plan, diagnostic tests and medications  Outcome: Progressing  Note: Patient understands recommendation for post acute rehab.

## 2025-04-08 NOTE — ASSESSMENT & PLAN NOTE
Patient with baseline hemoglobin near 13.  Dropped to 9.9 post femur fracture repair.  - Continue to monitor with daily CBC  - Transfuse to keep hemoglobin greater than 7

## 2025-04-08 NOTE — PROGRESS NOTES
[facility] [place] Internal Medicine Daily Progress Note    Date of Service  4/8/2025    [employer] Team: R IM Blue Team   Attending: Cyndy Santos M.d.  Senior Resident: Dr. Lake Cuevas  Intern:  Dr. Anuel Hdz  Contact Number: [phone]    Chief Complaint: Left femur fracture    Hospital Course  Clau Vences is a 80 y.o. female with PMH HFpEF, Dyslipidemia, chronic steroid therapy for osteoarthritis-related pain. Admitted 4/5/2025 d/t GLF with left distal femur fracture; secondary diagnosis of hyperkalemia. Orthopedic surgery consulted, placed NPO for OR on 04/06. Received calcium gluconate, IV fluids, and insulin for hyperkalemia with resolution.     04/06/25. No acute events overnight. Patient's pain is well tolerated. Planning for surgery today.  4/7: Reports 4/10 pain at rest, will provide oxycodone to encourage mobility.  PT/OT recommending postacute rehab.Potassium mildly elevated 5.6, will provide IV fluids to encourage clearance of potassium via kidneys as well as to treat her orthostatic hypotension to 80s/40s when walking.  Hold valsartan for hyperkalemia and hypotension. Start lokelma    Interval Problem Update  Hyperkalemia resolved, reduced Lokelma to once daily.  Patient with new ABHILASH creatinine worsened 1.3-1.9.  Will hold Lasix, repeat orthostatic vital signs.    I have discussed this patient's plan of care and discharge plan at IDT rounds today with Case Management, Nursing, Nursing leadership, and other members of the IDT team.    Consultants/Specialty  orthopedics    Code Status  DNAR/DNI    Disposition  Medically Cleared  I have placed the appropriate orders for post-discharge needs.    Review of Systems  Review of Systems   Constitutional:  Negative for malaise/fatigue.   HENT:  Negative for tinnitus.    Eyes:  Negative for blurred vision.   Respiratory:  Negative for shortness of breath.    Cardiovascular:  Negative for chest pain and palpitations.   Gastrointestinal:  Negative for diarrhea,  heartburn and nausea.   Genitourinary:  Negative for dysuria.   Musculoskeletal:  Positive for falls. Negative for myalgias.   Neurological:  Negative for dizziness and headaches.        Physical Exam  Temp:  [36 °C (96.8 °F)-36.4 °C (97.5 °F)] 36.4 °C (97.5 °F)  Pulse:  [67-86] 83  Resp:  [17-19] 19  BP: ()/(46-78) 91/78  SpO2:  [91 %-95 %] 93 %    Physical Exam  Vitals and nursing note reviewed.   Constitutional:       General: She is not in acute distress.     Appearance: Normal appearance. She is obese. She is not ill-appearing.   HENT:      Head: Normocephalic and atraumatic.      Right Ear: External ear normal.      Left Ear: External ear normal.      Nose: Nose normal. No congestion or rhinorrhea.      Mouth/Throat:      Mouth: Mucous membranes are moist.      Pharynx: No oropharyngeal exudate or posterior oropharyngeal erythema.   Eyes:      General: No scleral icterus.     Extraocular Movements: Extraocular movements intact.      Pupils: Pupils are equal, round, and reactive to light.   Cardiovascular:      Rate and Rhythm: Normal rate and regular rhythm.      Pulses: Normal pulses.      Heart sounds: No murmur (Aortic) heard.     No friction rub. No gallop.   Pulmonary:      Effort: Pulmonary effort is normal. No respiratory distress.      Breath sounds: Normal breath sounds. No wheezing, rhonchi or rales.   Chest:      Chest wall: No tenderness.   Abdominal:      General: Abdomen is flat. Bowel sounds are normal. There is no distension.      Palpations: Abdomen is soft.      Tenderness: There is no abdominal tenderness. There is no guarding or rebound.   Musculoskeletal:         General: Tenderness (Mild. Mid/distal left femur) present. No swelling.      Cervical back: No rigidity.      Comments: Left leg with overlying bandage clean/dry/intact   Neurological:      General: No focal deficit present.      Mental Status: She is alert and oriented to person, place, and time. Mental status is at  baseline.      Motor: No weakness.   Psychiatric:         Mood and Affect: Mood normal.         Behavior: Behavior normal.         Laboratory  Recent Labs     04/06/25  1210 04/07/25  0146 04/08/25  0352   WBC 7.4 11.0* 8.4   RBC 4.03* 3.37* 2.97*   HEMOGLOBIN 13.6 11.4* 9.9*   HEMATOCRIT 42.6 36.2* 31.5*   .7* 107.4* 106.1*   MCH 33.7* 33.8* 33.3*   MCHC 31.9* 31.5* 31.4*   RDW 48.9 48.0 47.7   PLATELETCT 111* 112* 102*   MPV 11.0 11.3 11.7     Recent Labs     04/06/25  1210 04/07/25  0146 04/08/25  0352   SODIUM 137 131* 133*   POTASSIUM 5.6* 5.6* 5.2   CHLORIDE 107 103 103   CO2 20 20 21   GLUCOSE 105* 149* 123*   BUN 51* 49* 56*   CREATININE 1.59* 1.31 1.90*   CALCIUM 10.3 9.7 9.3     Recent Labs     04/05/25  1746   APTT 26.6   INR 1.06               Imaging  DX-PORTABLE FLUORO > 1 HOUR   Final Result      Portable fluoroscopy utilized for 1 minute 13 seconds.         INTERPRETING LOCATION: [address] [facility], [address]      DX-FEMUR-2+ LEFT   Final Result      Digitized intraoperative radiograph is submitted for review. This examination is not for diagnostic purpose but for guidance during a surgical procedure. Please see the patient's chart for full procedural details.         INTERPRETING LOCATION: [address] [facility], [address]      CT-KNEE W/O PLUS RECONS LEFT   Final Result         1. Total knee arthroplasty.   2. Comminuted and displaced fracture of the distal femoral metaphysis.   3. Osteoarthrosis.   4. Osteopenia.      DX-FEMUR-2+ LEFT   Final Result      Acute, comminuted distal femoral fracture, just above the knee arthroplasty.      DX-PELVIS-1 OR 2 VIEWS   Final Result      No acute osseous abnormality.           Assessment/Plan  Problem Representation:  80 y.o. female with PMH HFpEF, Dyslipidemia, chronic steroid therapy for osteoarthritis-related pain. Admitted 04/05/25 d/t left distal femur fracture.    * Closed left subtrochanteric femur fracture, initial encounter (HCC)- (present on  admission)  Assessment & Plan  Patient with mechanical ground-level fall resulting in fracture distal left femur above her left knee replacement. Orthopedic surgery consulted.  Continue to monitor hemoglobin drop following procedure.  No signs/symptoms of hematoma formation.  - Will continue oxycodone for pain control to encourage mobility    ABHILASH (acute kidney injury) (HCC)  Assessment & Plan  Patient with increased creatinine to 1.9 from baseline 1.1-1.3.  BUN/creatinine ratio 29, likely prerenal.  Patient tolerated 1 L fluid ministration on 4/7 without leg swelling on exam or crackles concerning for volume overload on subsequent physical exam.  Unlikely to be cardiorenal ABHILASH.  Will obtain orthostatic vitals to see if they are positive for drop prior to considering fluid bolus.  -Will hold Lasix  - Orthostatic vitals  - Encourage oral intake  - Urine electrolytes  - Urinalysis  - Bladder scan for PVR  - Avoid nephrotoxins  - Accurate I's and O's      Current chronic use of systemic steroids  Assessment & Plan  Patient states she is on chronic prednisone 5 mg for 3 years due to shoulder arthritis.   - Continue prednisone at this time.   - Will readdress potential cessation outpatient    Hyperkalemia- (present on admission)  Assessment & Plan  K: 6.4 on admission  Received calcium gluconate, insulin dextrose, and lasix. Improved to 4.8.  As an outpatient, patient has a history of hyperkalemia in attempt to renew spironolactone dose to 12.5 from last visit  - Holding home spironolactone and valsartan  - Telemetry monitoring  -Lokelma once daily    ACC/AHA stage C heart failure with preserved ejection fraction (HCC)- (present on admission)  Assessment & Plan  History of chronic heart failure with preserved ejection fraction on home Coreg, Jardiance, Lasix, Crestor, valsartan  No acute exacerbation.  - Continue home meds except for as below  - Holding home spironolactone and losartan due to hyperkalemia and orthostatic  hypotension    Anemia- (present on admission)  Assessment & Plan  Patient with baseline hemoglobin near 13.  Dropped to 9.9 post femur fracture repair.  - Continue to monitor with daily CBC  - Transfuse to keep hemoglobin greater than 7    Acute hypoxic respiratory failure (HCC)- (present on admission)  Assessment & Plan  Likely secondary to atelectasis from fall.  Less likely due to hypoventilation secondary to pain medication as patient has high normal respiratory rate  - Incentive spirometer  - Mobilize patient as able for treatment of atelectasis, use oxycodone for pain control if necessary to encourage mobilization  - Wean O2 as able  - PT/OT consult    Dyslipidemia- (present on admission)  Assessment & Plan   Latest Reference Range & Units Most Recent   Cholesterol,Tot 100 - 199 mg/dL 166  5/20/24 14:15   Triglycerides 0 - 149 mg/dL 193 (H)  5/20/24 14:15   HDL >=40 mg/dL 67  5/20/24 14:15   LDL <100 mg/dL 60  5/20/24 14:15   - Continue Rosuvastatin 10 mg daily.         VTE prophylaxis: Transition enoxaparin to heparin given worsening renal function    I have performed a physical exam and reviewed and updated ROS and Plan today (4/8/2025). In review of yesterday's note (4/7/2025), there are no changes except as documented above.

## 2025-04-08 NOTE — DISCHARGE PLANNING
Case Management Discharge Planning    Admission Date: 4/5/2025  GMLOS: 6.3  ALOS: 3    6-Clicks ADL Score: 16  6-Clicks Mobility Score: 11  PT and/or OT Eval ordered: Yes  Post-acute Referrals Ordered: Yes  Post-acute Choice Obtained: Yes  Has referral(s) been sent to post-acute provider:  Yes      Anticipated Discharge Dispo:      DME Needed: No    Action(s) Taken: Updated Provider/Nurse on Discharge PlanPatient discussed during IDT rounds with medical team. [facility] has a private room and can take patient tomorrow at 1600. SNF's called in order of choice until a private room was found as patient requested.    Escalations Completed: None    Medically Clear: No    Next Steps: Case Management will continue to follow for discharge planning needs.    Barriers to Discharge: Medical clearance    Is the patient up for discharge tomorrow: Yes    1520  IMM form presented, patient asked that we wait until her daughter arrives, daughter arrived and agrees mom should sign her IMM.

## 2025-04-08 NOTE — ASSESSMENT & PLAN NOTE
Patient with increased creatinine to 1.9 from baseline 1.1-1.3.  BUN/creatinine ratio 29, likely prerenal.  Patient tolerated 1 L fluid ministration on 4/7 without leg swelling on exam or crackles concerning for volume overload on subsequent physical exam.  Unlikely to be cardiorenal ABHILASH.  Will obtain orthostatic vitals to see if they are positive for drop prior to considering fluid bolus.  -Will hold Lasix  - Orthostatic vitals  - Encourage oral intake  - Urine electrolytes  - Urinalysis  - Bladder scan for PVR  - Avoid nephrotoxins  - Accurate I's and O's

## 2025-04-09 VITALS
SYSTOLIC BLOOD PRESSURE: 142 MMHG | DIASTOLIC BLOOD PRESSURE: 55 MMHG | OXYGEN SATURATION: 96 % | TEMPERATURE: 97.3 F | BODY MASS INDEX: 35.99 KG/M2 | RESPIRATION RATE: 17 BRPM | HEIGHT: 67 IN | HEART RATE: 69 BPM | WEIGHT: 229.28 LBS

## 2025-04-09 LAB
ANION GAP SERPL CALC-SCNC: 8 MMOL/L (ref 7–16)
BASOPHILS # BLD AUTO: 0.4 % (ref 0–1.8)
BASOPHILS # BLD: 0.03 K/UL (ref 0–0.12)
BUN SERPL-MCNC: 55 MG/DL (ref 8–22)
CALCIUM SERPL-MCNC: 9.1 MG/DL (ref 8.5–10.5)
CHLORIDE SERPL-SCNC: 105 MMOL/L (ref 96–112)
CO2 SERPL-SCNC: 21 MMOL/L (ref 20–33)
CREAT SERPL-MCNC: 1.48 MG/DL (ref 0.5–1.4)
EOSINOPHIL # BLD AUTO: 0.18 K/UL (ref 0–0.51)
EOSINOPHIL NFR BLD: 2.6 % (ref 0–6.9)
ERYTHROCYTE [DISTWIDTH] IN BLOOD BY AUTOMATED COUNT: 45.8 FL (ref 35.9–50)
GFR SERPLBLD CREATININE-BSD FMLA CKD-EPI: 36 ML/MIN/1.73 M 2
GLUCOSE SERPL-MCNC: 111 MG/DL (ref 65–99)
HCT VFR BLD AUTO: 28.6 % (ref 37–47)
HGB BLD-MCNC: 9.2 G/DL (ref 12–16)
IMM GRANULOCYTES # BLD AUTO: 0.05 K/UL (ref 0–0.11)
IMM GRANULOCYTES NFR BLD AUTO: 0.7 % (ref 0–0.9)
LYMPHOCYTES # BLD AUTO: 0.88 K/UL (ref 1–4.8)
LYMPHOCYTES NFR BLD: 12.6 % (ref 22–41)
MCH RBC QN AUTO: 33.5 PG (ref 27–33)
MCHC RBC AUTO-ENTMCNC: 32.2 G/DL (ref 32.2–35.5)
MCV RBC AUTO: 104 FL (ref 81.4–97.8)
MONOCYTES # BLD AUTO: 0.85 K/UL (ref 0–0.85)
MONOCYTES NFR BLD AUTO: 12.2 % (ref 0–13.4)
NEUTROPHILS # BLD AUTO: 4.97 K/UL (ref 1.82–7.42)
NEUTROPHILS NFR BLD: 71.5 % (ref 44–72)
NRBC # BLD AUTO: 0 K/UL
NRBC BLD-RTO: 0 /100 WBC (ref 0–0.2)
PLATELET # BLD AUTO: 106 K/UL (ref 164–446)
PMV BLD AUTO: 11 FL (ref 9–12.9)
POTASSIUM SERPL-SCNC: 4.8 MMOL/L (ref 3.6–5.5)
RBC # BLD AUTO: 2.75 M/UL (ref 4.2–5.4)
SODIUM SERPL-SCNC: 134 MMOL/L (ref 135–145)
WBC # BLD AUTO: 7 K/UL (ref 4.8–10.8)

## 2025-04-09 PROCEDURE — 700102 HCHG RX REV CODE 250 W/ 637 OVERRIDE(OP)

## 2025-04-09 PROCEDURE — 80048 BASIC METABOLIC PNL TOTAL CA: CPT

## 2025-04-09 PROCEDURE — A9270 NON-COVERED ITEM OR SERVICE: HCPCS

## 2025-04-09 PROCEDURE — 99239 HOSP IP/OBS DSCHRG MGMT >30: CPT | Mod: GC | Performed by: INTERNAL MEDICINE

## 2025-04-09 PROCEDURE — 700111 HCHG RX REV CODE 636 W/ 250 OVERRIDE (IP)

## 2025-04-09 PROCEDURE — 97530 THERAPEUTIC ACTIVITIES: CPT

## 2025-04-09 PROCEDURE — 36415 COLL VENOUS BLD VENIPUNCTURE: CPT

## 2025-04-09 PROCEDURE — 85025 COMPLETE CBC W/AUTO DIFF WBC: CPT

## 2025-04-09 RX ORDER — HEPARIN SODIUM 5000 [USP'U]/ML
5000 INJECTION, SOLUTION INTRAVENOUS; SUBCUTANEOUS EVERY 8 HOURS
Status: ACTIVE | DISCHARGE
Start: 2025-04-09 | End: 2025-04-16

## 2025-04-09 RX ORDER — OXYCODONE HYDROCHLORIDE 5 MG/1
2.5-5 TABLET ORAL EVERY 4 HOURS PRN
Status: SHIPPED
Start: 2025-04-09 | End: 2025-04-19

## 2025-04-09 RX ORDER — AMOXICILLIN 250 MG
2 CAPSULE ORAL EVERY EVENING
Qty: 30 TABLET | Refills: 0 | Status: SHIPPED | OUTPATIENT
Start: 2025-04-09

## 2025-04-09 RX ORDER — METOPROLOL SUCCINATE 100 MG/1
100 TABLET, EXTENDED RELEASE ORAL EVERY EVENING
Status: SHIPPED
Start: 2025-04-09

## 2025-04-09 RX ADMIN — HEPARIN SODIUM 5000 UNITS: 5000 INJECTION, SOLUTION INTRAVENOUS; SUBCUTANEOUS at 05:28

## 2025-04-09 RX ADMIN — SODIUM ZIRCONIUM CYCLOSILICATE 10 G: 10 POWDER, FOR SUSPENSION ORAL at 12:17

## 2025-04-09 RX ADMIN — OXYCODONE HYDROCHLORIDE 5 MG: 5 TABLET ORAL at 01:30

## 2025-04-09 RX ADMIN — FERROUS SULFATE TAB 325 MG (65 MG ELEMENTAL FE) 325 MG: 325 (65 FE) TAB at 05:28

## 2025-04-09 RX ADMIN — POLYETHYLENE GLYCOL 3350 1 PACKET: 17 POWDER, FOR SOLUTION ORAL at 05:29

## 2025-04-09 RX ADMIN — OXYCODONE HYDROCHLORIDE 5 MG: 5 TABLET ORAL at 05:32

## 2025-04-09 RX ADMIN — DAPAGLIFLOZIN 10 MG: 10 TABLET, FILM COATED ORAL at 05:28

## 2025-04-09 RX ADMIN — ACETAMINOPHEN 1000 MG: 500 TABLET, FILM COATED ORAL at 14:41

## 2025-04-09 RX ADMIN — ACETAMINOPHEN 1000 MG: 500 TABLET, FILM COATED ORAL at 08:50

## 2025-04-09 RX ADMIN — PREDNISONE 5 MG: 5 TABLET ORAL at 05:29

## 2025-04-09 RX ADMIN — HEPARIN SODIUM 5000 UNITS: 5000 INJECTION, SOLUTION INTRAVENOUS; SUBCUTANEOUS at 14:41

## 2025-04-09 RX ADMIN — SERTRALINE 100 MG: 100 TABLET, FILM COATED ORAL at 05:28

## 2025-04-09 ASSESSMENT — GAIT ASSESSMENTS: GAIT LEVEL OF ASSIST: UNABLE TO PARTICIPATE

## 2025-04-09 ASSESSMENT — COGNITIVE AND FUNCTIONAL STATUS - GENERAL
STANDING UP FROM CHAIR USING ARMS: A LOT
MOBILITY SCORE: 9
MOVING TO AND FROM BED TO CHAIR: TOTAL
TURNING FROM BACK TO SIDE WHILE IN FLAT BAD: A LOT
CLIMB 3 TO 5 STEPS WITH RAILING: TOTAL
MOVING FROM LYING ON BACK TO SITTING ON SIDE OF FLAT BED: A LOT
SUGGESTED CMS G CODE MODIFIER MOBILITY: CM
WALKING IN HOSPITAL ROOM: TOTAL

## 2025-04-09 ASSESSMENT — PAIN DESCRIPTION - PAIN TYPE
TYPE: ACUTE PAIN;SURGICAL PAIN
TYPE: SURGICAL PAIN

## 2025-04-09 NOTE — CARE PLAN
The patient is Stable - Low risk of patient condition declining or worsening    Shift Goals  Clinical Goals: mobilize, safety, pain management  Patient Goals: pain management  Family Goals: updates    Progress made toward(s) clinical / shift goals:      Problem: Pain - Standard  Goal: Alleviation of pain or a reduction in pain to the patient’s comfort goal  Description: Target End Date:  Prior to discharge or change in level of careDocument on Vitals flowsheet1.  Document pain using the appropriate pain scale per order or unit policy2.  Educate and implement non-pharmacologic comfort measures (i.e. relaxation, distraction, massage, cold/heat therapy, etc.)3.  Pain management medications as ordered4.  Reassess pain after pain med administration per policy5.  If opiods administered assess patient's response to pain medication is appropriate per POSS sedation scale6.  Follow pain management plan developed in collaboration with patient and interdisciplinary team (including palliative care or pain specialists if applicable)  Outcome: Progressing     Problem: Knowledge Deficit - Standard  Goal: Patient and family/care givers will demonstrate understanding of plan of care, disease process/condition, diagnostic tests and medications  Description: Target End Date:  1-3 days or as soon as patient condition allowsDocument in Patient Education1.  Patient and family/caregiver oriented to unit, equipment, visitation policy and means for communicating concern2.  Complete/review Learning Assessment3.  Assess knowledge level of disease process/condition, treatment plan, diagnostic tests and medications4.  Explain disease process/condition, treatment plan, diagnostic tests and medications  Outcome: Progressing     Problem: Fall Risk  Goal: Patient will remain free from falls  Description: Target End Date:  Prior to discharge or change in level of careDocument interventions on the Edwina Ga Fall Risk Assessment1.  Assess for fall  risk factors2.  Implement fall precautions  Outcome: Progressing     Problem: Skin Integrity  Goal: Skin integrity is maintained or improved  Description: Target End Date:  Prior to discharge or change in level of careDocument interventions on Skin Risk/Kt flowsheet groups and corresponding LDA1.  Assess and monitor skin integrity, appearance and/or temperature2.  Assess risk factors for impaired skin integrity and/or pressures ulcers3.  Implement precautions to protect skin integrity in collaboration with interdisciplinary team4.  Implement pressure ulcer prevention protocol if at risk for skin breakdown5.  Confirm wound care consult if at risk for skin breakdown6.  Ensure patient use of pressure relieving devices  (Low air loss bed, waffle overlay, heel protectors, ROHO cushion, etc)  Outcome: Progressing       Patient is not progressing towards the following goals:

## 2025-04-09 NOTE — CARE PLAN
The patient is Stable - Low risk of patient condition declining or worsening    Shift Goals  Clinical Goals: pain management, q2 turns, monitor labs/vitals  Patient Goals: pain management, mobility  Family Goals: zo    Progress made toward(s) clinical / shift goals:    Problem: Pain - Standard  Goal: Alleviation of pain or a reduction in pain to the patient’s comfort goal  Outcome: Progressing  Note: Patient assessed per numeric scale and medicated per MAR. Patient encouraged to move leg and work on PT exercises post medication administration.      Problem: Knowledge Deficit - Standard  Goal: Patient and family/care givers will demonstrate understanding of plan of care, disease process/condition, diagnostic tests and medications  Outcome: Progressing  Note: Patient educated on plan to DC to rehab. Patient also educated and encouraged to mobilize.        Patient is not progressing towards the following goals:

## 2025-04-09 NOTE — DISCHARGE PLANNING
Case Management Discharge Planning    Admission Date: 4/5/2025  GMLOS: 6.3  ALOS: 4    6-Clicks ADL Score: 16  6-Clicks Mobility Score: 9  PT and/or OT Eval ordered: Yes  Post-acute Referrals Ordered: Yes  Post-acute Choice Obtained: Yes  Has referral(s) been sent to post-acute provider:  Yes      Anticipated Discharge Dispo: Discharge Disposition: Discharged to home/self care (01)    DME Needed: No    Action(s) Taken: Updated Provider/Nurse on Discharge Plan and Transport Arranged Patient discussed during IDT rounds with medical team. Transport at 1600 to [facility] today, daughter aware, Cob[place] and IMM signed. PASRR done.    Escalations Completed: None    Medically Clear: Yes    Next Steps: Case Management will continue to follow for discharge planning needs.    Barriers to Discharge: Transportation    Is the patient up for discharge tomorrow: No

## 2025-04-09 NOTE — THERAPY
Physical Therapy   Daily Treatment     Patient Name: Clau Vences  Age:  80 y.o., Sex:  female  Medical Record #: [id number]  Today's Date: 4/9/2025     Precautions  Precautions: Fall Risk;Weight Bearing As Tolerated Left Lower Extremity;Swallow Precautions;Other (See Comments)  Comments: ROM as tolerated    Assessment    Patient seen for PT treatment session. Patient in bed, agreeable for the session with encouragement. Able to participate with bed mobility, EOB sitting, sit-stand reps, chair transfer with use of Mimi Steady as detailed below. Will continue to benefit from PT services and recommend post-acute placement at this time.     Plan    Treatment Plan Status: Continue Current Treatment Plan  Type of Treatment: Bed Mobility, Equipment, Family / Caregiver Training, Gait Training, Neuro Re-Education / Balance, Stair Training, Therapeutic Activities, Therapeutic Exercise  Treatment Frequency: 5 Times per Week  Treatment Duration: Until Therapy Goals Met    DC Equipment Recommendations: Unable to determine at this time  Discharge Recommendations: Recommend post-acute placement for additional physical therapy services prior to discharge home    Objective     04/09/25 1034   Time In/Time Out   Therapy Start Time 0951   Therapy End Time 1034   Total Therapy Time 43   Precautions   Precautions Fall Risk;Weight Bearing As Tolerated Left Lower Extremity;Swallow Precautions;Other (See Comments)   Comments ROM as tolerated   Vitals   O2 (LPM) 1   O2 Delivery Device Nasal Cannula   Pain   Pain Scales 0 to 10 Scale    Intervention Distraction;Repositioned;Rest   Pain 0 - 10 Group   Location Back;Leg;Knee   Location Orientation Left   Therapist Pain Assessment Prior to Activity;During Activity;Post Activity;Post Activity Pain Same as Prior to Activity   Cognition    Cognition / Consciousness X   Speech/ Communication Hard of Hearing   Level of Consciousness Alert   Ability To Follow Commands 2 Step   Safety Awareness  Impaired   New Learning Impaired   Sequencing Impaired   Sitting Lower Body Exercises   Sitting Lower Body Exercises Yes   Ankle Pumps 2 sets of 10;Bilateral   Long Arc Quad 1 set of 10;Bilateral   Hamstring Curl 1 set of 10;Bilateral   Comments Seated in the chair, RLE-AROM, LLE-AAROM   Other Treatments   Other Treatments Provided Patient continues to have fear of fall, provided positive reinforcement. Reinforced importance of consistent participation and improvement with functional mobility. Reinforced importance of daily & frequent mobility, OOB to chair for meals with assistance from nursing staff.   Balance   Sitting Balance (Static) Fair -   Sitting Balance (Dynamic) Fair -   Standing Balance (Static) Poor   Standing Balance (Dynamic) Trace +   Weight Shift Sitting Poor   Weight Shift Standing Poor   Skilled Intervention Verbal Cuing;Tactile Cuing;Sequencing;Postural Facilitation;Compensatory Strategies;Facilitation   Comments Seated EOB-With UE support, Standing with FWW x2 person assist for safety   Bed Mobility    Supine to Sit Maximal Assist   Scooting Maximal Assist  (Towards EOB)   Skilled Intervention Verbal Cuing;Tactile Cuing;Sequencing;Facilitation;Compensatory Strategies   Comments HOB raised, use of bed rail, towards R side; increased time to initiate and sequence the task; increased assistance with trunk and LLE   Gait Analysis   Gait Level Of Assist Unable to Participate   Comments Attempted standing marches for pre-gait training, increased difficulty with weight shift and decreased tolerance to remain standing   Functional Mobility   Sit to Stand Moderate Assist  (x2 person assist for safety)   Bed, Chair, Wheelchair Transfer Total Assist   Transfer Method Steady Pivot Lift  (Mimi Steady)   Mobility Bed-EOB sitting-sit-stand reps with FWW (x2 reps)-EOB-sit-stand within Mimi Steady-transfer to chair-LE ex's   Skilled Intervention Verbal Cuing;Tactile Cuing;Sequencing;Postural  Facilitation;Compensatory Strategies   Comments Cues for hand placement, LE placement, sequencing   6 Clicks Assessment - How much HELP from from another person do you currently need... (If the patient hasn't done an activity recently, how much help from another person do you think he/she would need if he/she tried?)   Turning from your back to your side while in a flat bed without using bedrails? 2   Moving from lying on your back to sitting on the side of a flat bed without using bedrails? 2   Moving to and from a bed to a chair (including a wheelchair)? 1   Standing up from a chair using your arms (e.g., wheelchair, or bedside chair)? 2   Walking in hospital room? 1   Climbing 3-5 steps with a railing? 1   6 clicks Mobility Score 9   Activity Tolerance   Sitting in Chair Post session   Patient / Family Goals    Patient / Family Goal #1 To return to prior level of functional mobility   Goal #1 Outcome Goal not met   Short Term Goals    Short Term Goal # 1 Patient will perform supine-sit, sit-supine with HOB flat without rails with supervision in 6 visits to safely get in & out of bed   Goal Outcome # 1 Progressing slower than expected   Short Term Goal # 2 Patient will perform sit-stand with FWW with supervision in 6 visits to progress with functional mobility   Goal Outcome # 2 Progressing slower than expected   Short Term Goal # 3 Patient will perform chair transfers with FWW with supervision in 6 visits to safely get OOB to chair   Goal Outcome # 3 Goal not met   Short Term Goal # 4 Patient will ambulate 100 feet with FWW with supervision in 6 visits to safely ambulate household distance   Goal Outcome # 4 Goal not met   Physical Therapy Treatment Plan   Physical Therapy Treatment Plan Continue Current Treatment Plan   Anticipated Discharge Equipment and Recommendations   DC Equipment Recommendations Unable to determine at this time   Discharge Recommendations Recommend post-acute placement for additional  physical therapy services prior to discharge home   Interdisciplinary Plan of Care Collaboration   IDT Collaboration with  Nursing;Certified Nursing Assistant;Family / Caregiver   Patient Position at End of Therapy Seated;Chair Alarm On;Call Light within Reach;Tray Table within Reach;Family / Friend in Room   Session Information   Date / Session Number  4/9-3(3/5, 4/13)

## 2025-04-09 NOTE — DISCHARGE PLANNING
DC Transport Scheduled    Transport Company Scheduled:  [employer]  Spoke with Dipika at [facility] to schedule transport.    Scheduled Date: 4/9/2025  Scheduled Time: 1600    Transport Type: [facility]  Destination: [facility]   Destination address: [address] [place] [id number]    Notified care team of scheduled transport via [employer].     If there are any changes needed to the DC transportation scheduled, please contact [facility] at ext. [phone] between the hours of 1411-3271. If outside those hours, contact the ED Case Manager at [phone]. [phone].

## 2025-04-09 NOTE — PROGRESS NOTES
"      Orthopaedic Progress Note    Interval changes:  Patient doing well    L distal femur dressings CDI  Cleared for DC to SNF by ortho pending medicine clearance    ROS - Patient denies any new issues.  Pain well controlled.    /56   Pulse 74   Temp 36.1 °C (97 °F) (Temporal)   Resp 18   Ht 1.702 m (5' 7\")   Wt 104 kg (229 lb 4.5 oz)   SpO2 93%     Patient seen and examined  No acute distress  Breathing non labored  RRR  L distal femur dressings CDI, DNVI, moves all toes, cap refill <2 sec.     Recent Labs     04/07/25  0146 04/08/25  0352 04/09/25  0043   WBC 11.0* 8.4 7.0   RBC 3.37* 2.97* 2.75*   HEMOGLOBIN 11.4* 9.9* 9.2*   HEMATOCRIT 36.2* 31.5* 28.6*   .4* 106.1* 104.0*   MCH 33.8* 33.3* 33.5*   MCHC 31.5* 31.4* 32.2   RDW 48.0 47.7 45.8   PLATELETCT 112* 102* 106*   MPV 11.3 11.7 11.0       Active Hospital Problems    Diagnosis     ABHILASH (acute kidney injury) ([facility]) [N17.9]     Closed left subtrochanteric femur fracture, initial encounter ([facility]) [S72.22XA]     Current chronic use of systemic steroids [Z79.52]     Hyperkalemia [E87.5]     ACC/AHA stage C heart failure with preserved ejection fraction ([facility]) [I50.30]     Anemia [D64.9]     Acute hypoxic respiratory failure ([facility]) [J96.01]     Dyslipidemia [E78.5]        Assessment/Plan:  Patient doing well    L distal femur dressings CDI  Cleared for DC to SNF by ortho pending medicine clearance  POD#3 S/P Open reduction internal fixation left distal femur fracture with intra prosthetic extension   Wt bearing status - WBAT LLE  Wound care/Drains - Dressings to be changed every other day by nursing. Or PRN for saturation    Future Procedures - none planned   Lovenox: Start 4/5, Duration-until ambulatory > 150'  Sutures/Staples out- 14-21 days post operatively. Removal by ortho mid levels only.  PT/OT-initiated  Antibiotics: Perioperative completed  DVT Prophylaxis- TEDS/SCDs/Foot pumps  Victor-not needed per ortho  Case Coordination for Discharge " Planning - Disposition per therapy recs.

## 2025-04-09 NOTE — PROGRESS NOTES
Gave report to DILIP Roper from [facility]. RN verbalizes understanding of POC with no questions or concerns at this time. Patient aware of transfer and ready for transport. All belongings with daughter and will meet patient at the facility. Awaiting for transport.

## 2025-04-09 NOTE — DISCHARGE SUMMARY
[facility] Discharge Summary    Attending: Cyndy Santos M.d.  Senior Resident: Dr. Mariana Ochoa  Intern:  Dr. Sharan Awan  Contact Number: [phone]    CHIEF COMPLAINT ON ADMISSION  Chief Complaint   Patient presents with    T-5000 GLF     Pt bib remsa for mgl, states stepped on dustpan then slipped and fell. - head strike -loc. +shortening in left lower extremity. CMS intact. Gcs of 15. Aaox4. Was given total of Fentanyl 150 mcg iv and zofran 4 mg iv by ems.        Reason for Admission  ems     Admission Date  4/5/2025    CODE STATUS  DNAR/DNI    HPI & HOSPITAL COURSE  Clau Vences is a 80 y.o. female with PMH HFpEF, Dyslipidemia, chronic steroid therapy for osteoarthritis-related pain. Admitted 4/5/2025 d/t GLF with left distal femur fracture; secondary diagnosis of hyperkalemia. Orthopedic surgery consulted, placed NPO for OR on 04/06. Received calcium gluconate, IV fluids, and insulin for hyperkalemia with resolution.   She underwent Open reduction and internal fixation of the left distal femur fracture with intra prosthetic extension on 4/6/2025.  Pain managed with low-dose opioid medications.      Hospitalization was complicated by hypotension, confusion, and hyperkalemia.  The following home medications were held: Valsartan, spironolactone, furosemide, and carvedilol.  Hyperkalemia was also treated with Lokelma.  Hyperkalemia resolved and potassium level has continued to improve since then.  They recommend holding valsartan and spironolactone at this time, repeat lab work in a few days and resume these medications at a lower dose as tolerated.  Discussed with patient and her daughter.    She also had an ABHILASH on 4/8/2025, negative orthostatic vitals, however her FEUrea was 15% which indicated prerenal ABHILASH.  She was euvolemic versus slightly volume under on evaluation hence given slow IV fluids x 1 L.  ABHILASH started to improve with this.  Her furosemide was held, can be resumed as needed if  she startS to get volume overloaded again.    Due to low blood pressures, Coreg 25 mg twice daily was switched to metoprolol succinate 100 mg once daily which she has tolerated well.    Per orthopedics, she needs to be on DVT prophylaxis with enoxaparin versus heparin based on renal function until she is able to ambulate over 150 m.    PT OT saw the patient while inpatient, recommended postacute placement, she was accepted at [facility] and is being transferred for further rehab today.    She has been on chronic steroids for a long time, explained that this is increases her risk of recurrent fractures and osteoporosis, may also affect her HFpEF.  She should follow-up with her primary care provider versus specialist who prescribed this and wean off this medication as able.    Therefore, she is discharged in good and stable condition to skilled nursing facility.    The patient met 2-midnight criteria for an inpatient stay at the time of discharge.    Discharge Date  4/9/2025    Physical Exam on Day of Discharge  Physical Exam  Vitals and nursing note reviewed.   Constitutional:       General: She is not in acute distress.     Appearance: Normal appearance. She is obese. She is not ill-appearing.   HENT:      Head: Normocephalic and atraumatic.      Right Ear: External ear normal.      Left Ear: External ear normal.      Nose: Nose normal. No congestion or rhinorrhea.      Mouth/Throat:      Mouth: Mucous membranes are moist.      Pharynx: No oropharyngeal exudate or posterior oropharyngeal erythema.   Eyes:      General: No scleral icterus.     Extraocular Movements: Extraocular movements intact.      Pupils: Pupils are equal, round, and reactive to light.   Cardiovascular:      Rate and Rhythm: Normal rate and regular rhythm.      Pulses: Normal pulses.      Heart sounds: No murmur (Aortic) heard.     No friction rub. No gallop.   Pulmonary:      Effort: Pulmonary effort is normal. No respiratory distress.       Breath sounds: Normal breath sounds. No wheezing, rhonchi or rales.   Chest:      Chest wall: No tenderness.   Abdominal:      General: Abdomen is flat. Bowel sounds are normal. There is no distension.      Palpations: Abdomen is soft.      Tenderness: There is no abdominal tenderness. There is no guarding or rebound.   Musculoskeletal:         General: Tenderness (Mild. Mid/distal left femur) present. No swelling.      Cervical back: No rigidity.      Comments: Left leg with overlying bandage clean/dry/intact   Neurological:      General: No focal deficit present.      Mental Status: She is alert and oriented to person, place, and time. Mental status is at baseline.      Motor: No weakness.   Psychiatric:         Mood and Affect: Mood normal.         Behavior: Behavior normal.         FOLLOW UP ITEMS POST DISCHARGE  Orthopedics in 2 weeks for suture removal  Primary care provider within 1 to 2 weeks after discharge from [facility]  Cardiology follow-up in 2 weeks after discharge from SNF.  She does already have an appointment with cardiology, AKIRA Jain on 5/21/2025.    DISCHARGE DIAGNOSES  Principal Problem:    Closed left subtrochanteric femur fracture, initial encounter (HCC) (POA: Yes)  Active Problems:    Dyslipidemia (POA: Yes)    Acute hypoxic respiratory failure (HCC) (POA: Yes)    Anemia (POA: Yes)    ACC/AHA stage C heart failure with preserved ejection fraction (HCC) (POA: Yes)    Hyperkalemia (POA: Yes)    Current chronic use of systemic steroids (POA: Unknown)    ABHILASH (acute kidney injury) (HCC) (POA: Unknown)  Resolved Problems:    * No resolved hospital problems. *      FOLLOW UP  Future Appointments   Date Time Provider Department Center   5/21/2025  1:45 PM PACER CHECK-[facility] None   5/21/2025  2:30 PM ELLYN Jain [facility] None     Marge Brasher M.D.  [address]  [address]  [place] [id number] [phone]    Call  If symptoms worsen, As needed      MEDICATIONS ON DISCHARGE      Medication List        PAUSE taking these medications        Instructions   furosemide 20 MG Tabs  Wait to take this until your doctor or other care provider tells you to start again.  Monitor weight daily, resume if weight gain of >3lbs in a day or 5lbs in a week, or if significant swelling in legs  Commonly known as: Lasix   Take 1 Tablet by mouth every day.  Dose: 20 mg     spironolactone 25 MG Tabs  Wait to take this until your doctor or other care provider tells you to start again.  Commonly known as: Aldactone   Doctor's comments: Dose decreased to 12.5 mg daily  Take 0.5 Tablets by mouth every day.  Dose: 12.5 mg     valsartan 320 MG tablet  Wait to take this until your doctor or other care provider tells you to start again.  Commonly known as: Diovan   Take 1 Tablet by mouth every day.  Dose: 320 mg            START taking these medications        Instructions   heparin 5000 UNIT/ML Soln   Inject 1 mL under the skin every 8 hours for 7 days. To be continued until ambulating >150m per orthopedics.  Can be switched to enoxaparin once renal function recovers.  Dose: 5,000 Units     metoprolol  MG Tb24  Commonly known as: Toprol XL   Take 1 Tablet by mouth every evening.  Dose: 100 mg     oxyCODONE immediate-release 5 MG Tabs  Commonly known as: Roxicodone   Take 0.5-1 Tablets by mouth every four hours as needed (Moderate to severe pain) for up to 10 days.  Dose: 2.5-5 mg     senna-docusate 8.6-50 MG Tabs  Commonly known as: Pericolace Or Senokot S   Take 2 Tablets by mouth every evening.  Dose: 2 Tablet            CONTINUE taking these medications        Instructions   acetaminophen 500 MG Tabs  Commonly known as: Tylenol   Take 1,000 mg by mouth every 8 hours as needed for Mild Pain. 500 mg x 2 tablets = 1000 mg  Dose: 1,000 mg     cholestyramine 4 g packet  Commonly known as: Questran   Take 4 g by mouth 1 time a day as needed (Diarrhea).  Dose: 1 Packet     ferrous sulfate 325 (65 Fe) MG tablet    Take 325 mg by mouth every day.  Dose: 325 mg     folic acid 800 MCG tablet  Commonly known as: Folvite   Take 800 mcg by mouth every day at 6 PM.  Dose: 800 mcg     Jardiance 10 MG Tabs tablet  Generic drug: Empagliflozin   Take 1 Tablet by mouth every day.  Dose: 10 mg     predniSONE 5 MG Tabs  Commonly known as: Deltasone   Take 1 tablet by mouth once daily  Dose: 5 mg     rosuvastatin 10 MG Tabs  Commonly known as: Crestor   Take 1 Tablet by mouth every evening.  Dose: 10 mg     sertraline 100 MG Tabs  Commonly known as: Zoloft   Take 1 tablet by mouth once daily     vitamin D3 1000 Unit (25 mcg) Tabs  Commonly known as: Cholecalciferol   Take 1,000 Units by mouth every day.  Dose: 1,000 Units            STOP taking these medications      carvedilol 25 MG Tabs  Commonly known as: Coreg              Allergies  Allergies   Allergen Reactions    Mirabegron Unspecified     Dizziness & lightheadedness    Tramadol Nausea     Nausea and somnolence       DIET  Orders Placed This Encounter   Procedures    Diet Order Diet: Cardiac     Standing Status:   Standing     Number of Occurrences:   1     Diet::   Cardiac [6]       ACTIVITY  As tolerated and directed by skilled nursing.  Weight bearing as tolerated    LINES, DRAINS, AND WOUNDS  This is an automated list. Peripheral IVs will be removed prior to discharge.  Peripheral IV 04/06/25 22 G Anterior;Left Forearm (Active)   Site Assessment Dry;Clean;Intact 04/09/25 0900   Dressing Type Transparent 04/09/25 0900   Line Status Flushed 04/09/25 0900   Dressing Status Dry;Clean;Intact 04/09/25 0900   Dressing Intervention N/A 04/07/25 0900   Dressing Change Due 04/12/25 04/07/25 0900   Date Primary Tubing Changed 04/08/25 04/08/25 2100   Date Secondary Tubing Changed 04/08/25 04/08/25 2100   Infiltration Grading ([facility], [facility]) 0 04/08/25 2100   Phlebitis Scale ([facility] Only) 0 04/08/25 2000     External Urinary Catheter (Female Wick) (Active)   Collection Container Standard  drainage bag 04/06/25 1226   Suction Level (Female Wick Catheter) 80 mmHg 04/06/25 1226   Output (mL) 750 mL 04/09/25 1200      Wound 01/28/23 Incision Abdomen (Active)       Wound 11/15/23 Pretibial Anterior;Midline Left (Active)       Wound 04/06/25 Incision Leg Left staples, xeroform, 4x4, tegaderm (Active)   Site Assessment BENJAMIN 04/08/25 2000   Periwound Assessment BENJAMIN 04/08/25 2000   Margins BENJAMIN 04/08/25 2000   Closure BENJAMIN 04/08/25 2000   Drainage Amount Scant 04/08/25 2000   Drainage Description Maroon 04/08/25 2000   Dressing Status Old drainage 04/08/25 2000   Dressing Changed Observed 04/08/25 2000   Dressing Options Other (Comments) 04/06/25 1351       Peripheral IV 04/06/25 22 G Anterior;Left Forearm (Active)   Site Assessment Dry;Clean;Intact 04/09/25 0900   Dressing Type Transparent 04/09/25 0900   Line Status Flushed 04/09/25 0900   Dressing Status Dry;Clean;Intact 04/09/25 0900   Dressing Intervention N/A 04/07/25 0900   Dressing Change Due 04/12/25 04/07/25 0900   Date Primary Tubing Changed 04/08/25 04/08/25 2100   Date Secondary Tubing Changed 04/08/25 04/08/25 2100   Infiltration Grading ([facility], [facility]) 0 04/08/25 2100   Phlebitis Scale ([facility] Only) 0 04/08/25 2000               MENTAL STATUS ON TRANSFER  AOx4, appropriate mood and affect.  At her baseline.  She is hard of hearing.       CONSULTATIONS  Orthopedics    PROCEDURES  ORIF left distal femur fracture with intraprostatic extension 4/6/2025 performed by Dr. Poncho Negrete from orthopedics.    LABORATORY  Lab Results   Component Value Date    SODIUM 134 (L) 04/09/2025    POTASSIUM 4.8 04/09/2025    CHLORIDE 105 04/09/2025    CO2 21 04/09/2025    GLUCOSE 111 (H) 04/09/2025    BUN 55 (H) 04/09/2025    CREATININE 1.48 (H) 04/09/2025    CREATININE 0.5 09/04/2007        Lab Results   Component Value Date    WBC 7.0 04/09/2025    HEMOGLOBIN 9.2 (L) 04/09/2025    HEMATOCRIT 28.6 (L) 04/09/2025    PLATELETCT 106 (L) 04/09/2025        Total time of  the discharge process exceeds 42 minutes.

## 2025-04-10 NOTE — DISCHARGE PLANNING
SW received a call from Adrianna with [facility] requesting written RX for Oxycodone. Pt was discharged today, and there was no written rx in transfer packet. Pt requesting oxycodone at [facility]. RN CM assisted BONITA on getting a hold of MD Ochoa to obtain written RX. Copy of rx was faxed to [facility]. Adrianna confirmed rx was received. Copy of rx was scanned in media.

## 2025-04-13 ENCOUNTER — TELEPHONE (OUTPATIENT)
Dept: INTERNAL MEDICINE | Facility: IMAGING CENTER | Age: 80
End: 2025-04-13
Payer: MEDICARE

## 2025-04-14 ENCOUNTER — TELEPHONE (OUTPATIENT)
Dept: CARDIOLOGY | Facility: MEDICAL CENTER | Age: 80
End: 2025-04-14
Payer: MEDICARE

## 2025-04-14 NOTE — TELEPHONE ENCOUNTER
Phone Number Called: [phone]- Daughter Celeste    Call outcome: Did not leave a detailed message. Requested patient to call back.    Message: Called to discuss concerns with Rosy

## 2025-04-14 NOTE — TELEPHONE ENCOUNTER
VISHNU  PUMP LINE    Patient called into the pump line over the weekend. States that she fell and broke her hip, and reports some concerns about her pacemaker and her heart. She would like a callback to discuss this further. She would to be called back at [phone].

## 2025-04-14 NOTE — TELEPHONE ENCOUNTER
Patient called from her room at [facility], where she has been since Wednesday for rehab: h/o of left periprosthetic hip fracture requiring ORIF on 4/6/25. During our lengthy conversation, patient expressed a litany of complaints and frustrations about her current situation. I attempted to provide her support and strongly encouraged her to talk to case management/charge RN on Monday morning about her care plans and probable date of discharge. I also informed patient that I do not have clinical privileges at [facility] SNF rehab but will be happy to see her at our office after discharge.

## 2025-04-30 ENCOUNTER — APPOINTMENT (OUTPATIENT)
Dept: RADIOLOGY | Facility: MEDICAL CENTER | Age: 80
DRG: 175 | End: 2025-04-30
Attending: STUDENT IN AN ORGANIZED HEALTH CARE EDUCATION/TRAINING PROGRAM
Payer: MEDICARE

## 2025-04-30 ENCOUNTER — HOSPITAL ENCOUNTER (INPATIENT)
Facility: MEDICAL CENTER | Age: 80
LOS: 3 days | DRG: 175 | End: 2025-05-04
Attending: STUDENT IN AN ORGANIZED HEALTH CARE EDUCATION/TRAINING PROGRAM | Admitting: STUDENT IN AN ORGANIZED HEALTH CARE EDUCATION/TRAINING PROGRAM
Payer: MEDICARE

## 2025-04-30 DIAGNOSIS — J96.01 ACUTE RESPIRATORY FAILURE WITH HYPOXIA (HCC): ICD-10-CM

## 2025-04-30 DIAGNOSIS — I26.09 OTHER ACUTE PULMONARY EMBOLISM WITH ACUTE COR PULMONALE (HCC): ICD-10-CM

## 2025-04-30 DIAGNOSIS — E87.5 HYPERKALEMIA: ICD-10-CM

## 2025-04-30 DIAGNOSIS — R79.89 ELEVATED TROPONIN: ICD-10-CM

## 2025-04-30 DIAGNOSIS — S72.22XA CLOSED LEFT SUBTROCHANTERIC FEMUR FRACTURE, INITIAL ENCOUNTER (HCC): ICD-10-CM

## 2025-04-30 LAB
ALBUMIN SERPL BCP-MCNC: 3.6 G/DL (ref 3.2–4.9)
ALBUMIN/GLOB SERPL: 1.2 G/DL
ALP SERPL-CCNC: 124 U/L (ref 30–99)
ALT SERPL-CCNC: 12 U/L (ref 2–50)
ANION GAP SERPL CALC-SCNC: 12 MMOL/L (ref 7–16)
APTT PPP: 28.4 SEC (ref 24.7–36)
AST SERPL-CCNC: 23 U/L (ref 12–45)
BASOPHILS # BLD AUTO: 0.4 % (ref 0–1.8)
BASOPHILS # BLD: 0.04 K/UL (ref 0–0.12)
BILIRUB SERPL-MCNC: 0.4 MG/DL (ref 0.1–1.5)
BUN SERPL-MCNC: 48 MG/DL (ref 8–22)
CALCIUM ALBUM COR SERPL-MCNC: 10.8 MG/DL (ref 8.5–10.5)
CALCIUM SERPL-MCNC: 10.5 MG/DL (ref 8.5–10.5)
CHLORIDE SERPL-SCNC: 99 MMOL/L (ref 96–112)
CO2 SERPL-SCNC: 24 MMOL/L (ref 20–33)
CREAT SERPL-MCNC: 1.35 MG/DL (ref 0.5–1.4)
EKG IMPRESSION: NORMAL
EKG IMPRESSION: NORMAL
EOSINOPHIL # BLD AUTO: 0.06 K/UL (ref 0–0.51)
EOSINOPHIL NFR BLD: 0.6 % (ref 0–6.9)
ERYTHROCYTE [DISTWIDTH] IN BLOOD BY AUTOMATED COUNT: 48 FL (ref 35.9–50)
GFR SERPLBLD CREATININE-BSD FMLA CKD-EPI: 40 ML/MIN/1.73 M 2
GLOBULIN SER CALC-MCNC: 3.1 G/DL (ref 1.9–3.5)
GLUCOSE SERPL-MCNC: 109 MG/DL (ref 65–99)
HCT VFR BLD AUTO: 38.2 % (ref 37–47)
HGB BLD-MCNC: 12.2 G/DL (ref 12–16)
IMM GRANULOCYTES # BLD AUTO: 0.1 K/UL (ref 0–0.11)
IMM GRANULOCYTES NFR BLD AUTO: 0.9 % (ref 0–0.9)
INR PPP: 1.23 (ref 0.87–1.13)
LACTATE SERPL-SCNC: 1.6 MMOL/L (ref 0.5–2)
LYMPHOCYTES # BLD AUTO: 0.74 K/UL (ref 1–4.8)
LYMPHOCYTES NFR BLD: 7 % (ref 22–41)
MAGNESIUM SERPL-MCNC: 2.3 MG/DL (ref 1.5–2.5)
MCH RBC QN AUTO: 32.2 PG (ref 27–33)
MCHC RBC AUTO-ENTMCNC: 31.9 G/DL (ref 32.2–35.5)
MCV RBC AUTO: 100.8 FL (ref 81.4–97.8)
MONOCYTES # BLD AUTO: 1.05 K/UL (ref 0–0.85)
MONOCYTES NFR BLD AUTO: 9.9 % (ref 0–13.4)
NEUTROPHILS # BLD AUTO: 8.57 K/UL (ref 1.82–7.42)
NEUTROPHILS NFR BLD: 81.2 % (ref 44–72)
NRBC # BLD AUTO: 0 K/UL
NRBC BLD-RTO: 0 /100 WBC (ref 0–0.2)
NT-PROBNP SERPL IA-MCNC: ABNORMAL PG/ML (ref 0–125)
PLATELET # BLD AUTO: 183 K/UL (ref 164–446)
PMV BLD AUTO: 12.2 FL (ref 9–12.9)
POTASSIUM SERPL-SCNC: 4.3 MMOL/L (ref 3.6–5.5)
PROT SERPL-MCNC: 6.7 G/DL (ref 6–8.2)
PROTHROMBIN TIME: 15.5 SEC (ref 12–14.6)
RBC # BLD AUTO: 3.79 M/UL (ref 4.2–5.4)
SODIUM SERPL-SCNC: 135 MMOL/L (ref 135–145)
TROPONIN T SERPL-MCNC: 417 NG/L (ref 6–19)
UFH PPP CHRO-ACNC: <0.1 IU/ML
WBC # BLD AUTO: 10.6 K/UL (ref 4.8–10.8)

## 2025-04-30 PROCEDURE — 84443 ASSAY THYROID STIM HORMONE: CPT

## 2025-04-30 PROCEDURE — 83605 ASSAY OF LACTIC ACID: CPT

## 2025-04-30 PROCEDURE — 96365 THER/PROPH/DIAG IV INF INIT: CPT

## 2025-04-30 PROCEDURE — 5A09358 ASSISTANCE WITH RESPIRATORY VENTILATION, LESS THAN 24 CONSECUTIVE HOURS, INTERMITTENT POSITIVE AIRWAY PRESSURE: ICD-10-PCS | Performed by: STUDENT IN AN ORGANIZED HEALTH CARE EDUCATION/TRAINING PROGRAM

## 2025-04-30 PROCEDURE — 83735 ASSAY OF MAGNESIUM: CPT

## 2025-04-30 PROCEDURE — 85610 PROTHROMBIN TIME: CPT

## 2025-04-30 PROCEDURE — 84484 ASSAY OF TROPONIN QUANT: CPT

## 2025-04-30 PROCEDURE — 71275 CT ANGIOGRAPHY CHEST: CPT

## 2025-04-30 PROCEDURE — 700111 HCHG RX REV CODE 636 W/ 250 OVERRIDE (IP): Performed by: STUDENT IN AN ORGANIZED HEALTH CARE EDUCATION/TRAINING PROGRAM

## 2025-04-30 PROCEDURE — 700117 HCHG RX CONTRAST REV CODE 255: Performed by: STUDENT IN AN ORGANIZED HEALTH CARE EDUCATION/TRAINING PROGRAM

## 2025-04-30 PROCEDURE — 85025 COMPLETE CBC W/AUTO DIFF WBC: CPT

## 2025-04-30 PROCEDURE — 99291 CRITICAL CARE FIRST HOUR: CPT

## 2025-04-30 PROCEDURE — 93005 ELECTROCARDIOGRAM TRACING: CPT | Mod: TC

## 2025-04-30 PROCEDURE — 93005 ELECTROCARDIOGRAM TRACING: CPT | Mod: TC | Performed by: STUDENT IN AN ORGANIZED HEALTH CARE EDUCATION/TRAINING PROGRAM

## 2025-04-30 PROCEDURE — 96375 TX/PRO/DX INJ NEW DRUG ADDON: CPT

## 2025-04-30 PROCEDURE — 36415 COLL VENOUS BLD VENIPUNCTURE: CPT

## 2025-04-30 PROCEDURE — 80053 COMPREHEN METABOLIC PANEL: CPT

## 2025-04-30 PROCEDURE — 83880 ASSAY OF NATRIURETIC PEPTIDE: CPT

## 2025-04-30 PROCEDURE — 85520 HEPARIN ASSAY: CPT

## 2025-04-30 PROCEDURE — 85730 THROMBOPLASTIN TIME PARTIAL: CPT

## 2025-04-30 RX ORDER — DOCUSATE SODIUM 100 MG/1
100 CAPSULE, LIQUID FILLED ORAL 2 TIMES DAILY
COMMUNITY
End: 2025-05-21 | Stop reason: CLARIF

## 2025-04-30 RX ORDER — POLYETHYLENE GLYCOL 3350 17 G/17G
17 POWDER, FOR SOLUTION ORAL DAILY
COMMUNITY

## 2025-04-30 RX ORDER — HEPARIN SODIUM 5000 [USP'U]/ML
5000 INJECTION, SOLUTION INTRAVENOUS; SUBCUTANEOUS EVERY 8 HOURS
Status: ON HOLD | COMMUNITY
End: 2025-05-04

## 2025-04-30 RX ORDER — HEPARIN SODIUM 1000 [USP'U]/ML
60 INJECTION, SOLUTION INTRAVENOUS; SUBCUTANEOUS ONCE
Status: COMPLETED | OUTPATIENT
Start: 2025-04-30 | End: 2025-04-30

## 2025-04-30 RX ORDER — OXYCODONE HYDROCHLORIDE 5 MG/1
5 TABLET ORAL EVERY 4 HOURS PRN
Status: ON HOLD | COMMUNITY
End: 2025-05-04

## 2025-04-30 RX ORDER — HEPARIN SODIUM 1000 [USP'U]/ML
30 INJECTION, SOLUTION INTRAVENOUS; SUBCUTANEOUS PRN
Status: DISCONTINUED | OUTPATIENT
Start: 2025-04-30 | End: 2025-05-01

## 2025-04-30 RX ORDER — HEPARIN SODIUM 5000 [USP'U]/100ML
0-30 INJECTION, SOLUTION INTRAVENOUS CONTINUOUS
Status: DISCONTINUED | OUTPATIENT
Start: 2025-04-30 | End: 2025-05-01

## 2025-04-30 RX ORDER — HYDROXYZINE HYDROCHLORIDE 10 MG/1
10 TABLET, FILM COATED ORAL EVERY 4 HOURS PRN
COMMUNITY

## 2025-04-30 RX ADMIN — HEPARIN SODIUM 6100 UNITS: 1000 INJECTION, SOLUTION INTRAVENOUS; SUBCUTANEOUS at 23:21

## 2025-04-30 RX ADMIN — IOHEXOL 70 ML: 350 INJECTION, SOLUTION INTRAVENOUS at 22:20

## 2025-04-30 RX ADMIN — HEPARIN SODIUM 12 UNITS/KG/HR: 5000 INJECTION, SOLUTION INTRAVENOUS at 23:25

## 2025-04-30 ASSESSMENT — FIBROSIS 4 INDEX: FIB4 SCORE: 2.9

## 2025-05-01 PROBLEM — R79.89 ELEVATED TROPONIN: Status: ACTIVE | Noted: 2025-05-01

## 2025-05-01 PROBLEM — N18.32 CKD STAGE 3B, GFR 30-44 ML/MIN: Status: ACTIVE | Noted: 2025-05-01

## 2025-05-01 PROBLEM — J96.21 ACUTE ON CHRONIC HYPOXIC RESPIRATORY FAILURE (HCC): Status: ACTIVE | Noted: 2023-01-25

## 2025-05-01 PROBLEM — R79.89 ELEVATED BRAIN NATRIURETIC PEPTIDE (BNP) LEVEL: Status: ACTIVE | Noted: 2025-05-01

## 2025-05-01 PROBLEM — I26.09 OTHER PULMONARY EMBOLISM WITH ACUTE COR PULMONALE (HCC): Status: ACTIVE | Noted: 2025-05-01

## 2025-05-01 LAB
TROPONIN T SERPL-MCNC: 476 NG/L (ref 6–19)
TSH SERPL-ACNC: 0.68 UIU/ML (ref 0.38–5.33)
UFH PPP CHRO-ACNC: 0.52 IU/ML
UFH PPP CHRO-ACNC: 0.6 IU/ML

## 2025-05-01 PROCEDURE — 84484 ASSAY OF TROPONIN QUANT: CPT

## 2025-05-01 PROCEDURE — 700102 HCHG RX REV CODE 250 W/ 637 OVERRIDE(OP)

## 2025-05-01 PROCEDURE — 85520 HEPARIN ASSAY: CPT

## 2025-05-01 PROCEDURE — 99291 CRITICAL CARE FIRST HOUR: CPT | Mod: GC | Performed by: STUDENT IN AN ORGANIZED HEALTH CARE EDUCATION/TRAINING PROGRAM

## 2025-05-01 PROCEDURE — 97602 WOUND(S) CARE NON-SELECTIVE: CPT

## 2025-05-01 PROCEDURE — A9270 NON-COVERED ITEM OR SERVICE: HCPCS

## 2025-05-01 PROCEDURE — 770000 HCHG ROOM/CARE - INTERMEDIATE ICU *

## 2025-05-01 PROCEDURE — 97166 OT EVAL MOD COMPLEX 45 MIN: CPT

## 2025-05-01 PROCEDURE — 97162 PT EVAL MOD COMPLEX 30 MIN: CPT

## 2025-05-01 PROCEDURE — 700111 HCHG RX REV CODE 636 W/ 250 OVERRIDE (IP)

## 2025-05-01 RX ORDER — FERROUS SULFATE 325(65) MG
325 TABLET ORAL DAILY
Status: DISCONTINUED | OUTPATIENT
Start: 2025-05-01 | End: 2025-05-04 | Stop reason: HOSPADM

## 2025-05-01 RX ORDER — VITAMIN B COMPLEX
1000 TABLET ORAL DAILY
Status: DISCONTINUED | OUTPATIENT
Start: 2025-05-01 | End: 2025-05-04 | Stop reason: HOSPADM

## 2025-05-01 RX ORDER — ONDANSETRON 2 MG/ML
4 INJECTION INTRAMUSCULAR; INTRAVENOUS EVERY 4 HOURS PRN
Status: DISCONTINUED | OUTPATIENT
Start: 2025-05-01 | End: 2025-05-04 | Stop reason: HOSPADM

## 2025-05-01 RX ORDER — FOLIC ACID 1 MG/1
1 TABLET ORAL DAILY
Status: DISCONTINUED | OUTPATIENT
Start: 2025-05-01 | End: 2025-05-04 | Stop reason: HOSPADM

## 2025-05-01 RX ORDER — PREDNISONE 5 MG/1
5 TABLET ORAL DAILY
Status: DISCONTINUED | OUTPATIENT
Start: 2025-05-01 | End: 2025-05-04 | Stop reason: HOSPADM

## 2025-05-01 RX ORDER — ONDANSETRON 4 MG/1
4 TABLET, ORALLY DISINTEGRATING ORAL EVERY 4 HOURS PRN
Status: DISCONTINUED | OUTPATIENT
Start: 2025-05-01 | End: 2025-05-04 | Stop reason: HOSPADM

## 2025-05-01 RX ORDER — DICLOFENAC SODIUM 25 MG/1
25 TABLET, DELAYED RELEASE ORAL 2 TIMES DAILY
COMMUNITY
End: 2025-05-21 | Stop reason: CLARIF

## 2025-05-01 RX ORDER — AMOXICILLIN 250 MG
2 CAPSULE ORAL EVERY EVENING
Status: DISCONTINUED | OUTPATIENT
Start: 2025-05-01 | End: 2025-05-04 | Stop reason: HOSPADM

## 2025-05-01 RX ORDER — METOPROLOL SUCCINATE 50 MG/1
100 TABLET, EXTENDED RELEASE ORAL EVERY EVENING
Status: DISCONTINUED | OUTPATIENT
Start: 2025-05-01 | End: 2025-05-04 | Stop reason: HOSPADM

## 2025-05-01 RX ORDER — ACETAMINOPHEN 325 MG/1
650 TABLET ORAL EVERY 6 HOURS PRN
Status: DISCONTINUED | OUTPATIENT
Start: 2025-05-01 | End: 2025-05-04 | Stop reason: HOSPADM

## 2025-05-01 RX ORDER — SERTRALINE HYDROCHLORIDE 100 MG/1
100 TABLET, FILM COATED ORAL DAILY
Status: DISCONTINUED | OUTPATIENT
Start: 2025-05-01 | End: 2025-05-04 | Stop reason: HOSPADM

## 2025-05-01 RX ORDER — OXYCODONE HYDROCHLORIDE 5 MG/1
5 TABLET ORAL EVERY 4 HOURS PRN
Refills: 0 | Status: DISCONTINUED | OUTPATIENT
Start: 2025-05-01 | End: 2025-05-04 | Stop reason: HOSPADM

## 2025-05-01 RX ORDER — HYDROXYZINE HYDROCHLORIDE 10 MG/1
10 TABLET, FILM COATED ORAL EVERY 4 HOURS PRN
Status: DISCONTINUED | OUTPATIENT
Start: 2025-05-01 | End: 2025-05-04 | Stop reason: HOSPADM

## 2025-05-01 RX ORDER — POLYETHYLENE GLYCOL 3350 17 G/17G
1 POWDER, FOR SOLUTION ORAL
Status: DISCONTINUED | OUTPATIENT
Start: 2025-05-01 | End: 2025-05-04 | Stop reason: HOSPADM

## 2025-05-01 RX ORDER — HEPARIN SODIUM 1000 [USP'U]/ML
40 INJECTION, SOLUTION INTRAVENOUS; SUBCUTANEOUS PRN
Status: DISCONTINUED | OUTPATIENT
Start: 2025-05-01 | End: 2025-05-03

## 2025-05-01 RX ORDER — HEPARIN SODIUM 5000 [USP'U]/100ML
0-30 INJECTION, SOLUTION INTRAVENOUS CONTINUOUS
Status: DISCONTINUED | OUTPATIENT
Start: 2025-05-01 | End: 2025-05-03

## 2025-05-01 RX ORDER — DAPAGLIFLOZIN 10 MG/1
10 TABLET, FILM COATED ORAL DAILY
Status: DISCONTINUED | OUTPATIENT
Start: 2025-05-01 | End: 2025-05-04 | Stop reason: HOSPADM

## 2025-05-01 RX ORDER — ROSUVASTATIN CALCIUM 10 MG/1
10 TABLET, COATED ORAL EVERY EVENING
Status: DISCONTINUED | OUTPATIENT
Start: 2025-05-01 | End: 2025-05-04 | Stop reason: HOSPADM

## 2025-05-01 RX ADMIN — Medication 1000 UNITS: at 05:17

## 2025-05-01 RX ADMIN — FOLIC ACID 1 MG: 1 TABLET ORAL at 17:42

## 2025-05-01 RX ADMIN — HEPARIN SODIUM 18 UNITS/KG/HR: 5000 INJECTION, SOLUTION INTRAVENOUS at 17:52

## 2025-05-01 RX ADMIN — SERTRALINE 100 MG: 100 TABLET, FILM COATED ORAL at 05:17

## 2025-05-01 RX ADMIN — FERROUS SULFATE TAB 325 MG (65 MG ELEMENTAL FE) 325 MG: 325 (65 FE) TAB at 05:16

## 2025-05-01 RX ADMIN — PREDNISONE 5 MG: 5 TABLET ORAL at 05:17

## 2025-05-01 RX ADMIN — ROSUVASTATIN CALCIUM 10 MG: 10 TABLET, FILM COATED ORAL at 17:42

## 2025-05-01 RX ADMIN — HYDROXYZINE HYDROCHLORIDE 10 MG: 10 TABLET ORAL at 02:06

## 2025-05-01 RX ADMIN — METOPROLOL SUCCINATE 100 MG: 50 TABLET, EXTENDED RELEASE ORAL at 17:42

## 2025-05-01 RX ADMIN — HYDROXYZINE HYDROCHLORIDE 10 MG: 10 TABLET ORAL at 22:14

## 2025-05-01 RX ADMIN — DAPAGLIFLOZIN 10 MG: 10 TABLET, FILM COATED ORAL at 05:17

## 2025-05-01 RX ADMIN — OXYCODONE 5 MG: 5 TABLET ORAL at 20:32

## 2025-05-01 RX ADMIN — HEPARIN SODIUM 18 UNITS/KG/HR: 5000 INJECTION, SOLUTION INTRAVENOUS at 04:52

## 2025-05-01 ASSESSMENT — SOCIAL DETERMINANTS OF HEALTH (SDOH)
WITHIN THE LAST YEAR, HAVE YOU BEEN HUMILIATED OR EMOTIONALLY ABUSED IN OTHER WAYS BY YOUR PARTNER OR EX-PARTNER?: NO
WITHIN THE LAST YEAR, HAVE TO BEEN RAPED OR FORCED TO HAVE ANY KIND OF SEXUAL ACTIVITY BY YOUR PARTNER OR EX-PARTNER?: NO
WITHIN THE LAST YEAR, HAVE YOU BEEN AFRAID OF YOUR PARTNER OR EX-PARTNER?: NO
WITHIN THE PAST 12 MONTHS, THE FOOD YOU BOUGHT JUST DIDN'T LAST AND YOU DIDN'T HAVE MONEY TO GET MORE: NEVER TRUE
IN THE PAST 12 MONTHS, HAS THE ELECTRIC, GAS, OIL, OR WATER COMPANY THREATENED TO SHUT OFF SERVICE IN YOUR HOME?: NO
WITHIN THE LAST YEAR, HAVE YOU BEEN KICKED, HIT, SLAPPED, OR OTHERWISE PHYSICALLY HURT BY YOUR PARTNER OR EX-PARTNER?: NO
WITHIN THE PAST 12 MONTHS, YOU WORRIED THAT YOUR FOOD WOULD RUN OUT BEFORE YOU GOT THE MONEY TO BUY MORE: NEVER TRUE

## 2025-05-01 ASSESSMENT — PAIN DESCRIPTION - PAIN TYPE
TYPE: ACUTE PAIN

## 2025-05-01 ASSESSMENT — ENCOUNTER SYMPTOMS
SHORTNESS OF BREATH: 1
WHEEZING: 0
FALLS: 1
CHILLS: 0
FEVER: 0
ABDOMINAL PAIN: 0
VOMITING: 0
LOSS OF CONSCIOUSNESS: 1
WEAKNESS: 0
EYE REDNESS: 0

## 2025-05-01 ASSESSMENT — COGNITIVE AND FUNCTIONAL STATUS - GENERAL
SUGGESTED CMS G CODE MODIFIER DAILY ACTIVITY: CK
DAILY ACTIVITIY SCORE: 18
STANDING UP FROM CHAIR USING ARMS: A LITTLE
HELP NEEDED FOR BATHING: A LOT
WALKING IN HOSPITAL ROOM: A LOT
TOILETING: A LOT
DRESSING REGULAR LOWER BODY CLOTHING: A LOT
CLIMB 3 TO 5 STEPS WITH RAILING: A LOT
MOVING TO AND FROM BED TO CHAIR: A LOT
MOBILITY SCORE: 15
TURNING FROM BACK TO SIDE WHILE IN FLAT BAD: A LITTLE
MOVING FROM LYING ON BACK TO SITTING ON SIDE OF FLAT BED: A LITTLE
SUGGESTED CMS G CODE MODIFIER MOBILITY: CK

## 2025-05-01 ASSESSMENT — PATIENT HEALTH QUESTIONNAIRE - PHQ9
SUM OF ALL RESPONSES TO PHQ QUESTIONS 1-9: 5
9. THOUGHTS THAT YOU WOULD BE BETTER OFF DEAD, OR OF HURTING YOURSELF: NOT AT ALL
7. TROUBLE CONCENTRATING ON THINGS, SUCH AS READING THE NEWSPAPER OR WATCHING TELEVISION: NOT AT ALL
8. MOVING OR SPEAKING SO SLOWLY THAT OTHER PEOPLE COULD HAVE NOTICED. OR THE OPPOSITE, BEING SO FIGETY OR RESTLESS THAT YOU HAVE BEEN MOVING AROUND A LOT MORE THAN USUAL: NOT AT ALL
2. FEELING DOWN, DEPRESSED, IRRITABLE, OR HOPELESS: SEVERAL DAYS
SUM OF ALL RESPONSES TO PHQ9 QUESTIONS 1 AND 2: 2
3. TROUBLE FALLING OR STAYING ASLEEP OR SLEEPING TOO MUCH: SEVERAL DAYS
1. LITTLE INTEREST OR PLEASURE IN DOING THINGS: SEVERAL DAYS
6. FEELING BAD ABOUT YOURSELF - OR THAT YOU ARE A FAILURE OR HAVE LET YOURSELF OR YOUR FAMILY DOWN: SEVERAL DAYS
5. POOR APPETITE OR OVEREATING: NOT AT ALL
4. FEELING TIRED OR HAVING LITTLE ENERGY: SEVERAL DAYS

## 2025-05-01 ASSESSMENT — ACTIVITIES OF DAILY LIVING (ADL): TOILETING: INDEPENDENT

## 2025-05-01 ASSESSMENT — FIBROSIS 4 INDEX
FIB4 SCORE: 2.9
FIB4 SCORE: 2.9

## 2025-05-01 ASSESSMENT — GAIT ASSESSMENTS
DISTANCE (FEET): 2
ASSISTIVE DEVICE: NONE;HAND HELD ASSIST
GAIT LEVEL OF ASSIST: MODERATE ASSIST

## 2025-05-01 NOTE — ED NOTES
Bedside report given to the next shift DILIP WING for continuity of care and management.  Provided opportunity to asks questions.  Pt connected to monitor  All pt belongings at bedside    Contraptions: IV gauge 20 LT AC/ gauge 20 RT FA  Alert and Oriented: x 4  Ambulatory: No  Oxygen Treatment: 13 LPM via oxymask   Pending: Admission

## 2025-05-01 NOTE — ASSESSMENT & PLAN NOTE
8 L O2 at baseline, now requiring 13 L to maintain saturations  Likely secondary to acute bilateral PEs  Titrate down O2 as clinically tolerated  Hold off diuresis overnight, if patient remains clinically stable, would likely benefit from diuresis prior to discharge

## 2025-05-01 NOTE — ED NOTES
Received a call from laboratory ([id number]) for a critical result of Troponin: 417, read back done and he acknowledged  Notified to  Dr Rousseau and he acknowledged

## 2025-05-01 NOTE — THERAPY
Physical Therapy   Initial Evaluation     Patient Name: Clau Vences  Age:  80 y.o., Sex:  female  Medical Record #: [id number]  Today's Date: 5/1/2025     Precautions  Precautions: Fall Risk;Weight Bearing As Tolerated Left Lower Extremity    Assessment  Patient is 80 y.o. female w/ hx of ORIF left distal femur fx in April, WBAT w/ subsequent d/c to snf.  She is admitted from said snf w/ syncope, respiratory failure and possible fall per chart review.  Prior to her admit to snf, she was living alone in a single story house w/ one step to enter.  She used a fww or cane prn.  Today, she is rec'd alert, in bed, daughter at bedside.  She is fearful, but willing to participate.  Mobility as noted below.  Per nsg and family, the most she did at snf was to stand.  Today, she was able to stand and take several side steps to her left.  PT will follow and address goals noted below.  Plan    Physical Therapy Initial Treatment Plan   Treatment Plan : Bed Mobility, Gait Training, Stair Training, Therapeutic Activities  Treatment Frequency: 4 Times per Week  Duration: Until Therapy Goals Met    DC Equipment Recommendations: Unable to determine at this time  Discharge Recommendations: Recommend post-acute placement for additional physical therapy services prior to discharge home          Objective       05/01/25 1045   Prior Living Situation   Housing / Facility 1 Story House   Steps Into Home 1   Steps In Home 0   Rail None   Equipment Owned Front-Wheel Walker;Single Point Cane   Lives with - Patient's Self Care Capacity Alone and Able to Care For Self   Prior Level of Functional Mobility   Bed Mobility Independent   Transfer Status Independent   Ambulation Independent   Assistive Devices Used   (fww vs cane prn)   Stairs Independent   Strength Lower Body   Comments RLE grossly 5/5, LLE grossly 4/5   Balance Assessment   Sitting Balance (Static) Fair +   Sitting Balance (Dynamic) Fair +   Standing Balance (Static) Poor -    Standing Balance (Dynamic) Trace +   Weight Shift Sitting Fair   Weight Shift Standing Poor   Bed Mobility    Supine to Sit Minimal Assist   Sit to Supine Minimal Assist   Gait Analysis   Gait Level Of Assist Moderate Assist   Assistive Device None;Hand Held Assist   Distance (Feet) 2   Weight Bearing Status WBAT LLE   Comments sidestepping left   Functional Mobility   Sit to Stand Minimal Assist   Bed, Chair, Wheelchair Transfer Refused   Short Term Goals    Short Term Goal # 1 Pt to move supine to/from eob w/ spv in 6 visits   Short Term Goal # 2 Pt to move sit to/from stand w/ spv in 6 visits   Short Term Goal # 3 Pt to ambulate 75 ft w/ fww and spv in 6 visits   Short Term Goal # 4 Pt to move up/down one step w/ spv in 6 visits (if pt to d/c home)   Physical Therapy Initial Treatment Plan    Treatment Plan  Bed Mobility;Gait Training;Stair Training;Therapeutic Activities   Treatment Frequency 4 Times per Week   Duration Until Therapy Goals Met   Problem List    Problems Impaired Bed Mobility;Impaired Transfers;Impaired Ambulation;Impaired Balance;Decreased Activity Tolerance

## 2025-05-01 NOTE — ED NOTES
Cleaned the pt, pass stool (greenish, semi formed stool)  Changed linens  Applied pure wick and connected to suction bottle  Placed on comfortable position

## 2025-05-01 NOTE — ASSESSMENT & PLAN NOTE
2 syncopal episodes in the last few days at the SNF  Likely secondary to the significant pulmonary embolism burden on patient's heart  Telemetry to monitor for arrhythmias  Echo pending  Check TSH

## 2025-05-01 NOTE — PROGRESS NOTES
.4 Eyes Skin Assessment Completed by DILIP Sandoval and DILIP Box.    Head WDL  Ears WDL  Nose WDL  Mouth WDL  Neck WDL  Breast/Chest Scar  Shoulder Blades WDL  Spine WDL  (R) Arm/Elbow/Hand Redness, Blanching, and Bruising  (L) Arm/Elbow/Hand Redness and Blanching  Abdomen WDL  Groin Excoriation  Scrotum/Coccyx/Buttocks Redness and Blanching, bruising, non blanching redness in the middle   (R) Leg Scar  (L) Leg Scab and Abrasion  (R) Heel/Foot/Toe Redness and Blanching DTI   (L) Heel/Foot/Toe Redness and Blanching          Devices In Places ECG, Blood Pressure Cuff, Pulse Ox, and Oxy Mask      Interventions In Place Heel Mepilex, Sacral Mepilex, TAP System, Pillows, Low Air Loss Mattress, Dri-Wilberto Pads, Heels Loaded W/Pillows, and Pressure Redistribution Mattress    Possible Skin Injury Yes    Pictures Uploaded Into Epic Yes  Wound Consult Placed N/A-already placed   RN Wound Prevention Protocol Ordered Yes

## 2025-05-01 NOTE — PROGRESS NOTES
4 Eyes Skin Assessment Completed by DILIP Shirley and DILIP Anedrson.    Head WDL  Ears Redness and Blanching  Nose WDL  Mouth WDL  Neck WDL  Breast/Chest Redness  Shoulder Blades WDL  Spine WDL  (R) Arm/Elbow/Hand Bruising  (L) Arm/Elbow/Hand Bruising  Abdomen Redness, Abrasion, and Bruising   Groin Redness and Excoriation  Scrotum/Coccyx/Buttocks Redness, Blanching, Discoloration, and Scar  (R) Leg WDL  (L) Leg Scab and Bruising thigh and knee and hip   (R) Heel/Foot/Toe Redness, Discoloration, and Boggy heel  (L) Heel/Foot/Toe WDL          Devices In Places ECG, Blood Pressure Cuff, Pulse Ox, SCD's, and Oxy Mask Purewick      Interventions In Place NC W/Ear Foams, Heel Mepilex, Sacral Mepilex, TAP System, Pillows, Q2 Turns, Low Air Loss Mattress, and Heels Loaded W/Pillows    Possible Skin Injury Yes    Pictures Uploaded Into Epic Yes  Wound Consult Placed Yes  RN Wound Prevention Protocol Ordered Yes

## 2025-05-01 NOTE — THERAPY
Occupational Therapy   Initial Evaluation     Patient Name: Clau Vences  Age:  80 y.o., Sex:  female  Medical Record #: [id number]  Today's Date: 5/1/2025     Precautions  Precautions: Fall Risk, Weight Bearing As Tolerated Left Lower Extremity    Assessment  Patient is 80 y.o. female with a diagnosis of syncope, resp failure, resent L hip ORIF.  Pt currently limited by decreased functional mobility, activity tolerance, cognition, sensation, strength, AROM, coordination, balance, and pain which are affecting pt's ability to complete ADLs/IADLs at baseline. Pt would benefit from OT services in the acute care setting to maximize functional recovery.      Plan    Occupational Therapy Initial Treatment Plan   Treatment Interventions: (P) Self Care / Activities of Daily Living, Therapeutic Activity  Treatment Frequency: (P) 3 Times per Week  Duration: (P) Until Therapy Goals Met       Discharge Recommendations: (P) Recommend post-acute placement for additional occupational therapy services prior to discharge home        05/01/25 0904   Prior Living Situation   Housing / Facility 1 Story House   Steps Into Home 1   Steps In Home 0   Rail None   Equipment Owned Front-Wheel Walker;Single Point Cane   Lives with - Patient's Self Care Capacity Alone and Able to Care For Self   Prior Level of ADL Function   Self Feeding Independent   Grooming / Hygiene Independent   Bathing Independent   Dressing Independent   Toileting Independent   Strength Upper Body   Upper Body Strength  X   Comments 3/5 B UEs   Balance Assessment   Sitting Balance (Static) Fair +   Sitting Balance (Dynamic) Fair +   Standing Balance (Static) Poor -   Standing Balance (Dynamic) Trace +   Weight Shift Sitting Fair   Weight Shift Standing Poor   ADL Assessment   Grooming Supervision   Upper Body Dressing Supervision   Lower Body Dressing Moderate Assist   Toileting Moderate Assist   Functional Mobility   Sit to Stand Minimal Assist   Bed, Chair,  Wheelchair Transfer Unable to Participate   Short Term Goals   Short Term Goal # 1 supervised with LB dressing   Short Term Goal # 2 supervised with ADL txfs   Occupational Therapy Initial Treatment Plan    Treatment Interventions Self Care / Activities of Daily Living;Therapeutic Activity   Treatment Frequency 3 Times per Week   Duration Until Therapy Goals Met   Anticipated Discharge Equipment and Recommendations   Discharge Recommendations Recommend post-acute placement for additional occupational therapy services prior to discharge home   Session Information   Date / Session Number  5/1 1 (1/3 5/7)

## 2025-05-01 NOTE — ASSESSMENT & PLAN NOTE
Likely type II demand ischemia in the setting of bilateral acute PEs and right heart strain, on her background of CKD  EKG without any ST changes  Trend

## 2025-05-01 NOTE — ASSESSMENT & PLAN NOTE
Patient does not appear overtly volume overloaded  Elevated BNP likely secondary to right heart strain from acute bilateral PEs

## 2025-05-01 NOTE — ED NOTES
Pt transported upstairs with oxygen and on cardiac monitor with floor staff. VSS, NAD. All belongings sent with patient. Handoff completed via bedside, extension provided for any further questions.

## 2025-05-01 NOTE — CARE PLAN
The patient is Watcher - Medium risk of patient condition declining or worsening    Shift Goals  Clinical Goals: Optimize Oxygenation    Progress made toward(s) clinical / shift goals:        Optimize Oxygenation  Problem: Respiratory:  Goal: Respiratory status will improve  Outcome: Progressing  - Titrating O2      Problem: Communication  Goal: The ability to communicate needs accurately and effectively will improve  Outcome: Progressing         Problem: Pain Management  Goal: Pain level will decrease to patient's comfort goal  Outcome: Progressing

## 2025-05-01 NOTE — ASSESSMENT & PLAN NOTE
Provoked event as pt has had a L lower ext Fx and has been mostly bed ridden at SNF.    She has an Hx of a previous DVT with lower extremity trauma treated with 3 months of apixaban  Bilateral PEs with right heart strain noted on CT  Recent hip surgery  PESI score 120  DC heparin and transition to apixaban  After consultation and given risk/benefit evaluation will proceed with medical therapy

## 2025-05-01 NOTE — ED TRIAGE NOTES
Chief Complaint   Patient presents with    Syncope     Pt BIBA for syncope episode, no fall, no trauma. Episode described as blank stare by patient where her oxygen went into 80s and HR into 40s. EMS stated 3 of these episodes over last few days. SNF administered Narcan. Pt on 8L oxymask from facility, with oxygen recently being increased at facility.       Pt scores low in SI screen scale for stating she has depression due to her current medical state and in last month has had thoughts of wishing she was dead. Pt states she would like to speak with psych. Pt denies SI.     /74   Pulse 84   Temp 36.2 °C (97.1 °F) (Temporal)   Resp 20   SpO2 94%

## 2025-05-01 NOTE — ED NOTES
Bedside report received from off going RN/tech: Angel RN, assumed care of patient.  POC discussed with patient. Call light within reach, all needs addressed at this time.       Fall risk interventions in place: Move the patient closer to the nurse's station, Patient's personal possessions are with in their safe reach, Place socks on patient, Place fall risk sign on patient's door, and Keep floor surfaces clean and dry (all applicable per [facility] Fall risk assessment)   Continuous monitoring: Cardiac Leads, Pulse Ox, or Blood Pressure  IVF/IV medications: Infusion per MAR (List Med(s)) Heparin 24.48 mL/hr  Oxygen: How many liters 13 L oxymask  Bedside sitter: Not Applicable - low SI risk, no sitter req'd  Isolation: Not Applicable    '

## 2025-05-01 NOTE — ED PROVIDER NOTES
"ED Provider Note    CHIEF COMPLAINT  Chief Complaint   Patient presents with    Syncope     Pt BIBA for syncope episode, no fall, no trauma. Episode described as blank stare by patient where her oxygen went into 80s and HR into 40s. EMS stated 3 of these episodes over last few days. SNF administered Narcan. Pt on 8L oxymask from facility, with oxygen recently being increased at facility.        EXTERNAL RECORDS REVIEWED  Inpatient admission discharge summary 4/5/2025, admitted to the hospital for left subtrochanteric femur fracture status post ORIF left distal femur 4/6/2025    History of hyperlipidemia HFpEF chronic steroid therapy for osteoarthritis related pain    Discharged to [facility]    HPI/ROS  LIMITATION TO HISTORY   Hard of hearing  OUTSIDE HISTORIAN(S):  Daughter Celeste at bedside providing clinically relevant collateral history    Clau Vences is a 80 y.o. female with a past medical history of hypertension, sleep apnea, osteoporosis presenting to the emergency department for evaluation of several syncopal episodes.  She is currently at [facility] getting physical therapy after a left distal femur periprosthetic fracture earlier this month, according to daughter she was undergoing physical therapy today when she became unresponsive transiently.  She was noted to be hypotensive and hypoxic during this episode, she was given Narcan by her skilled nursing facility, she was placed on oxygen and she was sent to the emergency department for evaluation.  Daughter says that she has had several \"panic attacks\" during her physical therapy over the last several days, patient says that she just becomes short of breath during these episodes, she does not really have a history of anxiety attacks or panic attacks.  She says during these episodes she feels short of breath, she denies chest pain, feels like she is going to pass out.  Thinks that she passed out today.  In " the emergency department now, she is feeling slightly better.  Daughter notes that she is quite pale and says that she was very sweaty during the episode that occurred earlier today.    Patient says that she has not been eating much over the last several weeks because the food at her skilled nursing facility is terrible.    PAST MEDICAL HISTORY   has a past medical history of Anesthesia, Arthritis, Back pain, Dyslipidemia, Stateless measles, Heart murmur (09/13/2017), Hypertension, Influenza, Mumps, Other specified symptom associated with female genital organs, Painful joint, Psychiatric problem, Sleep apnea, Snoring, Sore muscles, Tonsillitis, and Unspecified cataract.    SURGICAL HISTORY   has a past surgical history that includes gastroscopy with biopsy (7/2/2014); other orthopedic surgery (2006); other; hysterectomy robotic (10/23/2014); tonsillectomy and adenoidectomy; other abdominal surgery; appendectomy; knee arthroplasty total (Right, 12/27/2017); breast reduction (Bilateral); remv 2nd cataract,corn-scler sectn; hysterectomy laparoscopy; tonsillectomy; arthroscopy, knee; exploratory of abdomen (N/A, 1/28/2023); and orif, fracture, femur (Left, 4/6/2025).    FAMILY HISTORY  Family History   Problem Relation Age of Onset    Breast Cancer Mother     Cancer Mother 65        Breast    Cancer Father         Colon    Alcohol/Drug Father     Colon Cancer Father     Hyperlipidemia Brother     Heart Disease Brother         arrythmia       SOCIAL HISTORY  Social History     Tobacco Use    Smoking status: Never    Smokeless tobacco: Never   Vaping Use    Vaping status: Never Used   Substance and Sexual Activity    Alcohol use: Yes     Alcohol/week: 0.6 - 1.2 oz     Types: 1 - 2 Glasses of wine per week     Comment: occ.    Drug use: No    Sexual activity: Not on file     Comment: , 2 children       CURRENT MEDICATIONS  Home Medications       Reviewed by Felicia Garcia (Pharmacy Tech) on 04/30/25 at 2310  Med  "List Status: Complete     Medication Last Dose Status   acetaminophen (TYLENOL) 500 MG Tab 4/29/2025 Active   docusate sodium (COLACE) 100 MG Cap 4/29/2025 Active   Empagliflozin (JARDIANCE) 10 MG Tab tablet 4/29/2025 Active   EnovaRX-Diclofenac Sodium 2.5 % Cream 4/29/2025 Active   ferrous sulfate 325 (65 Fe) MG tablet 4/29/2025 Active   folic acid (FOLVITE) 800 MCG tablet 4/28/2025 Active   furosemide (LASIX) 20 MG Tab  Active   hydrOXYzine HCl (ATARAX) 10 MG Tab 4/30/2025 Active   metoprolol SR (TOPROL XL) 100 MG TABLET SR 24 HR 4/29/2025 Active   oxyCODONE immediate-release (ROXICODONE) 5 MG Tab 4/29/2025 Active   polyethylene glycol/lytes (MIRALAX) 17 g Pack 4/29/2025 Active   predniSONE (DELTASONE) 5 MG Tab 4/29/2025 Active   rosuvastatin (CRESTOR) 10 MG Tab 4/29/2025 Active   senna-docusate (PERICOLACE OR SENOKOT S) 8.6-50 MG Tab 4/28/2025 Active   sertraline (ZOLOFT) 100 MG Tab 4/29/2025 Active   spironolactone (ALDACTONE) 25 MG Tab  Active   valsartan (DIOVAN) 320 MG tablet  Active   vitamin D3 (CHOLECALCIFEROL) 1000 Unit (25 mcg) Tab 4/29/2025 Active                  Audit from Redirected Encounters    **Home medications have not yet been reviewed for this encounter**         ALLERGIES  Allergies   Allergen Reactions    Mirabegron Unspecified     Dizziness & lightheadedness    Tramadol Nausea     Nausea and somnolence       PHYSICAL EXAM  VITAL SIGNS: /70   Pulse 81   Temp 35.9 °C (96.7 °F) (Temporal)   Resp 18   Ht 1.702 m (5' 7\")   Wt 100 kg (221 lb 1.9 oz)   SpO2 94%   BMI 34.63 kg/m²    General: Pale and slightly clammy  Neuro: oriented x 3, moving all extremities.   HEENT:   - Head: Normocephalic, atraumatic no cephalohematoma  - Eyes: PERRL conjunctival pallor  - Ears/Nose: normal external nose and ears  - Mouth: moist mucosal membranes, pale mucosal membranes  Resp: clear to auscultation, no increased work of breathing  CV: Regular rate and rhythm, systolic murmur noted  Abd: Soft, " non-tender, non-distended  Extremities: No tenderness to palpation of the surgical wounds to the left thigh  Psych: lucid and conversational         DIAGNOSTIC STUDIES / PROCEDURES    LABS and ECG  Results for orders placed or performed during the hospital encounter of 25   EKG    Collection Time: 25  6:31 PM   Result Value Ref Range    Report       [facility] Emergency Dept.    Test Date:  2025  Pt Name:    JACQUE SHEN                Department: ER  MRN:        [id number]                      Room:       [facility]  Gender:     Female                       Technician: [id number]  :        1945                   Requested By:ER TRIAGE PROTOCOL  Order #:    [id number]                    Reading MD: Seth Rousseau    Measurements  Intervals                                Axis  Rate:       79                           P:          58  CA:         219                          QRS:        -66  QRSD:       106                          T:          133  QT:         351  QTc:        403    Interpretive Statements  Sinus rhythm  Borderline prolonged CA interval  Probable left atrial enlargement  LVH with secondary repolarization abnormality  no st or t changes  Electronically Signed On 2025 18:31:24 PDT by Seth Rousseau     CBC WITH DIFFERENTIAL    Collection Time: 25  7:29 PM   Result Value Ref Range    WBC 10.6 4.8 - 10.8 K/uL    RBC 3.79 (L) 4.20 - 5.40 M/uL    Hemoglobin 12.2 12.0 - 16.0 g/dL    Hematocrit 38.2 37.0 - 47.0 %    .8 (H) 81.4 - 97.8 fL    MCH 32.2 27.0 - 33.0 pg    MCHC 31.9 (L) 32.2 - 35.5 g/dL    RDW 48.0 35.9 - 50.0 fL    Platelet Count 183 164 - 446 K/uL    MPV 12.2 9.0 - 12.9 fL    Neutrophils-Polys 81.20 (H) 44.00 - 72.00 %    Lymphocytes 7.00 (L) 22.00 - 41.00 %    Monocytes 9.90 0.00 - 13.40 %    Eosinophils 0.60 0.00 - 6.90 %    Basophils 0.40 0.00 - 1.80 %    Immature Granulocytes 0.90 0.00 - 0.90 %    Nucleated RBC 0.00 0.00 - 0.20 /100 WBC     Neutrophils (Absolute) 8.57 (H) 1.82 - 7.42 K/uL    Lymphs (Absolute) 0.74 (L) 1.00 - 4.80 K/uL    Monos (Absolute) 1.05 (H) 0.00 - 0.85 K/uL    Eos (Absolute) 0.06 0.00 - 0.51 K/uL    Baso (Absolute) 0.04 0.00 - 0.12 K/uL    Immature Granulocytes (abs) 0.10 0.00 - 0.11 K/uL    NRBC (Absolute) 0.00 K/uL   COMP METABOLIC PANEL    Collection Time: 04/30/25  7:29 PM   Result Value Ref Range    Sodium 135 135 - 145 mmol/L    Potassium 4.3 3.6 - 5.5 mmol/L    Chloride 99 96 - 112 mmol/L    Co2 24 20 - 33 mmol/L    Anion Gap 12.0 7.0 - 16.0    Glucose 109 (H) 65 - 99 mg/dL    Bun 48 (H) 8 - 22 mg/dL    Creatinine 1.35 0.50 - 1.40 mg/dL    Calcium 10.5 8.5 - 10.5 mg/dL    Correct Calcium 10.8 (H) 8.5 - 10.5 mg/dL    AST(SGOT) 23 12 - 45 U/L    ALT(SGPT) 12 2 - 50 U/L    Alkaline Phosphatase 124 (H) 30 - 99 U/L    Total Bilirubin 0.4 0.1 - 1.5 mg/dL    Albumin 3.6 3.2 - 4.9 g/dL    Total Protein 6.7 6.0 - 8.2 g/dL    Globulin 3.1 1.9 - 3.5 g/dL    A-G Ratio 1.2 g/dL   TROPONIN    Collection Time: 04/30/25  7:29 PM   Result Value Ref Range    Troponin T 417 (H) 6 - 19 ng/L   proBrain Natriuretic Peptide, NT    Collection Time: 04/30/25  7:29 PM   Result Value Ref Range    NT-proBNP 45048 (H) 0 - 125 pg/mL   MAGNESIUM    Collection Time: 04/30/25  7:29 PM   Result Value Ref Range    Magnesium 2.3 1.5 - 2.5 mg/dL   ESTIMATED GFR    Collection Time: 04/30/25  7:29 PM   Result Value Ref Range    GFR (CKD-EPI) 40 (A) >60 mL/min/1.73 m 2   LACTIC ACID    Collection Time: 04/30/25  7:29 PM   Result Value Ref Range    Lactic Acid 1.6 0.5 - 2.0 mmol/L   TSH    Collection Time: 04/30/25  7:29 PM   Result Value Ref Range    TSH 0.675 0.380 - 5.330 uIU/mL   EKG (NOW)    Collection Time: 04/30/25  8:57 PM   Result Value Ref Range    Report       [facility] Emergency Dept.    Test Date:  2025-04-30  Pt Name:    JACQUE SHEN                Department: ER  MRN:        [id number]                      Room:         32  Gender:     Female                       Technician: [id number]  :        1945                   Requested By:MCKENNA MENDEZ  Order #:    [id number]                    Reading MD:    Measurements  Intervals                                Axis  Rate:       81                           P:          58  RI:         224                          QRS:        -64  QRSD:       97                           T:          132  QT:         349  QTc:        405    Interpretive Statements  Sinus rhythm  Prolonged RI interval  Probable left atrial enlargement  LVH with secondary repolarization abnormality  Anterolateral infarct, age indeterminate  Compared to ECG 2025 17:53:39  Myocardial infarct finding now present     aPTT    Collection Time: 25 11:13 PM   Result Value Ref Range    APTT 28.4 24.7 - 36.0 sec   Prothrombin Time    Collection Time: 25 11:13 PM   Result Value Ref Range    PT 15.5 (H) 12.0 - 14.6 sec    INR 1.23 (H) 0.87 - 1.13   Heparin Xa (Unfractionated)    Collection Time: 25 11:13 PM   Result Value Ref Range    Heparin Xa (UFH) <0.10 IU/mL       RADIOLOGY  I have independently interpreted the diagnostic imaging associated with this visit and am waiting the final reading from the radiologist.   My preliminary interpretation is as follows:   - Reviewed CT pulmonary angiogram  shows bilateral pulmonary embolism with large clot burden and evidence of right heart strain  Radiologist interpretation:   CT-CTA CHEST PULMONARY ARTERY W/ RECONS   Final Result      1.  Exam is positive for bilateral pulmonary emboli.      2.  RV LV ratio is elevated consistent with right heart strain.      3.  Minimal scattered pulmonary opacifications could be due to atelectasis or developing pulmonary infarcts.         These findings were discussed with MCKENNA MENDEZ on 2025.      EC-ECHOCARDIOGRAM COMPLETE W/O CONT    (Results Pending)           MEDICAL DECISION MAKING      ED COURSE AND  "PLAN    80-year-old female presenting to the emergency department for syncopal episode which occurred earlier today while she was undergoing physical therapy.  Daughter reports that her skilled nursing facility has described \"panic attacks\" during physical therapy sessions but I am concerned that she might be having presyncopal episodes when she is exerting herself.  On exam she looks quite pale she has conjunctival pallor and she is slightly diaphoretic.  Her EKG obtained here shows no evidence of an obvious dysrhythmia or ischemic changes.  I ordered labs, electrolytes, troponin and BNP, CT pulmonary angiogram to further workup her syncope and hypoxia earlier today.    Trope is elevated at 417, repeat EKG shows no evidence of STEMI.  BNP markedly elevated at 28,000.  CT pulm angiogram shows bilateral  pulmonary embolism with extensive clot burden and marked RV strain with RV/LV ratio of 3.8  Her Byron score is intermediate risk.  I discussed case with intensivist Dr. Jain who felt patient was appropriate for IMCU, at this point I did not feel that she needed to undergo thrombectomy.  I started her on heparin and discussed case with [facility] resident for admission             Procedures:    CRITICAL CARE  The very real possibilty of a deterioration of this patient's condition required the highest level of my preparedness for sudden, emergent intervention.  I provided critical care services, which included medication orders, frequent reevaluations of the patient's condition and response to treatment, ordering and reviewing test results, and discussing the case with various consultants.  The critical care time associated with the care of the patient was 45 minutes. Review chart for interventions. This time is exclusive of any other billable procedures.       Point of Care Ultrasound    ED POINT OF CARE ULTRASOUND: LIMITED CARDIAC    Indication for exam: Pulmonary embolism evaluate right heart strain  LVEF: " Diminished  Pericardial Effusion:not present  RV Strain:present  Pulmonary B Lines:not present    Image retained through [facility] as seen below:      Additional interpretation:    This study is a limited ultrasound examination performed and interpreted to evaluate for limited conditions as outlined above. There may be other clinically important information contained in the images that is outside this scope. When clinically warranted, a comprehensive ultrasound through the appropriate department is considered.      ----------------------------------------------------------------------------------  DISCUSSIONS    I have discussed management of the patient with the following physicians and PAULA's: Intensivist, [facility] resident    Discussion of management with other [facility] or appropriate source(s): ED pharmacist    Escalation of care considered, and ultimately not performed: Considered IR consultation for thrombectomy    Barriers to care at this time, including but not limited to:     Decision tools and prescription drugs considered including, but not limited to:     FINAL IMPRESSION    1. Other acute pulmonary embolism with acute cor pulmonale (HCC)    2. Acute respiratory failure with hypoxia (HCC)    3. Elevated troponin        Current Discharge Medication List          No follow-up provider specified.      DISPOSITION      Admission: The patient will be admitted for further evaluation and treatment. Discussed case with consultants and relayed all necessary information.      This chart was dictated using an electronic voice recognition software. The chart has been reviewed and edited but there is still possibility for dictation errors due to limitation of software.    Seth Rousseau DO 4/30/2025

## 2025-05-01 NOTE — WOUND TEAM
[facility]  Inpatient Services  Initial Wound and Skin Care Evaluation    Admission Date: 4/30/2025     Last order of IP CONSULT TO WOUND CARE was found on 5/1/2025 from Hospital Encounter on 4/30/2025     HPI, PMH, SH: Reviewed    Past Surgical History:   Procedure Laterality Date    ORIF, FRACTURE, FEMUR Left 4/6/2025    Procedure: ORIF, FRACTURE, FEMUR, DISTAL;  Surgeon: Poncho Negrete M.D.;  Location: SURGERY [facility];  Service: Orthopedics    NH EXPLORATORY OF ABDOMEN N/A 1/28/2023    Procedure: EX LAP;  Surgeon: Mike Valles M.D.;  Location: SURGERY [facility];  Service: Gastroenterology    KNEE ARTHROPLASTY TOTAL Right 12/27/2017    HYSTERECTOMY ROBOTIC  10/23/2014    Performed by Smith Lyons M.D. at SURGERY [facility] ORS    GASTROSCOPY WITH BIOPSY  7/2/2014    Performed by Sky Vila M.D. at [facility] ORS    OTHER ORTHOPEDIC SURGERY  2006    left total knee    APPENDECTOMY      ARTHROSCOPY, KNEE      HYSTERECTOMY LAPAROSCOPY      OTHER      meghan cataracts    OTHER ABDOMINAL SURGERY      Resection of pelvic mass, uterus and ovaries    NH BREAST REDUCTION Bilateral     1982    NH REMV 2ND CATARACT,CORN-SCLER SECTN      TONSILLECTOMY      TONSILLECTOMY AND ADENOIDECTOMY       Social History     Tobacco Use    Smoking status: Never    Smokeless tobacco: Never   Substance Use Topics    Alcohol use: Yes     Alcohol/week: 0.6 - 1.2 oz     Types: 1 - 2 Glasses of wine per week     Comment: occ.     Chief Complaint   Patient presents with    Syncope     Pt BIBA for syncope episode, no fall, no trauma. Episode described as blank stare by patient where her oxygen went into 80s and HR into 40s. EMS stated 3 of these episodes over last few days. SNF administered Narcan. Pt on 8L oxymask from facility, with oxygen recently being increased at facility.      Diagnosis: Other pulmonary embolism with acute cor pulmonale (HCC) [I26.09]    Unit where seen by Wound  Team: [id number]     WOUND CONSULT RELATED TO:  Sacrum, Heels, and BL Legs    WOUND TEAM PLAN OF CARE - Frequency of Follow-up:   Nursing to follow dressing orders written for wound care. Contact wound team if area fails to progress, deteriorates or with any questions/concerns if something comes up before next scheduled follow up (See below as to whether wound is following and frequency of wound follow up)   Not following, consult as needed  - Sacrum, Heels, and BL Legs    WOUND HISTORY:     Additional Attending Comments:   Brought in from SNF with syncope and found to have hypoxemic respiratory failure.  CTA chest shows bilateral PE with right heart strain and likely pulmonary infarcts.  Will be admitted to the [facility], started on heparin drip.    History of Presenting Illness  Clau Vences is a 80 y.o. female who presented 4/30/2025 with syncope and acute on chronic hypoxic respiratory failure.  PMH HFpEF with diastolic dysfunction, HLD, anxiety, chronic prednisone use for osteoarthritis related pain.  Patient was recently discharged to a SNF from [facility] a few weeks ago following fixation of the left distal femur fracture.  In the last few days at the SNF, patient reportedly had 2 syncopal episodes and possibly 1 fall.  There was also reported that her oxygen dipped into the 80s on her baseline of 8 L.  Patient reports shortness of breath, denies chest pain, palpitations, abdominal pain, changes to urine/BMs.  On arrival to ED, patient was mildly hypertensive, afebrile, heart rate in the 80s, requiring 13 L via oxy mask to maintain saturations, up from a baseline of 8 L.  CBC and CMP were largely unremarkable for acute abnormalities.  Troponin 417, BMP 28,000.  CTA notable for bilateral pulmonary emboli with right heart strain.       WOUND ASSESSMENT/LDA  Wound 05/01/25 Pressure Injury Heel Right POA stage 2 (Active)   Date First Assessed/Time First Assessed: 05/01/25 1430   Present on Original Admission: Yes   Hand Hygiene Completed: Yes  Primary Wound Type: Pressure Injury  Location: Heel  Laterality: Right  Wound Description (Comments): POA stage 2      Assessments 5/1/2025  3:00 PM   Wound Image      Site Assessment Intact;Clean;Dry;Other (Comment) (dried blister)   Periwound Assessment Blanchable erythema   Margins Defined edges;Attached edges   Closure Open to air   Drainage Amount None   Treatments Cleansed;Site care;Offloading   Offloading/DME Other (comment) (dressing)   Wound Cleansing Approved Wound Cleanser   Dressing Status Intact   Dressing Changed Changed   Dressing Cleansing/Solutions Not Applicable   Dressing Options Offloading Dressing - Heel   Dressing Change/Treatment Frequency Every 72 hrs, and As Needed   NEXT Dressing Change/Treatment Date 05/04/25   NEXT Weekly Photo (Inpatient Only) 05/07/25   Wound Team Following Not following   Pressure Injury Stage Stage 2   Wound Length (cm) 0.8 cm   Wound Width (cm) 1 cm   Wound Surface Area (cm^2) 0.8 cm^2   Shape oval   Wound Odor None   WOUND NURSE ONLY - Time Spent with Patient (mins) 40       Wound 05/01/25 Pressure Injury Sacrum DTI POA (Active)   Date First Assessed/Time First Assessed: 05/01/25 1430   Present on Original Admission: Yes  Hand Hygiene Completed: Yes  Primary Wound Type: Pressure Injury  Location: Sacrum  Wound Description (Comments): DTI POA      Assessments 5/1/2025  3:00 PM   Wound Image      Site Assessment Purple;Pink;Light Purple;Intact   Periwound Assessment Non-blanchable erythema;Purple;Pink   Margins Attached edges;Defined edges   Closure Open to air   Drainage Amount None   Treatments Offloading   Dressing Changed Changed   Dressing Options Offloading Dressing - Sacral   Dressing Change/Treatment Frequency Every 72 hrs, and As Needed   NEXT Dressing Change/Treatment Date 05/04/25   NEXT Weekly Photo (Inpatient Only) 05/07/25   Wound Team Following Not following   Pressure Injury Stage Deep Tissue   Wound Length (cm) 5 cm    Wound Width (cm) 10 cm   Wound Surface Area (cm^2) 50 cm^2   Shape irregular - yet bilateral   Wound Odor None        Vascular:    BLANE:   No results found.    Lab Values:    Lab Results   Component Value Date/Time    WBC 10.6 04/30/2025 07:29 PM    RBC 3.79 (L) 04/30/2025 07:29 PM    HEMOGLOBIN 12.2 04/30/2025 07:29 PM    HEMATOCRIT 38.2 04/30/2025 07:29 PM    CREACTPROT <0.30 02/15/2022 11:30 AM    SEDRATEWES 7 02/15/2022 11:30 AM    HBA1C 5.5 08/21/2024 09:15 AM    PLATELETCT 183 04/30/2025 07:29 PM         Culture Results show:  No results found for this or any previous visit (from the past 720 hours).    Pain Level/Medicated:  None, Tolerated without pain medication       INTERVENTIONS BY WOUND TEAM:  Chart and images reviewed. Discussed with bedside RN. All areas of concern (based on picture review, LDA review and discussion with bedside RN) have been thoroughly assessed. Documentation of areas based on significant findings. This RN in to assess patient. Performed standard wound care which includes appropriate positioning, dressing removal and non-selective debridement. Pictures and measurements obtained weekly if/when required.    Wound:  Sacrum  Preparation for Dressing removal: Removed without difficulty  Primary Dressing:  Offloading dressing     Wound:  R heel  Preparation for Dressing removal: Removed without difficulty  Primary Dressing:  Offloading dressing    Advanced Wound Care Discharge Planning  Number of Clinicians necessary to complete wound care: 1  Is patient requiring IV pain medications for dressing changes:  No   Length of time for dressing change 10 min. (This does not include chart review, pre-medication time, set up, clean up or time spent charting.)    Interdisciplinary consultation: Patient, Bedside RN (Lori), Estelle RINCON (Wound RN) and Sophie OLSEN (Wound RN).  Pressure injury and staging reviewed with N/A.    EVALUATION / RATIONALE FOR TREATMENT:     Date:  05/01/25  Wound Status:   Initial evaluation    Sacrum - Erythema spreads from 5x10 bilaterally to sacrum with some blanchable and non-blanchable areas yet the area of most concern is 1x1 to the right side located distally closer to anus. This area is deep purple, non-blanching. All skin is intact at this time. Offloading dressing applied.     Right heel -  dried blister present. Appears patient most likely has a resolving stage 2. Offloading dressing applied.        Goals: Steady decrease in wound area and depth weekly.    NURSING PLAN OF CARE ORDERS:  RN Prevention Protocol    NUTRITION RECOMMENDATIONS   Wound Team Recommendations:  N/A    DIET ORDERS (From admission to next 24h)       Start     Ordered    05/01/25 0049  Diet Order Diet: Regular  ALL MEALS        Question:  Diet:  Answer:  Regular    05/01/25 0048                    PREVENTATIVE INTERVENTIONS:    Q shift Kt - performed per nursing policy  Q shift pressure point assessments - performed per nursing policy    Surface/Positioning  Low Airloss - Currently in Place  Reposition q 2 hours with pillows - Currently in Place    Offloading/Redistribution  Sacral offloading dressing (Silicone dressing) - Currently in Place  Heel offloading dressing (Silicone dressing) - Currently in Place      Containment/Moisture Prevention    Dri-adela pad - Currently in Place    Mobilization      Unable to assess     Anticipated discharge plans:  TBD    - most likely return to SNF    Vac Discharge Needs:  Vac Discharge plan is purely a recommendation from wound team and not a requirement for discharge unless otherwise stated by physician.  Not Applicable Pt not on a wound vac

## 2025-05-01 NOTE — ED NOTES
Medication history reviewed per patient's MAR from [facility].  Med rec is complete.  Allergies reviewed.    Anticoagulants taken in the last 14 days? Yes  Anticoagulant: Heparin, Last dose: 4/17/25.    Dispense history available in EPIC? Yes      Felicia Garcia

## 2025-05-01 NOTE — DISCHARGE PLANNING
Case Management Discharge Planning    Admission Date: 4/30/2025  GMLOS: 3.8  ALOS: 0    6-Clicks ADL Score:    6-Clicks Mobility Score:    PT and/or OT Eval ordered: No  Post-acute Referrals Ordered: No  Post-acute Choice Obtained: No  Has referral(s) been sent to post-acute provider:  No    Anticipated Discharge Dispo: Discharge Disposition: Discharged to home/self care (01)    DME Needed: No    Action(s) Taken:   Chart review was completed.     Discharge assessment was completed (see below).     Escalations Completed: None    Medically Clear: No    Next Steps:  CM RN to follow up with medical team to discuss discharge barriers or needs.     Barriers to Discharge: Medical clearance    Care Transition Team Assessment    CM RN met with pt at the bedside to complete discharge assessment. Pt appeared A/Ox4, answered all questions appropriately, and verbalized agreement to this assessment.      Case management role discussed; understanding of case management role verbalized.   Demographics on face sheet verified.   Please see H&P for pertinent PMH and reason for admission.   Housing: Pt lives alone in a house located in [place], [place]. The address listed on the face sheet was verified to be correct.  IADLS/ADLS: Independent with IADLS/ADLS, ambulates with no use of DME at baseline, and is an active .   DME: FWW, 4WW, CPAP   O2: RA at baseline. Uses a CPAP at night. On service with [employer]   Discharge support: Pt's daughter Celeste is her primary support in the community. She reports she will be able to provide her with transportation home at discharge.   PCP: Marge Brasher M.D.   Preferred pharmacy: [facility] on [address]   Insurance: Medicare and Misc.   AD: POLST   Discharge planning: Home     Information Source  Orientation Level: Oriented X4  Information Given By: Patient  Who is responsible for making decisions for patient? : Patient    Readmission Evaluation  Is this a readmission?: Yes - unplanned  readmission    Elopement Risk  Legal Hold: No  Ambulatory or Self Mobile in Wheelchair: No-Not an Elopement Risk    Discharge Preparedness  What is your plan after discharge?: Home with help  What are your discharge supports?: Child  Prior Functional Level: Ambulatory, Drives Self, Independent with Activities of Daily Living, Independent with Medication Management  Difficulity with ADLs: None  Difficulity with IADLs: None    Functional Assesment  Prior Functional Level: Ambulatory, Drives Self, Independent with Activities of Daily Living, Independent with Medication Management    Finances  Financial Barriers to Discharge: No  Prescription Coverage: Yes    Vision / Hearing Impairment  Hearing Impairment: Hearing Device Not Available    Advance Directive  Advance Directive?: POLST    Domestic Abuse  Have you ever been the victim of abuse or violence?: No    Psychological Assessment  History of Substance Abuse: None  History of Psychiatric Problems: No  Non-compliant with Treatment: No  Newly Diagnosed Illness: No    Discharge Risks or Barriers  Discharge risks or barriers?: Complex medical needs  Patient risk factors: Complex medical needs    Anticipated Discharge Information  Discharge Disposition: Discharged to home/self care (01)  Discharge Address: [address] [place] [address] [address] [address]  Discharge Contact Phone Number: [phone]

## 2025-05-01 NOTE — ED NOTES
Attending dr at bedside.  Report attempted to RICU x2, awaiting callback from floor.    Pt given cranberry juice. Pt resting comfortably, on heart monitor, bed low and locked, side rails up, call bell within reach. Pt on purewick. Heparin running per MAR.

## 2025-05-01 NOTE — ED NOTES
Notified to the provider that pt's daughter is waiting to talk to him before she goes home, he said he will come

## 2025-05-01 NOTE — H&P
Internal Medicine History & Physical Note    Date of Service  5/1/2025    Primary Care Physician  Marge Brasher M.D.    Code Status  DNAR/DNI    Chief Complaint  Chief Complaint   Patient presents with    Syncope     Pt BIBA for syncope episode, no fall, no trauma. Episode described as blank stare by patient where her oxygen went into 80s and HR into 40s. EMS stated 3 of these episodes over last few days. SNF administered Narcan. Pt on 8L oxymask from facility, with oxygen recently being increased at facility.        History of Presenting Illness  Clau Vences is a 80 y.o. female who presented 4/30/2025 with syncope and acute on chronic hypoxic respiratory failure.  PMH HFpEF with diastolic dysfunction, HLD, anxiety, chronic prednisone use for osteoarthritis related pain.  Patient was recently discharged to a SNF from [facility] a few weeks ago following fixation of the left distal femur fracture.  In the last few days at the SNF, patient reportedly had 2 syncopal episodes and possibly 1 fall.  There was also reported that her oxygen dipped into the 80s on her baseline of 8 L.  Patient reports shortness of breath, denies chest pain, palpitations, abdominal pain, changes to urine/BMs.  On arrival to ED, patient was mildly hypertensive, afebrile, heart rate in the 80s, requiring 13 L via oxy mask to maintain saturations, up from a baseline of 8 L.  CBC and CMP were largely unremarkable for acute abnormalities.  Troponin 417, BMP 28,000.  CTA notable for bilateral pulmonary emboli with right heart strain.    I discussed the plan of care with patient.    Review of Systems  Review of Systems   Constitutional:  Negative for chills and fever.   HENT:  Negative for congestion.    Eyes:  Negative for redness.   Respiratory:  Positive for shortness of breath. Negative for wheezing.    Cardiovascular:  Negative for chest pain.   Gastrointestinal:  Negative for abdominal pain and vomiting.   Musculoskeletal:  Positive  for falls.   Neurological:  Positive for loss of consciousness. Negative for weakness.       Past Medical History   has a past medical history of Anesthesia, Arthritis, Back pain, Dyslipidemia, Uz[place] measles, Heart murmur (09/13/2017), Hypertension, Influenza, Mumps, Other specified symptom associated with female genital organs, Painful joint, Psychiatric problem, Sleep apnea, Snoring, Sore muscles, Tonsillitis, and Unspecified cataract.    Surgical History   has a past surgical history that includes gastroscopy with biopsy (7/2/2014); other orthopedic surgery (2006); other; hysterectomy robotic (10/23/2014); tonsillectomy and adenoidectomy; other abdominal surgery; appendectomy; knee arthroplasty total (Right, 12/27/2017); pr breast reduction (Bilateral); pr remv 2nd cataract,corn-scler sectn; hysterectomy laparoscopy; tonsillectomy; arthroscopy, knee; pr exploratory of abdomen (N/A, 1/28/2023); and orif, fracture, femur (Left, 4/6/2025).     Family History  family history includes Alcohol/Drug in her father; Breast Cancer in her mother; Cancer in her father; Cancer (age of onset: 65) in her mother; Colon Cancer in her father; Heart Disease in her brother; Hyperlipidemia in her brother.   Family history reviewed with patient. There is no family history that is pertinent to the chief complaint.     Social History   reports that she has never smoked. She has never used smokeless tobacco. She reports current alcohol use of about 0.6 - 1.2 oz of alcohol per week. She reports that she does not use drugs.    Allergies  Allergies   Allergen Reactions    Mirabegron Unspecified     Dizziness & lightheadedness    Tramadol Nausea     Nausea and somnolence       Medications  Prior to Admission Medications   Prescriptions Last Dose Informant Patient Reported? Taking?   Empagliflozin (JARDIANCE) 10 MG Tab tablet 4/29/2025 at  7:00 AM MAR from Other Facility No Yes   Sig: Take 1 Tablet by mouth every day.   EnovaRX-Diclofenac  Sodium 2.5 % Cream 4/29/2025 at  8:00 PM MAR from Other Facility Yes Yes   Sig: Apply 1 Application topically 2 times a day. To bilateral shoulders   acetaminophen (TYLENOL) 500 MG Tab 4/29/2025 at  8:00 PM MAR from Other Facility Yes Yes   Sig: Take 1,000 mg by mouth 3 times a day.   docusate sodium (COLACE) 100 MG Cap 4/29/2025 at  8:00 PM MAR from Other Facility Yes Yes   Sig: Take 100 mg by mouth 2 times a day.   ferrous sulfate 325 (65 Fe) MG tablet 4/29/2025 at  7:00 AM MAR from Other Facility Yes Yes   Sig: Take 325 mg by mouth every day.   folic acid (FOLVITE) 800 MCG tablet 4/28/2025 at  6:00 PM MAR from Other Facility Yes Yes   Sig: Take 800 mcg by mouth every day at 6 PM.   furosemide (LASIX) 20 MG Tab  MAR from Other Facility No No   Sig: Take 1 Tablet by mouth every day.   heparin 5000 UNIT/ML Solution 4/17/2025 at  8:00 AM  Yes Yes   Sig: Inject 5,000 Units under the skin every 8 hours. For 7 days starting 4/10/25   Patient taking differently: Inject 5,000 Units under the skin every 8 hours. For 7 days starting 4/10/25  PRESCRIPTION COURSE WAS COMPLETED   hydrOXYzine HCl (ATARAX) 10 MG Tab 4/30/2025 at  7:39 PM MAR from Other Facility Yes Yes   Sig: Take 10 mg by mouth every four hours as needed.   metoprolol SR (TOPROL XL) 100 MG TABLET SR 24 HR 4/29/2025 at  4:00 PM MAR from Other Facility No Yes   Sig: Take 1 Tablet by mouth every evening.   oxyCODONE immediate-release (ROXICODONE) 5 MG Tab 4/29/2025 at  3:10 AM MAR from Other Facility Yes Yes   Sig: Take 5 mg by mouth every four hours as needed for Severe Pain.   polyethylene glycol/lytes (MIRALAX) 17 g Pack 4/29/2025 at  8:00 AM MAR from Other Facility Yes Yes   Sig: Take 17 g by mouth every day.   predniSONE (DELTASONE) 5 MG Tab 4/29/2025 at  7:00 AM MAR from Other Facility No Yes   Sig: Take 1 tablet by mouth once daily   rosuvastatin (CRESTOR) 10 MG Tab 4/29/2025 at  7:00 PM MAR from Other Facility No Yes   Sig: Take 1 Tablet by mouth every  evening.   senna-docusate (PERICOLACE OR SENOKOT S) 8.6-50 MG Tab 4/28/2025 at  6:00 PM MAR from Other Facility No No   Sig: Take 2 Tablets by mouth every evening.   Patient taking differently: Take 1 Tablet by mouth every evening.   sertraline (ZOLOFT) 100 MG Tab 4/29/2025 at  7:00 AM MAR from Other Facility No Yes   Sig: Take 1 tablet by mouth once daily   Patient taking differently: Take 100 mg by mouth every day. Take 1 tablet by mouth once daily   spironolactone (ALDACTONE) 25 MG Tab  MAR from Other Facility No No   Sig: Take 0.5 Tablets by mouth every day.   valsartan (DIOVAN) 320 MG tablet  MAR from Other Facility No No   Sig: Take 1 Tablet by mouth every day.   vitamin D3 (CHOLECALCIFEROL) 1000 Unit (25 mcg) Tab 4/29/2025 at  7:00 AM MAR from Other Facility Yes Yes   Sig: Take 1,000 Units by mouth every day.      Facility-Administered Medications: None       Physical Exam  Temp:  [36.2 °C (97.1 °F)] 36.2 °C (97.1 °F)  Pulse:  [80-84] 81  Resp:  [16-20] 18  BP: (130-148)/(67-78) 139/70  SpO2:  [88 %-95 %] 94 %  Blood Pressure : (!) 143/73   Temperature: 36.2 °C (97.1 °F)   Pulse: 81   Respiration: 19   Pulse Oximetry: 95 %       Physical Exam  Vitals reviewed.   Constitutional:       General: She is not in acute distress.     Appearance: Normal appearance. She is ill-appearing. She is not toxic-appearing.   HENT:      Head: Normocephalic.      Nose: Nose normal.      Mouth/Throat:      Pharynx: Oropharynx is clear.   Eyes:      General: No scleral icterus.     Conjunctiva/sclera: Conjunctivae normal.   Cardiovascular:      Rate and Rhythm: Normal rate and regular rhythm.      Heart sounds: Murmur heard.   Pulmonary:      Effort: Pulmonary effort is normal. No respiratory distress.      Breath sounds: No wheezing.      Comments: Coarse breath sounds  NC in place  Abdominal:      Palpations: Abdomen is soft.      Tenderness: There is no abdominal tenderness.   Musculoskeletal:         General: Normal range of  motion.      Right lower leg: No edema.      Left lower leg: No edema.   Skin:     General: Skin is warm.   Neurological:      General: No focal deficit present.      Mental Status: She is alert.      Cranial Nerves: No cranial nerve deficit.   Psychiatric:         Mood and Affect: Mood is anxious.         Behavior: Behavior normal.         Laboratory:  Recent Labs     04/30/25 1929   WBC 10.6   RBC 3.79*   HEMOGLOBIN 12.2   HEMATOCRIT 38.2   .8*   MCH 32.2   MCHC 31.9*   RDW 48.0   PLATELETCT 183   MPV 12.2     Recent Labs     04/30/25 1929   SODIUM 135   POTASSIUM 4.3   CHLORIDE 99   CO2 24   GLUCOSE 109*   BUN 48*   CREATININE 1.35   CALCIUM 10.5     Recent Labs     04/30/25 1929   ALTSGPT 12   ASTSGOT 23   ALKPHOSPHAT 124*   TBILIRUBIN 0.4   GLUCOSE 109*     Recent Labs     04/30/25 2313   APTT 28.4   INR 1.23*     Recent Labs     04/30/25 1929   NTPROBNP 42835*         Recent Labs     04/30/25 1929   TROPONINT 417*       Imaging:  CT-CTA CHEST PULMONARY ARTERY W/ RECONS   Final Result      1.  Exam is positive for bilateral pulmonary emboli.      2.  RV LV ratio is elevated consistent with right heart strain.      3.  Minimal scattered pulmonary opacifications could be due to atelectasis or developing pulmonary infarcts.         These findings were discussed with MCKENNA MENDEZ on 04/30/2025.      EC-ECHOCARDIOGRAM COMPLETE W/O CONT    (Results Pending)       EKG:  I have personally reviewed the images and compared with prior images. and My impression is: Sinus rhythm    Assessment/Plan:  Justification for Admission Status  I anticipate this patient will require at least two midnights for appropriate medical management, necessitating inpatient admission because bilateral pulm embolism    Patient will need a Intermediate Care (Adult and Pediatrics) bed on MEDICAL service .  The need is secondary to bilateral pulmonary embolism.    * Other pulmonary embolism with acute cor pulmonale (HCC)- (present  on admission)  Assessment & Plan  Bilateral PEs with right heart strain noted on CT  Recent hip surgery  PESI score 120  Started on heparin drip, continue  Monitor overnight, consult IR for potential intervention if any deterioration overnight  Echo ordered    CKD stage 3b, GFR 30-44 ml/min- (present on admission)  Assessment & Plan  At baseline  Renally dose meds as needed  Avoid nephrotoxins  Monitor urine output    Elevated brain natriuretic peptide (BNP) level- (present on admission)  Assessment & Plan  Patient does not appear overtly volume overloaded  Elevated BNP likely secondary to right heart strain from acute bilateral PEs    Elevated troponin- (present on admission)  Assessment & Plan  Likely type II demand ischemia in the setting of bilateral acute PEs and right heart strain, on her background of CKD  EKG without any ST changes  Trend    Syncope- (present on admission)  Assessment & Plan  2 syncopal episodes in the last few days at the SNF  Likely secondary to the significant pulmonary embolism burden on patient's heart  Telemetry to monitor for arrhythmias  Echo pending  Check TSH    Left ventricular diastolic dysfunction, NYHA class 3- (present on admission)  Assessment & Plan  Continue home metoprolol  Not in acute exacerbation  Hold off on any diuresis for now    Acute on chronic hypoxic respiratory failure (HCC)- (present on admission)  Assessment & Plan  8 L O2 at baseline, now requiring 13 L to maintain saturations  Likely secondary to acute bilateral PEs  Titrate down O2 as clinically tolerated  Hold off diuresis overnight, if patient remains clinically stable, would likely benefit from diuresis prior to discharge    Anxiety- (present on admission)  Assessment & Plan  Continue as needed hydroxyzine  Continue sertraline    Dyslipidemia- (present on admission)  Assessment & Plan  Continue home statin    HTN (hypertension)- (present on admission)  Assessment & Plan  Not currently, has not been on  antihypertensives since early April        VTE prophylaxis: therapeutic anticoagulation with heparin

## 2025-05-01 NOTE — ED NOTES
"ERP Dr. Rousseau notified pt is feeling depressed and scores low on SI screen scale for stating \" she has had thoughts in last month wishing she was not here.\"  "

## 2025-05-01 NOTE — ED NOTES
Report to Angel CHERRY. Pt on 13L oxymask, GCS 15 a/o x 4. Pt in bed locked in lowest positions side rail up x 2 call light within reach. Family bedside. Pt on cardiac monitor.

## 2025-05-01 NOTE — PROGRESS NOTES
Phone report given to DILIP Dougherty on IMCU. Patient being transferred to [facility] from [facility] via bed with monitor and ACLS RN. All questions answered. Patients daughter, Celeste packed up all th patients belongings and made sure nothing is being left behind.

## 2025-05-02 ENCOUNTER — APPOINTMENT (OUTPATIENT)
Dept: RADIOLOGY | Facility: MEDICAL CENTER | Age: 80
DRG: 175 | End: 2025-05-02
Attending: INTERNAL MEDICINE
Payer: MEDICARE

## 2025-05-02 ENCOUNTER — APPOINTMENT (OUTPATIENT)
Dept: CARDIOLOGY | Facility: MEDICAL CENTER | Age: 80
DRG: 175 | End: 2025-05-02
Attending: HOSPITALIST
Payer: MEDICARE

## 2025-05-02 LAB
ALBUMIN SERPL BCP-MCNC: 2.9 G/DL (ref 3.2–4.9)
ALBUMIN/GLOB SERPL: 1.1 G/DL
ALP SERPL-CCNC: 99 U/L (ref 30–99)
ALT SERPL-CCNC: 10 U/L (ref 2–50)
ANION GAP SERPL CALC-SCNC: 11 MMOL/L (ref 7–16)
AST SERPL-CCNC: 24 U/L (ref 12–45)
BASOPHILS # BLD AUTO: 0.6 % (ref 0–1.8)
BASOPHILS # BLD: 0.05 K/UL (ref 0–0.12)
BILIRUB SERPL-MCNC: 0.4 MG/DL (ref 0.1–1.5)
BUN SERPL-MCNC: 35 MG/DL (ref 8–22)
CALCIUM ALBUM COR SERPL-MCNC: 10.3 MG/DL (ref 8.5–10.5)
CALCIUM SERPL-MCNC: 9.4 MG/DL (ref 8.5–10.5)
CHLORIDE SERPL-SCNC: 106 MMOL/L (ref 96–112)
CO2 SERPL-SCNC: 23 MMOL/L (ref 20–33)
CREAT SERPL-MCNC: 0.91 MG/DL (ref 0.5–1.4)
EOSINOPHIL # BLD AUTO: 0.33 K/UL (ref 0–0.51)
EOSINOPHIL NFR BLD: 4 % (ref 0–6.9)
ERYTHROCYTE [DISTWIDTH] IN BLOOD BY AUTOMATED COUNT: 49.1 FL (ref 35.9–50)
GFR SERPLBLD CREATININE-BSD FMLA CKD-EPI: 64 ML/MIN/1.73 M 2
GLOBULIN SER CALC-MCNC: 2.7 G/DL (ref 1.9–3.5)
GLUCOSE SERPL-MCNC: 104 MG/DL (ref 65–99)
HCT VFR BLD AUTO: 34.5 % (ref 37–47)
HGB BLD-MCNC: 11.1 G/DL (ref 12–16)
IMM GRANULOCYTES # BLD AUTO: 0.07 K/UL (ref 0–0.11)
IMM GRANULOCYTES NFR BLD AUTO: 0.8 % (ref 0–0.9)
LV EJECT FRACT  99904: 75
LV EJECT FRACT MOD 4C 99902: 53.83
LYMPHOCYTES # BLD AUTO: 0.91 K/UL (ref 1–4.8)
LYMPHOCYTES NFR BLD: 11 % (ref 22–41)
MAGNESIUM SERPL-MCNC: 1.9 MG/DL (ref 1.5–2.5)
MCH RBC QN AUTO: 32.6 PG (ref 27–33)
MCHC RBC AUTO-ENTMCNC: 32.2 G/DL (ref 32.2–35.5)
MCV RBC AUTO: 101.5 FL (ref 81.4–97.8)
MONOCYTES # BLD AUTO: 0.87 K/UL (ref 0–0.85)
MONOCYTES NFR BLD AUTO: 10.5 % (ref 0–13.4)
NEUTROPHILS # BLD AUTO: 6.08 K/UL (ref 1.82–7.42)
NEUTROPHILS NFR BLD: 73.1 % (ref 44–72)
NRBC # BLD AUTO: 0 K/UL
NRBC BLD-RTO: 0 /100 WBC (ref 0–0.2)
NT-PROBNP SERPL IA-MCNC: ABNORMAL PG/ML (ref 0–125)
PHOSPHATE SERPL-MCNC: 2.7 MG/DL (ref 2.5–4.5)
PLATELET # BLD AUTO: 151 K/UL (ref 164–446)
PMV BLD AUTO: 11.9 FL (ref 9–12.9)
POTASSIUM SERPL-SCNC: 3.8 MMOL/L (ref 3.6–5.5)
PROT SERPL-MCNC: 5.6 G/DL (ref 6–8.2)
RBC # BLD AUTO: 3.4 M/UL (ref 4.2–5.4)
SODIUM SERPL-SCNC: 140 MMOL/L (ref 135–145)
TROPONIN T SERPL-MCNC: 358 NG/L (ref 6–19)
UFH PPP CHRO-ACNC: 0.67 IU/ML
WBC # BLD AUTO: 8.3 K/UL (ref 4.8–10.8)

## 2025-05-02 PROCEDURE — 770000 HCHG ROOM/CARE - INTERMEDIATE ICU *

## 2025-05-02 PROCEDURE — 80053 COMPREHEN METABOLIC PANEL: CPT

## 2025-05-02 PROCEDURE — 700102 HCHG RX REV CODE 250 W/ 637 OVERRIDE(OP)

## 2025-05-02 PROCEDURE — 93306 TTE W/DOPPLER COMPLETE: CPT

## 2025-05-02 PROCEDURE — 84484 ASSAY OF TROPONIN QUANT: CPT

## 2025-05-02 PROCEDURE — 85025 COMPLETE CBC W/AUTO DIFF WBC: CPT

## 2025-05-02 PROCEDURE — A9270 NON-COVERED ITEM OR SERVICE: HCPCS

## 2025-05-02 PROCEDURE — 99223 1ST HOSP IP/OBS HIGH 75: CPT | Mod: GC | Performed by: INTERNAL MEDICINE

## 2025-05-02 PROCEDURE — 83880 ASSAY OF NATRIURETIC PEPTIDE: CPT

## 2025-05-02 PROCEDURE — 83735 ASSAY OF MAGNESIUM: CPT

## 2025-05-02 PROCEDURE — 700117 HCHG RX CONTRAST REV CODE 255: Performed by: HOSPITALIST

## 2025-05-02 PROCEDURE — 85520 HEPARIN ASSAY: CPT

## 2025-05-02 PROCEDURE — 84100 ASSAY OF PHOSPHORUS: CPT

## 2025-05-02 PROCEDURE — 93306 TTE W/DOPPLER COMPLETE: CPT | Mod: 26 | Performed by: INTERNAL MEDICINE

## 2025-05-02 PROCEDURE — 93970 EXTREMITY STUDY: CPT

## 2025-05-02 PROCEDURE — 700111 HCHG RX REV CODE 636 W/ 250 OVERRIDE (IP)

## 2025-05-02 PROCEDURE — 99233 SBSQ HOSP IP/OBS HIGH 50: CPT | Performed by: HOSPITALIST

## 2025-05-02 RX ADMIN — SENNOSIDES AND DOCUSATE SODIUM 2 TABLET: 50; 8.6 TABLET ORAL at 18:08

## 2025-05-02 RX ADMIN — DAPAGLIFLOZIN 10 MG: 10 TABLET, FILM COATED ORAL at 05:11

## 2025-05-02 RX ADMIN — ROSUVASTATIN CALCIUM 10 MG: 10 TABLET, FILM COATED ORAL at 18:08

## 2025-05-02 RX ADMIN — OXYCODONE 5 MG: 5 TABLET ORAL at 05:12

## 2025-05-02 RX ADMIN — HEPARIN SODIUM 18 UNITS/KG/HR: 5000 INJECTION, SOLUTION INTRAVENOUS at 11:58

## 2025-05-02 RX ADMIN — METOPROLOL SUCCINATE 100 MG: 50 TABLET, EXTENDED RELEASE ORAL at 18:09

## 2025-05-02 RX ADMIN — PREDNISONE 5 MG: 5 TABLET ORAL at 05:12

## 2025-05-02 RX ADMIN — HUMAN ALBUMIN MICROSPHERES AND PERFLUTREN 3 ML: 10; .22 INJECTION, SOLUTION INTRAVENOUS at 10:30

## 2025-05-02 RX ADMIN — OXYCODONE 5 MG: 5 TABLET ORAL at 22:11

## 2025-05-02 RX ADMIN — FOLIC ACID 1 MG: 1 TABLET ORAL at 18:08

## 2025-05-02 RX ADMIN — HYDROXYZINE HYDROCHLORIDE 10 MG: 10 TABLET ORAL at 22:12

## 2025-05-02 RX ADMIN — Medication 1000 UNITS: at 05:12

## 2025-05-02 RX ADMIN — HYDROXYZINE HYDROCHLORIDE 10 MG: 10 TABLET ORAL at 10:00

## 2025-05-02 RX ADMIN — FERROUS SULFATE TAB 325 MG (65 MG ELEMENTAL FE) 325 MG: 325 (65 FE) TAB at 05:11

## 2025-05-02 RX ADMIN — SERTRALINE 100 MG: 100 TABLET, FILM COATED ORAL at 05:11

## 2025-05-02 ASSESSMENT — PAIN DESCRIPTION - PAIN TYPE
TYPE: ACUTE PAIN

## 2025-05-02 ASSESSMENT — PATIENT HEALTH QUESTIONNAIRE - PHQ9
6. FEELING BAD ABOUT YOURSELF - OR THAT YOU ARE A FAILURE OR HAVE LET YOURSELF OR YOUR FAMILY DOWN: SEVERAL DAYS
9. THOUGHTS THAT YOU WOULD BE BETTER OFF DEAD, OR OF HURTING YOURSELF: NOT AT ALL
SUM OF ALL RESPONSES TO PHQ QUESTIONS 1-9: 5
5. POOR APPETITE OR OVEREATING: NOT AT ALL
3. TROUBLE FALLING OR STAYING ASLEEP OR SLEEPING TOO MUCH: SEVERAL DAYS
7. TROUBLE CONCENTRATING ON THINGS, SUCH AS READING THE NEWSPAPER OR WATCHING TELEVISION: NOT AT ALL
8. MOVING OR SPEAKING SO SLOWLY THAT OTHER PEOPLE COULD HAVE NOTICED. OR THE OPPOSITE, BEING SO FIGETY OR RESTLESS THAT YOU HAVE BEEN MOVING AROUND A LOT MORE THAN USUAL: NOT AT ALL
4. FEELING TIRED OR HAVING LITTLE ENERGY: SEVERAL DAYS
2. FEELING DOWN, DEPRESSED, IRRITABLE, OR HOPELESS: SEVERAL DAYS
1. LITTLE INTEREST OR PLEASURE IN DOING THINGS: SEVERAL DAYS
SUM OF ALL RESPONSES TO PHQ9 QUESTIONS 1 AND 2: 2

## 2025-05-02 ASSESSMENT — ENCOUNTER SYMPTOMS
SPUTUM PRODUCTION: 0
BACK PAIN: 0
PALPITATIONS: 0
FALLS: 1
NAUSEA: 0
WHEEZING: 0
FEVER: 0
COUGH: 0
VOMITING: 0
CHILLS: 0
DIZZINESS: 0
DIARRHEA: 0
SHORTNESS OF BREATH: 1
LOSS OF CONSCIOUSNESS: 0
ABDOMINAL PAIN: 0
HEADACHES: 0
WEAKNESS: 0

## 2025-05-02 ASSESSMENT — FIBROSIS 4 INDEX: FIB4 SCORE: 4.02

## 2025-05-02 NOTE — PROGRESS NOTES
Hospital Medicine Daily Progress Note    Date of Service  5/2/2025    Chief Complaint  Clau Vences is a 80 y.o. female admitted 4/30/2025 with SOB    Hospital Course  81yo PMHx HLD, HTN, AZUL, chronic hypoxic resp railure on 8LNC baseline, CKD 3b, HFpEF, anxiety, chronic steroid use.  Presented with hypoxia and syncope, and found to have B PEs      Interval Problem Update  Pt is without complaints this am    Sinus 60s  -150  5 LNC  AFebrile  IV heparin running at 18u/kg/hr and titrating to goal anti X-a of 0.3-0.7    I have discussed this patient's plan of care and discharge plan at IDT rounds today with Case Management, Nursing, Nursing leadership, and other members of the IDT team.    Consultants/Specialty  pulmonary    Code Status  DNAR/DNI    Disposition  The patient is not medically cleared for discharge to home or a post-acute facility.    I have placed the appropriate orders for post-discharge needs.    Review of Systems  Review of Systems   Constitutional:  Negative for chills and fever.   Respiratory:  Positive for shortness of breath. Negative for cough.    Cardiovascular:  Negative for chest pain, palpitations and leg swelling.   Gastrointestinal:  Negative for abdominal pain, diarrhea, nausea and vomiting.   Genitourinary:  Negative for dysuria and urgency.   Musculoskeletal:  Negative for back pain.   Skin:  Negative for rash.   Neurological:  Negative for dizziness, loss of consciousness and headaches.        Physical Exam  Temp:  [36.1 °C (97 °F)-36.4 °C (97.5 °F)] 36.1 °C (97 °F)  Pulse:  [60-87] 60  Resp:  [13-33] 15  BP: (100-147)/(56-75) 113/57  SpO2:  [89 %-98 %] 97 %    Physical Exam  Constitutional:       General: She is not in acute distress.     Appearance: Normal appearance. She is well-developed. She is not diaphoretic.   HENT:      Head: Normocephalic and atraumatic.   Neck:      Vascular: No JVD.   Cardiovascular:      Rate and Rhythm: Normal rate and regular rhythm.       Heart sounds: No murmur heard.  Pulmonary:      Effort: Pulmonary effort is normal. No respiratory distress.      Breath sounds: No stridor. No wheezing or rales.   Abdominal:      Palpations: Abdomen is soft.      Tenderness: There is no abdominal tenderness. There is no guarding or rebound.   Skin:     General: Skin is warm and dry.      Findings: No rash.   Neurological:      Mental Status: She is oriented to person, place, and time.   Psychiatric:         Mood and Affect: Mood normal.         Behavior: Behavior normal.         Thought Content: Thought content normal.       Fluids    Intake/Output Summary (Last 24 hours) at 5/2/2025 0715  Last data filed at 5/2/2025 0600  Gross per 24 hour   Intake 770.33 ml   Output 300 ml   Net 470.33 ml        Laboratory  Recent Labs     04/30/25 1929 05/02/25  0222   WBC 10.6 8.3   RBC 3.79* 3.40*   HEMOGLOBIN 12.2 11.1*   HEMATOCRIT 38.2 34.5*   .8* 101.5*   MCH 32.2 32.6   MCHC 31.9* 32.2   RDW 48.0 49.1   PLATELETCT 183 151*   MPV 12.2 11.9     Recent Labs     04/30/25 1929 05/02/25  0222   SODIUM 135 140   POTASSIUM 4.3 3.8   CHLORIDE 99 106   CO2 24 23   GLUCOSE 109* 104*   BUN 48* 35*   CREATININE 1.35 0.91   CALCIUM 10.5 9.4     Recent Labs     04/30/25  2313   APTT 28.4   INR 1.23*               Imaging  EC-ECHOCARDIOGRAM COMPLETE W/ CONT   Final Result      CT-CTA CHEST PULMONARY ARTERY W/ RECONS   Final Result      1.  Exam is positive for bilateral pulmonary emboli.      2.  RV LV ratio is elevated consistent with right heart strain.      3.  Minimal scattered pulmonary opacifications could be due to atelectasis or developing pulmonary infarcts.         These findings were discussed with MCKENNA MENDEZ on 04/30/2025.      US-EXTREMITY VENOUS LOWER BILAT    (Results Pending)        Assessment/Plan  * Other pulmonary embolism with acute cor pulmonale (HCC)- (present on admission)  Assessment & Plan  Bilateral PEs with right heart strain noted on CT  Recent  hip surgery  PESI score 120  Started on heparin drip, continue  Monitor overnight, consult IR for potential intervention if any deterioration overnight  Echo ordered    CKD stage 3b, GFR 30-44 ml/min- (present on admission)  Assessment & Plan  At baseline  Renally dose meds as needed  Avoid nephrotoxins  Monitor urine output    Elevated brain natriuretic peptide (BNP) level- (present on admission)  Assessment & Plan  Patient does not appear overtly volume overloaded  Elevated BNP likely secondary to right heart strain from acute bilateral PEs    Elevated troponin- (present on admission)  Assessment & Plan  Likely type II demand ischemia in the setting of bilateral acute PEs and right heart strain, on her background of CKD  EKG without any ST changes  Trend    Syncope- (present on admission)  Assessment & Plan  2 syncopal episodes in the last few days at the SNF  Likely secondary to the significant pulmonary embolism burden on patient's heart  Telemetry to monitor for arrhythmias  Echo pending  Check TSH    Left ventricular diastolic dysfunction, NYHA class 3- (present on admission)  Assessment & Plan  Continue home metoprolol  Not in acute exacerbation  Hold off on any diuresis for now    Acute on chronic hypoxic respiratory failure (HCC)- (present on admission)  Assessment & Plan  8 L O2 at baseline, now requiring 13 L to maintain saturations  Likely secondary to acute bilateral PEs  Titrate down O2 as clinically tolerated  Hold off diuresis overnight, if patient remains clinically stable, would likely benefit from diuresis prior to discharge    Anxiety- (present on admission)  Assessment & Plan  Continue as needed hydroxyzine  Continue sertraline    Dyslipidemia- (present on admission)  Assessment & Plan  Continue home statin    HTN (hypertension)- (present on admission)  Assessment & Plan  Not currently, has not been on antihypertensives since early April         VTE prophylaxis: VTE Selection    I have performed  a physical exam and reviewed and updated ROS and Plan today (5/2/2025). In review of yesterday's note (5/1/2025), there are no changes except as documented above.

## 2025-05-02 NOTE — CARE PLAN
The patient is Watcher - Medium risk of patient condition declining or worsening    Shift Goals  Clinical Goals: wean/monitor SPO2, heparin gtt management  Patient Goals: sleep, pain control  Family Goals: zo    Progress made toward(s) clinical / shift goals:    Problem: Pain - Standard  Goal: Alleviation of pain or a reduction in pain to the patient’s comfort goal  Outcome: Progressing     Problem: Infection  Goal: Will remain free from infection  Outcome: Progressing     Problem: Respiratory:  Goal: Respiratory status will improve  Outcome: Progressing     Problem: Skin Integrity  Goal: Skin integrity is maintained or improved  Outcome: Progressing     Problem: Fall Risk  Goal: Patient will remain free from falls  Outcome: Progressing       Patient is not progressing towards the following goals:

## 2025-05-02 NOTE — CARE PLAN
The patient is Watcher - Medium risk of patient condition declining or worsening    Shift Goals  Clinical Goals: Hemodynamic stability  Patient Goals: sleep, pain control  Family Goals: zo    Progress made toward(s) clinical / shift goals:    Problem: Knowledge Deficit - Standard  Goal: Patient and family/care givers will demonstrate understanding of plan of care, disease process/condition, diagnostic tests and medications  Outcome: Progressing     Problem: Provide Safe Environment  Goal: Suicide environmental safety, protocols, policies, and practices will be implemented  Outcome: Progressing     Problem: Psychosocial  Goal: Patient's ability to identify and develop effective coping behaviors will improve  Outcome: Progressing  Goal: Patient's ability to identify and utilize available support systems will improve  Outcome: Progressing     Problem: Pain - Standard  Goal: Alleviation of pain or a reduction in pain to the patient’s comfort goal  Outcome: Progressing     Problem: Communication  Goal: The ability to communicate needs accurately and effectively will improve  Outcome: Progressing     Problem: Safety  Goal: Will remain free from injury  Outcome: Progressing  Goal: Will remain free from falls  Outcome: Progressing     Problem: Pain Management  Goal: Pain level will decrease to patient's comfort goal  Outcome: Progressing     Problem: Infection  Goal: Will remain free from infection  Outcome: Progressing     Problem: Skin Integrity  Goal: Risk for impaired skin integrity will decrease  Outcome: Progressing     Problem: Mobility  Goal: Risk for activity intolerance will decrease  Outcome: Progressing       Patient is not progressing towards the following goals:

## 2025-05-02 NOTE — CARE PLAN
The patient is Stable - Low risk of patient condition declining or worsening    Shift Goals  Clinical Goals: O2, Mobility  Patient Goals: Rest  Family Goals: Updates    Progress made toward(s) clinical / shift goals:  Pt stands with RICU staff this shift prior to transfer to [facility]. PT/Ot eval placed. Pt is weaned to 2.5-4L nasal cannula. Pt has increased O2 needs after cleanings, turns and repositions.     Patient is not progressing towards the following goals:

## 2025-05-02 NOTE — CONSULTS
Pulmonary Consultation      Date of Service: 5/2/2025    Date of Admission:  4/30/2025  5:45 PM    Consulting Provider:  Livan Macias, *     Chief Complaint:  Syncope (Pt BIBA for syncope episode, no fall, no trauma. Episode described as blank stare by patient where her oxygen went into 80s and HR into 40s. EMS stated 3 of these episodes over last few days. SNF administered Narcan. Pt on 8L oxymask from facility, with oxygen recently being increased at facility. )      History of Present Illness/Hospital Course: Clau Vences is a 80 y.o. female with a past medical history of congestive heart failure, chronic hypoxic respiratory failure(8 L NC at baseline), CKD stage IIIb who was brought in from SNF on 05/01 with syncope and found to have hypoxemic respiratory failure at her SNF.  CTA revealed bilateral PE, possible pulmonary infarcts and elevated RV/LV ratio consistent with right heart strain.  Patient was admitted for further management of acute PE and was started on heparin GTT.  Pulmonology consulted for further evaluation and management.    Interval update    05/01 : admitted overnight with acute PE, on heparin gtt.    05/02 : still requiring supplemental oxygen, BNP significantly elevated at 27K, troponin elevated at 417 > 476, TTE with RVSP 70 mgHg.  Reports recent hip surgery about 1 month ago.  Has been sedentary since then.  Reports prior history of DVT approximately 3 years ago.    Review of Systems   Constitutional:  Negative for chills, fever and malaise/fatigue.   Respiratory:  Positive for shortness of breath. Negative for cough, sputum production and wheezing.    Cardiovascular:  Negative for chest pain, palpitations and leg swelling.   Gastrointestinal:  Negative for abdominal pain, nausea and vomiting.   Genitourinary:  Negative for dysuria.   Musculoskeletal:  Positive for falls.   Neurological:  Negative for weakness and headaches.       Home Medications       Reviewed by  Lori Eugene R.N. (Registered Nurse) on 05/01/25 at 1254  Med List Status: Complete     Medication Last Dose Status   acetaminophen (TYLENOL) 500 MG Tab 4/29/2025 Active   diclofenac DR (VOLTAREN) 25 MG Tablet Delayed Response  Active   docusate sodium (COLACE) 100 MG Cap 4/29/2025 Active   Empagliflozin (JARDIANCE) 10 MG Tab tablet 4/29/2025 Active   EnovaRX-Diclofenac Sodium 2.5 % Cream 4/29/2025 Active   ferrous sulfate 325 (65 Fe) MG tablet 4/29/2025 Active   folic acid (FOLVITE) 800 MCG tablet 4/28/2025 Active   furosemide (LASIX) 20 MG Tab  Active   heparin 5000 UNIT/ML Solution 4/17/2025 Active   hydrOXYzine HCl (ATARAX) 10 MG Tab 4/30/2025 Active   metoprolol SR (TOPROL XL) 100 MG TABLET SR 24 HR 4/29/2025 Active   oxyCODONE immediate-release (ROXICODONE) 5 MG Tab 4/29/2025 Active   polyethylene glycol/lytes (MIRALAX) 17 g Pack 4/29/2025 Active   predniSONE (DELTASONE) 5 MG Tab 4/29/2025 Active   rosuvastatin (CRESTOR) 10 MG Tab 4/29/2025 Active   senna-docusate (PERICOLACE OR SENOKOT S) 8.6-50 MG Tab 4/28/2025 Active   sertraline (ZOLOFT) 100 MG Tab 4/29/2025 Active   spironolactone (ALDACTONE) 25 MG Tab  Active   valsartan (DIOVAN) 320 MG tablet  Active   vitamin D3 (CHOLECALCIFEROL) 1000 Unit (25 mcg) Tab 4/29/2025 Active                  Audit from Redirected Encounters    **Home medications have not yet been reviewed for this encounter**         Social History     Tobacco Use    Smoking status: Never    Smokeless tobacco: Never   Vaping Use    Vaping status: Never Used   Substance Use Topics    Alcohol use: Yes     Alcohol/week: 0.6 - 1.2 oz     Types: 1 - 2 Glasses of wine per week     Comment: occ.    Drug use: No        Past Medical History:   Diagnosis Date    Anesthesia     low O2 sat    Arthritis     osteo    Back pain     Dyslipidemia     [place] measles     Heart murmur 09/13/2017    Hypertension     Influenza     Mumps     Other specified symptom associated with female genital organs      "Painful joint     Psychiatric problem     Sleep apnea     c-pap with 3 l/nc    Snoring     Sore muscles     Tonsillitis     Unspecified cataract        Past Surgical History:   Procedure Laterality Date    ORIF, FRACTURE, FEMUR Left 4/6/2025    Procedure: ORIF, FRACTURE, FEMUR, DISTAL;  Surgeon: Poncho Negrete M.D.;  Location: SURGERY [facility];  Service: Orthopedics    NJ EXPLORATORY OF ABDOMEN N/A 1/28/2023    Procedure: EX LAP;  Surgeon: Mike Valles M.D.;  Location: SURGERY [facility];  Service: Gastroenterology    KNEE ARTHROPLASTY TOTAL Right 12/27/2017    HYSTERECTOMY ROBOTIC  10/23/2014    Performed by Smith Lyons M.D. at SURGERY [facility] ORS    GASTROSCOPY WITH BIOPSY  7/2/2014    Performed by Sky Vila M.D. at SURGERY [facility] ORS    OTHER ORTHOPEDIC SURGERY  2006    left total knee    APPENDECTOMY      ARTHROSCOPY, KNEE      HYSTERECTOMY LAPAROSCOPY      OTHER      meghan cataracts    OTHER ABDOMINAL SURGERY      Resection of pelvic mass, uterus and ovaries    NJ BREAST REDUCTION Bilateral     1982    NJ REMV 2ND CATARACT,CORN-SCLER SECTN      TONSILLECTOMY      TONSILLECTOMY AND ADENOIDECTOMY         Allergies: Mirabegron and Tramadol    Family History   Problem Relation Age of Onset    Breast Cancer Mother     Cancer Mother 65        Breast    Cancer Father         Colon    Alcohol/Drug Father     Colon Cancer Father     Hyperlipidemia Brother     Heart Disease Brother         arrythmia       Pulmonary-Specific Vital Signs  Vitals  Blood Pressure : (!) 152/69  Temperature: 36.1 °C (97 °F)  Temp src: Temporal  Pulse: 60  Respiration: 17  Pulse Oximetry: 98 %  Height: 170.2 cm (5' 7\")  Weight: 103 kg (227 lb 11.8 oz)    Physical Examination  Physical Exam  Constitutional:       Appearance: She is ill-appearing.   HENT:      Nose: Nose normal.      Mouth/Throat:      Mouth: Mucous membranes are moist.   Eyes:      Conjunctiva/sclera: Conjunctivae normal. "   Cardiovascular:      Rate and Rhythm: Normal rate and regular rhythm.   Pulmonary:      Effort: No respiratory distress.      Breath sounds: Normal breath sounds. No wheezing, rhonchi or rales.   Abdominal:      General: Bowel sounds are normal. There is no distension.      Palpations: Abdomen is soft.   Musculoskeletal:      Right lower leg: No edema.      Left lower leg: No edema.   Skin:     General: Skin is warm.   Neurological:      Mental Status: Mental status is at baseline.             Intake/Output Summary (Last 24 hours) at 5/2/2025 0850  Last data filed at 5/2/2025 0600  Gross per 24 hour   Intake 715.44 ml   Output 300 ml   Net 415.44 ml       Recent Labs     04/30/25 1929 05/02/25  0222   WBC 10.6 8.3   NEUTSPOLYS 81.20* 73.10*   LYMPHOCYTES 7.00* 11.00*   MONOCYTES 9.90 10.50   EOSINOPHILS 0.60 4.00   BASOPHILS 0.40 0.60   ASTSGOT 23 24   ALTSGPT 12 10   ALKPHOSPHAT 124* 99   TBILIRUBIN 0.4 0.4     Recent Labs     04/30/25 1929 05/02/25  0222   SODIUM 135 140   POTASSIUM 4.3 3.8   CHLORIDE 99 106   CO2 24 23   BUN 48* 35*   CREATININE 1.35 0.91   MAGNESIUM 2.3 1.9   PHOSPHORUS  --  2.7   CALCIUM 10.5 9.4     Recent Labs     04/30/25 1929 05/02/25  0222   ALTSGPT 12 10   ASTSGOT 23 24   ALKPHOSPHAT 124* 99   TBILIRUBIN 0.4 0.4   GLUCOSE 109* 104*         CT-CTA CHEST PULMONARY ARTERY W/ RECONS   Final Result      1.  Exam is positive for bilateral pulmonary emboli.      2.  RV LV ratio is elevated consistent with right heart strain.      3.  Minimal scattered pulmonary opacifications could be due to atelectasis or developing pulmonary infarcts.         These findings were discussed with MCKENNA MENDEZ on 04/30/2025.      EC-ECHOCARDIOGRAM COMPLETE W/O CONT    (Results Pending)       Patient Active Problem List   Diagnosis    HTN (hypertension)    Dyslipidemia    AZUL on CPAP    Chronic insomnia    Anxiety    Heart murmur    Obesity (BMI 30-39.9)    Thyroid nodule    Poor balance    Arthritis     Acute on chronic hypoxic respiratory failure (HCC)    Anemia    History of diverticulitis    Vitamin D deficiency    Osteopenia of neck of left femur    Abdominal bloating    OAB (overactive bladder)    Total knee replacement status, left    DNR (do not resuscitate)    Incisional hernia, without obstruction or gangrene    Physically inactive    Shoulder mass    Nonrheumatic aortic valve stenosis    Loose stools    ACC/AHA stage C heart failure with preserved ejection fraction (HCC)    Left ventricular diastolic dysfunction, NYHA class 3    Syncope    Hyperkalemia    Leg swelling    Pacemaker    Closed left subtrochanteric femur fracture, initial encounter ([facility])    Current chronic use of systemic steroids    ABHILASH (acute kidney injury) (HCC)    Other pulmonary embolism with acute cor pulmonale (HCC)    Elevated troponin    Elevated brain natriuretic peptide (BNP) level    CKD stage 3b, GFR 30-44 ml/min       Assessment and Recommendations:   Clau Vences is a 80 y.o. female with a past medical history of congestive heart failure, chronic hypoxic respiratory failure(8 L NC at baseline), CKD stage IIIb who was brought in from SNF on 05/01 with syncope and found to have acute PE, confirmed on CTA    # Acute on chronic hypoxemic respiratory failure 2/2  # Provoked, submassive acute pulmonary embolism    Patient has submassive PE given stable vitals.  However, patient has RV strain given elevated BNP, troponin and RV/LV ratio on CTA.  TTE with elevated RVSP 71 mmHg.    Provoked given recent history of hip surgery followed by secondary diastolic approximately 1 month ago.    PESI score 110, high risk of 30-day mortality (4.0 -11.4%)    Plan  - Continue heparin GTT  - Discussed benefits versus risks of thrombectomy with patient and patient's daughter at bedside.  - Will order DVT ultrasound left lower extremity to evaluate for proximal DVT , this will help decide further management, anticoagulation versus  thrombectomy.  - Will likely need indefinite anticoagulation, given prior history of DVT.      The patient was seen and plan discussed with my attending, Dr. Duncan.      Please note that this dictation was created using voice recognition software. I have made every reasonable attempt to correct obvious errors, but there may be errors of grammar and possibly content that I did not discover before finalizing the note.

## 2025-05-03 LAB
ERYTHROCYTE [DISTWIDTH] IN BLOOD BY AUTOMATED COUNT: 49.8 FL (ref 35.9–50)
HCT VFR BLD AUTO: 34 % (ref 37–47)
HGB BLD-MCNC: 10.6 G/DL (ref 12–16)
MCH RBC QN AUTO: 32.1 PG (ref 27–33)
MCHC RBC AUTO-ENTMCNC: 31.2 G/DL (ref 32.2–35.5)
MCV RBC AUTO: 103 FL (ref 81.4–97.8)
PLATELET # BLD AUTO: 165 K/UL (ref 164–446)
PMV BLD AUTO: 12.1 FL (ref 9–12.9)
RBC # BLD AUTO: 3.3 M/UL (ref 4.2–5.4)
UFH PPP CHRO-ACNC: 0.6 IU/ML
WBC # BLD AUTO: 7.9 K/UL (ref 4.8–10.8)

## 2025-05-03 PROCEDURE — 700102 HCHG RX REV CODE 250 W/ 637 OVERRIDE(OP)

## 2025-05-03 PROCEDURE — 700111 HCHG RX REV CODE 636 W/ 250 OVERRIDE (IP)

## 2025-05-03 PROCEDURE — 85520 HEPARIN ASSAY: CPT

## 2025-05-03 PROCEDURE — A9270 NON-COVERED ITEM OR SERVICE: HCPCS | Performed by: STUDENT IN AN ORGANIZED HEALTH CARE EDUCATION/TRAINING PROGRAM

## 2025-05-03 PROCEDURE — 99233 SBSQ HOSP IP/OBS HIGH 50: CPT | Performed by: HOSPITALIST

## 2025-05-03 PROCEDURE — 700102 HCHG RX REV CODE 250 W/ 637 OVERRIDE(OP): Performed by: HOSPITALIST

## 2025-05-03 PROCEDURE — 85027 COMPLETE CBC AUTOMATED: CPT

## 2025-05-03 PROCEDURE — A9270 NON-COVERED ITEM OR SERVICE: HCPCS | Performed by: HOSPITALIST

## 2025-05-03 PROCEDURE — 770020 HCHG ROOM/CARE - TELE (206)

## 2025-05-03 PROCEDURE — 700102 HCHG RX REV CODE 250 W/ 637 OVERRIDE(OP): Performed by: STUDENT IN AN ORGANIZED HEALTH CARE EDUCATION/TRAINING PROGRAM

## 2025-05-03 PROCEDURE — A9270 NON-COVERED ITEM OR SERVICE: HCPCS

## 2025-05-03 RX ADMIN — HEPARIN SODIUM 18 UNITS/KG/HR: 5000 INJECTION, SOLUTION INTRAVENOUS at 06:35

## 2025-05-03 RX ADMIN — Medication 5 MG: at 22:12

## 2025-05-03 RX ADMIN — SERTRALINE 100 MG: 100 TABLET, FILM COATED ORAL at 05:27

## 2025-05-03 RX ADMIN — APIXABAN 10 MG: 5 TABLET, FILM COATED ORAL at 10:37

## 2025-05-03 RX ADMIN — FERROUS SULFATE TAB 325 MG (65 MG ELEMENTAL FE) 325 MG: 325 (65 FE) TAB at 05:27

## 2025-05-03 RX ADMIN — OXYCODONE 5 MG: 5 TABLET ORAL at 22:12

## 2025-05-03 RX ADMIN — OXYCODONE 5 MG: 5 TABLET ORAL at 15:34

## 2025-05-03 RX ADMIN — APIXABAN 10 MG: 5 TABLET, FILM COATED ORAL at 17:03

## 2025-05-03 RX ADMIN — Medication 1000 UNITS: at 05:27

## 2025-05-03 RX ADMIN — DAPAGLIFLOZIN 10 MG: 10 TABLET, FILM COATED ORAL at 05:27

## 2025-05-03 RX ADMIN — ROSUVASTATIN CALCIUM 10 MG: 10 TABLET, FILM COATED ORAL at 17:04

## 2025-05-03 RX ADMIN — Medication 5 MG: at 01:42

## 2025-05-03 RX ADMIN — METOPROLOL SUCCINATE 100 MG: 50 TABLET, EXTENDED RELEASE ORAL at 17:03

## 2025-05-03 RX ADMIN — FOLIC ACID 1 MG: 1 TABLET ORAL at 17:03

## 2025-05-03 RX ADMIN — PREDNISONE 5 MG: 5 TABLET ORAL at 05:28

## 2025-05-03 ASSESSMENT — COGNITIVE AND FUNCTIONAL STATUS - GENERAL
CLIMB 3 TO 5 STEPS WITH RAILING: A LOT
DRESSING REGULAR LOWER BODY CLOTHING: A LOT
DRESSING REGULAR UPPER BODY CLOTHING: A LITTLE
HELP NEEDED FOR BATHING: A LOT
TURNING FROM BACK TO SIDE WHILE IN FLAT BAD: A LITTLE
STANDING UP FROM CHAIR USING ARMS: A LOT
HELP NEEDED FOR BATHING: A LOT
TOILETING: A LOT
TURNING FROM BACK TO SIDE WHILE IN FLAT BAD: A LOT
DRESSING REGULAR LOWER BODY CLOTHING: A LOT
DAILY ACTIVITIY SCORE: 17
MOBILITY SCORE: 15
MOVING FROM LYING ON BACK TO SITTING ON SIDE OF FLAT BED: A LOT
TOILETING: A LOT
MOVING TO AND FROM BED TO CHAIR: A LOT
MOBILITY SCORE: 12
SUGGESTED CMS G CODE MODIFIER DAILY ACTIVITY: CK
MOVING FROM LYING ON BACK TO SITTING ON SIDE OF FLAT BED: A LITTLE
DAILY ACTIVITIY SCORE: 18
WALKING IN HOSPITAL ROOM: A LOT
WALKING IN HOSPITAL ROOM: A LOT
SUGGESTED CMS G CODE MODIFIER MOBILITY: CK
SUGGESTED CMS G CODE MODIFIER MOBILITY: CL
SUGGESTED CMS G CODE MODIFIER DAILY ACTIVITY: CK
MOVING TO AND FROM BED TO CHAIR: A LOT
STANDING UP FROM CHAIR USING ARMS: A LITTLE
CLIMB 3 TO 5 STEPS WITH RAILING: A LOT

## 2025-05-03 ASSESSMENT — ENCOUNTER SYMPTOMS
COUGH: 0
CHILLS: 0
LOSS OF CONSCIOUSNESS: 0
ABDOMINAL PAIN: 0
FEVER: 0
SHORTNESS OF BREATH: 1
DIARRHEA: 0
NAUSEA: 0
BACK PAIN: 0
VOMITING: 0
HEADACHES: 0
PALPITATIONS: 0
DIZZINESS: 0

## 2025-05-03 ASSESSMENT — PAIN DESCRIPTION - PAIN TYPE
TYPE: ACUTE PAIN
TYPE: SURGICAL PAIN;ACUTE PAIN
TYPE: ACUTE PAIN

## 2025-05-03 ASSESSMENT — FIBROSIS 4 INDEX: FIB4 SCORE: 3.68

## 2025-05-03 NOTE — PROGRESS NOTES
Hospital Medicine Daily Progress Note    Date of Service  5/3/2025    Chief Complaint  Clau Vences is a 80 y.o. female admitted 4/30/2025 with SOB    Hospital Course  81yo PMHx HLD, HTN, AZUL, chronic hypoxic resp railure on 8LNC baseline, CKD 3b, HFpEF, anxiety, chronic steroid use.  Presented with hypoxia and syncope, and found to have B PEs      Interval Problem Update  Pt is without complaints this am    SPoke with pt and her daughter at the bedside at length RE PE, DVT, blood thinners etc.  All questions answered    DW Pulmonology    Sinus 60s  -150  5 LNC  AFebrile  IV heparin running at 18u/kg/hr and titrating to goal anti X-a of 0.3-0.7    I have discussed this patient's plan of care and discharge plan at IDT rounds today with Case Management, Nursing, Nursing leadership, and other members of the IDT team.    Consultants/Specialty  pulmonary    Code Status  DNAR/DNI    Disposition  Medically Cleared  I have placed the appropriate orders for post-discharge needs.    Review of Systems  Review of Systems   Constitutional:  Negative for chills and fever.   Respiratory:  Positive for shortness of breath. Negative for cough.    Cardiovascular:  Negative for chest pain, palpitations and leg swelling.   Gastrointestinal:  Negative for abdominal pain, diarrhea, nausea and vomiting.   Genitourinary:  Negative for dysuria and urgency.   Musculoskeletal:  Negative for back pain.   Skin:  Negative for rash.   Neurological:  Negative for dizziness, loss of consciousness and headaches.        Physical Exam  Temp:  [36.1 °C (97 °F)-36.3 °C (97.4 °F)] 36.3 °C (97.4 °F)  Pulse:  [60-85] 60  Resp:  [12-52] 33  BP: (117-156)/(60-95) 136/65  SpO2:  [91 %-98 %] 95 %    Physical Exam  Constitutional:       General: She is not in acute distress.     Appearance: Normal appearance. She is well-developed. She is not diaphoretic.   HENT:      Head: Normocephalic and atraumatic.   Neck:      Vascular: No JVD.    Cardiovascular:      Rate and Rhythm: Normal rate and regular rhythm.      Heart sounds: No murmur heard.  Pulmonary:      Effort: Pulmonary effort is normal. No respiratory distress.      Breath sounds: No stridor. No wheezing or rales.   Abdominal:      Palpations: Abdomen is soft.      Tenderness: There is no abdominal tenderness. There is no guarding or rebound.   Skin:     General: Skin is warm and dry.      Findings: No rash.   Neurological:      Mental Status: She is oriented to person, place, and time.   Psychiatric:         Mood and Affect: Mood normal.         Behavior: Behavior normal.         Thought Content: Thought content normal.         Fluids    Intake/Output Summary (Last 24 hours) at 5/3/2025 0643  Last data filed at 5/2/2025 1800  Gross per 24 hour   Intake 360 ml   Output 400 ml   Net -40 ml        Laboratory  Recent Labs     04/30/25 1929 05/02/25 0222 05/03/25  0533   WBC 10.6 8.3 7.9   RBC 3.79* 3.40* 3.30*   HEMOGLOBIN 12.2 11.1* 10.6*   HEMATOCRIT 38.2 34.5* 34.0*   .8* 101.5* 103.0*   MCH 32.2 32.6 32.1   MCHC 31.9* 32.2 31.2*   RDW 48.0 49.1 49.8   PLATELETCT 183 151* 165   MPV 12.2 11.9 12.1     Recent Labs     04/30/25 1929 05/02/25 0222   SODIUM 135 140   POTASSIUM 4.3 3.8   CHLORIDE 99 106   CO2 24 23   GLUCOSE 109* 104*   BUN 48* 35*   CREATININE 1.35 0.91   CALCIUM 10.5 9.4     Recent Labs     04/30/25  2313   APTT 28.4   INR 1.23*               Imaging  US-EXTREMITY VENOUS LOWER BILAT   Final Result      EC-ECHOCARDIOGRAM COMPLETE W/ CONT   Final Result      CT-CTA CHEST PULMONARY ARTERY W/ RECONS   Final Result      1.  Exam is positive for bilateral pulmonary emboli.      2.  RV LV ratio is elevated consistent with right heart strain.      3.  Minimal scattered pulmonary opacifications could be due to atelectasis or developing pulmonary infarcts.         These findings were discussed with MCKENNA MENDEZ on 04/30/2025.           Assessment/Plan  * Other pulmonary  embolism with acute cor pulmonale (HCC)- (present on admission)  Assessment & Plan  Provoked event as pt has had a L lower ext Fx and has been mostly bed ridden at SNF.    She has an Hx of a previous DVT with lower extremity trauma treated with 3 months of apixaban  Bilateral PEs with right heart strain noted on CT  Recent hip surgery  PESI score 120  DC heparin and transition to apixaban  After consultation and given risk/benefit evaluation will proceed with medical therapy    CKD stage 3b, GFR 30-44 ml/min- (present on admission)  Assessment & Plan  At baseline  Renally dose meds as needed  Avoid nephrotoxins  Monitor urine output    Elevated brain natriuretic peptide (BNP) level- (present on admission)  Assessment & Plan  Patient does not appear overtly volume overloaded  Elevated BNP likely secondary to right heart strain from acute bilateral PEs    Elevated troponin- (present on admission)  Assessment & Plan  Likely type II demand ischemia in the setting of bilateral acute PEs and right heart strain, on her background of CKD  EKG without any ST changes  Trend    Syncope- (present on admission)  Assessment & Plan  2 syncopal episodes in the last few days at the SNF  Likely secondary to the significant pulmonary embolism burden on patient's heart  Telemetry to monitor for arrhythmias  Echo pending  Check TSH    Left ventricular diastolic dysfunction, NYHA class 3- (present on admission)  Assessment & Plan  Continue home metoprolol  Not in acute exacerbation  Hold off on any diuresis for now    Acute on chronic hypoxic respiratory failure (HCC)- (present on admission)  Assessment & Plan  8 L O2 at baseline, now requiring 13 L to maintain saturations  Likely secondary to acute bilateral PEs  Titrate down O2 as clinically tolerated  Hold off diuresis overnight, if patient remains clinically stable, would likely benefit from diuresis prior to discharge    Anxiety- (present on admission)  Assessment & Plan  Continue  as needed hydroxyzine  Continue sertraline    Dyslipidemia- (present on admission)  Assessment & Plan  Continue home statin    HTN (hypertension)- (present on admission)  Assessment & Plan  Not currently, has not been on antihypertensives since early April         VTE prophylaxis: VTE Selection    I have performed a physical exam and reviewed and updated ROS and Plan today (5/3/2025). In review of yesterday's note (5/2/2025), there are no changes except as documented above.

## 2025-05-03 NOTE — PROGRESS NOTES
Goal Outcome Evaluation:            Patient oriented to self and time but confused to situation. No complaints of pain. PTT resulted 92. Eliquis held per MD. Spoke with daughter and she voiced concerns of her fathers confusion and possible d/c today. She states that patient experiences confusion at times but it is significantly worse now. [facility] notified. VSS.              Pulmonary follow up     Chart review of follow up of DVT assessment with PE  Acute to subacute, occlusive thrombus in one of two of the RIGHT popliteal   Fio2 requirements decreasing  Vitals stable  Agree with no thrombectomy    D/w Dr. Leon

## 2025-05-03 NOTE — DOCUMENTATION QUERY
"                                                                         [facility]                                                                       Query Response Note      PATIENT:               JACQUE SHEN  ACCT #:                  [id number]  MRN:                     [id number]  :                      1945  ADMIT DATE:       2025 5:45 PM  DISCH DATE:          RESPONDING  PROVIDER #:        [id number]           QUERY TEXT:    Sacrum DTI POA is documented in the Medical Record.    Do you agree with wound care findings of \"Sacrum DTI POA\"    The patient's clinical indicators include:  WOUND TEAM 2025  SACRUM DTI POA    Risk factors: recent ORIF L hip, decreased functional mobility  Options provided:   -- Agree, document as Sacrum DTI, POA   -- Disagree, document as-, Please document injury type, location, stage and poa status   -- Other explanation, (Please specify the other explanation)      Query created by: Peyton Lara on 2025 1:15 PM    RESPONSE TEXT:    Agree, document as Sacrum DTI, POA       QUERY TEXT:    Do you agree with wound care findings of \"RIGHT HEEL STAGE 2 PRESSURE INJURY POA\"?    Note: If a query response diagnosis is provided, please include it in your daily notes & DC Summary    Peyton Lara  RN-CDIS  [address] [place] [address]  [id number]  [email or web address]  Connect via [employer] Messenger    The patient's clinical indicators include:  WOUND TEAM: RIGHT HEEL STAGE 2 PRESSURE INJURY POA    Risk factors:   recent ORIF hip, decreased mobility  Options provided:   -- Agree, document as Right Heel pressure injury stage 2 POA   -- Other explanation, (Please specify the other explanation)      Query created by: Peyton Lara on 2025 1:19 PM    RESPONSE TEXT:    Agree, document as Right Heel pressure injury stage 2 POA          Electronically signed by:  JENNIFER CHUA MD 2025 10:37 PM              "

## 2025-05-03 NOTE — PROGRESS NOTES
Report received and patient care assumed. Pt is resting in bed and refused to get to the chair, A&O 4, with no pain, and is on 4LNC. Tele box on. All fall precautions are in place, belongings on bedside table.  Pt was updated on POC, no questions or concerns. Pt educated on use of call light for assistance.

## 2025-05-03 NOTE — PROGRESS NOTES
4 Eyes Skin Assessment Completed by DILIP Smith and DILIP River.    Head WDL  Ears WDL  Nose WDL  Mouth WDL  Neck WDL  Breast/Chest Scar  Shoulder Blades WDL  Spine WDL  (R) Arm/Elbow/Hand WDL  (L) Arm/Elbow/Hand WDL  Abdomen WDL  Groin Redness  Scrotum/Coccyx/Buttocks Redness and Moisture Fissure  (R) Leg WDL  (L) Leg Bruising and Incision  (R) Heel/Foot/Toe Redness and Ulcer(s)  (L) Heel/Foot/Toe WDL          Devices In Places Tele Box, Pulse Ox, and Nasal Cannula      Interventions In Place Gray Ear Foams, Heel Mepilex, Sacral Mepilex, TAP System, Pillows, Q2 Turns, Barrier Cream, and Heels Loaded W/Pillows    Possible Skin Injury Yes    Pictures Uploaded Into Epic Yes  Wound Consult Placed Yes  RN Wound Prevention Protocol Ordered Yes

## 2025-05-03 NOTE — CARE PLAN
The patient is Watcher - Medium risk of patient condition declining or worsening    Shift Goals  Clinical Goals: Hemodynamic stability  Patient Goals: sleep, pain control  Family Goals: zo    Progress made toward(s) clinical / shift goals:   Problem: Venous Thromboembolism (VTW)/Deep Vein Thrombosis (DVT) Prevention:  Goal: Patient will participate in Venous Thrombosis (VTE)/Deep Vein Thrombosis (DVT)Prevention Measures  Outcome: Progressing     Problem: Psychosocial Needs:  Goal: Level of anxiety will decrease  Outcome: Progressing     Problem: Respiratory:  Goal: Respiratory status will improve  Outcome: Progressing     Problem: Urinary Elimination:  Goal: Ability to reestablish a normal urinary elimination pattern will improve  Outcome: Progressing     Problem: Mobility  Goal: Risk for activity intolerance will decrease  Outcome: Progressing

## 2025-05-03 NOTE — CARE PLAN
The patient is Stable - Low risk of patient condition declining or worsening    Shift Goals  Clinical Goals: skin integrity, tele montioring, VSS  Patient Goals: no bed sores  Family Goals: updates    Progress made toward(s) clinical / shift goals:    Problem: Venous Thromboembolism (VTW)/Deep Vein Thrombosis (DVT) Prevention:  Goal: Patient will participate in Venous Thrombosis (VTE)/Deep Vein Thrombosis (DVT)Prevention Measures  Outcome: Progressing     Problem: Knowledge Deficit  Goal: Knowledge of disease process/condition, treatment plan, diagnostic tests, and medications will improve  Outcome: Progressing     Problem: Discharge Barriers/Planning  Goal: Patient's continuum of care needs will be met  Outcome: Progressing     Problem: Skin Integrity  Goal: Skin integrity is maintained or improved  Outcome: Progressing     Problem: Fall Risk  Goal: Patient will remain free from falls  Outcome: Progressing       Patient is not progressing towards the following goals:

## 2025-05-04 ENCOUNTER — NON-PROVIDER VISIT (OUTPATIENT)
Dept: CARDIOLOGY | Facility: MEDICAL CENTER | Age: 80
End: 2025-05-04
Payer: MEDICARE

## 2025-05-04 VITALS
SYSTOLIC BLOOD PRESSURE: 125 MMHG | HEIGHT: 67 IN | OXYGEN SATURATION: 90 % | TEMPERATURE: 97.5 F | WEIGHT: 217.37 LBS | HEART RATE: 58 BPM | DIASTOLIC BLOOD PRESSURE: 75 MMHG | BODY MASS INDEX: 34.12 KG/M2 | RESPIRATION RATE: 18 BRPM

## 2025-05-04 LAB
ERYTHROCYTE [DISTWIDTH] IN BLOOD BY AUTOMATED COUNT: 50.1 FL (ref 35.9–50)
HCT VFR BLD AUTO: 35 % (ref 37–47)
HGB BLD-MCNC: 10.7 G/DL (ref 12–16)
MCH RBC QN AUTO: 31.9 PG (ref 27–33)
MCHC RBC AUTO-ENTMCNC: 30.6 G/DL (ref 32.2–35.5)
MCV RBC AUTO: 104.5 FL (ref 81.4–97.8)
PLATELET # BLD AUTO: 159 K/UL (ref 164–446)
PMV BLD AUTO: 11.6 FL (ref 9–12.9)
RBC # BLD AUTO: 3.35 M/UL (ref 4.2–5.4)
WBC # BLD AUTO: 8.8 K/UL (ref 4.8–10.8)

## 2025-05-04 PROCEDURE — 700111 HCHG RX REV CODE 636 W/ 250 OVERRIDE (IP)

## 2025-05-04 PROCEDURE — 700102 HCHG RX REV CODE 250 W/ 637 OVERRIDE(OP)

## 2025-05-04 PROCEDURE — 85027 COMPLETE CBC AUTOMATED: CPT

## 2025-05-04 PROCEDURE — A9270 NON-COVERED ITEM OR SERVICE: HCPCS | Performed by: HOSPITALIST

## 2025-05-04 PROCEDURE — A9270 NON-COVERED ITEM OR SERVICE: HCPCS

## 2025-05-04 PROCEDURE — 99239 HOSP IP/OBS DSCHRG MGMT >30: CPT | Performed by: INTERNAL MEDICINE

## 2025-05-04 PROCEDURE — 36415 COLL VENOUS BLD VENIPUNCTURE: CPT

## 2025-05-04 PROCEDURE — 700102 HCHG RX REV CODE 250 W/ 637 OVERRIDE(OP): Performed by: HOSPITALIST

## 2025-05-04 RX ORDER — OXYCODONE HYDROCHLORIDE 5 MG/1
5 TABLET ORAL EVERY 4 HOURS PRN
Qty: 12 TABLET | Refills: 0 | Status: SHIPPED | OUTPATIENT
Start: 2025-05-04 | End: 2025-05-04

## 2025-05-04 RX ORDER — OXYCODONE HYDROCHLORIDE 5 MG/1
5 TABLET ORAL EVERY 4 HOURS PRN
Qty: 12 TABLET | Refills: 0 | Status: SHIPPED | OUTPATIENT
Start: 2025-05-04 | End: 2025-05-06

## 2025-05-04 RX ADMIN — SERTRALINE 100 MG: 100 TABLET, FILM COATED ORAL at 05:24

## 2025-05-04 RX ADMIN — PREDNISONE 5 MG: 5 TABLET ORAL at 05:24

## 2025-05-04 RX ADMIN — DAPAGLIFLOZIN 10 MG: 10 TABLET, FILM COATED ORAL at 05:24

## 2025-05-04 RX ADMIN — APIXABAN 10 MG: 5 TABLET, FILM COATED ORAL at 05:24

## 2025-05-04 RX ADMIN — Medication 1000 UNITS: at 05:24

## 2025-05-04 RX ADMIN — FERROUS SULFATE TAB 325 MG (65 MG ELEMENTAL FE) 325 MG: 325 (65 FE) TAB at 05:24

## 2025-05-04 RX ADMIN — OXYCODONE 5 MG: 5 TABLET ORAL at 03:11

## 2025-05-04 ASSESSMENT — PAIN DESCRIPTION - PAIN TYPE: TYPE: ACUTE PAIN;SURGICAL PAIN

## 2025-05-04 ASSESSMENT — FIBROSIS 4 INDEX: FIB4 SCORE: 3.82

## 2025-05-04 NOTE — DISCHARGE SUMMARY
Discharge Summary    CHIEF COMPLAINT ON ADMISSION  Chief Complaint   Patient presents with    Syncope     Pt BIBA for syncope episode, no fall, no trauma. Episode described as blank stare by patient where her oxygen went into 80s and HR into 40s. EMS stated 3 of these episodes over last few days. SNF administered Narcan. Pt on 8L oxymask from facility, with oxygen recently being increased at facility.        Reason for Admission  ems    Admission Date  4/30/2025     CODE STATUS  DNAR/DNI    HPI & HOSPITAL COURSE  This is a 80 y.o. female with a past medical history of congestive heart failure, chronic hypoxic respiratory failure(8 L NC at baseline), CKD stage IIIb who was brought in from SNF on 05/01 with syncope and found to have hypoxemic respiratory failure at her SNF.  CTA revealed bilateral PE, possible pulmonary infarcts and elevated RV/LV ratio consistent with right heart strain.  Patient was admitted for further management of acute PE and was started on heparin GTT.  Pulmonology consulted for further evaluation and management.  No need for thrombectomy and medical management recommended   She was initially continued on heparin drip and has now switched to eliquis   Patient now doing better   She will need continued physical therapy and will be discharged to SNF today     The patient met 2-midnight criteria for an inpatient stay at the time of discharge.      FOLLOW UP ITEMS POST DISCHARGE  PCP      DISCHARGE DIAGNOSES  Principal Problem:    Other pulmonary embolism with acute cor pulmonale (HCC) (POA: Yes)  Active Problems:    HTN (hypertension) (POA: Yes)    Dyslipidemia (POA: Yes)    Anxiety (POA: Yes)    Acute on chronic hypoxic respiratory failure (HCC) (POA: Yes)    Left ventricular diastolic dysfunction, NYHA class 3 (POA: Yes)    Syncope (POA: Yes)    Elevated troponin (POA: Yes)    Elevated brain natriuretic peptide (BNP) level (POA: Yes)    CKD stage 3b, GFR 30-44 ml/min (POA: Yes)  Resolved  Problems:    * No resolved hospital problems. *      FOLLOW UP  Future Appointments   Date Time Provider Department Center   5/21/2025  1:45 PM PACER CHECK-[facility] None   5/21/2025  2:30 PM ELLYN Jain [facility] None     No follow-up provider specified.    MEDICATIONS ON DISCHARGE     Medication List        START taking these medications        Instructions   * apixaban 5mg Tabs  Commonly known as: Eliquis   Take 2 Tablets by mouth 2 times a day for 5 days.  Dose: 10 mg     * apixaban 5mg Tabs  Start taking on: May 10, 2025  Commonly known as: Eliquis   Take 1 Tablet by mouth 2 times a day.  Dose: 5 mg     furosemide 20 MG Tabs  Commonly known as: Lasix   Take 1 Tablet by mouth every day.  Dose: 20 mg     spironolactone 25 MG Tabs  Commonly known as: Aldactone   Doctor's comments: Dose decreased to 12.5 mg daily  Take 0.5 Tablets by mouth every day.  Dose: 12.5 mg     valsartan 320 MG tablet  Commonly known as: Diovan   Take 1 Tablet by mouth every day.  Dose: 320 mg           * This list has 2 medication(s) that are the same as other medications prescribed for you. Read the directions carefully, and ask your doctor or other care provider to review them with you.                CHANGE how you take these medications        Instructions   sertraline 100 MG Tabs  What changed:   how much to take  how to take this  when to take this  Commonly known as: Zoloft   Take 1 tablet by mouth once daily            CONTINUE taking these medications        Instructions   acetaminophen 500 MG Tabs  Commonly known as: Tylenol   Take 1,000 mg by mouth 3 times a day.  Dose: 1,000 mg     diclofenac DR 25 MG Tbec  Commonly known as: Voltaren   Take 25 mg by mouth 2 times a day. Indications: Joint Damage causing Pain and Loss of Function, Shoulder Pain, R & L shoulder pain  Dose: 25 mg     docusate sodium 100 MG Caps  Commonly known as: Colace   Take 100 mg by mouth 2 times a day.  Dose: 100 mg     EnovaRX-Diclofenac Sodium  2.5 % Crea   Apply 1 Application topically 2 times a day. To bilateral shoulders  Dose: 1 Application     ferrous sulfate 325 (65 Fe) MG tablet   Take 325 mg by mouth every day.  Dose: 325 mg     folic acid 800 MCG tablet  Commonly known as: Folvite   Take 800 mcg by mouth every day at 6 PM.  Dose: 800 mcg     hydrOXYzine HCl 10 MG Tabs  Commonly known as: Atarax   Take 10 mg by mouth every four hours as needed.  Dose: 10 mg     Jardiance 10 MG Tabs tablet  Generic drug: Empagliflozin   Take 1 Tablet by mouth every day.  Dose: 10 mg     metoprolol  MG Tb24  Commonly known as: Toprol XL   Take 1 Tablet by mouth every evening.  Dose: 100 mg     oxyCODONE immediate-release 5 MG Tabs  Commonly known as: Roxicodone   Take 1 Tablet by mouth every four hours as needed for Severe Pain for up to 2 days.  Dose: 5 mg     polyethylene glycol/lytes 17 g Pack  Commonly known as: Miralax   Take 17 g by mouth every day.  Dose: 17 g     predniSONE 5 MG Tabs  Commonly known as: Deltasone   Take 1 tablet by mouth once daily  Dose: 5 mg     rosuvastatin 10 MG Tabs  Commonly known as: Crestor   Take 1 Tablet by mouth every evening.  Dose: 10 mg     Senna Plus 8.6-50 MG Tabs  Generic drug: senna-docusate   Take 2 Tablets by mouth every evening.  Dose: 2 Tablet     vitamin D3 1000 Unit (25 mcg) Tabs  Commonly known as: Cholecalciferol   Take 1,000 Units by mouth every day.  Dose: 1,000 Units            STOP taking these medications      heparin 5000 UNIT/ML Soln              Allergies  Allergies   Allergen Reactions    Mirabegron Unspecified     Dizziness & lightheadedness    Tramadol Nausea     Nausea and somnolence       DIET  Orders Placed This Encounter   Procedures    Diet Order Diet: Regular     Standing Status:   Standing     Number of Occurrences:   1     Diet::   Regular [1]       ACTIVITY  As tolerated.  Weight bearing as tolerated    LINES, DRAINS, AND WOUNDS  This is an automated list. Peripheral IVs will be removed  prior to discharge.  Peripheral IV 04/30/25 20 G Left Antecubital (Active)   Site Assessment Clean;Dry;Intact 05/04/25 0800   Dressing Type Transparent 05/04/25 0800   Line Status Scrubbed the hub prior to access;Flushed;Saline locked 05/04/25 0800   Dressing Status Clean;Dry;Intact 05/04/25 0800   Dressing Intervention N/A 05/04/25 0800   Infiltration Grading ([facility], [facility]) 0 05/04/25 0800   Phlebitis Scale ([facility] Only) 0 05/04/25 0800       Peripheral IV 04/30/25 20 G Distal;Posterior;Right Forearm (Active)   Site Assessment Clean;Dry;Intact 05/04/25 0800   Dressing Type Transparent 05/04/25 0800   Line Status Scrubbed the hub prior to access;Flushed;Saline locked 05/04/25 0800   Dressing Status Clean;Dry;Intact 05/04/25 0800   Dressing Intervention N/A 05/04/25 0800   Infiltration Grading ([facility]) 0 05/04/25 0800   Phlebitis Scale ([facility] Only) 0 05/04/25 0800       Wound 01/28/23 Incision Abdomen (Active)       Wound 11/15/23 Pretibial Anterior;Midline Left (Active)       Wound 04/06/25 Incision Leg Left staples, xeroform, 4x4, tegaderm (Active)       Wound 05/01/25 Pressure Injury Heel Right POA stage 2 (Active)   Wound Image   05/03/25 1354   Site Assessment Red;Purple 05/03/25 2215   Periwound Assessment Clean;Dry;Intact 05/03/25 2215   Margins Defined edges;Attached edges 05/03/25 2215   Closure Open to air 05/03/25 2215   Drainage Amount None 05/03/25 2215   Treatments Offloading;Site care 05/03/25 2215   Offloading/DME Other (comment) 05/03/25 2215   Wound Cleansing Approved Wound Cleanser 05/01/25 1500   Dressing Status Clean;Dry;Intact 05/03/25 2215   Dressing Changed Reapplied 05/03/25 2215   Dressing Cleansing/Solutions Not Applicable 05/01/25 1500   Dressing Options Offloading Dressing - Heel 05/03/25 2215   Dressing Change/Treatment Frequency Every 72 hrs, and As Needed 05/03/25 2215   NEXT Dressing Change/Treatment Date 05/04/25 05/03/25 2215   NEXT Weekly Photo (Inpatient Only) 05/07/25  05/01/25 1500   Wound Team Following Not following 05/01/25 1500   WOUND NURSE ONLY - Pressure Injury Stage 2 05/01/25 1500   Wound Length (cm) 0.8 cm 05/01/25 1500   Wound Width (cm) 1 cm 05/01/25 1500   Wound Surface Area (cm^2) 0.8 cm^2 05/01/25 1500   Shape oval 05/01/25 1500   Wound Odor None 05/01/25 1500   WOUND NURSE ONLY - Time Spent with Patient (mins) 40 05/01/25 1500       Wound 05/01/25 Pressure Injury Sacrum DTI POA (Active)   Wound Image   05/03/25 1354   Site Assessment Purple;Red 05/03/25 2215   Periwound Assessment Clean;Dry;Intact 05/03/25 2215   Margins Attached edges;Defined edges 05/03/25 1354   Closure Open to air 05/03/25 2215   Drainage Amount None 05/03/25 2215   Treatments Offloading 05/01/25 1500   Offloading/DME Other (comment) 05/03/25 1354   Dressing Status Open to Air 05/01/25 2000   Dressing Changed New 05/03/25 1354   Dressing Options Offloading Dressing - Sacral 05/01/25 1500   Dressing Change/Treatment Frequency Every 72 hrs, and As Needed 05/02/25 1400   NEXT Dressing Change/Treatment Date 05/04/25 05/01/25 1500   NEXT Weekly Photo (Inpatient Only) 05/07/25 05/01/25 1500   Wound Team Following Not following 05/01/25 1500   WOUND NURSE ONLY - Pressure Injury Stage DTPI 05/01/25 1500   Wound Length (cm) 5 cm 05/01/25 1500   Wound Width (cm) 10 cm 05/01/25 1500   Wound Surface Area (cm^2) 50 cm^2 05/01/25 1500   Shape irregular - yet bilateral 05/01/25 1500   Wound Odor None 05/01/25 1500       Peripheral IV 04/30/25 20 G Left Antecubital (Active)   Site Assessment Clean;Dry;Intact 05/04/25 0800   Dressing Type Transparent 05/04/25 0800   Line Status Scrubbed the hub prior to access;Flushed;Saline locked 05/04/25 0800   Dressing Status Clean;Dry;Intact 05/04/25 0800   Dressing Intervention N/A 05/04/25 0800   Infiltration Grading ([facility]) 0 05/04/25 0800   Phlebitis Scale ([facility] Only) 0 05/04/25 0800       Peripheral IV 04/30/25 20 G Distal;Posterior;Right Forearm (Active)    Site Assessment Clean;Dry;Intact 05/04/25 0800   Dressing Type Transparent 05/04/25 0800   Line Status Scrubbed the hub prior to access;Flushed;Saline locked 05/04/25 0800   Dressing Status Clean;Dry;Intact 05/04/25 0800   Dressing Intervention N/A 05/04/25 0800   Infiltration Grading ([facility], [facility]) 0 05/04/25 0800   Phlebitis Scale ([facility] Only) 0 05/04/25 0800               MENTAL STATUS ON TRANSFER             CONSULTATIONS  Pulmonology     PROCEDURES  None     LABORATORY  Lab Results   Component Value Date    SODIUM 140 05/02/2025    POTASSIUM 3.8 05/02/2025    CHLORIDE 106 05/02/2025    CO2 23 05/02/2025    GLUCOSE 104 (H) 05/02/2025    BUN 35 (H) 05/02/2025    CREATININE 0.91 05/02/2025    CREATININE 0.5 09/04/2007        Lab Results   Component Value Date    WBC 8.8 05/04/2025    HEMOGLOBIN 10.7 (L) 05/04/2025    HEMATOCRIT 35.0 (L) 05/04/2025    PLATELETCT 159 (L) 05/04/2025        Total time of the discharge process exceeds 35 minutes.

## 2025-05-04 NOTE — DISCHARGE PLANNING
Agency/Facility Name: [facility]   Outcome: DPA received call back from Alena and she states they can take Pt back at 12:00 Vikki also requested a referral be submitted through [employer]. DPA notified MERNA [facility]A and asked for SNF protocol be placed.     Protocol placed and WAI sent and referral to [facility]

## 2025-05-04 NOTE — DISCHARGE PLANNING
Agency/Facility Name: [facility]    Outcome:  Spoke with Kaia and she sent message off to on call admission for SNF. Will wait for return call.

## 2025-05-04 NOTE — DISCHARGE PLANNING
DC Transport Scheduled    Transport Company Scheduled:  [facility]  Spoke with Johana at [facility] to schedule transport.    Scheduled Date: 05/04/2025  Scheduled Time: 1400    Transport Type: [facility]  Destination: Destination Name: [facility]  Destination address: [address] [place] [address] [place] [address]    Notified care team of scheduled transport via [employer].     If there are any changes needed to the DC transportation scheduled, please contact [facility] at ext. [phone] between the hours of [phone]. If outside those hours, contact the ED Case Manager at ext. [phone].

## 2025-05-04 NOTE — DISCHARGE PLANNING
WAI requested to call and confirming ETA for Ameena.  WAI informed Alena she is being transported at 14:00.

## 2025-05-04 NOTE — CARE PLAN
The patient is Stable - Low risk of patient condition declining or worsening    Shift Goals  Clinical Goals: skin integrity, vss, anxiety reduction  Patient Goals: updates, rest  Family Goals: updates    Progress made toward(s) clinical / shift goals:    Problem: Knowledge Deficit - Standard  Goal: Patient and family/care givers will demonstrate understanding of plan of care, disease process/condition, diagnostic tests and medications  Outcome: Progressing     Problem: Psychosocial  Goal: Patient's ability to identify and develop effective coping behaviors will improve  Outcome: Progressing     Problem: Pain - Standard  Goal: Alleviation of pain or a reduction in pain to the patient’s comfort goal  Outcome: Progressing     Problem: Communication  Goal: The ability to communicate needs accurately and effectively will improve  Outcome: Progressing     Problem: Safety  Goal: Will remain free from injury  Outcome: Progressing  Goal: Will remain free from falls  Outcome: Progressing     Problem: Pain Management  Goal: Pain level will decrease to patient's comfort goal  Outcome: Progressing     Problem: Infection  Goal: Will remain free from infection  Outcome: Progressing     Problem: Respiratory:  Goal: Respiratory status will improve  Outcome: Progressing     Problem: Bowel/Gastric:  Goal: Normal bowel function is maintained or improved  Outcome: Progressing     Problem: Skin Integrity  Goal: Risk for impaired skin integrity will decrease  Outcome: Progressing     Problem: Knowledge Deficit  Goal: Knowledge of disease process/condition, treatment plan, diagnostic tests, and medications will improve  Outcome: Progressing  Goal: Knowledge of the prescribed therapeutic regimen will improve  Outcome: Progressing     Problem: Skin Integrity  Goal: Skin integrity is maintained or improved  Outcome: Progressing     Problem: Fall Risk  Goal: Patient will remain free from falls  Outcome: Progressing       Patient is not  progressing towards the following goals:      Problem: Mobility  Goal: Risk for activity intolerance will decrease  Outcome: Not Progressing

## 2025-05-04 NOTE — PROGRESS NOTES
Report received from night shift RN, assumed patient care. Patient is calmly resting in bed, no signs of distress, even and unlabored breathing noted. Pt on 4 LPM NC O2. A&Ox4. Tele box on and in place. Patient has call light within reach, fall precautions in place. Patient states no needs at this time. Hourly rounding initiated.

## 2025-05-04 NOTE — PROGRESS NOTES
Monitor Summary  Rhythm: SB/SR/paced  Rate: 59-66  Ectopy: (R) PVC  Measurements: .24/.09/.38  ---12 hr Chart Review---

## 2025-05-04 NOTE — DISCHARGE PLANNING
Case Management Discharge Planning    Admission Date: 4/30/2025  GMLOS: 3.8  ALOS: 3    6-Clicks ADL Score: 17  6-Clicks Mobility Score: 12  PT and/or OT Eval ordered: Yes  Post-acute Referrals Ordered: Yes  Post-acute Choice Obtained: Yes  Has referral(s) been sent to post-acute provider:  Yes      Anticipated Discharge Dispo: Discharge Disposition: D/T to SNF with Medicare cert in anticipation of skilled care (03)     DME Needed: No    Action(s) Taken: Updated Provider/Nurse on Discharge Plan, Choice obtained, and Referral(s) sent    1115, Pt and daughter Celeste were visited at room. Pt in agreement to discharge back to [facility]. SNF choice, IMM and [facility] form were signed by Pt.    1145, [facility] request initiated for a ride going to [facility] via [facility]    1300, Pt  and daughter informed of transfer to [facility] at 1400 via [facility]. [facility] transfer packet given to KARAN ANDRE    Escalations Completed: None    Medically Clear: Yes    Next Steps: CM will continue to assist Pt with discharge needs.      Barriers to Discharge: None    Is the patient up for discharge tomorrow: No

## 2025-05-05 ENCOUNTER — TELEPHONE (OUTPATIENT)
Dept: CARDIOLOGY | Facility: MEDICAL CENTER | Age: 80
End: 2025-05-05
Payer: MEDICARE

## 2025-05-05 NOTE — CARDIAC REMOTE MONITOR - SCAN
Device transmission reviewed. Device demonstrated appropriate function. New diagnosis of AF noted.      Electronically Signed by: Alia Kate M.D.    5/20/2025  10:48 AM

## 2025-05-05 NOTE — TELEPHONE ENCOUNTER
Remote transmission received via home monitor, patient presenting with new onset AF, full report scanned into Media.     New onset AF  -Episode Onset: 4/30/2025 @ 2:37 PM  -Duration: 1 hour 38 minutes  -Average A Rate:208 bpm  -Average V Rate:104 bpm  -Patient on OAC?: Yes - Eliquis

## 2025-05-05 NOTE — TELEPHONE ENCOUNTER
Chart reviewed, pt was in ER on 04/30/25 for syncope, discharged on 05/04/25. FV with VISHNU 05/21/25.    Phone Number Called: [phone]    Call outcome: Did not leave a detailed message. Requested patient to call back.    Message: Called to follow up on symptoms since hospital visit.

## 2025-05-21 ENCOUNTER — NON-PROVIDER VISIT (OUTPATIENT)
Dept: CARDIOLOGY | Facility: MEDICAL CENTER | Age: 80
End: 2025-05-21

## 2025-05-21 ENCOUNTER — NON-PROVIDER VISIT (OUTPATIENT)
Dept: CARDIOLOGY | Facility: MEDICAL CENTER | Age: 80
End: 2025-05-21
Attending: NURSE PRACTITIONER
Payer: MEDICARE

## 2025-05-21 VITALS
BODY MASS INDEX: 34.05 KG/M2 | HEART RATE: 76 BPM | OXYGEN SATURATION: 96 % | RESPIRATION RATE: 16 BRPM | SYSTOLIC BLOOD PRESSURE: 102 MMHG | DIASTOLIC BLOOD PRESSURE: 52 MMHG | HEIGHT: 67 IN

## 2025-05-21 DIAGNOSIS — Z79.899 HIGH RISK MEDICATION USE: ICD-10-CM

## 2025-05-21 DIAGNOSIS — I50.30 ACC/AHA STAGE C HEART FAILURE WITH PRESERVED EJECTION FRACTION (HCC): Primary | ICD-10-CM

## 2025-05-21 DIAGNOSIS — I48.0 PAROXYSMAL ATRIAL FIBRILLATION (HCC): ICD-10-CM

## 2025-05-21 DIAGNOSIS — E78.2 MIXED HYPERLIPIDEMIA: ICD-10-CM

## 2025-05-21 DIAGNOSIS — G47.33 OSA ON CPAP: ICD-10-CM

## 2025-05-21 DIAGNOSIS — R06.02 SOB (SHORTNESS OF BREATH): ICD-10-CM

## 2025-05-21 DIAGNOSIS — I26.09 OTHER CHRONIC PULMONARY EMBOLISM WITH ACUTE COR PULMONALE (HCC): ICD-10-CM

## 2025-05-21 DIAGNOSIS — I50.9 HEART FAILURE, NYHA CLASS 2 (HCC): ICD-10-CM

## 2025-05-21 DIAGNOSIS — Z95.0 CARDIAC PACEMAKER IN SITU: ICD-10-CM

## 2025-05-21 DIAGNOSIS — I49.5 SICK SINUS SYNDROME (HCC): ICD-10-CM

## 2025-05-21 DIAGNOSIS — I10 HTN (HYPERTENSION), MALIGNANT: ICD-10-CM

## 2025-05-21 DIAGNOSIS — Z95.0 PACEMAKER: ICD-10-CM

## 2025-05-21 DIAGNOSIS — I27.82 OTHER CHRONIC PULMONARY EMBOLISM WITH ACUTE COR PULMONALE (HCC): ICD-10-CM

## 2025-05-21 PROCEDURE — 93280 PM DEVICE PROGR EVAL DUAL: CPT | Performed by: STUDENT IN AN ORGANIZED HEALTH CARE EDUCATION/TRAINING PROGRAM

## 2025-05-21 PROCEDURE — 99214 OFFICE O/P EST MOD 30 MIN: CPT | Performed by: NURSE PRACTITIONER

## 2025-05-21 PROCEDURE — 99213 OFFICE O/P EST LOW 20 MIN: CPT | Performed by: NURSE PRACTITIONER

## 2025-05-21 PROCEDURE — 3078F DIAST BP <80 MM HG: CPT | Performed by: NURSE PRACTITIONER

## 2025-05-21 PROCEDURE — 3074F SYST BP LT 130 MM HG: CPT | Performed by: NURSE PRACTITIONER

## 2025-05-21 RX ORDER — MULTIVIT WITH MINERALS/LUTEIN
TABLET ORAL
COMMUNITY

## 2025-05-21 RX ORDER — NALOXONE HYDROCHLORIDE 0.4 MG/ML
0.4 INJECTION, SOLUTION INTRAMUSCULAR; INTRAVENOUS; SUBCUTANEOUS ONCE
COMMUNITY

## 2025-05-21 RX ORDER — ZINC OXIDE 20 G/100G
OINTMENT TOPICAL PRN
COMMUNITY

## 2025-05-21 RX ORDER — NYSTATIN 100000 [USP'U]/ML
500000 SUSPENSION ORAL 4 TIMES DAILY
COMMUNITY

## 2025-05-21 RX ORDER — OXYCODONE HYDROCHLORIDE 5 MG/1
5 TABLET ORAL EVERY 4 HOURS PRN
COMMUNITY

## 2025-05-21 ASSESSMENT — ENCOUNTER SYMPTOMS
COUGH: 0
MYALGIAS: 0
PALPITATIONS: 0
NAUSEA: 1
DIZZINESS: 1
ORTHOPNEA: 0
ABDOMINAL PAIN: 0
SHORTNESS OF BREATH: 0
FEVER: 0
PND: 0
CLAUDICATION: 0

## 2025-05-21 NOTE — PROGRESS NOTES
Chief Complaint   Patient presents with    Hypertension    Dyslipidemia    Congestive Heart Failure     F/V Dx: ACC/AHA stage C heart failure with preserved ejection fraction (HCC)       Subjective     Rosy Vences is a 80 y.o. female who presents today for follow up on her recent hospitalizations with her sister-in-law, Lorin.    Patient of Dr. Chandra.  She was last seen in clinic on 4/2/2025 with myself.  No changes were made during that visit, patient was sent for lab testing and to follow-up again when she has her pacemaker checked.    Unfortunately, patient suffered a fall on 4/5/2025 and was admitted through 4/9/2025.  Patient was found to have a left distal femur fracture and found to be hyperkalemic.  Patient had ORIF on 4/6/2025.  Her hospitalization was complicated by hypotension, confusion and hyperkalemia.  Her home medications were held, hyperkalemia was treated with Lokelma.  Hyperkalemia will resolved.  Then her valsartan and spironolactone doses were adjusted.  Patient also had some ABHILASH, medications were adjusted.  Patient was discharged to skilled nursing facility.    Patient returned to the hospital on 4/30/2025 through 5/4/2025 with syncope and found to be hypoxic.  Patient was found to have bilateral PE, possible pulmonary infarcts, elevated RV/LV ratio consistent with right heart strain.  Patient was admitted for further management and started on heparin drip.  Pulmonary was consulted, thrombectomy was not needed.  She was transition to [facility] and discharged back to SNF.    Patient reports she has been doing okay, but is eager to discharge from SNF.  She has been working with physical therapy, but is not ambulating much and is continuing to have difficulty transferring.    She is not weighing regularly at SNF, but mentions that her initial weight was at 237 and last week she was down to 217.  Patient is not eating much as the food is not great.    Patient reports having a little bit of  lightheadedness and nausea today.  She denies chest pain, palpitations, orthopnea, PND, edema or shortness of breath.  She is having some pain in her left leg from the surgery    She currently is using oxygen at 3 L.    She does have a history of sleep apnea and does use her CPAP regularly, but has not been using it at SNF.    Additionally, patient has the following medical problems:     -Hypertension    -Hyperlipidemia    -Mild aortic stenosis    -Pacemaker    Past Medical History:   Diagnosis Date    Anesthesia     low O2 sat    Arthritis     osteo    Back pain     Dyslipidemia     [place] measles     Heart murmur 09/13/2017    Hypertension     Influenza     Mumps     Other specified symptom associated with female genital organs     Painful joint     Psychiatric problem     Sleep apnea     c-pap with 3 l/nc    Snoring     Sore muscles     Tonsillitis     Unspecified cataract      Past Surgical History:   Procedure Laterality Date    ORIF, FRACTURE, FEMUR Left 4/6/2025    Procedure: ORIF, FRACTURE, FEMUR, DISTAL;  Surgeon: Poncho Negrete M.D.;  Location: [facility];  Service: Orthopedics    MS EXPLORATORY OF ABDOMEN N/A 1/28/2023    Procedure: EX LAP;  Surgeon: Mike Valles M.D.;  Location: [facility];  Service: Gastroenterology    KNEE ARTHROPLASTY TOTAL Right 12/27/2017    HYSTERECTOMY ROBOTIC  10/23/2014    Performed by Smith Lyons M.D. at [facility] ORS    GASTROSCOPY WITH BIOPSY  7/2/2014    Performed by Sky Vila M.D. at [facility] ORS    OTHER ORTHOPEDIC SURGERY  2006    left total knee    APPENDECTOMY      ARTHROSCOPY, KNEE      HYSTERECTOMY LAPAROSCOPY      OTHER      meghan cataracts    OTHER ABDOMINAL SURGERY      Resection of pelvic mass, uterus and ovaries    MS BREAST REDUCTION Bilateral     1982    MS REMV 2ND CATARACT,CORN-SCLER SECTN      TONSILLECTOMY      TONSILLECTOMY AND ADENOIDECTOMY       Family History   Problem Relation Age of  Onset    Breast Cancer Mother     Cancer Mother 65        Breast    Cancer Father         Colon    Alcohol/Drug Father     Colon Cancer Father     Hyperlipidemia Brother     Heart Disease Brother         arrythmia     Social History     Socioeconomic History    Marital status:      Spouse name: Not on file    Number of children: Not on file    Years of education: Not on file    Highest education level: Not on file   Occupational History    Not on file   Tobacco Use    Smoking status: Never    Smokeless tobacco: Never   Vaping Use    Vaping status: Never Used   Substance and Sexual Activity    Alcohol use: Yes     Alcohol/week: 0.6 - 1.2 oz     Types: 1 - 2 Glasses of wine per week     Comment: occ.    Drug use: No    Sexual activity: Not on file     Comment: , 2 children   Other Topics Concern    Not on file   Social History Narrative    Not on file     Social Drivers of Health     Financial Resource Strain: Not on file   Food Insecurity: No Food Insecurity (5/1/2025)    Hunger Vital Sign     Worried About Running Out of Food in the Last Year: Never true     Ran Out of Food in the Last Year: Never true   Recent Concern: Food Insecurity - Food Insecurity Present (4/6/2025)    Hunger Vital Sign     Worried About Running Out of Food in the Last Year: Sometimes true     Ran Out of Food in the Last Year: Sometimes true   Transportation Needs: No Transportation Needs (5/1/2025)    PRAPARE - Transportation     Lack of Transportation (Medical): No     Lack of Transportation (Non-Medical): No   Physical Activity: Not on file   Stress: Not on file   Social Connections: Not on file   Intimate Partner Violence: Not At Risk (5/1/2025)    Humiliation, Afraid, Rape, and Kick questionnaire     Fear of Current or Ex-Partner: No     Emotionally Abused: No     Physically Abused: No     Sexually Abused: No   Housing Stability: Low Risk  (5/1/2025)    Housing Stability Vital Sign     Unable to Pay for Housing in the Last  Year: No     Number of Times Moved in the Last Year: 0     Homeless in the Last Year: No     Allergies   Allergen Reactions    Mirabegron Unspecified     Dizziness & lightheadedness    Tramadol Nausea     Nausea and somnolence     Outpatient Encounter Medications as of 5/21/2025   Medication Sig Dispense Refill    naloxone (NARCAN) 0.4 MG/ML Solution Infuse 0.4 mg into a venous catheter one time.      nystatin (MYCOSTATIN) 554790 UNIT/ML Suspension Take 500,000 Units by mouth 4 times a day.      oxyCODONE immediate-release (ROXICODONE) 5 MG Tab Take 5 mg by mouth every four hours as needed for Severe Pain.      Ascorbic Acid (VITAMIN C) 1000 MG Tab Take  by mouth.      zinc oxide oint (ZINC OXIDE OINTMENT) 20 % Ointment Apply  topically as needed.      apixaban (ELIQUIS) 5mg Tab Take 1 Tablet by mouth 2 times a day. 60 Tablet 0    hydrOXYzine HCl (ATARAX) 10 MG Tab Take 10 mg by mouth every four hours as needed.      EnovaRX-Diclofenac Sodium 2.5 % Cream Apply 1 Application topically 2 times a day. To bilateral shoulders      polyethylene glycol/lytes (MIRALAX) 17 g Pack Take 17 g by mouth every day.      metoprolol SR (TOPROL XL) 100 MG TABLET SR 24 HR Take 1 Tablet by mouth every evening.      senna-docusate (PERICOLACE OR SENOKOT S) 8.6-50 MG Tab Take 2 Tablets by mouth every evening. 30 Tablet 0    vitamin D3 (CHOLECALCIFEROL) 1000 Unit (25 mcg) Tab Take 1,000 Units by mouth every day.      ferrous sulfate 325 (65 Fe) MG tablet Take 325 mg by mouth every day.      folic acid (FOLVITE) 800 MCG tablet Take 800 mcg by mouth every day at 6 PM.      spironolactone (ALDACTONE) 25 MG Tab Take 0.5 Tablets by mouth every day. 45 Tablet 3    acetaminophen (TYLENOL) 500 MG Tab Take 1,000 mg by mouth 3 times a day.      furosemide (LASIX) 20 MG Tab Take 1 Tablet by mouth every day. 90 Tablet 1    rosuvastatin (CRESTOR) 10 MG Tab Take 1 Tablet by mouth every evening. 100 Tablet 3    sertraline (ZOLOFT) 100 MG Tab Take 1  "tablet by mouth once daily 90 Tablet 3    predniSONE (DELTASONE) 5 MG Tab Take 1 tablet by mouth once daily 30 Tablet 6    valsartan (DIOVAN) 320 MG tablet Take 1 Tablet by mouth every day. 100 Tablet 4    Empagliflozin (JARDIANCE) 10 MG Tab tablet Take 1 Tablet by mouth every day. 90 Tablet 2    [DISCONTINUED] diclofenac DR (VOLTAREN) 25 MG Tablet Delayed Response Take 25 mg by mouth 2 times a day. Indications: Joint Damage causing Pain and Loss of Function, Shoulder Pain, R & L shoulder pain (Patient not taking: Reported on 5/21/2025)      [DISCONTINUED] docusate sodium (COLACE) 100 MG Cap Take 100 mg by mouth 2 times a day. (Patient not taking: Reported on 5/21/2025)       No facility-administered encounter medications on file as of 5/21/2025.     Review of Systems   Constitutional:  Negative for fever and malaise/fatigue.   Respiratory:  Negative for cough and shortness of breath.    Cardiovascular:  Negative for chest pain, palpitations, orthopnea, claudication, leg swelling and PND.   Gastrointestinal:  Positive for nausea. Negative for abdominal pain.   Musculoskeletal:  Negative for myalgias.        Mild pain in left leg from surgery   Neurological:  Positive for dizziness (lightheaded today).   All other systems reviewed and are negative.             Objective     /52 (BP Location: Left arm, Patient Position: Sitting, BP Cuff Size: Adult)   Pulse 76   Resp 16   Ht 1.702 m (5' 7\")   SpO2 96%   BMI 34.05 kg/m²     Physical Exam  Vitals reviewed.   Constitutional:       Appearance: She is well-developed. She is obese.   HENT:      Head: Normocephalic and atraumatic.   Eyes:      Pupils: Pupils are equal, round, and reactive to light.   Neck:      Vascular: No JVD.   Cardiovascular:      Rate and Rhythm: Normal rate and regular rhythm.      Heart sounds: Normal heart sounds.      Comments: Device site-no erosion, drainage or erythema; bilateral lower extremity edema now from mid calf/shin down to " feet  Pulmonary:      Effort: Pulmonary effort is normal. No respiratory distress.      Breath sounds: No wheezing or rales.   Abdominal:      General: Bowel sounds are normal.      Palpations: Abdomen is soft.   Musculoskeletal:         General: Normal range of motion.      Cervical back: Normal range of motion and neck supple.      Right lower leg: No edema.      Left lower leg: No edema.   Skin:     General: Skin is warm and dry.   Neurological:      General: No focal deficit present.      Mental Status: She is alert and oriented to person, place, and time.   Psychiatric:         Behavior: Behavior normal.       Lab Results   Component Value Date/Time    SODIUM 141 01/31/2025 1445    POTASSIUM 5.0 01/31/2025 1445    CHLORIDE 107 01/31/2025 1445    CO2 24 01/31/2025 1445    ANION 10.0 01/31/2025 1445    GLUCOSE 107 (H) 01/31/2025 1445    BUN 35 (H) 01/31/2025 1445    CREATININE 1.24 01/31/2025 1445    GFRCKD 44 (A) 01/31/2025 1445    CALCIUM 11.1 (H) 01/31/2025 1445    CORRCALC 10.2 10/08/2024 0048    ASTSGOT 17 10/04/2024 1227    ALTSGPT 18 10/04/2024 1227    ALKPHOSPHAT 60 10/04/2024 1227    TBILIRUBIN 0.4 10/04/2024 1227    ALBUMIN 3.6 10/08/2024 0048    TOTPROTEIN 6.3 10/04/2024 1227    GLOBULIN 2.5 10/04/2024 1227    AGRATIO 1.5 10/04/2024 1227     NT-proBNP   Date Value Ref Range Status   05/02/2025 88557 (H) 0 - 125 pg/mL Final   04/30/2025 88371 (H) 0 - 125 pg/mL Final   03/26/2025 501 (H) 0 - 125 pg/mL Final     Lab Results   Component Value Date/Time    CHOLSTRLTOT 166 05/20/2024 1415    CHOLSTRLTOT 169 09/19/2023 1030    TRIGLYCERIDE 193 (H) 05/20/2024 1415    TRIGLYCERIDE 151 (H) 09/19/2023 1030    HDL 67 05/20/2024 1415    HDL 78 09/19/2023 1030    LDL 60 05/20/2024 1415    LDL 61 09/19/2023 1030     Lab Results   Component Value Date/Time    WBC 10.0 10/08/2024 0048    WBC 7.7 10/06/2024 0646    HEMOGLOBIN 14.2 10/08/2024 0048    HEMOGLOBIN 14.0 10/06/2024 0646    HEMATOCRIT 44.3 10/08/2024 0048     HEMATOCRIT 44.1 10/06/2024 0646    PLATELETCT 160 (L) 10/08/2024 0048    PLATELETCT 153 (L) 10/06/2024 0646      Echocardiogram 7/1/2014  CONCLUSIONS   Normal left ventricular chamber size. Mild concentric left ventricular hypertrophy. Normal left ventricular systolic function. Left ventricular ejection fraction is 60% to 65%. Grade II diastolic dysfunction is present.   Trace mitral regurgitation. Possible prolapse of the anterior mitral valve leaflet.   The aortic valve is not well visualized. Aortic sclerosis without stenosis. AV MG 8.25 mmHg, PG 15 mmHg. Trace aortic insufficiency.   Trace tricuspid regurgitation. Unable to estimate pulmonary artery pressure due to an inadequate tricuspid regurgitant jet.   Small pericardial effusion without evidence of hemodynamic compromise.   Normal aortic root diameter 2.8 cm.   No prior study for comparison.    Transthoracic Echo Report 4/13/2021  Normal left ventricular chamber size.  Left ventricular ejection fraction is visually estimated to be 70%.  Moderate concentric left ventricular hypertrophy.  Grade I diastolic dysfunction.  Mild aortic stenosis.  Normal inferior vena cava size and inspiratory collapse.     Transthoracic Echo Report 8/21/2024  Compared to the prior study on 4/13/2021, no significant changes  The ejection fraction is measured to be 70% by Arriaga's biplane.  No regional wall motion abnormalities.  Mild aortic valve stenosis. Vmax is 2.6  m/s.     Transthoracic Echo Report 10/5/2024  Normal right and left ventricular size and function.   The left ventricular ejection fraction is visually estimated to be 70%.  Mild concentric left ventricular hypertrophy.  Grade II diastolic dysfunction.  Mildly dilated left atrium.  Mild aortic valve stenosis.  Right heart pressures are normal.      Compared to the prior study on 8/21/2024: No significant change.     Transthoracic Echo Report 5/2/2025  Compared to the images of the prior study on 10/5/24 - aortic  valve gradients are now normal, mitral regurgitation has increased, previously mild; and the right ventricular systolic pressure could not be estimated on the prior exam.  Hyperdynamic left ventricular systolic function.  The left ventricular ejection fraction is visually estimated to be 75%.  Reduced right ventricular systolic function.  Moderate eccentric mitral regurgitation.  Moderate tricuspid regurgitation.  Estimated right ventricular systolic pressure is 70 mmHg.       Assessment & Plan     1. ACC/AHA stage C heart failure with preserved ejection fraction (HCC)  Basic Metabolic Panel    proBrain Natriuretic Peptide, NT      2. SOB (shortness of breath)  Basic Metabolic Panel    proBrain Natriuretic Peptide, NT      3. Heart failure, NYHA class 2 (HCC)        4. HTN (hypertension), malignant        5. Sick sinus syndrome (HCC)        6. Pacemaker        7. Mixed hyperlipidemia        8. High risk medication use        9. AZUL on CPAP        10. Paroxysmal atrial fibrillation (HCC)        11. Other chronic pulmonary embolism with acute cor pulmonale ([facility])                  Medical Decision Making: Today's Assessment/Status/Plan:        HFpEF, Stage C, Class 2-3, LVEF 75%: Based on physical examination findings, patient is euvolemic. No JVD, lungs are clear to auscultation, no pitting edema in bilateral lower extremities, no ascites.  -Patient does have Mod MR, Mod TR, mild LVH, aortic gradients are now normal   -Continue furosemide 20 mg daily  - Continue spironolactone 12.5 mg daily due to high potassium level and ABHILASH  -Continue Jardiance 10 mg daily  -Repeat a BMP and NT proBNP before 6 weeks  -Reinforced s/sx of worsening heart failure with patient and weight monitoring. Pt verbalizes understanding. Pt to call office or RTC if present.    -Patient did have lower extremity venous reflux study which did not show any reflux disease in her bilateral small and great saphenous veins on  12/27/2024    Hypertension:  -BP stable  -Continue spironolactone 12.5 mg daily  -Continue valsartan 320 mg daily  -Continue metoprolol  mg daily    SSS, Second-degree AV block , s/p pacemaker on 10/7/2024:  - Device check today, patient did have an episode of AT/AF for about 2 hours    AT/AF:  - Episode of AT/AF  on day of admission  - Patient currently is on blood thinner due to PE    PE:  -  continue Eliquis 5 mg twice a day    Sleep apnea:  -Patient reports she had been using her CPAP regularly until she was admitted to SNF, discussed with patient if she is planning on being there for a few more weeks to see if she can bring her CPAP into rehab and start using    Hyperlipidemia:  -Last LDL 60 on 5/20/2024  -Continue rosuvastatin 10 mg daily    Patient's sister-in-law and patient plan on discussing intensifying rehab with daughter and facility, as patient is eager to go home    FU in clinic in 6 weeks with labs. Sooner if needed.    Patient verbalizes understanding and agrees with the plan of care.     PLEASE NOTE: This Note was created using voice recognition Software. I have made every reasonable attempt to correct obvious errors, but I expect that there are errors of grammar and possibly content that I did not discover before finalizing the note

## 2025-05-21 NOTE — CARDIAC REMOTE MONITOR - SCAN
Device transmission reviewed. Device demonstrated appropriate function.       Electronically Signed by: Charlotte Quiroz MD, PhD    5/23/2025  8:10 AM

## 2025-06-29 DIAGNOSIS — I10 HTN (HYPERTENSION), MALIGNANT: ICD-10-CM

## 2025-06-29 DIAGNOSIS — I50.30 ACC/AHA STAGE C HEART FAILURE WITH PRESERVED EJECTION FRACTION (HCC): ICD-10-CM

## 2025-06-29 DIAGNOSIS — M15.0 PRIMARY OSTEOARTHRITIS INVOLVING MULTIPLE JOINTS: Primary | ICD-10-CM

## 2025-06-29 RX ORDER — PREDNISONE 5 MG/1
5 TABLET ORAL DAILY
Qty: 30 TABLET | Refills: 6 | Status: SHIPPED | OUTPATIENT
Start: 2025-06-29

## 2025-06-30 ENCOUNTER — APPOINTMENT (OUTPATIENT)
Dept: INTERNAL MEDICINE | Facility: IMAGING CENTER | Age: 80
End: 2025-06-30
Payer: MEDICARE

## 2025-06-30 ENCOUNTER — HOSPITAL ENCOUNTER (OUTPATIENT)
Facility: MEDICAL CENTER | Age: 80
End: 2025-06-30
Attending: NURSE PRACTITIONER
Payer: MEDICARE

## 2025-06-30 ENCOUNTER — HOSPITAL ENCOUNTER (OUTPATIENT)
Facility: MEDICAL CENTER | Age: 80
End: 2025-06-30
Attending: FAMILY MEDICINE
Payer: MEDICARE

## 2025-06-30 ENCOUNTER — OFFICE VISIT (OUTPATIENT)
Dept: INTERNAL MEDICINE | Facility: IMAGING CENTER | Age: 80
End: 2025-06-30
Payer: MEDICARE

## 2025-06-30 VITALS
SYSTOLIC BLOOD PRESSURE: 120 MMHG | DIASTOLIC BLOOD PRESSURE: 62 MMHG | HEIGHT: 67 IN | RESPIRATION RATE: 17 BRPM | HEART RATE: 60 BPM | BODY MASS INDEX: 32.49 KG/M2 | OXYGEN SATURATION: 91 % | TEMPERATURE: 98.7 F | WEIGHT: 207.01 LBS

## 2025-06-30 DIAGNOSIS — I26.99 OTHER ACUTE PULMONARY EMBOLISM WITHOUT ACUTE COR PULMONALE (HCC): ICD-10-CM

## 2025-06-30 DIAGNOSIS — R53.83 OTHER FATIGUE: ICD-10-CM

## 2025-06-30 DIAGNOSIS — R30.0 DYSURIA: ICD-10-CM

## 2025-06-30 DIAGNOSIS — E55.9 VITAMIN D DEFICIENCY: ICD-10-CM

## 2025-06-30 DIAGNOSIS — E61.1 IRON DEFICIENCY: ICD-10-CM

## 2025-06-30 DIAGNOSIS — I10 PRIMARY HYPERTENSION: Primary | ICD-10-CM

## 2025-06-30 DIAGNOSIS — R73.9 HYPERGLYCEMIA: ICD-10-CM

## 2025-06-30 DIAGNOSIS — I50.30 ACC/AHA STAGE C HEART FAILURE WITH PRESERVED EJECTION FRACTION (HCC): ICD-10-CM

## 2025-06-30 DIAGNOSIS — I82.431 ACUTE DEEP VEIN THROMBOSIS (DVT) OF RIGHT POPLITEAL VEIN (HCC): ICD-10-CM

## 2025-06-30 DIAGNOSIS — I26.09 OTHER ACUTE PULMONARY EMBOLISM WITH ACUTE COR PULMONALE (HCC): ICD-10-CM

## 2025-06-30 DIAGNOSIS — R79.89 ELEVATED FERRITIN: ICD-10-CM

## 2025-06-30 DIAGNOSIS — F51.01 PRIMARY INSOMNIA: ICD-10-CM

## 2025-06-30 DIAGNOSIS — B37.2 YEAST DERMATITIS: ICD-10-CM

## 2025-06-30 DIAGNOSIS — Z79.01 CHRONIC ANTICOAGULATION: ICD-10-CM

## 2025-06-30 DIAGNOSIS — Z09 HOSPITAL DISCHARGE FOLLOW-UP: ICD-10-CM

## 2025-06-30 DIAGNOSIS — J96.21 ACUTE ON CHRONIC HYPOXIC RESPIRATORY FAILURE (HCC): ICD-10-CM

## 2025-06-30 DIAGNOSIS — S72.22XA CLOSED LEFT SUBTROCHANTERIC FEMUR FRACTURE, INITIAL ENCOUNTER (HCC): ICD-10-CM

## 2025-06-30 DIAGNOSIS — I10 PRIMARY HYPERTENSION: ICD-10-CM

## 2025-06-30 DIAGNOSIS — R06.02 SOB (SHORTNESS OF BREATH): ICD-10-CM

## 2025-06-30 PROBLEM — R14.0 ABDOMINAL BLOATING: Status: RESOLVED | Noted: 2023-09-19 | Resolved: 2025-06-30

## 2025-06-30 PROCEDURE — 80053 COMPREHEN METABOLIC PANEL: CPT

## 2025-06-30 PROCEDURE — 99215 OFFICE O/P EST HI 40 MIN: CPT | Performed by: FAMILY MEDICINE

## 2025-06-30 PROCEDURE — 80048 BASIC METABOLIC PNL TOTAL CA: CPT

## 2025-06-30 PROCEDURE — 85025 COMPLETE CBC W/AUTO DIFF WBC: CPT

## 2025-06-30 PROCEDURE — 83550 IRON BINDING TEST: CPT

## 2025-06-30 PROCEDURE — 3078F DIAST BP <80 MM HG: CPT | Performed by: FAMILY MEDICINE

## 2025-06-30 PROCEDURE — 83540 ASSAY OF IRON: CPT

## 2025-06-30 PROCEDURE — 81001 URINALYSIS AUTO W/SCOPE: CPT

## 2025-06-30 PROCEDURE — 83036 HEMOGLOBIN GLYCOSYLATED A1C: CPT

## 2025-06-30 PROCEDURE — 87086 URINE CULTURE/COLONY COUNT: CPT

## 2025-06-30 PROCEDURE — 82306 VITAMIN D 25 HYDROXY: CPT

## 2025-06-30 PROCEDURE — 87077 CULTURE AEROBIC IDENTIFY: CPT

## 2025-06-30 PROCEDURE — 82728 ASSAY OF FERRITIN: CPT

## 2025-06-30 PROCEDURE — 83880 ASSAY OF NATRIURETIC PEPTIDE: CPT

## 2025-06-30 PROCEDURE — 84443 ASSAY THYROID STIM HORMONE: CPT

## 2025-06-30 PROCEDURE — 87186 SC STD MICRODIL/AGAR DIL: CPT

## 2025-06-30 PROCEDURE — 3074F SYST BP LT 130 MM HG: CPT | Performed by: FAMILY MEDICINE

## 2025-06-30 PROCEDURE — 84439 ASSAY OF FREE THYROXINE: CPT

## 2025-06-30 RX ORDER — NYSTATIN 100000 [USP'U]/G
1 POWDER TOPICAL 3 TIMES DAILY
Qty: 60 G | Refills: 1 | Status: SHIPPED | OUTPATIENT
Start: 2025-06-30

## 2025-06-30 RX ORDER — ZOLPIDEM TARTRATE 5 MG/1
5 TABLET ORAL NIGHTLY PRN
Qty: 30 TABLET | Refills: 2 | Status: SHIPPED | OUTPATIENT
Start: 2025-06-30 | End: 2025-07-30

## 2025-06-30 RX ORDER — EMPAGLIFLOZIN 10 MG/1
10 TABLET, FILM COATED ORAL DAILY
Qty: 90 TABLET | Refills: 3 | Status: SHIPPED | OUTPATIENT
Start: 2025-06-30

## 2025-06-30 RX ORDER — FUROSEMIDE 20 MG/1
20 TABLET ORAL DAILY
Qty: 90 TABLET | Refills: 1 | Status: SHIPPED | OUTPATIENT
Start: 2025-06-30

## 2025-06-30 ASSESSMENT — FIBROSIS 4 INDEX: FIB4 SCORE: 3.82

## 2025-06-30 NOTE — TELEPHONE ENCOUNTER
Is the patient due for a refill? Yes    Was the patient seen the last 15 months? Yes    Date of last office visit: 5/21/25    Does the patient have an upcoming appointment?  Yes   If yes, When? 7/2/25    Provider to refill:VISHNU    Does the patient have [facility] Plus and need 100-day supply? (This applies to ALL medications) Patient does not have SCP

## 2025-07-01 ENCOUNTER — RESULTS FOLLOW-UP (OUTPATIENT)
Dept: CARDIOLOGY | Facility: MEDICAL CENTER | Age: 80
End: 2025-07-01

## 2025-07-01 ENCOUNTER — TELEPHONE (OUTPATIENT)
Dept: INTERNAL MEDICINE | Facility: IMAGING CENTER | Age: 80
End: 2025-07-01
Payer: MEDICARE

## 2025-07-01 DIAGNOSIS — R19.5 LOOSE STOOLS: ICD-10-CM

## 2025-07-01 PROBLEM — R79.89 ELEVATED TROPONIN: Status: RESOLVED | Noted: 2025-05-01 | Resolved: 2025-07-01

## 2025-07-01 PROBLEM — M79.89 LEG SWELLING: Status: RESOLVED | Noted: 2024-10-04 | Resolved: 2025-07-01

## 2025-07-01 PROBLEM — E87.5 HYPERKALEMIA: Status: RESOLVED | Noted: 2024-10-04 | Resolved: 2025-07-01

## 2025-07-01 PROBLEM — D64.9 ANEMIA: Status: RESOLVED | Noted: 2023-02-07 | Resolved: 2025-07-01

## 2025-07-01 PROBLEM — R55 SYNCOPE: Status: RESOLVED | Noted: 2024-10-04 | Resolved: 2025-07-01

## 2025-07-01 PROBLEM — N17.9 AKI (ACUTE KIDNEY INJURY) (HCC): Status: RESOLVED | Noted: 2025-04-08 | Resolved: 2025-07-01

## 2025-07-01 PROBLEM — K43.2 INCISIONAL HERNIA, WITHOUT OBSTRUCTION OR GANGRENE: Status: RESOLVED | Noted: 2024-05-22 | Resolved: 2025-07-01

## 2025-07-01 LAB
25(OH)D3 SERPL-MCNC: 41 NG/ML (ref 30–100)
ALBUMIN SERPL BCP-MCNC: 3.9 G/DL (ref 3.2–4.9)
ALBUMIN/GLOB SERPL: 1.4 G/DL
ALP SERPL-CCNC: 66 U/L (ref 30–99)
ALT SERPL-CCNC: 12 U/L (ref 2–50)
ANION GAP SERPL CALC-SCNC: 10 MMOL/L (ref 7–16)
ANION GAP SERPL CALC-SCNC: 10 MMOL/L (ref 7–16)
APPEARANCE UR: ABNORMAL
AST SERPL-CCNC: 17 U/L (ref 12–45)
BACTERIA #/AREA URNS HPF: ABNORMAL /HPF
BASOPHILS # BLD AUTO: 0.7 % (ref 0–1.8)
BASOPHILS # BLD: 0.05 K/UL (ref 0–0.12)
BILIRUB SERPL-MCNC: 0.4 MG/DL (ref 0.1–1.5)
BILIRUB UR QL STRIP.AUTO: NEGATIVE
BUN SERPL-MCNC: 43 MG/DL (ref 8–22)
BUN SERPL-MCNC: 43 MG/DL (ref 8–22)
CALCIUM ALBUM COR SERPL-MCNC: 10.7 MG/DL (ref 8.5–10.5)
CALCIUM SERPL-MCNC: 10.6 MG/DL (ref 8.5–10.5)
CALCIUM SERPL-MCNC: 10.6 MG/DL (ref 8.5–10.5)
CASTS URNS QL MICRO: ABNORMAL /LPF (ref 0–2)
CHLORIDE SERPL-SCNC: 94 MMOL/L (ref 96–112)
CHLORIDE SERPL-SCNC: 94 MMOL/L (ref 96–112)
CO2 SERPL-SCNC: 22 MMOL/L (ref 20–33)
CO2 SERPL-SCNC: 22 MMOL/L (ref 20–33)
COLOR UR: YELLOW
CREAT SERPL-MCNC: 1.56 MG/DL (ref 0.5–1.4)
CREAT SERPL-MCNC: 1.56 MG/DL (ref 0.5–1.4)
EOSINOPHIL # BLD AUTO: 0.12 K/UL (ref 0–0.51)
EOSINOPHIL NFR BLD: 1.7 % (ref 0–6.9)
EPITHELIAL CELLS 1715: ABNORMAL /HPF (ref 0–5)
ERYTHROCYTE [DISTWIDTH] IN BLOOD BY AUTOMATED COUNT: 49.4 FL (ref 35.9–50)
EST. AVERAGE GLUCOSE BLD GHB EST-MCNC: 108 MG/DL
FERRITIN SERPL-MCNC: 462 NG/ML (ref 10–291)
GFR SERPLBLD CREATININE-BSD FMLA CKD-EPI: 33 ML/MIN/1.73 M 2
GFR SERPLBLD CREATININE-BSD FMLA CKD-EPI: 33 ML/MIN/1.73 M 2
GLOBULIN SER CALC-MCNC: 2.7 G/DL (ref 1.9–3.5)
GLUCOSE SERPL-MCNC: 103 MG/DL (ref 65–99)
GLUCOSE SERPL-MCNC: 103 MG/DL (ref 65–99)
GLUCOSE UR STRIP.AUTO-MCNC: NEGATIVE MG/DL
HBA1C MFR BLD: 5.4 % (ref 4–5.6)
HCT VFR BLD AUTO: 35.4 % (ref 37–47)
HGB BLD-MCNC: 11.5 G/DL (ref 12–16)
HYALINE CAST   1831: PRESENT /LPF
IMM GRANULOCYTES # BLD AUTO: 0.04 K/UL (ref 0–0.11)
IMM GRANULOCYTES NFR BLD AUTO: 0.6 % (ref 0–0.9)
IRON SATN MFR SERPL: 27 % (ref 15–55)
IRON SERPL-MCNC: 69 UG/DL (ref 40–170)
KETONES UR STRIP.AUTO-MCNC: NEGATIVE MG/DL
LEUKOCYTE ESTERASE UR QL STRIP.AUTO: ABNORMAL
LYMPHOCYTES # BLD AUTO: 0.96 K/UL (ref 1–4.8)
LYMPHOCYTES NFR BLD: 13.7 % (ref 22–41)
MCH RBC QN AUTO: 30.4 PG (ref 27–33)
MCHC RBC AUTO-ENTMCNC: 32.5 G/DL (ref 32.2–35.5)
MCV RBC AUTO: 93.7 FL (ref 81.4–97.8)
MICRO URNS: ABNORMAL
MONOCYTES # BLD AUTO: 0.38 K/UL (ref 0–0.85)
MONOCYTES NFR BLD AUTO: 5.4 % (ref 0–13.4)
NEUTROPHILS # BLD AUTO: 5.46 K/UL (ref 1.82–7.42)
NEUTROPHILS NFR BLD: 77.9 % (ref 44–72)
NITRITE UR QL STRIP.AUTO: NEGATIVE
NRBC # BLD AUTO: 0 K/UL
NRBC BLD-RTO: 0 /100 WBC (ref 0–0.2)
NT-PROBNP SERPL IA-MCNC: 2555 PG/ML (ref 0–125)
PH UR STRIP.AUTO: 5 [PH] (ref 5–8)
PLATELET # BLD AUTO: 235 K/UL (ref 164–446)
PMV BLD AUTO: 11.5 FL (ref 9–12.9)
POTASSIUM SERPL-SCNC: 5.5 MMOL/L (ref 3.6–5.5)
POTASSIUM SERPL-SCNC: 5.6 MMOL/L (ref 3.6–5.5)
PROT SERPL-MCNC: 6.6 G/DL (ref 6–8.2)
PROT UR QL STRIP: NEGATIVE MG/DL
RBC # BLD AUTO: 3.78 M/UL (ref 4.2–5.4)
RBC # URNS HPF: ABNORMAL /HPF (ref 0–2)
RBC UR QL AUTO: ABNORMAL
SODIUM SERPL-SCNC: 126 MMOL/L (ref 135–145)
SODIUM SERPL-SCNC: 126 MMOL/L (ref 135–145)
SP GR UR STRIP.AUTO: 1.01
T4 FREE SERPL-MCNC: 1.52 NG/DL (ref 0.93–1.7)
TIBC SERPL-MCNC: 259 UG/DL (ref 250–450)
TSH SERPL-ACNC: 0.69 UIU/ML (ref 0.38–5.33)
UIBC SERPL-MCNC: 190 UG/DL (ref 110–370)
UROBILINOGEN UR STRIP.AUTO-MCNC: 0.2 EU/DL
WBC # BLD AUTO: 7 K/UL (ref 4.8–10.8)
WBC #/AREA URNS HPF: >100 /HPF

## 2025-07-01 RX ORDER — CHOLESTYRAMINE 4 G/9G
1 POWDER, FOR SUSPENSION ORAL DAILY
Qty: 60 EACH | Refills: 3 | Status: SHIPPED | OUTPATIENT
Start: 2025-07-01

## 2025-07-01 NOTE — TELEPHONE ENCOUNTER
Reviewed lab results with patient. I will follow up with her when urine culture results are available, increase daily water intake (she was dry on exam yesterday), and recommend patient discontinue iron supplementation. Will recheck related labs next week at her office visit with me.

## 2025-07-01 NOTE — PROGRESS NOTES
Chief Complaint   Patient presents with    Medication Refill     [facility] Follow-up       HPI:  Patient is a 80 y.o. female established patient who presents today, with her sister in law Padgett, for a hospital/SNF rehab follow-up appointment and to discuss current health concerns/needs. Patient was recently discharged from [facility] rehab after spending 2.5 months there regaining her strength and ability to ambulate safely. She continues to need 24/7 care and has  services/ PT with [facility].     Patient Active Problem List    Diagnosis Date Noted    Acute deep vein thrombosis (DVT) of right popliteal vein ([facility]) 06/30/2025    Chronic anticoagulation 06/30/2025    Acute pulmonary embolism without acute cor pulmonale ([facility]) 05/01/2025    Elevated brain natriuretic peptide (BNP) level 05/01/2025    CKD stage 3b, GFR 30-44 ml/min 05/01/2025    Closed left subtrochanteric femur fracture, initial encounter ([facility]) 04/05/2025    Current chronic use of systemic steroids 04/05/2025    Pacemaker 10/16/2024    ACC/AHA stage C heart failure with preserved ejection fraction (HCC) 10/01/2024    Left ventricular diastolic dysfunction, NYHA class 3 10/01/2024    Nonrheumatic aortic valve stenosis 09/04/2024    Loose stools 09/04/2024    Shoulder mass 08/19/2024    Physically inactive 05/22/2024    Total knee replacement status, left 11/15/2023    DNR (do not resuscitate) 11/15/2023    OAB (overactive bladder) 09/19/2023    Osteopenia of neck of left femur 07/31/2023    History of diverticulitis 05/15/2023    Vitamin D deficiency 05/15/2023    Acute on chronic hypoxic respiratory failure (HCC) 01/25/2023    Arthritis 01/24/2023    Poor balance 04/26/2021    Thyroid nodule 03/16/2021    Obesity (BMI 30-39.9) 04/06/2018    Heart murmur 09/13/2017    Chronic insomnia 08/09/2017    Anxiety 08/09/2017    HTN (hypertension) 07/01/2014    Dyslipidemia 07/01/2014    AZUL on CPAP 07/01/2014       Past medical, surgical,  "family, and social history was reviewed and updated in Epic chart by me today.     Medications and allergies reviewed and updated in Epic chart by me today.     ROS:  Pertinent positives listed above in HPI. All other systems have been reviewed and are negative.    PE:   /62 (BP Location: Left arm, Patient Position: Sitting, BP Cuff Size: Adult)   Pulse 60   Temp 37.1 °C (98.7 °F) (Temporal)   Resp 17   Ht 1.702 m (5' 7\")   Wt 93.9 kg (207 lb 0.2 oz)   SpO2 91%   BMI 32.42 kg/m²   Vital signs reviewed with patient.     Gen: Well developed; well nourished; no acute distress; age appropriate/ non toxic appearance   CV: Regular rate and rhythm; S1/ S2 present; no murmur, gallop or rub noted  Pulm: No respiratory distress; clear to ascultation b/l; no wheezing or stridor noted b/l  Abd: Adequate bowel sounds noted; soft and nontender; no rebound, rigidity, nor distention  Extremities: No new peripheral edema b/l LE extremities/ no clubbing nor cyanosis noted  Skin: Warm and dry; mild rash under both breasts; small skin abrasion on right low back/upper buttock area   Neuro: No new focal deficits noted; using wheelchair today  Psych: AAOx4; mood and affect are at her baseline     A/P:  1. Hospital discharge follow-up  Patient was admitted to [facility] 4/5/25 - 4/9/25 as well as from 4/30/25 - 5/4/25 for evaluation and treatment of acute medical conditions. I have reviewed all pertinent records prior to our visit today.     2. Closed left subtrochanteric femur fracture, initial encounter (HCC)  Patient sustained left femur fracture due to MGLF at home on 4/5/25. She underwent ORIF L distal femur fracture by Dr. Negrete on 4/6/25 and has spent the past 2.5 months at [facility] rehab recovering. She is now at her home with [facility] services, home PT, and 24/7 caregivers. She has followed up with [facility] team and denies associated pain nor extremity edema.     3. Other acute pulmonary " embolism without acute cor pulmonale (HCC)  Patient was diagnosed with bilateral PEs on CTA done 4/30/25 after experiencing syncopal episode with oxygen desaturation at SNF rehab. She is no longer requiring supplemental oxygen and is compliant with Eliquis 5 mg BID use. Recommend repeating CTA at end of July/early August for further follow up.   - apixaban (ELIQUIS) 5 MG Tab; Take 1 Tablet by mouth 2 times a day.  Dispense: 60 Tablet; Refill: 1    4. Acute deep vein thrombosis (DVT) of right popliteal vein (HCC)  Patient was diagnosed with RLE DVT on US done 5/3/25 and is compliant with Eliquis 5 mg BID use. Recommend repeating US in August for further follow up.   - apixaban (ELIQUIS) 5 MG Tab; Take 1 Tablet by mouth 2 times a day.  Dispense: 60 Tablet; Refill: 1    5. Acute on chronic hypoxic respiratory failure (HCC)  Resolved/ patient is no longer requiring supplemental oxygen and is saturating adequately on room air today.     6. Primary hypertension (Primary)  Stable/ recommend checking CBC and CMP today for further care management, and I will call patient when lab results are available for review.   - CBC WITH DIFFERENTIAL; Future  - Comp Metabolic Panel; Future    7. Other fatigue  - TSH; Future  - FREE THYROXINE; Future    8. Hyperglycemia  - HEMOGLOBIN A1C; Future    9. Iron deficiency  Patient is compliant with daily oral iron supplementation and is due for repeat iron panel today for ongoing management.   - IRON/TOTAL IRON BIND; Future    10. Elevated ferritin  - FERRITIN; Future    11. Vitamin D deficiency  Patient is compliant with Vitamin D supplementation and is due for repeat Vitamin D level today for ongoing management.   - VITAMIN D,25 HYDROXY (DEFICIENCY); Future    12. Chronic anticoagulation  Stable    13. Yeast dermatitis  Patient is suffering from rash under both pendulous breast consistent with yeast dermatitis. Recommend patient start Nystatin powder use for management, and new RX sent to  pharmacy.   - nystatin (MYCOSTATIN) powder; Apply 1 g topically 3 times a day.  Dispense: 60 g; Refill: 1    14. Primary insomnia  Chronic condition for patient previously managed with PRN Ambien prior to her hospital and SNF rehab admissions. She is now back at her home and would like to resume PRN Ambien use for management. She has tolerated Ambien use well in the past, denies medication related side effects, and has used this controlled medication in a safe manner.  reviewed today and no concerns noted. Pt is well versed in safe use of controlled medication, and benefit of use outweighs risk at this time. New RX sent to pharmacy.   - zolpidem (AMBIEN) 5 MG Tab; Take 1 Tablet by mouth at bedtime as needed for Sleep for up to 30 days.  Dispense: 30 Tablet; Refill: 2    15. Dysuria  Patient is complaining of generalized dysuria and will provide urine sample for testing at visit today.   - URINE CULTURE(NEW); Future  - URINALYSIS; Future     Time spent: 60 minutes including face to face patient care time and extensive chart review.

## 2025-07-02 ENCOUNTER — APPOINTMENT (OUTPATIENT)
Dept: CARDIOLOGY | Facility: MEDICAL CENTER | Age: 80
End: 2025-07-02
Attending: NURSE PRACTITIONER
Payer: MEDICARE

## 2025-07-02 DIAGNOSIS — I50.9 HEART FAILURE, NYHA CLASS 2 (HCC): ICD-10-CM

## 2025-07-02 DIAGNOSIS — I50.30 ACC/AHA STAGE C HEART FAILURE WITH PRESERVED EJECTION FRACTION (HCC): Primary | ICD-10-CM

## 2025-07-02 DIAGNOSIS — I48.0 PAROXYSMAL ATRIAL FIBRILLATION (HCC): ICD-10-CM

## 2025-07-02 DIAGNOSIS — I10 HTN (HYPERTENSION), MALIGNANT: ICD-10-CM

## 2025-07-02 NOTE — TELEPHONE ENCOUNTER
Is the patient due for a refill? Yes    Was the patient seen the last 15 months? Yes    Date of last office visit: 05.21.2025    Does the patient have an upcoming appointment?  Yes   If yes, When? 07.10.2025    Provider to refill: VISHNU    Does the patient have [facility] Plus and need 100-day supply? (This applies to ALL medications) Patient does not have SCP

## 2025-07-02 NOTE — TELEPHONE ENCOUNTER
VISHNU    Received request via: Patient    Was the patient seen in the last year in this department? Yes    Does the patient have an active prescription (recently filled or refills available) for medication(s) requested? Yes. Refill request has been refused in [facility]. Contacted pharmacy and called in most recent prescription.    Pharmacy Name: [facility] [address] [place] [address], [place] - [address] [[id number]]     Does the patient have long-term Plus and need 100-day supply? (This applies to ALL medications) Patient does not have SCP    Additional Questions: Patient also inquired about a medication refill for [place] which I do not see on her medication list. And wants to know if she should still be taking Furosemide - please advise.     Thank you,  Lola NAYAK

## 2025-07-03 DIAGNOSIS — N30.00 ACUTE CYSTITIS WITHOUT HEMATURIA: Primary | ICD-10-CM

## 2025-07-03 RX ORDER — METOPROLOL SUCCINATE 100 MG/1
100 TABLET, EXTENDED RELEASE ORAL EVERY EVENING
Qty: 90 TABLET | Refills: 3 | Status: SHIPPED | OUTPATIENT
Start: 2025-07-03

## 2025-07-03 RX ORDER — NITROFURANTOIN 25; 75 MG/1; MG/1
100 CAPSULE ORAL 2 TIMES DAILY
Qty: 14 CAPSULE | Refills: 0 | Status: SHIPPED | OUTPATIENT
Start: 2025-07-03 | End: 2025-07-10

## 2025-07-04 LAB
BACTERIA UR CULT: ABNORMAL
SIGNIFICANT IND 70042: ABNORMAL
SITE SITE: ABNORMAL
SOURCE SOURCE: ABNORMAL

## 2025-07-08 ENCOUNTER — APPOINTMENT (OUTPATIENT)
Dept: INTERNAL MEDICINE | Facility: IMAGING CENTER | Age: 80
End: 2025-07-08
Payer: MEDICARE

## 2025-07-08 ENCOUNTER — HOSPITAL ENCOUNTER (OUTPATIENT)
Facility: MEDICAL CENTER | Age: 80
End: 2025-07-08
Attending: FAMILY MEDICINE
Payer: MEDICARE

## 2025-07-08 VITALS
DIASTOLIC BLOOD PRESSURE: 56 MMHG | HEART RATE: 60 BPM | OXYGEN SATURATION: 98 % | SYSTOLIC BLOOD PRESSURE: 102 MMHG | WEIGHT: 215.2 LBS | BODY MASS INDEX: 33.78 KG/M2 | TEMPERATURE: 97.8 F | HEIGHT: 67 IN | RESPIRATION RATE: 17 BRPM

## 2025-07-08 DIAGNOSIS — M85.9 DISORDER OF BONE DENSITY AND STRUCTURE, UNSPECIFIED: ICD-10-CM

## 2025-07-08 DIAGNOSIS — S72.492D OTHER CLOSED FRACTURE OF DISTAL END OF LEFT FEMUR WITH ROUTINE HEALING, SUBSEQUENT ENCOUNTER: ICD-10-CM

## 2025-07-08 DIAGNOSIS — E87.8 ELECTROLYTE IMBALANCE: ICD-10-CM

## 2025-07-08 DIAGNOSIS — Z12.31 BREAST CANCER SCREENING BY MAMMOGRAM: ICD-10-CM

## 2025-07-08 DIAGNOSIS — F51.04 CHRONIC INSOMNIA: ICD-10-CM

## 2025-07-08 DIAGNOSIS — I26.99 OTHER ACUTE PULMONARY EMBOLISM WITHOUT ACUTE COR PULMONALE (HCC): ICD-10-CM

## 2025-07-08 DIAGNOSIS — R79.89 ELEVATED FERRITIN: ICD-10-CM

## 2025-07-08 DIAGNOSIS — N30.00 ACUTE CYSTITIS WITHOUT HEMATURIA: ICD-10-CM

## 2025-07-08 DIAGNOSIS — M85.852 OSTEOPENIA OF NECK OF LEFT FEMUR: ICD-10-CM

## 2025-07-08 DIAGNOSIS — R19.5 LOOSE STOOLS: ICD-10-CM

## 2025-07-08 DIAGNOSIS — I82.431 ACUTE DEEP VEIN THROMBOSIS (DVT) OF RIGHT POPLITEAL VEIN (HCC): ICD-10-CM

## 2025-07-08 DIAGNOSIS — E87.8 ELECTROLYTE IMBALANCE: Primary | ICD-10-CM

## 2025-07-08 DIAGNOSIS — E28.39 ESTROGEN DEFICIENCY: ICD-10-CM

## 2025-07-08 DIAGNOSIS — R53.81 PHYSICAL DEBILITY: ICD-10-CM

## 2025-07-08 PROBLEM — J96.21 ACUTE ON CHRONIC HYPOXIC RESPIRATORY FAILURE (HCC): Status: RESOLVED | Noted: 2023-01-25 | Resolved: 2025-07-08

## 2025-07-08 PROBLEM — S72.402D CLOSED FRACTURE OF DISTAL END OF LEFT FEMUR WITH ROUTINE HEALING: Status: ACTIVE | Noted: 2025-07-08

## 2025-07-08 PROBLEM — S72.22XA CLOSED LEFT SUBTROCHANTERIC FEMUR FRACTURE, INITIAL ENCOUNTER (HCC): Status: RESOLVED | Noted: 2025-04-05 | Resolved: 2025-07-08

## 2025-07-08 PROCEDURE — 3078F DIAST BP <80 MM HG: CPT | Performed by: FAMILY MEDICINE

## 2025-07-08 PROCEDURE — 80053 COMPREHEN METABOLIC PANEL: CPT

## 2025-07-08 PROCEDURE — 99214 OFFICE O/P EST MOD 30 MIN: CPT | Performed by: FAMILY MEDICINE

## 2025-07-08 PROCEDURE — 3074F SYST BP LT 130 MM HG: CPT | Performed by: FAMILY MEDICINE

## 2025-07-08 ASSESSMENT — FIBROSIS 4 INDEX: FIB4 SCORE: 1.67

## 2025-07-09 LAB
ALBUMIN SERPL BCP-MCNC: 3.8 G/DL (ref 3.2–4.9)
ALBUMIN/GLOB SERPL: 1.4 G/DL
ALP SERPL-CCNC: 62 U/L (ref 30–99)
ALT SERPL-CCNC: 12 U/L (ref 2–50)
ANION GAP SERPL CALC-SCNC: 10 MMOL/L (ref 7–16)
AST SERPL-CCNC: 17 U/L (ref 12–45)
BILIRUB SERPL-MCNC: <0.2 MG/DL (ref 0.1–1.5)
BUN SERPL-MCNC: 39 MG/DL (ref 8–22)
CALCIUM ALBUM COR SERPL-MCNC: 10.6 MG/DL (ref 8.5–10.5)
CALCIUM SERPL-MCNC: 10.4 MG/DL (ref 8.5–10.5)
CHLORIDE SERPL-SCNC: 99 MMOL/L (ref 96–112)
CO2 SERPL-SCNC: 23 MMOL/L (ref 20–33)
CREAT SERPL-MCNC: 1.55 MG/DL (ref 0.5–1.4)
GFR SERPLBLD CREATININE-BSD FMLA CKD-EPI: 34 ML/MIN/1.73 M 2
GLOBULIN SER CALC-MCNC: 2.7 G/DL (ref 1.9–3.5)
GLUCOSE SERPL-MCNC: 111 MG/DL (ref 65–99)
POTASSIUM SERPL-SCNC: 5.5 MMOL/L (ref 3.6–5.5)
PROT SERPL-MCNC: 6.5 G/DL (ref 6–8.2)
SODIUM SERPL-SCNC: 132 MMOL/L (ref 135–145)

## 2025-07-09 NOTE — PROGRESS NOTES
Chief Complaint   Patient presents with    Follow-Up       HPI:  Patient is a 80 y.o. female established patient who presents today, with her caregiver Maria Elena, for a one week follow up visit to discuss current health concerns. Patient continues to have 24/7 help at home but is walking with front wheeled walker this week (unable to do that in our office last week).     Patient Active Problem List    Diagnosis Date Noted    Elevated ferritin 07/08/2025    Acute cystitis without hematuria 07/08/2025    Closed fracture of distal end of left femur with routine healing 07/08/2025    Physical debility 07/08/2025    Acute deep vein thrombosis (DVT) of right popliteal vein (HCC) 06/30/2025    Chronic anticoagulation 06/30/2025    Acute pulmonary embolism without acute cor pulmonale (HCC) 05/01/2025    Elevated brain natriuretic peptide (BNP) level 05/01/2025    CKD stage 3b, GFR 30-44 ml/min 05/01/2025    Current chronic use of systemic steroids 04/05/2025    Pacemaker 10/16/2024    ACC/AHA stage C heart failure with preserved ejection fraction (HCC) 10/01/2024    Left ventricular diastolic dysfunction, NYHA class 3 10/01/2024    Nonrheumatic aortic valve stenosis 09/04/2024    Loose stools 09/04/2024    Shoulder mass 08/19/2024    Physically inactive 05/22/2024    Total knee replacement status, left 11/15/2023    DNR (do not resuscitate) 11/15/2023    OAB (overactive bladder) 09/19/2023    Osteopenia of neck of left femur 07/31/2023    History of diverticulitis 05/15/2023    Vitamin D deficiency 05/15/2023    Arthritis 01/24/2023    Poor balance 04/26/2021    Thyroid nodule 03/16/2021    Obesity (BMI 30-39.9) 04/06/2018    Heart murmur 09/13/2017    Chronic insomnia 08/09/2017    Anxiety 08/09/2017    HTN (hypertension) 07/01/2014    Dyslipidemia 07/01/2014    AZUL on CPAP 07/01/2014       Past medical, surgical, family, and social history was reviewed and updated in Epic chart by me today.     Medications and allergies  "reviewed and updated in Epic chart by me today.     ROS:  Pertinent positives listed above in HPI. All other systems have been reviewed and are negative.    PE:   /56 (BP Location: Left arm, Patient Position: Sitting, BP Cuff Size: Adult)   Pulse 60   Temp 36.6 °C (97.8 °F) (Temporal)   Resp 17   Ht 1.702 m (5' 7\")   Wt 97.6 kg (215 lb 3.2 oz)   SpO2 98%   BMI 33.71 kg/m²   Vital signs reviewed with patient.     Gen: Well developed; well nourished; no acute distress; age appropriate/ well rested appearance   CV: Regular rate and rhythm; S1/ S2 present; no murmur, gallop or rub noted  Pulm: No respiratory distress; clear to ascultation b/l; no wheezing or stridor noted b/l  Extremities: No new peripheral edema b/l LE extremities/ no clubbing nor cyanosis noted  Skin: Warm and dry; no rashes noted   Neuro: No focal deficits noted   Psych: AAOx4; mood and affect are appropriate    A/P:  1. Other closed fracture of distal end of left femur with routine healing, subsequent encounter  Patient sustained left femur fracture due to MGLF at home on 4/5/25. She underwent ORIF L distal femur fracture by Dr. Negrete on 4/6/25 and has spent the past 2.5 months at [facility] rehab recovering. She is now at her home with [facility] services, home PT, and 24/7 caregivers. She has followed up with [facility] team and denies associated pain nor extremity edema.     2. Electrolyte imbalance (Primary)  Recommend repeating non fasting CMP today to follow up on abnormalities noted on labs done last week.   - Comp Metabolic Panel; Future    3. Disorder of bone density and structure, unspecified  - DS-BONE DENSITY STUDY (DEXA); Future    4. Estrogen deficiency  - DS-BONE DENSITY STUDY (DEXA); Future    5. Osteopenia of neck of left femur  Chronic condition for patient - recommend patient repeat dexa scan with upcoming mammogram to determine next best steps in management.     6. Acute deep vein thrombosis (DVT) of right popliteal " vein (HCC)  Patient was diagnosed with RLE DVT on US done 5/3/25 and is compliant with Eliquis 5 mg BID use. Recommend repeating US in early August for further follow up. Patient provided with [facility] number to call to schedule imaging exam accordingly.   - US-EXTREMITY VENOUS LOWER BILAT; Future    7. Other acute pulmonary embolism without acute cor pulmonale (HCC)  Patient was diagnosed with bilateral PEs on CTA done 4/30/25 after experiencing syncopal episode with oxygen desaturation at SNF rehab. She is no longer requiring supplemental oxygen and is compliant with Eliquis 5 mg BID use. Recommend repeating CTA in early August for further follow up. Patient provided with [facility] number to call to schedule imaging exam accordingly.   - CT-CTA CHEST PULMONARY ARTERY W/ RECONS; Future    8. Chronic insomnia  Improved since patient restarted Ambien 5 mg HS use. Patient has used this controlled medication on and off for many years and denies medication related side effects.     9. Acute cystitis without hematuria  Patient is currently completing oral antibiotic for acute UTI and recommend repeating urine testing in two weeks for test of cure.   - URINALYSIS; Future  - URINE CULTURE(NEW); Future    10. Loose stools  Acute on chronic issue for patient that is multi factorial in origin. Recommend patient start daily probiotic use, increase Questran from daily to BID, continue PRN Imodium, and avoid greasy/ heavy foods. Will follow response.     11. Breast cancer screening by mammogram  Patient will be due for annual screening mammogram later this month for ongoing surveillance.   - MA-SCREENING MAMMO BILAT W/TOMOSYNTHESIS W/CAD; Future    12. Elevated ferritin  - FERRITIN; Future    13. Physical debility  Ongoing issue for patient - she is now walking with front wheeled walker and has  PT and OT services starting soon for additional rehab. Recommend patient wear compression socks during daytime hours to  help with venous return and to avoid peripheral edema.      Recommend patient follow up with me in two weeks for annual Medicare visit and recheck.

## 2025-07-10 ENCOUNTER — OFFICE VISIT (OUTPATIENT)
Dept: CARDIOLOGY | Facility: MEDICAL CENTER | Age: 80
End: 2025-07-10
Attending: NURSE PRACTITIONER
Payer: MEDICARE

## 2025-07-10 VITALS
OXYGEN SATURATION: 98 % | HEART RATE: 64 BPM | DIASTOLIC BLOOD PRESSURE: 58 MMHG | SYSTOLIC BLOOD PRESSURE: 108 MMHG | RESPIRATION RATE: 16 BRPM | HEIGHT: 67 IN | BODY MASS INDEX: 33.43 KG/M2 | WEIGHT: 213 LBS

## 2025-07-10 DIAGNOSIS — I48.0 PAROXYSMAL ATRIAL FIBRILLATION (HCC): ICD-10-CM

## 2025-07-10 DIAGNOSIS — E78.2 MIXED HYPERLIPIDEMIA: ICD-10-CM

## 2025-07-10 DIAGNOSIS — I27.82 OTHER CHRONIC PULMONARY EMBOLISM WITH ACUTE COR PULMONALE (HCC): ICD-10-CM

## 2025-07-10 DIAGNOSIS — I26.09 OTHER CHRONIC PULMONARY EMBOLISM WITH ACUTE COR PULMONALE (HCC): ICD-10-CM

## 2025-07-10 DIAGNOSIS — I50.30 ACC/AHA STAGE C HEART FAILURE WITH PRESERVED EJECTION FRACTION (HCC): Primary | ICD-10-CM

## 2025-07-10 DIAGNOSIS — G47.33 OSA ON CPAP: ICD-10-CM

## 2025-07-10 DIAGNOSIS — I49.5 SICK SINUS SYNDROME (HCC): ICD-10-CM

## 2025-07-10 DIAGNOSIS — D68.69 SECONDARY HYPERCOAGULABLE STATE (HCC): ICD-10-CM

## 2025-07-10 DIAGNOSIS — Z95.0 PACEMAKER: ICD-10-CM

## 2025-07-10 DIAGNOSIS — I50.9 HEART FAILURE, NYHA CLASS 2 (HCC): ICD-10-CM

## 2025-07-10 DIAGNOSIS — Z79.899 HIGH RISK MEDICATION USE: ICD-10-CM

## 2025-07-10 DIAGNOSIS — I10 HTN (HYPERTENSION), MALIGNANT: ICD-10-CM

## 2025-07-10 PROCEDURE — 3074F SYST BP LT 130 MM HG: CPT | Performed by: NURSE PRACTITIONER

## 2025-07-10 PROCEDURE — 99213 OFFICE O/P EST LOW 20 MIN: CPT | Performed by: NURSE PRACTITIONER

## 2025-07-10 PROCEDURE — 3078F DIAST BP <80 MM HG: CPT | Performed by: NURSE PRACTITIONER

## 2025-07-10 PROCEDURE — 99214 OFFICE O/P EST MOD 30 MIN: CPT | Performed by: NURSE PRACTITIONER

## 2025-07-10 ASSESSMENT — ENCOUNTER SYMPTOMS
ABDOMINAL PAIN: 0
COUGH: 0
ORTHOPNEA: 0
CLAUDICATION: 0
MYALGIAS: 0
PALPITATIONS: 0
PND: 0
FEVER: 0
SHORTNESS OF BREATH: 0
DIZZINESS: 0
NAUSEA: 1

## 2025-07-10 ASSESSMENT — FIBROSIS 4 INDEX: FIB4 SCORE: 1.67

## 2025-07-10 NOTE — PROGRESS NOTES
Chief Complaint   Patient presents with    Follow-Up     FV DX:ACC/AHA stage C heart failure with preserved ejection fraction       Subjective     Rosy Vences is a 80 y.o. female who presents today for follow up on her heart failure, hypertension, pacemaker, AT/AF, hyperlipidemia with her caregiver, Maria Elena.    Patient of Dr. Chandra.  She was last seen in clinic on 5/21/2025 with myself.  No changes were made during that visit, patient was sent for lab testing.    Since her last visit, patient reports she is back home from SNF.  She has been home for about 2 weeks.  She is receiving home health and PT.    She is off the oxygen.    She reports she is being treated for urinary tract infection and reports having stomach upset from the antibiotics.    She denies chest pain, palpitations, orthopnea, PND, edema, shortness of breath or dizziness/lightheadedness.    She is not weighing herself at home.  Her office weights are stable.    She denies any bleeding or problems with her A fib.    She is using her CPAP regularly.    Additionally, patient has the following medical problems:     - Hospitalization from 4/30/2025 through 5/4/2025 with syncope and found to be hypoxic.  Patient was found to have bilateral PE, possible pulmonary infarcts, elevated RV/LV ratio consistent with right heart strain.  Patient was admitted for further management and started on heparin drip.  Pulmonary was consulted, thrombectomy was not needed.  She was transition to [facility] and discharged back to SNF.    -Had hospitalization from 4/5/2025 and was admitted through 4/9/2025 after a fall.  Patient was found to have a left distal femur fracture and found to be hyperkalemic.  Patient had ORIF on 4/6/2025.  Her hospitalization was complicated by hypotension, confusion and hyperkalemia.  Her home medications were held, hyperkalemia was treated with Lokelma.  Hyperkalemia will resolved.  Then her valsartan and spironolactone doses were adjusted.   Patient also had some ABHILASH, medications were adjusted.  Patient was discharged to skilled nursing facility.    -Hypertension    -Hyperlipidemia    -Mild aortic stenosis    -Pacemaker    Past Medical History:   Diagnosis Date    Anesthesia     low O2 sat    Arthritis     osteo    Back pain     Dyslipidemia     [place] measles     Heart murmur 09/13/2017    Hypertension     Influenza     Mumps     Other specified symptom associated with female genital organs     Painful joint     Psychiatric problem     Sleep apnea     c-pap with 3 l/nc    Snoring     Sore muscles     Tonsillitis     Unspecified cataract      Past Surgical History:   Procedure Laterality Date    ORIF, FRACTURE, FEMUR Left 4/6/2025    Procedure: ORIF, FRACTURE, FEMUR, DISTAL;  Surgeon: Poncho Negrete M.D.;  Location: [facility];  Service: Orthopedics    WA EXPLORATORY OF ABDOMEN N/A 1/28/2023    Procedure: EX LAP;  Surgeon: Mike Valles M.D.;  Location: [facility];  Service: Gastroenterology    KNEE ARTHROPLASTY TOTAL Right 12/27/2017    HYSTERECTOMY ROBOTIC  10/23/2014    Performed by Smith Lyons M.D. at SURGERY [facility]S    GASTROSCOPY WITH BIOPSY  7/2/2014    Performed by Sky Vila M.D. at [facility] ORS    OTHER ORTHOPEDIC SURGERY  2006    left total knee    APPENDECTOMY      ARTHROSCOPY, KNEE      HYSTERECTOMY LAPAROSCOPY      OTHER      meghan cataracts    OTHER ABDOMINAL SURGERY      Resection of pelvic mass, uterus and ovaries    WA BREAST REDUCTION Bilateral     1982    WA REMV 2ND CATARACT,CORN-SCLER SECTN      TONSILLECTOMY      TONSILLECTOMY AND ADENOIDECTOMY       Family History   Problem Relation Age of Onset    Breast Cancer Mother     Cancer Mother 65        Breast    Cancer Father         Colon    Alcohol/Drug Father     Colon Cancer Father     Hyperlipidemia Brother     Heart Disease Brother         arrythmia     Social History     Socioeconomic History    Marital status:       Spouse name: Not on file    Number of children: Not on file    Years of education: Not on file    Highest education level: Not on file   Occupational History    Not on file   Tobacco Use    Smoking status: Never    Smokeless tobacco: Never   Vaping Use    Vaping status: Never Used   Substance and Sexual Activity    Alcohol use: Yes     Alcohol/week: 0.6 - 1.2 oz     Types: 1 - 2 Glasses of wine per week     Comment: occ.    Drug use: No    Sexual activity: Not on file     Comment: , 2 children   Other Topics Concern    Not on file   Social History Narrative    Not on file     Social Drivers of Health     Financial Resource Strain: Not on file   Food Insecurity: No Food Insecurity (5/1/2025)    Hunger Vital Sign     Worried About Running Out of Food in the Last Year: Never true     Ran Out of Food in the Last Year: Never true   Recent Concern: Food Insecurity - Food Insecurity Present (4/6/2025)    Hunger Vital Sign     Worried About Running Out of Food in the Last Year: Sometimes true     Ran Out of Food in the Last Year: Sometimes true   Transportation Needs: No Transportation Needs (5/1/2025)    PRAPARE - Transportation     Lack of Transportation (Medical): No     Lack of Transportation (Non-Medical): No   Physical Activity: Not on file   Stress: Not on file   Social Connections: Not on file   Intimate Partner Violence: Not At Risk (5/1/2025)    Humiliation, Afraid, Rape, and Kick questionnaire     Fear of Current or Ex-Partner: No     Emotionally Abused: No     Physically Abused: No     Sexually Abused: No   Housing Stability: Low Risk  (5/1/2025)    Housing Stability Vital Sign     Unable to Pay for Housing in the Last Year: No     Number of Times Moved in the Last Year: 0     Homeless in the Last Year: No     Allergies   Allergen Reactions    Mirabegron Unspecified     Dizziness & lightheadedness    Tramadol Nausea     Nausea and somnolence     Outpatient Encounter Medications as of 7/10/2025    Medication Sig Dispense Refill    metoprolol SR (TOPROL XL) 100 MG TABLET SR 24 HR Take 1 Tablet by mouth every evening. 90 Tablet 3    nitrofurantoin (MACROBID) 100 MG Cap Take 1 Capsule by mouth 2 times a day for 7 days. 14 Capsule 0    cholestyramine (QUESTRAN) 4 g packet Take 4 g by mouth every day. 60 Each 3    furosemide (LASIX) 20 MG Tab Take 1 Tablet by mouth every day. 90 Tablet 1    Empagliflozin (JARDIANCE) 10 MG Tab tablet Take 1 Tablet by mouth every day. 90 Tablet 3    apixaban (ELIQUIS) 5 MG Tab Take 1 Tablet by mouth 2 times a day. 60 Tablet 1    nystatin (MYCOSTATIN) powder Apply 1 g topically 3 times a day. 60 g 1    zolpidem (AMBIEN) 5 MG Tab Take 1 Tablet by mouth at bedtime as needed for Sleep for up to 30 days. 30 Tablet 2    predniSONE (DELTASONE) 5 MG Tab Take 1 Tablet by mouth every day. 30 Tablet 6    Ascorbic Acid (VITAMIN C) 1000 MG Tab Take  by mouth.      EnovaRX-Diclofenac Sodium 2.5 % Cream Apply 1 Application topically 2 times a day. To bilateral shoulders      vitamin D3 (CHOLECALCIFEROL) 1000 Unit (25 mcg) Tab Take 1,000 Units by mouth every day.      ferrous sulfate 325 (65 Fe) MG tablet Take 325 mg by mouth every day.      folic acid (FOLVITE) 800 MCG tablet Take 800 mcg by mouth every day at 6 PM.      spironolactone (ALDACTONE) 25 MG Tab Take 0.5 Tablets by mouth every day. 45 Tablet 3    acetaminophen (TYLENOL) 500 MG Tab Take 1,000 mg by mouth 3 times a day.      rosuvastatin (CRESTOR) 10 MG Tab Take 1 Tablet by mouth every evening. 100 Tablet 3    sertraline (ZOLOFT) 100 MG Tab Take 1 tablet by mouth once daily 90 Tablet 3    valsartan (DIOVAN) 320 MG tablet Take 1 Tablet by mouth every day. 100 Tablet 4     No facility-administered encounter medications on file as of 7/10/2025.     Review of Systems   Constitutional:  Negative for fever and malaise/fatigue.   Respiratory:  Negative for cough and shortness of breath.    Cardiovascular:  Negative for chest pain,  "palpitations, orthopnea, claudication, leg swelling and PND.   Gastrointestinal:  Positive for nausea (Stomach upset). Negative for abdominal pain.   Musculoskeletal:  Negative for myalgias.   Neurological:  Negative for dizziness.   All other systems reviewed and are negative.             Objective     /58 (BP Location: Left arm, Patient Position: Sitting, BP Cuff Size: Large adult)   Pulse 64   Resp 16   Ht 1.702 m (5' 7\")   Wt 96.6 kg (213 lb)   SpO2 98%   BMI 33.36 kg/m²     Physical Exam  Vitals reviewed.   Constitutional:       Appearance: She is well-developed. She is obese.   HENT:      Head: Normocephalic and atraumatic.   Eyes:      Pupils: Pupils are equal, round, and reactive to light.   Neck:      Vascular: No JVD.   Cardiovascular:      Rate and Rhythm: Normal rate and regular rhythm.      Heart sounds: Normal heart sounds.      Comments: Device site-no erosion, drainage or erythema  Pulmonary:      Effort: Pulmonary effort is normal. No respiratory distress.      Breath sounds: No wheezing or rales.   Abdominal:      General: Bowel sounds are normal.      Palpations: Abdomen is soft.   Musculoskeletal:         General: Normal range of motion.      Cervical back: Normal range of motion and neck supple.      Right lower leg: No edema.      Left lower leg: No edema.   Skin:     General: Skin is warm and dry.   Neurological:      General: No focal deficit present.      Mental Status: She is alert and oriented to person, place, and time.   Psychiatric:         Behavior: Behavior normal.       Lab Results   Component Value Date/Time    CHOLSTRLTOT 166 05/20/2024 02:15 PM    LDL 60 05/20/2024 02:15 PM    HDL 67 05/20/2024 02:15 PM    TRIGLYCERIDE 193 (H) 05/20/2024 02:15 PM       Lab Results   Component Value Date/Time    SODIUM 132 (L) 07/08/2025 02:00 PM    POTASSIUM 5.5 07/08/2025 02:00 PM    CHLORIDE 99 07/08/2025 02:00 PM    CO2 23 07/08/2025 02:00 PM    GLUCOSE 111 (H) 07/08/2025 02:00 PM "    BUN 39 (H) 07/08/2025 02:00 PM    CREATININE 1.55 (H) 07/08/2025 02:00 PM    CREATININE 0.5 09/04/2007 03:20 AM     Lab Results   Component Value Date/Time    ALKPHOSPHAT 62 07/08/2025 02:00 PM    ASTSGOT 17 07/08/2025 02:00 PM    ALTSGPT 12 07/08/2025 02:00 PM    TBILIRUBIN <0.2 07/08/2025 02:00 PM       NT-proBNP   Date Value Ref Range Status   06/30/2025 2555 (H) 0 - 125 pg/mL Final   05/02/2025 99885 (H) 0 - 125 pg/mL Final   04/30/2025 37839 (H) 0 - 125 pg/mL Final        Echocardiogram 7/1/2014  CONCLUSIONS   Normal left ventricular chamber size. Mild concentric left ventricular hypertrophy. Normal left ventricular systolic function. Left ventricular ejection fraction is 60% to 65%. Grade II diastolic dysfunction is present.   Trace mitral regurgitation. Possible prolapse of the anterior mitral valve leaflet.   The aortic valve is not well visualized. Aortic sclerosis without stenosis. AV MG 8.25 mmHg, PG 15 mmHg. Trace aortic insufficiency.   Trace tricuspid regurgitation. Unable to estimate pulmonary artery pressure due to an inadequate tricuspid regurgitant jet.   Small pericardial effusion without evidence of hemodynamic compromise.   Normal aortic root diameter 2.8 cm.   No prior study for comparison.    Transthoracic Echo Report 4/13/2021  Normal left ventricular chamber size.  Left ventricular ejection fraction is visually estimated to be 70%.  Moderate concentric left ventricular hypertrophy.  Grade I diastolic dysfunction.  Mild aortic stenosis.  Normal inferior vena cava size and inspiratory collapse.     Transthoracic Echo Report 8/21/2024  Compared to the prior study on 4/13/2021, no significant changes  The ejection fraction is measured to be 70% by Arriaga's biplane.  No regional wall motion abnormalities.  Mild aortic valve stenosis. Vmax is 2.6  m/s.     Transthoracic Echo Report 10/5/2024  Normal right and left ventricular size and function.   The left ventricular ejection fraction is  visually estimated to be 70%.  Mild concentric left ventricular hypertrophy.  Grade II diastolic dysfunction.  Mildly dilated left atrium.  Mild aortic valve stenosis.  Right heart pressures are normal.      Compared to the prior study on 8/21/2024: No significant change.     Transthoracic Echo Report 5/2/2025  Compared to the images of the prior study on 10/5/24 - aortic valve gradients are now normal, mitral regurgitation has increased, previously mild; and the right ventricular systolic pressure could not be estimated on the prior exam.  Hyperdynamic left ventricular systolic function.  The left ventricular ejection fraction is visually estimated to be 75%.  Reduced right ventricular systolic function.  Moderate eccentric mitral regurgitation.  Moderate tricuspid regurgitation.  Estimated right ventricular systolic pressure is 70 mmHg.       Assessment & Plan     1. ACC/AHA stage C heart failure with preserved ejection fraction (HCC)  Basic Metabolic Panel      2. High risk medication use  Basic Metabolic Panel      3. Heart failure, NYHA class 2 (HCC)        4. HTN (hypertension), malignant        5. Sick sinus syndrome (HCC)        6. Pacemaker        7. Paroxysmal atrial fibrillation (HCC)        8. AZUL on CPAP        9. Mixed hyperlipidemia        10. Other chronic pulmonary embolism with acute cor pulmonale (HCC)        11. Secondary hypercoagulable state (HCC)                    Medical Decision Making: Today's Assessment/Status/Plan:        HFpEF, Stage C, Class 2, LVEF 75%: Based on physical examination findings, patient is euvolemic. No JVD, lungs are clear to auscultation, no pitting edema in bilateral lower extremities, no ascites.  -Patient does have Mod MR, Mod TR, mild LVH, aortic gradients are now normal   -Continue furosemide 20 mg daily  - Continue spironolactone 12.5 mg daily due to high potassium level and ABHILASH  -Continue Jardiance 10 mg daily  -Repeat a BMP with next labs in a few weeks, has  some ABHILASH likely due to UTI and antibiotics, borderline sodium, will follow  -Reinforced s/sx of worsening heart failure with patient and weight monitoring. Pt verbalizes understanding. Pt to call office or RTC if present.    -Patient did have lower extremity venous reflux study which did not show any reflux disease in her bilateral small and great saphenous veins on 12/27/2024    Hypertension:  -BP stable  -Continue spironolactone 12.5 mg daily  -Continue valsartan 320 mg daily  -Continue metoprolol  mg daily    SSS, Second-degree AV block , s/p pacemaker on 10/7/2024:  - Device check on 5/21/2025, mode switching less than 1%    AT/AF:  - Episode of AT/AF on day of admission, device checks showed mode switching less than 1%  - Patient currently is on blood thinner due to PE    PE:  -  continue Eliquis 5 mg twice a day, denies bleeding    Sleep apnea:  -Patient reports she had been using her CPAP regularly    Hyperlipidemia:  -Last LDL 60 on 5/20/2024  -Continue rosuvastatin 10 mg daily    FU in clinic in 3 months with labs. Sooner if needed.    Patient verbalizes understanding and agrees with the plan of care.     PLEASE NOTE: This Note was created using voice recognition Software. I have made every reasonable attempt to correct obvious errors, but I expect that there are errors of grammar and possibly content that I did not discover before finalizing the note

## 2025-07-18 DIAGNOSIS — I15.9 SECONDARY HYPERTENSION: ICD-10-CM

## 2025-07-29 ENCOUNTER — HOSPITAL ENCOUNTER (OUTPATIENT)
Facility: MEDICAL CENTER | Age: 80
End: 2025-07-29
Attending: FAMILY MEDICINE
Payer: MEDICARE

## 2025-07-29 ENCOUNTER — OFFICE VISIT (OUTPATIENT)
Dept: INTERNAL MEDICINE | Facility: IMAGING CENTER | Age: 80
End: 2025-07-29
Payer: MEDICARE

## 2025-07-29 VITALS
TEMPERATURE: 97.9 F | OXYGEN SATURATION: 95 % | DIASTOLIC BLOOD PRESSURE: 62 MMHG | SYSTOLIC BLOOD PRESSURE: 118 MMHG | RESPIRATION RATE: 17 BRPM | HEIGHT: 67 IN | BODY MASS INDEX: 33.9 KG/M2 | WEIGHT: 216 LBS | HEART RATE: 60 BPM

## 2025-07-29 DIAGNOSIS — E66.9 OBESITY (BMI 30-39.9): ICD-10-CM

## 2025-07-29 DIAGNOSIS — I10 PRIMARY HYPERTENSION: ICD-10-CM

## 2025-07-29 DIAGNOSIS — I51.89 LEFT VENTRICULAR DIASTOLIC DYSFUNCTION, NYHA CLASS 3: ICD-10-CM

## 2025-07-29 DIAGNOSIS — R53.81 PHYSICAL DEBILITY: ICD-10-CM

## 2025-07-29 DIAGNOSIS — E78.5 DYSLIPIDEMIA: ICD-10-CM

## 2025-07-29 DIAGNOSIS — N18.32 CKD STAGE 3B, GFR 30-44 ML/MIN: ICD-10-CM

## 2025-07-29 DIAGNOSIS — F41.9 ANXIETY: ICD-10-CM

## 2025-07-29 DIAGNOSIS — Z91.81 RISK FOR FALLS: ICD-10-CM

## 2025-07-29 DIAGNOSIS — Z00.00 ENCOUNTER FOR MEDICARE ANNUAL WELLNESS EXAM: ICD-10-CM

## 2025-07-29 DIAGNOSIS — I10 PRIMARY HYPERTENSION: Primary | ICD-10-CM

## 2025-07-29 DIAGNOSIS — I35.0 NONRHEUMATIC AORTIC VALVE STENOSIS: ICD-10-CM

## 2025-07-29 DIAGNOSIS — I50.30 ACC/AHA STAGE C HEART FAILURE WITH PRESERVED EJECTION FRACTION (HCC): ICD-10-CM

## 2025-07-29 DIAGNOSIS — Z95.0 PACEMAKER: ICD-10-CM

## 2025-07-29 DIAGNOSIS — F51.04 CHRONIC INSOMNIA: ICD-10-CM

## 2025-07-29 DIAGNOSIS — N32.81 OAB (OVERACTIVE BLADDER): ICD-10-CM

## 2025-07-29 DIAGNOSIS — Z79.52 CURRENT CHRONIC USE OF SYSTEMIC STEROIDS: ICD-10-CM

## 2025-07-29 DIAGNOSIS — R26.89 POOR BALANCE: ICD-10-CM

## 2025-07-29 DIAGNOSIS — H61.23 BILATERAL IMPACTED CERUMEN: ICD-10-CM

## 2025-07-29 DIAGNOSIS — E55.9 VITAMIN D DEFICIENCY: ICD-10-CM

## 2025-07-29 DIAGNOSIS — R79.89 ELEVATED BRAIN NATRIURETIC PEPTIDE (BNP) LEVEL: ICD-10-CM

## 2025-07-29 DIAGNOSIS — I26.99 OTHER ACUTE PULMONARY EMBOLISM WITHOUT ACUTE COR PULMONALE (HCC): ICD-10-CM

## 2025-07-29 DIAGNOSIS — M85.852 OSTEOPENIA OF NECK OF LEFT FEMUR: ICD-10-CM

## 2025-07-29 DIAGNOSIS — R19.5 LOOSE STOOLS: ICD-10-CM

## 2025-07-29 DIAGNOSIS — G47.33 OSA ON CPAP: ICD-10-CM

## 2025-07-29 DIAGNOSIS — S72.492D OTHER CLOSED FRACTURE OF DISTAL END OF LEFT FEMUR WITH ROUTINE HEALING, SUBSEQUENT ENCOUNTER: ICD-10-CM

## 2025-07-29 DIAGNOSIS — I82.431 ACUTE DEEP VEIN THROMBOSIS (DVT) OF RIGHT POPLITEAL VEIN (HCC): ICD-10-CM

## 2025-07-29 DIAGNOSIS — R79.89 ELEVATED FERRITIN: ICD-10-CM

## 2025-07-29 DIAGNOSIS — M19.90 ARTHRITIS: ICD-10-CM

## 2025-07-29 DIAGNOSIS — Z79.01 CHRONIC ANTICOAGULATION: ICD-10-CM

## 2025-07-29 PROBLEM — Z92.89 HISTORY OF USE OF HEARING AID IN BOTH EARS: Status: ACTIVE | Noted: 2025-07-29

## 2025-07-29 PROBLEM — N30.00 ACUTE CYSTITIS WITHOUT HEMATURIA: Status: RESOLVED | Noted: 2025-07-08 | Resolved: 2025-07-29

## 2025-07-29 PROBLEM — Z72.3 PHYSICALLY INACTIVE: Status: RESOLVED | Noted: 2024-05-22 | Resolved: 2025-07-29

## 2025-07-29 LAB
ALBUMIN SERPL BCP-MCNC: 4 G/DL (ref 3.2–4.9)
ALBUMIN/GLOB SERPL: 1.5 G/DL
ALP SERPL-CCNC: 65 U/L (ref 30–99)
ALT SERPL-CCNC: 12 U/L (ref 2–50)
ANION GAP SERPL CALC-SCNC: 11 MMOL/L (ref 7–16)
AST SERPL-CCNC: 15 U/L (ref 12–45)
BILIRUB SERPL-MCNC: <0.2 MG/DL (ref 0.1–1.5)
BUN SERPL-MCNC: 44 MG/DL (ref 8–22)
CALCIUM ALBUM COR SERPL-MCNC: 10.5 MG/DL (ref 8.5–10.5)
CALCIUM SERPL-MCNC: 10.5 MG/DL (ref 8.5–10.5)
CHLORIDE SERPL-SCNC: 111 MMOL/L (ref 96–112)
CO2 SERPL-SCNC: 16 MMOL/L (ref 20–33)
CREAT SERPL-MCNC: 1.37 MG/DL (ref 0.5–1.4)
GFR SERPLBLD CREATININE-BSD FMLA CKD-EPI: 39 ML/MIN/1.73 M 2
GLOBULIN SER CALC-MCNC: 2.7 G/DL (ref 1.9–3.5)
GLUCOSE SERPL-MCNC: 94 MG/DL (ref 65–99)
POTASSIUM SERPL-SCNC: 5.9 MMOL/L (ref 3.6–5.5)
PROT SERPL-MCNC: 6.7 G/DL (ref 6–8.2)
SODIUM SERPL-SCNC: 138 MMOL/L (ref 135–145)

## 2025-07-29 PROCEDURE — 80053 COMPREHEN METABOLIC PANEL: CPT

## 2025-07-29 ASSESSMENT — PATIENT HEALTH QUESTIONNAIRE - PHQ9: CLINICAL INTERPRETATION OF PHQ2 SCORE: 0

## 2025-07-29 ASSESSMENT — ENCOUNTER SYMPTOMS: GENERAL WELL-BEING: GOOD

## 2025-07-29 ASSESSMENT — ACTIVITIES OF DAILY LIVING (ADL): BATHING_REQUIRES_ASSISTANCE: 0

## 2025-07-29 ASSESSMENT — FIBROSIS 4 INDEX: FIB4 SCORE: 1.67

## 2025-07-29 NOTE — PROGRESS NOTES
CC:   Medicare Annual Wellness Visit    HPI:  Clau is a 80 y.o. female here for her Medicare Annual Wellness Visit and to discuss current health concerns. Her caregiver Maria Elena is present today, and Rosy is feeling much better overall as compared to recent prior visits.     Patient Active Problem List    Diagnosis Date Noted    Risk for falls 07/29/2025    History of use of hearing aid in both ears 07/29/2025    Elevated ferritin 07/08/2025    Closed fracture of distal end of left femur with routine healing 07/08/2025    Physical debility 07/08/2025    Acute deep vein thrombosis (DVT) of right popliteal vein (HCC) 06/30/2025    Chronic anticoagulation 06/30/2025    Acute pulmonary embolism without acute cor pulmonale (HCC) 05/01/2025    Elevated brain natriuretic peptide (BNP) level 05/01/2025    CKD stage 3b, GFR 30-44 ml/min 05/01/2025    Current chronic use of systemic steroids 04/05/2025    Pacemaker 10/16/2024    ACC/AHA stage C heart failure with preserved ejection fraction (HCC) 10/01/2024    Left ventricular diastolic dysfunction, NYHA class 3 10/01/2024    Nonrheumatic aortic valve stenosis 09/04/2024    Loose stools 09/04/2024    Shoulder mass 08/19/2024    Total knee replacement status, left 11/15/2023    DNR (do not resuscitate) 11/15/2023    OAB (overactive bladder) 09/19/2023    Osteopenia of neck of left femur 07/31/2023    History of diverticulitis 05/15/2023    Vitamin D deficiency 05/15/2023    Arthritis 01/24/2023    Poor balance 04/26/2021    Thyroid nodule 03/16/2021    Obesity (BMI 30-39.9) 04/06/2018    Heart murmur 09/13/2017    Chronic insomnia 08/09/2017    Anxiety 08/09/2017    HTN (hypertension) 07/01/2014    Dyslipidemia 07/01/2014    AZUL on CPAP 07/01/2014     Current medications: see MAR   Current supplements: see MAR  Chronic narcotic pain medicines: no  Allergies: Mirabegron and Tramadol  Exercise: unable to tolerate  Current social contact/activities: yes as tolerated   Current  mood: good  Advance Directive on file: no    Screening:  Depression Screening  Little interest or pleasure in doing things?  0 - not at all  Feeling down, depressed , or hopeless? 0 - not at all  Patient Health Questionnaire Score: 0     If depressive symptoms identified deferred to follow up visit unless specifically addressed in assessment and plan.    Interpretation of PHQ-9 Total Score   Score Severity   1-4 No Depression   5-9 Mild Depression   10-14 Moderate Depression   15-19 Moderately Severe Depression   20-27 Severe Depression    Screening for Cognitive Impairment  Do you or any of your friends or family members have any concern about your memory? No  Three Minute Recall (Village, Kitchen, Baby) 3/3    Paramjit clock face with all 12 numbers and set the hands to show 10 minutes past 11.  Yes    Cognitive concerns identified deferred for follow up unless specifically addressed in assessment and plan.    Fall Risk Assessment  Has the patient had two or more falls in the last year or any fall with injury in the last year?  Yes    Safety Assessment  Do you always wear your seatbelt?  Yes  Any changes to home needed to function safely? No  Difficulty hearing.  No  Patient counseled about all safety risks that were identified.    Functional Assessment ADLs  Are there any barriers preventing you from cooking for yourself or meeting nutritional needs?  No.    Are there any barriers preventing you from driving safely or obtaining transportation?  No.    Are there any barriers preventing you from using a telephone or calling for help?  No    Are there any barriers preventing you from shopping?  No.    Are there any barriers preventing you from taking care of your own finances?  No    Are there any barriers preventing you from managing your medications?  No    Are there any barriers preventing you from showering, bathing or dressing yourself? No    Are there any barriers preventing you from doing housework or laundry?  No  Are there any barriers preventing you from using the toilet?No  Are you currently engaging in any exercise or physical activity?  Yes.      Self-Assessment of Health  What is your perception of your health? Good    Do you sleep more than six hours a night? Yes    In the past 7 days, how much did pain keep you from doing your normal work? None    Do you spend quality time with family or friends (virtually or in person)? Yes    Do you usually eat a heart healthy diet that constists of a variety of fruits, vegetables, whole grains and fiber? Yes    Do you eat foods high in fat and/or Fast Food more than three times per week? No    How concerned are you that your medical conditions are not being well managed? Not at all    Are you worried that in the next 2 months, you may not have stable housing that you own, rent, or stay in as part of a household?        Advance Care Planning  Do you have an Advance Directive, Living Will, Durable Power of , or POLST?                   Health Maintenance Summary            Current Care Gaps       COVID-19 Vaccine (7 - 2024-25 season) Overdue since 9/1/2024      10/19/2023  Imm Admin: Covid-19 Mrna (Spikevax) Moderna 12+ Years    10/27/2022  Imm Admin: PFIZER BIVALENT SARS-COV-2 VACCINE (12+)    04/09/2022  Imm Admin: COVID-19 Vaccine, unspecified - HISTORICAL DATA    09/30/2021  Imm Admin: PFIZER PURPLE CAP SARS-COV-2 VACCINATION (12+)    02/12/2021  Imm Admin: PFIZER PURPLE CAP SARS-COV-2 VACCINATION (12+)     Only the first 5 history entries have been loaded, but more history exists.                    Awaiting Completion       Mammogram (Yearly) Scheduled for 9/10/2025      07/08/2025  Order placed for MA-SCREENING MAMMO BILAT W/TOMOSYNTHESIS W/CAD by Marge Brasher M.D.    07/26/2024  MA-SCREENING MAMMO BILAT W/TOMOSYNTHESIS W/CAD    07/25/2023  MA-SCREENING MAMMO BILAT W/TOMOSYNTHESIS W/CAD    07/18/2022  MA-SCREENING MAMMO BILAT W/TOMOSYNTHESIS W/CAD     07/16/2021  MA-SCREENING MAMMO BILAT W/TOMOSYNTHESIS W/CAD      Only the first 5 history entries have been loaded, but more history exists.              Bone Density Scan (Every 2 Years) Scheduled for 9/10/2025      07/08/2025  Order placed for DS-BONE DENSITY STUDY (DEXA) by Marge Brasher M.D.    07/25/2023  DS-BONE DENSITY STUDY (DEXA)    05/07/2018  DS-BONE DENSITY STUDY (DEXA)    04/27/2016  DS-BONE DENSITY STUDY (DEXA)    03/13/2013  DS-BONE DENSITY STUDY (DEXA)      Only the first 5 history entries have been loaded, but more history exists.                      Upcoming       Influenza Vaccine (1) Next due on 9/1/2025      10/01/2024  Imm Admin: Influenza high-dose trivalent (PF)    09/19/2023  Imm Admin: Influenza Vaccine Adult HD    09/27/2022  Imm Admin: Influenza Vaccine Adult HD    09/01/2022  Imm Admin: Influenza Seasonal Injectable - Historical Data    09/23/2021  Imm Admin: Influenza Vaccine Adult HD      Only the first 5 history entries have been loaded, but more history exists.              Annual Wellness Visit (Yearly) Next due on 7/29/2026 07/29/2025  Subsequent Annual Wellness Visit - Includes PPPS ()    07/29/2025  Visit Dx: Encounter for Medicare annual wellness exam    05/22/2024  Subsequent Annual Wellness Visit - Includes PPPS ()    05/22/2024  Visit Dx: Encounter for Medicare annual wellness exam    05/15/2023  Visit Dx: Encounter for Medicare annual wellness exam      Only the first 5 history entries have been loaded, but more history exists.              IMM DTaP/Tdap/Td Vaccine (2 - Td or Tdap) Next due on 10/11/2026      10/11/2016  Imm Admin: Tdap Vaccine                      Completed or No Longer Recommended       Zoster (Shingles) Vaccines (Series Information) Completed      01/29/2020  Imm Admin: Zoster Vaccine Recombinant (RZV) (SHINGRIX)    11/21/2019  Imm Admin: Zoster Vaccine Recombinant (RZV) (SHINGRIX)    08/08/2015  Imm Admin: Zoster Vaccine Live (ZVL)  (Zostavax) - HISTORICAL DATA              Pneumococcal Vaccine: 50+ Years (Series Information) Completed      02/01/2016  Imm Admin: Pneumococcal polysaccharide vaccine (PPSV-23)    12/22/2014  Imm Admin: Pneumococcal Conjugate Vaccine (Prevnar/PCV-13)              Hepatitis A Vaccine (Hep A) (Series Information) Aged Out      No completion history exists for this topic.              HPV Vaccines (Series Information) Aged Out     No completion history exists for this topic.              Polio Vaccine (Inactivated Polio) (Series Information) Aged Out     No completion history exists for this topic.              Meningococcal Immunization (Series Information) Aged Out     No completion history exists for this topic.              Meningococcal B Vaccine (Series Information) Aged Out     No completion history exists for this topic.              Colorectal Cancer Screening  Discontinued        Frequency changed to Never automatically (Topic No Longer Applies)    10/17/2023  AMB EXTERNAL COLONOSCOPY RESULTS    07/01/2014  OCCULT BLOOD X3 (STOOL)    02/04/2013  REFERRAL TO GI FOR COLONOSCOPY              Hepatitis B Vaccine (Hep B)  Discontinued     No completion history exists for this topic.              Hepatitis C Screening  Discontinued        Frequency changed to Never automatically (Topic No Longer Applies)    12/09/2020  Done    12/09/2020  Hepatitis C Antibody component of HEP C VIRUS ANTIBODY    10/23/2014  Hepatitis C Antibody component of NEEDLESTICK SOURCE                            Patient Care Team:  Marge Brasher M.D. as PCP - General (Family Medicine)  Savannah Dolan R.N. as Registered Nurse  Charu Godfrey, PT, DPT as Physical Therapist (Physical Therapy)  [employer] (DME Supplier)      Social History[1]  Family History   Problem Relation Age of Onset    Breast Cancer Mother     Cancer Mother 65        Breast    Cancer Father         Colon    Alcohol/Drug Father     Colon Cancer Father      "Hyperlipidemia Brother     Heart Disease Brother         arrythmia     She  has a past medical history of Anesthesia, Arthritis, Back pain, Dyslipidemia, Sudanese measles, Heart murmur (09/13/2017), Hypertension, Influenza, Mumps, Other specified symptom associated with female genital organs, Painful joint, Psychiatric problem, Sleep apnea, Snoring, Sore muscles, Tonsillitis, and Unspecified cataract.    She has no past medical history of Breast cancer (HCC), CAD (coronary artery disease), Cancer (HCC), or Diabetes (HCC).   Past Surgical History[2]    ROS:    All positives noted in HPI. All others reviewed and are negative.    Ostomy or other tubes or amputations: no  Chronic oxygen use: no  Last eye exam: UTD per report  : denies urinary incontinence; does not interfere with ADLs/ sleep  Gait: Uses front wheel walker   Problems with balance/ difficulty walking: yes  Hearing: adequate with bilateral hearing aid use  Dentition: adequate    Exam:   /62 (BP Location: Left arm, Patient Position: Sitting, BP Cuff Size: Adult)   Pulse 60   Temp 36.6 °C (97.9 °F) (Temporal)   Resp 17   Ht 1.702 m (5' 7\")   Wt 98 kg (216 lb)   SpO2 95%  Body mass index is 33.83 kg/m².    Gen: Well developed; well nourished; no acute distress; age appropriate appearance   HEENT: Normocephalic; atraumatic; PEERLA b/l; sclera clear b/l; b/l external auditory canals WNL after hearing aids removed;; b/l TM obscured by cerumen; nares patent; oropharynx clear; oral mucosa moist; tongue midline; dentition adequate   Neck: No adenopathy; no thyromegaly  CV: Regular rate and rhythm; S1/ S2 present; no gallop or rub noted; PPM  Pulm: No respiratory distress; clear to ascultation b/l; no wheezing or stridor noted b/l  Abd: Adequate bowel sounds noted; soft and nontender; no rebound, rigidity, nor distention  Extremities: No new peripheral edema b/l LE extremities/ no clubbing nor cyanosis noted  Skin: Warm and dry; no rashes noted   Neuro: " No new focal deficits noted; pt is able to get up out of chair using her walker for support and walk forward  Psych: AAOx4; mood and affect are at her baseline     Assessment and Plan:  1. Primary hypertension (Primary)  Stable/ chronic condition managed by [facility].  - Comp Metabolic Panel; Future  - Subsequent Annual Wellness Visit - Includes PPPS ()    2. ACC/AHA stage C heart failure with preserved ejection fraction (HCC)  Chronic condition managed by [facility].   - Subsequent Annual Wellness Visit - Includes PPPS ()    3. Elevated brain natriuretic peptide (BNP) level  Chronic condition managed by [facility].   - Subsequent Annual Wellness Visit - Includes PPPS ()    4. Left ventricular diastolic dysfunction, NYHA class 3  Chronic condition managed by [facility].   - Subsequent Annual Wellness Visit - Includes PPPS ()    5. Nonrheumatic aortic valve stenosis  Chronic condition managed by [facility].   - Subsequent Annual Wellness Visit - Includes PPPS ()    6. Pacemaker  Chronic condition managed by [facility].   - Subsequent Annual Wellness Visit - Includes PPPS ()    7. Acute deep vein thrombosis (DVT) of right popliteal vein (HCC)  Patient was diagnosed with RLE DVT on US done 5/3/25 and is compliant with Eliquis 5 mg BID use. Patient has repeat US scheduled in August for follow up.  - Subsequent Annual Wellness Visit - Includes PPPS ()    8. Other acute pulmonary embolism without acute cor pulmonale (HCC)  Patient was diagnosed with bilateral PEs on CTA done 4/30/25 after experiencing syncopal episode with oxygen desaturation at [facility] rehab. She is no longer requiring supplemental oxygen and is compliant with Eliquis 5 mg BID use. Repeat CTA is scheduled in August for close follow up.  - Subsequent Annual Wellness Visit - Includes PPPS ()    9. Chronic anticoagulation  Stable/ patient is tolerating Eliquis well without  reported side effects.   - Subsequent Annual Wellness Visit - Includes PPPS ()    10. Chronic insomnia  Stable/ patient may continue PRN Ambien use for insomnia management. She has used this controlled medication long term without reported side effects, has not tolerated prior weaning well, and uses this medication in a safe manner.  reviewed today and no concerns noted. Pt is well versed in safe use of controlled medication, and benefit of use outweighs risk at this time.   - Subsequent Annual Wellness Visit - Includes PPPS ()    11. Arthritis  Chronic condition managed with low dose prednisone use (failed other treatment modalities).   - Subsequent Annual Wellness Visit - Includes PPPS ()    12. Anxiety  Chronic condition managed with daily Zoloft use. Patient is much more calm currently, and no safety concerns present.   - Subsequent Annual Wellness Visit - Includes PPPS ()    13. CKD stage 3b, GFR 30-44 ml/min  Following condition closely and patient reminded to stay well hydrated.   - Subsequent Annual Wellness Visit - Includes PPPS ()    14. Current chronic use of systemic steroids  Refer to #11.  - Subsequent Annual Wellness Visit - Includes PPPS ()    15. Other closed fracture of distal end of left femur with routine healing, subsequent encounter  Patient sustained left femur fracture due to MGLF at home on 4/5/25. She underwent ORIF L distal femur fracture by Dr. Negrete on 4/6/25 and has spent 2.5 months at [facility] rehab recovering. She is now in her own home and has followed up with [facility] team. She denies associated pain nor extremity edema and is ready to resume outpatient PT. New referral made to [facility] [employer] at [facility].   - Referral to Physical Therapy  - Subsequent Annual Wellness Visit - Includes PPPS ()    16. Dyslipidemia  Stable  - Subsequent Annual Wellness Visit - Includes PPPS ()    17. Elevated ferritin  Will repeat Ferritin lab with next lab  draw for follow up.   - Subsequent Annual Wellness Visit - Includes PPPS ()    18. Risk for falls  - Patient identified as fall risk.  Appropriate orders and counseling given.  - Referral to Physical Therapy  - Subsequent Annual Wellness Visit - Includes PPPS ()    19. Loose stools  Chronic condition managed by her GI team.   - Subsequent Annual Wellness Visit - Includes PPPS ()    20. OAB (overactive bladder)  Patient has failed prior OAB targeted medication use.   - Subsequent Annual Wellness Visit - Includes PPPS ()    21. Obesity (BMI 30-39.9)  - Patient identified as having weight management issue.  Appropriate orders and counseling given.  - Subsequent Annual Wellness Visit - Includes PPPS ()    22. AZUL on CPAP  Stable  - Subsequent Annual Wellness Visit - Includes PPPS ()    23. Osteopenia of neck of left femur  Chronic condition for patient - she ultimately needs definitive treatment for this condition but has declined prior offers for advanced management.   - Subsequent Annual Wellness Visit - Includes PPPS ()    24. Physical debility  - Referral to Physical Therapy  - Subsequent Annual Wellness Visit - Includes PPPS ()    25. Poor balance  - Referral to Physical Therapy  - Subsequent Annual Wellness Visit - Includes PPPS ()    26. Vitamin D deficiency  Stable/ recommend patient continue Vitamin D supplementation for maintenance.   - Subsequent Annual Wellness Visit - Includes PPPS ()    27. Bilateral impacted cerumen  Recommend patient use Deborx drops in both ears nightly for a week and return to see Ale CHERRY PRN for ear flush.  - Subsequent Annual Wellness Visit - Includes PPPS ()    28. Encounter for Medicare annual wellness exam  Patient remains well informed about medical conditions that need additional attention moving forward.   - Subsequent Annual Wellness Visit - Includes PPPS ()       Services needed: no new services needed at this  time  Health Care Screening: recommendations as per orders if indicated.  Referrals offered: PT  Counseling provided today:  Prevent falls and reduce trip hazards; Secure or remove rugs if present   Maintain working fire alarm and carbon monoxide detectors   Engage in regular physical activity daily and social activities weekly as tolerated   F/U with me in three months and PRN sooner as new needs arise         [1]   Social History  Tobacco Use    Smoking status: Never    Smokeless tobacco: Never   Vaping Use    Vaping status: Never Used   Substance Use Topics    Alcohol use: Yes     Alcohol/week: 0.6 - 1.2 oz     Types: 1 - 2 Glasses of wine per week     Comment: occ.    Drug use: No   [2]   Past Surgical History:  Procedure Laterality Date    ORIF, FRACTURE, FEMUR Left 4/6/2025    Procedure: ORIF, FRACTURE, FEMUR, DISTAL;  Surgeon: Ponhco Negrete M.D.;  Location: [facility];  Service: Orthopedics    AR EXPLORATORY OF ABDOMEN N/A 1/28/2023    Procedure: EX LAP;  Surgeon: Mike Valles M.D.;  Location: [facility];  Service: Gastroenterology    KNEE ARTHROPLASTY TOTAL Right 12/27/2017    HYSTERECTOMY ROBOTIC  10/23/2014    Performed by Smith Lyons M.D. at [facility] ORS    GASTROSCOPY WITH BIOPSY  7/2/2014    Performed by Sky Vila M.D. at [facility] ORS    OTHER ORTHOPEDIC SURGERY  2006    left total knee    APPENDECTOMY      ARTHROSCOPY, KNEE      HYSTERECTOMY LAPAROSCOPY      OTHER      meghan cataracts    OTHER ABDOMINAL SURGERY      Resection of pelvic mass, uterus and ovaries    AR BREAST REDUCTION Bilateral     1982    AR REMV 2ND CATARACT,CORN-SCLER SECTN      TONSILLECTOMY      TONSILLECTOMY AND ADENOIDECTOMY

## 2025-07-30 ENCOUNTER — TELEPHONE (OUTPATIENT)
Dept: INTERNAL MEDICINE | Facility: IMAGING CENTER | Age: 80
End: 2025-07-30
Payer: MEDICARE

## 2025-07-30 DIAGNOSIS — E87.5 HYPERKALEMIA: Primary | ICD-10-CM

## 2025-07-30 NOTE — TELEPHONE ENCOUNTER
Discussed lab results with patient today. She is not taking any supplemental potassium at this time but does take Spironolactone 12.5 mg daily. Patient is feeling well at this time and will be able to return to clinic tomorrow for repeat potassium lab draw (she does not have a ride to clinic today).

## 2025-07-31 ENCOUNTER — NON-PROVIDER VISIT (OUTPATIENT)
Dept: INTERNAL MEDICINE | Facility: IMAGING CENTER | Age: 80
End: 2025-07-31
Payer: MEDICARE

## 2025-07-31 ENCOUNTER — HOSPITAL ENCOUNTER (OUTPATIENT)
Facility: MEDICAL CENTER | Age: 80
End: 2025-07-31
Attending: FAMILY MEDICINE
Payer: MEDICARE

## 2025-07-31 DIAGNOSIS — E87.5 HYPERKALEMIA: ICD-10-CM

## 2025-07-31 LAB — POTASSIUM SERPL-SCNC: 5.8 MMOL/L (ref 3.6–5.5)

## 2025-07-31 PROCEDURE — 84132 ASSAY OF SERUM POTASSIUM: CPT

## 2025-08-01 ENCOUNTER — RESULTS FOLLOW-UP (OUTPATIENT)
Dept: CARDIOLOGY | Facility: MEDICAL CENTER | Age: 80
End: 2025-08-01
Payer: MEDICARE

## 2025-08-01 DIAGNOSIS — Z79.899 HIGH RISK MEDICATION USE: Primary | ICD-10-CM

## 2025-08-08 ENCOUNTER — NON-PROVIDER VISIT (OUTPATIENT)
Dept: INTERNAL MEDICINE | Facility: IMAGING CENTER | Age: 80
End: 2025-08-08
Payer: MEDICARE

## 2025-08-08 ENCOUNTER — HOSPITAL ENCOUNTER (OUTPATIENT)
Facility: MEDICAL CENTER | Age: 80
End: 2025-08-08
Attending: NURSE PRACTITIONER
Payer: MEDICARE

## 2025-08-08 DIAGNOSIS — Z79.899 HIGH RISK MEDICATION USE: ICD-10-CM

## 2025-08-08 LAB
ANION GAP SERPL CALC-SCNC: 9 MMOL/L (ref 7–16)
BUN SERPL-MCNC: 39 MG/DL (ref 8–22)
CALCIUM SERPL-MCNC: 10.6 MG/DL (ref 8.5–10.5)
CHLORIDE SERPL-SCNC: 110 MMOL/L (ref 96–112)
CO2 SERPL-SCNC: 21 MMOL/L (ref 20–33)
CREAT SERPL-MCNC: 1.47 MG/DL (ref 0.5–1.4)
GFR SERPLBLD CREATININE-BSD FMLA CKD-EPI: 36 ML/MIN/1.73 M 2
GLUCOSE SERPL-MCNC: 85 MG/DL (ref 65–99)
POTASSIUM SERPL-SCNC: 5.4 MMOL/L (ref 3.6–5.5)
SODIUM SERPL-SCNC: 140 MMOL/L (ref 135–145)

## 2025-08-08 PROCEDURE — 80048 BASIC METABOLIC PNL TOTAL CA: CPT

## 2025-08-12 ENCOUNTER — HOSPITAL ENCOUNTER (OUTPATIENT)
Dept: RADIOLOGY | Facility: MEDICAL CENTER | Age: 80
End: 2025-08-12
Attending: FAMILY MEDICINE
Payer: MEDICARE

## 2025-08-12 DIAGNOSIS — I26.99 OTHER ACUTE PULMONARY EMBOLISM WITHOUT ACUTE COR PULMONALE (HCC): ICD-10-CM

## 2025-08-12 DIAGNOSIS — I82.431 ACUTE DEEP VEIN THROMBOSIS (DVT) OF RIGHT POPLITEAL VEIN (HCC): ICD-10-CM

## 2025-08-12 PROCEDURE — 71275 CT ANGIOGRAPHY CHEST: CPT

## 2025-08-12 PROCEDURE — 700117 HCHG RX CONTRAST REV CODE 255: Performed by: FAMILY MEDICINE

## 2025-08-12 PROCEDURE — 93970 EXTREMITY STUDY: CPT

## 2025-08-12 RX ADMIN — IOHEXOL 65 ML: 350 INJECTION, SOLUTION INTRAVENOUS at 13:36

## 2025-08-13 ENCOUNTER — TELEPHONE (OUTPATIENT)
Dept: INTERNAL MEDICINE | Facility: IMAGING CENTER | Age: 80
End: 2025-08-13
Payer: MEDICARE

## 2025-08-17 ENCOUNTER — HOSPITAL ENCOUNTER (INPATIENT)
Facility: MEDICAL CENTER | Age: 80
LOS: 3 days | DRG: 378 | End: 2025-08-21
Attending: EMERGENCY MEDICINE | Admitting: STUDENT IN AN ORGANIZED HEALTH CARE EDUCATION/TRAINING PROGRAM
Payer: MEDICARE

## 2025-08-17 DIAGNOSIS — K92.2 ACUTE UPPER GI BLEED: ICD-10-CM

## 2025-08-17 DIAGNOSIS — Z79.01 ANTICOAGULATED: ICD-10-CM

## 2025-08-17 DIAGNOSIS — K92.2 UPPER GI BLEED: Primary | ICD-10-CM

## 2025-08-17 DIAGNOSIS — S72.492D OTHER CLOSED FRACTURE OF DISTAL END OF LEFT FEMUR WITH ROUTINE HEALING, SUBSEQUENT ENCOUNTER: ICD-10-CM

## 2025-08-17 DIAGNOSIS — Z86.711 HISTORY OF PULMONARY EMBOLISM: ICD-10-CM

## 2025-08-17 LAB
BASOPHILS # BLD AUTO: 0.5 % (ref 0–1.8)
BASOPHILS # BLD: 0.04 K/UL (ref 0–0.12)
EOSINOPHIL # BLD AUTO: 0.28 K/UL (ref 0–0.51)
EOSINOPHIL NFR BLD: 3.3 % (ref 0–6.9)
ERYTHROCYTE [DISTWIDTH] IN BLOOD BY AUTOMATED COUNT: 58.2 FL (ref 35.9–50)
HCT VFR BLD AUTO: 22.7 % (ref 37–47)
HGB BLD-MCNC: 7.2 G/DL (ref 12–16)
IMM GRANULOCYTES # BLD AUTO: 0.11 K/UL (ref 0–0.11)
IMM GRANULOCYTES NFR BLD AUTO: 1.3 % (ref 0–0.9)
LYMPHOCYTES # BLD AUTO: 1.18 K/UL (ref 1–4.8)
LYMPHOCYTES NFR BLD: 14 % (ref 22–41)
MCH RBC QN AUTO: 31.2 PG (ref 27–33)
MCHC RBC AUTO-ENTMCNC: 31.7 G/DL (ref 32.2–35.5)
MCV RBC AUTO: 98.3 FL (ref 81.4–97.8)
MONOCYTES # BLD AUTO: 0.59 K/UL (ref 0–0.85)
MONOCYTES NFR BLD AUTO: 7 % (ref 0–13.4)
NEUTROPHILS # BLD AUTO: 6.23 K/UL (ref 1.82–7.42)
NEUTROPHILS NFR BLD: 73.9 % (ref 44–72)
NRBC # BLD AUTO: 0.02 K/UL
NRBC BLD-RTO: 0.2 /100 WBC (ref 0–0.2)
PLATELET # BLD AUTO: 197 K/UL (ref 164–446)
PMV BLD AUTO: 11.5 FL (ref 9–12.9)
RBC # BLD AUTO: 2.31 M/UL (ref 4.2–5.4)
WBC # BLD AUTO: 8.4 K/UL (ref 4.8–10.8)

## 2025-08-17 PROCEDURE — 86850 RBC ANTIBODY SCREEN: CPT

## 2025-08-17 PROCEDURE — 86900 BLOOD TYPING SEROLOGIC ABO: CPT

## 2025-08-17 PROCEDURE — 85730 THROMBOPLASTIN TIME PARTIAL: CPT

## 2025-08-17 PROCEDURE — 85610 PROTHROMBIN TIME: CPT

## 2025-08-17 PROCEDURE — 80053 COMPREHEN METABOLIC PANEL: CPT

## 2025-08-17 PROCEDURE — 36415 COLL VENOUS BLD VENIPUNCTURE: CPT

## 2025-08-17 PROCEDURE — 96374 THER/PROPH/DIAG INJ IV PUSH: CPT

## 2025-08-17 PROCEDURE — 85025 COMPLETE CBC W/AUTO DIFF WBC: CPT

## 2025-08-17 PROCEDURE — 86901 BLOOD TYPING SEROLOGIC RH(D): CPT

## 2025-08-17 PROCEDURE — 83690 ASSAY OF LIPASE: CPT

## 2025-08-17 PROCEDURE — 99285 EMERGENCY DEPT VISIT HI MDM: CPT

## 2025-08-17 ASSESSMENT — LIFESTYLE VARIABLES
AUDIT-C TOTAL SCORE: 0
HOW OFTEN DO YOU HAVE SIX OR MORE DRINKS ON ONE OCCASION: NEVER
HOW OFTEN DO YOU HAVE A DRINK CONTAINING ALCOHOL: NEVER
HOW MANY STANDARD DRINKS CONTAINING ALCOHOL DO YOU HAVE ON A TYPICAL DAY: PATIENT DOES NOT DRINK
SKIP TO QUESTIONS 9-10: 1

## 2025-08-17 ASSESSMENT — FIBROSIS 4 INDEX: FIB4 SCORE: 1.47

## 2025-08-18 ENCOUNTER — ANESTHESIA EVENT (OUTPATIENT)
Dept: SURGERY | Facility: MEDICAL CENTER | Age: 80
DRG: 378 | End: 2025-08-18
Payer: MEDICARE

## 2025-08-18 ENCOUNTER — ANESTHESIA (OUTPATIENT)
Dept: SURGERY | Facility: MEDICAL CENTER | Age: 80
DRG: 378 | End: 2025-08-18
Payer: MEDICARE

## 2025-08-18 PROBLEM — Z86.711 HISTORY OF PULMONARY EMBOLISM: Status: ACTIVE | Noted: 2025-08-18

## 2025-08-18 PROBLEM — K92.2 ACUTE UPPER GI BLEED: Status: ACTIVE | Noted: 2025-08-18

## 2025-08-18 PROBLEM — I48.0 PAROXYSMAL ATRIAL FIBRILLATION (HCC): Status: ACTIVE | Noted: 2025-08-18

## 2025-08-18 LAB
ABO GROUP BLD: NORMAL
ALBUMIN SERPL BCP-MCNC: 3.8 G/DL (ref 3.2–4.9)
ALBUMIN/GLOB SERPL: 1.7 G/DL
ALP SERPL-CCNC: 59 U/L (ref 30–99)
ALT SERPL-CCNC: 12 U/L (ref 2–50)
ANION GAP SERPL CALC-SCNC: 11 MMOL/L (ref 7–16)
APTT PPP: 30.4 SEC (ref 24.7–36)
AST SERPL-CCNC: 19 U/L (ref 12–45)
BARCODED ABORH UBTYP: 6200
BARCODED PRD CODE UBPRD: NORMAL
BARCODED UNIT NUM UBUNT: NORMAL
BILIRUB SERPL-MCNC: <0.2 MG/DL (ref 0.1–1.5)
BLD GP AB SCN SERPL QL: NORMAL
BUN SERPL-MCNC: 47 MG/DL (ref 8–22)
CALCIUM ALBUM COR SERPL-MCNC: 10.4 MG/DL (ref 8.5–10.5)
CALCIUM SERPL-MCNC: 10.2 MG/DL (ref 8.5–10.5)
CHLORIDE SERPL-SCNC: 102 MMOL/L (ref 96–112)
CO2 SERPL-SCNC: 21 MMOL/L (ref 20–33)
COMPONENT R 8504R: NORMAL
CREAT SERPL-MCNC: 1.19 MG/DL (ref 0.5–1.4)
GFR SERPLBLD CREATININE-BSD FMLA CKD-EPI: 46 ML/MIN/1.73 M 2
GLOBULIN SER CALC-MCNC: 2.2 G/DL (ref 1.9–3.5)
GLUCOSE SERPL-MCNC: 81 MG/DL (ref 65–99)
HCT VFR BLD AUTO: 22.9 % (ref 37–47)
HCT VFR BLD AUTO: 27.3 % (ref 37–47)
HEMOCCULT STL QL: POSITIVE
HGB BLD-MCNC: 7 G/DL (ref 12–16)
HGB BLD-MCNC: 8.6 G/DL (ref 12–16)
INR PPP: 1.23 (ref 0.87–1.13)
LIPASE SERPL-CCNC: 35 U/L (ref 11–82)
POTASSIUM SERPL-SCNC: 5.4 MMOL/L (ref 3.6–5.5)
PRODUCT TYPE UPROD: NORMAL
PROT SERPL-MCNC: 6 G/DL (ref 6–8.2)
PROTHROMBIN TIME: 15.6 SEC (ref 12–14.6)
RH BLD: NORMAL
SODIUM SERPL-SCNC: 134 MMOL/L (ref 135–145)
UNIT STATUS USTAT: NORMAL

## 2025-08-18 PROCEDURE — 160002 HCHG RECOVERY MINUTES (STAT): Performed by: SPECIALIST

## 2025-08-18 PROCEDURE — 700111 HCHG RX REV CODE 636 W/ 250 OVERRIDE (IP)

## 2025-08-18 PROCEDURE — 700102 HCHG RX REV CODE 250 W/ 637 OVERRIDE(OP)

## 2025-08-18 PROCEDURE — 160203 HCHG ENDO MINUTES - 1ST 30 MINS LEVEL 4: Performed by: SPECIALIST

## 2025-08-18 PROCEDURE — 36430 TRANSFUSION BLD/BLD COMPNT: CPT

## 2025-08-18 PROCEDURE — P9016 RBC LEUKOCYTES REDUCED: HCPCS

## 2025-08-18 PROCEDURE — 85014 HEMATOCRIT: CPT

## 2025-08-18 PROCEDURE — 30233N1 TRANSFUSION OF NONAUTOLOGOUS RED BLOOD CELLS INTO PERIPHERAL VEIN, PERCUTANEOUS APPROACH: ICD-10-PCS

## 2025-08-18 PROCEDURE — 85018 HEMOGLOBIN: CPT

## 2025-08-18 PROCEDURE — 43255 EGD CONTROL BLEEDING ANY: CPT | Performed by: SPECIALIST

## 2025-08-18 PROCEDURE — 82272 OCCULT BLD FECES 1-3 TESTS: CPT

## 2025-08-18 PROCEDURE — 86923 COMPATIBILITY TEST ELECTRIC: CPT

## 2025-08-18 PROCEDURE — 160193 HCHG PACU STANDARD - 1ST 60 MINS: Performed by: SPECIALIST

## 2025-08-18 PROCEDURE — 770020 HCHG ROOM/CARE - TELE (206)

## 2025-08-18 PROCEDURE — 160048 HCHG OR STATISTICAL LEVEL 1-5: Performed by: SPECIALIST

## 2025-08-18 PROCEDURE — A9270 NON-COVERED ITEM OR SERVICE: HCPCS

## 2025-08-18 PROCEDURE — 160015 HCHG STAT PREOP MINUTES: Performed by: SPECIALIST

## 2025-08-18 PROCEDURE — 160208 HCHG ENDO MINUTES - EA ADDL 1 MIN LEVEL 4: Performed by: SPECIALIST

## 2025-08-18 PROCEDURE — 99222 1ST HOSP IP/OBS MODERATE 55: CPT | Performed by: INTERNAL MEDICINE

## 2025-08-18 PROCEDURE — 160197 HCHG MAC ANESTHESIA: Performed by: SPECIALIST

## 2025-08-18 PROCEDURE — 0W3P8ZZ CONTROL BLEEDING IN GASTROINTESTINAL TRACT, VIA NATURAL OR ARTIFICIAL OPENING ENDOSCOPIC: ICD-10-PCS | Performed by: INTERNAL MEDICINE

## 2025-08-18 PROCEDURE — 99223 1ST HOSP IP/OBS HIGH 75: CPT | Mod: AI,GC | Performed by: STUDENT IN AN ORGANIZED HEALTH CARE EDUCATION/TRAINING PROGRAM

## 2025-08-18 PROCEDURE — 700105 HCHG RX REV CODE 258: Performed by: ANESTHESIOLOGY

## 2025-08-18 PROCEDURE — 700111 HCHG RX REV CODE 636 W/ 250 OVERRIDE (IP): Performed by: ANESTHESIOLOGY

## 2025-08-18 PROCEDURE — 700111 HCHG RX REV CODE 636 W/ 250 OVERRIDE (IP): Performed by: EMERGENCY MEDICINE

## 2025-08-18 PROCEDURE — 36415 COLL VENOUS BLD VENIPUNCTURE: CPT

## 2025-08-18 PROCEDURE — 700101 HCHG RX REV CODE 250

## 2025-08-18 RX ORDER — ZOLPIDEM TARTRATE 5 MG/1
5 TABLET ORAL NIGHTLY PRN
Status: DISCONTINUED | OUTPATIENT
Start: 2025-08-18 | End: 2025-08-21 | Stop reason: HOSPADM

## 2025-08-18 RX ORDER — ONDANSETRON 4 MG/1
4 TABLET, ORALLY DISINTEGRATING ORAL EVERY 4 HOURS PRN
Status: DISCONTINUED | OUTPATIENT
Start: 2025-08-18 | End: 2025-08-21 | Stop reason: HOSPADM

## 2025-08-18 RX ORDER — GABAPENTIN 100 MG/1
100 CAPSULE ORAL ONCE
Status: COMPLETED | OUTPATIENT
Start: 2025-08-18 | End: 2025-08-18

## 2025-08-18 RX ORDER — PANTOPRAZOLE SODIUM 40 MG/10ML
40 INJECTION, POWDER, LYOPHILIZED, FOR SOLUTION INTRAVENOUS ONCE
Status: COMPLETED | OUTPATIENT
Start: 2025-08-18 | End: 2025-08-18

## 2025-08-18 RX ORDER — VALSARTAN 80 MG/1
320 TABLET ORAL DAILY
Status: DISCONTINUED | OUTPATIENT
Start: 2025-08-18 | End: 2025-08-18

## 2025-08-18 RX ORDER — VITAMIN B COMPLEX
1000 TABLET ORAL DAILY
Status: DISCONTINUED | OUTPATIENT
Start: 2025-08-18 | End: 2025-08-21 | Stop reason: HOSPADM

## 2025-08-18 RX ORDER — SODIUM CHLORIDE, SODIUM LACTATE, POTASSIUM CHLORIDE, CALCIUM CHLORIDE 600; 310; 30; 20 MG/100ML; MG/100ML; MG/100ML; MG/100ML
INJECTION, SOLUTION INTRAVENOUS CONTINUOUS
Status: DISCONTINUED | OUTPATIENT
Start: 2025-08-18 | End: 2025-08-18 | Stop reason: HOSPADM

## 2025-08-18 RX ORDER — DAPAGLIFLOZIN 10 MG/1
10 TABLET, FILM COATED ORAL DAILY
Status: DISCONTINUED | OUTPATIENT
Start: 2025-08-18 | End: 2025-08-21 | Stop reason: HOSPADM

## 2025-08-18 RX ORDER — NYSTATIN 100000 [USP'U]/G
1 POWDER TOPICAL 3 TIMES DAILY
Status: DISCONTINUED | OUTPATIENT
Start: 2025-08-18 | End: 2025-08-21 | Stop reason: HOSPADM

## 2025-08-18 RX ORDER — ALBUTEROL SULFATE 5 MG/ML
2.5 SOLUTION RESPIRATORY (INHALATION)
Status: DISCONTINUED | OUTPATIENT
Start: 2025-08-18 | End: 2025-08-18 | Stop reason: HOSPADM

## 2025-08-18 RX ORDER — ONDANSETRON 2 MG/ML
4 INJECTION INTRAMUSCULAR; INTRAVENOUS
Status: DISCONTINUED | OUTPATIENT
Start: 2025-08-18 | End: 2025-08-18 | Stop reason: HOSPADM

## 2025-08-18 RX ORDER — ZOLPIDEM TARTRATE 5 MG/1
5 TABLET ORAL NIGHTLY PRN
Status: CANCELLED | OUTPATIENT
Start: 2025-08-18

## 2025-08-18 RX ORDER — SPIRONOLACTONE 25 MG/1
12.5 TABLET ORAL DAILY
Status: DISCONTINUED | OUTPATIENT
Start: 2025-08-18 | End: 2025-08-18

## 2025-08-18 RX ORDER — ACETAMINOPHEN 500 MG
1000 TABLET ORAL 3 TIMES DAILY
Status: DISCONTINUED | OUTPATIENT
Start: 2025-08-18 | End: 2025-08-21 | Stop reason: HOSPADM

## 2025-08-18 RX ORDER — FUROSEMIDE 20 MG/1
20 TABLET ORAL DAILY
Status: DISCONTINUED | OUTPATIENT
Start: 2025-08-18 | End: 2025-08-18

## 2025-08-18 RX ORDER — ROSUVASTATIN CALCIUM 5 MG/1
10 TABLET, COATED ORAL EVERY EVENING
Status: DISCONTINUED | OUTPATIENT
Start: 2025-08-18 | End: 2025-08-21 | Stop reason: HOSPADM

## 2025-08-18 RX ORDER — METOPROLOL SUCCINATE 25 MG/1
100 TABLET, EXTENDED RELEASE ORAL EVERY EVENING
Status: DISCONTINUED | OUTPATIENT
Start: 2025-08-18 | End: 2025-08-21 | Stop reason: HOSPADM

## 2025-08-18 RX ORDER — PANTOPRAZOLE SODIUM 40 MG/10ML
40 INJECTION, POWDER, LYOPHILIZED, FOR SOLUTION INTRAVENOUS 2 TIMES DAILY
Status: DISCONTINUED | OUTPATIENT
Start: 2025-08-18 | End: 2025-08-20

## 2025-08-18 RX ORDER — SODIUM CHLORIDE, SODIUM LACTATE, POTASSIUM CHLORIDE, CALCIUM CHLORIDE 600; 310; 30; 20 MG/100ML; MG/100ML; MG/100ML; MG/100ML
INJECTION, SOLUTION INTRAVENOUS
Status: DISCONTINUED | OUTPATIENT
Start: 2025-08-18 | End: 2025-08-18 | Stop reason: SURG

## 2025-08-18 RX ORDER — SERTRALINE HYDROCHLORIDE 100 MG/1
100 TABLET, FILM COATED ORAL DAILY
Status: DISCONTINUED | OUTPATIENT
Start: 2025-08-18 | End: 2025-08-21 | Stop reason: HOSPADM

## 2025-08-18 RX ORDER — HALOPERIDOL 5 MG/ML
1 INJECTION INTRAMUSCULAR
Status: DISCONTINUED | OUTPATIENT
Start: 2025-08-18 | End: 2025-08-18 | Stop reason: HOSPADM

## 2025-08-18 RX ORDER — FOLIC ACID 1 MG/1
1 TABLET ORAL DAILY
Status: DISCONTINUED | OUTPATIENT
Start: 2025-08-18 | End: 2025-08-21 | Stop reason: HOSPADM

## 2025-08-18 RX ADMIN — PROPOFOL 40 MG: 10 INJECTION, EMULSION INTRAVENOUS at 10:57

## 2025-08-18 RX ADMIN — DICLOFENAC SODIUM 2 G: 10 GEL TOPICAL at 09:36

## 2025-08-18 RX ADMIN — PANTOPRAZOLE SODIUM 40 MG: 40 INJECTION, POWDER, FOR SOLUTION INTRAVENOUS at 00:28

## 2025-08-18 RX ADMIN — PANTOPRAZOLE SODIUM 40 MG: 40 INJECTION, POWDER, FOR SOLUTION INTRAVENOUS at 17:23

## 2025-08-18 RX ADMIN — FOLIC ACID 1 MG: 1 TABLET ORAL at 17:22

## 2025-08-18 RX ADMIN — GABAPENTIN 100 MG: 100 CAPSULE ORAL at 05:26

## 2025-08-18 RX ADMIN — ZOLPIDEM TARTRATE 5 MG: 5 TABLET ORAL at 21:44

## 2025-08-18 RX ADMIN — PROPOFOL 60 MG: 10 INJECTION, EMULSION INTRAVENOUS at 10:54

## 2025-08-18 RX ADMIN — PROPOFOL 75 MCG/KG/MIN: 10 INJECTION, EMULSION INTRAVENOUS at 10:55

## 2025-08-18 RX ADMIN — SODIUM CHLORIDE, POTASSIUM CHLORIDE, SODIUM LACTATE AND CALCIUM CHLORIDE: 600; 310; 30; 20 INJECTION, SOLUTION INTRAVENOUS at 10:49

## 2025-08-18 RX ADMIN — ROSUVASTATIN CALCIUM 10 MG: 5 TABLET, FILM COATED ORAL at 17:22

## 2025-08-18 RX ADMIN — NYSTATIN 100000 UNITS: 100000 POWDER TOPICAL at 06:11

## 2025-08-18 RX ADMIN — SERTRALINE 100 MG: 100 TABLET, FILM COATED ORAL at 05:26

## 2025-08-18 RX ADMIN — PANTOPRAZOLE SODIUM 40 MG: 40 INJECTION, POWDER, FOR SOLUTION INTRAVENOUS at 05:25

## 2025-08-18 RX ADMIN — Medication 1000 UNITS: at 05:26

## 2025-08-18 RX ADMIN — GABAPENTIN 100 MG: 100 CAPSULE ORAL at 21:44

## 2025-08-18 RX ADMIN — DAPAGLIFLOZIN 10 MG: 10 TABLET, FILM COATED ORAL at 05:26

## 2025-08-18 RX ADMIN — ACETAMINOPHEN 1000 MG: 500 TABLET ORAL at 17:22

## 2025-08-18 RX ADMIN — ACETAMINOPHEN 1000 MG: 500 TABLET ORAL at 05:25

## 2025-08-18 ASSESSMENT — ENCOUNTER SYMPTOMS
PALPITATIONS: 0
DIARRHEA: 1
CHILLS: 1
WHEEZING: 0
SHORTNESS OF BREATH: 1
VOMITING: 0
BLOOD IN STOOL: 0
COUGH: 0
HEADACHES: 0
DIAPHORESIS: 0
FOCAL WEAKNESS: 0
ABDOMINAL PAIN: 0
FEVER: 0
NAUSEA: 0
ORTHOPNEA: 0
CONSTIPATION: 0
DIZZINESS: 0
TINGLING: 1
HEARTBURN: 0
HEMOPTYSIS: 0

## 2025-08-18 ASSESSMENT — PATIENT HEALTH QUESTIONNAIRE - PHQ9
2. FEELING DOWN, DEPRESSED, IRRITABLE, OR HOPELESS: NOT AT ALL
1. LITTLE INTEREST OR PLEASURE IN DOING THINGS: NOT AT ALL
SUM OF ALL RESPONSES TO PHQ9 QUESTIONS 1 AND 2: 0
2. FEELING DOWN, DEPRESSED, IRRITABLE, OR HOPELESS: NOT AT ALL
1. LITTLE INTEREST OR PLEASURE IN DOING THINGS: NOT AT ALL
SUM OF ALL RESPONSES TO PHQ9 QUESTIONS 1 AND 2: 0

## 2025-08-18 ASSESSMENT — FIBROSIS 4 INDEX: FIB4 SCORE: 2.23

## 2025-08-18 ASSESSMENT — PAIN DESCRIPTION - PAIN TYPE
TYPE: ACUTE PAIN
TYPE: ACUTE PAIN
TYPE: SURGICAL PAIN
TYPE: SURGICAL PAIN
TYPE: ACUTE PAIN
TYPE: SURGICAL PAIN
TYPE: SURGICAL PAIN

## 2025-08-18 ASSESSMENT — LIFESTYLE VARIABLES
TOTAL SCORE: 0
HAVE YOU EVER FELT YOU SHOULD CUT DOWN ON YOUR DRINKING: NO
HOW OFTEN DO YOU HAVE SIX OR MORE DRINKS ON ONE OCCASION: NEVER
EVER FELT BAD OR GUILTY ABOUT YOUR DRINKING: NO
HOW MANY STANDARD DRINKS CONTAINING ALCOHOL DO YOU HAVE ON A TYPICAL DAY: PATIENT DOES NOT DRINK
HOW MANY TIMES IN THE PAST YEAR HAVE YOU HAD 5 OR MORE DRINKS IN A DAY: 0
ON A TYPICAL DAY WHEN YOU DRINK ALCOHOL HOW MANY DRINKS DO YOU HAVE: 0
HAVE PEOPLE ANNOYED YOU BY CRITICIZING YOUR DRINKING: NO
ALCOHOL_USE: NO
AVERAGE NUMBER OF DAYS PER WEEK YOU HAVE A DRINK CONTAINING ALCOHOL: 0
CONSUMPTION TOTAL: NEGATIVE
DOES PATIENT WANT TO STOP DRINKING: NO
SKIP TO QUESTIONS 9-10: 1
EVER HAD A DRINK FIRST THING IN THE MORNING TO STEADY YOUR NERVES TO GET RID OF A HANGOVER: NO
AUDIT-C TOTAL SCORE: 0
HOW OFTEN DO YOU HAVE A DRINK CONTAINING ALCOHOL: NEVER

## 2025-08-18 ASSESSMENT — COGNITIVE AND FUNCTIONAL STATUS - GENERAL
DRESSING REGULAR LOWER BODY CLOTHING: A LITTLE
STANDING UP FROM CHAIR USING ARMS: A LOT
SUGGESTED CMS G CODE MODIFIER DAILY ACTIVITY: CK
DAILY ACTIVITIY SCORE: 19
SUGGESTED CMS G CODE MODIFIER MOBILITY: CL
MOVING FROM LYING ON BACK TO SITTING ON SIDE OF FLAT BED: A LOT
WALKING IN HOSPITAL ROOM: A LOT
DRESSING REGULAR UPPER BODY CLOTHING: A LITTLE
TOILETING: A LITTLE
TURNING FROM BACK TO SIDE WHILE IN FLAT BAD: A LOT
MOVING TO AND FROM BED TO CHAIR: A LOT
CLIMB 3 TO 5 STEPS WITH RAILING: A LOT
HELP NEEDED FOR BATHING: A LITTLE
MOBILITY SCORE: 12
PERSONAL GROOMING: A LITTLE

## 2025-08-19 LAB
ALBUMIN SERPL BCP-MCNC: 3.2 G/DL (ref 3.2–4.9)
ALBUMIN/GLOB SERPL: 1.5 G/DL
ALP SERPL-CCNC: 51 U/L (ref 30–99)
ALT SERPL-CCNC: 9 U/L (ref 2–50)
ANION GAP SERPL CALC-SCNC: 8 MMOL/L (ref 7–16)
AST SERPL-CCNC: 15 U/L (ref 12–45)
BILIRUB SERPL-MCNC: 0.2 MG/DL (ref 0.1–1.5)
BUN SERPL-MCNC: 25 MG/DL (ref 8–22)
CALCIUM ALBUM COR SERPL-MCNC: 10.2 MG/DL (ref 8.5–10.5)
CALCIUM SERPL-MCNC: 9.6 MG/DL (ref 8.5–10.5)
CHLORIDE SERPL-SCNC: 108 MMOL/L (ref 96–112)
CO2 SERPL-SCNC: 24 MMOL/L (ref 20–33)
CREAT SERPL-MCNC: 1.13 MG/DL (ref 0.5–1.4)
ERYTHROCYTE [DISTWIDTH] IN BLOOD BY AUTOMATED COUNT: 64.1 FL (ref 35.9–50)
ERYTHROCYTE [DISTWIDTH] IN BLOOD BY AUTOMATED COUNT: 66.4 FL (ref 35.9–50)
GFR SERPLBLD CREATININE-BSD FMLA CKD-EPI: 49 ML/MIN/1.73 M 2
GLOBULIN SER CALC-MCNC: 2.1 G/DL (ref 1.9–3.5)
GLUCOSE SERPL-MCNC: 96 MG/DL (ref 65–99)
HCT VFR BLD AUTO: 23.1 % (ref 37–47)
HCT VFR BLD AUTO: 25.8 % (ref 37–47)
HCT VFR BLD AUTO: 28.2 % (ref 37–47)
HGB BLD-MCNC: 7.4 G/DL (ref 12–16)
HGB BLD-MCNC: 7.9 G/DL (ref 12–16)
HGB BLD-MCNC: 8.6 G/DL (ref 12–16)
MAGNESIUM SERPL-MCNC: 1.9 MG/DL (ref 1.5–2.5)
MCH RBC QN AUTO: 31 PG (ref 27–33)
MCH RBC QN AUTO: 31.5 PG (ref 27–33)
MCHC RBC AUTO-ENTMCNC: 30.5 G/DL (ref 32.2–35.5)
MCHC RBC AUTO-ENTMCNC: 32 G/DL (ref 32.2–35.5)
MCV RBC AUTO: 101.8 FL (ref 81.4–97.8)
MCV RBC AUTO: 98.3 FL (ref 81.4–97.8)
PHOSPHATE SERPL-MCNC: 3.1 MG/DL (ref 2.5–4.5)
PLATELET # BLD AUTO: 186 K/UL (ref 164–446)
PLATELET # BLD AUTO: 214 K/UL (ref 164–446)
PMV BLD AUTO: 11.6 FL (ref 9–12.9)
PMV BLD AUTO: 11.6 FL (ref 9–12.9)
POTASSIUM SERPL-SCNC: 5 MMOL/L (ref 3.6–5.5)
PROT SERPL-MCNC: 5.3 G/DL (ref 6–8.2)
RBC # BLD AUTO: 2.35 M/UL (ref 4.2–5.4)
RBC # BLD AUTO: 2.77 M/UL (ref 4.2–5.4)
SODIUM SERPL-SCNC: 140 MMOL/L (ref 135–145)
WBC # BLD AUTO: 10.2 K/UL (ref 4.8–10.8)
WBC # BLD AUTO: 10.4 K/UL (ref 4.8–10.8)

## 2025-08-19 PROCEDURE — 36415 COLL VENOUS BLD VENIPUNCTURE: CPT

## 2025-08-19 PROCEDURE — 85014 HEMATOCRIT: CPT

## 2025-08-19 PROCEDURE — 97162 PT EVAL MOD COMPLEX 30 MIN: CPT

## 2025-08-19 PROCEDURE — 700102 HCHG RX REV CODE 250 W/ 637 OVERRIDE(OP)

## 2025-08-19 PROCEDURE — 84100 ASSAY OF PHOSPHORUS: CPT

## 2025-08-19 PROCEDURE — 85027 COMPLETE CBC AUTOMATED: CPT

## 2025-08-19 PROCEDURE — 80053 COMPREHEN METABOLIC PANEL: CPT

## 2025-08-19 PROCEDURE — 85018 HEMOGLOBIN: CPT

## 2025-08-19 PROCEDURE — 770020 HCHG ROOM/CARE - TELE (206)

## 2025-08-19 PROCEDURE — 97530 THERAPEUTIC ACTIVITIES: CPT

## 2025-08-19 PROCEDURE — 700111 HCHG RX REV CODE 636 W/ 250 OVERRIDE (IP)

## 2025-08-19 PROCEDURE — A9270 NON-COVERED ITEM OR SERVICE: HCPCS

## 2025-08-19 PROCEDURE — 83735 ASSAY OF MAGNESIUM: CPT

## 2025-08-19 PROCEDURE — 97535 SELF CARE MNGMENT TRAINING: CPT

## 2025-08-19 PROCEDURE — 99233 SBSQ HOSP IP/OBS HIGH 50: CPT | Mod: GC | Performed by: HOSPITALIST

## 2025-08-19 PROCEDURE — 99232 SBSQ HOSP IP/OBS MODERATE 35: CPT | Performed by: NURSE PRACTITIONER

## 2025-08-19 RX ORDER — GABAPENTIN 100 MG/1
100 CAPSULE ORAL ONCE
Status: COMPLETED | OUTPATIENT
Start: 2025-08-19 | End: 2025-08-19

## 2025-08-19 RX ADMIN — FOLIC ACID 1 MG: 1 TABLET ORAL at 17:20

## 2025-08-19 RX ADMIN — ROSUVASTATIN CALCIUM 10 MG: 5 TABLET, FILM COATED ORAL at 17:19

## 2025-08-19 RX ADMIN — SERTRALINE 100 MG: 100 TABLET, FILM COATED ORAL at 05:57

## 2025-08-19 RX ADMIN — ZOLPIDEM TARTRATE 5 MG: 5 TABLET ORAL at 21:42

## 2025-08-19 RX ADMIN — GABAPENTIN 100 MG: 100 CAPSULE ORAL at 21:42

## 2025-08-19 RX ADMIN — PANTOPRAZOLE SODIUM 40 MG: 40 INJECTION, POWDER, FOR SOLUTION INTRAVENOUS at 05:56

## 2025-08-19 RX ADMIN — ACETAMINOPHEN 1000 MG: 500 TABLET ORAL at 17:19

## 2025-08-19 RX ADMIN — NYSTATIN 100000 UNITS: 100000 POWDER TOPICAL at 17:19

## 2025-08-19 RX ADMIN — ACETAMINOPHEN 1000 MG: 500 TABLET ORAL at 05:56

## 2025-08-19 RX ADMIN — NYSTATIN 100000 UNITS: 100000 POWDER TOPICAL at 12:00

## 2025-08-19 RX ADMIN — METOPROLOL SUCCINATE 100 MG: 25 TABLET, EXTENDED RELEASE ORAL at 17:19

## 2025-08-19 RX ADMIN — DAPAGLIFLOZIN 10 MG: 10 TABLET, FILM COATED ORAL at 05:56

## 2025-08-19 RX ADMIN — NYSTATIN 100000 UNITS: 100000 POWDER TOPICAL at 05:58

## 2025-08-19 RX ADMIN — Medication 1000 UNITS: at 05:57

## 2025-08-19 RX ADMIN — PANTOPRAZOLE SODIUM 40 MG: 40 INJECTION, POWDER, FOR SOLUTION INTRAVENOUS at 17:19

## 2025-08-19 ASSESSMENT — ENCOUNTER SYMPTOMS
SORE THROAT: 0
PALPITATIONS: 0
DIZZINESS: 0
DEPRESSION: 0
CHILLS: 0
EYE DISCHARGE: 0
HEADACHES: 0
SENSORY CHANGE: 0
SPUTUM PRODUCTION: 0
WEIGHT LOSS: 0
SHORTNESS OF BREATH: 0
SHORTNESS OF BREATH: 1
WEAKNESS: 0
SINUS PAIN: 0
ABDOMINAL PAIN: 0
HALLUCINATIONS: 0
CONSTIPATION: 0
DOUBLE VISION: 0
EYE PAIN: 0
PND: 0
BRUISES/BLEEDS EASILY: 0
NAUSEA: 0
NERVOUS/ANXIOUS: 0
INSOMNIA: 0
HEARTBURN: 0
DIAPHORESIS: 0
DIARRHEA: 0
HEMOPTYSIS: 0
BLURRED VISION: 0
FOCAL WEAKNESS: 0
BLOOD IN STOOL: 0
PHOTOPHOBIA: 0
CLAUDICATION: 0
NECK PAIN: 0
FLANK PAIN: 0
VOMITING: 0
FEVER: 0
SEIZURES: 0
EYE REDNESS: 0
MYALGIAS: 0
COUGH: 0
TINGLING: 0
FALLS: 0

## 2025-08-19 ASSESSMENT — COGNITIVE AND FUNCTIONAL STATUS - GENERAL
STANDING UP FROM CHAIR USING ARMS: A LITTLE
STANDING UP FROM CHAIR USING ARMS: A LITTLE
SUGGESTED CMS G CODE MODIFIER MOBILITY: CK
SUGGESTED CMS G CODE MODIFIER MOBILITY: CK
MOBILITY SCORE: 19
CLIMB 3 TO 5 STEPS WITH RAILING: A LOT
DRESSING REGULAR UPPER BODY CLOTHING: A LITTLE
MOVING TO AND FROM BED TO CHAIR: A LITTLE
SUGGESTED CMS G CODE MODIFIER DAILY ACTIVITY: CK
CLIMB 3 TO 5 STEPS WITH RAILING: A LOT
WALKING IN HOSPITAL ROOM: A LITTLE
DRESSING REGULAR LOWER BODY CLOTHING: A LITTLE
PERSONAL GROOMING: A LITTLE
DAILY ACTIVITIY SCORE: 19
WALKING IN HOSPITAL ROOM: A LITTLE
HELP NEEDED FOR BATHING: A LITTLE
MOBILITY SCORE: 19
TOILETING: A LITTLE
MOVING TO AND FROM BED TO CHAIR: A LITTLE

## 2025-08-19 ASSESSMENT — PAIN DESCRIPTION - PAIN TYPE
TYPE: ACUTE PAIN

## 2025-08-19 ASSESSMENT — GAIT ASSESSMENTS
DISTANCE (FEET): 15
DEVIATION: INCREASED BASE OF SUPPORT;BRADYKINETIC;SHUFFLED GAIT
GAIT LEVEL OF ASSIST: STANDBY ASSIST
ASSISTIVE DEVICE: FRONT WHEEL WALKER

## 2025-08-20 LAB
ALBUMIN SERPL BCP-MCNC: 3.2 G/DL (ref 3.2–4.9)
ALBUMIN/GLOB SERPL: 1.5 G/DL
ALP SERPL-CCNC: 52 U/L (ref 30–99)
ALT SERPL-CCNC: 6 U/L (ref 2–50)
ANION GAP SERPL CALC-SCNC: 7 MMOL/L (ref 7–16)
AST SERPL-CCNC: 13 U/L (ref 12–45)
BILIRUB SERPL-MCNC: 0.2 MG/DL (ref 0.1–1.5)
BUN SERPL-MCNC: 19 MG/DL (ref 8–22)
CALCIUM ALBUM COR SERPL-MCNC: 10 MG/DL (ref 8.5–10.5)
CALCIUM SERPL-MCNC: 9.4 MG/DL (ref 8.5–10.5)
CHLORIDE SERPL-SCNC: 108 MMOL/L (ref 96–112)
CO2 SERPL-SCNC: 25 MMOL/L (ref 20–33)
CREAT SERPL-MCNC: 1.13 MG/DL (ref 0.5–1.4)
ERYTHROCYTE [DISTWIDTH] IN BLOOD BY AUTOMATED COUNT: 63.6 FL (ref 35.9–50)
GFR SERPLBLD CREATININE-BSD FMLA CKD-EPI: 49 ML/MIN/1.73 M 2
GLOBULIN SER CALC-MCNC: 2.2 G/DL (ref 1.9–3.5)
GLUCOSE SERPL-MCNC: 113 MG/DL (ref 65–99)
HCT VFR BLD AUTO: 23.9 % (ref 37–47)
HCT VFR BLD AUTO: 25.5 % (ref 37–47)
HCT VFR BLD AUTO: 26.3 % (ref 37–47)
HGB BLD-MCNC: 7.4 G/DL (ref 12–16)
HGB BLD-MCNC: 7.8 G/DL (ref 12–16)
HGB BLD-MCNC: 8.1 G/DL (ref 12–16)
MAGNESIUM SERPL-MCNC: 1.9 MG/DL (ref 1.5–2.5)
MCH RBC QN AUTO: 31 PG (ref 27–33)
MCHC RBC AUTO-ENTMCNC: 31 G/DL (ref 32.2–35.5)
MCV RBC AUTO: 100 FL (ref 81.4–97.8)
PHOSPHATE SERPL-MCNC: 3.1 MG/DL (ref 2.5–4.5)
PLATELET # BLD AUTO: 202 K/UL (ref 164–446)
PMV BLD AUTO: 11.2 FL (ref 9–12.9)
POTASSIUM SERPL-SCNC: 5.4 MMOL/L (ref 3.6–5.5)
PROT SERPL-MCNC: 5.4 G/DL (ref 6–8.2)
RBC # BLD AUTO: 2.39 M/UL (ref 4.2–5.4)
SODIUM SERPL-SCNC: 140 MMOL/L (ref 135–145)
WBC # BLD AUTO: 6.3 K/UL (ref 4.8–10.8)

## 2025-08-20 PROCEDURE — 84100 ASSAY OF PHOSPHORUS: CPT

## 2025-08-20 PROCEDURE — 85018 HEMOGLOBIN: CPT | Mod: 91

## 2025-08-20 PROCEDURE — A9270 NON-COVERED ITEM OR SERVICE: HCPCS

## 2025-08-20 PROCEDURE — 85027 COMPLETE CBC AUTOMATED: CPT

## 2025-08-20 PROCEDURE — 700102 HCHG RX REV CODE 250 W/ 637 OVERRIDE(OP)

## 2025-08-20 PROCEDURE — 99232 SBSQ HOSP IP/OBS MODERATE 35: CPT | Mod: GC | Performed by: HOSPITALIST

## 2025-08-20 PROCEDURE — 770020 HCHG ROOM/CARE - TELE (206)

## 2025-08-20 PROCEDURE — 700111 HCHG RX REV CODE 636 W/ 250 OVERRIDE (IP)

## 2025-08-20 PROCEDURE — 80053 COMPREHEN METABOLIC PANEL: CPT

## 2025-08-20 PROCEDURE — 85014 HEMATOCRIT: CPT | Mod: 91

## 2025-08-20 PROCEDURE — 83735 ASSAY OF MAGNESIUM: CPT

## 2025-08-20 RX ORDER — OMEPRAZOLE 20 MG/1
20 CAPSULE, DELAYED RELEASE ORAL 2 TIMES DAILY
Status: DISCONTINUED | OUTPATIENT
Start: 2025-08-20 | End: 2025-08-21 | Stop reason: HOSPADM

## 2025-08-20 RX ORDER — GABAPENTIN 100 MG/1
100 CAPSULE ORAL ONCE
Status: COMPLETED | OUTPATIENT
Start: 2025-08-20 | End: 2025-08-20

## 2025-08-20 RX ORDER — LANOLIN ALCOHOL/MO/W.PET/CERES
400 CREAM (GRAM) TOPICAL DAILY
Status: DISCONTINUED | OUTPATIENT
Start: 2025-08-20 | End: 2025-08-21 | Stop reason: HOSPADM

## 2025-08-20 RX ADMIN — OMEPRAZOLE 20 MG: 20 CAPSULE, DELAYED RELEASE ORAL at 17:17

## 2025-08-20 RX ADMIN — OMEPRAZOLE 20 MG: 20 CAPSULE, DELAYED RELEASE ORAL at 08:17

## 2025-08-20 RX ADMIN — FOLIC ACID 1 MG: 1 TABLET ORAL at 17:17

## 2025-08-20 RX ADMIN — ZOLPIDEM TARTRATE 5 MG: 5 TABLET ORAL at 21:26

## 2025-08-20 RX ADMIN — METOPROLOL SUCCINATE 100 MG: 25 TABLET, EXTENDED RELEASE ORAL at 17:17

## 2025-08-20 RX ADMIN — NYSTATIN 100000 UNITS: 100000 POWDER TOPICAL at 17:18

## 2025-08-20 RX ADMIN — NYSTATIN 100000 UNITS: 100000 POWDER TOPICAL at 05:02

## 2025-08-20 RX ADMIN — PANTOPRAZOLE SODIUM 40 MG: 40 INJECTION, POWDER, FOR SOLUTION INTRAVENOUS at 04:56

## 2025-08-20 RX ADMIN — DAPAGLIFLOZIN 10 MG: 10 TABLET, FILM COATED ORAL at 04:56

## 2025-08-20 RX ADMIN — ACETAMINOPHEN 1000 MG: 500 TABLET ORAL at 17:17

## 2025-08-20 RX ADMIN — Medication 5 MG: at 21:26

## 2025-08-20 RX ADMIN — ROSUVASTATIN CALCIUM 10 MG: 5 TABLET, FILM COATED ORAL at 17:17

## 2025-08-20 RX ADMIN — Medication 5 MG: at 02:38

## 2025-08-20 RX ADMIN — GABAPENTIN 100 MG: 100 CAPSULE ORAL at 21:26

## 2025-08-20 RX ADMIN — ACETAMINOPHEN 1000 MG: 500 TABLET ORAL at 02:59

## 2025-08-20 RX ADMIN — Medication 1000 UNITS: at 04:56

## 2025-08-20 RX ADMIN — Medication 400 MG: at 08:18

## 2025-08-20 RX ADMIN — SERTRALINE 100 MG: 100 TABLET, FILM COATED ORAL at 04:56

## 2025-08-20 ASSESSMENT — ENCOUNTER SYMPTOMS
SENSORY CHANGE: 0
DIZZINESS: 0
PND: 0
NAUSEA: 0
WEAKNESS: 0
HEARTBURN: 0
FALLS: 0
EYE PAIN: 0
DOUBLE VISION: 0
SEIZURES: 0
ABDOMINAL PAIN: 0
BLURRED VISION: 0
PHOTOPHOBIA: 0
VOMITING: 0
PALPITATIONS: 0
BRUISES/BLEEDS EASILY: 0
COUGH: 0
DIARRHEA: 0
CLAUDICATION: 0
EYE DISCHARGE: 0
SHORTNESS OF BREATH: 1
EYE REDNESS: 0
FLANK PAIN: 0
NECK PAIN: 0
FOCAL WEAKNESS: 0
SPUTUM PRODUCTION: 0
CHILLS: 0
HEMOPTYSIS: 0
DIAPHORESIS: 0
MYALGIAS: 0
CONSTIPATION: 0
SINUS PAIN: 0
HALLUCINATIONS: 0
TINGLING: 0
WEIGHT LOSS: 0
FEVER: 0
BLOOD IN STOOL: 0
DEPRESSION: 0

## 2025-08-20 ASSESSMENT — PAIN DESCRIPTION - PAIN TYPE
TYPE: ACUTE PAIN
TYPE: ACUTE PAIN;CHRONIC PAIN

## 2025-08-20 ASSESSMENT — FIBROSIS 4 INDEX: FIB4 SCORE: 2.1

## 2025-08-21 ENCOUNTER — PHARMACY VISIT (OUTPATIENT)
Dept: PHARMACY | Facility: MEDICAL CENTER | Age: 80
End: 2025-08-21
Payer: COMMERCIAL

## 2025-08-21 VITALS
BODY MASS INDEX: 34.91 KG/M2 | WEIGHT: 222.44 LBS | OXYGEN SATURATION: 97 % | DIASTOLIC BLOOD PRESSURE: 64 MMHG | TEMPERATURE: 97.5 F | SYSTOLIC BLOOD PRESSURE: 143 MMHG | HEART RATE: 70 BPM | RESPIRATION RATE: 16 BRPM | HEIGHT: 67 IN

## 2025-08-21 LAB
ALBUMIN SERPL BCP-MCNC: 3 G/DL (ref 3.2–4.9)
ALBUMIN/GLOB SERPL: 1.4 G/DL
ALP SERPL-CCNC: 57 U/L (ref 30–99)
ALT SERPL-CCNC: 6 U/L (ref 2–50)
ANION GAP SERPL CALC-SCNC: 7 MMOL/L (ref 7–16)
AST SERPL-CCNC: 15 U/L (ref 12–45)
BILIRUB SERPL-MCNC: <0.2 MG/DL (ref 0.1–1.5)
BUN SERPL-MCNC: 25 MG/DL (ref 8–22)
CALCIUM ALBUM COR SERPL-MCNC: 10 MG/DL (ref 8.5–10.5)
CALCIUM SERPL-MCNC: 9.2 MG/DL (ref 8.5–10.5)
CHLORIDE SERPL-SCNC: 110 MMOL/L (ref 96–112)
CO2 SERPL-SCNC: 23 MMOL/L (ref 20–33)
CREAT SERPL-MCNC: 1.24 MG/DL (ref 0.5–1.4)
ERYTHROCYTE [DISTWIDTH] IN BLOOD BY AUTOMATED COUNT: 63.8 FL (ref 35.9–50)
GFR SERPLBLD CREATININE-BSD FMLA CKD-EPI: 44 ML/MIN/1.73 M 2
GLOBULIN SER CALC-MCNC: 2.2 G/DL (ref 1.9–3.5)
GLUCOSE SERPL-MCNC: 157 MG/DL (ref 65–99)
HCT VFR BLD AUTO: 24.9 % (ref 37–47)
HGB BLD-MCNC: 7.6 G/DL (ref 12–16)
MAGNESIUM SERPL-MCNC: 1.9 MG/DL (ref 1.5–2.5)
MCH RBC QN AUTO: 31.3 PG (ref 27–33)
MCHC RBC AUTO-ENTMCNC: 30.5 G/DL (ref 32.2–35.5)
MCV RBC AUTO: 102.5 FL (ref 81.4–97.8)
PHOSPHATE SERPL-MCNC: 3.1 MG/DL (ref 2.5–4.5)
PLATELET # BLD AUTO: 192 K/UL (ref 164–446)
PMV BLD AUTO: 11 FL (ref 9–12.9)
POTASSIUM SERPL-SCNC: 5.2 MMOL/L (ref 3.6–5.5)
PROT SERPL-MCNC: 5.2 G/DL (ref 6–8.2)
RBC # BLD AUTO: 2.43 M/UL (ref 4.2–5.4)
SODIUM SERPL-SCNC: 140 MMOL/L (ref 135–145)
WBC # BLD AUTO: 5.6 K/UL (ref 4.8–10.8)

## 2025-08-21 PROCEDURE — A9270 NON-COVERED ITEM OR SERVICE: HCPCS

## 2025-08-21 PROCEDURE — 700102 HCHG RX REV CODE 250 W/ 637 OVERRIDE(OP)

## 2025-08-21 PROCEDURE — 85027 COMPLETE CBC AUTOMATED: CPT

## 2025-08-21 PROCEDURE — 84100 ASSAY OF PHOSPHORUS: CPT

## 2025-08-21 PROCEDURE — 99239 HOSP IP/OBS DSCHRG MGMT >30: CPT | Mod: GC | Performed by: HOSPITALIST

## 2025-08-21 PROCEDURE — 80053 COMPREHEN METABOLIC PANEL: CPT

## 2025-08-21 PROCEDURE — RXMED WILLOW AMBULATORY MEDICATION CHARGE

## 2025-08-21 PROCEDURE — 83735 ASSAY OF MAGNESIUM: CPT

## 2025-08-21 RX ORDER — OMEPRAZOLE 20 MG/1
CAPSULE, DELAYED RELEASE ORAL
Qty: 164 CAPSULE | Refills: 0 | Status: SHIPPED | OUTPATIENT
Start: 2025-08-21 | End: 2025-12-21

## 2025-08-21 RX ORDER — LANOLIN ALCOHOL/MO/W.PET/CERES
400 CREAM (GRAM) TOPICAL DAILY
Status: COMPLETED | OUTPATIENT
Start: 2025-08-21 | End: 2025-08-21

## 2025-08-21 RX ADMIN — OMEPRAZOLE 20 MG: 20 CAPSULE, DELAYED RELEASE ORAL at 04:42

## 2025-08-21 RX ADMIN — NYSTATIN 100000 UNITS: 100000 POWDER TOPICAL at 04:41

## 2025-08-21 RX ADMIN — Medication 400 MG: at 04:42

## 2025-08-21 RX ADMIN — Medication 400 MG: at 09:39

## 2025-08-21 RX ADMIN — DAPAGLIFLOZIN 10 MG: 10 TABLET, FILM COATED ORAL at 04:42

## 2025-08-21 RX ADMIN — Medication 1000 UNITS: at 04:42

## 2025-08-21 RX ADMIN — SERTRALINE 100 MG: 100 TABLET, FILM COATED ORAL at 04:42

## 2025-08-21 RX ADMIN — ACETAMINOPHEN 1000 MG: 500 TABLET ORAL at 04:41

## 2025-08-21 ASSESSMENT — FIBROSIS 4 INDEX: FIB4 SCORE: 2.55

## 2025-08-21 ASSESSMENT — PAIN DESCRIPTION - PAIN TYPE: TYPE: ACUTE PAIN

## 2025-08-27 ENCOUNTER — HOSPITAL ENCOUNTER (OUTPATIENT)
Facility: MEDICAL CENTER | Age: 80
End: 2025-08-27
Attending: FAMILY MEDICINE
Payer: MEDICARE

## 2025-08-27 ENCOUNTER — OFFICE VISIT (OUTPATIENT)
Dept: INTERNAL MEDICINE | Facility: IMAGING CENTER | Age: 80
End: 2025-08-27
Payer: MEDICARE

## 2025-08-27 VITALS
WEIGHT: 221.6 LBS | DIASTOLIC BLOOD PRESSURE: 62 MMHG | TEMPERATURE: 98.3 F | HEIGHT: 67 IN | HEART RATE: 75 BPM | SYSTOLIC BLOOD PRESSURE: 124 MMHG | RESPIRATION RATE: 17 BRPM | OXYGEN SATURATION: 97 % | BODY MASS INDEX: 34.78 KG/M2

## 2025-08-27 DIAGNOSIS — I10 PRIMARY HYPERTENSION: ICD-10-CM

## 2025-08-27 DIAGNOSIS — D62 ACUTE BLOOD LOSS ANEMIA: ICD-10-CM

## 2025-08-27 DIAGNOSIS — I10 PRIMARY HYPERTENSION: Primary | ICD-10-CM

## 2025-08-27 PROCEDURE — 80053 COMPREHEN METABOLIC PANEL: CPT

## 2025-08-27 PROCEDURE — 85025 COMPLETE CBC W/AUTO DIFF WBC: CPT

## 2025-08-27 ASSESSMENT — FIBROSIS 4 INDEX: FIB4 SCORE: 2.55

## 2025-08-28 LAB
ALBUMIN SERPL BCP-MCNC: 3.7 G/DL (ref 3.2–4.9)
ALBUMIN/GLOB SERPL: 1.5 G/DL
ALP SERPL-CCNC: 63 U/L (ref 30–99)
ALT SERPL-CCNC: 12 U/L (ref 2–50)
ANION GAP SERPL CALC-SCNC: 10 MMOL/L (ref 7–16)
AST SERPL-CCNC: 16 U/L (ref 12–45)
BASOPHILS # BLD AUTO: 0.7 % (ref 0–1.8)
BASOPHILS # BLD: 0.04 K/UL (ref 0–0.12)
BILIRUB SERPL-MCNC: <0.2 MG/DL (ref 0.1–1.5)
BUN SERPL-MCNC: 24 MG/DL (ref 8–22)
CALCIUM ALBUM COR SERPL-MCNC: 10.1 MG/DL (ref 8.5–10.5)
CALCIUM SERPL-MCNC: 9.9 MG/DL (ref 8.5–10.5)
CHLORIDE SERPL-SCNC: 108 MMOL/L (ref 96–112)
CO2 SERPL-SCNC: 23 MMOL/L (ref 20–33)
CREAT SERPL-MCNC: 1.16 MG/DL (ref 0.5–1.4)
EOSINOPHIL # BLD AUTO: 0.25 K/UL (ref 0–0.51)
EOSINOPHIL NFR BLD: 4.3 % (ref 0–6.9)
ERYTHROCYTE [DISTWIDTH] IN BLOOD BY AUTOMATED COUNT: 62.6 FL (ref 35.9–50)
GFR SERPLBLD CREATININE-BSD FMLA CKD-EPI: 48 ML/MIN/1.73 M 2
GLOBULIN SER CALC-MCNC: 2.4 G/DL (ref 1.9–3.5)
GLUCOSE SERPL-MCNC: 108 MG/DL (ref 65–99)
HCT VFR BLD AUTO: 29.7 % (ref 37–47)
HGB BLD-MCNC: 9.1 G/DL (ref 12–16)
IMM GRANULOCYTES # BLD AUTO: 0.03 K/UL (ref 0–0.11)
IMM GRANULOCYTES NFR BLD AUTO: 0.5 % (ref 0–0.9)
LYMPHOCYTES # BLD AUTO: 1 K/UL (ref 1–4.8)
LYMPHOCYTES NFR BLD: 17.1 % (ref 22–41)
MCH RBC QN AUTO: 31.3 PG (ref 27–33)
MCHC RBC AUTO-ENTMCNC: 30.6 G/DL (ref 32.2–35.5)
MCV RBC AUTO: 102.1 FL (ref 81.4–97.8)
MONOCYTES # BLD AUTO: 0.49 K/UL (ref 0–0.85)
MONOCYTES NFR BLD AUTO: 8.4 % (ref 0–13.4)
NEUTROPHILS # BLD AUTO: 4.05 K/UL (ref 1.82–7.42)
NEUTROPHILS NFR BLD: 69 % (ref 44–72)
NRBC # BLD AUTO: 0 K/UL
NRBC BLD-RTO: 0 /100 WBC (ref 0–0.2)
PLATELET # BLD AUTO: 283 K/UL (ref 164–446)
PMV BLD AUTO: 12.1 FL (ref 9–12.9)
POTASSIUM SERPL-SCNC: 5.1 MMOL/L (ref 3.6–5.5)
PROT SERPL-MCNC: 6.1 G/DL (ref 6–8.2)
RBC # BLD AUTO: 2.91 M/UL (ref 4.2–5.4)
SODIUM SERPL-SCNC: 141 MMOL/L (ref 135–145)
WBC # BLD AUTO: 5.9 K/UL (ref 4.8–10.8)

## 2025-08-29 ENCOUNTER — TELEPHONE (OUTPATIENT)
Dept: INTERNAL MEDICINE | Facility: IMAGING CENTER | Age: 80
End: 2025-08-29
Payer: MEDICARE

## (undated) DEVICE — LACTATED RINGERS INJ 1000 ML - (14EA/CA 60CA/PF)

## (undated) DEVICE — STOCKINETTE IMPERVIOUS 12X48 - STERILELF (10/CA)"

## (undated) DEVICE — TOWELS CLOTH SURGICAL - (4/PK 20PK/CA)

## (undated) DEVICE — GLOVE BIOGEL PI INDICATOR SZ 7.0 SURGICAL PF LF - (50/BX 4BX/CA)

## (undated) DEVICE — SODIUM CHL IRRIGATION 0.9% 1000ML (12EA/CA)

## (undated) DEVICE — PACK MAJOR ORTHO TRAUMA- (3EA/CA)

## (undated) DEVICE — MASK PANORAMIC OXYGEN PRO2 (30EA/CA)

## (undated) DEVICE — STAPLER SKIN DISP - (6/BX 10BX/CA) VISISTAT

## (undated) DEVICE — FILM CASSETTE ENDO

## (undated) DEVICE — BIT DRILL AO 4.3MM X 215MM (1EA)

## (undated) DEVICE — NEPTUNE 4 PORT MANIFOLD - (20/PK)

## (undated) DEVICE — PACK MAJOR BASIN - (2EA/CA)

## (undated) DEVICE — DRESSING PETROLEUM GAUZE 5 X 9" (50EA/BX 4BX/CA)"

## (undated) DEVICE — IMMOBILIZER KNEE 20 INCH - (1/EA)

## (undated) DEVICE — SENSOR OXIMETER ADULT SPO2 RD SET (20EA/BX)

## (undated) DEVICE — TUBING CLEARLINK DUO-VENT - C-FLO (48EA/CA)

## (undated) DEVICE — GLOVE SZ 7 BIOGEL PI MICRO - PF LF (50PR/BX 4BX/CA)

## (undated) DEVICE — GOWN WARMING STANDARD FLEX - (30/CA)

## (undated) DEVICE — ELECTRODE DUAL RETURN W/ CORD - (50/PK)

## (undated) DEVICE — PADDING CAST 6 IN STERILE - 6 X 4 YDS (24/CA)

## (undated) DEVICE — SPONGE GAUZESTER 4 X 4 4PLY - (128PK/CA)

## (undated) DEVICE — CLIP HEMOSTASIS ASSURANCE 16MM

## (undated) DEVICE — BUTTON ENDOSCOPY DISPOSABLE

## (undated) DEVICE — GLOVE SIZE 8.0 SURGEON ACCELERATOR FREE GREEN (50PR/BX)

## (undated) DEVICE — CHLORAPREP 26 ML APPLICATOR - ORANGE TINT(25/CA)

## (undated) DEVICE — BANDAGE ELASTIC 6 HONEYCOMB - 6X5YD LF (20/CA)"

## (undated) DEVICE — BLOCK BITE MAXI DENTAL RETENTION RIM (100EA/BX)

## (undated) DEVICE — COVER LIGHT HANDLE ALC PLUS DISP (18EA/BX)

## (undated) DEVICE — SLEEVE, VASO, THIGH, MED

## (undated) DEVICE — DRAPE 36X28IN RAD CARM BND BG - (25/CA)

## (undated) DEVICE — TOWEL STOP TIMEOUT SAFETY FLAG (40EA/CA)

## (undated) DEVICE — GOWN SURGEONS X-LARGE - DISP. (30/CA)

## (undated) DEVICE — SUTURE 1 STRATAFIX SYMMETRIC PDS CTX 45CM (12EA/BX)

## (undated) DEVICE — GLOVE SZ 7.5 BIOGEL PI MICRO - PF LF (50PR/BX)

## (undated) DEVICE — SUTURE 2-0 ETHILON FS - (36/BX) 18 INCH

## (undated) DEVICE — DRAPE U SPLIT IMP 54 X 76 - (24/CA)

## (undated) DEVICE — TUBE CONNECTING SUCTION - CLEAR PLASTIC STERILE 72 IN (50EA/CA)

## (undated) DEVICE — SUTURE 3-0 VICRYL PLUS SH - 8X 18 INCH (12/BX)

## (undated) DEVICE — PORT AUXILLARY WATER (50EA/BX)

## (undated) DEVICE — CANISTER SUCTION RIGID RED 1500CC (40EA/CA)

## (undated) DEVICE — WRAP COBAN SELF-ADHERENT 6 IN X 5YDS STERILE TAN (12/CA)

## (undated) DEVICE — DRESSING BORDERED GAUZE 4X10 - (15EA/BX 10BX/CA 150EA/CA)

## (undated) DEVICE — SUCTION INSTRUMENT YANKAUER BULBOUS TIP W/O VENT (50EA/CA)

## (undated) DEVICE — KIT CUSTOM PROCEDURE SINGLE FOR ENDO (15/CA)

## (undated) DEVICE — CANISTER SUCTION 3000ML MECHANICAL FILTER AUTO SHUTOFF MEDI-VAC NONSTERILE LF DISP (40EA/CA)

## (undated) DEVICE — TRAY CATHETER FOLEY URINE METER W/STATLOCK 350ML (10EA/CA)

## (undated) DEVICE — SUTURE GENERAL

## (undated) DEVICE — DRAPE U ORTHOPEDIC - (10/BX)

## (undated) DEVICE — SET EXTENSION WITH 2 PORTS (48EA/CA) ***PART #2C8610 IS A SUBSTITUTE*****

## (undated) DEVICE — DRAPE C ARMOR (12EA/CA)

## (undated) DEVICE — SET LEADWIRE 5 LEAD BEDSIDE DISPOSABLE ECG (1SET OF 5/EA)

## (undated) DEVICE — DRAPE LAPAROTOMY T SHEET - (12EA/CA)

## (undated) DEVICE — SUTURE 2-0 VICRYL PLUS CT-1 - 8 X 18 INCH(12/BX)

## (undated) DEVICE — ELECTRODE 850 FOAM ADHESIVE - HYDROGEL RADIOTRNSPRNT (50/PK)

## (undated) DEVICE — WATER IRRIGATION STERILE 1000ML (12EA/CA)

## (undated) DEVICE — SUTURE 1 PDS II PLUS TP-1 - (12PK/BX)

## (undated) DEVICE — SLEEVE VASO DVT COMPRESSION CALF MED - (10PR/CA)

## (undated) DEVICE — KIT  I.V. START (100EA/CA)

## (undated) DEVICE — PROBE CIRCUMFERENTIAL 2.3CM X 220CM (10EA/CA)